# Patient Record
Sex: MALE | Race: WHITE | Employment: OTHER | URBAN - NONMETROPOLITAN AREA
[De-identification: names, ages, dates, MRNs, and addresses within clinical notes are randomized per-mention and may not be internally consistent; named-entity substitution may affect disease eponyms.]

---

## 2017-05-30 ENCOUNTER — OFFICE VISIT (OUTPATIENT)
Dept: FAMILY MEDICINE | Facility: CLINIC | Age: 72
End: 2017-05-30

## 2017-05-30 VITALS
BODY MASS INDEX: 27.07 KG/M2 | DIASTOLIC BLOOD PRESSURE: 88 MMHG | TEMPERATURE: 97.3 F | WEIGHT: 186 LBS | HEART RATE: 50 BPM | SYSTOLIC BLOOD PRESSURE: 138 MMHG | OXYGEN SATURATION: 99 %

## 2017-05-30 DIAGNOSIS — B02.9 HERPES ZOSTER WITHOUT COMPLICATION: Primary | ICD-10-CM

## 2017-05-30 PROCEDURE — 99214 OFFICE O/P EST MOD 30 MIN: CPT | Performed by: NURSE PRACTITIONER

## 2017-05-30 RX ORDER — ACYCLOVIR 800 MG/1
800 TABLET ORAL
Qty: 35 TABLET | Refills: 0 | Status: SHIPPED | OUTPATIENT
Start: 2017-05-30 | End: 2017-11-28

## 2017-05-30 RX ORDER — PREDNISONE 20 MG/1
40 TABLET ORAL DAILY
Qty: 10 TABLET | Refills: 0 | Status: SHIPPED | OUTPATIENT
Start: 2017-05-30 | End: 2017-06-04

## 2017-05-30 RX ORDER — AMITRIPTYLINE HYDROCHLORIDE 10 MG/1
TABLET ORAL
Qty: 60 TABLET | Refills: 0 | Status: SHIPPED | OUTPATIENT
Start: 2017-05-30 | End: 2017-06-20

## 2017-05-30 NOTE — PATIENT INSTRUCTIONS
Shingles  Shingles is a viral infection caused by the same virus as chicken pox. Anyone who has had chicken pox may get shingles later in life. The virus stays in the body, but remains dormant (asleep). Shingles often occurs in older persons or persons with lowered immunity. But it can affect anyone at any age.  Shingles starts as a tingling patch of skin on one side of the body. Small, painful blisters may then appear. The rash does not spread to the rest of the body.  Exposure to shingles cannot cause shingles. However, it can cause chicken pox in anyone who has not had chicken pox or has not been vaccinated. The contagious period ends when all blisters have crusted over (generally about 2 weeks after the illness begins).  After the blisters heal, the affected skin may be sensitive or painful for months (neuralgia). This often gradually goes away.  A shingles vaccine is available. This can help prevent shingles or make it less painful. It is generally recommended for adults over the age of 60 who have had chicken pox in the past, but who have never had shingles. Adults over 60 who have had neither chicken pox nor shingles can prevent both diseases with the chicken pox vaccine. Ask your healthcare provider about these vaccines.  Home care    Medicines may be prescribed to help relieve pain. Take these medicines as directed. Ask your healthcare provider or pharmacist before using over-the-counter medicines for helping treat pain and itching.     In certain cases, antiviral medicines may be prescribed to reduce pain, shorten the illness, and prevent neuralgia. Take these medicines as directed.    Compresses made from a solution of cool water mixed with cornstarch or baking soda may help relieve pain and itching.     Gently wash skin daily with soap and water to help prevent infection.  Be certain to rinse off all of the soap, which can be irritating.    Trim fingernails and try not to scratch. Scratching the sores  may leave scars.    Stay home from work or school until all blisters have formed a crust and you are no longer contagious.  Follow-up care  Follow up with your healthcare provider or as directed by our staff.  When to seek medical advice    Fever of 100.4 F (38 C) or higher, or as directed by your healthcare provider    Affected skin is on the face or neck and any of the following occur:                          Headache                          Eye pain                          Changes in vision                          Sores near the eye                          Weakness of facial muscles    Pain, redness, or swelling of a joint    Signs of skin infection: colored drainage from the sores, warmth, increasing redness, or increasing pain    0404-9276 [employer]. [address] [place] [address]. All rights reserved. This information is not intended as a substitute for professional medical care. Always follow your healthcare professional's instructions.

## 2017-05-30 NOTE — PROGRESS NOTES
SUBJECTIVE:                                                    Simone Daniels is a 69 year old male who presents to clinic today for the following health issues:      Rash      Duration: 5 days     Description  Location: left leg   Itching: mild    Intensity:  moderate    Accompanying signs and symptoms: blisters     History (similar episodes/previous evaluation): None    Precipitating or alleviating factors:  New exposures:  soaps laundry   Recent travel: no      Therapies tried and outcome: hydrocortisone cream -  not effective and lotions     Chicken pox in childhood.   Burning and painful  5-6 days.   No relief of the pain. Used heating pad and it got worse.  Thought it might be from a new laundry soap. New use of ERA        Chronic left knee pain.       -------------------------------------    Problem list and histories reviewed & adjusted, as indicated.  Additional history: as documented    BP Readings from Last 3 Encounters:   05/30/17 (!) 152/96   12/14/15 139/82   05/22/15 148/70    Wt Readings from Last 3 Encounters:   05/30/17 186 lb (84.4 kg)   12/14/15 186 lb (84.4 kg)   05/22/15 187 lb (84.8 kg)                  Labs reviewed in EPIC    Reviewed and updated as needed this visit by clinical staff  Allergies       Reviewed and updated as needed this visit by Provider         ROS:   ROS: 10 point ROS neg other than the symptoms noted above in the HPI.      OBJECTIVE:                                                    BP (!) 152/96  Pulse 50  Temp 97.3  F (36.3  C) (Tympanic)  Wt 186 lb (84.4 kg)  SpO2 99%  BMI 27.07 kg/m2  Body mass index is 27.07 kg/(m^2).   GENERAL: healthy, alert, well nourished, well hydrated, no distress  HENT: ear canals- normal; TMs- normal; Nose- normal; Mouth- no ulcers, no lesions  NECK: no tenderness, no adenopathy, no asymmetry, no masses, no stiffness; thyroid- normal to palpation  RESP: lungs clear to auscultation - no rales, no rhonchi, no wheezes  CV: regular  rates and rhythm, normal S1 S2, no S3 or S4 and no murmur, no click or rub -  ABDOMEN: soft, no tenderness, no  hepatosplenomegaly, no masses, normal bowel sounds  SKIN blistering lesions back of the left thigh down to the left knee.     Diagnostic test results:  Results for orders placed or performed in visit on 12/16/15   Basic metabolic panel  (Ca, Cl, CO2, Creat, Gluc, K, Na, BUN)   Result Value Ref Range    Sodium 140 133 - 144 mmol/L    Potassium 4.0 3.4 - 5.3 mmol/L    Chloride 106 94 - 109 mmol/L    Carbon Dioxide 27 20 - 32 mmol/L    Anion Gap 7 3 - 14 mmol/L    Glucose 91 70 - 99 mg/dL    Urea Nitrogen 18 7 - 30 mg/dL    Creatinine 0.88 0.66 - 1.25 mg/dL    GFR Estimate 86 >60 mL/min/1.7m2    GFR Estimate If Black >90   GFR Calc   >60 mL/min/1.7m2    Calcium 8.9 8.5 - 10.1 mg/dL   Lipid Profile   Result Value Ref Range    Cholesterol 159 <200 mg/dL    Triglycerides 52 <150 mg/dL    HDL Cholesterol 76 >39 mg/dL    LDL Cholesterol Calculated 73 <100 mg/dL    Non HDL Cholesterol 83 <130 mg/dL   Cortisol   Result Value Ref Range    Cortisol Serum 13.7 4 - 22 ug/dL   TSH with free T4 reflex   Result Value Ref Range    TSH 1.73 0.40 - 4.00 mU/L   CBC with platelets   Result Value Ref Range    WBC 5.4 4.0 - 11.0 10e9/L    RBC Count 4.75 4.4 - 5.9 10e12/L    Hemoglobin 14.9 13.3 - 17.7 g/dL    Hematocrit 43.2 40.0 - 53.0 %    MCV 91 78 - 100 fl    MCH 31.4 26.5 - 33.0 pg    MCHC 34.5 31.5 - 36.5 g/dL    RDW 12.7 10.0 - 15.0 %    Platelet Count 215 150 - 450 10e9/L        ASSESSMENT/PLAN:                                                    1. Herpes zoster without complication  BEGIN NEW MEDS  - acyclovir (ZOVIRAX) 800 MG tablet; Take 1 tablet (800 mg) by mouth 5 times daily for 7 days  Dispense: 35 tablet; Refill: 0  - amitriptyline (ELAVIL) 10 MG tablet; Start at 1 tab at bedtime for 3 days, then 2 tabs at bedtime for 3 days, then 3 tabs at bedtime.  Plan to increase dose to 50-75 mg at bedtime  after 1 month  Dispense: 60 tablet; Refill: 0  - acetaminophen-codeine (TYLENOL #3) 300-30 MG per tablet; Take 1 tablet by mouth every 4 hours as needed for pain maximum 4 tablet(s) per day  Dispense: 20 tablet; Refill: 0  Start tomorrow.   - predniSONE (DELTASONE) 20 MG tablet; Take 2 tablets (40 mg) by mouth daily for 5 days  Dispense: 10 tablet; Refill: 0      Follow up with Provider - Call or return to the clinic with any worsening of symptoms or no resolution. Patient/Parent verbalized understanding and is in agreement. Medication side effects reviewed.   Current Outpatient Prescriptions   Medication Sig Dispense Refill     acyclovir (ZOVIRAX) 800 MG tablet Take 1 tablet (800 mg) by mouth 5 times daily for 7 days 35 tablet 0     amitriptyline (ELAVIL) 10 MG tablet Start at 1 tab at bedtime for 3 days, then 2 tabs at bedtime for 3 days, then 3 tabs at bedtime.  Plan to increase dose to 50-75 mg at bedtime after 1 month 60 tablet 0     acetaminophen-codeine (TYLENOL #3) 300-30 MG per tablet Take 1 tablet by mouth every 4 hours as needed for pain maximum 4 tablet(s) per day 20 tablet 0     predniSONE (DELTASONE) 20 MG tablet Take 2 tablets (40 mg) by mouth daily for 5 days 10 tablet 0     L-CITRULLINE 600 MG OR CAPS 2 capsule daily       Q-10 CO-ENZYME OR 300mg daily          See Patient Instructions    CAROLANN Leary [facility]

## 2017-05-30 NOTE — MR AVS SNAPSHOT
After Visit Summary   5/30/2017    Simone Daniels    MRN: [id number]           Patient Information     Date Of Birth          5/26/1948        Visit Information        Provider Department      5/30/2017 10:00 AM Sophia Dacosta APRN [facility]        Today's Diagnoses     Herpes zoster without complication    -  1      Care Instructions      Shingles  Shingles is a viral infection caused by the same virus as chicken pox. Anyone who has had chicken pox may get shingles later in life. The virus stays in the body, but remains dormant (asleep). Shingles often occurs in older persons or persons with lowered immunity. But it can affect anyone at any age.  Shingles starts as a tingling patch of skin on one side of the body. Small, painful blisters may then appear. The rash does not spread to the rest of the body.  Exposure to shingles cannot cause shingles. However, it can cause chicken pox in anyone who has not had chicken pox or has not been vaccinated. The contagious period ends when all blisters have crusted over (generally about 2 weeks after the illness begins).  After the blisters heal, the affected skin may be sensitive or painful for months (neuralgia). This often gradually goes away.  A shingles vaccine is available. This can help prevent shingles or make it less painful. It is generally recommended for adults over the age of 60 who have had chicken pox in the past, but who have never had shingles. Adults over 60 who have had neither chicken pox nor shingles can prevent both diseases with the chicken pox vaccine. Ask your healthcare provider about these vaccines.  Home care    Medicines may be prescribed to help relieve pain. Take these medicines as directed. Ask your healthcare provider or pharmacist before using over-the-counter medicines for helping treat pain and itching.     In certain cases, antiviral medicines may be prescribed to reduce pain, shorten the  illness, and prevent neuralgia. Take these medicines as directed.    Compresses made from a solution of cool water mixed with cornstarch or baking soda may help relieve pain and itching.     Gently wash skin daily with soap and water to help prevent infection.  Be certain to rinse off all of the soap, which can be irritating.    Trim fingernails and try not to scratch. Scratching the sores may leave scars.    Stay home from work or school until all blisters have formed a crust and you are no longer contagious.  Follow-up care  Follow up with your healthcare provider or as directed by our staff.  When to seek medical advice    Fever of 100.4 F (38 C) or higher, or as directed by your healthcare provider    Affected skin is on the face or neck and any of the following occur:                          Headache                          Eye pain                          Changes in vision                          Sores near the eye                          Weakness of facial muscles    Pain, redness, or swelling of a joint    Signs of skin infection: colored drainage from the sores, warmth, increasing redness, or increasing pain    [id number] [employer]. [address] [place] [address]. All rights reserved. This information is not intended as a substitute for professional medical care. Always follow your healthcare professional's instructions.                Follow-ups after your visit        Who to contact     If you have questions or need follow up information about today's clinic visit or your schedule please contact [facility] directly at [phone].  Normal or non-critical lab and imaging results will be communicated to you by MyChart, letter or phone within 4 business days after the clinic has received the results. If you do not hear from us within 7 days, please contact the clinic through MyChart or phone. If you have a critical or abnormal lab result, we will notify you by  "phone as soon as possible.  Submit refill requests through [employer] or call your pharmacy and they will forward the refill request to us. Please allow 3 business days for your refill to be completed.          Additional Information About Your Visit        [employer] Information     [employer] lets you send messages to your doctor, view your test results, renew your prescriptions, schedule appointments and more. To sign up, go to [email or web address] . Click on \"Log in\" on the left side of the screen, which will take you to the Welcome page. Then click on \"Sign up Now\" on the right side of the page.     You will be asked to enter the access code listed below, as well as some personal information. Please follow the directions to create your username and password.     Your access code is: [email or web address]  Expires: 2017 10:20 AM     Your access code will  in 90 days. If you need help or a new code, please call your [facility] clinic or [phone].        Care EveryWhere ID     This is your Care EveryWhere ID. This could be used by other organizations to access your [facility] medical records  [id number]        Your Vitals Were     Pulse Temperature Pulse Oximetry BMI (Body Mass Index)          50 97.3  F (36.3  C) (Tympanic) 99% 27.07 kg/m2         Blood Pressure from Last 3 Encounters:   17 (!) 152/96   12/14/15 139/82   05/22/15 148/70    Weight from Last 3 Encounters:   17 186 lb (84.4 kg)   12/14/15 186 lb (84.4 kg)   05/22/15 187 lb (84.8 kg)              Today, you had the following     No orders found for display         Today's Medication Changes          These changes are accurate as of: 17 10:20 AM.  If you have any questions, ask your nurse or doctor.               Start taking these medicines.        Dose/Directions    acetaminophen-codeine 300-30 MG per tablet   Commonly known as:  TYLENOL #3   Used for:  Herpes zoster without complication   Started by:  Sophia Dacosta APRN CNP "        Dose:  1 tablet   Take 1 tablet by mouth every 4 hours as needed for pain maximum 4 tablet(s) per day   Quantity:  20 tablet   Refills:  0       acyclovir 800 MG tablet   Commonly known as:  ZOVIRAX   Used for:  Herpes zoster without complication   Started by:  Sophia Dacosta APRN CNP        Dose:  800 mg   Take 1 tablet (800 mg) by mouth 5 times daily for 7 days   Quantity:  35 tablet   Refills:  0       amitriptyline 10 MG tablet   Commonly known as:  ELAVIL   Used for:  Herpes zoster without complication   Started by:  Sophia Dacosta APRN CNP        Start at 1 tab at bedtime for 3 days, then 2 tabs at bedtime for 3 days, then 3 tabs at bedtime.  Plan to increase dose to 50-75 mg at bedtime after 1 month   Quantity:  60 tablet   Refills:  0       predniSONE 20 MG tablet   Commonly known as:  DELTASONE   Used for:  Herpes zoster without complication   Started by:  Sophia Dacosta APRN CNP        Dose:  40 mg   Take 2 tablets (40 mg) by mouth daily for 5 days   Quantity:  10 tablet   Refills:  0         Stop taking these medicines if you haven't already. Please contact your care team if you have questions.     amoxicillin 500 MG capsule   Commonly known as:  AMOXIL   Stopped by:  Sophia Dacosta APRN CNP                Where to get your medicines      These medications were sent to [facility] [address] [address]     Phone:  [phone]     acyclovir 800 MG tablet    predniSONE 20 MG tablet         Some of these will need a paper prescription and others can be bought over the counter.  Ask your nurse if you have questions.     Bring a paper prescription for each of these medications     acetaminophen-codeine 300-30 MG per tablet    amitriptyline 10 MG tablet                Primary Care Provider Office Phone # Fax #    CAROLANN Leary [phone]       [facility]  [address] [place] [address]        Thank you!     Thank you for choosing [facility]  for your care. Our goal is always to provide you with excellent care. Hearing back from our patients is one way we can continue to improve our services. Please take a few minutes to complete the written survey that you may receive in the mail after your visit with us. Thank you!             Your Updated Medication List - Protect others around you: Learn how to safely use, store and throw away your medicines at www.disposemymeds.org.          This list is accurate as of: 5/30/17 10:20 AM.  Always use your most recent med list.                   Brand Name Dispense Instructions for use    acetaminophen-codeine 300-30 MG per tablet    TYLENOL #3    20 tablet    Take 1 tablet by mouth every 4 hours as needed for pain maximum 4 tablet(s) per day       acyclovir 800 MG tablet    ZOVIRAX    35 tablet    Take 1 tablet (800 mg) by mouth 5 times daily for 7 days       amitriptyline 10 MG tablet    ELAVIL    60 tablet    Start at 1 tab at bedtime for 3 days, then 2 tabs at bedtime for 3 days, then 3 tabs at bedtime.  Plan to increase dose to 50-75 mg at bedtime after 1 month       citrulline 600 MG Caps capsule    L-CITRULLINE     2 capsule daily       predniSONE 20 MG tablet    DELTASONE    10 tablet    Take 2 tablets (40 mg) by mouth daily for 5 days       Q-10 CO-ENZYME PO      300mg daily

## 2017-05-30 NOTE — NURSING NOTE
"Chief Complaint   Patient presents with     Derm Problem     left leg rash for 5 days        Initial There were no vitals taken for this visit. Estimated body mass index is 27.07 kg/(m^2) as calculated from the following:    Height as of 4/29/15: 5' 9.5\" (1.765 m).    Weight as of 12/14/15: 186 lb (84.4 kg).  Medication Reconciliation: complete    Health Maintenance that is potentially due pending provider review:  NONE    n/a    "

## 2017-06-12 ENCOUNTER — TELEPHONE (OUTPATIENT)
Dept: FAMILY MEDICINE | Facility: CLINIC | Age: 72
End: 2017-06-12

## 2017-06-12 DIAGNOSIS — B02.9 HERPES ZOSTER WITHOUT COMPLICATION: Primary | ICD-10-CM

## 2017-06-12 RX ORDER — LIDOCAINE 50 MG/G
PATCH TOPICAL
Qty: 90 PATCH | Refills: 1 | Status: SHIPPED | OUTPATIENT
Start: 2017-06-12 | End: 2017-06-20

## 2017-06-12 NOTE — TELEPHONE ENCOUNTER
Reason for call:  Patient reporting a symptom    Symptom or request: Shingles Healing- Shingles very itchy. Pt requesting script for something for the itching. Pt unable to get ahold of him as he is working  Until tonight. Pt was seen 5/30/17 for the Shingles. Please Advise.    Have you been treated for this before? No    Phone Number patient can be reached at:  Home number on file [phone] (home)    Best Time:  Any Time      Can we leave a detailed message on this number:  YES    Call taken on 6/12/2017 at 8:56 AM by Denisse Mcdonald

## 2017-06-18 ENCOUNTER — TELEPHONE (OUTPATIENT)
Dept: NURSING | Facility: CLINIC | Age: 72
End: 2017-06-18

## 2017-06-18 ENCOUNTER — NURSE TRIAGE (OUTPATIENT)
Dept: NURSING | Facility: CLINIC | Age: 72
End: 2017-06-18

## 2017-06-18 DIAGNOSIS — B02.29 POST HERPETIC NEURALGIA: Primary | ICD-10-CM

## 2017-06-19 RX ORDER — GABAPENTIN 300 MG/1
300 CAPSULE ORAL 3 TIMES DAILY
Qty: 90 CAPSULE | Refills: 1 | Status: CANCELLED | OUTPATIENT
Start: 2017-06-19

## 2017-06-19 NOTE — TELEPHONE ENCOUNTER
"Simone calling for a prescription for Gabapentin for his postherpetic neuralgia symptoms which he describes as a \"burning pain\" from his \"buttock to his lower leg\". He reports his shingles rash is \"almost gone\" but that his currently prescribed medications aren't helping his nerve pain. He has tried the lidocaine patches, soaking, anti itch cream, \"synovial fluid\" topically and amitriptyline, with no resolve. He reports the nerve pain has been present \"for 1 week\". Advised patient to contact clinic in am for further pain control options. This nurse will leave a message for the clinic team as well. This nurse provided patient education regarding shingles, postherpetic neuralgia treatments, gabapentin side effects, Capsaicin info, etc via [employer], [employer] and nursing knowledge. (http://[email or web address]/skin-problems-[email or web address]-treatments/shingles/understanding-postherpetic-neuralgia-basics)  "

## 2017-06-19 NOTE — TELEPHONE ENCOUNTER
"  Reason for Disposition    SEVERE pain (e.g., excruciating)    Additional Information    Negative: Difficult to awaken or acting confused  (e.g., disoriented, slurred speech)    Negative: Sounds like a life-threatening emergency to the triager    Negative: [1] Localized rash AND [2] doesn't match the SYMPTOMS of shingles    Negative: [1] Back pain AND [2] doesn't match the SYMPTOMS of shingles    Negative: Patient sounds very sick or weak to the triager    Negative: [1] Shingles rash (matches SYMPTOMS) AND [2] weak immune system (e.g., HIV positive,  cancer chemotherapy, chronic steroid treatment, splenectomy) AND [3] NOT taking antiviral medication    Negative: Shingles rash on the eyelid or tip of the nose    Negative: [1] Shingles rash of face AND [2] eye pain or blurred vision    Negative: [1] Shingles rash of face AND [2] facial weakness    Negative: [1] Shingles rash of face or ear AND [2] earache or ringing in the ear    Negative: [1] Shingles rash AND [2] spots start appearing other places on body    Negative: Fever > 100.5 F (38.1 C)    Protocols used: SHINGLES-ADULT-AH    Simone calling for a prescription for Gabapentin for his postherpetic neuralgia symptoms which he describes as a \"burning pain\" from his \"buttock to his lower leg\". He reports his shingles rash is \"almost gone\" but that his currently prescribed medications aren't helping his nerve pain. He has tried the lidocaine patches, soaking, anti itch cream, \"synovial fluid\" topically and amitriptyline, with no resolve. He reports the nerve pain has been present \"for 1 week\". Advised patient to contact clinic in am for further pain control options. This nurse will leave a message for the clinic team as well. This nurse provided patient education regarding shingles, postherpetic neuralgia treatments, gabapentin side effects, Capsaicin info, etc via [employer], [employer] and nursing knowledge. " (http://www.[email or web address]/skin-problems-and-treatments/shingles/understanding-postherpetic-neuralgia-basics)

## 2017-06-19 NOTE — TELEPHONE ENCOUNTER
Gabapentin ready to send to pharmacy of his choice.  Maybe once a day, then twice a day, then 3x/day.  Let's see how that does.

## 2017-06-19 NOTE — TELEPHONE ENCOUNTER
Clinic Action Needed: please call Simone regarding his postherpetic neuralgia pain options (he might try gabapentin or capsaicin)  [facility] Triage Call  Presenting Problem: current pain control methods not resolving pain  Guideline Used: shingles; see triage encounter from today for details  Patient Recommendations/Teaching: call clinic tomorrow morning for advice  Routed to: Sophia Dacosta's care team

## 2017-06-20 ENCOUNTER — OFFICE VISIT (OUTPATIENT)
Dept: FAMILY MEDICINE | Facility: CLINIC | Age: 72
End: 2017-06-20

## 2017-06-20 VITALS
BODY MASS INDEX: 26.05 KG/M2 | OXYGEN SATURATION: 99 % | TEMPERATURE: 97.3 F | DIASTOLIC BLOOD PRESSURE: 82 MMHG | SYSTOLIC BLOOD PRESSURE: 139 MMHG | WEIGHT: 179 LBS | HEART RATE: 80 BPM

## 2017-06-20 DIAGNOSIS — B02.29 POST HERPETIC NEURALGIA: Primary | ICD-10-CM

## 2017-06-20 PROCEDURE — 99214 OFFICE O/P EST MOD 30 MIN: CPT | Performed by: NURSE PRACTITIONER

## 2017-06-20 RX ORDER — GABAPENTIN 300 MG/1
CAPSULE ORAL
Qty: 90 CAPSULE | Refills: 0 | Status: SHIPPED | OUTPATIENT
Start: 2017-06-20 | End: 2017-06-20

## 2017-06-20 RX ORDER — GABAPENTIN 100 MG/1
CAPSULE ORAL
Qty: 90 CAPSULE | Refills: 0 | Status: SHIPPED | OUTPATIENT
Start: 2017-06-20 | End: 2017-07-25

## 2017-06-20 RX ORDER — ASPIRIN 81 MG
TABLET,CHEWABLE ORAL 4 TIMES DAILY
Qty: 56.6 G | Refills: 2 | Status: ON HOLD | OUTPATIENT
Start: 2017-06-20 | End: 2018-12-11

## 2017-06-20 NOTE — PROGRESS NOTES
SUBJECTIVE:                                                    Simone Daniels is a 69 year old male who presents to clinic today for the following health issues:      RASH SHINGLES PAIN       Duration: 2 weeks     Description (location/character/radiation): left leg and abd     Intensity:  severe    Accompanying signs and symptoms: shooting pains     History (similar episodes/previous evaluation): shingles        Shingles lesions resolving no further open sores.   Severe burning pain left buttocks radiating down the left leg  lidoderm patches seemed to help some but when heated up after being outside. Causing severe burning sensation  Needs something further for pain. Not sleeping well. Hard to perform ADL's due to pain.   -------------------------------------    Problem list and histories reviewed & adjusted, as indicated.  Additional history: as documented    BP Readings from Last 3 Encounters:   06/20/17 139/82   05/30/17 138/88   12/14/15 139/82    Wt Readings from Last 3 Encounters:   06/20/17 179 lb (81.2 kg)   05/30/17 186 lb (84.4 kg)   12/14/15 186 lb (84.4 kg)                  Labs reviewed in EPIC    Reviewed and updated as needed this visit by clinical staff  Allergies       Reviewed and updated as needed this visit by Provider         ROS:  C: NEGATIVE for fever, chills, change in weight  INTEGUMENTARY/SKIN: SHINGELS  E/M: NEGATIVE for ear, mouth and throat problems  R: NEGATIVE for significant cough or SOB  CV: NEGATIVE for chest pain, palpitations or peripheral edema  MUSCULOSKELETAL: CHRONIC LEFT KNEE PAIN  ROS otherwise negative    OBJECTIVE:                                                    /82  Pulse 80  Temp 97.3  F (36.3  C) (Tympanic)  Wt 179 lb (81.2 kg)  SpO2 99%  BMI 26.05 kg/m2  Body mass index is 26.05 kg/(m^2).   GENERAL: healthy, alert, well nourished, well hydrated, no distress  HENT: ear canals- normal; TMs- normal; Nose- normal; Mouth- no ulcers, no lesions  NECK: no  tenderness, no adenopathy, no asymmetry, no masses, no stiffness; thyroid- normal to palpation  RESP: lungs clear to auscultation - no rales, no rhonchi, no wheezes  CV: regular rates and rhythm, normal S1 S2, no S3 or S4 and no murmur, no click or rub -  ABDOMEN: soft, no tenderness, no  hepatosplenomegaly, no masses, normal bowel sounds  SKIN calamine lotion if placed. Hard to see. Lesions all look to have dried up.   CMS to LLE intact.   Hypersensitive to touch.     Diagnostic test results:  none      ASSESSMENT/PLAN:                                                    1. Post herpetic neuralgia  Discussed treatment strategies.   May use topical   - Capsaicin 0.1 % cream; Apply topically 4 times daily  Dispense: 56.6 g; Refill: 2  Try oral   - gabapentin (NEURONTIN) 100 MG capsule; 100 mg by mouth at night for 3 nights then then 1 tablet twice daily for 3 days, then 1 tablet three times daily.  Dispense: 90 capsule; Refill: 0  Stop amitriptyline while taking neurontin.       Follow up with Provider - 1 month.  Call or return to the clinic with any worsening of symptoms or no resolution. Patient/Parent verbalized understanding and is in agreement. Medication side effects reviewed.   Current Outpatient Prescriptions   Medication Sig Dispense Refill     acetaminophen-codeine (TYLENOL #3) 300-30 MG per tablet Take 1 tablet by mouth every 4 hours as needed for pain maximum 4 tablet(s) per day 30 tablet 0     Capsaicin 0.1 % cream Apply topically 4 times daily 56.6 g 2     gabapentin (NEURONTIN) 100 MG capsule 100 mg by mouth at night for 3 nights then then 1 tablet twice daily for 3 days, then 1 tablet three times daily. 90 capsule 0     L-CITRULLINE 600 MG OR CAPS 2 capsule daily       Q-10 CO-ENZYME OR 300mg daily       [DISCONTINUED] gabapentin (NEURONTIN) 300 MG capsule Take 1 tablet (300 mg) every night for 1-3 days, then 1 tablet twice daily for 1-3 days, then 1 tablet three times daily 90 capsule 0     acyclovir  (ZOVIRAX) 800 MG tablet Take 1 tablet (800 mg) by mouth 5 times daily for 7 days 35 tablet 0          CAROLANN Leary CNP  [facility]

## 2017-06-20 NOTE — PATIENT INSTRUCTIONS
Shingles (Herpes Zoster)     Talk to your healthcare provider about the shingles vaccine.     Shingles is also called herpes zoster. It is a painful skin rash caused by the herpes zoster virus. This is the same virus that causes chickenpox. After a person has chickenpox, the virus remains inactive in the nerve cells. Years later, the virus can become active again and travel to the skin. Most people have shingles only once, but it is possible to have it more than once.  What are the risk factors for shingles?  Anyone who has ever had chickenpox can develop shingles. But your risk is greater if you:    Are 50 years of age or older    Have an illness that weakens your immune system, such as HIV/AIDS    Have cancer, especially Hodgkin disease or lymphoma    Take medicines that weaken your immune system  What are the symptoms of shingles?    The first sign of shingles is usually pain, burning, tingling, or itching on one part of your face or body. You may also feel as if you have the flu, with fever and chills.    A red rash with small blisters appears within a few days. The rash may appear as follows:     The blisters can occur anywhere, but they re most common on the back, chest, or abdomen.    They usually appear on only one side of the body, spreading along the nerve pathway where the virus was inactive.     The rash can also form around an eye, along one side of the face or neck, or in the mouth.    In a few people, usually those with weakened immune systems, shingles appear on more than one part of the body at once.    After a few days, the blisters become dry and form a crust. The crust falls off in days to weeks. The blisters generally do not leave scars.  How is shingles treated?  For most people, shingles heals on its own in a few weeks. But treatment is recommended to help relieve pain, speed healing, and reduce the risk of complications. Antiviral medicines are prescribed within the first 72 hours of the  appearance of the rash. To lessen symptoms:    Apply ice packs (wrapped in a thin towel) or cool compresses, or soak in a cool bath.    Use calamine lotion to calm itchy skin.    Ask your healthcare provider about over-the-counter pain relievers. If your pain is severe, your healthcare provider may prescribe stronger pain medicines.  What are the complications of shingles?  Shingles often goes away with no lasting effects. But some people have serious problems long after the blisters have healed:    Postherpetic neuralgia. This is the most common complication. It is severe nerve pain at the place where the rash used to be. It can last for months, or even years after you have had shingles. Medicines can be prescribed to help relieve the pain and improve quality of life.    Bacterial infection. Shingles blisters may become infected with bacteria. Antibiotic medicine is used to treat the infection.    Eye problems. A person with shingles on the face should see his or her healthcare provider right away. Shingles can cause serious problems with vision, and even blindness.  Very rarely shingles can also lead to pneumonia, hearing problems, brain inflammation, or even death.   When to seek medical care  Contact your healthcare provider if you experience any of the following:    Symptoms that don t go away with treatment    A rash or blisters near your eye    Increased drainage, fever, or rash after treatment, or severe pain that doesn t go away   How can shingles be prevented?  You can only get shingles if you have had chicken pox in the past. Those who have never had chickenpox can get the virus from you. Although instead of developing shingles, the person may get chickenpox. Until your blisters form scabs, avoid contact with others, especially the following:    Pregnant women who have never had chickenpox or the vaccine    Infants who were born early (prematurely) or who had low weight at birth    People with weak immune  system (for example, people receiving chemotherapy for cancer, people who have had organ transplants, or people with HIV infections)     The shingles vaccine  If you re 60 years of age or older, ask your healthcare provider if you should receive the shingles vaccine. The vaccine makes it less likely that you will develop shingles. If you do develop shingles, your symptoms will likely be milder than if you hadn t been vaccinated. Note: Although the vaccine is licensed for people 50 years of age or older, the CDC does not recommend the vaccine for those who are 50 to 59 years old.   Date Last Reviewed: 10/1/2016    6460-5854 [employer]. [address] [place] [address], [place] [address]. All rights reserved. This information is not intended as a substitute for professional medical care. Always follow your healthcare professional's instructions.

## 2017-06-20 NOTE — MR AVS SNAPSHOT
After Visit Summary   6/20/2017    Simone Daniels    MRN: [id number]           Patient Information     Date Of Birth          5/26/1948        Visit Information        Provider Department      6/20/2017 12:00 PM Sophia Dacosta APRN [facility]        Today's Diagnoses     Post herpetic neuralgia    -  1    Herpes zoster without complication          Care Instructions      Shingles (Herpes Zoster)     Talk to your healthcare provider about the shingles vaccine.     Shingles is also called herpes zoster. It is a painful skin rash caused by the herpes zoster virus. This is the same virus that causes chickenpox. After a person has chickenpox, the virus remains inactive in the nerve cells. Years later, the virus can become active again and travel to the skin. Most people have shingles only once, but it is possible to have it more than once.  What are the risk factors for shingles?  Anyone who has ever had chickenpox can develop shingles. But your risk is greater if you:    Are 50 years of age or older    Have an illness that weakens your immune system, such as HIV/AIDS    Have cancer, especially Hodgkin disease or lymphoma    Take medicines that weaken your immune system  What are the symptoms of shingles?    The first sign of shingles is usually pain, burning, tingling, or itching on one part of your face or body. You may also feel as if you have the flu, with fever and chills.    A red rash with small blisters appears within a few days. The rash may appear as follows:     The blisters can occur anywhere, but they re most common on the back, chest, or abdomen.    They usually appear on only one side of the body, spreading along the nerve pathway where the virus was inactive.     The rash can also form around an eye, along one side of the face or neck, or in the mouth.    In a few people, usually those with weakened immune systems, shingles appear on more than one part of the  body at once.    After a few days, the blisters become dry and form a crust. The crust falls off in days to weeks. The blisters generally do not leave scars.  How is shingles treated?  For most people, shingles heals on its own in a few weeks. But treatment is recommended to help relieve pain, speed healing, and reduce the risk of complications. Antiviral medicines are prescribed within the first 72 hours of the appearance of the rash. To lessen symptoms:    Apply ice packs (wrapped in a thin towel) or cool compresses, or soak in a cool bath.    Use calamine lotion to calm itchy skin.    Ask your healthcare provider about over-the-counter pain relievers. If your pain is severe, your healthcare provider may prescribe stronger pain medicines.  What are the complications of shingles?  Shingles often goes away with no lasting effects. But some people have serious problems long after the blisters have healed:    Postherpetic neuralgia. This is the most common complication. It is severe nerve pain at the place where the rash used to be. It can last for months, or even years after you have had shingles. Medicines can be prescribed to help relieve the pain and improve quality of life.    Bacterial infection. Shingles blisters may become infected with bacteria. Antibiotic medicine is used to treat the infection.    Eye problems. A person with shingles on the face should see his or her healthcare provider right away. Shingles can cause serious problems with vision, and even blindness.  Very rarely shingles can also lead to pneumonia, hearing problems, brain inflammation, or even death.   When to seek medical care  Contact your healthcare provider if you experience any of the following:    Symptoms that don t go away with treatment    A rash or blisters near your eye    Increased drainage, fever, or rash after treatment, or severe pain that doesn t go away   How can shingles be prevented?  You can only get shingles if you have  had chicken pox in the past. Those who have never had chickenpox can get the virus from you. Although instead of developing shingles, the person may get chickenpox. Until your blisters form scabs, avoid contact with others, especially the following:    Pregnant women who have never had chickenpox or the vaccine    Infants who were born early (prematurely) or who had low weight at birth    People with weak immune system (for example, people receiving chemotherapy for cancer, people who have had organ transplants, or people with HIV infections)     The shingles vaccine  If you re 60 years of age or older, ask your healthcare provider if you should receive the shingles vaccine. The vaccine makes it less likely that you will develop shingles. If you do develop shingles, your symptoms will likely be milder than if you hadn t been vaccinated. Note: Although the vaccine is licensed for people 50 years of age or older, the CDC does not recommend the vaccine for those who are 50 to 59 years old.   Date Last Reviewed: 10/1/2016    0449-5036 [employer]. [address] [place] [address]. All rights reserved. This information is not intended as a substitute for professional medical care. Always follow your healthcare professional's instructions.                Follow-ups after your visit        Who to contact     If you have questions or need follow up information about today's clinic visit or your schedule please contact [facility] directly at [phone].  Normal or non-critical lab and imaging results will be communicated to you by MyChart, letter or phone within 4 business days after the clinic has received the results. If you do not hear from us within 7 days, please contact the clinic through MyChart or phone. If you have a critical or abnormal lab result, we will notify you by phone as soon as possible.  Submit refill requests through [employer] or call your pharmacy and they will  "forward the refill request to us. Please allow 3 business days for your refill to be completed.          Additional Information About Your Visit        [email or web address] Information     [employer] lets you send messages to your doctor, view your test results, renew your prescriptions, schedule appointments and more. To sign up, go to [email or web address] . Click on \"Log in\" on the left side of the screen, which will take you to the Welcome page. Then click on \"Sign up Now\" on the right side of the page.     You will be asked to enter the access code listed below, as well as some personal information. Please follow the directions to create your username and password.     Your access code is: [email or web address]  Expires: 2017 10:20 AM     Your access code will  in 90 days. If you need help or a new code, please call your [facility] clinic or [phone].        Care EveryWhere ID     This is your Care EveryWhere ID. This could be used by other organizations to access your [facility] medical records  [id number]        Your Vitals Were     Pulse Temperature Pulse Oximetry BMI (Body Mass Index)          80 97.3  F (36.3  C) (Tympanic) 99% 26.05 kg/m2         Blood Pressure from Last 3 Encounters:   17 139/82   17 138/88   12/14/15 139/82    Weight from Last 3 Encounters:   17 179 lb (81.2 kg)   17 186 lb (84.4 kg)   12/14/15 186 lb (84.4 kg)              Today, you had the following     No orders found for display         Today's Medication Changes          These changes are accurate as of: 17 12:26 PM.  If you have any questions, ask your nurse or doctor.               Start taking these medicines.        Dose/Directions    Capsaicin 0.1 % cream   Used for:  Post herpetic neuralgia   Started by:  Sophia Dacosta APRN CNP        Apply topically 4 times daily   Quantity:  56.6 g   Refills:  2       gabapentin 300 MG capsule   Commonly known as:  NEURONTIN   Used for:  Post herpetic neuralgia "   Started by:  Sophia Dacosta APRN CNP        Take 1 tablet (300 mg) every night for 1-3 days, then 1 tablet twice daily for 1-3 days, then 1 tablet three times daily   Quantity:  90 capsule   Refills:  0         Stop taking these medicines if you haven't already. Please contact your care team if you have questions.     amitriptyline 10 MG tablet   Commonly known as:  ELAVIL   Stopped by:  Sophia Dacosta APRN CNP           lidocaine 5 % Patch   Commonly known as:  LIDODERM   Stopped by:  Sophia Dacosta APRN CNP                Where to get your medicines      These medications [place] [facility] [place] - [address]  [address] [facility] [address]     Phone:  [phone]     Capsaicin 0.1 % cream    gabapentin 300 MG capsule         Some of these will need a paper prescription and others can be bought over the counter.  Ask your nurse if you have questions.     Bring a paper prescription for each of these medications     acetaminophen-codeine 300-30 MG per tablet                Primary Care Provider Office Phone # Fax #    CAROLANN Leary [phone]       [facility] [address] [facility] [address]        Thank you!     Thank you for choosing [facility]  for your care. Our goal is always to provide you with excellent care. Hearing back from our patients is one way we can continue to improve our services. Please take a few minutes to complete the written survey that you may receive in the mail after your visit with us. Thank you!             Your Updated Medication List - Protect others around you: Learn how to safely use, store and throw away your medicines at www.disposemymeds.org.          This list is accurate as of: 6/20/17 12:26 PM.  Always use your most recent med list.                   Brand Name Dispense Instructions for use    acetaminophen-codeine 300-30 MG per tablet    TYLENOL  #3    30 tablet    Take 1 tablet by mouth every 4 hours as needed for pain maximum 4 tablet(s) per day       acyclovir 800 MG tablet    ZOVIRAX    35 tablet    Take 1 tablet (800 mg) by mouth 5 times daily for 7 days       Capsaicin 0.1 % cream     56.6 g    Apply topically 4 times daily       citrulline 600 MG Caps capsule    L-CITRULLINE     2 capsule daily       gabapentin 300 MG capsule    NEURONTIN    90 capsule    Take 1 tablet (300 mg) every night for 1-3 days, then 1 tablet twice daily for 1-3 days, then 1 tablet three times daily       Q-10 CO-ENZYME PO      300mg daily

## 2017-06-20 NOTE — NURSING NOTE
"Chief Complaint   Patient presents with     Pain     shingles pain        Initial There were no vitals taken for this visit. Estimated body mass index is 27.07 kg/(m^2) as calculated from the following:    Height as of 4/29/15: 5' 9.5\" (1.765 m).    Weight as of 5/30/17: 186 lb (84.4 kg).  Medication Reconciliation: complete    Health Maintenance that is potentially due pending provider review:  NONE    n/a    "

## 2017-07-12 DIAGNOSIS — B02.29 OTHER POSTHERPETIC NERVOUS SYSTEM INVOLVEMENT: Primary | ICD-10-CM

## 2017-07-12 NOTE — TELEPHONE ENCOUNTER
Please call patient and do a formal Pain Eval to document ongoing pain.   Location of Pain:  Duration of Pain:   Rating of Pain: /10  Pain is better with:   Pain is worse with:  Treatment so far consists of:    RX in out basket for pain meds  Thanks Sophia Dacosta FNP-BC

## 2017-07-12 NOTE — TELEPHONE ENCOUNTER
Pain Eval  Location of Pain: left leg  Duration of Pain: one month  Rating of Pain: 6/10  Pain is better with: taking pain medication  Pain is worse with:   Treatment so far consists of: Tylenol 3  :

## 2017-07-25 DIAGNOSIS — B02.29 POST HERPETIC NEURALGIA: ICD-10-CM

## 2017-07-25 RX ORDER — GABAPENTIN 100 MG/1
100 CAPSULE ORAL 3 TIMES DAILY
Qty: 30 CAPSULE | Refills: 0 | Status: SHIPPED | OUTPATIENT
Start: 2017-07-25 | End: 2017-11-28

## 2017-07-25 NOTE — TELEPHONE ENCOUNTER
Gabapentin      Last Written Prescription Date: 6/20/2017  Last Quantity: 90, # refills: 0  Last Office Visit with [facility], [facility] or [facility] prescribing provider: 6/20/2017       Creatinine   Date Value Ref Range Status   12/16/2015 0.88 0.66 - 1.25 mg/dL Final     Lab Results   Component Value Date    AST 32 02/27/2010     Lab Results   Component Value Date    ALT 12 02/27/2010     BP Readings from Last 3 Encounters:   06/20/17 139/82   05/30/17 138/88   12/14/15 139/82       He is wondering if he can get 10 more days of the Gabapentin for his shingles.

## 2017-11-28 ENCOUNTER — OFFICE VISIT (OUTPATIENT)
Dept: FAMILY MEDICINE | Facility: CLINIC | Age: 72
End: 2017-11-28

## 2017-11-28 VITALS
DIASTOLIC BLOOD PRESSURE: 62 MMHG | BODY MASS INDEX: 26.05 KG/M2 | WEIGHT: 179 LBS | RESPIRATION RATE: 14 BRPM | SYSTOLIC BLOOD PRESSURE: 126 MMHG | HEART RATE: 80 BPM | OXYGEN SATURATION: 99 % | TEMPERATURE: 98.2 F

## 2017-11-28 DIAGNOSIS — B02.29 POST HERPETIC NEURALGIA: ICD-10-CM

## 2017-11-28 DIAGNOSIS — N30.00 ACUTE CYSTITIS WITHOUT HEMATURIA: ICD-10-CM

## 2017-11-28 DIAGNOSIS — N39.41 URGE INCONTINENCE OF URINE: Primary | ICD-10-CM

## 2017-11-28 LAB
ALBUMIN UR-MCNC: 30 MG/DL
APPEARANCE UR: ABNORMAL
BACTERIA #/AREA URNS HPF: ABNORMAL /HPF
BILIRUB UR QL STRIP: NEGATIVE
COLOR UR AUTO: YELLOW
GLUCOSE UR STRIP-MCNC: NEGATIVE MG/DL
HGB UR QL STRIP: ABNORMAL
KETONES UR STRIP-MCNC: NEGATIVE MG/DL
LEUKOCYTE ESTERASE UR QL STRIP: ABNORMAL
NITRATE UR QL: POSITIVE
PH UR STRIP: 5.5 PH (ref 5–7)
RBC #/AREA URNS AUTO: ABNORMAL /HPF
SOURCE: ABNORMAL
SP GR UR STRIP: 1.01 (ref 1–1.03)
UROBILINOGEN UR STRIP-ACNC: 0.2 EU/DL (ref 0.2–1)
WBC #/AREA URNS AUTO: >100 /HPF

## 2017-11-28 PROCEDURE — 81001 URINALYSIS AUTO W/SCOPE: CPT | Performed by: NURSE PRACTITIONER

## 2017-11-28 PROCEDURE — 87086 URINE CULTURE/COLONY COUNT: CPT | Performed by: NURSE PRACTITIONER

## 2017-11-28 PROCEDURE — 99214 OFFICE O/P EST MOD 30 MIN: CPT | Performed by: NURSE PRACTITIONER

## 2017-11-28 RX ORDER — GABAPENTIN 100 MG/1
100 CAPSULE ORAL 3 TIMES DAILY
Qty: 90 CAPSULE | Refills: 2 | Status: ON HOLD | OUTPATIENT
Start: 2017-11-28 | End: 2018-12-11

## 2017-11-28 RX ORDER — SULFAMETHOXAZOLE/TRIMETHOPRIM 800-160 MG
1 TABLET ORAL 2 TIMES DAILY
Qty: 20 TABLET | Refills: 0 | Status: SHIPPED | OUTPATIENT
Start: 2017-11-28 | End: 2017-12-08

## 2017-11-28 NOTE — PATIENT INSTRUCTIONS
Urinary Tract Infections in Men  Urinary tract infections (UTIs) are most often caused by bacteria (germs) that invade the urinary tract. The bacteria may come from outside the body. Or they may travel from the skin outside of rectum into the urethra. Pain in or around the urinary tract is a common symptom for most UTIs. But the only way to know for sure if you have a UTI is to have a urinalysis and urine culture.     Four Types of UTIs    Cystitis: A bladder infection, or cystitis, is often linked to a blockage from an enlarged prostate. You may have an urgent or frequent need to urinate, and bloody urine. Treatment includes antibiotics and medications to relax or shrink the prostate. In some cases, surgery is needed.    Urethritis: This is an infection of the urethra. You may have a discharge from the urethra or burning when you urinate.You may also have pain in the urethra or penis. Urethritis is treated with antibiotics.    Prostatitis: This is an inflammation or infection of the prostate. You may have an urgent or frequent need to urinate, fever, or burning when you urinate. Or you may have a tender prostate, or a vague feeling of pressure. Prostatitis is treated with a range of medications, depending on the cause.    Pyelonephritis: This is a kidney infection. If not treated, it can be serious and damage your kidneys. In severe cases you may be hospitalized. You may have a fever and upper back pain.  Treating a UTI    Medications: Most UTIs are treated with antibiotics. These kill the bacteria. The length of time you need to take them depends on the type of infection. Take antibiotics exactly as directed until all of the medication is gone. If you do not, the infection may not go away and may become harder to treat. For certain types of UTIs, you may be given other medications to help treat your symptoms.    Lifestyle changes: The lifestyle changes below will help get rid of your current infection. They may  also help prevent future UTIs.    Drink plenty of fluids such as water, juice, or other caffeine-free drinks. This helps flush bacteria out of your system.    Empty your bladder when you feel the urge to urinate and before going to sleep. Urine that stays in your bladder promotes infection.    Use condoms during sex. These help prevent UTIs caused by sexually transmitted bacteria.    Keep follow-up appointments with your health care provider. He or she can may do tests to make sure the infection has cleared. If necessary, additional treatment can be started.    Additional treatment: Most UTIs respond to medication. But sometimes a procedure or surgery is needed. This can treat an enlarged prostate, or remove a kidney stone or other blockage. Surgery may also treat problems caused by scarring or long-term infections.    2584-7171 [employer]. [address] [place] [address], [place] [address]. All rights reserved. This information is not intended as a substitute for professional medical care. Always follow your healthcare professional's instructions.  This information has been modified by your health care provider with permission from the publisher.

## 2017-11-28 NOTE — MR AVS SNAPSHOT
After Visit Summary   11/28/2017    Simone Daniels    MRN: [id number]           Patient Information     Date Of Birth          5/26/1948        Visit Information        Provider Department      11/28/2017 10:00 AM Sophia Dacosta APRN [facility]        Today's Diagnoses     Urge incontinence of urine    -  1    Post herpetic neuralgia        Acute cystitis without hematuria          Care Instructions      Urinary Tract Infections in Men  Urinary tract infections (UTIs) are most often caused by bacteria (germs) that invade the urinary tract. The bacteria may come from outside the body. Or they may travel from the skin outside of rectum into the urethra. Pain in or around the urinary tract is a common symptom for most UTIs. But the only way to know for sure if you have a UTI is to have a urinalysis and urine culture.     Four Types of UTIs    Cystitis: A bladder infection, or cystitis, is often linked to a blockage from an enlarged prostate. You may have an urgent or frequent need to urinate, and bloody urine. Treatment includes antibiotics and medications to relax or shrink the prostate. In some cases, surgery is needed.    Urethritis: This is an infection of the urethra. You may have a discharge from the urethra or burning when you urinate.You may also have pain in the urethra or penis. Urethritis is treated with antibiotics.    Prostatitis: This is an inflammation or infection of the prostate. You may have an urgent or frequent need to urinate, fever, or burning when you urinate. Or you may have a tender prostate, or a vague feeling of pressure. Prostatitis is treated with a range of medications, depending on the cause.    Pyelonephritis: This is a kidney infection. If not treated, it can be serious and damage your kidneys. In severe cases you may be hospitalized. You may have a fever and upper back pain.  Treating a UTI    Medications: Most UTIs are treated with  antibiotics. These kill the bacteria. The length of time you need to take them depends on the type of infection. Take antibiotics exactly as directed until all of the medication is gone. If you do not, the infection may not go away and may become harder to treat. For certain types of UTIs, you may be given other medications to help treat your symptoms.    Lifestyle changes: The lifestyle changes below will help get rid of your current infection. They may also help prevent future UTIs.    Drink plenty of fluids such as water, juice, or other caffeine-free drinks. This helps flush bacteria out of your system.    Empty your bladder when you feel the urge to urinate and before going to sleep. Urine that stays in your bladder promotes infection.    Use condoms during sex. These help prevent UTIs caused by sexually transmitted bacteria.    Keep follow-up appointments with your health care provider. He or she can may do tests to make sure the infection has cleared. If necessary, additional treatment can be started.    Additional treatment: Most UTIs respond to medication. But sometimes a procedure or surgery is needed. This can treat an enlarged prostate, or remove a kidney stone or other blockage. Surgery may also treat problems caused by scarring or long-term infections.    0312-8557 [employer]. [address] [place] [address]. All rights reserved. This information is not intended as a substitute for professional medical care. Always follow your healthcare professional's instructions.  This information has been modified by your health care provider with permission from the publisher.                Follow-ups after your visit        Your next 10 appointments already scheduled     Nov 29, 2017  3:00 PM CST   Office Visit with CAROLANN Leary CNP   [facility] ([facility]    [address] [place] [id number]   [phone]           Bring a current  "list of meds and any records pertaining to this visit. For Physicals, please bring immunization records and any forms needing to be filled out. Please arrive 10 minutes early to complete paperwork.              Who to contact     If you have questions or need follow up information about today's clinic visit or your schedule please contact [facility] directly at [phone].  Normal or non-critical lab and imaging results will be communicated to you by MyChart, letter or phone within 4 business days after the clinic has received the results. If you do not hear from us within 7 days, please contact the clinic through Given[employer]tohart or phone. If you have a critical or abnormal lab result, we will notify you by phone as soon as possible.  Submit refill requests through [employer] or call your pharmacy and they will forward the refill request to us. Please allow 3 business days for your refill to be completed.          Additional Information About Your Visit        Given.tohar"[employer]" Information     [employer] lets you send messages to your doctor, view your test results, renew your prescriptions, schedule appointments and more. To sign up, go to [email or web address] . Click on \"Log in\" on the left side of the screen, which will take you to the Welcome page. Then click on \"Sign up Now\" on the right side of the page.     You will be asked to enter the access code listed below, as well as some personal information. Please follow the directions to create your username and password.     Your access code is: [email or web address]  Expires: 2018 11:03 AM     Your access code will  in 90 days. If you need help or a new code, please call your [place] clinic or [phone].        Care EveryWhere ID     This is your Care EveryWhere ID. This could be used by other organizations to access your [place] medical records  [id number]        Your Vitals Were     Pulse Temperature Respirations Pulse Oximetry BMI (Body Mass Index)       " 80 98.2  F (36.8  C) (Tympanic) 14 99% 26.05 kg/m2        Blood Pressure from Last 3 Encounters:   11/28/17 126/62   06/20/17 139/82   05/30/17 138/88    Weight from Last 3 Encounters:   11/28/17 179 lb (81.2 kg)   06/20/17 179 lb (81.2 kg)   05/30/17 186 lb (84.4 kg)              We Performed the Following     *UA reflex to Microscopic and Culture ([place] [facility] (except [place] and [place])     Urine Culture Aerobic Bacterial     Urine Microscopic          Today's Medication Changes          These changes are accurate as of: 11/28/17 11:03 AM.  If you have any questions, ask your nurse or doctor.               Start taking these medicines.        Dose/Directions    sulfamethoxazole-trimethoprim 800-160 MG per tablet   Commonly known as:  BACTRIM DS/SEPTRA DS   Used for:  Acute cystitis without hematuria   Started by:  Sophia Dacosta APRN CNP        Dose:  1 tablet   Take 1 tablet by mouth 2 times daily for 10 days   Quantity:  20 tablet   Refills:  0         Stop taking these medicines if you haven't already. Please contact your care team if you have questions.     acetaminophen-codeine 300-30 MG per tablet   Commonly known as:  TYLENOL #3   Stopped by:  Sophia Dacosta APRN CNP           acyclovir 800 MG tablet   Commonly known as:  ZOVIRAX   Stopped by:  Sophia Dacosta APRN CNP           gabapentin 100 MG capsule   Commonly known as:  NEURONTIN   Stopped by:  Sophia Dacosta APRN CNP                Where to get your medicines      These medications were sent to [facility] [address] [address]     Phone:  [phone]     sulfamethoxazole-trimethoprim 800-160 MG per tablet                Primary Care Provider Office Phone # Fax #    CAROLANN Leary [phone]       [address] [place] [address]        Equal Access to Services     TERESSA BAHENA : Marcia nolan  Saritha, waalexda luallenadaha, qasilviata kafredy trevino, esteban sawyerin hayaan levangy rickygonsalo laJaniyanikita rosie. So [facility] [phone].    ATENCIÓN: Si bogdan earl, tiene a zavala disposición servicios gratuitos de asistencia lingüística. Angel al [phone].    We comply with applicable federal civil rights laws and [place] laws. We do not discriminate on the basis of race, color, national origin, age, disability, sex, sexual orientation, or gender identity.            Thank you!     Thank you for choosing [facility]  for your care. Our goal is always to provide you with excellent care. Hearing back from our patients is one way we can continue to improve our services. Please take a few minutes to complete the written survey that you may receive in the mail after your visit with us. Thank you!             Your Updated Medication List - Protect others around you: Learn how to safely use, store and throw away your medicines at www.disposemymeds.org.          This list is accurate as of: 11/28/17 11:03 AM.  Always use your most recent med list.                   Brand Name Dispense Instructions for use Diagnosis    Capsaicin 0.1 % cream     56.6 g    Apply topically 4 times daily    Post herpetic neuralgia       citrulline 600 MG Caps capsule    L-CITRULLINE     2 capsule daily        Q-10 CO-ENZYME PO      300mg daily        sulfamethoxazole-trimethoprim 800-160 MG per tablet    BACTRIM DS/SEPTRA DS    20 tablet    Take 1 tablet by mouth 2 times daily for 10 days    Acute cystitis without hematuria

## 2017-11-28 NOTE — PROGRESS NOTES
SUBJECTIVE:   Simone Daniels is a 69 year old male who presents to clinic today for the following health issues:      Genitourinary symptoms      Duration: 6 weeks     First started as a prostate issue then now increased urination.     Description:  dysuria, frequency, back pain and nerve pain     Intensity:  moderate    Accompanying signs and symptoms (fever/discharge/nausea/vomiting/back or abdominal pain):  None    History (frequent UTI's/kidney stones/prostate problems): shingles in may   Sexually active: no     Precipitating or alleviating factors: None    Therapies tried and outcome: pyridium , cranberry juice  and saw palmetto   Outcome: saw palmettop helped       Shingles pain  Lidocaine not effective. Made the pain worse.  Gabapentin has been very helpful  Finished the antiviral.  Prostate pain was bad after that started 7 weeks.       -------------------------------------    Problem list and histories reviewed & adjusted, as indicated.  Additional history: as documented    BP Readings from Last 3 Encounters:   12/22/17 138/84   11/28/17 126/62   06/20/17 139/82    Wt Readings from Last 3 Encounters:   12/22/17 179 lb (81.2 kg)   11/28/17 179 lb (81.2 kg)   06/20/17 179 lb (81.2 kg)                  Labs reviewed in EPIC      Reviewed and updated as needed this visit by clinical staffTobacco  Allergies       Reviewed and updated as needed this visit by Provider         ROS:   ROS: 10 point ROS neg other than the symptoms noted above in the HPI.      OBJECTIVE:                                                    /62  Pulse 80  Temp 98.2  F (36.8  C) (Tympanic)  Resp 14  Wt 179 lb (81.2 kg)  SpO2 99%  BMI 26.05 kg/m2  Body mass index is 26.05 kg/(m^2).   GENERAL: healthy, alert, well nourished, well hydrated, no distress  HENT: ear canals- normal; TMs- normal; Nose- normal; Mouth- no ulcers, no lesions  NECK: no tenderness, no adenopathy, no asymmetry, no masses, no stiffness; thyroid-  normal to palpation  RESP: lungs clear to auscultation - no rales, no rhonchi, no wheezes  CV: regular rates and rhythm, normal S1 S2, no S3 or S4 and no murmur, no click or rub -  ABDOMEN: soft, no tenderness, no  hepatosplenomegaly, no masses, normal bowel sounds  MS: extremities- limited range of motion to the left knee with swelling   SKIN free of rash    Diagnostic test results:  Results for orders placed or performed in visit on 11/28/17   *UA reflex to Microscopic and Culture ([facility] (except [place] and [place])   Result Value Ref Range    Color Urine Yellow     Appearance Urine Cloudy     Glucose Urine Negative NEG^Negative mg/dL    Bilirubin Urine Negative NEG^Negative    Ketones Urine Negative NEG^Negative mg/dL    Specific Gravity Urine 1.015 1.003 - 1.035    Blood Urine Moderate (A) NEG^Negative    pH Urine 5.5 5.0 - 7.0 pH    Protein Albumin Urine 30 (A) NEG^Negative mg/dL    Urobilinogen Urine 0.2 0.2 - 1.0 EU/dL    Nitrite Urine Positive (A) NEG^Negative    Leukocyte Esterase Urine Large (A) NEG^Negative    Source Midstream Urine    Urine Microscopic   Result Value Ref Range    WBC Urine >100 (A) OTO2^O - 2 /HPF    RBC Urine 5-10 (A) OTO2^O - 2 /HPF    Bacteria Urine Moderate (A) NEG^Negative /HPF   Urine Culture Aerobic Bacterial   Result Value Ref Range    Specimen Description Midstream Urine     Special Requests Specimen received in preservative     Culture Micro       50,000 to 100,000 colonies/mL  mixed urogenital smiley      Culture Micro Susceptibility testing not routinely done           ASSESSMENT/PLAN:                                                    1. Urge incontinence of urine  PSA with prostate exam declined.   Urology consultation with ongoing symptoms recommended.   - *UA reflex to Microscopic and Culture ([facility] (except [place] and [place])  - Urine Microscopic  - Urine Culture Aerobic Bacterial    2. Post herpetic neuralgia  Refilled    - gabapentin (NEURONTIN) 100 MG capsule; Take 1 capsule (100 mg) by mouth 3 times daily  Dispense: 90 capsule; Refill: 2    3. Acute cystitis without hematuria  - sulfamethoxazole-trimethoprim (BACTRIM DS/SEPTRA DS) 800-160 MG per tablet; Take 1 tablet by mouth 2 times daily for 10 days  Dispense: 20 tablet; Refill: 0      Follow up with Provider - Call or return to the clinic with any worsening of symptoms or no resolution. Patient/Parent verbalized understanding and is in agreement. Medication side effects reviewed.          See Patient Instructions    CAROLANN Leary [facility]

## 2017-11-29 LAB
BACTERIA SPEC CULT: NORMAL
BACTERIA SPEC CULT: NORMAL
Lab: NORMAL
SPECIMEN SOURCE: NORMAL

## 2017-12-21 ENCOUNTER — ALLIED HEALTH/NURSE VISIT (OUTPATIENT)
Dept: FAMILY MEDICINE | Facility: CLINIC | Age: 72
End: 2017-12-21

## 2017-12-21 DIAGNOSIS — S05.90XA EYE INJURY: Primary | ICD-10-CM

## 2017-12-21 PROCEDURE — 99207 ZZC NO CHARGE NURSE ONLY: CPT

## 2017-12-21 NOTE — MR AVS SNAPSHOT
"              After Visit Summary   12/21/2017    Simone Daniels    MRN: [id number]           Patient Information     Date Of Birth          5/26/1948        Visit Information        Provider Department      12/21/2017 8:45 AM BEATA RIVERA RN [facility]        Today's Diagnoses     Eye injury    -  1       Follow-ups after your visit        Your next 10 appointments already scheduled     Dec 22, 2017  8:00 AM KANDIS   SHORT with CAROLANN Hogue CNP   [facility] ([facility])    [address] [place] [id number] [phone]              Who to contact     If you have questions or need follow up information about today's clinic visit or your schedule please contact [facility]note 3[facility] directly at [phone].  Normal or non-critical lab and imaging results will be communicated to you by MyChart, letter or phone within 4 business days after the clinic has received the results. If you do not hear from us within 7 days, please contact the clinic through MyChart or phone. If you have a critical or abnormal lab result, we will notify you by phone as soon as possible.  Submit refill requests through [employer] or call your pharmacy and they will forward the refill request to us. Please allow 3 business days for your refill to be completed.          Additional Information About Your Visit        [employer] Information     [employer] lets you send messages to your doctor, view your test results, renew your prescriptions, schedule appointments and more. To sign up, go to [email or web address] . Click on \"Log in\" on the left side of the screen, which will take you to the Welcome page. Then click on \"Sign up Now\" on the right side of the page.     You will be asked to enter the access code listed below, as well as some personal information. Please follow the directions to create your username and password.     Your access code is: [email or web address]  Expires: 2/26/2018 11:03 " AM     Your access code will  in 90 days. If you need help or a new code, please call your [place] clinic or [phone].        Care EveryWhere ID     This is your Care EveryWhere ID. This could be used by other organizations to access your [place] medical records  [id number]         Blood Pressure from Last 3 Encounters:   17 126/62   17 139/82   17 138/88    Weight from Last 3 Encounters:   17 179 lb (81.2 kg)   17 179 lb (81.2 kg)   17 186 lb (84.4 kg)              Today, you had the following     No orders found for display       Primary Care Provider Office Phone # Fax #    Sophia London CAROLANN Dacosta [facility] [phone] [address] [facility] [address]        Equal Access to Services     [facility] CenterALEXEI : Hadii patricia prakash hadasho Sowalter, waaxda luqadaha, qaybta kaalmada adeegyada, esteban ruth haynikita newton . So [facility] [phone].    ATENCIÓN: Si habla español, tiene a zavala disposición servicios gratuitos de asistencia lingüística. Llame al [phone].    We comply with applicable federal civil rights laws and [place] laws. We do not discriminate on the basis of race, color, national origin, age, disability, sex, sexual orientation, or gender identity.            Thank you!     Thank you for choosing [facility]  for your care. Our goal is always to provide you with excellent care. Hearing back from our patients is one way we can continue to improve our services. Please take a few minutes to complete the written survey that you may receive in the mail after your visit with us. Thank you!             Your Updated Medication List - Protect others around you: Learn how to safely use, store and throw away your medicines at www.disposemymeds.org.          This list is accurate as of: 17  3:08 PM.  Always use your most recent med list.                   Brand Name Dispense Instructions for use Diagnosis    Capsaicin 0.1 %  cream     56.6 g    Apply topically 4 times daily    Post herpetic neuralgia       citrulline 600 MG Caps capsule    L-CITRULLINE     2 capsule daily        gabapentin 100 MG capsule    NEURONTIN    90 capsule    Take 1 capsule (100 mg) by mouth 3 times daily    Post herpetic neuralgia       Q-10 CO-ENZYME PO      300mg daily

## 2017-12-21 NOTE — NURSING NOTE
Pt walked into clinic stating a piece of wood stick hit is eye 48 hours ago. States he would like to be seen. R eye noticeably red on outer scleral. Pt states it is getting better. Denies any vision problems. Denies fever, pain in eye. Offered appointment at our clinic later today with a provider, pt declined. Offered appointment at other clinics today and declined. Declined assistance with getting into eye doctor today. States he will take an appointment tomorrow. Advised for moisture he can use saline drops. Also advised if symptoms worsen he need melita seen in urgent care or ED.Christel Babb RN

## 2017-12-22 ENCOUNTER — OFFICE VISIT (OUTPATIENT)
Dept: FAMILY MEDICINE | Facility: CLINIC | Age: 72
End: 2017-12-22

## 2017-12-22 VITALS
WEIGHT: 179 LBS | BODY MASS INDEX: 26.05 KG/M2 | HEART RATE: 74 BPM | SYSTOLIC BLOOD PRESSURE: 138 MMHG | OXYGEN SATURATION: 97 % | TEMPERATURE: 97.3 F | DIASTOLIC BLOOD PRESSURE: 84 MMHG

## 2017-12-22 DIAGNOSIS — S05.91XA EYE INJURY, NON-PENETRATING, RIGHT, INITIAL ENCOUNTER: Primary | ICD-10-CM

## 2017-12-22 PROCEDURE — 99213 OFFICE O/P EST LOW 20 MIN: CPT | Performed by: NURSE PRACTITIONER

## 2017-12-22 RX ORDER — SULFACETAMIDE SODIUM 100 MG/ML
1 SOLUTION/ DROPS OPHTHALMIC
Qty: 1 BOTTLE | Refills: 0 | Status: SHIPPED | OUTPATIENT
Start: 2017-12-22 | End: 2017-12-22 | Stop reason: ALTCHOICE

## 2017-12-22 RX ORDER — OFLOXACIN 3 MG/ML
1 SOLUTION/ DROPS OPHTHALMIC EVERY 4 HOURS
Qty: 1 BOTTLE | Refills: 0 | Status: ON HOLD | OUTPATIENT
Start: 2017-12-22 | End: 2018-12-11

## 2017-12-22 NOTE — MR AVS SNAPSHOT
After Visit Summary   12/22/2017    Simone Daniels    MRN: [id number]           Patient Information     Date Of Birth          5/26/1948        Visit Information        Provider Department      12/22/2017 8:00 AM Lita Sawyer APRN [facility]        Today's Diagnoses     Eye injury, non-penetrating, right, initial encounter    -  1      Care Instructions    Apply eye drops 4 times a day for 7 days.    If vision worsens, you start to have discharge, increased redness or increased pain then go to the ER.      Corneal Abrasion    You have received a scratch or scrape (abrasion) to your cornea. The cornea is the clear part in the front of the eye. This sensitive area is very painful when injured. You may make tears frequently, and your vision may be blurry until the injury heals. You may be sensitive to light.  This part of the body heals quickly. You can expect the pain to go away within 24 to 48 hours. If the abrasion is large or deep, your doctor may apply an eye patch, although this is not always done. An antibiotic ointment or eye drops may also be used to prevent infection.  Numbing drops may be used to relieve the pain temporarily so that your eyes can be examined. But these drops can t be prescribed for home use because that would prevent healing and lead to more serious problems. Also, if you can t feel your eye, there is a chance of accidentally injuring it further without knowing it.  Home care    A cold pack may be applied over the eye (or eye patch) for 20 minutes at a time, to reduce pain. To make a cold pack, put ice cubes in a plastic bag that seals at the top. Wrap the bag in a clean, thin towel or cloth.    You may use acetaminophen or ibuprofen to control pain, unless another pain medicine was prescribed. Note: If you have chronic liver or kidney disease or have ever had a stomach ulcer or GI bleeding, talk with your doctor before using these  medicines.    Rest your eyes and don t read until symptoms are gone.    If you use contact lenses, don t wear them until all symptoms are gone.    If your vision is affected by the corneal abrasion or if an eye patch was applied, don t drive a motor vehicle or operate machinery until all symptoms are gone. You may have trouble judging distances using only one eye.    If your eyes are sensitive to light, try wearing sunglasses, or stay indoors until symptoms go away.  Follow-up care  Follow up with your healthcare provider, or as advised.    If no patch was put on your eye and the pain continues for more than 48 hours, you should have another exam. Contact your healthcare provider to arrange this.    If your eye was patched and you were asked to remove the patch yourself, see your healthcare provider. Contact your healthcare provider if you still have pain after the patch is removed.    If you were given a return appointment for patch removal and re-examination, be sure to keep the appointment. Leaving the patch in place longer than advised could be harmful.  When to seek medical advice  Call your healthcare provider right away if any of these occur.    Increasing eye pain or pain that does not improve after 24 hours    Discharge from the eye    Increasing redness of the eye or swelling of the eyelids    Worsening vision    Symptoms get worse after the abrasion has healed  Date Last Reviewed: 7/1/2017 2000-2017 [employer]. [address] [place] [address]. All rights reserved. This information is not intended as a substitute for professional medical care. Always follow your healthcare professional's instructions.                Follow-ups after your visit        Who to contact     If you have questions or need follow up information about today's clinic visit or your schedule please contact [facility] directly at [phone].  Normal or non-critical lab and imaging results  "will be communicated to you by MyChart, letter or phone within 4 business days after the clinic has received the results. If you do not hear from us within 7 days, please contact the clinic through [employer] or phone. If you have a critical or abnormal lab result, we will notify you by phone as soon as possible.  Submit refill requests through [employer] or call your pharmacy and they will forward the refill request to us. Please allow 3 business days for your refill to be completed.          Additional Information About Your Visit        [employer] Information     [employer] lets you send messages to your doctor, view your test results, renew your prescriptions, schedule appointments and more. To sign up, go to [email or web address] . Click on \"Log in\" on the left side of the screen, which will take you to the Welcome page. Then click on \"Sign up Now\" on the right side of the page.     You will be asked to enter the access code listed below, as well as some personal information. Please follow the directions to create your username and password.     Your access code is: [email or web address]  Expires: 2018 11:03 AM     Your access code will  in 90 days. If you need help or a new code, please call your [facility] clinic or [phone].        Care EveryWhere ID     This is your Care EveryWhere ID. This could be used by other organizations to access your [facility] medical records  [id number]        Your Vitals Were     Pulse Temperature Pulse Oximetry BMI (Body Mass Index)          74 97.3  F (36.3  C) (Tympanic) 97% 26.05 kg/m2         Blood Pressure from Last 3 Encounters:   17 138/84   17 126/62   17 139/82    Weight from Last 3 Encounters:   17 179 lb (81.2 kg)   17 179 lb (81.2 kg)   17 179 lb (81.2 kg)              Today, you had the following     No orders found for display         Today's Medication Changes          These changes are accurate as of: 17  8:46 AM.  If you have " any questions, ask your nurse or doctor.               Start taking these medicines.        Dose/Directions    sulfacetamide 10 % ophthalmic solution   Commonly known as:  BLEPH-10   Used for:  Eye injury, non-penetrating, right, initial encounter        Dose:  1 drop   Apply 1 drop to eye every 4 hours (while awake) for 7 days   Quantity:  1 Bottle   Refills:  0            Where to get your medicines      These medications were sent to [facility] - [address] [address]     Phone:  [phone]     sulfacetamide 10 % ophthalmic solution                Primary Care Provider Office Phone # Fax #    Sophia Dacosta, CAROLANN -[phone]       [address] [facility] [address]        Equal Access to Services     TERESSA BAHENA : Marcia najerao Sowalter, waaxda luqadaha, qaybta kaalmada adeegyada, esteban velez. So [facility] [phone].    ATENCIÓN: Si habla español, tiene a zavala disposición servicios gratuitos de asistencia lingüística. Llame al [phone].    We comply with applicable federal civil rights laws and [place] laws. We do not discriminate on the basis of race, color, national origin, age, disability, sex, sexual orientation, or gender identity.            Thank you!     Thank you for choosing [facility]  for your care. Our goal is always to provide you with excellent care. Hearing back from our patients is one way we can continue to improve our services. Please take a few minutes to complete the written survey that you may receive in the mail after your visit with us. Thank you!             Your Updated Medication List - Protect others around you: Learn how to safely use, store and throw away your medicines at www.disposemymeds.org.          This list is accurate as of: 12/22/17  8:46 AM.  Always use your most recent med list.                   Brand Name Dispense Instructions  for use Diagnosis    Capsaicin 0.1 % cream     56.6 g    Apply topically 4 times daily    Post herpetic neuralgia       citrulline 600 MG Caps capsule    L-CITRULLINE     2 capsule daily        gabapentin 100 MG capsule    NEURONTIN    90 capsule    Take 1 capsule (100 mg) by mouth 3 times daily    Post herpetic neuralgia       Q-10 CO-ENZYME PO      300mg daily        sulfacetamide 10 % ophthalmic solution    BLEPH-10    1 Bottle    Apply 1 drop to eye every 4 hours (while awake) for 7 days    Eye injury, non-penetrating, right, initial encounter

## 2017-12-22 NOTE — PATIENT INSTRUCTIONS
Apply eye drops 4 times a day for 7 days.    If vision worsens, you start to have discharge, increased redness or increased pain then go to the ER.      Corneal Abrasion    You have received a scratch or scrape (abrasion) to your cornea. The cornea is the clear part in the front of the eye. This sensitive area is very painful when injured. You may make tears frequently, and your vision may be blurry until the injury heals. You may be sensitive to light.  This part of the body heals quickly. You can expect the pain to go away within 24 to 48 hours. If the abrasion is large or deep, your doctor may apply an eye patch, although this is not always done. An antibiotic ointment or eye drops may also be used to prevent infection.  Numbing drops may be used to relieve the pain temporarily so that your eyes can be examined. But these drops can t be prescribed for home use because that would prevent healing and lead to more serious problems. Also, if you can t feel your eye, there is a chance of accidentally injuring it further without knowing it.  Home care    A cold pack may be applied over the eye (or eye patch) for 20 minutes at a time, to reduce pain. To make a cold pack, put ice cubes in a plastic bag that seals at the top. Wrap the bag in a clean, thin towel or cloth.    You may use acetaminophen or ibuprofen to control pain, unless another pain medicine was prescribed. Note: If you have chronic liver or kidney disease or have ever had a stomach ulcer or GI bleeding, talk with your doctor before using these medicines.    Rest your eyes and don t read until symptoms are gone.    If you use contact lenses, don t wear them until all symptoms are gone.    If your vision is affected by the corneal abrasion or if an eye patch was applied, don t drive a motor vehicle or operate machinery until all symptoms are gone. You may have trouble judging distances using only one eye.    If your eyes are sensitive to light, try wearing  sunglasses, or stay indoors until symptoms go away.  Follow-up care  Follow up with your healthcare provider, or as advised.    If no patch was put on your eye and the pain continues for more than 48 hours, you should have another exam. Contact your healthcare provider to arrange this.    If your eye was patched and you were asked to remove the patch yourself, see your healthcare provider. Contact your healthcare provider if you still have pain after the patch is removed.    If you were given a return appointment for patch removal and re-examination, be sure to keep the appointment. Leaving the patch in place longer than advised could be harmful.  When to seek medical advice  Call your healthcare provider right away if any of these occur.    Increasing eye pain or pain that does not improve after 24 hours    Discharge from the eye    Increasing redness of the eye or swelling of the eyelids    Worsening vision    Symptoms get worse after the abrasion has healed  Date Last Reviewed: 7/1/2017 2000-2017 [employer]. [address] [place] [address], [place] [address]. All rights reserved. This information is not intended as a substitute for professional medical care. Always follow your healthcare professional's instructions.

## 2017-12-22 NOTE — PROGRESS NOTES
SUBJECTIVE:   Simone Daniels is a 69 year old male who presents to clinic today for the following health issues:      Eye(s) Problem  Onset: one day with irritation (a branch hit eye 4 days ago)    Description:   Location: right  Pain: YES  Redness: YES    Accompanying Signs & Symptoms:  Discharge/mattering: no   Swelling: no   Visual changes: no   Fever: no   Nasal Congestion: no   Bothered by bright lights: no     History:   Trauma: YES- was hit by a small tree branch  Foreign body exposure: does not think anything is stuck    Precipitating factors:   Wearing contacts: no     Alleviating factors:  Improved by: none    Therapies Tried and outcome: eye drops, did not help  Gentle tears last night, now discomfort that lasts 20 seconds and intermittent. Slight discomfort, feels like muscle pain.  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  He never had significant injury.  Up until 2 years ago had very good vision, 2 years ago was going to have a laser procedure. Still plans to have procedure but set back by shingles and sequela.        Problem list and histories reviewed & adjusted, as indicated.  Additional history: as documented    Patient Active Problem List   Diagnosis     Anxiety state     Left knee osteoarthritis     Contact dermatitis and other eczema, due to unspecified cause     ESOPHAGEAL REFLUX     CARDIOVASCULAR SCREENING; LDL GOAL LESS THAN 160     Advanced directives, counseling/discussion     Post herpetic neuralgia     Past Surgical History:   Procedure Laterality Date     SURGICAL HISTORY OF -   1979    Cervical disc (5,6,&7)     SURGICAL HISTORY OF -       (L) Knee fx tibial plateau     SURGICAL HISTORY OF -   1981    (L) Knee surgery - re-fx tibial plateau       Social History   Substance Use Topics     Smoking status: Former Smoker     Packs/day: 2.00     Years: 18.00     Types: Cigarettes     Quit date:  1/1/1980     Smokeless tobacco: Not on file     Alcohol use Yes      Comment: occaisonal     Family History   Problem Relation Age of Onset     C.A.D. Mother      heart disease in her 60s     C.A.D. Brother      1/2 brother with heart disease     DIABETES No family hx of              Reviewed and updated as needed this visit by clinical staffAllergies  Meds  Problems       Reviewed and updated as needed this visit by Provider  Allergies  Meds  Problems         ROS:  Constitutional, HEENT, cardiovascular, pulmonary, gi and gu systems are negative, except as otherwise noted.      OBJECTIVE:   /84 (BP Location: Left arm, Patient Position: Chair, Cuff Size: Adult Large)  Pulse 74  Temp 97.3  F (36.3  C) (Tympanic)  Wt 179 lb (81.2 kg)  SpO2 97%  BMI 26.05 kg/m2  Body mass index is 26.05 kg/(m^2).  GENERAL: healthy, alert and no distress  EYES: Sclera of the right eye is injected.  There is no hemorrhage.  Fluorescein used prior to exam.  I do not identify any foreign body on exam.  No discharge.  NECK: no adenopathy, no asymmetry, masses, or scars and thyroid normal to palpation  RESP: lungs clear to auscultation - no rales, rhonchi or wheezes  CV: regular rate and rhythm, normal S1 S2, no S3 or S4, no murmur, click or rub, no peripheral edema and peripheral pulses strong    Diagnostic Test Results:  none     ASSESSMENT/PLAN:     ASSESSMENT:  1. Eye injury, non-penetrating, right, initial encounter    No foreign body or abrasion identified.  Will treat with antibiotic as a precaution.    PLAN:  Orders Placed This Encounter     DISCONTD: sulfacetamide (BLEPH-10) 10 % ophthalmic solution     ofloxacin (OCUFLOX) 0.3 % ophthalmic solution       Patient Instructions   Apply eye drops 4 times a day for 7 days.    If vision worsens, you start to have discharge, increased redness or increased pain then go to the ER.      Corneal Abrasion    You have received a scratch or scrape (abrasion) to your cornea. The  cornea is the clear part in the front of the eye. This sensitive area is very painful when injured. You may make tears frequently, and your vision may be blurry until the injury heals. You may be sensitive to light.  This part of the body heals quickly. You can expect the pain to go away within 24 to 48 hours. If the abrasion is large or deep, your doctor may apply an eye patch, although this is not always done. An antibiotic ointment or eye drops may also be used to prevent infection.  Numbing drops may be used to relieve the pain temporarily so that your eyes can be examined. But these drops can t be prescribed for home use because that would prevent healing and lead to more serious problems. Also, if you can t feel your eye, there is a chance of accidentally injuring it further without knowing it.  Home care    A cold pack may be applied over the eye (or eye patch) for 20 minutes at a time, to reduce pain. To make a cold pack, put ice cubes in a plastic bag that seals at the top. Wrap the bag in a clean, thin towel or cloth.    You may use acetaminophen or ibuprofen to control pain, unless another pain medicine was prescribed. Note: If you have chronic liver or kidney disease or have ever had a stomach ulcer or GI bleeding, talk with your doctor before using these medicines.    Rest your eyes and don t read until symptoms are gone.    If you use contact lenses, don t wear them until all symptoms are gone.    If your vision is affected by the corneal abrasion or if an eye patch was applied, don t drive a motor vehicle or operate machinery until all symptoms are gone. You may have trouble judging distances using only one eye.    If your eyes are sensitive to light, try wearing sunglasses, or stay indoors until symptoms go away.  Follow-up care  Follow up with your healthcare provider, or as advised.    If no patch was put on your eye and the pain continues for more than 48 hours, you should have another exam.  Contact your healthcare provider to arrange this.    If your eye was patched and you were asked to remove the patch yourself, see your healthcare provider. Contact your healthcare provider if you still have pain after the patch is removed.    If you were given a return appointment for patch removal and re-examination, be sure to keep the appointment. Leaving the patch in place longer than advised could be harmful.  When to seek medical advice  Call your healthcare provider right away if any of these occur.    Increasing eye pain or pain that does not improve after 24 hours    Discharge from the eye    Increasing redness of the eye or swelling of the eyelids    Worsening vision    Symptoms get worse after the abrasion has healed  Date Last Reviewed: 7/1/2017 2000-2017 [employer]. [address] [place] [address], [place] [address]. All rights reserved. This information is not intended as a substitute for professional medical care. Always follow your healthcare professional's instructions.            Lita Sawyer NP, APRN [facility]

## 2017-12-22 NOTE — NURSING NOTE
"Initial /84 (BP Location: Left arm, Patient Position: Chair, Cuff Size: Adult Large)  Pulse 74  Temp 97.3  F (36.3  C) (Tympanic)  Wt 179 lb (81.2 kg)  SpO2 97%  BMI 26.05 kg/m2 Estimated body mass index is 26.05 kg/(m^2) as calculated from the following:    Height as of 4/29/15: 5' 9.5\" (1.765 m).    Weight as of this encounter: 179 lb (81.2 kg). .    Gabi Corbin    "

## 2018-11-26 ENCOUNTER — HOSPITAL ENCOUNTER (EMERGENCY)
Facility: CLINIC | Age: 73
Discharge: ANOTHER HOSPITAL | End: 2018-11-27
Attending: EMERGENCY MEDICINE | Admitting: EMERGENCY MEDICINE

## 2018-11-26 DIAGNOSIS — R90.89 ABNORMAL BRAIN CT: ICD-10-CM

## 2018-11-26 DIAGNOSIS — I10 HYPERTENSION, UNSPECIFIED TYPE: ICD-10-CM

## 2018-11-26 PROCEDURE — 99285 EMERGENCY DEPT VISIT HI MDM: CPT | Mod: 25 | Performed by: EMERGENCY MEDICINE

## 2018-11-26 PROCEDURE — 96374 THER/PROPH/DIAG INJ IV PUSH: CPT | Performed by: EMERGENCY MEDICINE

## 2018-11-26 PROCEDURE — 99285 EMERGENCY DEPT VISIT HI MDM: CPT | Mod: Z6 | Performed by: EMERGENCY MEDICINE

## 2018-11-27 ENCOUNTER — APPOINTMENT (OUTPATIENT)
Dept: SPEECH THERAPY | Facility: CLINIC | Age: 73
DRG: 064 | End: 2018-11-27
Attending: STUDENT IN AN ORGANIZED HEALTH CARE EDUCATION/TRAINING PROGRAM

## 2018-11-27 ENCOUNTER — APPOINTMENT (OUTPATIENT)
Dept: CT IMAGING | Facility: CLINIC | Age: 73
DRG: 064 | End: 2018-11-27
Attending: STUDENT IN AN ORGANIZED HEALTH CARE EDUCATION/TRAINING PROGRAM

## 2018-11-27 ENCOUNTER — HOSPITAL ENCOUNTER (INPATIENT)
Facility: CLINIC | Age: 73
LOS: 14 days | Discharge: NURSING, REHAB OR LONG-TERM CARE | DRG: 064 | End: 2018-12-11
Attending: PSYCHIATRY & NEUROLOGY | Admitting: PSYCHIATRY & NEUROLOGY

## 2018-11-27 ENCOUNTER — APPOINTMENT (OUTPATIENT)
Dept: PHYSICAL THERAPY | Facility: CLINIC | Age: 73
DRG: 064 | End: 2018-11-27
Attending: STUDENT IN AN ORGANIZED HEALTH CARE EDUCATION/TRAINING PROGRAM

## 2018-11-27 ENCOUNTER — APPOINTMENT (OUTPATIENT)
Dept: CARDIOLOGY | Facility: CLINIC | Age: 73
DRG: 064 | End: 2018-11-27
Attending: STUDENT IN AN ORGANIZED HEALTH CARE EDUCATION/TRAINING PROGRAM

## 2018-11-27 ENCOUNTER — APPOINTMENT (OUTPATIENT)
Dept: CT IMAGING | Facility: CLINIC | Age: 73
End: 2018-11-27
Attending: EMERGENCY MEDICINE

## 2018-11-27 ENCOUNTER — APPOINTMENT (OUTPATIENT)
Dept: MRI IMAGING | Facility: CLINIC | Age: 73
DRG: 064 | End: 2018-11-27
Attending: STUDENT IN AN ORGANIZED HEALTH CARE EDUCATION/TRAINING PROGRAM

## 2018-11-27 VITALS
RESPIRATION RATE: 16 BRPM | HEART RATE: 91 BPM | TEMPERATURE: 97.1 F | DIASTOLIC BLOOD PRESSURE: 114 MMHG | SYSTOLIC BLOOD PRESSURE: 184 MMHG | WEIGHT: 180 LBS | OXYGEN SATURATION: 99 % | BODY MASS INDEX: 26.2 KG/M2

## 2018-11-27 DIAGNOSIS — K21.9 GASTROESOPHAGEAL REFLUX DISEASE, ESOPHAGITIS PRESENCE NOT SPECIFIED: ICD-10-CM

## 2018-11-27 DIAGNOSIS — B02.29 POST HERPETIC NEURALGIA: ICD-10-CM

## 2018-11-27 DIAGNOSIS — K59.09 OTHER CONSTIPATION: ICD-10-CM

## 2018-11-27 DIAGNOSIS — R33.9 URINARY RETENTION WITH INCOMPLETE BLADDER EMPTYING: Chronic | ICD-10-CM

## 2018-11-27 DIAGNOSIS — S05.91XA EYE INJURY, NON-PENETRATING, RIGHT, INITIAL ENCOUNTER: ICD-10-CM

## 2018-11-27 DIAGNOSIS — R09.81 NASAL CONGESTION: ICD-10-CM

## 2018-11-27 DIAGNOSIS — I63.532: Primary | ICD-10-CM

## 2018-11-27 DIAGNOSIS — M25.562 ARTHRALGIA OF LEFT LOWER LEG: ICD-10-CM

## 2018-11-27 PROBLEM — I62.9 INTRACRANIAL HEMORRHAGE (H): Status: ACTIVE | Noted: 2018-11-27

## 2018-11-27 LAB
ABO + RH BLD: NORMAL
ABO + RH BLD: NORMAL
ACETAMINOPHEN QUAL: NEGATIVE
ALBUMIN UR-MCNC: NEGATIVE MG/DL
AMANTADINE: NEGATIVE
AMITRIPTYLINE QUAL: NEGATIVE
AMOXAPINE: NEGATIVE
AMPHETAMINES QUAL: NEGATIVE
ANION GAP SERPL CALCULATED.3IONS-SCNC: 7 MMOL/L (ref 3–14)
APPEARANCE UR: CLEAR
ATROPINE: NEGATIVE
BACTERIA SPEC CULT: NORMAL
BASOPHILS # BLD AUTO: 0 10E9/L (ref 0–0.2)
BASOPHILS NFR BLD AUTO: 0.5 %
BENZODIAZ UR QL: NEGATIVE
BILIRUB UR QL STRIP: NEGATIVE
BLD GP AB SCN SERPL QL: NORMAL
BLOOD BANK CMNT PATIENT-IMP: NORMAL
BUN SERPL-MCNC: 17 MG/DL (ref 7–30)
CAFFEINE QUAL: POSITIVE
CALCIUM SERPL-MCNC: 8.3 MG/DL (ref 8.5–10.1)
CANNABINOIDS UR QL SCN: NEGATIVE
CARBAMAZEPINE QUAL: NEGATIVE
CHLORIDE SERPL-SCNC: 109 MMOL/L (ref 94–109)
CHLORPHENIRAMINE: NEGATIVE
CHLORPROMAZINE: NEGATIVE
CHOLEST SERPL-MCNC: 147 MG/DL
CITALOPRAM QUAL: NEGATIVE
CLOMIPRAMINE QUAL: NEGATIVE
CO2 SERPL-SCNC: 27 MMOL/L (ref 20–32)
COCAINE QUAL: NEGATIVE
COCAINE UR QL: NEGATIVE
CODEINE QUAL: NEGATIVE
COLOR UR AUTO: ABNORMAL
CREAT SERPL-MCNC: 0.95 MG/DL (ref 0.66–1.25)
DESIPRAMINE QUAL: NEGATIVE
DEXTROMETHORPHAN: NEGATIVE
DIFFERENTIAL METHOD BLD: NORMAL
DIPHENHYDRAMINE: NEGATIVE
DOXEPIN/METABOLITE: NEGATIVE
DOXYLAMINE: NEGATIVE
EOSINOPHIL # BLD AUTO: 0.1 10E9/L (ref 0–0.7)
EOSINOPHIL NFR BLD AUTO: 1.3 %
EPHEDRINE OR PSEUDO: NEGATIVE
ERYTHROCYTE [DISTWIDTH] IN BLOOD BY AUTOMATED COUNT: 13 % (ref 10–15)
FENTANYL QUAL: NEGATIVE
FLUAV+FLUBV AG SPEC QL: NEGATIVE
FLUAV+FLUBV AG SPEC QL: NEGATIVE
FLUOXETINE AND METAB: NEGATIVE
GFR SERPL CREATININE-BSD FRML MDRD: 78 ML/MIN/1.7M2
GLUCOSE BLDC GLUCOMTR-MCNC: 97 MG/DL (ref 70–99)
GLUCOSE SERPL-MCNC: 99 MG/DL (ref 70–99)
GLUCOSE UR STRIP-MCNC: NEGATIVE MG/DL
HBA1C MFR BLD: 5.5 % (ref 0–5.6)
HCT VFR BLD AUTO: 41.3 % (ref 40–53)
HDLC SERPL-MCNC: 57 MG/DL
HGB BLD-MCNC: 14 G/DL (ref 13.3–17.7)
HGB UR QL STRIP: ABNORMAL
HYDROCODONE QUAL: NEGATIVE
HYDROMORPHONE QUAL: NEGATIVE
IBUPROFEN QUAL: NEGATIVE
IMIPRAMINE QUAL: NEGATIVE
IMM GRANULOCYTES # BLD: 0 10E9/L (ref 0–0.4)
IMM GRANULOCYTES NFR BLD: 0.3 %
INR PPP: 1.08 (ref 0.86–1.14)
INTERPRETATION ECG - MUSE: NORMAL
KETONES UR STRIP-MCNC: NEGATIVE MG/DL
LAMOTRIGINE QUAL: NEGATIVE
LDLC SERPL CALC-MCNC: 78 MG/DL
LEUKOCYTE ESTERASE UR QL STRIP: NEGATIVE
LOXAPINE: NEGATIVE
LYMPHOCYTES # BLD AUTO: 1.4 10E9/L (ref 0.8–5.3)
LYMPHOCYTES NFR BLD AUTO: 16.1 %
MAPROTYLINE: NEGATIVE
MCH RBC QN AUTO: 31.8 PG (ref 26.5–33)
MCHC RBC AUTO-ENTMCNC: 33.9 G/DL (ref 31.5–36.5)
MCV RBC AUTO: 94 FL (ref 78–100)
MDMA QUAL: NEGATIVE
MEPERIDINE QUAL: NEGATIVE
METHAMPHETAMINE: NEGATIVE
METHODONE QUAL: NEGATIVE
MONOCYTES # BLD AUTO: 1 10E9/L (ref 0–1.3)
MONOCYTES NFR BLD AUTO: 11.1 %
MORPHINE QUAL: NEGATIVE
MRSA DNA SPEC QL NAA+PROBE: NEGATIVE
NEUTROPHILS # BLD AUTO: 6.1 10E9/L (ref 1.6–8.3)
NEUTROPHILS NFR BLD AUTO: 70.7 %
NICOTINE: NEGATIVE
NITRATE UR QL: NEGATIVE
NONHDLC SERPL-MCNC: 90 MG/DL
NORTRIPTYLINE QUAL: NEGATIVE
NRBC # BLD AUTO: 0 10*3/UL
NRBC BLD AUTO-RTO: 0 /100
OLANZAPINE QUAL: NEGATIVE
OPIATES UR QL SCN: NEGATIVE
OXYCODONE QUAL: NEGATIVE
PENTAZOCINE: NEGATIVE
PH UR STRIP: 7 PH (ref 5–7)
PHENCYCLIDINE QUAL: NEGATIVE
PHENMETRAZINE: NEGATIVE
PHENTERMINE: NEGATIVE
PHENYLBUTAZONE: NEGATIVE
PHENYLPROPANOLAMINE: NEGATIVE
PLATELET # BLD AUTO: 208 10E9/L (ref 150–450)
POTASSIUM SERPL-SCNC: 3.5 MMOL/L (ref 3.4–5.3)
PROPOXPHENE QUAL: NEGATIVE
PROPRANOLOL QUAL: NEGATIVE
PYRILAMINE: NEGATIVE
RADIOLOGIST FLAGS: ABNORMAL
RBC # BLD AUTO: 4.4 10E12/L (ref 4.4–5.9)
RBC #/AREA URNS AUTO: 6 /HPF (ref 0–2)
SALICYLATE QUAL: NEGATIVE
SODIUM SERPL-SCNC: 143 MMOL/L (ref 133–144)
SOURCE: ABNORMAL
SP GR UR STRIP: 1.01 (ref 1–1.03)
SPECIMEN EXP DATE BLD: NORMAL
SPECIMEN SOURCE: NORMAL
SQUAMOUS #/AREA URNS AUTO: <1 /HPF (ref 0–1)
THEOBROMINE: POSITIVE
TOPIRAMATE QUAL: NEGATIVE
TRANS CELLS #/AREA URNS HPF: <1 /HPF (ref 0–1)
TRIGL SERPL-MCNC: 58 MG/DL
TRIMIPRAMINE QUAL: NEGATIVE
TROPONIN I SERPL-MCNC: 0.06 UG/L (ref 0–0.04)
TROPONIN I SERPL-MCNC: 0.06 UG/L (ref 0–0.04)
TROPONIN I SERPL-MCNC: 0.07 UG/L (ref 0–0.04)
UROBILINOGEN UR STRIP-MCNC: NORMAL MG/DL (ref 0–2)
VENLAFAXINE QUAL: NEGATIVE
WBC # BLD AUTO: 8.7 10E9/L (ref 4–11)
WBC #/AREA URNS AUTO: 1 /HPF (ref 0–5)

## 2018-11-27 PROCEDURE — 87804 INFLUENZA ASSAY W/OPTIC: CPT | Performed by: EMERGENCY MEDICINE

## 2018-11-27 PROCEDURE — 80061 LIPID PANEL: CPT | Performed by: STUDENT IN AN ORGANIZED HEALTH CARE EDUCATION/TRAINING PROGRAM

## 2018-11-27 PROCEDURE — 87641 MR-STAPH DNA AMP PROBE: CPT | Performed by: STUDENT IN AN ORGANIZED HEALTH CARE EDUCATION/TRAINING PROGRAM

## 2018-11-27 PROCEDURE — 00000146 ZZHCL STATISTIC GLUCOSE BY METER IP

## 2018-11-27 PROCEDURE — 40000225 ZZH STATISTIC SLP WARD VISIT: Performed by: SPEECH-LANGUAGE PATHOLOGIST

## 2018-11-27 PROCEDURE — 40000193 ZZH STATISTIC PT WARD VISIT

## 2018-11-27 PROCEDURE — 86901 BLOOD TYPING SEROLOGIC RH(D): CPT | Performed by: EMERGENCY MEDICINE

## 2018-11-27 PROCEDURE — 93005 ELECTROCARDIOGRAM TRACING: CPT

## 2018-11-27 PROCEDURE — 97530 THERAPEUTIC ACTIVITIES: CPT | Mod: GP

## 2018-11-27 PROCEDURE — 97162 PT EVAL MOD COMPLEX 30 MIN: CPT | Mod: GP

## 2018-11-27 PROCEDURE — 36415 COLL VENOUS BLD VENIPUNCTURE: CPT | Performed by: PSYCHIATRY & NEUROLOGY

## 2018-11-27 PROCEDURE — 93306 TTE W/DOPPLER COMPLETE: CPT | Mod: 26 | Performed by: INTERNAL MEDICINE

## 2018-11-27 PROCEDURE — 84484 ASSAY OF TROPONIN QUANT: CPT | Performed by: STUDENT IN AN ORGANIZED HEALTH CARE EDUCATION/TRAINING PROGRAM

## 2018-11-27 PROCEDURE — 20000004 ZZH R&B ICU UMMC

## 2018-11-27 PROCEDURE — 25500064 ZZH RX 255 OP 636: Performed by: PSYCHIATRY & NEUROLOGY

## 2018-11-27 PROCEDURE — 40000358 ZZHCL STATISTIC DRUG SCREEN MULTIPLE (METRO): Performed by: STUDENT IN AN ORGANIZED HEALTH CARE EDUCATION/TRAINING PROGRAM

## 2018-11-27 PROCEDURE — 93010 ELECTROCARDIOGRAM REPORT: CPT | Performed by: INTERNAL MEDICINE

## 2018-11-27 PROCEDURE — 81001 URINALYSIS AUTO W/SCOPE: CPT | Performed by: STUDENT IN AN ORGANIZED HEALTH CARE EDUCATION/TRAINING PROGRAM

## 2018-11-27 PROCEDURE — 25000132 ZZH RX MED GY IP 250 OP 250 PS 637: Performed by: NURSE PRACTITIONER

## 2018-11-27 PROCEDURE — 97112 NEUROMUSCULAR REEDUCATION: CPT | Mod: GP

## 2018-11-27 PROCEDURE — A9585 GADOBUTROL INJECTION: HCPCS | Performed by: PSYCHIATRY & NEUROLOGY

## 2018-11-27 PROCEDURE — 40000264 ECHO COMPLETE BUBBLE

## 2018-11-27 PROCEDURE — 86900 BLOOD TYPING SEROLOGIC ABO: CPT | Performed by: EMERGENCY MEDICINE

## 2018-11-27 PROCEDURE — 80048 BASIC METABOLIC PNL TOTAL CA: CPT | Performed by: FAMILY MEDICINE

## 2018-11-27 PROCEDURE — 85610 PROTHROMBIN TIME: CPT | Performed by: EMERGENCY MEDICINE

## 2018-11-27 PROCEDURE — 36415 COLL VENOUS BLD VENIPUNCTURE: CPT | Performed by: STUDENT IN AN ORGANIZED HEALTH CARE EDUCATION/TRAINING PROGRAM

## 2018-11-27 PROCEDURE — 87640 STAPH A DNA AMP PROBE: CPT | Performed by: STUDENT IN AN ORGANIZED HEALTH CARE EDUCATION/TRAINING PROGRAM

## 2018-11-27 PROCEDURE — 25000125 ZZHC RX 250: Performed by: EMERGENCY MEDICINE

## 2018-11-27 PROCEDURE — 84484 ASSAY OF TROPONIN QUANT: CPT | Performed by: PSYCHIATRY & NEUROLOGY

## 2018-11-27 PROCEDURE — 92610 EVALUATE SWALLOWING FUNCTION: CPT | Mod: GN | Performed by: SPEECH-LANGUAGE PATHOLOGIST

## 2018-11-27 PROCEDURE — 84484 ASSAY OF TROPONIN QUANT: CPT | Performed by: NURSE PRACTITIONER

## 2018-11-27 PROCEDURE — 86850 RBC ANTIBODY SCREEN: CPT | Performed by: EMERGENCY MEDICINE

## 2018-11-27 PROCEDURE — 25000128 H RX IP 250 OP 636: Performed by: PSYCHIATRY & NEUROLOGY

## 2018-11-27 PROCEDURE — 70450 CT HEAD/BRAIN W/O DYE: CPT | Mod: 77

## 2018-11-27 PROCEDURE — 25000128 H RX IP 250 OP 636: Performed by: EMERGENCY MEDICINE

## 2018-11-27 PROCEDURE — 40000275 ZZH STATISTIC RCP TIME EA 10 MIN

## 2018-11-27 PROCEDURE — 25000125 ZZHC RX 250: Performed by: STUDENT IN AN ORGANIZED HEALTH CARE EDUCATION/TRAINING PROGRAM

## 2018-11-27 PROCEDURE — 36415 COLL VENOUS BLD VENIPUNCTURE: CPT | Performed by: NURSE PRACTITIONER

## 2018-11-27 PROCEDURE — 85025 COMPLETE CBC W/AUTO DIFF WBC: CPT | Performed by: FAMILY MEDICINE

## 2018-11-27 PROCEDURE — 83036 HEMOGLOBIN GLYCOSYLATED A1C: CPT | Performed by: STUDENT IN AN ORGANIZED HEALTH CARE EDUCATION/TRAINING PROGRAM

## 2018-11-27 PROCEDURE — 70549 MR ANGIOGRAPH NECK W/O&W/DYE: CPT

## 2018-11-27 PROCEDURE — 25800025 ZZH RX 258: Performed by: INTERNAL MEDICINE

## 2018-11-27 PROCEDURE — 97116 GAIT TRAINING THERAPY: CPT | Mod: GP

## 2018-11-27 PROCEDURE — 70450 CT HEAD/BRAIN W/O DYE: CPT

## 2018-11-27 PROCEDURE — 25000128 H RX IP 250 OP 636: Performed by: STUDENT IN AN ORGANIZED HEALTH CARE EDUCATION/TRAINING PROGRAM

## 2018-11-27 PROCEDURE — 80307 DRUG TEST PRSMV CHEM ANLYZR: CPT | Performed by: STUDENT IN AN ORGANIZED HEALTH CARE EDUCATION/TRAINING PROGRAM

## 2018-11-27 RX ORDER — ACETAMINOPHEN 325 MG/1
650 TABLET ORAL EVERY 4 HOURS PRN
Status: DISCONTINUED | OUTPATIENT
Start: 2018-11-27 | End: 2018-12-11 | Stop reason: HOSPADM

## 2018-11-27 RX ORDER — HYDRALAZINE HYDROCHLORIDE 20 MG/ML
10 INJECTION INTRAMUSCULAR; INTRAVENOUS EVERY 6 HOURS PRN
Status: DISCONTINUED | OUTPATIENT
Start: 2018-11-27 | End: 2018-11-29

## 2018-11-27 RX ORDER — LABETALOL HYDROCHLORIDE 5 MG/ML
10-20 INJECTION, SOLUTION INTRAVENOUS EVERY 4 HOURS PRN
Status: DISCONTINUED | OUTPATIENT
Start: 2018-11-27 | End: 2018-11-29

## 2018-11-27 RX ORDER — GABAPENTIN 100 MG/1
100 CAPSULE ORAL 3 TIMES DAILY
Status: DISCONTINUED | OUTPATIENT
Start: 2018-11-27 | End: 2018-11-27

## 2018-11-27 RX ORDER — GADOBUTROL 604.72 MG/ML
10 INJECTION INTRAVENOUS ONCE
Status: COMPLETED | OUTPATIENT
Start: 2018-11-27 | End: 2018-11-27

## 2018-11-27 RX ORDER — SODIUM CHLORIDE 9 MG/ML
INJECTION, SOLUTION INTRAVENOUS CONTINUOUS
Status: DISCONTINUED | OUTPATIENT
Start: 2018-11-27 | End: 2018-11-28

## 2018-11-27 RX ORDER — TAMSULOSIN HYDROCHLORIDE 0.4 MG/1
0.4 CAPSULE ORAL DAILY
Status: DISCONTINUED | OUTPATIENT
Start: 2018-11-27 | End: 2018-12-11 | Stop reason: HOSPADM

## 2018-11-27 RX ORDER — ACYCLOVIR 200 MG/1
10 CAPSULE ORAL ONCE
Status: COMPLETED | OUTPATIENT
Start: 2018-11-27 | End: 2018-11-27

## 2018-11-27 RX ORDER — OFLOXACIN 3 MG/ML
1 SOLUTION/ DROPS OPHTHALMIC EVERY 4 HOURS
Status: DISCONTINUED | OUTPATIENT
Start: 2018-11-27 | End: 2018-12-11 | Stop reason: HOSPADM

## 2018-11-27 RX ORDER — LISINOPRIL 5 MG/1
5 TABLET ORAL DAILY
Status: DISCONTINUED | OUTPATIENT
Start: 2018-11-27 | End: 2018-12-11 | Stop reason: HOSPADM

## 2018-11-27 RX ORDER — NALOXONE HYDROCHLORIDE 0.4 MG/ML
.1-.4 INJECTION, SOLUTION INTRAMUSCULAR; INTRAVENOUS; SUBCUTANEOUS
Status: DISCONTINUED | OUTPATIENT
Start: 2018-11-27 | End: 2018-12-11 | Stop reason: HOSPADM

## 2018-11-27 RX ADMIN — SODIUM CHLORIDE 10 ML: 9 INJECTION, SOLUTION INTRAMUSCULAR; INTRAVENOUS; SUBCUTANEOUS at 11:09

## 2018-11-27 RX ADMIN — ACETAMINOPHEN 650 MG: 325 TABLET, FILM COATED ORAL at 11:09

## 2018-11-27 RX ADMIN — SODIUM CHLORIDE 0.5 MCG/KG/MIN: 9 INJECTION, SOLUTION INTRAVENOUS at 02:44

## 2018-11-27 RX ADMIN — GADOBUTROL 10 ML: 604.72 INJECTION INTRAVENOUS at 16:57

## 2018-11-27 RX ADMIN — TAMSULOSIN HYDROCHLORIDE 0.4 MG: 0.4 CAPSULE ORAL at 11:09

## 2018-11-27 RX ADMIN — NICARDIPINE HYDROCHLORIDE 7.5 MG/HR: 0.2 INJECTION, SOLUTION INTRAVENOUS at 04:51

## 2018-11-27 RX ADMIN — SODIUM CHLORIDE: 9 INJECTION, SOLUTION INTRAVENOUS at 04:46

## 2018-11-27 RX ADMIN — OFLOXACIN 1 DROP: 3 SOLUTION/ DROPS OPHTHALMIC at 19:59

## 2018-11-27 RX ADMIN — SODIUM CHLORIDE 100 ML: 9 INJECTION, SOLUTION INTRAVENOUS at 16:57

## 2018-11-27 RX ADMIN — LISINOPRIL 5 MG: 5 TABLET ORAL at 11:09

## 2018-11-27 ASSESSMENT — VISUAL ACUITY
OU: NORMAL ACUITY

## 2018-11-27 ASSESSMENT — ENCOUNTER SYMPTOMS
CARDIOVASCULAR NEGATIVE: 1
GASTROINTESTINAL NEGATIVE: 1
ENDOCRINE NEGATIVE: 1
PSYCHIATRIC NEGATIVE: 1
HEMATOLOGIC/LYMPHATIC NEGATIVE: 1
EYES NEGATIVE: 1
RESPIRATORY NEGATIVE: 1
ALLERGIC/IMMUNOLOGIC NEGATIVE: 1
MUSCULOSKELETAL NEGATIVE: 1

## 2018-11-27 ASSESSMENT — ACTIVITIES OF DAILY LIVING (ADL)
ADLS_ACUITY_SCORE: 12
ADLS_ACUITY_SCORE: 13
BATHING: 0-->INDEPENDENT
ADLS_ACUITY_SCORE: 13
TOILETING: 0-->INDEPENDENT
RETIRED_COMMUNICATION: 0-->UNDERSTANDS/COMMUNICATES WITHOUT DIFFICULTY
ADLS_ACUITY_SCORE: 13
AMBULATION: 0-->INDEPENDENT
DRESS: 0-->INDEPENDENT
COGNITION: 0 - NO COGNITION ISSUES REPORTED
RETIRED_EATING: 0-->INDEPENDENT
SWALLOWING: 0-->SWALLOWS FOODS/LIQUIDS WITHOUT DIFFICULTY
ADLS_ACUITY_SCORE: 12
TRANSFERRING: 0-->INDEPENDENT
FALL_HISTORY_WITHIN_LAST_SIX_MONTHS: NO

## 2018-11-27 ASSESSMENT — PAIN DESCRIPTION - DESCRIPTORS: DESCRIPTORS: HEADACHE

## 2018-11-27 NOTE — PLAN OF CARE
Problem: Patient Care Overview  Goal: Plan of Care/Patient Progress Review  Discharge Planner SLP   Patient plan for discharge: not discussed  Current status: Clinical swallow evaluation completed per MD order. RN reported no swallowing concerns. Patient presents with minimal oral dysphagia characterized by mild oral motor impairments. No overt aspiration signs occurred across consistencies. Patient able to manage solids and liquids orally without difficulty and no oral residue remained. Recommend continue regular diet and thin liquids given swallow precautions (fully upright position, small bites/sips, slow rate). No further swallow treatment indicated at this time. Will complete cognitive-communication evaluation as appropriate given mild confusion noted during swallow evaluation. Discussed with RN.  Barriers to return to prior living situation: medical needs  Recommendations for discharge: defer to OT/PT  Rationale for recommendations: may consider ST for cognitive-communication at discharge pending patient progress       Entered by: Tegan De León 11/27/2018 9:58 AM

## 2018-11-27 NOTE — PROGRESS NOTES
" 11/27/18 1300   Quick Adds   Type of Visit Initial PT Evaluation   Living Environment   Lives With alone   Living Arrangements house   Home Accessibility bed and bath on same level   Number of Stairs to Enter Home 2   Number of Stairs Within Home (pt denies stairs in home)   Stair Railings at Home outside, present at both sides   Transportation Available car   Living Environment Comment works as a  and deals with animals. Has a brace for his knee, broke his knee plate 'a long time ago' but unable to be more specific. No friends or family living nearby. 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    Self-Care   Dominant Hand right   Usual Activity Tolerance moderate   Current Activity Tolerance moderate   Regular Exercise yes   Activity/Exercise Type walking;strength training  (3.5 miles (distance to [address]))   Exercise Amount/Frequency daily   Equipment Currently Used at Home none   Activity/Exercise/Self-Care Comment pt reports he is very active 'doing what he needs to do' walking the equivalent distance from his home [address] (3.5 miles )daily on a treadmill   Functional Level Prior   Ambulation 0-->independent   Transferring 0-->independent   Toileting 0-->independent   Bathing 0-->independent   Dressing 0-->independent   Eating 0-->independent   Communication 0-->understands/communicates without difficulty   Swallowing 0-->swallows foods/liquids without difficulty   Cognition 1 - attention or memory deficits   Fall history within last six months (pt denies falls)   Which of the above functional risks had a recent onset or change? ambulation;transferring;cognition;toileting;bathing;dressing  (vision changes)   Prior Functional Level Comment pt IND at baseline   General Information   Onset of Illness/Injury or Date of Surgery - Date 11/27/18 " "  Referring Physician Ranjeet Lama MD   Patient/Family Goals Statement \"I just want to be a better person than I am\"   Pertinent History of Current Problem (include personal factors and/or comorbidities that impact the POC) 70 year old male with pmh of anxiety, GERD who presents w/ 3 days of generalized chills + subjective fevers as well as incoordination/imbalance, incidentally found to have L parietal hypodensity + associated hemorrhagic conversion. Patient vaguely describes fairly sudden onset of flu-like symptoms    Precautions/Limitations fall precautions   Weight-Bearing Status - LUE full weight-bearing   Weight-Bearing Status - RUE full weight-bearing   Weight-Bearing Status - LLE full weight-bearing   Weight-Bearing Status - RLE full weight-bearing   General Observations R eye/visual field deficit   General Info Comments activity: up with assist   Cognitive Status Examination   Orientation person;place   Level of Consciousness alert;confused   Follows Commands and Answers Questions 75% of the time   Personal Safety and Judgment impaired;impulsive   Memory impaired   Cognitive Comment pt is oriented to self (name only) but unable to report birthdate or current age. impulsive throughout session, displays impaired cognition from baseline.    Pain Assessment   Patient Currently in Pain No   Integumentary/Edema   Integumentary/Edema no deficits were identifed   Posture    Posture Forward head position;Protracted shoulders  (flexed posture in standing at hip and L knee)   Range of Motion (ROM)   ROM Comment BLE wfl; L knee with brace donned; limiting full extension AROM; pt denies concern without brace on.    Strength   Strength Comments BLE grossly 4+/5; mild strength deficit of L knee extensor vs R side.   Bed Mobility   Bed Mobility Comments not assessed (pt encountered up in chair); reports staff present during transfer earlier today however were not needed   Transfer Skills   Transfer Comments sit <> " stand transfer with close SBA without AD; BUE push off   Gait   Gait Comments amb x 40' without AD: initially CGA progressing to close SBA; flexed/slouched posture noted with head down initially; short step lengths with L knee maintained in flexion initially, improves over distance; minimal foot clearance BLE.    Balance   Balance Comments close SBA static stance; CGA with dynamic stepping tasks   Sensory Examination   Sensory Perception Comments grossly intact to light touch BUE/BLE dermatomes    Coordination   Coordination Comments impaired; difficulty controlling tempo R sided toe taps, BUE prontation/supination drill, and R UE nose-finger touches impaired both accuracy and speed of performance.    General Therapy Interventions   Planned Therapy Interventions balance training;gait training;bed mobility training;neuromuscular re-education;strengthening;transfer training;home program guidelines;visual perception   Clinical Impression   Criteria for Skilled Therapeutic Intervention yes, treatment indicated   PT Diagnosis impaired functional mobility   Influenced by the following impairments weakness, fatigue, coordination deficit, cognition deficit, visual deficit   Functional limitations due to impairments decreased safety with performance of functional mobility including transfers and gait.    Clinical Presentation Evolving/Changing   Clinical Presentation Rationale pt presentation, clinical reasoning   Clinical Decision Making (Complexity) Moderate complexity   Therapy Frequency` 5 times/week   Predicted Duration of Therapy Intervention (days/wks) 2-3 weeks   Anticipated Equipment Needs at Discharge (TBD)   Anticipated Discharge Disposition Acute Rehabilitation Facility;Transitional Care Facility   Risk & Benefits of therapy have been explained Yes   Patient, Family & other staff in agreement with plan of care Yes   Clinical Impression Comments evaluation completed, treatment initiated; pt with balance and  "coordination deficits, as well as cognitive and vision changes all leading to decreased safety with mobility.    [facility] AM-PAC TM \"6 Clicks\"   2016, Trustees of [facility], under license to [employer].  All rights reserved.   6 Clicks Short Forms Basic Mobility Inpatient Short Form   [facility] AM-PAC  \"6 Clicks\" V.2 Basic Mobility Inpatient Short Form   1. Turning from your back to your side while in a flat bed without using bedrails? 4 - None  (estimated)   2. Moving from lying on your back to sitting on the side of a flat bed without using bedrails? 4 - None  (estimated)   3. Moving to and from a bed to a chair (including a wheelchair)? 4 - None   4. Standing up from a chair using your arms (e.g., wheelchair, or bedside chair)? 4 - None   5. To walk in hospital room? 4 - None   6. Climbing 3-5 steps with a railing? 3 - A Little   Basic Mobility Raw Score (Score out of 24.Lower scores equate to lower levels of function) 23   Total Evaluation Time   Total Evaluation Time (Minutes) 10     "

## 2018-11-27 NOTE — ED NOTES
"Pt states that he has had the chills since I touched my neighboer who has \" the virus. He states that he starting getting the chills about a day later. He states that he was having trouible with hand movements as well as light headed as well. He states he bumped his head twice within the last 2 days. He states the chills are minimal now. He is alert and oriented. No  Nausea or vomiting. No change in bowel or bladder.  "

## 2018-11-27 NOTE — PROGRESS NOTES
Neurocrit plan updates:  - MRI brain today  - Started lisinopril for BP and flomax for bladder issues  - SBP goal <140 for now, can liberalize to <160 this evening around 10 pm  - prn bladder scan  - consider DVT prophylaxis tomorrow  - Pan culture if fever spikes

## 2018-11-27 NOTE — PROGRESS NOTES
Nurse Care Coordination was consulted to assess needs of patient following stroke. Chart was reviewed and discussed with interdisciplinary team. Nurse care coordination needs are not identified at this time.  RNCC will cont to follow plan of care.      Diana Osorio RN, PHN, BSN  4A and 4E/ ICU  Care Coordinator  Phone: [phone]  Pager: [phone]

## 2018-11-27 NOTE — PLAN OF CARE
Problem: Patient Care Overview  Goal: Plan of Care/Patient Progress Review  Discharge Planner PT 4AB  Patient plan for discharge: agreeable to rehab  Current status: evaluation completed; pt demonstrating impaired cognition, vision, coordination and balance from baseline. Completes sit <> stand transfers with close SBA; needs assist and guidance for managing urinal; ambulates x ~500' with light CGA -close SBA displaying short step lengths, limited foot clearance and limited L knee extension. Has much difficulty with navigating hallway without cues to turn head left/right to scan environment, needing frequent cues to turn right. Unable to attend to objects on R side of body without turning head to the right. VSS pre/post session.   Barriers to return to prior living situation: vision, cognition, balance, coordination deficits; medical status  Recommendations for discharge: TCU  Rationale for recommendations: pt not safe for return home; will benefit from continued therapy to improve safety with functional mobility status. Would be good candidate for ARU given multidisciplinary needs however is limited by potential unsafe discharge plan as pt lives alone does not have assistance.        Entered by: Morro Dacosta 11/27/2018 2:58 PM

## 2018-11-27 NOTE — ED PROVIDER NOTES
"  History     Chief Complaint   Patient presents with     Chills     believes he caught a virus from a friend that caused generalzied body aches and chills. Started 2 days ago. No n/v/d. Denies cough cold sx.      HPI  Simone Daniels is a 70 year old male with a history of postherpetic neuralgia, anxiety, GERD who presents for evaluation for concern for chills and generalized body aches with influenza-like symptoms .  Patient is reporting over the last 2-3 days he has felt ill.  He is reporting chills with body aches and also reporting his balance and coordination is off.  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  He is reporting no rash . He reports he was treated for shingles about a year and a half ago.  He is reporting no recent fall or trauma.  He was brought in by friend.  He reports he lives in [facility].  He is also reporting that \"he does not receive flu vaccines\".  He is denying any diarrhea and no nausea or vomiting.  No abdominal pain, no chest pressure or tightness or palpitations.  He reports no neck pain, no headache, no blurry vision.  He reports he does  does mostly holistic care and is not take any active prescription medication    Problem List:    Patient Active Problem List    Diagnosis Date Noted     Post herpetic neuralgia 06/19/2017     Priority: Medium     Advanced directives, counseling/discussion 05/10/2012     Priority: Medium     Discussed advance care planning with patient; however, patient declined at this time. 5/10/2012          CARDIOVASCULAR SCREENING; LDL GOAL LESS THAN 160 10/31/2010     Priority: Medium     ESOPHAGEAL REFLUX 10/19/2006     Priority: Medium     Left knee osteoarthritis 04/08/2006     Priority: Medium     Contact dermatitis and other eczema, due to unspecified cause 04/08/2006     Priority: Medium     " Anxiety state 05/26/2005     Priority: Medium     Problem list name updated by automated process. Provider to review          Past Medical History:    Past Medical History:   Diagnosis Date     LOW BACK PAIN        Past Surgical History:    Past Surgical History:   Procedure Laterality Date     SURGICAL HISTORY OF -   1979    Cervical disc (5,6,&7)     SURGICAL HISTORY OF -       (L) Knee fx tibial plateau     SURGICAL HISTORY OF -   1981    (L) Knee surgery - re-fx tibial plateau       Family History:    Family History   Problem Relation Age of Onset     C.A.D. Mother      heart disease in her 60s     C.A.D. Brother      1/2 brother with heart disease     Diabetes No family hx of        Social History:  Marital Status:  Single [1]  Social History   Substance Use Topics     Smoking status: Former Smoker     Packs/day: 2.00     Years: 18.00     Types: Cigarettes     Quit date: 1/1/1980     Smokeless tobacco: Not on file     Alcohol use Yes      Comment: occaisonal        Medications:      Capsaicin 0.1 % cream   gabapentin (NEURONTIN) 100 MG capsule   L-CITRULLINE 600 MG OR CAPS   ofloxacin (OCUFLOX) 0.3 % ophthalmic solution   Q-10 CO-ENZYME OR         Review of Systems   Constitutional:        Feeling unwell   HENT: Negative.    Eyes: Negative.    Respiratory: Negative.    Cardiovascular: Negative.    Gastrointestinal: Negative.    Endocrine: Negative.    Genitourinary: Negative.    Musculoskeletal: Negative.    Skin: Negative.    Allergic/Immunologic: Negative.    Neurological:        Coordination and balance off   Hematological: Negative.    Psychiatric/Behavioral: Negative.    All other systems reviewed and are negative.      Physical Exam   BP: (!) 184/103  Pulse: 91  Temp: 97.1  F (36.2  C)  Resp: 16  Weight: 81.6 kg (180 lb)  SpO2: 99 %      Physical Exam   Constitutional: He is oriented to person, place, and time. He appears well-developed and well-nourished. No distress.   HENT:   Head: Normocephalic and  atraumatic.   Eyes: Conjunctivae and EOM are normal. Pupils are equal, round, and reactive to light. Right eye exhibits no discharge. Left eye exhibits no discharge. No scleral icterus.   Neck: Normal range of motion. Neck supple. No JVD present. No tracheal deviation present. No thyromegaly present.   Cardiovascular: Normal rate and regular rhythm.  Exam reveals no gallop and no friction rub.    No murmur heard.  Pulmonary/Chest: Effort normal and breath sounds normal. No stridor. No respiratory distress. He has no wheezes. He has no rales. He exhibits no tenderness.   Abdominal: Soft. Bowel sounds are normal. He exhibits no distension and no mass. There is no tenderness. There is no rebound and no guarding.   Lymphadenopathy:     He has no cervical adenopathy.   Neurological: He is alert and oriented to person, place, and time. He displays normal reflexes. No cranial nerve deficit. He exhibits normal muscle tone. Coordination normal.   Skin: No rash noted. He is not diaphoretic. No erythema. No pallor.   Psychiatric: He has a normal mood and affect. His behavior is normal. Thought content normal.       ED Course     ED Course     Procedures               Critical Care time:  none                     ED medications:  Medications   niCARdipine (CARDENE) 40 mg in sodium chloride 0.9 % 200 mL infusion (0 mcg/kg/min × 81.6 kg Intravenous ED Infusing on Admission/transfer 11/27/18 0249)         ED labs and imaging:  Results for orders placed or performed during the hospital encounter of 11/26/18   Head CT w/o contrast   Result Value Ref Range    Radiologist flags Acute intracranial hemorrhage (AA)     Narrative    CT HEAD WITHOUT CONTRAST  11/27/2018 1:38 AM     HISTORY: Walking into doors. Generalized weakness and loss of  coordination. Flu-like symptoms.    COMPARISON: None.    TECHNIQUE: Without intravenous contrast, helical sections were  acquired through the brain. Coronal reconstructions were generated.  Radiation  dose for this scan was reduced using automated exposure  control, adjustment of the mA and/or kV according to the patient's  size, or iterative reconstruction technique.    FINDINGS: A 2.7 cm lobulated high attenuation structure in the  posterior left parietal lobe, consistent with acute intraparenchymal  hemorrhage. There is a 6 cm region of surrounding circumscribed low  attenuation. No significant mass effect. Mild periventricular white  matter low attenuation likely relating to chronic small vessel  ischemic disease. The cerebral ventricles are normal in caliber. The  basal cisterns are patent. No extra-axial fluid collection. The  visualized portions of the paranasal sinuses and mastoid air cells are  unremarkable.      Impression    IMPRESSION: 3 cm acute intraparenchymal hemorrhage in the posterior  left parietal lobe with surrounding low attenuation. The cause of this  is not definite, but it most likely represents the site of a  hemorrhagic infarction.    [Critical Result: Acute intracranial hemorrhage]    Finding was identified on 11/27/2018 2:08 AM.     Dr. Rivera was contacted by me on 11/27/2018 2:11 AM and verbalized  understanding of the critical result.   CBC with platelets differential   Result Value Ref Range    WBC 8.7 4.0 - 11.0 10e9/L    RBC Count 4.40 4.4 - 5.9 10e12/L    Hemoglobin 14.0 13.3 - 17.7 g/dL    Hematocrit 41.3 40.0 - 53.0 %    MCV 94 78 - 100 fl    MCH 31.8 26.5 - 33.0 pg    MCHC 33.9 31.5 - 36.5 g/dL    RDW 13.0 10.0 - 15.0 %    Platelet Count 208 150 - 450 10e9/L    Diff Method Automated Method     % Neutrophils 70.7 %    % Lymphocytes 16.1 %    % Monocytes 11.1 %    % Eosinophils 1.3 %    % Basophils 0.5 %    % Immature Granulocytes 0.3 %    Nucleated RBCs 0 0 /100    Absolute Neutrophil 6.1 1.6 - 8.3 10e9/L    Absolute Lymphocytes 1.4 0.8 - 5.3 10e9/L    Absolute Monocytes 1.0 0.0 - 1.3 10e9/L    Absolute Eosinophils 0.1 0.0 - 0.7 10e9/L    Absolute Basophils 0.0 0.0 - 0.2  "10e9/L    Abs Immature Granulocytes 0.0 0 - 0.4 10e9/L    Absolute Nucleated RBC 0.0    Basic metabolic panel   Result Value Ref Range    Sodium 143 133 - 144 mmol/L    Potassium 3.5 3.4 - 5.3 mmol/L    Chloride 109 94 - 109 mmol/L    Carbon Dioxide 27 20 - 32 mmol/L    Anion Gap 7 3 - 14 mmol/L    Glucose 99 70 - 99 mg/dL    Urea Nitrogen 17 7 - 30 mg/dL    Creatinine 0.95 0.66 - 1.25 mg/dL    GFR Estimate 78 >60 mL/min/1.7m2    GFR Estimate If Black >90 >60 mL/min/1.7m2    Calcium 8.3 (L) 8.5 - 10.1 mg/dL   INR   Result Value Ref Range    INR 1.08 0.86 - 1.14   ABO/Rh type and screen   Result Value Ref Range    ABO A     RH(D) Pos     Antibody Screen Neg     Test Valid Only At [facility]        Specimen Expires 11/30/2018    Influenza A/B antigen   Result Value Ref Range    Influenza A/B Agn Specimen Nasopharyngeal     Influenza A Negative NEG^Negative    Influenza B Negative NEG^Negative         ED Vitals:  Vitals:    11/27/18 0045 11/27/18 0100 11/27/18 0115 11/27/18 0216   BP:    (!) 184/114   Pulse:       Resp:       Temp:       SpO2: 98% 97% 99%    Weight:         Assessments & Plan (with Medical Decision Making)   Clinical impression: 70-year-old male who arrived by private car with a friend for further evaluation for concern that he has \"a virus\".  Patient has what appears to be concern for hemorrhagic stroke cannot exclude a mass lesion.    Patient reports he recently got sick after close contact was sick with viral infection.  Reports a history of shingles.  He practices holistic medicines and does not take prescription medications.  He reports he was treated for shingles about a year and a half ago.  He was concerned because over the last 3 days he has been walking into car doors and feels like his equilibrium and motor skills are off.  He is not reporting any extremity weakness or numbness.  He is also reporting no headache no fever no chills.  He also reports no rash.  He has had no " "complaints about neck stiffness no abdominal pain.  On my exam he is alert and oriented in no obvious distress and pleasant.  He has poor dentition.  He was noted to be hypertensive both on ED arrival and during his course of care in ED.  His lungs were clear to auscultation he was in no obvious distress.  Patient had normal gross neurologic exam without any obvious motor deficit including extremity weakness or numbness and he denied having a headache.      ED course and Plan:  We discussed and reviewed his concerns about a possible \"virus infection\".  His clinical symptoms are not consistent with influenza-like illness or symptoms.  His exam is not concerning for meningitis or encephalitis.  His friend who drove him to the emergency department was concerned about his episode of  Poor coordination and reports of walking into door and feeling like his \" motor function is off\". We agreed to obtain rapid influenza given his concern that he has a virus and feels poorly with chills and generalized body aches although he was documented to be afebrile in no acute distress during his course of care in ED.  Friend also requested neuroimaging given concern about coordination and balance.  She did not report any recent fall or trauma.  He did report walking to a car door that he opened.  I received a phone call from the interpreting and on-duty radiologist Dr Eddy Faith-who reported concern that patient had a recent ischemic event with hemorrhagic transformation.  Please see the detailed interpretation by the radiologist above.  I reviewed CT head findings with the patient and his friend who is at the bedside.  I reviewed options for care and additional management. Patient and friend requested transfer to the [facility] .    I spoke with Dr. Davina Anne- on-duty stroke neurologist at the [place] at 2.20AM.  Dr Anne recommended blood pressure control with goals of (~140/90), and requested transfer " "to the neuro ICU for further care and evaluation for consideration for repeat serial imaging including MRI imaging. Patient was started on IV nicardipine for better blood pressure control and transferred for concern for acute hemorrhagic stroke, cannot exclude a mass lesion.      Disclaimer: This note consists of symbols derived from keyboarding, dictation and/or voice recognition software. As a result, there may be errors in the script that have gone undetected. Please consider this when interpreting information found in this chart.  I have reviewed the nursing notes.    I have reviewed the findings, diagnosis, plan and need for follow up with the patient.       Discharge Medication List as of 11/27/2018  2:53 AM          Final diagnoses:   Hypertension, unspecified type   Abnormal brain CT - Showing concern for acute hemorrhagic infarction. 3-day history of feeling clumsy and \"my equilibrium is off and that I have a virus\"       11/26/2018   [facility] EMERGENCY DEPARTMENT     Mukund Rivera MD  11/27/18 0335    "

## 2018-11-27 NOTE — CONSULTS
Social Work: Assessment with Discharge Plan    Patient Name:  Simone Daniels  :  1948  Age:  70 year old  MRN:  [id number]  Risk/Complexity Score:  Filed Complexity Screen Score: 2  Completed assessment with:  Pt, Pt's friend    Presenting Information   Reason for Referral:  Discharge plan  Date of Intake:  2018  Referral Source:  Physician  Decision Maker:  Pt  Alternate Decision Maker:  Pt does not have NOK for decision-making. He stated his preference to have his friend, Jayne Fulton, designated as a health care agent and is willing to complete an advanced directive.  Health Care Directive:  Patient considering completing and Provided education  Living Situation:  House; Pt lives alone in a house with 2 steps to enter. He is currently in the process of renovations which he is completing on his own.  Previous Functional Status:  Independent; Pt has been independent and active. 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  Patient and family understanding of hospitalization:  Pt is aware of his current health status, though he did not relay specifics.  Cultural/Language/Spiritual Considerations:  Pt is part of the [facility] in [place].  Adjustment to Illness:  Pt is in good spirits and is optimistic about recovery.    Physical Health  Reason for Admission:  No diagnosis found.  Services Needed/Recommended:  TCU    Mental Health/Chemical Dependency  Diagnosis:  Per chart, Pt has a history of anxiety  Support/Services in Place:  None noted  Services Needed/Recommended:  Medication or support as needed.    Support System  Significant relationship at present time:  Friend, Jayne Fulton  Family of origin is available for support:  No; Pt grew up in foster care and does not have any close relatives. Pt refers to distant relatives in [place], but has no desire to contact  them or to have them contacted.  Other support available:  None noted  Gaps in support system:  Pt has limited social supports.  Patient is caregiver to:  None     Provider Information   Primary Care Physician:  Sophia Dacosta   [phone]   Clinic:  [address] [place] [id number]      :  None    Financial   Income Source:  Pt receives income, approximately $20,500 per year, through a settlement with [employer], a company for which he formerly worked.  Financial Concerns:  Pt has limited income and no insurance coverage. He does not qualify for Medicare because his employer did not pay into Social Security for him.  Insurance:    Payor/Plan Subscriber Name Rel Member # Group #       Discharge Plan   Patient and family discharge goal:  Pt wishes to return home at his baseline function.  Provided education on discharge plan:  YES; Writer discussed potential need for inpatient rehab (prior to PT recommendations for TCU) and the need to establish insurance coverage.  Patient agreeable to discharge plan:  Pt prefers to return home and maintain his independence.  A list of Medicare Certified Facilities was provided to the patient and/or family to encourage patient choice. Patient's choices for facility are:  No options discussed, pending review of rehab need and potential for insurance coverage.  Will NH provide Skilled rehabilitation or complex medical:  Pending placement.  General information regarding anticipated insurance coverage and possible out of pocket cost was discussed. Patient and patient's family are aware patient may incur the cost of transportation to the facility, pending insurance payment: YES  Barriers to discharge:  Medical clearance, lack of insurance coverage    Discharge Recommendations   Anticipated Disposition:  TCU recommended, though Pt prefers to return home.  Transportation Needs:  Other:  Friend may be able to transport.  Name of Transportation Company and  Phone:  N/A    Additional comments   Writer met with Pt to assess home situation and discuss discharge planning. Prior to speaking with Pt, Writer encountered his friend, Jayne, outside of his room. Jayne stated that Pt lives alone and has been very independent. She expressed concern about helping him find a way to cover the cost of hospitalization. She provided her contact information and stated a willingness to be his emergency contact.    Writer entered Pt's room and introduced self and role. Writer informed Pt of the discussion with his friend and he agreed that she should be his emergency contact. Writer discussed HCD and NOK policy; Pt is interested in reviewing the HCD and would like to name Jayne as his agent. Writer provided a blank form. Writer discussed applying for MA. Pt states that he does not qualify for Medicare due to his former employer not paying into the system. He did martha the employer and now has a alf income, but does not have insurance. Writer contacted Financial Services who will work with Pt to determine if he qualifies for MA, IHS, or michelle care.    Pt is fiercely independent and plans to protect his independence. He is not opposed to rehab, but also does not currently have coverage for inpatient rehab. He prefers to return home. SW will continue to work with Pt, team, and financial counselor, to determine Pt's care needs and options.      Shilpi Bellamy, [facility]  ICU   Pager: [phone]

## 2018-11-27 NOTE — PROGRESS NOTES
11/27/18 0938   General Information   Onset Date 11/27/18   Start of Care Date 11/27/18   Referring Physician Ranjeet Lama MD    Patient Profile Review/OT: Additional Occupational Profile Info See Profile for full history and prior level of function   Patient/Family Goals Statement Patient is hungry.   Swallowing Evaluation Bedside swallow evaluation   Behaviorial Observations Alert;Confused  (mild confusion; oriented to place, situation; not year or da)   Mode of current nutrition Oral diet   Type of oral diet Regular;Thin liquid   Respiratory Status Room air   Comments Simone Daniels is a 70 year old male with pmh of anxiety, GERD who presents w/ 3 days of generalized chills + subjective fevers as well as incoordination/imbalance, incidentally found to have L parietal hypodensity + associated hemorrhagic conversion. HCT revealed intraparenchymal hemorrhage posterior left parietal lobe. RN reports no swallowing concerns.   Clinical Swallow Evaluation   Oral Musculature anomalies present   Structural Abnormalities none present   Dentition (many missing or teeth in poor repair)   Mucosal Quality adequate   Mandibular Strength and Mobility intact   Oral Labial Strength and Mobility impaired retraction  (mild on right side)   Lingual Strength and Mobility impaired coordination   Velar Elevation intact   Laryngeal Function Cough;Throat clear;Swallow;Voicing initiated;Dry swallow palpated   Clinical Swallow Eval: Thin Liquid Texture Trial   Mode of Presentation, Thin Liquids cup;straw;self-fed   Volume of Liquid or Food Presented 2-3 oz water   Oral Phase of Swallow WFL   Pharyngeal Phase of Swallow intact   Diagnostic Statement No overt aspiration signs occurred.   Clinical Swallow Eval: Puree Solid Texture Trial   Mode of Presentation, Puree spoon   Volume of Puree Presented 1-2 oz applesauce   Oral Phase, Puree WFL   Pharyngeal Phase, Puree intact   Diagnostic Statement No overt aspiration signs occurred.    Clinical Swallow Eval: Solid Food Texture Trial   Mode of Presentation, Solid self-fed   Volume of Solid Food Presented 1/2 chacorta cracker square   Oral Phase, Solid WFL   Pharyngeal Phase, Solid intact   Diagnostic Statement No overt aspiration signs occurred.   Esophageal Phase of Swallow   Patient reports or presents with symptoms of esophageal dysphagia No   Swallow Eval: Clinical Impressions   Skilled Criteria for Therapy Intervention No problems identified which require skilled intervention   Functional Assessment Scale (FAS) 6   Treatment Diagnosis minimal dysphagia   Diet texture recommendations Regular diet;Thin liquids   Recommended Feeding/Eating Techniques maintain upright posture during/after eating for 30 mins;small sips/bites  (slow rate)   Demonstrates Need for Referral to Another Service (involved)   Therapy Frequency (n/a)   Predicted Duration of Therapy Intervention (days/wks) n/a   Anticipated Discharge Disposition (defer to OT/PT/medical team)   Risks and Benefits of Treatment have been explained. Yes   Patient, family and/or staff in agreement with Plan of Care Yes   Clinical Impression Comments Patient presents with minimal oral dysphagia characterized by mild oral motor impairments. No overt aspiration signs occurred across consistencies. Patient able to manage solids and liquids orally without difficulty and no oral residue remained. Recommend continue regular diet and thin liquids given swallow precautions (fully upright position, small bites/sips, slow rate). No further swallow treatment indicated at this time. Will complete cognitive-communication evaluation as appropriate.   Total Evaluation Time   Total Evaluation Time (Minutes) 15

## 2018-11-27 NOTE — H&P
[facility], [place]      Neurology Stroke Admission    Patient Name: Simone Daniels  : 1948 MRN#: [id number]    STROKE DATA    Stroke Code:  Stroke code not activated.  Time patient seen:  2018 0200  Last known normal (pt's baseline):  2018     TPA treatment:  Not given due to intracranial hemorrhage.      Stroke Scales      National Institutes of Health Stroke Scale (at presentation)   NIHSS done at:  time patient seen      Score    Level of consciousness:  (0)   Alert, keenly responsive    LOC questions:  (0)   Answers both questions correctly    LOC commands:  (0)   Performs both tasks correctly    Best gaze:  (0)   Normal    Visual:  (0)   No visual loss    Facial palsy:  (0)   Normal symmetrical movements    Motor arm (left):  (0)   No drift    Motor arm (right):  (0)   No drift    Motor leg (left):  (0)   No drift    Motor leg (right):  (0)   No drift    Limb ataxia:  (0)   Absent    Sensory:  (0)   Normal- no sensory loss    Best language:  (0)   Normal- no aphasia    Dysarthria:  (0)   Normal    Extinction and inattention:  (2)   Profound nickie-inattention / extinction > one modality        NIHSS Total Score:  2        ICH Score (within 6 hrs of admission/before surgery)   Done at: 1:38 18    ICH Score Tool Patients Score   Age ? 80 years 1 point 0   GCS score  3-4   5-12   13-15    2 point  1 point  0 point 0   Hematoma volume, cm 3 ? 30 1 point 0   Intraventricular extension 1 point 0   Infratentorial location 1 point 0   Patient s ICH Score (0-6) = 0          Dysphagia Screen  Time of screenin2018 0200  Screening results: Per Nursing, passed bedside swallow     ASSESSMENT & PLAN BY PROBLEM     Work-up for Intracerebral Hemorrhage     Acute Hemorrhagic Stroke Plan: ICH w/ some concern for ischemic stroke w/ hemorrhagic transformation vs underlying lesion, will further evaluate w/ MRI  - ICH Score at admission: 0  - Admit to Neuro ICU  -  "Neurochecks  - Systolic BP Goal: < 140    - on nicardipine  - Euthermia, Euglycemia  - Head of bed elevated  - Telemetry, EKG  - Imaging: 3cm acute intraparenchymal hemorrhage in posterior left parietal lobe w/ surrounding low attention.   - Bedside Glucose Monitoring  - A1c, Troponin   - PT/OT/SLP  - PM&R  - Stroke Education    During initial physical assessment, the plan of care was discussed and developed with patient.  Plan of care.    Patient was admitted via transfer from [facility] ED.    The patient will be admitted to the Neuro Critical Care/Stroke team..     Other Medical Problems:  #Concern for afib: no hx of palpitations, racing heart beat or known arrhythmia. E/o afib on tele + then  EKG w/intermittent absence of  / inconsistent/diminutive p-waves. To be further evaluated while hospitalized    #Urinary retention: straight cath'd for >1L, frequent urination ON.   - will continue to evaluate, discuss further w/u + treatment for possible BPH    Fluids/Electrolytes/Nutrition  0.9% sodium chloride @ 100 mL/hr  Avoid hypotonic fluids.    Nutrition:     Prophylaxis            For VTE Prevention:  - ambulation (patient is low-risk and no additional VTE prophylaxis is needed)    For Acid Suppression:  - GI prophylaxis is not indicated    Code Status  Full Code    HPI  Simone Daniels is a 70 year old male with pmh of anxiety, GERD who presents w/ 3 days of generalized chills + subjective fevers as well as incoordination/imbalance, incidentally found to have L parietal hypodensity + associated hemorrhagic conversion. Patient vaguely describes fairly sudden onset of flu-like symptoms   (nausea w/o vomiting, chills, subjective fevers) and imbalance (w/o falls) 3 days ago, generalized weakness + \"not feeling right\";  Also reports sensation of being \"excited,\" tingling in his fingers and as if someone was controlling his motor system. denies focal weakness/numbness/tingling/changes in vision or other abnormality. " Denies hx of HTN, HLD, stroke or other neurologic issue, night sweats/chills/weight loss, palpitations, SOB, Headache or vision changes.     Presented to [facility] ED, found to have CT w/ e/o ischemic event/hypodensity + hemorrhagic transformation; transferred to [facility] for further evaluation + treatment.     Pertinent Past Medical/Surgical History  Past Medical History:   Diagnosis Date     LOW BACK PAIN        Past Surgical History:   Procedure Laterality Date     SURGICAL HISTORY OF -   1979    Cervical disc (5,6,&7)     SURGICAL HISTORY OF -       (L) Knee fx tibial plateau     SURGICAL HISTORY OF -   1981    (L) Knee surgery - re-fx tibial plateau       Medications:   Prescriptions Prior to Admission   Medication Sig Dispense Refill Last Dose     Capsaicin 0.1 % cream Apply topically 4 times daily 56.6 g 2 11/26/2018     gabapentin (NEURONTIN) 100 MG capsule Take 1 capsule (100 mg) by mouth 3 times daily 90 capsule 2 11/26/2018     L-CITRULLINE 600 MG OR CAPS 2 capsule daily   11/26/2018     ofloxacin (OCUFLOX) 0.3 % ophthalmic solution Apply 1 drop to eye every 4 hours 1 Bottle 0 11/26/2018     Q-10 CO-ENZYME OR 300mg daily   11/26/2018   .    Allergies: No Known Allergies.    Family History:   Family History   Problem Relation Age of Onset     C.A.D. Mother      heart disease in her 60s     C.A.D. Brother      1/2 brother with heart disease     Diabetes No family hx of    .    Social History:   Social History   Substance Use Topics     Smoking status: Former Smoker     Packs/day: 2.00     Years: 18.00     Types: Cigarettes     Quit date: 1/1/1980     Smokeless tobacco: Not on file     Alcohol use Yes      Comment: occaisonal   .    Tobacco use: Former smoker, last smoked teens    ROS:  The 10 point Review of Systems is negative other than noted in the HPI.    PHYSICAL EXAMINATION  Vital Signs:  B/P: Data Unavailable,  T: 98.2,  P: Data Unavailable,  R: 16    General:  patient lying in bed without any acute distress     HEENT:  normocephalic/atraumatic  Cardio:  RRR  Pulmonary:  no respiratory distress  Abdomen:  soft, non-tender, bowel sounds present  Extremities:  no edema    Neurologic  Mental Status:  fully alert, attentive and oriented, follows commands, speech clear and fluent, odd comments.  Cranial Nerves:  visual fields intact (neglects R visual field w/ b/l stim), EOMI with normal smooth pursuit, facial sensation intact and symmetric, facial movements symmetric, hearing not formally tested but intact to conversation, no dysarthria, shoulder shrug strong bilaterally, tongue protrusion midline  Motor:  no abnormal movements, normal muscle bulk, no pronator drift, able to move all limbs spontaneously, strength 5/5 throughout upper and lower extremities decreased mobility in LLE w/ brace.  Reflexes:  2+ and symmetric throughout  Sensory:  intact/symmetric to light touch throughout upper and lower extremities  Coordination:  FNF and HS intact without dysmetria  Station/Gait:  deferred.    Labs  Labs and Imaging reviewed and used in developing the plan; pertinent results included.     Lab Results   Component Value Date    GLC 99 11/27/2018       The patient was discussed with the fellow, Dr. Shaffer.  The staff is Dr. Bass.    Ranjeet Lama MD  Pager: [phone]

## 2018-11-27 NOTE — LETTER
Transition Communication Hand-off for Care Transitions to Next Level of Care Provider    Name: Simone Daniels  : 1948  MRN #: [id number]  Primary Care Provider: Sophia Dacosta     Primary Clinic: [address] [facility] [address]     Reason for Hospitalization:  Intracerebral hemorrhage  Intracranial hemorrhage (H)  Admit Date/Time: 2018  3:37 AM  Discharge Date:  18  Payor Source: No coverage found.                   Reason for Communication Hand-off Referral:     Discharge Plan:    Social Work Services Discharge Note     Patient Name:  Simone Daniels     Anticipated Discharge Date:  18     Discharge Disposition:   [facility] ([phone])     Following MD:  Facility Assignment     Pre-Admission Screening (PAS) online form has been completed.  The Level of Care (LOC) is:  Determined  Confirmation Code is:  [id number].  Patient/caregiver informed of referral to [facility] for Pre-Admission Screening for skilled nursing facility (SNF) placement and to expect a phone call post discharge from SNF.     Additional Services/Equipment Arranged:  GILBERT confirmed readiness for discharge with Dr. Santos.  GILBERT confirmed acceptance to [facility] with Admissions (Rosanne).  GILBERT arranged for [email or web address] (Erik [phone]) to provide w/c transport at 11:30am.     Patient / Family response to discharge plan:  Pt voices understanding of the discharge plan and agreement with the discharge plan.     Persons notified of above discharge plan:  Pt, 6A nursing and Dr. Santos.  SW (per pt request) left a message for his friend (Marley) informing of the discharge plan.     Staff Discharge Instructions:  Please fax discharge orders and signed hard scripts for any controlled substances (GILBERT will complete this task).  Please print a packet and send with patient.      CTS Handoff completed:  YES     Medicare Notice of Rights provided to the patient/family:  NO   as per Admissions  Facesheet, pt is not on medicare.     SILVIA Sanchez  Social Work, 6A  Phone:  [phone]  Pager:  [phone]  12/11/2018

## 2018-11-28 ENCOUNTER — APPOINTMENT (OUTPATIENT)
Dept: SPEECH THERAPY | Facility: CLINIC | Age: 73
DRG: 064 | End: 2018-11-28
Attending: PSYCHIATRY & NEUROLOGY

## 2018-11-28 ENCOUNTER — APPOINTMENT (OUTPATIENT)
Dept: OCCUPATIONAL THERAPY | Facility: CLINIC | Age: 73
DRG: 064 | End: 2018-11-28
Attending: STUDENT IN AN ORGANIZED HEALTH CARE EDUCATION/TRAINING PROGRAM

## 2018-11-28 LAB
ALBUMIN UR-MCNC: 300 MG/DL
APPEARANCE UR: ABNORMAL
BILIRUB UR QL STRIP: NEGATIVE
COLOR UR AUTO: ABNORMAL
GLUCOSE UR STRIP-MCNC: NEGATIVE MG/DL
HGB UR QL STRIP: ABNORMAL
KETONES UR STRIP-MCNC: NEGATIVE MG/DL
LEUKOCYTE ESTERASE UR QL STRIP: ABNORMAL
MUCOUS THREADS #/AREA URNS LPF: PRESENT /LPF
NITRATE UR QL: NEGATIVE
PH UR STRIP: 7.5 PH (ref 5–7)
RBC #/AREA URNS AUTO: >182 /HPF (ref 0–2)
SOURCE: ABNORMAL
SP GR UR STRIP: >1.035 (ref 1–1.03)
SQUAMOUS #/AREA URNS AUTO: 5 /HPF (ref 0–1)
TROPONIN I SERPL-MCNC: 0.03 UG/L (ref 0–0.04)
TROPONIN I SERPL-MCNC: 0.04 UG/L (ref 0–0.04)
UROBILINOGEN UR STRIP-MCNC: NORMAL MG/DL (ref 0–2)
WBC #/AREA URNS AUTO: 8 /HPF (ref 0–5)

## 2018-11-28 PROCEDURE — 36415 COLL VENOUS BLD VENIPUNCTURE: CPT | Performed by: STUDENT IN AN ORGANIZED HEALTH CARE EDUCATION/TRAINING PROGRAM

## 2018-11-28 PROCEDURE — 25000125 ZZHC RX 250: Performed by: STUDENT IN AN ORGANIZED HEALTH CARE EDUCATION/TRAINING PROGRAM

## 2018-11-28 PROCEDURE — 40000225 ZZH STATISTIC SLP WARD VISIT

## 2018-11-28 PROCEDURE — 25000132 ZZH RX MED GY IP 250 OP 250 PS 637: Performed by: NURSE PRACTITIONER

## 2018-11-28 PROCEDURE — 40000133 ZZH STATISTIC OT WARD VISIT: Performed by: OCCUPATIONAL THERAPIST

## 2018-11-28 PROCEDURE — 84484 ASSAY OF TROPONIN QUANT: CPT | Performed by: STUDENT IN AN ORGANIZED HEALTH CARE EDUCATION/TRAINING PROGRAM

## 2018-11-28 PROCEDURE — 81001 URINALYSIS AUTO W/SCOPE: CPT | Performed by: PSYCHIATRY & NEUROLOGY

## 2018-11-28 PROCEDURE — 92523 SPEECH SOUND LANG COMPREHEN: CPT | Mod: GN

## 2018-11-28 PROCEDURE — 92507 TX SP LANG VOICE COMM INDIV: CPT | Mod: GN

## 2018-11-28 PROCEDURE — 97535 SELF CARE MNGMENT TRAINING: CPT | Mod: GO | Performed by: OCCUPATIONAL THERAPIST

## 2018-11-28 PROCEDURE — 97166 OT EVAL MOD COMPLEX 45 MIN: CPT | Mod: GO | Performed by: OCCUPATIONAL THERAPIST

## 2018-11-28 PROCEDURE — 20000004 ZZH R&B ICU UMMC

## 2018-11-28 RX ADMIN — OFLOXACIN 1 DROP: 3 SOLUTION/ DROPS OPHTHALMIC at 00:04

## 2018-11-28 RX ADMIN — LISINOPRIL 5 MG: 5 TABLET ORAL at 08:03

## 2018-11-28 RX ADMIN — TAMSULOSIN HYDROCHLORIDE 0.4 MG: 0.4 CAPSULE ORAL at 08:04

## 2018-11-28 RX ADMIN — OFLOXACIN 1 DROP: 3 SOLUTION/ DROPS OPHTHALMIC at 20:08

## 2018-11-28 RX ADMIN — OFLOXACIN 1 DROP: 3 SOLUTION/ DROPS OPHTHALMIC at 04:05

## 2018-11-28 RX ADMIN — LIDOCAINE HYDROCHLORIDE 10 ML: 20 JELLY TOPICAL at 22:54

## 2018-11-28 RX ADMIN — ACETAMINOPHEN 650 MG: 325 TABLET, FILM COATED ORAL at 20:06

## 2018-11-28 ASSESSMENT — VISUAL ACUITY
OU: NORMAL ACUITY

## 2018-11-28 ASSESSMENT — ACTIVITIES OF DAILY LIVING (ADL)
ADLS_ACUITY_SCORE: 16
ADLS_ACUITY_SCORE: 17
ADLS_ACUITY_SCORE: 17
ADLS_ACUITY_SCORE: 16
ADLS_ACUITY_SCORE: 17
ADLS_ACUITY_SCORE: 13

## 2018-11-28 ASSESSMENT — PAIN DESCRIPTION - DESCRIPTORS: DESCRIPTORS: BURNING

## 2018-11-28 NOTE — PROGRESS NOTES
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   Self-Care   Dominant Hand left   Usual Activity Tolerance excellent   Current Activity Tolerance moderate   Regular Exercise yes   Activity/Exercise Type walking;strength training   Exercise Amount/Frequency daily   Equipment Currently Used at Home none   Activity/Exercise/Self-Care Comment Pt reports being very active in daily activity - walking several miles daily on his property and engaging in strength training exercises (\"planks\", resistance bands, etc)   Functional Level Prior   Ambulation 0-->independent   Transferring 0-->independent   Toileting 0-->independent   Bathing 0-->independent   Dressing 0-->independent   Eating 0-->independent   Communication 0-->understands/communicates without difficulty   Swallowing 0-->swallows foods/liquids without difficulty   Cognition 0 - no cognition issues reported   Fall history within last six months no   Which of the above functional risks had a recent onset or change? ambulation;transferring;toileting;bathing;dressing;cognition;communication/speech   Prior Functional Level Comment Pt was (I) with all ADL/IADLs including driving and working odd jobs in mechanics, physical labor, etc   General Information   Onset of Illness/Injury or Date of Surgery - Date 11/27/18   Referring Physician Ranjeet Lama MD   Patient/Family Goals Statement to go home   Additional Occupational Profile Info/Pertinent History of Current Problem Pt is a 70 year old male with pmh of " "anxiety, GERD who presents w/ 3 days of generalized chills + subjective fevers as well as incoordination/imbalance, incidentally found to have L parietal hypodensity + associated hemorrhagic conversion.   Precautions/Limitations fall precautions   Weight-Bearing Status - LUE full weight-bearing   Weight-Bearing Status - RUE full weight-bearing   Weight-Bearing Status - LLE full weight-bearing   Weight-Bearing Status - RLE full weight-bearing   Cognitive Status Examination   Orientation person;place   Level of Consciousness alert;confused   Able to Follow Commands success, 1-step commands   Personal Safety (Cognitive) impulsive;moderate impairment;unaware of cognitive deficits;unaware of functional deficits   Memory impaired   Attention Distractible during evaluation;Sustained attention impaired   Executive Function Impulsive;Working memory impaired, decreased storage of information for performing tasks;Self awareness/monitoring impaired   Cognitive Comment Pt denies any new concerns with cognition; pt unable to state correct month or year, initially stated president as \"Daniel\" - able to correct to \"Trump\" with additional questioning (appears to have some mild word finding difficulty though he denies); pt attempts to compensate for cognitive deficits by \"talking around\" questions   Visual Perception   Visual Field WNL on peripheral testing though pt somewhat inconsistent at times with report   Visual Attention pt with severe inattention to R side   Occulomotor WNL   Visual Perception Comments Pt denies any new visual concerns   Sensory Examination   Sensory Quick Adds No deficits were identified   Pain Assessment   Patient Currently in Pain No   Range of Motion (ROM)   ROM Comment BUEs WFL   Strength   Strength Comments [place] WFL   Transfer Skill: Sit to Stand   Level of [place]: Sit/Stand stand-by assist   Grooming   Level of [place]: Grooming stand-by assist   Activities of Daily Living Analysis " "  Impairments Contributing to Impaired Activities of Daily Living cognition impaired;sensory feedback impaired;motor control impaired;balance impaired   General Therapy Interventions   Planned Therapy Interventions ADL retraining;IADL retraining;cognition;bed mobility training;motor coordination training;neuromuscular re-education;ROM;strengthening;stretching;transfer training;home program guidelines;progressive activity/exercise;visual perception   Clinical Impression   Criteria for Skilled Therapeutic Interventions Met yes, treatment indicated   OT Diagnosis decreased ADL/IADL (I)   Influenced by the following impairments cognitive deficits, visual inattention, impaired balance   Assessment of Occupational Performance 5 or more Performance Deficits   Identified Performance Deficits bathing, dressing, household chores, medication management, work, driving   Clinical Decision Making (Complexity) Moderate complexity   Therapy Frequency daily   Predicted Duration of Therapy Intervention (days/wks) 7-10 days   Anticipated Discharge Disposition Home with Home Therapy;Home with Outpatient Therapy;Transitional Care Facility;Acute Rehabilitation Facility   Risks and Benefits of Treatment have been explained. Yes   Patient, Family & other staff in agreement with plan of care Yes   [facility] \"6 Clicks\"   2016, Trustees of [facility], under license to [employer].  All rights reserved.   6 Clicks Short Forms Daily Activity Inpatient Short Form   [facility]  \"6 Clicks\" Daily Activity Inpatient Short Form   1. Putting on and taking off regular lower body clothing? 3 - A Little   2. Bathing (including washing, rinsing, drying)? 3 - A Little   3. Toileting, which includes using toilet, bedpan or urinal? 3 - A Little   4. Putting on and taking off regular upper body clothing? 3 - A Little   5. Taking care of personal grooming such as brushing teeth? 3 - A Little   6. Eating meals? 4 - None "   Daily Activity Raw Score (Score out of 24.Lower scores equate to lower levels of function) 19   Total Evaluation Time   Total Evaluation Time (Minutes) 18

## 2018-11-28 NOTE — PLAN OF CARE
Problem: Patient Care Overview  Goal: Plan of Care/Patient Progress Review  Cancel, in midst of SLP session.

## 2018-11-28 NOTE — PROGRESS NOTES
11/28/18 9888   General Information, SLP   Type of Evaluation Speech and Language   Type of Visit Initial   Start of Care Date 11/28/18   Onset of Illness/Injury or Date of Surgery - Date 11/27/18   Referring Physician Ranjeet Lama MD   Patient/Family Goals Statement Pt reports only mild difficulties with word finding   Pertinent History of Current Problem Pt  with pmh of anxiety, GERD who presents w/ 3 days of nausea, generalized chills and subjective fevers as well as generalized weakness and imbalance, was found to have L parietal hypodensity associated hemorrhagic conversion. MRI revealed hemorrhagic infarction.    General Observations Pt pleasant and cooperative   General Info Comments somewhat tangential in conversation   Oral Motor Sensory Function   Completed on Swallow Evaluation Completed Clinical Bedside Swallow Evaluation   Language: Auditory Comprehension (understanding of spoken language)   Tests were administered at the following levels Complex (vocation/community/social activities)   Commands; [place] Diagnostic Aphasia Exam 3 (out of 15 total) 10   Functional Assessment Scale (Auditory Comprehension) Mild Impairment   Comments (Auditory Comprehension) mild auditory comprehension deficits. Pt able to self correct on some errors after the fact   Language: Verbal Expression (use of spoken language to express information)   Tests were administered at the following levels Moderate (routine daily activities);Complex (vocation/community/social activities)   Generate Sentences; [place] Test for Differential Diagnosis Of Aphasia (out of 6 total) 5   [place] Naming Test, short form (out of 15 total) 12   Define Words; [place] Test for Differential Diagnosis Of Aphasia (out of 10 total) 2  (Pt with difficulty providing definition without use of word)   Generative Naming Score; Cognitive Linguistic Quick Test 4   Generative Naming; Cognitive Linguistic Quick Test Result Below mean  (mean= 5.14)    Conversation; [place] Diagnostic Aphasia Exam rating (out of 5 total) 4   Functional Assessment Scale (Verbal Expression) Mild Impairment   Comments (Verbal Expression) mild expressive language deficits   General Therapy Interventions   Planned Therapy Interventions Language   Language Auditory comprehension;Verbal expression   Clinical Impression, SLP Eval   Criteria for Skilled Therapeutic Interventions Met Yes   SLP Diagnosis mild expressive and receptive language deficits   Rehab Potential Good, to achieve stated therapy goals   Therapy Frequency 5 times/wk   Predicted Duration of Therapy Intervention (days/wks) 2 weeks   Anticipated Discharge Disposition (inpatient rehab vs. home with assist)   Risks and Benefits of Treatment have been explained. Yes   Patient, Family & other staff in agreement with plan of care Yes   Clinical Impression Comments Language evaluation completed per MD order. Pt presents with mild expressive and receptive language deficits on exam. Noted to do well in structured tasks, but breakdown often noted in conversation, or with open ended questions/multiple possible answers. ST to follow for high level language tx   Total Evaluation Time      Total Evaluation Time (Minutes) 15

## 2018-11-28 NOTE — PLAN OF CARE
"Problem: Patient Care Overview  Goal: Plan of Care/Patient Progress Review  Discharge Planner OT   Patient plan for discharge: Home  Current status: OT eval completed and tx initiated. Pt with significant cognitive impairment that he attempted to cover by \"talking around\" questions. Pt oriented to self, location, and general situation - unable to state correct date or president. Pt able to complete transfers and ADLs at sink with close SBA for safety due to mild impulsivity and R visual inattention. Pt inconsistent with visual testing however reported peripheral vision WNL to R side during assessment - demonstrated significant R neglect during testing and functional tasks; education initiated on compensatory strategies to promote safety.  Barriers to return to prior living situation: impaired cognition, visual deficits  Recommendations for discharge: TCU vs ARU pending progress with therapies; support available from friends/family  Rationale for recommendations: to promote pt's safety with ADL/IADLs       Entered by: Blanquita Lopez 11/28/2018 12:40 PM           "

## 2018-11-28 NOTE — PLAN OF CARE
Problem: Patient Care Overview  Goal: Plan of Care/Patient Progress Review  Outcome: No Change  D/A/I. Pt alert, disoriented to time intermittently. Observing right sided neglect, and potential right field cut. Denies CMS changes. Reg diet. Voiding per urinal with some incontinence. A-fib with frequent PVC's.  Up with SBA. Fluids TKO'd, BP managed with lisinopril.   P. Plan for OT eval tomorrow, possible tx out of ICU. Q 2 hour neuros, notify MD with any changes or concerns.

## 2018-11-28 NOTE — PLAN OF CARE
Problem: Patient Care Overview  Goal: Plan of Care/Patient Progress Review  Outcome: Improving  D/I= Pleasant 69 yo gentleman here since yesterday w/ left parietal hemorrhage. Skilled nursing assessments and neuro check every 2 hrs completed. A/P= AAO x3 but does not know the year intermittently. Has right sided gaze w/ right eye and does not see correct # of fingers @ times.  Cooperative w/ assessments. Denies HA or any other discomforts except did experience some leg cramping early in shift that has resolved. Voiding small amts in urinal and does have occasional incontinence. Wears a brief. Bladder scanned  @ midnoc for 617cc and straight cathed for  800cc. Was started on flomax yesterday. Can converse on natural medicines and their benefits and uses them @ home to treat his eyes and bladder symptoms. Continue poc.

## 2018-11-28 NOTE — PLAN OF CARE
Problem: Patient Care Overview  Goal: Plan of Care/Patient Progress Review  Outcome: No Change  D/A/I. Pt alert and oriented x3. Disoriented to time. Right neglect, small field cut noted per therapy. GOTTI. Up with SBA. Bladder scanned for volume of 580, pt unable to void and empty bladder. MD notified, elena inserted per MD, 800 drained. Reg diet, passing flatus. VSS, afib with CVR.   P. Transfer to , notify MD with any changes or concerns.

## 2018-11-28 NOTE — PLAN OF CARE
Problem: Patient Care Overview  Goal: Plan of Care/Patient Progress Review  Discharge Planner SLP   Patient plan for discharge: Pt wants to go home  Current status: Language evaluation completed per MD order. Pt presents with mild expressive and receptive language deficits on exam. Noted to do well in structured tasks, but breakdown often noted in conversation, or with open ended questions/multiple possible answers. ST to follow for high level language tx  Barriers to return to prior living situation: visual deficits; cognitive status  Recommendations for discharge: pending course  Rationale for recommendations: Pt with mild language deficits on exam. Further cognitive testing indicated to assess safety/judgement       Entered by: Rosanne Carranza 11/28/2018 4:57 PM

## 2018-11-28 NOTE — PROGRESS NOTES
[facility], [place]   Neurology Daily Note  Simone Daniels  [id number]  11/28/2018    Subjective:  He had bladder scan at midnight and was found retained at 600s then straight cath with 800 ml urine.He does not feel he is retained. He also has intermittent incontinence which was there at home too. He uses pads and herbal remidies for the retention at home. He has no history of cardiac diseases but was found to have intermittent Afib on telemetry.   His top was trending up from 0.057 to 0.067 yesterday    Objective:   BP (!) 150/100  Temp 97.2  F (36.2  C) (Axillary)  Resp 12  Wt 76.3 kg (168 lb 3.4 oz)  SpO2 99%  BMI 24.48 kg/m2  General:  patient lying in bed without any acute distress    HEENT:  normocephalic/atraumatic  Cardio:  RRR  Pulmonary:  no respiratory distress  Abdomen:  soft, non-tender, bowel sounds present  Extremities:  no edema     Neurologic  Mental Status:  fully alert, attentive and oriented, follows commands, speech clear and fluent, odd comments. He has intermittent right to left disorientation  Cranial Nerves:  visual fields intact (neglects R visual field w/ b/l stim), EOMI with normal smooth pursuit, facial sensation intact and symmetric, facial movements symmetric, hearing not formally tested but intact to conversation, no dysarthria, shoulder shrug strong bilaterally, tongue protrusion midline  Motor:  no abnormal movements, normal muscle bulk, no pronator drift, able to move all limbs spontaneously, strength 5/5 throughout upper and lower extremities decreased mobility in LLE w/ brace.  Reflexes:  2+ and symmetric throughout  Sensory:  intact/symmetric to light touch throughout upper and lower extremities but intermittently will have neglect to the right side.   Coordination:  FNF and HS intact without dysmetria  Station/Gait:  deferred.    National [employer] Stroke Scale  Exam Interval: 48 hours post onset of symptom +/- 20 minutes   Score     Level of consciousness: (0)   Alert, keenly responsive    LOC questions: (0)   Answers both questions correctly    LOC commands: (0)   Performs both tasks correctly    Best gaze: (0)   Normal    Visual: (1)   Partial hemianopia    Facial palsy: (0)   Normal symmetrical movements    Motor arm (left): (0)   No drift    Motor arm (right): (0)   No drift    Motor leg (left): (0)   No drift    Motor leg (right): (0)   No drift    Limb ataxia: (0)   Absent    Sensory: (0)   Normal- no sensory loss    Best language: (0)   Normal- no aphasia    Dysarthria: (0)   Normal    Extinction and inattention: (1)   Visual, tacile, auditory, spatial, person inattention        Total Score:  2         Lab Investigations:   Hemoglobin A1C   Date Value Ref Range Status   11/27/2018 5.5 0 - 5.6 % Final     Comment:     Normal <5.7% Prediabetes 5.7-6.4%  Diabetes 6.5% or higher - adopted from ADA   consensus guidelines.       Lab Results   Component Value Date    LDL 78 11/27/2018       Imaging:  Recent Results (from the past 24 hour(s))   CT Head w/o Contrast    Narrative    CT HEAD W/O CONTRAST 11/27/2018 9:54 AM    Provided History: repeat CTH in setting of recently discovered ICH;     Comparison: Head CT earlier the same day at 0136 hours.    Technique: Using multidetector thin collimation helical acquisition  technique, axial, coronal and sagittal CT images from the skull base  to the vertex were obtained without intravenous contrast.     Findings:    No significant change in left parietal lobe intraparenchymal hematoma  with surrounding confluent edema and loss of gray-white  differentiation. Unchanged associated mass effect with effacement of  adjacent cerebral sulci and partial effacement of the left lateral  ventricle. No new intracranial hemorrhage. Mild to moderate  leukoaraiosis, unchanged. No hydrocephalus.       Impression    Impression: No change in left parietal intraparenchymal hemorrhage  with surrounding  infarct.    MERCEDES ELLIOTT MD   MR Brain for Stroke [facility] w/o & w Contras    Narrative    MRI brain without and with contrast  MRA of the head without contrast  Neck MRA without and with contrast    Provided History:  ICH, concern for possible mets.    Comparison:  Head CT earlier today    Technique:   Brain MRI:  Axial diffusion, FLAIR, T2-weighted, susceptibility, and  coronal T1-weighted images were obtained without intravenous contrast.  Following intravenous gadolinium-based contrast administration, axial  and coronal T1-weighted images were obtained.    Head MRA: 3D time-of-flight MRA of the [place] of [place] was performed  without intravenous contrast.  Neck MRA:  Limited non contrast 2DTOF images were obtained of the  mid-cervical region. Following intravenous gadolinium-based contrast  administration, a contrast enhanced MRA of the neck/cervical vessels  was performed.  Three-dimensional reconstructions of the neck and head MRA were  created, which were reviewed by the radiologist.    Dose: 10mL Gadavist    Findings:   Brain MRI: No significant change in posterior left parietal  intraparenchymal hemorrhage. Associated susceptibility effect and  surrounding cytotoxic edema predominantly in the cerebral cortex with  adjacent vasogenic edema. Mild subcortical and deep white matter  leukoaraiosis likely related to chronic small vessel ischemic disease.  Ventricles are proportionate to the cerebral sulci. Mild  leptomeningeal enhancement in the region of infarct.    Mild paranasal sinus mucosal thickening. Susceptibility effect in the  left maxillary region attributable to dental hardware.    Head MRA demonstrates no aneurysm or stenosis of the major  intracranial arteries.    Neck MRA demonstrates is partially degraded due to suboptimal bolus  timing relative to image acquisition. However, the major cervical  arteries are patent. Short segment mild atherosclerotic plaque of the  proximal left internal carotid  artery without associated stenosis. The  normal distal left internal carotid artery measures 4 mm in diameter.  The normal distal right internal carotid artery measures 5 mm.  Antegrade flow in the major cervical vasculature.      Impression    Impression:  1. No significant change in hemorrhagic posterior left parietal  infarct.  2. Head MRA demonstrates no aneurysm or stenosis of the major  intracranial arteries.  3. Neck MRA is partially degraded due to suboptimal bolus timing.  However, the major cervical arteries are patent.    I have personally reviewed the examination and initial interpretation  and I agree with the findings.    MERCEDES ELLIOTT MD     Recent Results (from the past 4320 hour(s))   Echo Complete Bubble    Narrative    [id number]  [id number]  [id number]  [id number]^[place]^RAHEEM           [facility],[place]  Echocardiography Laboratory  [address] [place] [address]     Name: HALIE PEAÑ  MRN: [id number]  : 1948  Study Date: 2018 08:36 AM  Age: 70 yrs  Gender: Male  Patient Location: [facility]  Reason For Study: Afib  Ordering Physician: RAHEEM FERRIS  Referring Physician: Sophia Dacosta  Performed By: Chelsey Arshad     BSA: 2.0 m2  Height: 69 in  Weight: 180 lb  BP: 116/77 mmHg  _____________________________________________________________________________  __        Procedure  Bubble Echocardiogram with two-dimensional, color and spectral Doppler  performed.  _____________________________________________________________________________  __        Interpretation Summary  Global and regional left ventricular function is normal with an EF of 60-65%.  Global right ventricular function is normal.  Diastolic function not assessed due to atrial fibrillation.  No significant valve dysfunction.  No prior study for comparison.  _____________________________________________________________________________  __        Left Ventricle  Left ventricular  size is normal. Global and regional left ventricular function  is normal with an EF of 60-65%. Mild concentric wall thickening consistent  with left ventricular hypertrophy is present. Diastolic function not assessed  due to atrial fibrillation. No regional wall motion abnormalities are seen.     Right Ventricle  The right ventricle is normal size. Global right ventricular function is  normal.     Atria  Moderate to severe left atrial enlargement is present. Moderate right atrial  enlargement is present. The atrial septum is intact as assessed by color  Doppler and agitated saline bubble study .     Mitral Valve  The mitral valve is normal.        Aortic Valve  Aortic valve is normal in structure and function. The aortic valve is  tricuspid. No aortic regurgitation is present.     Tricuspid Valve  Trace to mild tricuspid insufficiency is present. The right ventricular  systolic pressure is approximated at 22.8 mmHg plus the right atrial pressure.     Pulmonic Valve  The pulmonic valve is normal.     Vessels  The inferior vena cava was normal in size with preserved respiratory  variability. Ascending aorta is mildly enlarged measuring 3.9 cm.     Pericardium  No pericardial effusion is present.     _____________________________________________________________________________  __  MMode/2D Measurements & Calculations  RVDd: 3.3 cm  IVSd: 1.3 cm  LVIDd: 4.8 cm  LVIDs: 3.0 cm  LVPWd: 1.1 cm  FS: 38.6 %     LV mass(C)d: 220.6 grams  LV mass(C)dI: 111.7 grams/m2  Ao root diam: 1.5 cm  LA dimension: 4.6 cm  asc Aorta Diam: 3.9 cm  LA/Ao: 3.0  LVOT diam: 2.2 cm  LVOT area: 3.7 cm2  LA Volume Index (BP): 62.2 ml/m2  RWT: 0.47  TAPSE: 1.9 cm        Doppler Measurements & Calculations  MV E max mattie: 63.4 cm/sec     TV max P.8 mmHg  PA V2 max: 88.2 cm/sec  PA max PG: 3.1 mmHg  PA acc time: 0.08 sec  TR max mattie: 238.4 cm/sec  TR max P.8 mmHg  E/E' av.4  Lateral E/e': 7.4  Medial E/e': 9.3      _____________________________________________________________________________  __           Report approved by: Lebron Grove 11/27/2018 11:01 AM            Assessment and Plan   Simone Daniels is a 70 year old male with pmh of anxiety, GERD who presents w/ 3 days of nausea, generalized chills and subjective fevers as well as generalized weakness and imbalance, was found to have L parietal hypodensity associated hemorrhagic conversion. MRI revealed hemorrhagic infarction. Probably HI2, vessels are patent. EKG revealed a fib, echo revealed moderate to severe left atrial enlargement and moderate right atrial enlargement. No PFO. The working diagnosis is cardio embolic hemorrhagic infarction in the context of newly diagnosed A fib. The blood pressure also is high since admission. This could be due to the acute stroke +/- baseline untreated hypertension.  LDL was 78 and HbA1c is 5.5%    #Acute hemorrhagic infarct:  Probably cardio embolic due to a fib not on anticoagulant. His SHAD vas score is 2. He is candidate for anticoagulation, however, the hemorrhage is fairly big. The risk of increasing the size of hemorrhage is high if we started the anticoagulation now. We will hold off starting anticoagulation for now and plan to start in 7-10 days with NOAC. 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 We will not start ASA now as the preventive value of ASA in the context of stroke due to a fib is minimal.   - Hold off Anticoagulation for now   - Consider adding NOAC in 7-10 days  - PT recommended TCU  - OT recommended TCU vs ARU based on his performance with therapies  - Neuro checks  - Euglycemic   - SPL cleared him and started on regular diet    # Afib  Newly diagnosed on admission with EKG and on telemetry. There is no RVR. Will start anticoagulation in 7-10 days as the ICH     # Elevated troponin  On admission his  trop was 0.057. We trended it and was found to be minimally trending up. There is no symptoms suggestive of cardiac ischemia and his EKG was not indicative of ischemia.  - continue trending trop q4h until starting trending down    # High blood pressure  Since his admission his blood pressure is running in the higher . This could be due to the acute stroke. There is a possibility that he has untreated hypertension.  We started him Nicardipine drip and switched him to Lisinopril 5 mg yesterday. It ranges between 80s to 150 systolic and 50s to 100 diastolic.   - Continue on Lisinopril   - Monitor his blood pressure. The goal is less than 160 systolic    # Urinary retention with incontinence  He has history of retention and intermittent incontinence at home. He did not seek medical advice and was treating himself with natural remedies and was using pads.   We do PRN bladder scanning. He does not feel when his bladder is full. At midnight the scan revealed 600 ml of urine and straight cath with 800 ml of urine. This afternoon he also was found to be retained about 600. This could be BPH. We started Flomax yesterday.  - Continue on Flomax 0.4 mg daily  - Insert Flores   - Follow with urologist as an outpatient    # GERD  Not on medications. He does not have symptoms   - no need for starting PPI now  - will defer managing the GERD to his PCP      FEN: regular diet   PPx: Not on GI or DVT prophylaxis. Will consider starting YOBANY tomorrow  Code status: Full    Disposition Plan   Expected discharge in days to probably transitional care unit once his blood pressure stabilize and find place in the TCU.          Patient was seen and discussed with Dr. Shelia Pearson MD  Neurology Resident, PGY-1  Pager: [phone]

## 2018-11-29 ENCOUNTER — APPOINTMENT (OUTPATIENT)
Dept: PHYSICAL THERAPY | Facility: CLINIC | Age: 73
DRG: 064 | End: 2018-11-29
Attending: PSYCHIATRY & NEUROLOGY

## 2018-11-29 ENCOUNTER — APPOINTMENT (OUTPATIENT)
Dept: OCCUPATIONAL THERAPY | Facility: CLINIC | Age: 73
DRG: 064 | End: 2018-11-29
Attending: PSYCHIATRY & NEUROLOGY

## 2018-11-29 LAB
PLATELET # BLD AUTO: 188 10E9/L (ref 150–450)
TROPONIN I SERPL-MCNC: 0.03 UG/L (ref 0–0.04)
TROPONIN I SERPL-MCNC: 0.07 UG/L (ref 0–0.04)
TROPONIN I SERPL-MCNC: 0.12 UG/L (ref 0–0.04)
TROPONIN I SERPL-MCNC: 0.15 UG/L (ref 0–0.04)

## 2018-11-29 PROCEDURE — 97112 NEUROMUSCULAR REEDUCATION: CPT | Mod: GO | Performed by: OCCUPATIONAL THERAPIST

## 2018-11-29 PROCEDURE — 25000128 H RX IP 250 OP 636: Performed by: PSYCHIATRY & NEUROLOGY

## 2018-11-29 PROCEDURE — 25000132 ZZH RX MED GY IP 250 OP 250 PS 637: Performed by: STUDENT IN AN ORGANIZED HEALTH CARE EDUCATION/TRAINING PROGRAM

## 2018-11-29 PROCEDURE — 36415 COLL VENOUS BLD VENIPUNCTURE: CPT | Performed by: STUDENT IN AN ORGANIZED HEALTH CARE EDUCATION/TRAINING PROGRAM

## 2018-11-29 PROCEDURE — 36415 COLL VENOUS BLD VENIPUNCTURE: CPT | Performed by: PSYCHIATRY & NEUROLOGY

## 2018-11-29 PROCEDURE — 97116 GAIT TRAINING THERAPY: CPT | Mod: GP

## 2018-11-29 PROCEDURE — 84484 ASSAY OF TROPONIN QUANT: CPT | Performed by: STUDENT IN AN ORGANIZED HEALTH CARE EDUCATION/TRAINING PROGRAM

## 2018-11-29 PROCEDURE — 97530 THERAPEUTIC ACTIVITIES: CPT | Mod: GP

## 2018-11-29 PROCEDURE — 97112 NEUROMUSCULAR REEDUCATION: CPT | Mod: GP

## 2018-11-29 PROCEDURE — 40000193 ZZH STATISTIC PT WARD VISIT

## 2018-11-29 PROCEDURE — 25000132 ZZH RX MED GY IP 250 OP 250 PS 637: Performed by: PSYCHIATRY & NEUROLOGY

## 2018-11-29 PROCEDURE — 25000128 H RX IP 250 OP 636: Performed by: STUDENT IN AN ORGANIZED HEALTH CARE EDUCATION/TRAINING PROGRAM

## 2018-11-29 PROCEDURE — 40000133 ZZH STATISTIC OT WARD VISIT: Performed by: OCCUPATIONAL THERAPIST

## 2018-11-29 PROCEDURE — 97535 SELF CARE MNGMENT TRAINING: CPT | Mod: GO | Performed by: OCCUPATIONAL THERAPIST

## 2018-11-29 PROCEDURE — 85049 AUTOMATED PLATELET COUNT: CPT | Performed by: PSYCHIATRY & NEUROLOGY

## 2018-11-29 PROCEDURE — 25000132 ZZH RX MED GY IP 250 OP 250 PS 637: Performed by: NURSE PRACTITIONER

## 2018-11-29 PROCEDURE — 12000008 ZZH R&B INTERMEDIATE UMMC

## 2018-11-29 PROCEDURE — 84484 ASSAY OF TROPONIN QUANT: CPT | Performed by: PSYCHIATRY & NEUROLOGY

## 2018-11-29 RX ORDER — MAGNESIUM HYDROXIDE 1200 MG/15ML
LIQUID ORAL
Status: DISCONTINUED
Start: 2018-11-29 | End: 2018-11-29 | Stop reason: HOSPADM

## 2018-11-29 RX ORDER — HYDRALAZINE HYDROCHLORIDE 20 MG/ML
10 INJECTION INTRAMUSCULAR; INTRAVENOUS EVERY 6 HOURS PRN
Status: DISCONTINUED | OUTPATIENT
Start: 2018-11-29 | End: 2018-12-11 | Stop reason: HOSPADM

## 2018-11-29 RX ORDER — ASPIRIN 81 MG/1
81 TABLET, CHEWABLE ORAL DAILY
Status: DISCONTINUED | OUTPATIENT
Start: 2018-11-29 | End: 2018-12-04

## 2018-11-29 RX ORDER — LABETALOL HYDROCHLORIDE 5 MG/ML
10-20 INJECTION, SOLUTION INTRAVENOUS EVERY 4 HOURS PRN
Status: DISCONTINUED | OUTPATIENT
Start: 2018-11-29 | End: 2018-12-11 | Stop reason: HOSPADM

## 2018-11-29 RX ORDER — CALCIUM CARBONATE 500 MG/1
500 TABLET, CHEWABLE ORAL EVERY 4 HOURS PRN
Status: DISCONTINUED | OUTPATIENT
Start: 2018-11-29 | End: 2018-12-11 | Stop reason: HOSPADM

## 2018-11-29 RX ORDER — HEPARIN SODIUM 5000 [USP'U]/.5ML
5000 INJECTION, SOLUTION INTRAVENOUS; SUBCUTANEOUS EVERY 12 HOURS
Status: COMPLETED | OUTPATIENT
Start: 2018-11-29 | End: 2018-12-03

## 2018-11-29 RX ADMIN — OFLOXACIN 1 DROP: 3 SOLUTION/ DROPS OPHTHALMIC at 15:43

## 2018-11-29 RX ADMIN — Medication 5000 UNITS: at 20:36

## 2018-11-29 RX ADMIN — ASPIRIN 81 MG CHEWABLE TABLET 81 MG: 81 TABLET CHEWABLE at 15:42

## 2018-11-29 RX ADMIN — SALINE NASAL SPRAY 1 SPRAY: 1.5 SOLUTION NASAL at 22:50

## 2018-11-29 RX ADMIN — LISINOPRIL 5 MG: 5 TABLET ORAL at 08:13

## 2018-11-29 RX ADMIN — TAMSULOSIN HYDROCHLORIDE 0.4 MG: 0.4 CAPSULE ORAL at 08:13

## 2018-11-29 RX ADMIN — ACETAMINOPHEN 650 MG: 325 TABLET, FILM COATED ORAL at 12:02

## 2018-11-29 RX ADMIN — SODIUM CHLORIDE 1000 ML: 9 INJECTION, SOLUTION INTRAVENOUS at 04:59

## 2018-11-29 RX ADMIN — OFLOXACIN 1 DROP: 3 SOLUTION/ DROPS OPHTHALMIC at 07:57

## 2018-11-29 RX ADMIN — CALCIUM CARBONATE (ANTACID) CHEW TAB 500 MG 500 MG: 500 CHEW TAB at 17:33

## 2018-11-29 RX ADMIN — OFLOXACIN 1 DROP: 3 SOLUTION/ DROPS OPHTHALMIC at 00:06

## 2018-11-29 RX ADMIN — ACETAMINOPHEN 650 MG: 325 TABLET, FILM COATED ORAL at 15:46

## 2018-11-29 RX ADMIN — OFLOXACIN 1 DROP: 3 SOLUTION/ DROPS OPHTHALMIC at 19:49

## 2018-11-29 RX ADMIN — ACETAMINOPHEN 650 MG: 325 TABLET, FILM COATED ORAL at 08:13

## 2018-11-29 RX ADMIN — OFLOXACIN 1 DROP: 3 SOLUTION/ DROPS OPHTHALMIC at 11:50

## 2018-11-29 RX ADMIN — OFLOXACIN 1 DROP: 3 SOLUTION/ DROPS OPHTHALMIC at 05:00

## 2018-11-29 RX ADMIN — ACETAMINOPHEN 650 MG: 325 TABLET, FILM COATED ORAL at 03:04

## 2018-11-29 ASSESSMENT — ACTIVITIES OF DAILY LIVING (ADL)
ADLS_ACUITY_SCORE: 12
ADLS_ACUITY_SCORE: 11

## 2018-11-29 ASSESSMENT — VISUAL ACUITY
OU: NORMAL ACUITY

## 2018-11-29 ASSESSMENT — PAIN DESCRIPTION - DESCRIPTORS
DESCRIPTORS: SORE
DESCRIPTORS: TINGLING
DESCRIPTORS: SORE

## 2018-11-29 NOTE — PLAN OF CARE
Problem: Patient Care Overview  Goal: Plan of Care/Patient Progress Review  Outcome: No Change  D/I= 69 yo male noted to have cherry red urine in elena @ 1900 last evening which was placed yesterday for retention . Urine had gotten darker throughout noc and was experiencing pain @ elena insertion site. NCC MD notified and came to bedside to assess. Was given tylenol for discomfort and 2 % lidocaine gel placed to urinary meatus. A/P= Relates lidocaine gel eased the pain and was able to sleep but the pain returned again and more gel given. One liter NSS bolus finishing up. Urine remains dark red. Averaging ~ 100cc UO / hr. Monitor for shelby bleeding or more pain. Report all untoward findings.

## 2018-11-29 NOTE — PLAN OF CARE
"Problem: Patient Care Overview  Goal: Plan of Care/Patient Progress Review  OT 4AB: Discharge Planner OT   Patient plan for discharge: Pt states he is agreeable to whatever is recommended at time of discharge; however believes if he went home he could \"figure it out\"  Current status: Pt is pleasant, agreeable and shows good participation throughout session.  Able to engage in R visual scanning exercises though requires cues for use and will benefit from continued reinforcement for safe use during functional activity.  Pt scored 9/30 on the [place] Cognitive Assessment (MoCA) indicating moderate to severe impairment.  Primary deficit areas include executive functioning, thought/language fluency, delayed recall and attention.  While pt acknowledge feeling below baseline, he lacks insight into safety and functional implications of current deficits.   Barriers to return to prior living situation: pt lives alone with limited support system, impaired cognition, R sided inattention, safety/functional problem solving  Recommendations for discharge: ARU  Rationale for recommendations: Pt is currently below baseline with regards to mobility and independence with self cares and will benefit from continued skilled therapy intervention to address deficits.  Based on pt cognitive presentation, would recommend 24hr supervision at this time.  If pt does not qualify for an inpatient rehab stay would recommend home safety evaluation          Entered by: Marilia Almodovar 11/29/2018 10:51 AM           "

## 2018-11-29 NOTE — PLAN OF CARE
Problem: Patient Care Overview  Goal: Plan of Care/Patient Progress Review  Outcome: Improving  Transfer  Transferred to: 6A   Via: wheelchair  Reason for transfer: Pt appropriate for 6A- improving patient condition  Belongings: Sent with pt  Chart: Sent with pt  Medications: Meds from bin sent with pt  Report called to: TAYLOR Ngo at 1600. Pt up to 6A on monitor at 1630      D/I/A:  Neuro: Occasionally disoriented to time. Reorientopn. Small right right visual cut noted. Mild aphasia-expressive. Continue to monitor. Other neuros WNL. Pleasant, cooperative. Afebrile. Pain in penile area. Tylenol with relief.  Resp: LS clear on RA. No SOB/GRANDE noted.   CV:Afib in 60-80s. BP stable. Aspirin started today. BP WNL.    GI/: BS+, BM+ x2.Good appetite.Flores draining with clots/red-pink urine. Urology consult-see note on 11/29/2018. Keep in till 12/3-if D/C'ed will need education Continue to monitor. Trops elevated, but no correlation with EKG. Trops q 4hr canceled.  Skin: WNL. Shifting weight in chair.  Other: Walking around with PT. STB.       P: Continue to monitor. Contact Neuro Crit with changes. Awaiting on bed on 6A.

## 2018-11-29 NOTE — PROGRESS NOTES
[facility], [place]   Neurology Daily Note  Simone Daniels  [id number]  11/29/2018    Subjective:   Pt had elena placed yesterday evening for continued urinary retention with pain and bleeding at the meatus. Elena continues to have blood-tinged output, improved from yesterday but elena colored. No other events.     Objective:   /66  Temp 97.4  F (36.3  C) (Oral)  Resp 12  Wt 76.3 kg (168 lb 3.4 oz)  SpO2 99%  BMI 24.48 kg/m2  General:  sitting in chair, non distressed  HEENT:  normocephalic/atraumatic  Cardio:  irregular  Pulmonary:  no respiratory distress  Abdomen:  soft, non-tender, bowel sounds present  Extremities:  no edema     Neurologic  Mental Status:  awake/alert. Speech is fluent, no paraphasic errors. Question of word-finding difficulties/confusion, exclusively with medical terminology.   Cranial Nerves:  Visual fields full with extinction on the right. EOM intact, no facial droop, hearing not formally tested but intact to conversation, no dysarthria, shoulder shrug strong bilaterally, tongue protrusion midline  Motor:  no abnormal movements, normal muscle bulk, no pronator drift, able to move all limbs spontaneously, strength 5/5 throughout upper and lower extremities decreased mobility in LLE w/ brace.  Sensory:  intact to LT, neglect of the right side  Coordination:  FNF and HS intact without dysmetria  Station/Gait:  deferred.    National Institutes of Health Stroke Scale  Exam Interval: 11/29/2018 1200   Score    Level of consciousness: (0)   Alert, keenly responsive    LOC questions: (0)   Answers both questions correctly    LOC commands: (0)   Performs both tasks correctly    Best gaze: (0)   Normal    Visual: (0)   normal    Facial palsy: (0)   Normal symmetrical movements    Motor arm (left): (0)   No drift    Motor arm (right): (0)   No drift    Motor leg (left): (0)   No drift    Motor leg (right): (0)   No drift    Limb ataxia: (0)   Absent     Sensory: (0)   Normal- no sensory loss    Best language: (0)   Normal- no aphasia    Dysarthria: (0)   Normal    Extinction and inattention: (2)   Visual, tacile, auditory, spatial, person inattention        Total Score:  2         Lab Investigations:   Hemoglobin A1C   Date Value Ref Range Status   11/27/2018 5.5 0 - 5.6 % Final     Comment:     Normal <5.7% Prediabetes 5.7-6.4%  Diabetes 6.5% or higher - adopted from ADA   consensus guidelines.       Lab Results   Component Value Date    LDL 78 11/27/2018       Imaging:  Recent Results (from the past 24 hour(s))   CT Head w/o Contrast    Narrative    CT HEAD W/O CONTRAST 11/27/2018 9:54 AM    Provided History: repeat CTH in setting of recently discovered ICH;     Comparison: Head CT earlier the same day at 0136 hours.    Technique: Using multidetector thin collimation helical acquisition  technique, axial, coronal and sagittal CT images from the skull base  to the vertex were obtained without intravenous contrast.     Findings:    No significant change in left parietal lobe intraparenchymal hematoma  with surrounding confluent edema and loss of gray-white  differentiation. Unchanged associated mass effect with effacement of  adjacent cerebral sulci and partial effacement of the left lateral  ventricle. No new intracranial hemorrhage. Mild to moderate  leukoaraiosis, unchanged. No hydrocephalus.       Impression    Impression: No change in left parietal intraparenchymal hemorrhage  with surrounding infarct.    MERCEDES ELLIOTT MD   MR Brain for Stroke [facility] w/o & w Contras    Narrative    MRI brain without and with contrast  MRA of the head without contrast  Neck MRA without and with contrast    Provided History:  ICH, concern for possible mets.    Comparison:  Head CT earlier today    Technique:   Brain MRI:  Axial diffusion, FLAIR, T2-weighted, susceptibility, and  coronal T1-weighted images were obtained without intravenous contrast.  Following intravenous  gadolinium-based contrast administration, axial  and coronal T1-weighted images were obtained.    Head MRA: 3D time-of-flight MRA of the [place] of [place] was performed  without intravenous contrast.  Neck MRA:  Limited non contrast 2DTOF images were obtained of the  mid-cervical region. Following intravenous gadolinium-based contrast  administration, a contrast enhanced MRA of the neck/cervical vessels  was performed.  Three-dimensional reconstructions of the neck and head MRA were  created, which were reviewed by the radiologist.    Dose: 10mL Gadavist    Findings:   Brain MRI: No significant change in posterior left parietal  intraparenchymal hemorrhage. Associated susceptibility effect and  surrounding cytotoxic edema predominantly in the cerebral cortex with  adjacent vasogenic edema. Mild subcortical and deep white matter  leukoaraiosis likely related to chronic small vessel ischemic disease.  Ventricles are proportionate to the cerebral sulci. Mild  leptomeningeal enhancement in the region of infarct.    Mild paranasal sinus mucosal thickening. Susceptibility effect in the  left maxillary region attributable to dental hardware.    Head MRA demonstrates no aneurysm or stenosis of the major  intracranial arteries.    Neck MRA demonstrates is partially degraded due to suboptimal bolus  timing relative to image acquisition. However, the major cervical  arteries are patent. Short segment mild atherosclerotic plaque of the  proximal left internal carotid artery without associated stenosis. The  normal distal left internal carotid artery measures 4 mm in diameter.  The normal distal right internal carotid artery measures 5 mm.  Antegrade flow in the major cervical vasculature.      Impression    Impression:  1. No significant change in hemorrhagic posterior left parietal  infarct.  2. Head MRA demonstrates no aneurysm or stenosis of the major  intracranial arteries.  3. Neck MRA is partially degraded due to  suboptimal bolus timing.  However, the major cervical arteries are patent.    I have personally reviewed the examination and initial interpretation  and I agree with the findings.    MERCEDES ELLIOTT MD     Recent Results (from the past 4320 hour(s))   Echo Complete Bubble    Narrative    [id number]  [id number]  [id number]  [id number]^[place]^RAHEEM           [facility],[place]  Echocardiography Laboratory  [address] [place] [address]     Name: HALIE PEÑA  MRN: [id number]  : 1948  Study Date: 2018 08:36 AM  Age: 70 yrs  Gender: Male  Patient Location: [facility]  Reason For Study: Afib  Ordering Physician: RAHEEM FERRIS  Referring Physician: Sophia Dacosta  Performed By: Chelsey Arshad     BSA: 2.0 m2  Height: 69 in  Weight: 180 lb  BP: 116/77 mmHg  _____________________________________________________________________________  __        Procedure  Bubble Echocardiogram with two-dimensional, color and spectral Doppler  performed.  _____________________________________________________________________________  __        Interpretation Summary  Global and regional left ventricular function is normal with an EF of 60-65%.  Global right ventricular function is normal.  Diastolic function not assessed due to atrial fibrillation.  No significant valve dysfunction.  No prior study for comparison.  _____________________________________________________________________________  __        Left Ventricle  Left ventricular size is normal. Global and regional left ventricular function  is normal with an EF of 60-65%. Mild concentric wall thickening consistent  with left ventricular hypertrophy is present. Diastolic function not assessed  due to atrial fibrillation. No regional wall motion abnormalities are seen.     Right Ventricle  The right ventricle is normal size. Global right ventricular function is  normal.     Atria  Moderate to severe left atrial enlargement is  present. Moderate right atrial  enlargement is present. The atrial septum is intact as assessed by color  Doppler and agitated saline bubble study .     Mitral Valve  The mitral valve is normal.        Aortic Valve  Aortic valve is normal in structure and function. The aortic valve is  tricuspid. No aortic regurgitation is present.     Tricuspid Valve  Trace to mild tricuspid insufficiency is present. The right ventricular  systolic pressure is approximated at 22.8 mmHg plus the right atrial pressure.     Pulmonic Valve  The pulmonic valve is normal.     Vessels  The inferior vena cava was normal in size with preserved respiratory  variability. Ascending aorta is mildly enlarged measuring 3.9 cm.     Pericardium  No pericardial effusion is present.     _____________________________________________________________________________  __  MMode/2D Measurements & Calculations  RVDd: 3.3 cm  IVSd: 1.3 cm  LVIDd: 4.8 cm  LVIDs: 3.0 cm  LVPWd: 1.1 cm  FS: 38.6 %     LV mass(C)d: 220.6 grams  LV mass(C)dI: 111.7 grams/m2  Ao root diam: 1.5 cm  LA dimension: 4.6 cm  asc Aorta Diam: 3.9 cm  LA/Ao: 3.0  LVOT diam: 2.2 cm  LVOT area: 3.7 cm2  LA Volume Index (BP): 62.2 ml/m2  RWT: 0.47  TAPSE: 1.9 cm        Doppler Measurements & Calculations  MV E max mattie: 63.4 cm/sec     TV max P.8 mmHg  PA V2 max: 88.2 cm/sec  PA max PG: 3.1 mmHg  PA acc time: 0.08 sec  TR max mattie: 238.4 cm/sec  TR max P.8 mmHg  E/E' av.4  Lateral E/e': 7.4  Medial E/e': 9.3     _____________________________________________________________________________  __           Report approved by: [place] 2018 11:01 AM            Assessment and Plan   Simone Daniels is a 70 year old male with pmh of anxiety, GERD who presents w/ 3 days of nausea, generalized chills and subjective fevers as well as generalized weakness and imbalance, was found to have L parietal hypodensity associated hemorrhagic conversion. MRI revealed  hemorrhagic infarction. Probably HI2, vessels are patent. EKG revealed a fib, echo revealed moderate to severe left atrial enlargement and moderate right atrial enlargement. No PFO. The working diagnosis is cardio embolic ischemic stroke with hemorrhagic transformation in the setting of new A fib. The blood pressure also is high since admission. This could be due to the acute stroke +/- baseline untreated hypertension.  LDL was 78 and HbA1c is 5.5%    #Acute hemorrhagic infarct:  Probably cardio embolic due to a fib not on anticoagulant. His [id number] score is 4. He should receive anticoagulation, but will wait due to hemorrhagic conversion HI2. We will hold off starting anticoagulation for now and plan to start in 7-10 days with NOAC if not cost-prohibitive; otherwise, will do coumadin.  - start anticoagulation around 12/7 (10 days post-admission)  - start ASA 81 today   - PT recommended TCU  - OT recommended ARU vs home w 24hr supervision   - Neuro checks q4h  - Euglycemic   - SPL cleared him and started on regular diet    # Afib  Newly diagnosed on admission with EKG and on telemetry. There is no RVR. Will start anticoagulation in 7-10 days as the ICH   - daily aspirin     # Elevated troponin  On admission his trop was 0.057, trended down then back to 0.149. Likely type 2 demand ischemia in the setting of afib and stroke, trop max 0.149. Echo completed, no evidence of infarct.   - If pt has chest pressure, eval w EKG.    # High blood pressure  Since his admission his blood pressure is running in the higher . This could be due to the acute stroke. There is a possibility that he has untreated hypertension.  Started lisinopril and flomax this admission  - Continue on Lisinopril   - SBP cap 180 (hasn't needed prns)    # Urinary retention with incontinence  He has history of retention and intermittent incontinence at home. He did not seek medical advice and was treating himself with natural remedies and was  using pads.   We do PRN bladder scanning. He does not feel when his bladder is full. After several straight caths, elena placed, now w blood-tinged urine  - Continue on Flomax 0.4 mg daily  - Continue elena   - urologist consult    # GERD  Not on medications. He does not have symptoms   - no need for starting PPI now  - will defer managing the GERD to his PCP      FEN: regular diet   PPx: subcutaneous heparin  Code status: Full    Disposition Plan   TCU vs ARU vs home. Transfer to floors.      Patient was seen and discussed with Dr. Shelia Shaffer MD  Stroke fellow

## 2018-11-29 NOTE — PLAN OF CARE
Problem: Patient Care Overview  Goal: Plan of Care/Patient Progress Review  Patient plan for discharge: agreeable to rehab  Current status: Completes sit <> stand transfers with SBA; ambulates x ~500' with SBA displaying short step lengths and downward gaze, as well as weaving pattern in hallway. Unable to attend to objects on R side of body without turning head to the right.  Barriers to return to prior living situation: vision, cognition, balance, coordination deficits, falls risk  Recommendations for discharge: TCU; possibly ARC if he demonstrates heightened cognitive deficits limiting his ability to discharge home  Rationale for recommendations: continued therapy to improve safety with functional mobility status and decrease falls risk (see 30sec STS results)    30-Second Sit to Stand Test:  The test is conducted with a straight back chair, without armrests, with a 17-inch seat height.    Patient Score (reps): 8     The 30 Second Sit to Stand Test is considered a test of fall risk.  Data from [employer], cosponsored by [place] Dept of Health:  8 or less times = High Fall Risk   9 to 12 times = Moderate Risk  13 or more times = Low Risk    The 30 Second Sit to Stand Test is also considered a test of leg strength and endurance.   Normative Data from Juliano et al,. 2001  Age                 Reps: Men        Reps: Women  60-64                14-19                       12-17                               65-69                12-18                       11-16                    70-74                12-17                       10-15              75-79                11-17                       10-15                    80-84                10-15                         9-14  85-89                 8-14                          8-13  90-94                 7-12                          4-11    Assessment (rationale for performing, application to patient s function & care plan): assess fall risk

## 2018-11-29 NOTE — CONSULTS
"Urology Consult History and Physical    Name: Simone Daniels    MRN: [id number]   YOB: 1948       We were asked to see Simone Daniels at the request of CAROLANN Araujo CNP for evaluation and treatment of the following chief complaint:          Chief Complaint:   Urinary retention  Hematuria following Flores placement    History is obtained from patient          History of Present Illness:   Simone Daniels is a 70 year old male with PMH significant for anxiety and Gerd who was admitted on 11/27/18 to the Neuro-ICU for left parietal hemorrhagic infarct with etiology likely being cardioembolic from Afib (per Neuro notes).     Upon admission he required straight catheterization (11/27 - 1100cc; 11/28 - 800cc) despite also having spontaneous voids.  Flomax was started by the team on POD#0.  Last night he had a Flores placed for 800cc.  The patient sts the Flores was immediately painful and has remained uncomfortable, mostly at tip of penis, worse with movement.  Some relief with tylenol and warm washcloths.  He also developed hematuria with the Flores.  Color lightened to Raquel' this morning with a bolus, says his RN, but now it is darker again. There are no labs today, but HGB on 11/27 was 14.0. INR 1.08. Plts normal.  The patient is not anticoagulated.     He tells me he has never seen a Urologist.  He did have some stones pass spontaneously \"many, many\" years ago.  He also had 1 UTI in the distant past.  No recent hematuria.  Never has been catheterized.     Voiding symptoms:  Prior to admission felt he was emptying normally, although on further questioning he endorsed hesitancy sometimes, urgency other times.  Intermittency was common.  Nocturia x 2-3. And about twice weekly he wet the bed.  He uses Marigold for prostate symptoms.     No history of \"slipped discs\" or other known low back pathology although he did have h/o shingles causing radiating pain down leg.   No DM          Past " Medical History:     Past Medical History:   Diagnosis Date     LOW BACK PAIN             Past Surgical History:     Past Surgical History:   Procedure Laterality Date     SURGICAL HISTORY OF -   1979    Cervical disc (5,6,&7)     SURGICAL HISTORY OF -       (L) Knee fx tibial plateau     SURGICAL HISTORY OF -   1981    (L) Knee surgery - re-fx tibial plateau            Social History:     Social History   Substance Use Topics     Smoking status: Former Smoker     Packs/day: 2.00     Years: 18.00     Types: Cigarettes     Quit date: 1/1/1980     Smokeless tobacco: Not on file     Alcohol use Yes      Comment: occaisonal   Lives alone  Works as a .   Is very cautious about avoiding dangerous fumes and solvents.          Family History:     Family History   Problem Relation Age of Onset     C.A.D. Mother      heart disease in her 60s     C.A.D. Brother      1/2 brother with heart disease     Diabetes No family hx of    No Fhx  malignancy         Allergies:   No Known Allergies         Medications:     Current Facility-Administered Medications   Medication     acetaminophen (TYLENOL) tablet 650 mg     hydrALAZINE (APRESOLINE) injection 10 mg     labetalol (NORMODYNE/TRANDATE) injection 10-20 mg     lisinopril (PRINIVIL/ZESTRIL) tablet 5 mg     naloxone (NARCAN) injection 0.1-0.4 mg     ofloxacin (OCUFLOX) 0.3 % ophthalmic solution 1 drop     tamsulosin (FLOMAX) capsule 0.4 mg             Review of Systems:    ROS: See HPI for pertinent details.  Remainder of 10-point ROS negative.         Physical Exam:   VS:  T: 97.4    HR: Data Unavailable    BP: 110/66    RR: 12   GEN:  AOx3.  NAD.  Pleasant.  HEENT:  Sclerae anicteric.  Conjunctivae pink.  Moist mucous membranes  NECK:  Supple.  No lymphadenopathy.  LUNGS: Non-labored breathing.  BACK:  No costoverterbral tenderness.  ABD:  Soft.  NT.  ND.  No rebound or guarding.  No surgical scars.   :  Phallus circumcised.  Meatus patent. Normal penile shaft  without plaques.  Testicles descended bilaterally, no nodules or tenderness.  Epididymes non-tender. No inguinal hernias.  PALAK:  Deferred  EXT:  Warm, well perfused.  no lower extremity edema bilaterally  SKIN:  Warm.  Dry.  No rashes.  NEURO:  CN grossly intact.  Normal gait    URINE: deep cherry without clots (in Flores bag)    PROCEDURE:  - Aspirated 8cc from the Flores balloon (which resulted in considerable discomfort for the patient)  - Then lubricated the urinary meatus and advanced/ hubbed the Flores  - Filled the balloon slowly with 20cc fluid to observe for grimacing or patient discomfort, and there was none.   - Seated the balloon at the bladder neck then manually irrigated with 400cc sterile NS.  Retried a couple tiny clots.  The irrigant cleared to peach  - Reconnected Flores drainage bag and secured Flores tension-free to thigh.             Data:   All laboratory data reviewed:    No results found for: PSA    Recent Labs  Lab 11/27/18  0024   WBC 8.7   HGB 14.0          Recent Labs  Lab 11/27/18  0024      POTASSIUM 3.5   CHLORIDE 109   CO2 27   BUN 17   CR 0.95   GLC 99   SANDRA 8.3*       Recent Labs  Lab 11/28/18  1827   COLOR Dark Red   APPEARANCE Cloudy   URINEGLC Negative   URINEBILI Negative   URINEKETONE Negative   SG >1.035*   URINEPH 7.5*   PROTEIN 300*   NITRITE Negative   LEUKEST Small*   RBCU >182*   WBCU 8*     Results for orders placed or performed in visit on 11/28/17   Urine Culture Aerobic Bacterial   Result Value Ref Range    Specimen Description Midstream Urine     Special Requests Specimen received in preservative     Culture Micro       50,000 to 100,000 colonies/mL  mixed urogenital smiley      Culture Micro Susceptibility testing not routinely done        All pertinent imaging reviewed:  None recent relevant         Impression and Plan:   Impression / Plan:   Simone Daniles is a 70 year old male with:  - Urinary retention, likely secondary to prostate hypertrophy.   Suspect his voiding dysfunction has been longstanding (as evidenced by recent history of nocturnal enuresis 2 of 7 nights).   - Hematuria, likely prostatic bleeding secondary to trauma from Flores balloon.         - Please continue Flomax (tamsulosin 0.4mg) and discharge patient with a generous supply.   - Likely will need to discharge patient with Flores.  He will need outpatient urology clinic f/u for a voiding trial and to further address voiding dysfunction, if persistent.   - If Mr. Daniels is still inpatient on 12/3, a voiding trial could be attempted.  For now, would rest bladder given recent history of overdistension.     - For hematuria --> have asked the patient to push PO fluids today to dilute any bleeding.  Expect the hematuria will clear spontaneously over next 24-48 hours.   - Nurses could manually irrigate (using a 60cc syringe and sterile NS/water) PRN if there are concerns for clots occluding Flores outflow.  Wouldn't recommend routine irrigation since it could increase risk of UTIs.   - Avoid narcotics, antihistamines and alpha agonists  - Avoid constipation, which will most certainly worsen retention    Urology will sign off but please call with questions.     Thank you for the opportunity to participate in the care of Simone Daniels.     Chelsi Blackman PA-C  Urology Physician Assistant  Personal Pager: [phone]    Please call Job Code:   [phone] to reach the Urology resident or PA on call - Weekdays  [phone] to reach the Urology resident or PA on call - Weeknights and weekends

## 2018-11-29 NOTE — PROVIDER NOTIFICATION
Writer assessed bloody urine in catheter tubing. Pt c/o burning from meatus. Provider notified, UA sent. Will cont to monitor.

## 2018-11-30 ENCOUNTER — APPOINTMENT (OUTPATIENT)
Dept: OCCUPATIONAL THERAPY | Facility: CLINIC | Age: 73
DRG: 064 | End: 2018-11-30
Attending: PSYCHIATRY & NEUROLOGY

## 2018-11-30 LAB
ANION GAP SERPL CALCULATED.3IONS-SCNC: 7 MMOL/L (ref 3–14)
BUN SERPL-MCNC: 14 MG/DL (ref 7–30)
CALCIUM SERPL-MCNC: 8.3 MG/DL (ref 8.5–10.1)
CHLORIDE SERPL-SCNC: 108 MMOL/L (ref 94–109)
CO2 SERPL-SCNC: 24 MMOL/L (ref 20–32)
CREAT SERPL-MCNC: 0.77 MG/DL (ref 0.66–1.25)
ERYTHROCYTE [DISTWIDTH] IN BLOOD BY AUTOMATED COUNT: 13.6 % (ref 10–15)
GFR SERPL CREATININE-BSD FRML MDRD: >90 ML/MIN/1.7M2
GLUCOSE SERPL-MCNC: 91 MG/DL (ref 70–99)
HCT VFR BLD AUTO: 41.7 % (ref 40–53)
HGB BLD-MCNC: 13.9 G/DL (ref 13.3–17.7)
MCH RBC QN AUTO: 31.3 PG (ref 26.5–33)
MCHC RBC AUTO-ENTMCNC: 33.3 G/DL (ref 31.5–36.5)
MCV RBC AUTO: 94 FL (ref 78–100)
PLATELET # BLD AUTO: 187 10E9/L (ref 150–450)
POTASSIUM SERPL-SCNC: 3.8 MMOL/L (ref 3.4–5.3)
RBC # BLD AUTO: 4.44 10E12/L (ref 4.4–5.9)
SODIUM SERPL-SCNC: 139 MMOL/L (ref 133–144)
WBC # BLD AUTO: 7.6 10E9/L (ref 4–11)

## 2018-11-30 PROCEDURE — 25000125 ZZHC RX 250: Performed by: STUDENT IN AN ORGANIZED HEALTH CARE EDUCATION/TRAINING PROGRAM

## 2018-11-30 PROCEDURE — 40000133 ZZH STATISTIC OT WARD VISIT

## 2018-11-30 PROCEDURE — 80048 BASIC METABOLIC PNL TOTAL CA: CPT | Performed by: PSYCHIATRY & NEUROLOGY

## 2018-11-30 PROCEDURE — 25000132 ZZH RX MED GY IP 250 OP 250 PS 637: Performed by: NURSE PRACTITIONER

## 2018-11-30 PROCEDURE — 25000128 H RX IP 250 OP 636: Performed by: PSYCHIATRY & NEUROLOGY

## 2018-11-30 PROCEDURE — 12000008 ZZH R&B INTERMEDIATE UMMC

## 2018-11-30 PROCEDURE — 25000132 ZZH RX MED GY IP 250 OP 250 PS 637: Performed by: PSYCHIATRY & NEUROLOGY

## 2018-11-30 PROCEDURE — 85027 COMPLETE CBC AUTOMATED: CPT | Performed by: PSYCHIATRY & NEUROLOGY

## 2018-11-30 PROCEDURE — 97530 THERAPEUTIC ACTIVITIES: CPT | Mod: GO

## 2018-11-30 PROCEDURE — 36415 COLL VENOUS BLD VENIPUNCTURE: CPT | Performed by: PSYCHIATRY & NEUROLOGY

## 2018-11-30 RX ADMIN — OFLOXACIN 1 DROP: 3 SOLUTION/ DROPS OPHTHALMIC at 00:04

## 2018-11-30 RX ADMIN — Medication 5000 UNITS: at 10:05

## 2018-11-30 RX ADMIN — OFLOXACIN 1 DROP: 3 SOLUTION/ DROPS OPHTHALMIC at 07:50

## 2018-11-30 RX ADMIN — Medication 5000 UNITS: at 22:07

## 2018-11-30 RX ADMIN — ACETAMINOPHEN 650 MG: 325 TABLET, FILM COATED ORAL at 14:23

## 2018-11-30 RX ADMIN — OFLOXACIN 1 DROP: 3 SOLUTION/ DROPS OPHTHALMIC at 12:35

## 2018-11-30 RX ADMIN — OFLOXACIN 1 DROP: 3 SOLUTION/ DROPS OPHTHALMIC at 04:54

## 2018-11-30 RX ADMIN — LISINOPRIL 5 MG: 5 TABLET ORAL at 07:50

## 2018-11-30 RX ADMIN — ASPIRIN 81 MG CHEWABLE TABLET 81 MG: 81 TABLET CHEWABLE at 07:50

## 2018-11-30 RX ADMIN — TAMSULOSIN HYDROCHLORIDE 0.4 MG: 0.4 CAPSULE ORAL at 07:50

## 2018-11-30 RX ADMIN — OFLOXACIN 1 DROP: 3 SOLUTION/ DROPS OPHTHALMIC at 16:47

## 2018-11-30 RX ADMIN — OFLOXACIN 1 DROP: 3 SOLUTION/ DROPS OPHTHALMIC at 19:48

## 2018-11-30 ASSESSMENT — ACTIVITIES OF DAILY LIVING (ADL)
ADLS_ACUITY_SCORE: 11

## 2018-11-30 ASSESSMENT — VISUAL ACUITY: OU: NORMAL ACUITY

## 2018-11-30 NOTE — PLAN OF CARE
Problem: Patient Care Overview  Goal: Plan of Care/Patient Progress Review  Outcome: Improving  Status: pt w/ hx of anxiety, GERD who presented to the ED w/3 days of nausea, generalized chills & subjective fever as well as generalized weakness & imbalance, found to have a L parietal hypodensity associated hemorrhagic conversion     VS: VSS on RA   Neuros: Alert & orientated x3, disorientated to time, pt has mild expressive aphasia, slight right field cut   GI: Regular diet, pt stated some stomach discomfort, PRN Tums given x1   : pt has a elena in place, pt has had a light blood tinged urine since insertion, Urologist was consulted for this, pt states the discomfort is getting better, per nursing report keep until 12/3, pt given handout on elena self care, PLC tomorrow morning  IV:PIC SL   Activity: SBA  Cardiac: pt on cardiac monitoring, afib part of this shift   Pain: pt denied pain this shift   Respiratory/Trach:  WNL, pt asked for nasal spray, stated the room felt dry, nasal spray ordered   Skin: Intact, pt repostioning himself independently   Social: pt anxious about money, transportation, rehab & living situation, social work consulted per pt request    Plan of care: Continue to monitor for pain, continue w/ POC

## 2018-11-30 NOTE — PLAN OF CARE
Problem: Patient Care Overview  Goal: Plan of Care/Patient Progress Review  SLP: SLP treatment session cancelled as pt working with OT on time of attempt. Will continue to follow per POC.

## 2018-11-30 NOTE — PLAN OF CARE
Problem: Patient Care Overview  Goal: Plan of Care/Patient Progress Review  Outcome: No Change  Pt alert and oriented x3 - slight confusion related to the year. VSS on RA. Flores catheter patent and having good amounts of reddish output, plan to keep in until 12/3 per urology. Pt very anxious about being at the hospital and how he is going to pay for his stay. Pt very hopeful that social work will be able to see him today. Continue plan of care.

## 2018-11-30 NOTE — PROGRESS NOTES
Social Work Services Progress Note    Hospital Day: 4  Date of Initial Social Work Evaluation:  11/27/18  Collaborated with:  Patient, Windy Metcalf NP Neurology, Admissions for [facility] ([facility]), Admissions at [facility] in [facility] ([facility] [phone])    Data:  SW is following for discharge planning.  A rehab stay is recommended.  Pt has a elena in place.    Pt does not have an active payer source for rehab placement.  [facility] [employer] Counselor, Nichole Arriaza met with pt on 11/27/18 and completed application for medical assistance.      Intervention:  SW met with pt.  SW provided a discharge planning update (rehab placement) and MA pending status was discussed. Pt would prefer placement as close to home as possible.  Pt states that he will work hard in therapy as he wants to be able to return to home.  SW spoke with Windy Metcalf NP.  Windy anticipates readiness for discharge on the weekend.  GILBERT spoke with Admissions for [facility] ([facility]).  [facility]ita indicates that they will not consider pt for admit to ARU as pt does not have a care provider in the home if assistance was needed. Margarita indicates that they will not consider pt for admit to TCU due to pt's cognitive deficits, lack of a care provider and because they are short term stay only.  GILBERT phoned Admissions at [facility] ([facility] [phone]) and they will assess.  GILBERT faxed referral via EPIC.  Jazmin requested a copy of pt's MA melina.  GILBERT phoned [facility] [employer] Counselor Nichole Tolentino ([phone]) and asked that she email a copy of the MA melina.      Assessment:  Pt is agreeable to short term rehab placement.    Plan:    Anticipated Disposition:  Short term rehab placement.  [facility] is assessing    Barriers to d/c plan:  Medical Assistance pending status    Follow Up:  SW will follow for discharge planning.    SILVIA Sanchez  Social Work, 6A  Phone:  [phone]  Pager:  [phone]  11/30/2018

## 2018-11-30 NOTE — PROGRESS NOTES
[facility], [place]   Neurology Daily Note  Simone Daniels  [id number]  11/30/2018    Subjective:   Continues w bloody urine, otherwise no complaints.    Objective:   /80 (BP Location: Right arm)  Pulse 95  Temp 98.3  F (36.8  C) (Oral)  Resp 15  Wt 76.3 kg (168 lb 3.4 oz)  SpO2 100%  BMI 24.48 kg/m2  General:  sitting in chair, non distressed  HEENT:  normocephalic/atraumatic  Cardio:  irregular  Pulmonary:  no respiratory distress  Abdomen:  soft, non-tender, bowel sounds present  Extremities:  no edema     Neurologic  Mental Status:  awake/alert. Not oriented to age, states he doesn't keep track of age. Speech is fluent, no paraphasic errors. Question of word-finding difficulties/confusion, exclusively with medical terminology.   Cranial Nerves:  Visual fields full with intermittent extinction on the right. EOM intact, no facial droop, hearing not formally tested but intact to conversation, no dysarthria, shoulder shrug strong bilaterally, tongue protrusion midline  Motor:  no abnormal movements, normal muscle bulk, no pronator drift, able to move all limbs spontaneously, strength 5/5 throughout upper and lower extremities decreased mobility in LLE w/ brace.  Sensory:  intact to LT, neglect of the right side  Coordination:  FNF and HS intact without dysmetria  Station/Gait:  deferred.    [employer] Stroke Scale  Exam Interval: 11/30/2018 800   Score    Level of consciousness: (0)   Alert, keenly responsive    LOC questions: (1)   Answers 1 question correctly    LOC commands: (0)   Performs both tasks correctly    Best gaze: (0)   Normal    Visual: (0)   normal    Facial palsy: (0)   Normal symmetrical movements    Motor arm (left): (0)   No drift    Motor arm (right): (0)   No drift    Motor leg (left): (0)   No drift    Motor leg (right): (0)   No drift    Limb ataxia: (0)   Absent    Sensory: (0)   Normal- no sensory loss    Best language: (0)    Normal- no aphasia    Dysarthria: (0)   Normal    Extinction and inattention: (2)   Visual, tacile, auditory, spatial, person inattention        Total Score:  3         Lab Investigations:   Hemoglobin A1C   Date Value Ref Range Status   11/27/2018 5.5 0 - 5.6 % Final     Comment:     Normal <5.7% Prediabetes 5.7-6.4%  Diabetes 6.5% or higher - adopted from ADA   consensus guidelines.       Lab Results   Component Value Date    LDL 78 11/27/2018       Imaging:  Recent Results (from the past 24 hour(s))   CT Head w/o Contrast    Narrative    CT HEAD W/O CONTRAST 11/27/2018 9:54 AM    Provided History: repeat CTH in setting of recently discovered ICH;     Comparison: Head CT earlier the same day at 0136 hours.    Technique: Using multidetector thin collimation helical acquisition  technique, axial, coronal and sagittal CT images from the skull base  to the vertex were obtained without intravenous contrast.     Findings:    No significant change in left parietal lobe intraparenchymal hematoma  with surrounding confluent edema and loss of gray-white  differentiation. Unchanged associated mass effect with effacement of  adjacent cerebral sulci and partial effacement of the left lateral  ventricle. No new intracranial hemorrhage. Mild to moderate  leukoaraiosis, unchanged. No hydrocephalus.       Impression    Impression: No change in left parietal intraparenchymal hemorrhage  with surrounding infarct.    MERCEDES ELLIOTT MD   MR Brain for Stroke [facility] w/o & w Contras    Narrative    MRI brain without and with contrast  MRA of the head without contrast  Neck MRA without and with contrast    Provided History:  ICH, concern for possible mets.    Comparison:  Head CT earlier today    Technique:   Brain MRI:  Axial diffusion, FLAIR, T2-weighted, susceptibility, and  coronal T1-weighted images were obtained without intravenous contrast.  Following intravenous gadolinium-based contrast administration, axial  and coronal  T1-weighted images were obtained.    Head MRA: 3D time-of-flight MRA of the [place] was performed  without intravenous contrast.  Neck MRA:  Limited non contrast 2DTOF images were obtained of the  mid-cervical region. Following intravenous gadolinium-based contrast  administration, a contrast enhanced MRA of the neck/cervical vessels  was performed.  Three-dimensional reconstructions of the neck and head MRA were  created, which were reviewed by the radiologist.    Dose: 10mL Gadavist    Findings:   Brain MRI: No significant change in posterior left parietal  intraparenchymal hemorrhage. Associated susceptibility effect and  surrounding cytotoxic edema predominantly in the cerebral cortex with  adjacent vasogenic edema. Mild subcortical and deep white matter  leukoaraiosis likely related to chronic small vessel ischemic disease.  Ventricles are proportionate to the cerebral sulci. Mild  leptomeningeal enhancement in the region of infarct.    Mild paranasal sinus mucosal thickening. Susceptibility effect in the  left maxillary region attributable to dental hardware.    Head MRA demonstrates no aneurysm or stenosis of the major  intracranial arteries.    Neck MRA demonstrates is partially degraded due to suboptimal bolus  timing relative to image acquisition. However, the major cervical  arteries are patent. Short segment mild atherosclerotic plaque of the  proximal left internal carotid artery without associated stenosis. The  normal distal left internal carotid artery measures 4 mm in diameter.  The normal distal right internal carotid artery measures 5 mm.  Antegrade flow in the major cervical vasculature.      Impression    Impression:  1. No significant change in hemorrhagic posterior left parietal  infarct.  2. Head MRA demonstrates no aneurysm or stenosis of the major  intracranial arteries.  3. Neck MRA is partially degraded due to suboptimal bolus timing.  However, the major cervical arteries are  patent.    I have personally reviewed the examination and initial interpretation  and I agree with the findings.    MERCEDES ELLIOTT MD     Recent Results (from the past 4320 hour(s))   Echo Complete Bubble    Narrative    [id number]  [id number]  [id number]  [id number]^[place]^RAHEEM           [facility]  [facility]  [address] [place] [address]     Name: HALIE PEÑA  MRN: [id number]  : 1948  Study Date: 2018 08:36 AM  Age: 70 yrs  Gender: Male  Patient Location: [facility]  Reason For Study: Afib  Ordering Physician: RAHEEM FERRIS  Referring Physician: Sophia Dacosta  Performed By: Chelsey Arshad     BSA: 2.0 m2  Height: 69 in  Weight: 180 lb  BP: 116/77 mmHg  _____________________________________________________________________________  __        Procedure  Bubble Echocardiogram with two-dimensional, color and spectral Doppler  performed.  _____________________________________________________________________________  __        Interpretation Summary  Global and regional left ventricular function is normal with an EF of 60-65%.  Global right ventricular function is normal.  Diastolic function not assessed due to atrial fibrillation.  No significant valve dysfunction.  No prior study for comparison.  _____________________________________________________________________________  __        Left Ventricle  Left ventricular size is normal. Global and regional left ventricular function  is normal with an EF of 60-65%. Mild concentric wall thickening consistent  with left ventricular hypertrophy is present. Diastolic function not assessed  due to atrial fibrillation. No regional wall motion abnormalities are seen.     Right Ventricle  The right ventricle is normal size. Global right ventricular function is  normal.     Atria  Moderate to severe left atrial enlargement is present. Moderate right atrial  enlargement is present. The atrial septum  is intact as assessed by color  Doppler and agitated saline bubble study .     Mitral Valve  The mitral valve is normal.        Aortic Valve  Aortic valve is normal in structure and function. The aortic valve is  tricuspid. No aortic regurgitation is present.     Tricuspid Valve  Trace to mild tricuspid insufficiency is present. The right ventricular  systolic pressure is approximated at 22.8 mmHg plus the right atrial pressure.     Pulmonic Valve  The pulmonic valve is normal.     Vessels  The inferior vena cava was normal in size with preserved respiratory  variability. Ascending aorta is mildly enlarged measuring 3.9 cm.     Pericardium  No pericardial effusion is present.     _____________________________________________________________________________  __  MMode/2D Measurements & Calculations  RVDd: 3.3 cm  IVSd: 1.3 cm  LVIDd: 4.8 cm  LVIDs: 3.0 cm  LVPWd: 1.1 cm  FS: 38.6 %     LV mass(C)d: 220.6 grams  LV mass(C)dI: 111.7 grams/m2  Ao root diam: 1.5 cm  LA dimension: 4.6 cm  asc Aorta Diam: 3.9 cm  LA/Ao: 3.0  LVOT diam: 2.2 cm  LVOT area: 3.7 cm2  LA Volume Index (BP): 62.2 ml/m2  RWT: 0.47  TAPSE: 1.9 cm        Doppler Measurements & Calculations  MV E max mattie: 63.4 cm/sec     TV max P.8 mmHg  PA V2 max: 88.2 cm/sec  PA max PG: 3.1 mmHg  PA acc time: 0.08 sec  TR max mattie: 238.4 cm/sec  TR max P.8 mmHg  E/E' av.4  Lateral E/e': 7.4  Medial E/e': 9.3     _____________________________________________________________________________  __           Report approved by: [place] 2018 11:01 AM            Assessment and Plan   Simone Daniels is a 70 year old male with pmh of anxiety, GERD who presents w/ 3 days of nausea, generalized chills and subjective fevers as well as generalized weakness and imbalance, was found to have L parietal hypodensity associated hemorrhagic conversion. MRI revealed hemorrhagic infarction. Probably HI2, vessels are patent. EKG revealed a fib, echo  revealed moderate to severe left atrial enlargement and moderate right atrial enlargement. No PFO. The working diagnosis is cardio embolic ischemic stroke with hemorrhagic transformation in the setting of new A fib. The blood pressure also is high since admission. This could be due to the acute stroke +/- baseline untreated hypertension.  LDL was 78 and HbA1c is 5.5%    #Acute hemorrhagic infarct:  Probably cardio embolic due to a fib not on anticoagulant. His [id number] score is 4. He should receive anticoagulation, but will wait due to hemorrhagic conversion HI2. We will hold off starting anticoagulation for now and plan to start in 7-10 days with NOAC   - start anticoagulation around 12/7 (10 days post-admission)  - start ASA 81 today   - PT recommended TCU  - OT recommended ARU vs home w 24hr supervision   - Neuro checks q4h  - Euglycemic   - SPL cleared him and started on regular diet    # Afib  Newly diagnosed on admission with EKG and on telemetry. There is no RVR. Will start anticoagulation in 7-10 days as the ICH   - daily aspirin     # Elevated troponin  On admission his trop was 0.057, trended down then back to 0.149. Likely type 2 demand ischemia in the setting of afib and stroke, trop max 0.149. Echo completed, no evidence of infarct.   - If pt has chest pressure, eval w EKG.    # High blood pressure  Since his admission his blood pressure is running in the higher . This could be due to the acute stroke. There is a possibility that he has untreated hypertension.  Started lisinopril and flomax this admission  - Continue on Lisinopril   - SBP cap 180 (hasn't needed prns)    # Urinary retention with incontinence  He has history of retention and intermittent incontinence at home. He did not seek medical advice and was treating himself with natural remedies and was using pads.   We do PRN bladder scanning. He does not feel when his bladder is full. After several straight caths, elena placed, now w  blood-tinged urine. Improving  - Continue on Flomax 0.4 mg daily  - Continue elena   - urologist consult completed and OP follow up  - voiding trial 12/3 if still inpatient    # GERD  Not on medications. He does not have symptoms   - no need for starting PPI now  - will defer managing the GERD to his PCP      FEN: regular diet   PPx: subcutaneous heparin  Code status: Full    Disposition Plan   TCU vs ARU vs home. Medically ready for discharge      Patient was seen and discussed with Dr. Shelia Shaffer MD  Stroke fellow

## 2018-11-30 NOTE — PLAN OF CARE
Problem: Patient Care Overview  Goal: Plan of Care/Patient Progress Review  OT 6A   Discharge Planner OT   Patient plan for discharge: Pt open to rehab at discharge  Current status: Pt completed medication management activity with mod assist required to successfully complete activity. Pt declining all other therapy today due to abdominal pain.   Barriers to return to prior living situation: cognitive deficits, vision, balance, fall risk, acute medical needs  Recommendations for discharge: TCU  Rationale for recommendations: Pt is below baseline and would benefit from continued skilled therapy to increase activity tolerance and independence with ADLs. Based on pt cognitive presentation, would recommend 24hr supervision at this time.  If pt does not qualify for an inpatient rehab stay would recommend home safety evaluation        Entered by: Sudha Goldstein 11/30/2018 3:58 PM

## 2018-12-01 ENCOUNTER — APPOINTMENT (OUTPATIENT)
Dept: OCCUPATIONAL THERAPY | Facility: CLINIC | Age: 73
DRG: 064 | End: 2018-12-01
Attending: PSYCHIATRY & NEUROLOGY

## 2018-12-01 ENCOUNTER — APPOINTMENT (OUTPATIENT)
Dept: SPEECH THERAPY | Facility: CLINIC | Age: 73
DRG: 064 | End: 2018-12-01
Attending: PSYCHIATRY & NEUROLOGY

## 2018-12-01 PROBLEM — I63.531: Status: ACTIVE | Noted: 2018-11-27

## 2018-12-01 PROBLEM — I63.532: Status: ACTIVE | Noted: 2018-11-27

## 2018-12-01 PROCEDURE — 92507 TX SP LANG VOICE COMM INDIV: CPT | Mod: GN | Performed by: SPEECH-LANGUAGE PATHOLOGIST

## 2018-12-01 PROCEDURE — 25000125 ZZHC RX 250: Performed by: PSYCHIATRY & NEUROLOGY

## 2018-12-01 PROCEDURE — 25000132 ZZH RX MED GY IP 250 OP 250 PS 637: Performed by: PSYCHIATRY & NEUROLOGY

## 2018-12-01 PROCEDURE — 40000225 ZZH STATISTIC SLP WARD VISIT: Performed by: SPEECH-LANGUAGE PATHOLOGIST

## 2018-12-01 PROCEDURE — 25000125 ZZHC RX 250: Performed by: STUDENT IN AN ORGANIZED HEALTH CARE EDUCATION/TRAINING PROGRAM

## 2018-12-01 PROCEDURE — 12000008 ZZH R&B INTERMEDIATE UMMC

## 2018-12-01 PROCEDURE — 25000128 H RX IP 250 OP 636: Performed by: PSYCHIATRY & NEUROLOGY

## 2018-12-01 PROCEDURE — 25000132 ZZH RX MED GY IP 250 OP 250 PS 637: Performed by: NURSE PRACTITIONER

## 2018-12-01 PROCEDURE — 40000133 ZZH STATISTIC OT WARD VISIT: Performed by: OCCUPATIONAL THERAPIST

## 2018-12-01 PROCEDURE — 97530 THERAPEUTIC ACTIVITIES: CPT | Mod: GO | Performed by: OCCUPATIONAL THERAPIST

## 2018-12-01 RX ADMIN — LISINOPRIL 5 MG: 5 TABLET ORAL at 08:36

## 2018-12-01 RX ADMIN — Medication 5000 UNITS: at 22:27

## 2018-12-01 RX ADMIN — OFLOXACIN 1 DROP: 3 SOLUTION/ DROPS OPHTHALMIC at 08:36

## 2018-12-01 RX ADMIN — OFLOXACIN 1 DROP: 3 SOLUTION/ DROPS OPHTHALMIC at 20:50

## 2018-12-01 RX ADMIN — ACETAMINOPHEN 650 MG: 325 TABLET, FILM COATED ORAL at 12:43

## 2018-12-01 RX ADMIN — ASPIRIN 81 MG CHEWABLE TABLET 81 MG: 81 TABLET CHEWABLE at 08:36

## 2018-12-01 RX ADMIN — OFLOXACIN 1 DROP: 3 SOLUTION/ DROPS OPHTHALMIC at 20:44

## 2018-12-01 RX ADMIN — Medication 5000 UNITS: at 10:56

## 2018-12-01 RX ADMIN — OFLOXACIN 1 DROP: 3 SOLUTION/ DROPS OPHTHALMIC at 00:53

## 2018-12-01 RX ADMIN — OFLOXACIN 1 DROP: 3 SOLUTION/ DROPS OPHTHALMIC at 12:43

## 2018-12-01 RX ADMIN — LIDOCAINE HYDROCHLORIDE 10 ML: 20 JELLY TOPICAL at 20:50

## 2018-12-01 RX ADMIN — TAMSULOSIN HYDROCHLORIDE 0.4 MG: 0.4 CAPSULE ORAL at 08:35

## 2018-12-01 RX ADMIN — LIDOCAINE HYDROCHLORIDE 10 ML: 20 JELLY TOPICAL at 00:53

## 2018-12-01 RX ADMIN — ACETAMINOPHEN 650 MG: 325 TABLET, FILM COATED ORAL at 08:38

## 2018-12-01 RX ADMIN — ACETAMINOPHEN 650 MG: 325 TABLET, FILM COATED ORAL at 04:25

## 2018-12-01 RX ADMIN — OFLOXACIN 1 DROP: 3 SOLUTION/ DROPS OPHTHALMIC at 04:25

## 2018-12-01 ASSESSMENT — ACTIVITIES OF DAILY LIVING (ADL)
ADLS_ACUITY_SCORE: 11

## 2018-12-01 ASSESSMENT — VISUAL ACUITY
OU: NORMAL ACUITY

## 2018-12-01 NOTE — PLAN OF CARE
Problem: Patient Care Overview  Goal: Plan of Care/Patient Progress Review  Outcome: No Change  /90  Pulse 71  Temp 98.2  F (36.8  C) (Oral)  Resp 16  Wt 76.3 kg (168 lb 3.4 oz)  SpO2 99%  BMI 24.48 kg/m2  Status: pt w/ hx of anxiety, GERD who was found to have a L parietal hypodensity associated hemorrhagic conversion  VS: VSS on RA ex HTN (MD notified) and CCM (intermittent A-fib)  Neuro: Alert & orientated x3, disorientated to time. Anxious, pt has mild expressive aphasia, slight right field cut and forgetful at times.   Respiratory: WDL  GI: Regular diet, fair po intake, needs enc and assist to order meals   : Pt has a elena in place, with cherry to dark red urine o/p. Urology aware on previous shifts and per pt, the color is due to traumatic insertion. Pt states he has penile discomfort, uro-jet given per order with relief.   IV: PIV SL x2  Skin: Intact, pt repostioning himself independently  Pain: Penile only, relief from uro-jet until AM. Is asking for more.  Activity: SBA  See flow sheets for further details and assessments.  Plan of Care: continue to monitor, notify MD of significant changes.

## 2018-12-01 NOTE — PLAN OF CARE
Problem: Patient Care Overview  Goal: Plan of Care/Patient Progress Review  Outcome: Therapy, progress towards functional goals is fair  Discharge Planner SLP   Patient plan for discharge: rehab facility  Current status: Patient seen for cognitive-communication treatment. Patient denied difficulty with vision, however note inattention to right side of reading materials at times and difficulty locating printed material on left side of page x1. Patient completed practical reading task for medication label information with 57% accuracy independently (performance improved given mod-max cues). Patient not able to calculate 2-digit addition unless vertically oriented. Two-digit addition and subtraction 100% accurate when oriented vertically. Patient not able to complete multiplication or division tasks (e.g. 3x15; 4x20; 48/4). Patient reports he takes care of bills and balances checkbook at home and that performance today much below baseline. Patient appeared aware of difficulty with tasks but note reduced understanding of impact on functional/daily needs. Will continue to follow.  Barriers to return to prior living situation: cognition; insight/awareness of impairments  Recommendations for discharge: defer to OT/PT  Rationale for recommendations: recommend continued ST at discharge for cognitive-communication       Entered by: Tegan De León 12/01/2018 11:43 AM

## 2018-12-01 NOTE — PLAN OF CARE
Problem: Patient Care Overview  Goal: Plan of Care/Patient Progress Review  Outcome: No Change  Status: Pt s/o L parietal hypodensity associated hemorrhagic conversion   VS: VSS on RA, HTN within parameters. CCM with intermittent Afib   Neuros: AO3, disoriented to time, intermittent confusion/forgetful. Mild expressive aphasia   GI: Tolerating regular diet. No BM this shift   : Flores catheter patent with adequate amount of red urine output, urology following per report ?  IV: No blood return on both PIVs, awaiting order for no IVs  Activity: Up SBA, can be impulsive.   Pain: Penile pain controlled with tylenol [id number] x 2  Social: Family friend, Isa Fulton, will be visiting Monday to bring clothes for TCU whenever pt transitions   Plan of care: OT/PT recommending TCU when medically ready. Continue to monitor and follow POC
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
## 2018-12-01 NOTE — PLAN OF CARE
Problem: Patient Care Overview  Goal: Plan of Care/Patient Progress Review  Outcome: No Change  Status: pt w/ hx of anxiety, GERD who was found to have a L parietal hypodensity associated hemorrhagic conversion     VS: VSS on RA   Neuros: Alert & orientated x3, disorientated to time, pt has mild expressive aphasia, slight right field cut and forgetful at times.  GI: Regular diet, fair po intake, needs enc and assist to order meals   : Pt has a elena in place, with cherry red urine o/p. Urology aware on previous shifts and per pt, the color is due to traumatic insertion. Pt states the penile discomfort is getting better nd finds relief with warm washcloths.  IV: PIV SL x2  Activity: SBA  Cardiac: pt on cardiac monitoring, afib part of this shift   Respiratory/Trach:  WNL  Skin: Intact, pt repostioning himself independently   Plan of care: Continue to monitor for pain, continue w/ POC. Charge nurse currently updating MD that urine continues to be cherry red and are we to give Heparin subcutaneous as scheduled or possibly check HGB. Awaiting call back.

## 2018-12-01 NOTE — PLAN OF CARE
Problem: Patient Care Overview  Goal: Plan of Care/Patient Progress Review  Outcome: No Change  Status: pt w/ hx of anxiety, GERD who was found to have a L parietal hypodensity associated hemorrhagic conversion     VS: VSS on RA   Neuros: Alert & orientated x3, disorientated to time, pt has mild expressive aphasia, slight right field cut and forgetful at times.  GI: Regular diet, fair po intake, needs enc and assist to order meals   : Pt has a elena in place, with cherry red urine o/p. Urology aware on previous shifts and per pt, the color is due to traumatic insertion. Pt states the penile discomfort is getting better nd finds relief with warm washcloths. PLC called [facility] and stated they were asked to teach elena care, but were unable due to their schedule. RN staff will teach elena care if dc'd with it.  IV: PIV SL x2  Activity: SBA  Cardiac: pt on cardiac monitoring, afib part of this shift   Respiratory/Trach:  WNL  Skin: Intact, pt repostioning himself independently   Plan of care: Continue to monitor for pain, continue w/ POC. Pt was on MD question list as to whether we should be holding subcutaneous Heparin d/t bleeding. Did receive one dose today.

## 2018-12-02 LAB
ANION GAP SERPL CALCULATED.3IONS-SCNC: 7 MMOL/L (ref 3–14)
BUN SERPL-MCNC: 16 MG/DL (ref 7–30)
CALCIUM SERPL-MCNC: 8.4 MG/DL (ref 8.5–10.1)
CHLORIDE SERPL-SCNC: 105 MMOL/L (ref 94–109)
CO2 SERPL-SCNC: 27 MMOL/L (ref 20–32)
CREAT SERPL-MCNC: 0.86 MG/DL (ref 0.66–1.25)
ERYTHROCYTE [DISTWIDTH] IN BLOOD BY AUTOMATED COUNT: 13.5 % (ref 10–15)
GFR SERPL CREATININE-BSD FRML MDRD: 88 ML/MIN/1.7M2
GLUCOSE SERPL-MCNC: 101 MG/DL (ref 70–99)
HCT VFR BLD AUTO: 45.5 % (ref 40–53)
HGB BLD-MCNC: 15 G/DL (ref 13.3–17.7)
MCH RBC QN AUTO: 30.9 PG (ref 26.5–33)
MCHC RBC AUTO-ENTMCNC: 33 G/DL (ref 31.5–36.5)
MCV RBC AUTO: 94 FL (ref 78–100)
PLATELET # BLD AUTO: 227 10E9/L (ref 150–450)
POTASSIUM SERPL-SCNC: 3.9 MMOL/L (ref 3.4–5.3)
RBC # BLD AUTO: 4.85 10E12/L (ref 4.4–5.9)
SODIUM SERPL-SCNC: 139 MMOL/L (ref 133–144)
WBC # BLD AUTO: 8.2 10E9/L (ref 4–11)

## 2018-12-02 PROCEDURE — 12000001 ZZH R&B MED SURG/OB UMMC

## 2018-12-02 PROCEDURE — 25000128 H RX IP 250 OP 636: Performed by: PSYCHIATRY & NEUROLOGY

## 2018-12-02 PROCEDURE — 80048 BASIC METABOLIC PNL TOTAL CA: CPT | Performed by: PSYCHIATRY & NEUROLOGY

## 2018-12-02 PROCEDURE — 85027 COMPLETE CBC AUTOMATED: CPT | Performed by: PSYCHIATRY & NEUROLOGY

## 2018-12-02 PROCEDURE — 40000556 ZZH STATISTIC PERIPHERAL IV START W US GUIDANCE

## 2018-12-02 PROCEDURE — 36415 COLL VENOUS BLD VENIPUNCTURE: CPT | Performed by: PSYCHIATRY & NEUROLOGY

## 2018-12-02 PROCEDURE — 25000132 ZZH RX MED GY IP 250 OP 250 PS 637: Performed by: NURSE PRACTITIONER

## 2018-12-02 PROCEDURE — 25000132 ZZH RX MED GY IP 250 OP 250 PS 637: Performed by: PSYCHIATRY & NEUROLOGY

## 2018-12-02 RX ADMIN — OFLOXACIN 1 DROP: 3 SOLUTION/ DROPS OPHTHALMIC at 00:07

## 2018-12-02 RX ADMIN — OFLOXACIN 1 DROP: 3 SOLUTION/ DROPS OPHTHALMIC at 16:58

## 2018-12-02 RX ADMIN — OFLOXACIN 1 DROP: 3 SOLUTION/ DROPS OPHTHALMIC at 03:04

## 2018-12-02 RX ADMIN — TAMSULOSIN HYDROCHLORIDE 0.4 MG: 0.4 CAPSULE ORAL at 08:16

## 2018-12-02 RX ADMIN — OFLOXACIN 1 DROP: 3 SOLUTION/ DROPS OPHTHALMIC at 08:17

## 2018-12-02 RX ADMIN — ASPIRIN 81 MG CHEWABLE TABLET 81 MG: 81 TABLET CHEWABLE at 08:16

## 2018-12-02 RX ADMIN — ACETAMINOPHEN 650 MG: 325 TABLET, FILM COATED ORAL at 19:02

## 2018-12-02 RX ADMIN — LISINOPRIL 5 MG: 5 TABLET ORAL at 08:18

## 2018-12-02 RX ADMIN — OFLOXACIN 1 DROP: 3 SOLUTION/ DROPS OPHTHALMIC at 12:55

## 2018-12-02 RX ADMIN — Medication 5000 UNITS: at 10:47

## 2018-12-02 RX ADMIN — OFLOXACIN 1 DROP: 3 SOLUTION/ DROPS OPHTHALMIC at 20:14

## 2018-12-02 RX ADMIN — Medication 5000 UNITS: at 22:17

## 2018-12-02 ASSESSMENT — ACTIVITIES OF DAILY LIVING (ADL)
ADLS_ACUITY_SCORE: 11

## 2018-12-02 ASSESSMENT — VISUAL ACUITY
OU: NORMAL ACUITY

## 2018-12-02 NOTE — PROGRESS NOTES
"Neuro-ICU Progress Note    24 hour events:  Urine more cherry/bloody red w clots. More pain. urojet ordered.     Vital Signs:  B/P: 136/82, T: 98.4, P: 71, R: 16    Physical Examination:  BP 95/59 (BP Location: Right arm)  Temp 98.6  F (37  C) (Oral)  Resp 18  Ht 1.6 m (5' 3\")  Wt 73.1 kg (161 lb 2.5 oz)  SpO2 93%  BMI 28.55 kg/m2    Neurologic  Mental Status:  awake/alert. Speech is fluent, no paraphasic errors.  Cranial Nerves:  Visual fields full with intermittent extinction on the right. EOM intact, no facial droop, hearing not formally tested but intact to conversation, no dysarthria, shoulder shrug strong bilaterally, tongue protrusion midline  Motor:  no abnormal movements, normal muscle bulk, no pronator drift, able to move all limbs spontaneously, strength 5/5 throughout upper and lower extremities decreased mobility in LLE w/ brace.  Sensory:  intact to LT, neglect of the right side  Coordination:  FNF and HS intact without dysmetria  Station/Gait:  deferred.    Labs/Studies:  No labs    Imaging:  No new images     Assessment and Plan:  Assessment and Plan   Simone Daniels is a 70 year old male with pmh of anxiety, GERD who presents w/ 3 days of nausea, generalized chills and subjective fevers as well as generalized weakness and imbalance, was found to have L parietal hypodensity associated hemorrhagic conversion. MRI revealed hemorrhagic infarction. Probably HI2, vessels are patent. EKG revealed a fib, echo revealed moderate to severe left atrial enlargement and moderate right atrial enlargement. No PFO. The working diagnosis is cardio embolic ischemic stroke with hemorrhagic transformation in the setting of new A fib. The blood pressure also is high since admission. This could be due to the acute stroke +/- baseline untreated hypertension.  LDL was 78 and HbA1c is 5.5%     #Acute hemorrhagic infarct:  Probably cardio embolic due to a fib not on anticoagulant. His [id number] score is 4. He should " receive anticoagulation, but will wait due to hemorrhagic conversion HI2. We will hold off starting anticoagulation for now and plan to start in 7-10 days with NOAC   - start anticoagulation around 12/7 (10 days post-admission)  - cont ASA 81 (started in this admission), discontinue once started AC.   - PT/OT/SLP  - Neuro checks q4h  - Euglycemic   - SPL cleared him and started on regular diet     # Afib  Newly diagnosed on admission with EKG and on telemetry. There is no RVR. Will start anticoagulation in 7-10 days as the ICH   - daily aspirin      # Elevated troponin  On admission his trop was 0.057, trended down then back to 0.149. Likely type 2 demand ischemia in the setting of afib and stroke, trop max 0.149. Echo completed, no evidence of infarct.   - If pt has chest pressure, eval w EKG.     # High blood pressure  Since his admission his blood pressure is running in the higher . This could be due to the acute stroke. There is a possibility that he has untreated hypertension.  Started lisinopril and flomax this admission  - Continue on Lisinopril        # Urinary retention with incontinence  He has history of retention and intermittent incontinence at home. He did not seek medical advice and was treating himself with natural remedies and was using pads.   We do PRN bladder scanning. He does not feel when his bladder is full. After several straight caths, elena placed, now w blood-tinged urine. Improving  - Continue on Flomax 0.4 mg daily  - Continue elena   - urologist consult completed and OP follow up  - voiding trial 12/3 if still inpatient     # GERD  Not on medications. He does not have symptoms   - no need for starting PPI now  - will defer managing the GERD to his PCP        FEN: regular diet   PPx: subcutaneous heparin  Code status: Full     Disposition Plan   TCU. Medically ready for discharge        Patient was seen and discussed with Dr[facility] Shelia Katz, [facility]  PGY2-Neurology   P: [phone]

## 2018-12-02 NOTE — PLAN OF CARE
Problem: Patient Care Overview  Goal: Plan of Care/Patient Progress Review  Outcome: No Change  /74 (BP Location: Left arm)  Pulse 80  Temp 96.4  F (35.8  C) (Oral)  Resp 16  Wt 76.3 kg (168 lb 3.4 oz)  SpO2 98%  BMI 24.48 kg/m2  Status: pt w/ hx of anxiety, GERD who was found to have a L parietal hypodensity associated hemorrhagic conversion  VS: VSS on RA  Neuro: Alert & orientated x3, disorientated to time, pt has mild expressive aphasia, very slight right field cut and forgetful at times. Somewhat impulsive to stand.  GI: Regular diet, fair po intake, needs enc and assist to order meals. No BM since 11/30 wants Metamucil  : Pt has a elena in place, with cherry red urine o/p for most of shift. At [address] [place] and Yellow Urine noted in line, some clots present. Urology aware on previous shifts and per pt, the color is due to traumatic insertion. Intermittently reporting pain at insertion site. Wants lidocaine (urojet) for pain.   IV: PIV placed  Skin: Intact, pt repostioning himself independently  Pain:Intermittently reporting pain at catheter insertion site.  Activity: SBA  See flow sheets for further details and assessments.  Plan of Care: Awaiting TCU placement, continue to monitor, notify MD of significant changes.

## 2018-12-02 NOTE — PLAN OF CARE
Problem: Patient Care Overview  Goal: Plan of Care/Patient Progress Review  Outcome: No Change  Status: pt w/ hx of anxiety, GERD who was found to have a L parietal hypodensity associated hemorrhagic conversion     VS: VSS on RA   Neuros: Alert & orientated x3, disorientated to time, pt has mild expressive aphasia,  Very slight right field cut and forgetful at times.  GI: Regular diet, fair po intake, needs enc and assist to order meals   : Pt has a elena in place, with cherry red urine o/p. Urology aware on previous shifts and per pt, the color is due to traumatic insertion. Pt states the penile discomfort is getting better nd finds relief with warm washcloths.  IV: PIV SL x2  Activity: SBA  Cardiac: pt on cardiac monitoring, afib part of this shift   Respiratory/Trach:  WNL  Skin: Intact, pt repostioning himself independently   Plan of care: Continue to monitor for pain, continue w/ POC.

## 2018-12-02 NOTE — PROGRESS NOTES
"Neuro-ICU Progress Note    24 hour events:  Urine more cherry/bloody without clots. Has pain and irritation. urojet ordered.     Vital Signs:  B/P: 136/82, T: 98.4, P: 71, R: 16    Physical Examination:  BP 95/59 (BP Location: Right arm)  Temp 98.6  F (37  C) (Oral)  Resp 18  Ht 1.6 m (5' 3\")  Wt 73.1 kg (161 lb 2.5 oz)  SpO2 93%  BMI 28.55 kg/m2    Neurologic  Mental Status:  awake/alert. Speech is fluent, no paraphasic errors.  Cranial Nerves:  Visual fields full with intermittent extinction on the right. EOM intact, no facial droop, hearing not formally tested but intact to conversation, no dysarthria, shoulder shrug strong bilaterally, tongue protrusion midline  Motor:  no abnormal movements, normal muscle bulk, no pronator drift, able to move all limbs spontaneously, strength 5/5 throughout upper and lower extremities decreased mobility in LLE w/ brace.  Sensory:  intact to LT, neglect of the right side improved   Coordination:  FNF and HS intact without dysmetria  Station/Gait:  deferred.    Labs/Studies:  CBc today : WbCs 8.2, Hb 15, Plt 227  BMP: unremarkable    Imaging:  No new images     Assessment and Plan:  Assessment and Plan   Simone Daniels is a 70 year old male with pmh of anxiety, GERD who presents w/ 3 days of nausea, generalized chills and subjective fevers as well as generalized weakness and imbalance, was found to have L parietal hypodensity associated hemorrhagic conversion. MRI revealed hemorrhagic infarction. Probably HI2, vessels are patent. EKG revealed a fib, echo revealed moderate to severe left atrial enlargement and moderate right atrial enlargement. No PFO. The working diagnosis is cardio embolic ischemic stroke with hemorrhagic transformation in the setting of new A fib. The blood pressure also is high since admission. This could be due to the acute stroke +/- baseline untreated hypertension.  LDL was 78 and HbA1c is 5.5%     #Acute hemorrhagic infarct:  Probably cardio " embolic due to a fib not on anticoagulant. His [id number] score is 4. He should receive anticoagulation, but will wait due to hemorrhagic conversion HI2. We will hold off starting anticoagulation for now and plan to start 7 days post admission with NOAC   - start anticoagulation around 12/4 (7 days post-admission)  - cont ASA 81 (started in this admission), discontinue once started AC.   - PT/OT/SLP  - Neuro checks q4h  - Euglycemic   - SPL cleared him and started on regular diet     # Afib  Newly diagnosed on admission with EKG and on telemetry. There is no RVR. Will start anticoagulation 7 days post admission as the ICH   - daily aspirin for now     # Elevated troponin  On admission his trop was 0.057, trended down then back to 0.149. Likely type 2 demand ischemia in the setting of afib and stroke, trop max 0.149. Echo completed, no evidence of infarct.   - If pt has chest pressure, eval w EKG.     # High blood pressure  Since his admission his blood pressure is running in the higher . This could be due to the acute stroke. There is a possibility that he has untreated hypertension.  Started lisinopril and flomax this admission  - Continue on Lisinopril 5 mg       # Urinary retention with incontinence  He has history of retention and intermittent incontinence at home. He did not seek medical advice and was treating himself with natural remedies and was using pads.   We did PRN bladder scanning. He did not feel when his bladder is full. After several straight caths, elena placed, now w blood-tinged urine. Improving  - Continue on Flomax 0.4 mg daily  - Continue elena   - urologist consult completed and OP follow up  - voiding trial 12/3      # GERD  Not on medications. He does not have symptoms   - no need for starting PPI now  - will defer managing the GERD to his PCP        FEN: regular diet   PPx: subcutaneous heparin  Code status: Full     Disposition Plan   TCU. Medically ready for discharge        Patient  was seen and discussed with Dr. Shelia Pearson MD  PGY1-Neurology   P: [phone]

## 2018-12-02 NOTE — PLAN OF CARE
Problem: Patient Care Overview  Goal: Plan of Care/Patient Progress Review  Outcome: No Change  BP (!) 153/98 (BP Location: Left arm)  Pulse 91  Temp 96.4  F (35.8  C) (Oral)  Resp 16  Wt 76.3 kg (168 lb 3.4 oz)  SpO2 98%  BMI 24.48 kg/m2  Status: pt w/ hx of anxiety, GERD who was found to have a L parietal hypodensity associated hemorrhagic conversion  VS: VSS on RA - CCM discontinued per verbal order  Neuro: Alert & orientated x3, disorientated to time, pt has mild expressive aphasia, very slight right field cut and forgetful at times. Somewhat impulsive to stand.  GI: Regular diet, fair po intake, needs enc and assist to order meals   : Pt has a elena in place, with cherry red urine o/p. Urology aware on previous shifts and per pt, the color is due to traumatic insertion. No longer complaining of pain at insertion site.  IV: PIV placed  Skin: Intact, pt repostioning himself independently  Pain: denies  Activity: SBA  See flow sheets for further details and assessments.  Plan of Care: continue to monitor, notify MD of significant changes.

## 2018-12-03 ENCOUNTER — APPOINTMENT (OUTPATIENT)
Dept: PHYSICAL THERAPY | Facility: CLINIC | Age: 73
DRG: 064 | End: 2018-12-03
Attending: PSYCHIATRY & NEUROLOGY

## 2018-12-03 ENCOUNTER — APPOINTMENT (OUTPATIENT)
Dept: OCCUPATIONAL THERAPY | Facility: CLINIC | Age: 73
DRG: 064 | End: 2018-12-03
Attending: PSYCHIATRY & NEUROLOGY

## 2018-12-03 PROCEDURE — 40000133 ZZH STATISTIC OT WARD VISIT

## 2018-12-03 PROCEDURE — 40000193 ZZH STATISTIC PT WARD VISIT: Performed by: PHYSICAL THERAPIST

## 2018-12-03 PROCEDURE — 25000128 H RX IP 250 OP 636: Performed by: PSYCHIATRY & NEUROLOGY

## 2018-12-03 PROCEDURE — 97112 NEUROMUSCULAR REEDUCATION: CPT | Mod: GP | Performed by: PHYSICAL THERAPIST

## 2018-12-03 PROCEDURE — 97116 GAIT TRAINING THERAPY: CPT | Mod: GP | Performed by: PHYSICAL THERAPIST

## 2018-12-03 PROCEDURE — 25000132 ZZH RX MED GY IP 250 OP 250 PS 637: Performed by: PSYCHIATRY & NEUROLOGY

## 2018-12-03 PROCEDURE — 12000001 ZZH R&B MED SURG/OB UMMC

## 2018-12-03 PROCEDURE — 97530 THERAPEUTIC ACTIVITIES: CPT | Mod: GO

## 2018-12-03 PROCEDURE — 25000132 ZZH RX MED GY IP 250 OP 250 PS 637: Performed by: NURSE PRACTITIONER

## 2018-12-03 RX ADMIN — ASPIRIN 81 MG CHEWABLE TABLET 81 MG: 81 TABLET CHEWABLE at 07:59

## 2018-12-03 RX ADMIN — TAMSULOSIN HYDROCHLORIDE 0.4 MG: 0.4 CAPSULE ORAL at 07:59

## 2018-12-03 RX ADMIN — ACETAMINOPHEN 650 MG: 325 TABLET, FILM COATED ORAL at 03:12

## 2018-12-03 RX ADMIN — OFLOXACIN 1 DROP: 3 SOLUTION/ DROPS OPHTHALMIC at 20:09

## 2018-12-03 RX ADMIN — OFLOXACIN 1 DROP: 3 SOLUTION/ DROPS OPHTHALMIC at 23:41

## 2018-12-03 RX ADMIN — Medication 5000 UNITS: at 10:14

## 2018-12-03 RX ADMIN — LISINOPRIL 5 MG: 5 TABLET ORAL at 07:59

## 2018-12-03 RX ADMIN — OFLOXACIN 1 DROP: 3 SOLUTION/ DROPS OPHTHALMIC at 03:56

## 2018-12-03 RX ADMIN — OFLOXACIN 1 DROP: 3 SOLUTION/ DROPS OPHTHALMIC at 07:59

## 2018-12-03 RX ADMIN — Medication 5000 UNITS: at 22:24

## 2018-12-03 RX ADMIN — OFLOXACIN 1 DROP: 3 SOLUTION/ DROPS OPHTHALMIC at 15:30

## 2018-12-03 RX ADMIN — ACETAMINOPHEN 650 MG: 325 TABLET, FILM COATED ORAL at 10:38

## 2018-12-03 RX ADMIN — OFLOXACIN 1 DROP: 3 SOLUTION/ DROPS OPHTHALMIC at 00:00

## 2018-12-03 ASSESSMENT — ACTIVITIES OF DAILY LIVING (ADL)
ADLS_ACUITY_SCORE: 11

## 2018-12-03 ASSESSMENT — VISUAL ACUITY
OU: NORMAL ACUITY

## 2018-12-03 NOTE — PROGRESS NOTES
"Social Work Services Progress Note    Hospital Day: 7  Date of Initial Social Work Evaluation:  11/27/18  Collaborated with:  Admissions at [facility], [employer] (Evelina Cronin)    Data:  Pt is awaiting rehab placement.  Both [facility] and [facility] are interested in accepting pt for admit but neither facility will consider pt until \"Application for Payment of Long Term Care Services\" is complete and submitted to pt's home county.    Intervention:  GILBERT spoke with Admissions at [facility] (Archana) who indicates that she has assessed and approved pt for admit pending completion of \"Application for Payment of Long Term Care Services.\"  SW phoned [employer]or, Nichole Tolentino, and learned that Nichole is not in today.  GILBERT spoke with [employer], Evelina Cronin.  Evelina states that she will come to 6A now to complete the above application, with pt.    Assessment:  Pt is agreeable to rehab placement    Plan:    Anticipated Disposition: Rehab placement    Barriers to d/c plan:  Pending completion of \"Application for Payment of Long Term Care Services\"    Follow Up:  GILBERT will continue to follow.    SILVIA Sanchez  Social Work, 6A  Phone:  [phone]  Pager:  [phone]  12/3/2018        "

## 2018-12-03 NOTE — PROGRESS NOTES
"Social Work Services Progress Note    Hospital Day: 7  Date of Initial Social Work Evaluation:  11/27/18  Collaborated with:  SNF Admissions  Coordinator's    Data:  Per rounds report, pt is medically ready for discharge.      Intervention:  SW received a voice mail message from Admissions at [facility] requesting that SW fax a copy of pt's Medical Assistance application.  SW received a copy of the application from the [employer] Counselor, Nichole Tolentino.  SW faxed the melina an left a message informing that this task was completed and asked Admissions to call in regards to whether or not they can accept pt for admit today.  SW received a return call from Admissions (Rosanne) at [facility] indicating that the [employer] Counselor completed the wrong melina ([place] [place] melina completed instead of DHS form 3531, \"Application for Payment of Long Term Care Services.\"  SW phoned Nichole Rajan and left a message informing her that pt is medically ready for discharge and need DHS form 3531 completed.  SW also phoned Admissions Coordinator's at the following facility's (which are closest to pt's home):  [facility] (Isabella) is full, [facility] (Carolee) does not accept [place] pending pt's, [place] (Abbie[place] does not accept MA pending pt's, [facility] (Elsa) does not accept MA pending pt's, [facility] (Corie) is full,  [facility] (Thao) is full, [facility] (Lauren) will not take another MA pending as they already \"have a couple,\"  [facility] (Archana) assessed and approved pt for admit but will not accept pt for admit until \"Application for Payment of Long Term Care Services is complete and submitted to pt's home county.          Assessment:  Pt is medically ready for discharge.  Pt is agreeable to rehab placement.      Plan:    Anticipated Disposition:  Short term rehab placement in a community SNF.    Barriers " to d/c plan:  Secure accepting facility, MA pending    Follow Up:  SW will continue to follow.    SILVIA Sanchez  Social Work, 6A  Phone:  [phone]  Pager:  [phone]  12/3/2018

## 2018-12-03 NOTE — PLAN OF CARE
Problem: Stroke (Hemorrhagic) (Adult)  Goal: Signs and Symptoms of Listed Potential Problems Will be Absent, Minimized or Managed (Stroke)  Signs and symptoms of listed potential problems will be absent, minimized or managed by discharge/transition of care (reference Stroke (Hemorrhagic) (Adult) CPG).   Outcome: Improving  Status: Pt on 6A with L parietal hypodensity associated hemorrhagic conversion  VS: VSS  Neuro: A&Ox3; DO to time. Pt is confused at forgetful at times. BA/CA on at all times dt impulsivity.   GI: Tolerating a reg diet w/fair intake. No BM since 11/30   : Flores in place; blood noted on previous shifts continues but is improving; MD aware. Per pt it was from a traumatic insertion.  Lidocaine urojet available for pain. Cath cares completed.   IV: PIV-SL  Skin: Intact  Pain:No c/o of pain except with cath cares  Activity: Up w/SBA  Plan of Care: awaiting TCU placement. Continue to monitor and follow POC.

## 2018-12-03 NOTE — PROGRESS NOTES
Neuro-ICU Progress Note    24 hour events:  No acute overnight events. Urine now faintly pink, improved from cherry-red previously. Continued irritation from Flores catheter.    Physical Examination:  Temp: 95.6  F (35.3  C) Temp src: Oral BP: 135/84 Pulse: 88 Heart Rate: 75 Resp: 16 SpO2: 100 % O2 Device: None (Room air)      Neurologic  Mental Status:  Awake/alert. Speech is fluent, no paraphasic errors.  Cranial Nerves:  Visual fields full, right-sided extinction improved. EOM intact, no facial droop, hearing intact to conversation, no dysarthria, shoulder shrug strong bilaterally, tongue protrusion midline.  Motor:  No abnormal movements, normal muscle bulk, no pronator drift, able to move all limbs spontaneously, strength 5/5 throughout upper and lower extremities  Sensory:  Intact to LT, neglect of the right side improved  Coordination:  FNF and HS intact without dysmetria  Station/Gait: Requires assistance to transfer from bed to chair    Labs/Studies:  CBC and BMP from 12/2/18 unremarkable    Imaging:  No new images     Assessment and Plan:  Simone Daniels is a 70 year old male with PMHx of anxiety, GERD who presents w/ 3 days of nausea, generalized chills and subjective fevers as well as generalized weakness and imbalance, was found to have L parietal hypodensity associated hemorrhagic conversion. MRI revealed hemorrhagic infarction. Probably HI2, vessels are patent. EKG revealed a fib, echo revealed moderate to severe left atrial enlargement and moderate right atrial enlargement. No PFO. The working diagnosis is cardio embolic ischemic stroke with hemorrhagic transformation in the setting of new A fib. The blood pressure also is high since admission. This could be due to the acute stroke +/- baseline untreated hypertension.  LDL was 78 and HbA1c is 5.5%     #Acute hemorrhagic infarct:  Probably cardioembolic due to a fib not on anticoagulant. Euglycemic. His [id number] score is 4. Allowed 7 days before  starting anticoagulation due to hemorrhagic conversion HI2, will initiate NOAC on 12/4.   - Tomorrow: start apixaban 5 mg BID (7 days post-admission)  - continue ASA 81 (started this admission), discontinue once started AC   - Neuro checks q4h  - regular diet (cleared by SLP)    #Afib  Newly diagnosed on admission by EKG and telemetry. No RVR. Will start anticoagulation 7 days post admission (12/4) given ICH.  - continue ASA 81 (started this admission), discontinue once started AC      #Elevated troponin  Troponin 0.057 on admission, downtrended to 0.149. Likely type 2 demand ischemia in the setting of afib and stroke, trop max 0.149. Echo completed, no evidence of infarct.   - If pt has chest pressure, get EKG     #High blood pressure  BP running high since admission, could be 2/2 acute stroke. There is a possibility that he has untreated hypertension. Started lisinopril and Flomax this admission.  - lisinopril 5 mg    #Urinary retention with incontinence  H/o retention and intermittent incontinence at home. Pt did not seek medical advice, was treating himself with natural remedies and was using pads. Bladder scans performed prn; patient unable to feel when bladder is full. Flores placed after repeated straight catheterization. Initially returning blood-tinged urine, now improving.  - Void trial today  - Continue Flomax 0.4 mg daily  - Inpt Urology consult completed, follow up as outpatient     #GERD  Not on medications, asymptomatic.   - no need to start PPI for now  - defer management to PCP        FEN: regular diet   PPx: subcutaneous heparin, start apixaban 12/4  Code status: Full  Disposition: TCU, medically ready for discharge        Patient was seen and discussed with Dr. Nicholson.    Annabella Santos MD  PGY1-Neurology   P: [phone]

## 2018-12-03 NOTE — PLAN OF CARE
Cared for from [id number]: No change from previous RN note. Forgetful and impulsive. Up in chair majority of the evening. Denied pain. Urine yellow and red, urology aware. Awaiting placement. BA/CA on. Continue with POC.

## 2018-12-03 NOTE — PLAN OF CARE
Problem: Patient Care Overview  Goal: Plan of Care/Patient Progress Review  Outcome: No Change  Reason for admission: Acute hemorrhagic stroke.    Neuro: A&Ox3- not to time. Bed alarm on- confused at times.  Activity: SBA w/ BG.   Vitals: VSS on RA.     LDAS: R PIV SL. Elena in place- continuing to have blood noted.   Cardiac: No acute changes this shift.   Respiratory: Stable on RA.     GI/: No BM this shift. Voiding spont.    Pain: C/o pain with the elena catheter, managed w/ Tylenol.   Diet: Tolerating regular diet.   Plan: Awaiting TCU placement. Will continue to monitor and follow POC.

## 2018-12-03 NOTE — PLAN OF CARE
Problem: Patient Care Overview  Goal: Plan of Care/Patient Progress Review  Discharge Planner OT   Patient plan for discharge: Rehab  Current status: Facilitated seated cognitive tasks with playing cards. Pt with difficulty attending to right visual field ~45 degrees, pt with continued difficulty following VC to attend to right side.   Barriers to return to prior living situation: Cognition, medical stability  Recommendations for discharge: TCU  Rationale for recommendations: Pt below functional baseline in cognition with new neuro impairments. Pt would require 24 hour assist to live safely with cognitive deficits. Pt would benefit from continued therapy to increase safety and IND in I/ADLs.        Entered by: Deanna Zimmerman 12/03/2018 4:11 PM

## 2018-12-04 ENCOUNTER — APPOINTMENT (OUTPATIENT)
Dept: SPEECH THERAPY | Facility: CLINIC | Age: 73
DRG: 064 | End: 2018-12-04
Attending: PSYCHIATRY & NEUROLOGY

## 2018-12-04 ENCOUNTER — APPOINTMENT (OUTPATIENT)
Dept: PHYSICAL THERAPY | Facility: CLINIC | Age: 73
DRG: 064 | End: 2018-12-04
Attending: PSYCHIATRY & NEUROLOGY

## 2018-12-04 LAB
ANION GAP SERPL CALCULATED.3IONS-SCNC: 8 MMOL/L (ref 3–14)
BUN SERPL-MCNC: 16 MG/DL (ref 7–30)
CALCIUM SERPL-MCNC: 8.5 MG/DL (ref 8.5–10.1)
CHLORIDE SERPL-SCNC: 105 MMOL/L (ref 94–109)
CO2 SERPL-SCNC: 26 MMOL/L (ref 20–32)
CREAT SERPL-MCNC: 0.81 MG/DL (ref 0.66–1.25)
ERYTHROCYTE [DISTWIDTH] IN BLOOD BY AUTOMATED COUNT: 13.4 % (ref 10–15)
GFR SERPL CREATININE-BSD FRML MDRD: >90 ML/MIN/1.7M2
GLUCOSE SERPL-MCNC: 101 MG/DL (ref 70–99)
HCT VFR BLD AUTO: 42.6 % (ref 40–53)
HGB BLD-MCNC: 14.1 G/DL (ref 13.3–17.7)
MCH RBC QN AUTO: 30.8 PG (ref 26.5–33)
MCHC RBC AUTO-ENTMCNC: 33.1 G/DL (ref 31.5–36.5)
MCV RBC AUTO: 93 FL (ref 78–100)
PLATELET # BLD AUTO: 278 10E9/L (ref 150–450)
POTASSIUM SERPL-SCNC: 3.7 MMOL/L (ref 3.4–5.3)
RBC # BLD AUTO: 4.58 10E12/L (ref 4.4–5.9)
SODIUM SERPL-SCNC: 139 MMOL/L (ref 133–144)
WBC # BLD AUTO: 8.3 10E9/L (ref 4–11)

## 2018-12-04 PROCEDURE — 85027 COMPLETE CBC AUTOMATED: CPT | Performed by: PSYCHIATRY & NEUROLOGY

## 2018-12-04 PROCEDURE — 80048 BASIC METABOLIC PNL TOTAL CA: CPT | Performed by: PSYCHIATRY & NEUROLOGY

## 2018-12-04 PROCEDURE — 97110 THERAPEUTIC EXERCISES: CPT | Mod: GP | Performed by: PHYSICAL THERAPIST

## 2018-12-04 PROCEDURE — 40000225 ZZH STATISTIC SLP WARD VISIT: Performed by: SPEECH-LANGUAGE PATHOLOGIST

## 2018-12-04 PROCEDURE — 25000132 ZZH RX MED GY IP 250 OP 250 PS 637: Performed by: PSYCHIATRY & NEUROLOGY

## 2018-12-04 PROCEDURE — 12000008 ZZH R&B INTERMEDIATE UMMC

## 2018-12-04 PROCEDURE — 36415 COLL VENOUS BLD VENIPUNCTURE: CPT | Performed by: PSYCHIATRY & NEUROLOGY

## 2018-12-04 PROCEDURE — 40000193 ZZH STATISTIC PT WARD VISIT: Performed by: PHYSICAL THERAPIST

## 2018-12-04 PROCEDURE — 97127 ZZHC SP THERAPEUTIC INTERVENTION W/FOCUS ON COGNITIVE FUNCTION,EA 15 MIN: CPT | Mod: GN | Performed by: SPEECH-LANGUAGE PATHOLOGIST

## 2018-12-04 PROCEDURE — 25000132 ZZH RX MED GY IP 250 OP 250 PS 637: Performed by: NURSE PRACTITIONER

## 2018-12-04 RX ADMIN — ACETAMINOPHEN 650 MG: 325 TABLET, FILM COATED ORAL at 15:35

## 2018-12-04 RX ADMIN — APIXABAN 5 MG: 5 TABLET, FILM COATED ORAL at 07:10

## 2018-12-04 RX ADMIN — OFLOXACIN 1 DROP: 3 SOLUTION/ DROPS OPHTHALMIC at 07:10

## 2018-12-04 RX ADMIN — TAMSULOSIN HYDROCHLORIDE 0.4 MG: 0.4 CAPSULE ORAL at 07:10

## 2018-12-04 RX ADMIN — ASPIRIN 81 MG CHEWABLE TABLET 81 MG: 81 TABLET CHEWABLE at 07:10

## 2018-12-04 RX ADMIN — OFLOXACIN 1 DROP: 3 SOLUTION/ DROPS OPHTHALMIC at 03:03

## 2018-12-04 RX ADMIN — OFLOXACIN 1 DROP: 3 SOLUTION/ DROPS OPHTHALMIC at 20:40

## 2018-12-04 RX ADMIN — OFLOXACIN 1 DROP: 3 SOLUTION/ DROPS OPHTHALMIC at 15:35

## 2018-12-04 RX ADMIN — LISINOPRIL 5 MG: 5 TABLET ORAL at 07:10

## 2018-12-04 RX ADMIN — APIXABAN 5 MG: 5 TABLET, FILM COATED ORAL at 20:40

## 2018-12-04 ASSESSMENT — ACTIVITIES OF DAILY LIVING (ADL)
ADLS_ACUITY_SCORE: 11
ADLS_ACUITY_SCORE: 11
ADLS_ACUITY_SCORE: 10
ADLS_ACUITY_SCORE: 11

## 2018-12-04 ASSESSMENT — VISUAL ACUITY
OU: NORMAL ACUITY

## 2018-12-04 NOTE — PROGRESS NOTES
CLINICAL NUTRITION SERVICES - BRIEF NOTE    Reviewed nutrition risk factors due to LOS. Pt is tolerating diet, eating well per nursing documentation; % PO intakes. No nutrition issues identified at this time. RD will follow via rounds at this time, unless consulted.      Celia Trivedi RD, LD, [facility]  Neuro ICU  Pager: [phone]

## 2018-12-04 NOTE — PROGRESS NOTES
"Social Work Services Progress Note    Hospital Day: 8  Date of Initial Social Work Evaluation:  11/27/18  Collaborated with:  SNF Admissions Coordinator's, patient    Data:  Pt is awaiting rehab placement.    Intervention:  SW faxed pt's completed Application for Payment of Long Term Care Services (form 3531) to Admissions at [facility] and [facility].   SW phoned Admissions Coordinator's at both facility's to inform that this task is complete. SW met with pt and updated.  Pt states that based on location, he would prefer [facility] but will accept placement at either facility.  Pt states that someone told him that he was going to a facility called \"[facility].\"  SW informed pt that there is not a SNF called [facility].      Assessment:  Pt is medically ready for discharge.  Pt is agreeable to rehab placement.    Plan:    Anticipated Disposition:  Short term rehab placement.  Anticipate discharge today.    Barriers to d/c plan:  Admissions at [facility] (Teresa) and [facility] (Jazmin) are reviewing the MA melina and will then call back to confirm whether or not pt is approved for admit.    Follow Up:  SW will continue to follow for discharge planning.    SILVIA Sanchez  Social Work, 6A  Phone:  [phone]  Pager:  [phone]  12/4/2018        "

## 2018-12-04 NOTE — PROGRESS NOTES
Social Work Services Progress Note    Hospital Day: 8  Date of Initial Social Work Evaluation:  11/27/18  Collaborated with:   Admissions at [facility] (Teresa), Admissions at [facility] (Jazmin), pt, pt's friend (Marley Donaldo)    Data:  Pt is awaiting rehab placement.      Intervention:  SW received a message from Admissions at [facility] (Teresa) and pt has been declined for admit.  SW spoke with Admissions at [facility] (Jazmin) and they do not have a male bed open today.  Jazmin also voiced concerns about the applications completed by the [place] [employer] as their are discrepancies between the 2 melina's.  Lita also voiced concerns as pt does not have known support persons to provide verifications of income and assets.    SW updated pt.  Pt confused (even after multiple explanations) about the discharge plan, what a rehab stay is, how the rehab stay is paid for.  With pt's permission, SW phoned his emergency , Marley Fulton.  Marley indicates that she has known pt for the past 30 years. 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  Marley states that she is picking up pt's mail.  Marley states that she visited pt at the hospital yesterday to bring him his bills. Marley states that pt has his check book with him.  Marley states that she offered pt assistance to write out checks for his bills but pt declined.  Marley states that pt does all of his banking on line and most of his bills are paid on line.  Marley states that she does not know any personal information about pt (income/asset information).  Marley states that to her knowledge, pt has a truck and 2 other vehicles that he purchased in need of repair that he was working on.  Marley states that she has pt's wallet and bills.  Marley's does not see herself becoming involved in pt's personal financial affairs as she has no knowledge of  them.    Assessment:  Pt is agreeable to placement.  Pt's friend Marley is supportive of pt emotionally.    Plan:    Anticipated Disposition:  Rehab placement in a SNF    Barriers to d/c plan:  Secure accepting facility, MA pending    Follow Up:  SW will continue to follow.    SILVIA Sanchez  Social Work, 6A  Phone:  [phone]  Pager:  [phone]  12/4/2018

## 2018-12-04 NOTE — PLAN OF CARE
Problem: Patient Care Overview  Goal: Plan of Care/Patient Progress Review  Discharge Planner PT   Patient plan for discharge: rehab  Current status: Pt transfers with SBA. Ambulated >500 ft with SBA. Completed standing strengthening exercises. Some difficulties with word finding, decreased safety awareness.   Barriers to return to prior living situation: impaired cognition, pt lives alone, currently needs 24/7 supervision 2/2 cognitive impairments.   Recommendations for discharge: TCU   Rationale for recommendations: progress safety and independence with mobility.        Entered by: Aura Kidd 12/04/2018 1:24 PM

## 2018-12-04 NOTE — PLAN OF CARE
Patient refuses bed alarm. Educated patient on rational for bed alarm overnight. Patient understood rational but still refuses bed alarm.

## 2018-12-04 NOTE — PLAN OF CARE
Problem: Patient Care Overview  Goal: Discharge Needs Assessment  VSS: A&O X 4 ex forgetful at times. Disoriented x time historically. PIV saline locked, up with SBA, tolerated regular diet. BS pt at 1730 at for 475 and Straight cath'd for 450 ml. Pt had dribbled about 50 ml out, continues to be blood-tinged. Continue with POC, ready and waiting for placement.

## 2018-12-04 NOTE — PROGRESS NOTES
Neuro-ICU Progress Note    24 hour events:  No acute overnight events. Yesterday during void trial patient had straight cath x2, voided x3. No issues with straight cath.    Physical Examination:  Temp: 98.3  F (36.8  C) Temp src: Oral BP: 123/80 Pulse: 85 Heart Rate: 89 Resp: 16 SpO2: 96 % O2 Device: None (Room air)      Neurologic  Mental Status:  Awake/alert. Speech is fluent, no paraphasic errors.  Cranial Nerves:  Visual fields full, right-sided extinction improved. EOM intact, no facial droop, hearing intact to conversation, no dysarthria, shoulder shrug strong bilaterally, tongue protrusion midline.  Motor:  No abnormal movements, normal muscle bulk, no pronator drift, able to move all limbs spontaneously, strength 5/5 throughout upper and lower extremities  Sensory:  Intact to LT, right sided neglect noted intermittently this morning  Coordination:  FNF and HS intact without dysmetria  Station/Gait: Not assessed    Labs/Studies:  CBC and BMP wnl (12/4/18)    Imaging:  No new images     Assessment and Plan:  Simone Daniels is a 70 year old male with PMHx of anxiety, GERD who presents w/ 3 days of nausea, generalized chills and subjective fevers as well as generalized weakness and imbalance, was found to have L parietal hypodensity associated hemorrhagic conversion. MRI revealed hemorrhagic infarction. Probably HI2, vessels are patent. EKG revealed a fib, echo revealed moderate to severe left atrial enlargement and moderate right atrial enlargement. No PFO. The working diagnosis is cardio embolic ischemic stroke with hemorrhagic transformation in the setting of new A fib. The blood pressure also is high since admission. This could be due to the acute stroke +/- baseline untreated hypertension.  LDL was 78 and HbA1c is 5.5%     #Acute hemorrhagic infarct:  Probably cardioembolic due to a fib not on anticoagulant. Euglycemic. His [id number] score is 4. Allowed 7 days before starting anticoagulation due to  hemorrhagic conversion HI2. NOAC initiated today, heparin and ASA discontinued.   - apixaban 5 mg BID  - Neuro checks q4h    #Afib  Newly diagnosed on admission by EKG and telemetry. No RVR. Will start anticoagulation 7 days post admission (12/4) given ICH.  - discontinue ASA     #Elevated troponin  Troponin 0.057 on admission, downtrended to 0.149. Likely type 2 demand ischemia in the setting of afib and stroke, trop max 0.149. Echo completed, no evidence of infarct.   - If pt has chest pressure, get EKG     #High blood pressure (resolved)  BP running high since admission, could be 2/2 acute stroke vs untreated chronic hypertension. Started lisinopril and Flomax this admission, BPs now wnl.  - lisinopril 5 mg    #Urinary retention with incontinence  H/o retention and intermittent incontinence at home. Pt did not seek medical advice, was treating himself with natural remedies and was using pads. Bladder scans performed prn; patient unable to feel when bladder is full. Flores placed after repeated straight catheterization. Initially returning blood-tinged urine, now improving. Lfores removed after void trial yesterday, required straight cath x2. May need to discharge with intermittent straight cath.  - Continue Flomax 0.4 mg daily  - Inpt Urology consult completed, follow up as outpatient     #GERD  Not on medications, asymptomatic.   - no need to start PPI for now  - defer management to PCP        FEN: regular diet (cleared by SLP)  PPx: apixaban  Code status: Full  Disposition: Discharge to TCU, likely today       Patient was seen and discussed with Dr. Nicholson.    Annabella Santos MD  PGY1-Neurology   P: [phone]

## 2018-12-04 NOTE — PLAN OF CARE
Problem: Patient Care Overview  Goal: Plan of Care/Patient Progress Review  Outcome: Therapy, progress towards functional goals is fair  Discharge Planner SLP   Patient plan for discharge: rehab  Current status: Patient seen for cognitive-communication treatment. Note verbosity at times in conversation with reduced turn-taking awareness. Patient oriented to place, time, situation information. Patient completed generative naming task with 3-8 items per abstract category. Patient required max assist for simple elapsed time calculations. Recommend continued ST for cognitive-communication at discharge.  Barriers to return to prior living situation: confusion; safety concerns due to cognition  Recommendations for discharge: TCU  Rationale for recommendations: continued ST to optimize cognitive-communication and safety for daily needs       Entered by: Tegan De León 12/04/2018 1:39 PM

## 2018-12-04 NOTE — PLAN OF CARE
Problem: Patient Care Overview  Goal: Plan of Care/Patient Progress Review  Outcome: No Change  Admit for stroke work up. VSS ex HTN within parameters. Neuros intact ex forgetful. Disoriented to time. Refuses BA/CA. Patient educated on rational for bed alarm. PIV SL. Regular diet. Up SBA. Voids, high PVR. Straight cath at 0300, see flow sheet. Tylenol for generalized pain. Pending TCU discharge. Continue to monitor.

## 2018-12-04 NOTE — PLAN OF CARE
Problem: Patient Care Overview  Goal: Plan of Care/Patient Progress Review  Discharge Planner PT   Patient plan for discharge: TCU  Current status: gait without device on level & stairs with supervision; dynamic standing instability  Barriers to return to prior living situation: medical status; cognition; lives alone  Recommendations for discharge: TCU  Rationale for recommendations: will benefit from continued skilled PT intervention to progress gait, balance & activity tolerance       Entered by: Ivette Manrique 12/03/2018 10:39 PM

## 2018-12-05 ENCOUNTER — APPOINTMENT (OUTPATIENT)
Dept: OCCUPATIONAL THERAPY | Facility: CLINIC | Age: 73
DRG: 064 | End: 2018-12-05
Attending: PSYCHIATRY & NEUROLOGY

## 2018-12-05 LAB
ANION GAP SERPL CALCULATED.3IONS-SCNC: 7 MMOL/L (ref 3–14)
BUN SERPL-MCNC: 15 MG/DL (ref 7–30)
CALCIUM SERPL-MCNC: 8.7 MG/DL (ref 8.5–10.1)
CHLORIDE SERPL-SCNC: 104 MMOL/L (ref 94–109)
CO2 SERPL-SCNC: 27 MMOL/L (ref 20–32)
CREAT SERPL-MCNC: 0.85 MG/DL (ref 0.66–1.25)
ERYTHROCYTE [DISTWIDTH] IN BLOOD BY AUTOMATED COUNT: 13.5 % (ref 10–15)
GFR SERPL CREATININE-BSD FRML MDRD: 88 ML/MIN/1.7M2
GLUCOSE SERPL-MCNC: 84 MG/DL (ref 70–99)
HCT VFR BLD AUTO: 42.9 % (ref 40–53)
HGB BLD-MCNC: 14.3 G/DL (ref 13.3–17.7)
MCH RBC QN AUTO: 31.3 PG (ref 26.5–33)
MCHC RBC AUTO-ENTMCNC: 33.3 G/DL (ref 31.5–36.5)
MCV RBC AUTO: 94 FL (ref 78–100)
PLATELET # BLD AUTO: 279 10E9/L (ref 150–450)
POTASSIUM SERPL-SCNC: 3.9 MMOL/L (ref 3.4–5.3)
RBC # BLD AUTO: 4.57 10E12/L (ref 4.4–5.9)
SODIUM SERPL-SCNC: 139 MMOL/L (ref 133–144)
WBC # BLD AUTO: 7.3 10E9/L (ref 4–11)

## 2018-12-05 PROCEDURE — 36415 COLL VENOUS BLD VENIPUNCTURE: CPT | Performed by: PSYCHIATRY & NEUROLOGY

## 2018-12-05 PROCEDURE — 25000132 ZZH RX MED GY IP 250 OP 250 PS 637: Performed by: PSYCHIATRY & NEUROLOGY

## 2018-12-05 PROCEDURE — 40000133 ZZH STATISTIC OT WARD VISIT: Performed by: OCCUPATIONAL THERAPIST

## 2018-12-05 PROCEDURE — 85027 COMPLETE CBC AUTOMATED: CPT | Performed by: PSYCHIATRY & NEUROLOGY

## 2018-12-05 PROCEDURE — 80048 BASIC METABOLIC PNL TOTAL CA: CPT | Performed by: PSYCHIATRY & NEUROLOGY

## 2018-12-05 PROCEDURE — 97535 SELF CARE MNGMENT TRAINING: CPT | Mod: GO | Performed by: OCCUPATIONAL THERAPIST

## 2018-12-05 PROCEDURE — 12000008 ZZH R&B INTERMEDIATE UMMC

## 2018-12-05 PROCEDURE — 25000132 ZZH RX MED GY IP 250 OP 250 PS 637: Performed by: NURSE PRACTITIONER

## 2018-12-05 RX ADMIN — OFLOXACIN 1 DROP: 3 SOLUTION/ DROPS OPHTHALMIC at 08:08

## 2018-12-05 RX ADMIN — APIXABAN 5 MG: 5 TABLET, FILM COATED ORAL at 20:02

## 2018-12-05 RX ADMIN — OFLOXACIN 1 DROP: 3 SOLUTION/ DROPS OPHTHALMIC at 16:11

## 2018-12-05 RX ADMIN — TAMSULOSIN HYDROCHLORIDE 0.4 MG: 0.4 CAPSULE ORAL at 08:09

## 2018-12-05 RX ADMIN — OFLOXACIN 1 DROP: 3 SOLUTION/ DROPS OPHTHALMIC at 00:18

## 2018-12-05 RX ADMIN — APIXABAN 5 MG: 5 TABLET, FILM COATED ORAL at 08:09

## 2018-12-05 RX ADMIN — LISINOPRIL 5 MG: 5 TABLET ORAL at 08:09

## 2018-12-05 RX ADMIN — OFLOXACIN 1 DROP: 3 SOLUTION/ DROPS OPHTHALMIC at 20:02

## 2018-12-05 RX ADMIN — OFLOXACIN 1 DROP: 3 SOLUTION/ DROPS OPHTHALMIC at 13:26

## 2018-12-05 ASSESSMENT — VISUAL ACUITY
OU: NORMAL ACUITY

## 2018-12-05 ASSESSMENT — ACTIVITIES OF DAILY LIVING (ADL)
ADLS_ACUITY_SCORE: 11
ADLS_ACUITY_SCORE: 10
ADLS_ACUITY_SCORE: 11
ADLS_ACUITY_SCORE: 11

## 2018-12-05 NOTE — PLAN OF CARE
Problem: Patient Care Overview  Goal: Plan of Care/Patient Progress Review  Outcome: Improving  Admit stroke work up. Neuros intact ex forgetful. Disoriented to time. VSS on RA. Voids with high PVR, straight cathed at 0300 for 850cc. PIV SL. Regular diet. Up SBA. Bed alarm off d/t patient refusing. Uses call light appropriately. Pending discharge to rehab facility. Continue to monitor.

## 2018-12-05 NOTE — PLAN OF CARE
Problem: Patient Care Overview  Goal: Plan of Care/Patient Progress Review  Discharge Planner OT   Patient plan for discharge: Pt reports he would have no concerns for discharge home   Current status: Continue to note severe cognitive impairment with functional tasks. Focused on training in finance management and bill pay with practice check writing. Pt requiring max A to successfully complete task, pt with limited carryover following explanantion and training on multiple trials. When asked at end of session if pt feels he could be indep in task, pt tangential and reports no concerns for going home and paying bills through check writing, indicating very poor insight into current deficits despite education.   Barriers to return to prior living situation: cognition, poor insight, vision, falls risk  Recommendations for discharge: TCU  Rationale for recommendations: Strongly advise against home discharge due to safety concerns with pts severe cognitive impairment as pt lives alone, would currently require 24/7 assist at home to live safely. Pt continues to benefit from continued therapy to maximize safety and indep with ADLs/IADLs given new neuro deficits.        Entered by: Cristiane Newman 12/05/2018 11:13 AM

## 2018-12-05 NOTE — PROGRESS NOTES
Neuro-ICU Progress Note    24 hour events:  Denied from TCU yesterday, as no close contact available to corroborate financial information for MA application.    Physical Examination:  Temp: 97.1  F (36.2  C) Temp src: Oral BP: 116/74 Pulse: 75   Resp: 16 SpO2: 100 % O2 Device: None (Room air)      Neurologic  Mental Status:  Awake/alert. Speech is fluent, no paraphasic errors.  Cranial Nerves:  Visual fields full, right-sided extinction noted intermittently. EOM intact, no facial droop, hearing intact to conversation, no dysarthria, shoulder shrug strong bilaterally, tongue protrusion midline.  Motor:  No abnormal movements, normal muscle bulk, no pronator drift, able to move all limbs spontaneously, strength 5/5 throughout RUE, LUE, and LLE. Strength 4+/5 RLE.   Reflexes: DTRs 2+/symmetric throughout.  Sensory:  Intact to LT, right sided neglect noted intermittently.  Coordination:  FNF and HS intact without dysmetria  Station/Gait: Not assessed    Labs/Studies:  CBC and BMP wnl (12/5/18)    Imaging:  No new images     Assessment and Plan:  Simone Daniels is a 70 year old male with PMHx of anxiety, GERD who presents w/ 3 days of nausea, generalized chills and subjective fevers as well as generalized weakness and imbalance, was found to have L parietal hypodensity associated hemorrhagic conversion. MRI revealed hemorrhagic infarction. Probably HI2, vessels are patent. EKG revealed a fib, echo revealed moderate to severe left atrial enlargement and moderate right atrial enlargement. No PFO. The working diagnosis is cardio embolic ischemic stroke with hemorrhagic transformation in the setting of new A fib. The blood pressure also is high since admission. This could be due to the acute stroke +/- baseline untreated hypertension.  LDL was 78 and HbA1c is 5.5%     #Acute hemorrhagic infarct:  Probably cardioembolic due to a fib not on anticoagulant. Now on apixaban.  - apixaban 5 mg BID  - Neuro checks q4h  - Will  need acute rehab, placement pending    #Afib  Newly diagnosed on admission by EKG and telemetry. No RVR. [id number] score=4. Allowed 7 days before starting anticoagulation due to hemorrhagic conversion. Now on apixaban as of 12/4, heparin and ASA discontinued.   - apixaban 5 mg BID     #Elevated troponin  Troponin 0.057 on admission, downtrended to 0.149. Likely type 2 demand ischemia in the setting of afib and stroke, trop max 0.149. Echo completed, no evidence of infarct.   - If pt has chest pressure, get EKG     #High blood pressure (resolved)  BP running high since admission, could be 2/2 acute stroke vs untreated chronic hypertension. Started lisinopril and Flomax this admission, BPs now wnl.  - lisinopril 5 mg    #Urinary retention with incontinence  H/o retention and intermittent incontinence at home. Pt did not seek medical advice, was treating himself with natural remedies and was using pads. Bladder scans performed prn; patient unable to feel when bladder is full. Flores placed after repeated straight catheterization. Initially returning blood-tinged urine, now improving. Flores removed after void trial yesterday, required straight cath x2. Noted to have high PVRs after removal of Flores, will need to discharge with intermittent straight cath.  - Continue Flomax 0.4 mg daily  - Inpt Urology consult completed, follow up as outpatient     #GERD  Not on medications, asymptomatic.   - no need to start PPI for now  - defer management to PCP        FEN: regular diet  PPx: apixaban  Code status: Full  Disposition: TCU per OT recs, medically ready for discharge       Patient was seen and discussed with Dr. Nicholson.    Annabella Santos MD  PGY1-Neurology   P: [phone]

## 2018-12-05 NOTE — PLAN OF CARE
Problem: Patient Care Overview  Goal: Plan of Care/Patient Progress Review  ST 6A: Cx- Pt sleeping at time of attempt this afternoon. Woke to voicing, but declined to participate in ST intervention despite encouragement/rationale. Will reschedule

## 2018-12-05 NOTE — PLAN OF CARE
Problem: Patient Care Overview  Goal: Interdisciplinary Rounds/Family Conf  VSS: A&O X 4 ex forgetful at times. Disoriented x time historically. PIV saline locked, up with SBA, tolerated regular diet. Continuing to straight cath pt after large PVRs. Urine is less blood tinged today compared to yesterday. Continue with POC, ready and waiting for placement.

## 2018-12-05 NOTE — PLAN OF CARE
Problem: Patient Care Overview  Goal: Plan of Care/Patient Progress Review  Status: Patient admitted d/t hemorrhagic infarction.   VS: VSS.    Neuros: A&Ox3, disoriented to time. Neuros include forgetful otherwise intact.   GI: Regular diet, good intake. No BM this shift.   : Voiding in very small amounts. Straight cathed last at 1830 for 775cc. Urine is patricio reddish color from old blood, improved throughout day. Paged MDs about elena placement d/t straight cathing for over 48 hours, said no elena - continue to straight cath. ?  IV: PIV SL.   Activity: Up independently in room. Calls appropriately.   Pain: Denies.   Respiratory/Trach: No issues.    Skin: Intact.    Plan of care: Plan to discharge to TCU when bed available. Will continue to monitor.

## 2018-12-06 ENCOUNTER — APPOINTMENT (OUTPATIENT)
Dept: PHYSICAL THERAPY | Facility: CLINIC | Age: 73
DRG: 064 | End: 2018-12-06
Attending: PSYCHIATRY & NEUROLOGY

## 2018-12-06 ENCOUNTER — APPOINTMENT (OUTPATIENT)
Dept: OCCUPATIONAL THERAPY | Facility: CLINIC | Age: 73
DRG: 064 | End: 2018-12-06
Attending: PSYCHIATRY & NEUROLOGY

## 2018-12-06 ENCOUNTER — APPOINTMENT (OUTPATIENT)
Dept: SPEECH THERAPY | Facility: CLINIC | Age: 73
DRG: 064 | End: 2018-12-06
Attending: PSYCHIATRY & NEUROLOGY

## 2018-12-06 PROCEDURE — 12000008 ZZH R&B INTERMEDIATE UMMC

## 2018-12-06 PROCEDURE — 92507 TX SP LANG VOICE COMM INDIV: CPT | Mod: GN

## 2018-12-06 PROCEDURE — 40000225 ZZH STATISTIC SLP WARD VISIT

## 2018-12-06 PROCEDURE — 97535 SELF CARE MNGMENT TRAINING: CPT | Mod: GO | Performed by: OCCUPATIONAL THERAPIST

## 2018-12-06 PROCEDURE — 97110 THERAPEUTIC EXERCISES: CPT | Mod: GP

## 2018-12-06 PROCEDURE — 40000133 ZZH STATISTIC OT WARD VISIT: Performed by: OCCUPATIONAL THERAPIST

## 2018-12-06 PROCEDURE — 40000193 ZZH STATISTIC PT WARD VISIT

## 2018-12-06 PROCEDURE — 25000132 ZZH RX MED GY IP 250 OP 250 PS 637: Performed by: NURSE PRACTITIONER

## 2018-12-06 PROCEDURE — 97116 GAIT TRAINING THERAPY: CPT | Mod: GP

## 2018-12-06 PROCEDURE — 25000132 ZZH RX MED GY IP 250 OP 250 PS 637: Performed by: PSYCHIATRY & NEUROLOGY

## 2018-12-06 RX ORDER — SENNOSIDES 8.6 MG
8.6 TABLET ORAL 2 TIMES DAILY PRN
Status: DISCONTINUED | OUTPATIENT
Start: 2018-12-06 | End: 2018-12-08

## 2018-12-06 RX ADMIN — LISINOPRIL 5 MG: 5 TABLET ORAL at 07:42

## 2018-12-06 RX ADMIN — TAMSULOSIN HYDROCHLORIDE 0.4 MG: 0.4 CAPSULE ORAL at 07:42

## 2018-12-06 RX ADMIN — OFLOXACIN 1 DROP: 3 SOLUTION/ DROPS OPHTHALMIC at 05:00

## 2018-12-06 RX ADMIN — OFLOXACIN 1 DROP: 3 SOLUTION/ DROPS OPHTHALMIC at 12:34

## 2018-12-06 RX ADMIN — SODIUM PHOSPHATE 1 ENEMA: 7; 19 ENEMA RECTAL at 12:34

## 2018-12-06 RX ADMIN — APIXABAN 5 MG: 5 TABLET, FILM COATED ORAL at 07:42

## 2018-12-06 RX ADMIN — SENNOSIDES 8.6 MG: 8.6 TABLET, FILM COATED ORAL at 11:20

## 2018-12-06 RX ADMIN — OFLOXACIN 1 DROP: 3 SOLUTION/ DROPS OPHTHALMIC at 00:00

## 2018-12-06 RX ADMIN — OFLOXACIN 1 DROP: 3 SOLUTION/ DROPS OPHTHALMIC at 07:42

## 2018-12-06 RX ADMIN — OFLOXACIN 1 DROP: 3 SOLUTION/ DROPS OPHTHALMIC at 20:09

## 2018-12-06 RX ADMIN — OFLOXACIN 1 DROP: 3 SOLUTION/ DROPS OPHTHALMIC at 16:28

## 2018-12-06 RX ADMIN — APIXABAN 5 MG: 5 TABLET, FILM COATED ORAL at 20:09

## 2018-12-06 ASSESSMENT — ACTIVITIES OF DAILY LIVING (ADL)
ADLS_ACUITY_SCORE: 10

## 2018-12-06 ASSESSMENT — VISUAL ACUITY
OU: NORMAL ACUITY

## 2018-12-06 NOTE — PLAN OF CARE
Problem: Patient Care Overview  Goal: Plan of Care/Patient Progress Review  Discharge Planner SLP   Patient plan for discharge: none stated  Current status: SLP: Pt with improved cog/ling skills with minimal word-finding difficulties noted in conversation. Recommend continue ST for moderate-high level cog/ling tasks. Pt benefits from increased response time and repetition of directions.   Barriers to return to prior living situation: cognition  Recommendations for discharge: TCU  Rationale for recommendations: pt would benefit from continued ST to optimize cog/ling skills for safety and daily needs       Entered by: Cristiane Gill 12/06/2018 10:33 AM

## 2018-12-06 NOTE — PLAN OF CARE
Problem: Patient Care Overview  Goal: Plan of Care/Patient Progress Review  Discharge Planner OT   6A  Patient plan for discharge: rehab  Current status: Pt continues to demonstrate deficits in cognition and insight.  Poor problem solving and judgement.  Scored slightly better on [place] today 12/30 (26+ being normal and scored 9 on 11-29).  Pt Ox person and place but again stated the date incorrectly believing it was Saturday and 2017. Pt with confidence in his ability to be IND and manage IADLs such as driving.    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 Would currently require 24/7 assist at home to live safely. Pt continues to benefit from continued therapy to maximize safety and indep with ADLs/IADLs given new neuro deficits.  Strongly rec behind the wheel driving assessment if pt plan to return to driving (as he reports)       Entered by: Cristiane Franco 12/06/2018 4:26 PM

## 2018-12-06 NOTE — PROGRESS NOTES
Social Work Services Progress Note    Hospital Day: 10  Date of Initial Social Work Evaluation:  11/27/18  Collaborated with:  Admissions at [facility] ([facility])    Data:  Pt is awaiting rehab placement.     Intervention:  On 12/5/18, GILBERT spoke with Admissions at [facility] ([facility]) who expressed concerns about accepting pt for admit due to MA pending status and lack of support person to assist pt in obtaining income/asset verification information needed by the [facility] to process MA application.  Jazmin inquired about a contract with [place].  GILBERT discussed with Manager (on 12/5/18) and based on the outcome of the discussion, asked Jazmin to contact Care Coordination Dept Manager to further discuss.  On 12/6/18. GILBERT left a message for Jazmin to call in regards to the outcome.  On 12/6/18, OT completed a MoCha and pt scored 12/30 indicating severe cognitive impairment.  GILBERT phoned (12/6/18) [facility] and left a message for Eliza Amezquita ([phone]) in the Adult Protection Unit to call.  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    Assessment:  Pt's cognitive impairment and lack of community support person impacts ability to secure payer source    Plan:    Anticipated Disposition:  Rehab placement    Barriers to d/c plan:  No active payer source    Follow Up:  GILBERT will continue to follow.    SILVIA Sanchez  Social Work, 6A  Phone:  [phone]  Pager:  [phone]  12/6/2018

## 2018-12-06 NOTE — PROGRESS NOTES
Neuro-ICU Progress Note    24 hour events:  Per nursing, patient had a straight cath requiring attempts by three different nurses overnight, had previous traumatic catheterization. Patient still wants to go home instead of to TCU.     Physical Examination:  Temp: 95.8  F (35.4  C) Temp src: Oral BP: 93/58 Pulse: 75   Resp: 16 SpO2: 99 % O2 Device: None (Room air)      Neurologic  Mental Status:  Awake/alert. Speech is fluent, no paraphasic errors.  Cranial Nerves:  Visual fields full, right-sided extinction noted intermittently. EOM intact, no facial droop, hearing intact to conversation, no dysarthria, shoulder shrug strong bilaterally, tongue protrusion midline.  Motor:  No abnormal movements, normal muscle bulk, no pronator drift, able to move all limbs spontaneously, strength 5/5 throughout RUE, LUE, and LLE. Strength 4+/5 RLE.   Reflexes: DTRs 2+/symmetric throughout.  Sensory:  Intact to LT, right sided neglect noted intermittently.  Coordination:  FNF and HS intact without dysmetria  Station/Gait: Not assessed    Labs/Studies:  CBC and BMP wnl (12/5/18)    Imaging:  No new images     Assessment and Plan:  Simone Daniels is a 70 year old male with PMHx of anxiety, GERD who presents w/ 3 days of nausea, generalized chills and subjective fevers as well as generalized weakness and imbalance, was found to have L parietal hypodensity associated hemorrhagic conversion. MRI revealed hemorrhagic infarction. Probably HI2, vessels are patent. EKG revealed a fib, echo revealed moderate to severe left atrial enlargement and moderate right atrial enlargement. No PFO. The working diagnosis is cardio embolic ischemic stroke with hemorrhagic transformation in the setting of new A fib. The blood pressure also is high since admission. This could be due to the acute stroke +/- baseline untreated hypertension.  LDL was 78 and HbA1c is 5.5%     #Acute hemorrhagic infarct:  Probably cardioembolic due to a fib not on  anticoagulant. Now on apixaban.  - apixaban 5 mg BID  - Neuro checks q4h  - Will need acute rehab, placement pending    #Afib  Newly diagnosed on admission by EKG and telemetry. No RVR. [id number] score=4. Allowed 7 days before starting anticoagulation due to hemorrhagic conversion. Now on apixaban as of 12/4, heparin and ASA discontinued.   - apixaban 5 mg BID     #Elevated troponin  Troponin 0.057 on admission, downtrended to 0.149. Likely type 2 demand ischemia in the setting of afib and stroke, trop max 0.149. Echo completed, no evidence of infarct.   - If pt has chest pressure, get EKG     #High blood pressure (resolved)  BP running high since admission, could be 2/2 acute stroke vs untreated chronic hypertension. Started lisinopril and Flomax this admission, BPs now wnl.  - lisinopril 5 mg    #Urinary retention with incontinence  H/o retention and intermittent incontinence at home. Pt did not seek medical advice, was treating himself with natural remedies and was using pads. Bladder scans performed prn; patient unable to feel when bladder is full. Flores placed after repeated straight catheterization. Initially returning blood-tinged urine, now improving. Flores removed after void trial, though noted to have high PVRs requiring intermittent straight cath. Increasing difficulty with repeated catheterization reported.  - Consider replacing Flores due to repetitive intermittent straight cath  - Continue Flomax 0.4 mg daily  - Inpt Urology consult completed, follow up as outpatient    #Constipation  No BM for several days.  - Senokot 8.6 mg PO BID prn  - Fleet enema     #GERD  Not on medications, asymptomatic.   - no need to start PPI for now  - defer management to PCP        FEN: regular diet  PPx: apixaban  Code status: Full  Disposition: TCU per OT recs, medically ready for discharge       Patient was seen and discussed with Dr. Nicholson.    Annabella Santos MD  PGY1-Neurology   P: [phone]

## 2018-12-07 ENCOUNTER — APPOINTMENT (OUTPATIENT)
Dept: PHYSICAL THERAPY | Facility: CLINIC | Age: 73
DRG: 064 | End: 2018-12-07
Attending: PSYCHIATRY & NEUROLOGY

## 2018-12-07 LAB
ANION GAP SERPL CALCULATED.3IONS-SCNC: 9 MMOL/L (ref 3–14)
BUN SERPL-MCNC: 17 MG/DL (ref 7–30)
CALCIUM SERPL-MCNC: 8.6 MG/DL (ref 8.5–10.1)
CHLORIDE SERPL-SCNC: 104 MMOL/L (ref 94–109)
CO2 SERPL-SCNC: 25 MMOL/L (ref 20–32)
CREAT SERPL-MCNC: 0.92 MG/DL (ref 0.66–1.25)
ERYTHROCYTE [DISTWIDTH] IN BLOOD BY AUTOMATED COUNT: 13.4 % (ref 10–15)
GFR SERPL CREATININE-BSD FRML MDRD: 80 ML/MIN/1.7M2
GLUCOSE SERPL-MCNC: 92 MG/DL (ref 70–99)
HCT VFR BLD AUTO: 42.9 % (ref 40–53)
HGB BLD-MCNC: 14.3 G/DL (ref 13.3–17.7)
MCH RBC QN AUTO: 31.2 PG (ref 26.5–33)
MCHC RBC AUTO-ENTMCNC: 33.3 G/DL (ref 31.5–36.5)
MCV RBC AUTO: 94 FL (ref 78–100)
PLATELET # BLD AUTO: 302 10E9/L (ref 150–450)
POTASSIUM SERPL-SCNC: 4 MMOL/L (ref 3.4–5.3)
RBC # BLD AUTO: 4.59 10E12/L (ref 4.4–5.9)
SODIUM SERPL-SCNC: 138 MMOL/L (ref 133–144)
WBC # BLD AUTO: 7 10E9/L (ref 4–11)

## 2018-12-07 PROCEDURE — 25000132 ZZH RX MED GY IP 250 OP 250 PS 637: Performed by: NURSE PRACTITIONER

## 2018-12-07 PROCEDURE — 40000193 ZZH STATISTIC PT WARD VISIT

## 2018-12-07 PROCEDURE — 36415 COLL VENOUS BLD VENIPUNCTURE: CPT | Performed by: PSYCHIATRY & NEUROLOGY

## 2018-12-07 PROCEDURE — 97530 THERAPEUTIC ACTIVITIES: CPT | Mod: GP

## 2018-12-07 PROCEDURE — 85027 COMPLETE CBC AUTOMATED: CPT | Performed by: PSYCHIATRY & NEUROLOGY

## 2018-12-07 PROCEDURE — 12000008 ZZH R&B INTERMEDIATE UMMC

## 2018-12-07 PROCEDURE — 80048 BASIC METABOLIC PNL TOTAL CA: CPT | Performed by: PSYCHIATRY & NEUROLOGY

## 2018-12-07 PROCEDURE — 25000132 ZZH RX MED GY IP 250 OP 250 PS 637: Performed by: PSYCHIATRY & NEUROLOGY

## 2018-12-07 PROCEDURE — 97110 THERAPEUTIC EXERCISES: CPT | Mod: GP

## 2018-12-07 RX ADMIN — APIXABAN 5 MG: 5 TABLET, FILM COATED ORAL at 08:14

## 2018-12-07 RX ADMIN — LISINOPRIL 5 MG: 5 TABLET ORAL at 08:14

## 2018-12-07 RX ADMIN — OFLOXACIN 1 DROP: 3 SOLUTION/ DROPS OPHTHALMIC at 08:14

## 2018-12-07 RX ADMIN — OFLOXACIN 1 DROP: 3 SOLUTION/ DROPS OPHTHALMIC at 21:20

## 2018-12-07 RX ADMIN — OFLOXACIN 1 DROP: 3 SOLUTION/ DROPS OPHTHALMIC at 05:00

## 2018-12-07 RX ADMIN — OFLOXACIN 1 DROP: 3 SOLUTION/ DROPS OPHTHALMIC at 00:00

## 2018-12-07 RX ADMIN — OFLOXACIN 1 DROP: 3 SOLUTION/ DROPS OPHTHALMIC at 16:16

## 2018-12-07 RX ADMIN — TAMSULOSIN HYDROCHLORIDE 0.4 MG: 0.4 CAPSULE ORAL at 08:14

## 2018-12-07 RX ADMIN — OFLOXACIN 1 DROP: 3 SOLUTION/ DROPS OPHTHALMIC at 13:34

## 2018-12-07 RX ADMIN — APIXABAN 5 MG: 5 TABLET, FILM COATED ORAL at 21:20

## 2018-12-07 ASSESSMENT — ACTIVITIES OF DAILY LIVING (ADL)
ADLS_ACUITY_SCORE: 10
ADLS_ACUITY_SCORE: 8
ADLS_ACUITY_SCORE: 10
ADLS_ACUITY_SCORE: 8
ADLS_ACUITY_SCORE: 8
ADLS_ACUITY_SCORE: 10

## 2018-12-07 ASSESSMENT — VISUAL ACUITY
OU: NORMAL ACUITY

## 2018-12-07 NOTE — PLAN OF CARE
Problem: Patient Care Overview  Goal: Plan of Care/Patient Progress Review  Discharge Planner PT   Patient plan for discharge: not discussed   Current status: Pt performs most functional mobility with supervision for cognition. To promote improvement/maintenance in activity tolerance, pt ambulated 500'+ without AD and supervision. Shuffling steps with antalgic gait dt L knee pain. No overt LOB. Completed 5 x 3 stairs with unilateral UE support during ascent and marni support during descent, step over gait pattern.     Barriers to return to prior living situation: medical needs, cognition,   Recommendations for discharge: TCU dt waxing and waning cognition   Rationale for recommendations: PT to decrease frequency to 3x/week for maintenance of strength and activity tolerance. Encourage up ambulating with staff per request.        Entered by: Dana Tidwell 12/07/2018 3:30 PM

## 2018-12-07 NOTE — PLAN OF CARE
Problem: Patient Care Overview  Goal: Plan of Care/Patient Progress Review  ST session cancelled. Attempted to see pt x2, however pt sleeping soundly upon first attempt and then declined participation despite encouragement. Will follow up as appropriate.

## 2018-12-07 NOTE — PLAN OF CARE
Problem: Patient Care Overview  Goal: Plan of Care/Patient Progress Review  Outcome: No Change  Status: Admitted with hemorrhagic infarction, now awaiting TCU placement  VS: VSS  Neuros: mild expressive aphasia; forgetful  GI: small BM this NOC; Reg diet  : continues to require straight catheterization; no incontinence.   IV: SL  Activity: up independent in room; SBA in halls  Pain: denies ex with catheterizations  Respiratory: LS clear   Plan of care: awaiting TCU placment

## 2018-12-07 NOTE — PLAN OF CARE
Problem: Patient Care Overview  Goal: Plan of Care/Patient Progress Review  Status: Patient admitted d/t hemorrhagic infarction.   VS: VSS.    Neuros: A&Ox4. Can be forgetful at times, otherwise neuros intact.   GI: Regular diet, good intake. No BM this shift.   : Voiding in small amounts, increasing throughout day. Straight cathed last at 1845 for 900cc. Urine is yellow with minimal clots.   IV: PIV SL.   Activity: Up independently in room. Calls appropriately.   Pain: Denies.   Respiratory/Trach: No issues.    Skin: Intact.    Plan of care: Plan to discharge to TCU when bed available. Will continue to monitor.

## 2018-12-07 NOTE — PROGRESS NOTES
Social Work Services Progress Note    Hospital Day: 11  Date of Initial Social Work Evaluation:  11/27/18  Collaborated with:  Received a voice mail from Admissions at [facility] and left a message for [facility] to call    Data:  Pt is awaiting rehab placement.      Intervention:  SW received a 12/6/18 voice mail from Admissions at [facility] (Jazmin) who indicated that they are in process of working on a contract with [facility] for pt's stay.  On 12/7/18, SW left a 2nd message for [facility] Eliza Amezquita ([phone]) in the Adult Protection Unit to call.  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     Assessment:  Pt is agreeable to rehab placement    Plan:    Anticipated Disposition:  Rehab placement    Barriers to d/c plan:  MA pending with no ability to provide proofs of income and assets at this time.    Follow Up:  GILBERT will continue to follow.    SILVIA Sanchez  Social Work, 6A  Phone:  [phone]  Pager:  [phone]  12/7/2018

## 2018-12-07 NOTE — PROGRESS NOTES
Neuro-ICU Progress Note    24 hour events:  Repeat MoCA increased from 9/30 to 12/30. Ongoing placement efforts by SW.     Physical Examination:  Temp: 96.4  F (35.8  C) Temp src: Oral BP: 109/66 Pulse: 78   Resp: 16 SpO2: 100 % O2 Device: None (Room air)      Neurologic  Mental Status:  Awake/alert. Speech is fluent, no paraphasic errors.  Cranial Nerves:  Visual fields full, right-sided extinction noted intermittently. EOM intact, no facial droop, hearing intact to conversation, no dysarthria, shoulder shrug strong bilaterally, tongue protrusion midline.  Motor:  No abnormal movements, normal muscle bulk, no pronator drift, able to move all limbs spontaneously, strength 5/5 throughout RUE, LUE, and LLE. Strength 4+/5 RLE.   Reflexes: DTRs 2+/symmetric throughout.  Sensory:  Intact to LT, right sided neglect noted intermittently.  Coordination:  FNF and HS intact without dysmetria  Station/Gait: Not assessed    Labs/Studies:  CBC and BMP wnl (12/7/18)    Imaging:  No new images     Assessment and Plan:  Simone Daniels is a 70 year old male with PMHx of anxiety, GERD who presents w/ 3 days of nausea, generalized chills and subjective fevers as well as generalized weakness and imbalance, was found to have L parietal hypodensity associated hemorrhagic conversion. MRI revealed hemorrhagic infarction. Probably HI2, vessels are patent. EKG revealed a fib, echo revealed moderate to severe left atrial enlargement and moderate right atrial enlargement. No PFO. The working diagnosis is cardio embolic ischemic stroke with hemorrhagic transformation in the setting of new A fib. The blood pressure also is high since admission. This could be due to the acute stroke +/- baseline untreated hypertension.  LDL was 78 and HbA1c is 5.5%     #Acute hemorrhagic infarct:  Probably cardioembolic due to a fib not on anticoagulant. Now on apixaban.  - apixaban 5 mg BID  - Neuro checks q4h  - Will need acute rehab, placement  pending    #Afib  Newly diagnosed on admission by EKG and telemetry. No RVR. [id number] score=4. Allowed 7 days before starting anticoagulation due to hemorrhagic conversion. Now on apixaban as of 12/4, heparin and ASA discontinued.   - apixaban 5 mg BID     #Elevated troponin  Troponin 0.057 on admission, downtrended to 0.149. Likely type 2 demand ischemia in the setting of afib and stroke, trop max 0.149. Echo completed, no evidence of infarct.   - If pt has chest pressure, get EKG     #High blood pressure (resolved)  BP running high since admission, could be 2/2 acute stroke vs untreated chronic hypertension. Started lisinopril and Flomax this admission, BPs now wnl.  - lisinopril 5 mg    #Urinary retention with incontinence  H/o retention and intermittent incontinence at home. Pt did not seek medical advice, was treating himself with natural remedies and was using pads. Bladder scans performed prn; patient unable to feel when bladder is full. Flores placed after repeated straight catheterization. Initially returning blood-tinged urine, now improving. Flores removed after void trial, though noted to have high PVRs requiring intermittent straight cath. Increasing difficulty with repeated catheterization initially reported; patient appears to now be recovering spontaneous voiding.   - Continue to monitor, consider replacing Flores if persistent issues with voiding and/or intermittent cath  - Continue Flomax 0.4 mg daily  - Inpt Urology consult completed, follow up as outpatient    #Constipation   No BM for several days. Senokot started, Fleet enema ordered 12/6.   - Senokot 8.6 mg PO BID prn  - repeat enema prn     #GERD  Not on medications, asymptomatic.   - no need to start PPI for now  - defer management to PCP        FEN: regular diet  PPx: apixaban  Code status: Full  Disposition: TCU for cognitive impairment per OT recs, medically ready for discharge       Patient was seen and discussed with   Ezzeddine.    Annabella Santos MD  PGY1-Neurology   P: [phone]

## 2018-12-08 ENCOUNTER — APPOINTMENT (OUTPATIENT)
Dept: SPEECH THERAPY | Facility: CLINIC | Age: 73
DRG: 064 | End: 2018-12-08
Attending: PSYCHIATRY & NEUROLOGY

## 2018-12-08 LAB — PLATELET # BLD AUTO: 344 10E9/L (ref 150–450)

## 2018-12-08 PROCEDURE — 25000132 ZZH RX MED GY IP 250 OP 250 PS 637: Performed by: NURSE PRACTITIONER

## 2018-12-08 PROCEDURE — 25000132 ZZH RX MED GY IP 250 OP 250 PS 637: Performed by: PSYCHIATRY & NEUROLOGY

## 2018-12-08 PROCEDURE — 40000225 ZZH STATISTIC SLP WARD VISIT: Performed by: SPEECH-LANGUAGE PATHOLOGIST

## 2018-12-08 PROCEDURE — 36415 COLL VENOUS BLD VENIPUNCTURE: CPT | Performed by: PSYCHIATRY & NEUROLOGY

## 2018-12-08 PROCEDURE — 92526 ORAL FUNCTION THERAPY: CPT | Mod: GN | Performed by: SPEECH-LANGUAGE PATHOLOGIST

## 2018-12-08 PROCEDURE — 12000008 ZZH R&B INTERMEDIATE UMMC

## 2018-12-08 PROCEDURE — 92507 TX SP LANG VOICE COMM INDIV: CPT | Mod: GN | Performed by: SPEECH-LANGUAGE PATHOLOGIST

## 2018-12-08 PROCEDURE — 85049 AUTOMATED PLATELET COUNT: CPT | Performed by: PSYCHIATRY & NEUROLOGY

## 2018-12-08 RX ORDER — AMOXICILLIN 250 MG
1 CAPSULE ORAL 2 TIMES DAILY
Status: DISCONTINUED | OUTPATIENT
Start: 2018-12-08 | End: 2018-12-11 | Stop reason: HOSPADM

## 2018-12-08 RX ADMIN — APIXABAN 5 MG: 5 TABLET, FILM COATED ORAL at 20:22

## 2018-12-08 RX ADMIN — TAMSULOSIN HYDROCHLORIDE 0.4 MG: 0.4 CAPSULE ORAL at 08:39

## 2018-12-08 RX ADMIN — OFLOXACIN 1 DROP: 3 SOLUTION/ DROPS OPHTHALMIC at 13:00

## 2018-12-08 RX ADMIN — SODIUM PHOSPHATE 1 ENEMA: 7; 19 ENEMA RECTAL at 17:57

## 2018-12-08 RX ADMIN — SENNOSIDES AND DOCUSATE SODIUM 1 TABLET: 8.6; 5 TABLET ORAL at 20:22

## 2018-12-08 RX ADMIN — OFLOXACIN 1 DROP: 3 SOLUTION/ DROPS OPHTHALMIC at 20:22

## 2018-12-08 RX ADMIN — APIXABAN 5 MG: 5 TABLET, FILM COATED ORAL at 08:39

## 2018-12-08 RX ADMIN — OFLOXACIN 1 DROP: 3 SOLUTION/ DROPS OPHTHALMIC at 08:39

## 2018-12-08 RX ADMIN — OFLOXACIN 1 DROP: 3 SOLUTION/ DROPS OPHTHALMIC at 00:04

## 2018-12-08 RX ADMIN — OFLOXACIN 1 DROP: 3 SOLUTION/ DROPS OPHTHALMIC at 23:39

## 2018-12-08 RX ADMIN — SENNOSIDES 8.6 MG: 8.6 TABLET, FILM COATED ORAL at 08:51

## 2018-12-08 ASSESSMENT — VISUAL ACUITY
OU: NORMAL ACUITY

## 2018-12-08 ASSESSMENT — ACTIVITIES OF DAILY LIVING (ADL)
ADLS_ACUITY_SCORE: 8

## 2018-12-08 NOTE — PLAN OF CARE
Problem: Patient Care Overview  Goal: Plan of Care/Patient Progress Review  Outcome: No Change  Status: Patient on 6A for ischemic stroke with hemorrhagic transformation.   VS: VSS.    Neuros: Pt is A&Ox4. Mild expressive aphasia and forgetful at times. Other neuros intact.   GI: Regular diet. No BM this shift.   : No void this shift; per report pt had been voiding in small amounts. Continues to need to be straight cathed. Will scan around 0600 and cath if needed  IV: PIV SL.   Activity: Up independently in room. Up with SBA in halls. Ambulated halls with staff multiple times last evening.   Pain: Denies pain.   Skin: Intact.    Plan of care: Plan to discharge to TCU when bed available. Continue to monitor and follow POC.    *Pt straight cathed at 0615 for 600ml. Pt would like a Urology consult, on charge list for MD

## 2018-12-08 NOTE — PLAN OF CARE
Problem: Patient Care Overview  Goal: Plan of Care/Patient Progress Review  Discharge Planner SLP   Patient plan for discharge: TCU  Current status: Pt with ongoing cognitive and language deficits. Deficits most present at conversational level and in tasks requiring reasoning. Reading comprehension not targeted this session 2/2 time. Worksheets left in room at pt request. SLP to follow per POC.  Barriers to return to prior living situation: Language, cognitive deficits  Recommendations for discharge: TCU  Rationale for recommendations: Pt will benefit from ongoing SLP services at next level of care to address the above outlined deficits.        Entered by: Kristen Mcmillan 12/08/2018 3:10 PM

## 2018-12-08 NOTE — PLAN OF CARE
Problem: Patient Care Overview  Goal: Plan of Care/Patient Progress Review  Status: Patient admitted d/t hemorrhagic infarction.   VS: VSS.    Neuros: A&Ox4. Can be forgetful at times, otherwise neuros intact.   GI: Regular diet, good intake. No BM this shift.   : Voiding in small amounts, increasing throughout day. Urine is yellow with minimal clots. Last cathed around 1400 for 800cc. Bladder scanned at 1845 for 200cc.  IV: PIV SL.   Activity: Up independently in room. Calls appropriately.   Pain: Denies.   Respiratory/Trach: No issues.    Skin: Intact.    Plan of care: Plan to discharge to TCU when bed available. Will continue to monitor.

## 2018-12-09 ENCOUNTER — APPOINTMENT (OUTPATIENT)
Dept: OCCUPATIONAL THERAPY | Facility: CLINIC | Age: 73
DRG: 064 | End: 2018-12-09
Attending: PSYCHIATRY & NEUROLOGY

## 2018-12-09 PROCEDURE — 12000008 ZZH R&B INTERMEDIATE UMMC

## 2018-12-09 PROCEDURE — 25000132 ZZH RX MED GY IP 250 OP 250 PS 637: Performed by: NURSE PRACTITIONER

## 2018-12-09 PROCEDURE — 97535 SELF CARE MNGMENT TRAINING: CPT | Mod: GO | Performed by: OCCUPATIONAL THERAPIST

## 2018-12-09 PROCEDURE — 40000133 ZZH STATISTIC OT WARD VISIT: Performed by: OCCUPATIONAL THERAPIST

## 2018-12-09 PROCEDURE — 25000132 ZZH RX MED GY IP 250 OP 250 PS 637: Performed by: PSYCHIATRY & NEUROLOGY

## 2018-12-09 RX ADMIN — OFLOXACIN 1 DROP: 3 SOLUTION/ DROPS OPHTHALMIC at 23:43

## 2018-12-09 RX ADMIN — OFLOXACIN 1 DROP: 3 SOLUTION/ DROPS OPHTHALMIC at 04:50

## 2018-12-09 RX ADMIN — OFLOXACIN 1 DROP: 3 SOLUTION/ DROPS OPHTHALMIC at 19:31

## 2018-12-09 RX ADMIN — SENNOSIDES AND DOCUSATE SODIUM 1 TABLET: 8.6; 5 TABLET ORAL at 08:04

## 2018-12-09 RX ADMIN — APIXABAN 5 MG: 5 TABLET, FILM COATED ORAL at 08:04

## 2018-12-09 RX ADMIN — LISINOPRIL 5 MG: 5 TABLET ORAL at 08:04

## 2018-12-09 RX ADMIN — APIXABAN 5 MG: 5 TABLET, FILM COATED ORAL at 19:31

## 2018-12-09 RX ADMIN — OFLOXACIN 1 DROP: 3 SOLUTION/ DROPS OPHTHALMIC at 12:43

## 2018-12-09 RX ADMIN — TAMSULOSIN HYDROCHLORIDE 0.4 MG: 0.4 CAPSULE ORAL at 08:04

## 2018-12-09 RX ADMIN — OFLOXACIN 1 DROP: 3 SOLUTION/ DROPS OPHTHALMIC at 08:04

## 2018-12-09 RX ADMIN — OFLOXACIN 1 DROP: 3 SOLUTION/ DROPS OPHTHALMIC at 15:53

## 2018-12-09 ASSESSMENT — ACTIVITIES OF DAILY LIVING (ADL)
ADLS_ACUITY_SCORE: 12
ADLS_ACUITY_SCORE: 12
ADLS_ACUITY_SCORE: 15
ADLS_ACUITY_SCORE: 15

## 2018-12-09 NOTE — PLAN OF CARE
Problem: Patient Care Overview  Goal: Plan of Care/Patient Progress Review  Outcome: No Change  Status: Pt on 6A s/p ischemic stroke with hemorrhagic transformation.   VS: Stable on RA. Morning lisinopril held d/t soft BP (96/51).   Neuros: alert and oriented, forgetful. Mild expressive aphasia.   GI: Tolerating regular diet. One small BM today. Pt requested enema, given at 1800.   : Retaining large amounts of urine. Able to void 225 ccs on own this evening. Order to straight cath >600ccs. Will follow up before shift change if pt needs to be cathed.   IV: PIV SL  Activity: Up independently in room. SBA in the halls. Walked the halls several times today.   Pain: denies.   Plan of care: Awaiting TCU placement. Continue to monitor.

## 2018-12-09 NOTE — PLAN OF CARE
Pt here for ischemic stroke work-up, vs ex bry, neuros include: a/o x4, forgetful. PIV SL, regular diet, up ad kb in room, SBA in hallway, regular diet, voiding w/PVRs, on list to have Urology consulted to place elena, pt reported having BM this morning, denies pain. Pt upset that RNs unable to successfully straight cath, RN provided education about double-voiding if possible, pt slightly resistance and refused to attempt to void, charge RN made aware. Continue to monitor per MD orders.

## 2018-12-09 NOTE — PROGRESS NOTES
[facility]   Neuro-ICU Progress Note    24 hour events:  No acute overnight events. Retaining moderate to large amounts of urine, requiring continued intermittent straight cath. Had small BM x2 yesterday. Has been ambulating around unit.     Physical Examination:  Temp: 95.9  F (35.5  C) Temp src: Oral BP: 112/71   Heart Rate: 59 Resp: 16 SpO2: 100 % O2 Device: None (Room air)      Gen: no acute distress, sitting on edge of bed  Cardiac: RRR  Lungs: Clear  Abdomen: non distended    Neurologic  Mental Status: Awake/alert. Speech is fluent, no paraphasic errors.  Cranial Nerves: Visual fields full,  EOM intact, no facial droop, hearing intact to conversation, no dysarthria, shoulder shrug strong bilaterally, tongue protrusion midline.  Motor: No abnormal movements, normal muscle bulk, no pronator drift, able to move all limbs spontaneously, strength 5/5 throughout RUE, LUE, and LLE.   Sensory: Intact to LT, right sided neglect noted intermittently.  Coordination: FNF and HS intact without dysmetria.  Station/Gait: Not assessed    Labs/Studies:  CBC and BMP wnl (12/7/18)    Imaging:  No new images     Assessment and Plan:  Simone Daniels is a 70 year old male with PMHx of anxiety, GERD who presents w/ 3 days of nausea, generalized chills and subjective fevers as well as generalized weakness and imbalance, was found to have L parietal hypodensity associated hemorrhagic conversion. MRI revealed hemorrhagic infarction. Probably HI2, vessels are patent. EKG revealed a fib, echo revealed moderate to severe left atrial enlargement and moderate right atrial enlargement. No PFO. The working diagnosis is cardio embolic ischemic stroke with hemorrhagic transformation in the setting of new A fib. The blood pressure also is high since admission. This could be due to the acute stroke +/- baseline untreated hypertension.  LDL was 78 and HbA1c is 5.5%. MoCA 12/30, TCU placement pending for ongoing  cognitive deficits.      #Acute hemorrhagic infarct:  Probably cardioembolic due to a fib not on anticoagulant. Now on apixaban.  - apixaban 5 mg BID  - Neuro checks q4h    #Afib  Newly diagnosed on admission by EKG and telemetry. No RVR. [id number] score=4. Allowed 7 days before starting anticoagulation due to hemorrhagic conversion. Now on apixaban as of 12/4, heparin and ASA discontinued.   - apixaban 5 mg BID     #Elevated troponin  Troponin 0.057 on admission, downtrended to 0.149. Likely type 2 demand ischemia in the setting of afib and stroke, trop max 0.149. Echo completed, no evidence of infarct.   - If pt has chest pressure, get EKG     #High blood pressure (resolved)  BP running high since admission, could be 2/2 acute stroke vs untreated chronic hypertension. Started lisinopril and Flomax this admission, BPs now wnl.  - lisinopril 5 mg    #Urinary retention with incontinence  H/o retention and intermittent incontinence at home. Pt did not seek medical advice, was treating himself with natural remedies and was using pads. Bladder scans performed prn; patient unable to feel when bladder is full. Flores placed after repeated straight catheterization. Initially returning blood-tinged urine, now improving. Flores removed after void trial, though noted to have high PVRs requiring intermittent straight cath. Discussed with Urology 12/8/2018 - will continue straight cath and pt needs to learn to straight cath (or have this continued at TCU), no Flores.  - Continue Flomax 0.4 mg daily  - Follow up with Urology as outpatient    #Constipation (resolved)  - Senokot 8.6 mg PO BID prn  - repeat enema prn  - aggressive bowel regimen to help alleviate urinary retention.     #GERD  Not on medications, asymptomatic.   - no need to start PPI for now  - defer management to PCP        FEN: regular diet  PPx: apixaban  Code status: Full  Disposition: TCU, medically ready for discharge       Patient was seen and discussed with   Ezzeddine.    Annabella Santos MD  Neurology PGY-1  Pager: [phone]

## 2018-12-09 NOTE — PROGRESS NOTES
Status: Pt on 6A s/p ischemic stroke with hemorrhagic transformation.   VS: AVSS on RA.   Neuros: A&Ox4 but forgetful. Very mild expressive aphasia.   GI: Regular diet - tolerating well. Had enema yesterday which produced 2 small stools.  : Retaining large amounts of urine. Refused attempting to void. Order to straight cath >600ml. SC at 0245 for 850 ml with coude cath.   IV: PIV SL  Activity: Up ad kb in room. SBA in halls. Went on several walks yesterday.   Pain: denies.   Plan of care: Awaiting TCU placement. Will continue to monitor and follow with POC.

## 2018-12-09 NOTE — PLAN OF CARE
Discharge Planner OT   Patient plan for discharge: prefers home with homecare, but agreeable to TCU  Current status: Mild cognitive deficits (memory, attention). Pt endorses PTSD from childhood that affects his performance on academic based cognitive assessments/tasks. Pt scores/performs better on functional tasks and verbal processing with problem solving/safety scenarios. Past standardized testing scores could be affected by this.  Barriers to return to prior living situation: cognitive/safety concerns, limited support system/lives alone  Recommendations for discharge: TCU preferred. If pt does discharge to home, recommend home OT safety assessment (especially prior to any driving, use of oven/stove)  Rationale for recommendations: Pt would benefit from OT to continue to address cognitive deficits, although improving. Pt has executive level deficits that could affect safety with driving, new medications, oven/stovetop use, new learning/retention       Entered by: Alessandra Garcia 12/09/2018 3:42 PM

## 2018-12-10 ENCOUNTER — APPOINTMENT (OUTPATIENT)
Dept: SPEECH THERAPY | Facility: CLINIC | Age: 73
DRG: 064 | End: 2018-12-10
Attending: PSYCHIATRY & NEUROLOGY

## 2018-12-10 ENCOUNTER — APPOINTMENT (OUTPATIENT)
Dept: OCCUPATIONAL THERAPY | Facility: CLINIC | Age: 73
DRG: 064 | End: 2018-12-10
Attending: PSYCHIATRY & NEUROLOGY

## 2018-12-10 ENCOUNTER — APPOINTMENT (OUTPATIENT)
Dept: PHYSICAL THERAPY | Facility: CLINIC | Age: 73
DRG: 064 | End: 2018-12-10
Attending: PSYCHIATRY & NEUROLOGY

## 2018-12-10 PROCEDURE — 92507 TX SP LANG VOICE COMM INDIV: CPT | Mod: GN

## 2018-12-10 PROCEDURE — 97750 PHYSICAL PERFORMANCE TEST: CPT | Mod: GP | Performed by: PHYSICAL THERAPIST

## 2018-12-10 PROCEDURE — 25000132 ZZH RX MED GY IP 250 OP 250 PS 637: Performed by: NURSE PRACTITIONER

## 2018-12-10 PROCEDURE — 97535 SELF CARE MNGMENT TRAINING: CPT | Mod: GO

## 2018-12-10 PROCEDURE — 40000225 ZZH STATISTIC SLP WARD VISIT

## 2018-12-10 PROCEDURE — 97530 THERAPEUTIC ACTIVITIES: CPT | Mod: GO

## 2018-12-10 PROCEDURE — 40000133 ZZH STATISTIC OT WARD VISIT

## 2018-12-10 PROCEDURE — 40000193 ZZH STATISTIC PT WARD VISIT: Performed by: PHYSICAL THERAPIST

## 2018-12-10 PROCEDURE — 12000008 ZZH R&B INTERMEDIATE UMMC

## 2018-12-10 PROCEDURE — 25000132 ZZH RX MED GY IP 250 OP 250 PS 637: Performed by: PSYCHIATRY & NEUROLOGY

## 2018-12-10 PROCEDURE — 97530 THERAPEUTIC ACTIVITIES: CPT | Mod: GP | Performed by: PHYSICAL THERAPIST

## 2018-12-10 RX ADMIN — OFLOXACIN 1 DROP: 3 SOLUTION/ DROPS OPHTHALMIC at 11:11

## 2018-12-10 RX ADMIN — LISINOPRIL 5 MG: 5 TABLET ORAL at 08:02

## 2018-12-10 RX ADMIN — OFLOXACIN 1 DROP: 3 SOLUTION/ DROPS OPHTHALMIC at 08:02

## 2018-12-10 RX ADMIN — OFLOXACIN 1 DROP: 3 SOLUTION/ DROPS OPHTHALMIC at 20:49

## 2018-12-10 RX ADMIN — OFLOXACIN 1 DROP: 3 SOLUTION/ DROPS OPHTHALMIC at 04:00

## 2018-12-10 RX ADMIN — OFLOXACIN 1 DROP: 3 SOLUTION/ DROPS OPHTHALMIC at 17:03

## 2018-12-10 RX ADMIN — TAMSULOSIN HYDROCHLORIDE 0.4 MG: 0.4 CAPSULE ORAL at 08:02

## 2018-12-10 RX ADMIN — OFLOXACIN 1 DROP: 3 SOLUTION/ DROPS OPHTHALMIC at 23:45

## 2018-12-10 RX ADMIN — APIXABAN 5 MG: 5 TABLET, FILM COATED ORAL at 20:49

## 2018-12-10 RX ADMIN — APIXABAN 5 MG: 5 TABLET, FILM COATED ORAL at 08:02

## 2018-12-10 ASSESSMENT — VISUAL ACUITY
OU: NORMAL ACUITY

## 2018-12-10 ASSESSMENT — ACTIVITIES OF DAILY LIVING (ADL)
ADLS_ACUITY_SCORE: 12

## 2018-12-10 NOTE — PROGRESS NOTES
[facility]   Neuro-ICU Progress Note    24 hour events:  No acute overnight events. Now self-catheterizing. TCU placement pending.     Physical Examination:  Temp: 95.3  F (35.2  C) Temp src: Oral BP: 97/79 Pulse: 69 Heart Rate: 72 Resp: 18 SpO2: 100 % O2 Device: None (Room air)      Gen: no acute distress, sitting on edge of bed  Cardiac: RRR  Lungs: Clear  Abdomen: non distended    Neurologic  Mental Status: Awake/alert. Speech is fluent, no paraphasic errors.  Cranial Nerves: EOM intact, no facial droop, hearing intact to conversation, no dysarthria, shoulder shrug strong bilaterally, tongue protrusion midline.  Motor: No abnormal movements, normal muscle bulk, no pronator drift, moves all four extremities spontaneously, strength 5/5 throughout BUE and BLE.  Sensory: Intact to LT, right sided neglect nearly resolved.  Coordination: FNF and HS intact without dysmetria.  Station/Gait: Not assessed    Labs/Studies:  CBC and BMP wnl (12/7/18)    Imaging:  No new images     Assessment and Plan:  Simone Daniels is a 70 year old male with PMHx of anxiety, GERD who presents w/ 3 days of nausea, generalized chills and subjective fevers as well as generalized weakness and imbalance, was found to have L parietal hypodensity with hemorrhagic conversion. MRI revealed hemorrhagic infarction. Probably HI2, vessels are patent. EKG revealed a fib; echo showed moderate to severe left atrial enlargement and moderate right atrial enlargement without PFO or other ASD. Etiology likely cardioembolic in the setting of new A fib.     #Acute hemorrhagic infarct:  Probably cardioembolic due to a fib not on anticoagulant. Now on apixaban.  - apixaban 5 mg BID  - Neuro checks q4h    #Afib  Newly diagnosed on admission by EKG and telemetry. No RVR. [id number] score=4. Patient on heparin and ASA for 7 days before starting anticoagulation due to concern for bleed in the setting of hemorrhagic conversion.  Transitioned to apixaban starting 12/4.  - apixaban 5 mg BID     #Elevated troponin  Troponin 0.057 on admission, downtrended to 0.149. Likely type 2 demand ischemia in the setting of afib and stroke, trop max 0.149. Echo completed, no evidence of infarct.   - Repeat EKG if symptomatic     #High blood pressure (resolved)  BP elevated early in hopsitalization, could be 2/2 acute stroke vs untreated chronic hypertension. Started lisinopril and Flomax this admission, BPs now wnl.  - lisinopril 5 mg    #Urinary retention with incontinence  H/o retention and intermittent incontinence at home. Pt did not seek medical advice, was treating himself with natural remedies and was using pads. Bladder scans performed prn; patient unable to feel when bladder is full. Flores placed after repeated straight catheterization. Initially returning blood-tinged urine, now improving. Flores removed after void trial, though noted to have high PVRs requiring intermittent straight cath. Discussed with Urology 12/8/2018 - will continue straight cath and pt needs to learn to straight cath (or have this continued at TCU), no Flores. Ongoing/intermittent difficulties with straight catheterization, Urology recommends trying smaller catheter (14 Fr) and emptying bladder before PVRs reach >300.   - Continue Flomax 0.4 mg daily  - Follow up with Urology as outpatient    #Constipation (resolved)  - Senokot 8.6 mg PO BID prn  - repeat enema prn  - aggressive bowel regimen to help alleviate urinary retention.       FEN: regular diet  PPx: apixaban  Code status: Full  Disposition: TCU placement pending, medically ready for discharge       Patient was seen and discussed with Dr. Nicholson.    Annabella Santos MD  Neurology PGY-1  Pager: [phone]

## 2018-12-10 NOTE — PLAN OF CARE
OT6A  Discharge Planner OT   Patient plan for discharge: rehab v home  Current status: Pt completed g/h activities in bathroom SBA. Pt completed LBD SBA. Completed functional cognition activities such as money management, medication set up, and scheduling. Pt completed in rivera navigation ambulating ~ 1000+ ' SBA with x1 seated rest break. Pt with tangential speech at times when discussing cognitive deficits. VSS on RA throughout.   Barriers to return to prior living situation: medical status, cognition, insight to safety concerns in home 2/2 higher level cognitive deficits   Recommendations for discharge: TCU   Rationale for recommendations: Pt lives alone and would continue to benefit from therapy to progress safety and IND in home through higher level cognition improvement and safety strategies.        Entered by: Aura Dale 12/10/2018 4:08 PM

## 2018-12-10 NOTE — PROGRESS NOTES
Social Work Services Progress Note    Hospital Day: 14  Date of Initial Social Work Evaluation:  11/2718  Collaborated with:  Left a message for [facility] Eliza Amezquita ([phone]) in the Adult Protection Unit to call.    Data:  Pt is awaiting rehab placement in a community SNF.  [facility] has received [facility] contract.  GILBERT is awaiting decision as to whether or not contract will be signed.  GILBERT received a 12/7/18 voice mail to call [facility] Eliza Amezquita ([phone]) in the Adult Protection Unit (Eliza was returning this SW's call.    Intervention:  GILBERT phoned [facility] Eliza Amezquita ([phone]) in the Adult Protection Unit, and left a message for Eliza to call (desk and pager number provided.  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            Assessment:  Pt is agreeable to rehab placement.    Plan:    Anticipated Disposition:  Rehab placement    Barriers to d/c plan:  MA pending    Follow Up:  GILBERT will continue to follow.    SILVIA Sanchez  Social Work, 6A  Phone:  [phone]  Pager:  [phone]  12/10/2018

## 2018-12-10 NOTE — PLAN OF CARE
Discharge Planner PT   Patient plan for discharge: home, indicates he does not need TCU  Current status: PT 6A: Pt demonstrates gait without any loss of balance, has baseline L LE limp and abnormal gait presentation from prior knee injury but was not unsteady with multiple dynamic balance challenges. Pt scored 22/24 on the DGI (Dynamic Gait Index) indicating low falling risk. Educated pt regarding safe activity progression and fall prevention. Pt has been up walking unit and hospital hallways already multiple times/day and agrees to continue. Pt has met all PT goals at this time.   Barriers to return to prior living situation: none from a PT standpoint but defer to OT related to cognition/safety  Recommendations for discharge: home  Rationale for recommendations: goals met, pt lacks skilled PT needs in TCU setting       Entered by: Kayleen Llamas 12/10/2018 5:28 PM         Physical Therapy Discharge Summary    Reason for therapy discharge:    All goals and outcomes met, no further needs identified.    Progress towards therapy goal(s). See goals on Care Plan in [facility] electronic health record for goal details.  Goals met    Therapy recommendation(s):    Continue home exercise program.

## 2018-12-10 NOTE — PLAN OF CARE
Discharge Planner SLP   Patient plan for discharge: Prefers home, but agreeable to rehab  Current status: Pt continues to demonstrate difficulty communicating in conversation and unstructured tasks. Provides vague responses and demonstrates frustration with follow-up questions from clinician. Pt able to complete moderate level naming tasks with ~90% accuracy. Increased difficulty with simple numerical reasoning task.  Barriers to return to prior living situation: cognitive-communication deficits; safety/judgement  Recommendations for discharge: inpatient rehab  Rationale for recommendations: anticipate ongoing rehab needs post discharge       Entered by: Rosanne Carranza 12/10/2018 3:22 PM

## 2018-12-10 NOTE — PLAN OF CARE
Temp: 97.4  F (36.3  C) Temp src: Oral BP: 122/71   Heart Rate: 74 Resp: 18 SpO2: 98 % O2 Device: None (Room air)       Status: Pt here for ischemic stroke work up.  VS: Stable, RA.    Neuros: A&O x4. Forgetful.   GI: Regular diet, good intake. Pt reported having BM during the afternoon.   : Difficulty voiding completely and retaining. Last PVR bladder scan around 2130 was 738cc and straight cath for 700 with pink, clear output. Pt self-cath for better success. Continue to educate pt on self-cath.  ?  IV: PIV SL  Activity: Up ad kb in room. UP SBA in hallways. Up in chair multiple times today.   Pain: Denies any pain.   Respiratory/Trach: No issue   Social: No family or friends visited this shift.     Plan of care: Continue to monitor and follow POC.

## 2018-12-10 NOTE — PLAN OF CARE
Status: Pt on 6A s/p ischemic stroke with hemorrhagic transformation.   VS: AVSS on RA.   Neuros: A&Ox4 . Neuros intact except slight expressive aphasia noted.   GI: Regular diet - tolerating well. Last BM 12/9 per pt  : Retaining large amounts of urine. Refused attempting to void. Order to straight cath >600ml. Pt SC at 0410 for 525ml with RN guidance.  IV: PIV SL  Activity: Up ad kb in room. SBA in halls. Went on several walks yesterday.   Pain: denies.   Plan of care: Awaiting TCU placement. Will continue to monitor and follow with POC.

## 2018-12-11 VITALS
SYSTOLIC BLOOD PRESSURE: 104 MMHG | OXYGEN SATURATION: 100 % | DIASTOLIC BLOOD PRESSURE: 71 MMHG | RESPIRATION RATE: 16 BRPM | HEART RATE: 70 BPM | WEIGHT: 164 LBS | TEMPERATURE: 96.4 F | BODY MASS INDEX: 23.87 KG/M2

## 2018-12-11 PROBLEM — R33.9 URINARY RETENTION WITH INCOMPLETE BLADDER EMPTYING: Chronic | Status: ACTIVE | Noted: 2018-12-11

## 2018-12-11 LAB — PLATELET # BLD AUTO: 304 10E9/L (ref 150–450)

## 2018-12-11 PROCEDURE — 25000132 ZZH RX MED GY IP 250 OP 250 PS 637: Performed by: PSYCHIATRY & NEUROLOGY

## 2018-12-11 PROCEDURE — 85049 AUTOMATED PLATELET COUNT: CPT | Performed by: PSYCHIATRY & NEUROLOGY

## 2018-12-11 PROCEDURE — 25000132 ZZH RX MED GY IP 250 OP 250 PS 637: Performed by: NURSE PRACTITIONER

## 2018-12-11 PROCEDURE — 36415 COLL VENOUS BLD VENIPUNCTURE: CPT | Performed by: PSYCHIATRY & NEUROLOGY

## 2018-12-11 RX ORDER — AMOXICILLIN 250 MG
1 CAPSULE ORAL 2 TIMES DAILY
Qty: 180 TABLET | Refills: 3 | DISCHARGE
Start: 2018-12-11 | End: 2018-12-11

## 2018-12-11 RX ORDER — ACETAMINOPHEN 325 MG/1
650 TABLET ORAL EVERY 4 HOURS PRN
DISCHARGE
Start: 2018-12-11 | End: 2019-01-18

## 2018-12-11 RX ORDER — CALCIUM CARBONATE 500 MG/1
2 TABLET, CHEWABLE ORAL EVERY 4 HOURS PRN
Qty: 150 TABLET | DISCHARGE
Start: 2018-12-11 | End: 2019-04-18

## 2018-12-11 RX ORDER — ASPIRIN 81 MG
TABLET,CHEWABLE ORAL 4 TIMES DAILY
Qty: 56.6 G | Refills: 2 | DISCHARGE
Start: 2018-12-11 | End: 2018-12-11

## 2018-12-11 RX ORDER — GABAPENTIN 100 MG/1
100 CAPSULE ORAL 3 TIMES DAILY
Qty: 90 CAPSULE | Refills: 2 | DISCHARGE
Start: 2018-12-11 | End: 2018-12-11

## 2018-12-11 RX ORDER — AMOXICILLIN 250 MG
1 CAPSULE ORAL 2 TIMES DAILY
Qty: 180 TABLET | Refills: 3 | Status: ON HOLD | DISCHARGE
Start: 2018-12-11 | End: 2020-12-12

## 2018-12-11 RX ORDER — LISINOPRIL 5 MG/1
5 TABLET ORAL DAILY
Qty: 90 TABLET | Refills: 3 | DISCHARGE
Start: 2018-12-12 | End: 2019-01-18

## 2018-12-11 RX ORDER — LISINOPRIL 5 MG/1
5 TABLET ORAL DAILY
Qty: 90 TABLET | Refills: 3 | DISCHARGE
Start: 2018-12-12 | End: 2018-12-11

## 2018-12-11 RX ORDER — TAMSULOSIN HYDROCHLORIDE 0.4 MG/1
0.4 CAPSULE ORAL DAILY
Qty: 90 CAPSULE | Refills: 3 | DISCHARGE
Start: 2018-12-12 | End: 2019-01-18

## 2018-12-11 RX ORDER — TAMSULOSIN HYDROCHLORIDE 0.4 MG/1
0.4 CAPSULE ORAL DAILY
Qty: 90 CAPSULE | Refills: 3 | DISCHARGE
Start: 2018-12-12 | End: 2018-12-11

## 2018-12-11 RX ADMIN — TAMSULOSIN HYDROCHLORIDE 0.4 MG: 0.4 CAPSULE ORAL at 08:25

## 2018-12-11 RX ADMIN — APIXABAN 5 MG: 5 TABLET, FILM COATED ORAL at 08:25

## 2018-12-11 RX ADMIN — LISINOPRIL 5 MG: 5 TABLET ORAL at 08:25

## 2018-12-11 RX ADMIN — OFLOXACIN 1 DROP: 3 SOLUTION/ DROPS OPHTHALMIC at 08:26

## 2018-12-11 RX ADMIN — OFLOXACIN 1 DROP: 3 SOLUTION/ DROPS OPHTHALMIC at 04:16

## 2018-12-11 ASSESSMENT — VISUAL ACUITY
OU: NORMAL ACUITY
OU: NORMAL ACUITY

## 2018-12-11 ASSESSMENT — PATIENT HEALTH QUESTIONNAIRE - PHQ9: SUM OF ALL RESPONSES TO PHQ QUESTIONS 1-9: 0

## 2018-12-11 ASSESSMENT — ACTIVITIES OF DAILY LIVING (ADL)
ADLS_ACUITY_SCORE: 12

## 2018-12-11 NOTE — DISCHARGE SUMMARY
[facility] [place]    Neurology Stroke Discharge Summary    Date of Admission: 11/27/2018  Date of Discharge: 12/11/2018    Disposition: Discharged to TCU ([facility])  Primary Care Physician: Sophia Dacosta      Admission Diagnosis:   Left parietal hypodensity on CT Head    Discharge Diagnosis:   Ischemic Stroke due to cardioembolism    Problem Leading to Hospitalization (from HPI):   Acute left posterior parietal ischemic stroke with hemorrhagic transformation    Please see H&P dated 11/27/2018 for further details about presentation.    Brief Hospital Course:   Simone Daniels is a 70 year old male with PMHx of anxiety, GERD who presented w/ 3 days of flu-like symptoms, weakness, and loss of coordination. CT Head revealed left parietal hypodensity with associated intraparenchymal hemorrhage. MRI revealed hemorrhagic infarction. EKG revealed new atrial fibrillation. Echo showed moderate to severe left atrial enlargement and moderate right atrial enlargement without PFO or other ASD. MoCA of 9/30 revealed cognitive deficit. Improved to 12/30 upon reassessment during this hospitalization. TCU recommended by OT for ongoing cognitive impairment precluding management of patient's own finances, ensuring own safety, etc. Otherwise no ongoing physical and/or neurologic deficits.     IV tPA was not administered due to being outside of tPA window, presence of intraparenchymal hemorrhage.      Work-up as stated below under Pertinent Investigations.    Etiology is thought to be cardioembolic in the setting of new atrial fibrillation.       Rehab evaluation: OT.     Smoking Cessation: patient is not a smoker    BP Long-term Goal: 140/90 or less    Antithrombotic/Anticoagulant Agent: apixaban (Eliquis)    Statins: Not indicated for LDL 78       Hgb A1C Goal: < 7.0    Complications: None.     Other problems addressed during this hospitalization:     #Acute ischemic infarct with hemorrhagic  transformation  Likely cardioembolic due to undiagnosed a fib not on anticoagulant. Now on apixaban 5 mg PO BID.     #Atrial fibrillation  Newly diagnosed during this hospitalization by EKG and telemetry. No RVR. [id number] score was 4 on admission. Now anticoagulated on apixaban.      #Elevated troponin  Troponin 0.057 on admission, downtrended to 0.149. Likely type 2 demand ischemia in the setting of afib and stroke, trop max 0.149. Echo completed, no evidence of infarct.     #High blood pressure (resolved)  BP elevated early in hopsitalization, could be 2/2 acute stroke vs untreated chronic hypertension. Started lisinopril and Flomax this admission, BPs now wnl.     #Urinary retention with incontinence  H/o retention and intermittent incontinence at home. Pt did not seek medical advice, was treating himself with natural remedies and was using pads. High PVRs noted during this admission. Briefly with Flores catheter, transitioned to intermittent straight catheterization which patient now performs independently.   - intermittent straight cath  - Continue Flomax 0.4 mg daily  - Follow up with Urology as outpatient     #Constipation  - Senokot 8.6 mg PO BID prn  - repeat enema prn  - aggressive bowel regimen to help alleviate urinary retention      PERTINENT INVESTIGATIONS    Labs  Lipid Panel:   Recent Labs   Lab Test 11/27/18  0701   CHOL 147   HDL 57   LDL 78   TRIG 58     A1C:   Lab Results   Component Value Date    A1C 5.5 11/27/2018     INR: No lab results found in last 7 days.   Coag Panel / Hypercoag Workup: Not indicated  Pending test results: None    Echo (11/27/2018):   Global and regional left ventricular function is normal with an EF of 60-65%.  Global right ventricular function is normal.  Diastolic function not assessed due to atrial fibrillation.  No significant valve dysfunction.  No prior study for comparison.    Imaging:  CT Head w/o contrast (11/26/2018)  3 cm acute intraparenchymal hemorrhage in  the posterior  left parietal lobe with surrounding low attenuation. This likely  represents a subacute cerebral infarction with associated hemorrhage.    CT Head w/o contrast (11/27/2018):  No significant change in left parietal lobe intraparenchymal hematoma  with surrounding confluent edema and loss of gray-white  differentiation. Unchanged associated mass effect with effacement of  adjacent cerebral sulci and partial effacement of the left lateral  ventricle. No new intracranial hemorrhage. Mild to moderate  leukoaraiosis, unchanged. No hydrocephalus.    MR Brain stroke protocol (11/27/2018):  1. No significant change in hemorrhagic posterior left parietal infarct.  2. Head MRA demonstrates no aneurysm or stenosis of the major  intracranial arteries.  3. Neck MRA is partially degraded due to suboptimal bolus timing.  However, the major cervical arteries are patent.    Endovascular procedure: None     Cardiac Monitoring: Patient had > 24 hrs of cardiac monitor while in hospital.    Findings: Patient was found to have atrial fibrillation and was started on apixaban.    Sleep Apnea Screen:   Questions/Answers      1. Prior to your stroke, have you been told that you snore? No.    2. Prior to your stroke, have you been told that you struggle to breath while you are sleeping? No.    3. Prior to your stroke, do you feel tired and sleepy even after getting a normal night of sleep? No.    Sleep Apnea Screen Findings: Patient has 0-1 symptoms of sleep apnea.  Further sleep study is not recommended at this time.    PHQ-9 Depression Screen Score: 0    Education discussed with: patient on follow-up recommendations/plan.    During daily rounds, the plan of care was discussed and developed with patient.  Plan of care includes: rehabilitation at TCU.    PHYSICAL EXAMINATION  Vital Signs:  B/P: 104/71, T: 96.4, P: 70, R: 16    General:  patient lying in bed without any acute distress     HEENT:  normocephalic/atraumatic    Cardio:  Irregularly irregular  Pulmonary:  no respiratory distress  Abdomen:  soft, non-tender, non-distended  Extremities:  no edema, chronic bony enlargement of left knee  Skin:  intact, warm/dry     Neurologic  Mental Status:  fully alert, attentive and oriented, follows commands, speech clear and fluent  Cranial Nerves:  PERRL, EOMI with normal smooth pursuit, facial sensation intact and symmetric, facial movements symmetric  Motor:  no abnormal movements, normal tone throughout, no pronator drift, normal and symmetric rapid finger tapping, able to move all limbs spontaneously, strength 5/5 throughout upper and lower extremities  Reflexes:  2+ and symmetric throughout  Sensory:  intact/symmetric to light touch and pin prick throughout upper and lower extremities  Coordination:  FNF and HS intact without dysmetria  Station/Gait:  Normal casual gait    National Institutes of Health Stroke Scale (on day of discharge)  NIHSS Total Score: 0    Modified Weston Scale (on day of discharge): 0-No symptoms    Medications    Current Discharge Medication List      START taking these medications    Details   acetaminophen (TYLENOL) 325 MG tablet Take 2 tablets (650 mg) by mouth every 4 hours as needed for mild pain    Associated Diagnoses: Arthralgia of left lower leg      apixaban ANTICOAGULANT (ELIQUIS) 5 MG tablet Take 1 tablet (5 mg) by mouth 2 times daily  Qty: 180 tablet, Refills: 3    Associated Diagnoses: Hemorrhagic infarction involving posterior cerebral circulation of left side (H)      calcium carbonate (TUMS) 500 MG chewable tablet Take 2 tablets (1,000 mg) by mouth every 4 hours as needed for heartburn  Qty: 150 tablet    Associated Diagnoses: Gastroesophageal reflux disease, esophagitis presence not specified      lisinopril (PRINIVIL/ZESTRIL) 5 MG tablet Take 1 tablet (5 mg) by mouth daily  Qty: 90 tablet, Refills: 3    Associated Diagnoses: Hemorrhagic infarction involving posterior cerebral circulation  of left side (H)      senna-docusate (SENOKOT-S/PERICOLACE) 8.6-50 MG tablet Take 1 tablet by mouth 2 times daily  Qty: 180 tablet, Refills: 3    Associated Diagnoses: Other constipation      sodium chloride (OCEAN) 0.65 % nasal spray Spray 1 spray into both nostrils every hour as needed for congestion    Associated Diagnoses: Nasal congestion      tamsulosin (FLOMAX) 0.4 MG capsule Take 1 capsule (0.4 mg) by mouth daily  Qty: 90 capsule, Refills: 3    Associated Diagnoses: Urinary retention with incomplete bladder emptying         CONTINUE these medications which have CHANGED    Details   Capsaicin 0.1 % cream Apply topically 4 times daily  Qty: 56.6 g, Refills: 2    Associated Diagnoses: Post herpetic neuralgia      gabapentin (NEURONTIN) 100 MG capsule Take 1 capsule (100 mg) by mouth 3 times daily  Qty: 90 capsule, Refills: 2    Associated Diagnoses: Post herpetic neuralgia         STOP taking these medications       L-CITRULLINE 600 MG OR CAPS Comments:   Reason for Stopping:         ofloxacin (OCUFLOX) 0.3 % ophthalmic solution Comments:   Reason for Stopping:         Q-10 CO-ENZYME OR Comments:   Reason for Stopping:               Additional recommendations and follow up:       General info for SNF    Length of Stay Estimate: Short Term Care: Estimated # of Days <30  Condition at Discharge: Stable  Level of care:board and care  Rehabilitation Potential: Good  Admission H&P remains valid and up-to-date: Yes  Recent Chemotherapy: N/A  Use Nursing Home Standing Orders: N/A     Mantoux instructions    Give two-step Mantoux (PPD) Per Facility Policy Yes     Reason for your hospital stay    Acute ischemic stroke with hemorrhagic conversion     Bladder scan    X 2 for post void residual    Patient self-catheterizes with intermittent straight cath     Activity - Up ad kb     Full Code    Prior     Occupational Therapy Adult Consult    Evaluate and treat as clinically indicated.    Reason:  Cognitive impairment        Patient was seen and discussed with the Attending, Dr. Tony Issa.    Annabella Santos MD  Pager: [phone]

## 2018-12-11 NOTE — PROGRESS NOTES
"CLINICAL NUTRITION SERVICES - ASSESSMENT NOTE     Nutrition Prescription    RECOMMENDATIONS FOR MDs/PROVIDERS TO ORDER:  - none currently     Malnutrition Status:    - unable to complete all parameters of NFPA     Recommendations already ordered by Registered Dietitian (RD):  - Ordered new weight (most recent 11/28)    Future/Additional Recommendations:  Monitor   - Ongoing PO adequacy      REASON FOR ASSESSMENT  Simone Daniels is a/an 70 year old male assessed by the dietitian for LOS    Medical history: Pt with h/o anxiety, GERD admitted w/  generalized chills + subjective fevers as well as incoordination/imbalance, incidentally found to have L parietal hypodensity + associated hemorrhagic conversion.    NUTRITION HISTORY  Assess as able; pt unavailable upon visit     CURRENT NUTRITION ORDERS  Diet: Regular  Intake/Tolerance: tolerating 100% PO     LABS  Labs reviewed    MEDICATIONS  Medications reviewed  Senokot     ANTHROPOMETRICS  Height: 0 cm (Data Unavailable)   Ht Readings from Last 2 Encounters:   04/29/15 1.765 m (5' 9.5\")   05/10/12 1.778 m (5' 10\")   70\"  Most Recent Weight: 76.3 kg (168 lb 3.4 oz)    IBW: 75 kg  BMI: Normal BMI (21.0)  Weight History:   Wt Readings from Last 8 Encounters:   11/28/18 76.3 kg (168 lb 3.4 oz)   11/26/18 81.6 kg (180 lb)   12/22/17 81.2 kg (179 lb)   11/28/17 81.2 kg (179 lb)   06/20/17 81.2 kg (179 lb)   05/30/17 84.4 kg (186 lb)   12/14/15 84.4 kg (186 lb)   05/22/15 84.8 kg (187 lb)   6% weight loss over ~ 1 year. Large discrepancy over 2 days, likely incorrect vs change in fluid status.    Dosing Weight: 76 kg (most recent/driest weight)    ASSESSED NUTRITION NEEDS  Estimated Energy Needs: 2658-7733 kcals/day (25 - 30 kcals/kg)  Justification: Maintenance  Estimated Protein Needs: 76-91 grams protein/day (1 - 1.2 grams of pro/kg)  Justification: Maintenance  Estimated Fluid Needs: 3304-1660 mL/day (1 mL/kcal)   Justification: Maintenance and Per provider pending " fluid status    PHYSICAL FINDINGS  See malnutrition section below.     MALNUTRITION  % Intake: Unable to assess; currently tolerating 100% per flowsheets   % Weight Loss: Weight loss does not meet criteria  Subcutaneous Fat Loss: Unable to assess  Muscle Loss: Unable to assess  Fluid Accumulation/Edema: Unable to assess  Malnutrition Diagnosis: Unable to determine due to unable to complete all parameters of NFPA/pt unavailable upon visit.     NUTRITION DIAGNOSIS  Predicted inadequate nutrient intake  related to hospital LOS as evidenced by potential for menu fatigue and disruptions to regular PO over long hospital stay.       INTERVENTIONS  Implementation  Nutrition Education: Unable to complete due to pt unavailable.  No nutrition interventions identified at this time. Will continue to monitor for continued adequate PO intake/nutrition needs.      Goals  Patient to consume % of nutritionally adequate meal trays TID, or the equivalent with supplements/snacks.     Monitoring/Evaluation  Progress toward goals will be monitored and evaluated per protocol.     Celia Trivedi RD, AARTI, [facility]  Neuro ICU  Pager: [phone]

## 2018-12-11 NOTE — PLAN OF CARE
Pt. admitted w/ 3 days of nausea, generalized chills and subjective fevers as well as generalized weakness and imbalance, was found to have L parietal hypodensity with hemorrhagic conversion. MRI revealed hemorrhagic infarction. VSS on RA with intermittent bradycardia within parameters (50-59). A&Ox4. Neuros include forgetful at times, otherwise intact. Tolerating regular diet. Voids spontaneously in small amounts with high PVR's; straight cathed self @0645 for 500mL. No BM this shift. Up independently. PIV, SL. Had no c/o pain. Plans for rehab pending placement. Continue to monitor and follow POC.

## 2018-12-11 NOTE — PLAN OF CARE
Afebrile, VSS on RA.  Alert and oriented x4.  Denies pain and nausea.  Tolerating regular diet, appetite good.  Up independently in hallway.  Piv removed.  Pt. Transported by [facility] to [facility].  Reort given to receiving nurse.

## 2018-12-11 NOTE — PROGRESS NOTES
Social Work Services Discharge Note      Patient Name:  Simone Daniels     Anticipated Discharge Date:  12/11/18    Discharge Disposition:   [facility] ([phone])    Following MD:  Facility Assignment     Pre-Admission Screening (PAS) online form has been completed.  The Level of Care (LOC) is:  Determined  Confirmation Code is:  [id number].  Patient/caregiver informed of referral to [facility] for Pre-Admission Screening for skilled nursing facility (SNF) placement and to expect a phone call post discharge from SNF.     Additional Services/Equipment Arranged:  SW confirmed readiness for discharge with Dr. Santos.  SW confirmed acceptance to [facility] with Admissions (Rosanne).  SW arranged for [employer] (Erik [phone]) to provide w/c transport at 11:30am.     Patient / Family response to discharge plan:  Pt voices understanding of the discharge plan and agreement with the discharge plan.     Persons notified of above discharge plan:  Pt, 6A nursing and Dr. Santos.  SW (per pt request) left a message for his friend (Marley) informing of the discharge plan.    Staff Discharge Instructions:  Please fax discharge orders and signed hard scripts for any controlled substances (SW will complete this task).  Please print a packet and send with patient.     CTS Handoff completed:  YES    Medicare Notice of Rights provided to the patient/family:  NO   as per Admissions Facesheet, pt is not on medicare.    SILVIA Sanchez  Social Work, 6A  Phone:  [phone]  Pager:  [phone]  12/11/2018

## 2018-12-11 NOTE — PLAN OF CARE
Occupational Therapy Discharge Summary    Reason for therapy discharge:    Discharged to transitional care facility.    Progress towards therapy goal(s). See goals on Care Plan in [facility] electronic health record for goal details.  Goals partially met.  Barriers to achieving goals:   discharge from facility.    Therapy recommendation(s):    Continued therapy is recommended.  Rationale/Recommendations:  To increase safety and independence with ADLs/IADLs. .

## 2018-12-11 NOTE — PLAN OF CARE
Status: Stroke  VS: Stable, RA.    Neuros: A&O x4. Forgetful.   GI: Regular diet, good intake. No BM this shift; +flatus  : Voiding small amounts with high PVR's. Straight cath'd self around 1730 for 650 mL, will continue to assess. Continue to educate patient on self-cath technique.  ?  IV: PIV SL  Activity: Up ad kb in room. Walked halls x1   Pain: Denies pain.   Respiratory/Trach: LS clear   Social: No visitors     Plan of care: Awaiting placement. Continue to monitor and follow POC.     Straight cath at 2300 for 525 mL

## 2018-12-11 NOTE — PLAN OF CARE
SLP: Patient not in room at time of attempt this morning and reportedly out for a walk. RN notified. Will reattempt later today or reschedule for 12-.

## 2018-12-12 ENCOUNTER — NURSING HOME VISIT (OUTPATIENT)
Dept: GERIATRICS | Facility: CLINIC | Age: 73
End: 2018-12-12

## 2018-12-12 ENCOUNTER — PATIENT OUTREACH (OUTPATIENT)
Dept: CARE COORDINATION | Facility: CLINIC | Age: 73
End: 2018-12-12

## 2018-12-12 VITALS
BODY MASS INDEX: 25.03 KG/M2 | HEIGHT: 69 IN | SYSTOLIC BLOOD PRESSURE: 121 MMHG | DIASTOLIC BLOOD PRESSURE: 72 MMHG | TEMPERATURE: 98.2 F | HEART RATE: 77 BPM | WEIGHT: 169 LBS | OXYGEN SATURATION: 98 % | RESPIRATION RATE: 16 BRPM

## 2018-12-12 DIAGNOSIS — I10 ESSENTIAL HYPERTENSION: ICD-10-CM

## 2018-12-12 DIAGNOSIS — I69.30 LATE EFFECTS OF CEREBRAL ISCHEMIC STROKE: Primary | ICD-10-CM

## 2018-12-12 DIAGNOSIS — K59.01 SLOW TRANSIT CONSTIPATION: ICD-10-CM

## 2018-12-12 DIAGNOSIS — I48.91 NEW ONSET ATRIAL FIBRILLATION (H): ICD-10-CM

## 2018-12-12 DIAGNOSIS — R33.9 URINARY RETENTION WITH INCOMPLETE BLADDER EMPTYING: Chronic | ICD-10-CM

## 2018-12-12 PROCEDURE — 99309 SBSQ NF CARE MODERATE MDM 30: CPT | Performed by: NURSE PRACTITIONER

## 2018-12-12 ASSESSMENT — MIFFLIN-ST. JEOR: SCORE: 1506.96

## 2018-12-12 NOTE — PLAN OF CARE
Speech Language Therapy Discharge Summary    Reason for therapy discharge:    Discharged to transitional care facility.    Progress towards therapy goal(s). See goals on Care Plan in [facility] electronic health record for goal details.  Goals partially met.  Barriers to achieving goals:   discharge from facility.    Therapy recommendation(s):    Continued therapy is recommended.  Rationale/Recommendations:  Below baseline communication function. Recommend continued SLP services targeting functional communication.

## 2018-12-12 NOTE — LETTER
[facility] CARE COORDINATION  [place] [id number] [address] [address]  [phone]  December 12, 2018    Simone Daniels  [address]   [place] [place] [facility] [address]      Dear Simone,    I am a clinic care coordinator who works with CAROLANN Leary CNP at [facility]. I wanted to introduce myself and provide you with my contact information so that you can call me with questions or concerns about your health care. Below is a description of clinic care coordination and how I can further assist you.     The clinic care coordinator is a registered nurse and/or  who understand the health care system. The goal of clinic care coordination is to help you manage your health and improve access to the [facility] system in the most efficient manner. The registered nurse can assist you in meeting your health care goals by providing education, coordinating services, and strengthening the communication among your providers. The  can assist you with financial, behavioral, psychosocial, chemical dependency, counseling, and/or psychiatric resources.    Please feel free to contact me at [phone], [phone], with any questions or concerns. We at [facility] are focused on providing you with the highest-quality healthcare experience possible and that all starts with you.     Sincerely,     Carlton Briones RN  Clinic Care Coordinator    Enclosed: I have enclosed a copy of a 24 Hour Access Plan. This has helpful phone numbers for you to call when needed. Please keep this in an easy to access place to use as needed.

## 2018-12-12 NOTE — PROGRESS NOTES
[facility]  PRIMARY CARE PROVIDER AND CLINIC:  Sophia Dacosta [address]  / [facility] [address]  Chief Complaint   Patient presents with     Hospital F/U     [facility] Medical Record Number:  [id number]  Place of Service where encounter took place:  [facility] ([facility]) [[id number]]    HPI:    Simone Daniels is a 70 year old  (5/26/1948),admitted to the above facility from  [facility].  Hospital stay 11/27/18 through 12/11/18.  Admitted to this facility for  rehab, medical management and nursing care.      Hospital Course Per [facility] Discharge Summary 12/11/18:    Simone Daniels is a 70 year old male with PMHx of anxiety, GERD who presented w/ 3 days of flu-like symptoms, weakness, and loss of coordination. CT Head revealed left parietal hypodensity with associated intraparenchymal hemorrhage. MRI revealed hemorrhagic infarction. EKG revealed new atrial fibrillation. Echo showed moderate to severe left atrial enlargement and moderate right atrial enlargement without PFO or other ASD. MoCA of 9/30 revealed cognitive deficit. Improved to 12/30 upon reassessment during this hospitalization. TCU recommended by OT for ongoing cognitive impairment precluding management of patient's own finances, ensuring own safety, etc. Otherwise no ongoing physical and/or neurologic deficits.      IV tPA was not administered due to being outside of tPA window, presence of intraparenchymal hemorrhage.       Work-up as stated below under Pertinent Investigations.     Etiology is thought to be cardioembolic in the setting of new atrial fibrillation.       Rehab evaluation: OT.      Smoking Cessation: patient is not a smoker     BP Long-term Goal: 140/90 or less     Antithrombotic/Anticoagulant Agent: apixaban (Eliquis)     Statins: Not indicated for LDL 78        Hgb A1C Goal: < 7.0     Complications: None.      Other problems addressed during this hospitalization:     #Acute ischemic  infarct with hemorrhagic transformation  Likely cardioembolic due to undiagnosed a fib not on anticoagulant. Now on apixaban 5 mg PO BID.     #Atrial fibrillation  Newly diagnosed during this hospitalization by EKG and telemetry. No RVR. [id number] score was 4 on admission. Now anticoagulated on apixaban.      #Elevated troponin  Troponin 0.057 on admission, downtrended to 0.149. Likely type 2 demand ischemia in the setting of afib and stroke, trop max 0.149. Echo completed, no evidence of infarct.     #High blood pressure (resolved)  BP elevated early in hopsitalization, could be 2/2 acute stroke vs untreated chronic hypertension. Started lisinopril and Flomax this admission, BPs now wnl.     #Urinary retention with incontinence  H/o retention and intermittent incontinence at home. Pt did not seek medical advice, was treating himself with natural remedies and was using pads. High PVRs noted during this admission. Briefly with Flores catheter, transitioned to intermittent straight catheterization which patient now performs independently.   - intermittent straight cath  - Continue Flomax 0.4 mg daily  - Follow up with Urology as outpatient     #Constipation  - Senokot 8.6 mg PO BID prn  - repeat enema prn  - aggressive bowel regimen to help alleviate urinary retention        HPI information obtained from: facility chart records, facility staff, patient report and [facility] chart review.        Current issues are:      Late effects of cerebral ischemic stroke  See above. Patient is being evaluated by therapy today. When his new medications are discussed, he speaks at length about using natural treatments and how he knew for a long time that something was wrong with his heart. See afib. He seems to indicate that he does not want to take many prescriptions.    New onset atrial fibrillation (H)  See above. Current regimen Abixaban 5mg po BID. HR 66-92. Patient reports that for years he would feel palpatations, a few  times a year, and it would last 20-30 seconds. He has had multiple EKGs and been told he is fine. Advised patient that if his afib was paroxysmal it can be challenging to diagnose. Anticoagulation would not be recommended unless there was documented afib.    Essential hypertension  Taking Lisinopril 5mg daily.   BP readings range:  121/72  116/76  116/79  138/68  140/79  131/79  123/78    Urinary retention with incomplete bladder emptying  Taking Flomax 0.4mg po qday. No acute concerns    Slow transit constipation  Bowels moving currently      CODE STATUS/ADVANCE DIRECTIVES DISCUSSION:   CPR/Full code   Patient's living condition: lives alone    ALLERGIES:Patient has no known allergies.  PAST MEDICAL HISTORY:  has a past medical history of LOW BACK PAIN.  PAST SURGICAL HISTORY:  has a past surgical history that includes surgical history of -  (1979); surgical history of - ; and surgical history of -  (1981).  FAMILY HISTORY: family history includes C.A.D. in his brother and mother.  SOCIAL HISTORY:  reports that he quit smoking about 38 years ago. His smoking use included cigarettes. He has a 36.00 pack-year smoking history. He does not have any smokeless tobacco history on file. He reports that he drinks alcohol. He reports that he does not use drugs.    Post Discharge Medication Reconciliation Status: discharge medications reconciled, continue medications without change.  Current Outpatient Medications   Medication Sig Dispense Refill     acetaminophen (TYLENOL) 325 MG tablet Take 2 tablets (650 mg) by mouth every 4 hours as needed for mild pain       apixaban ANTICOAGULANT (ELIQUIS) 5 MG tablet Take 1 tablet (5 mg) by mouth 2 times daily For anticoagulation after stroke 180 tablet 3     calcium carbonate (TUMS) 500 MG chewable tablet Take 2 tablets (1,000 mg) by mouth every 4 hours as needed for heartburn 150 tablet      lisinopril (PRINIVIL/ZESTRIL) 5 MG tablet Take 1 tablet (5 mg) by mouth daily For blood  "pressure control 90 tablet 3     senna-docusate (SENOKOT-S/PERICOLACE) 8.6-50 MG tablet Take 1 tablet by mouth 2 times daily For constipation 180 tablet 3     sodium chloride (OCEAN) 0.65 % nasal spray Spray 1 spray into both nostrils every hour as needed for congestion       tamsulosin (FLOMAX) 0.4 MG capsule Take 1 capsule (0.4 mg) by mouth daily For urinary retention 90 capsule 3       ROS:  10 point ROS of systems including Constitutional, Eyes, Respiratory, Cardiovascular, Gastroenterology, Genitourinary, Integumentary, Musculoskeletal, Psychiatric were all negative except for pertinent positives noted in my HPI.    Exam:  /72   Pulse 77   Temp 98.2  F (36.8  C)   Resp 16   Ht 1.753 m (5' 9\")   Wt 76.7 kg (169 lb)   SpO2 98%   BMI 24.96 kg/m    GENERAL APPEARANCE:  Alert, in no distress, oriented  ENT:  Mouth and posterior oropharynx normal, moist mucous membranes, normal hearing acuity  EYES:  EOM, conjunctivae, lids, pupils and irises normal  NECK:  No adenopathy,masses or thyromegaly  RESP:  respiratory effort and palpation of chest normal, lungs clear to auscultation , no respiratory distress  CV:  Palpation and auscultation of heart done , no edema, rate-normal, few irregular beats noted, but was not irregularly irregular  ABDOMEN:  normal bowel sounds, soft, nontender, no hepatosplenomegaly or other masses  M/S:   Gait and station normal  Digits and nails normal  SKIN:  Inspection of skin and subcutaneous tissue baseline, Palpation of skin and subcutaneous tissue baseline  PSYCH:  oriented X 3, normal insight, judgement and memory, affect and mood normal    Lab/Diagnostic data:     CBC RESULTS:   Recent Labs   Lab Test 12/11/18  0618 12/08/18  0917 12/07/18  0818 12/05/18  0652   WBC  --   --  7.0 7.3   RBC  --   --  4.59 4.57   HGB  --   --  14.3 14.3   HCT  --   --  42.9 42.9   MCV  --   --  94 94   MCH  --   --  31.2 31.3   MCHC  --   --  33.3 33.3   RDW  --   --  13.4 13.5    344 " 302 279       Last Basic Metabolic Panel:  Recent Labs   Lab Test 12/07/18  0818 12/05/18  0652    139   POTASSIUM 4.0 3.9   CHLORIDE 104 104   SANDRA 8.6 8.7   CO2 25 27   BUN 17 15   CR 0.92 0.85   GLC 92 84     TSH   Date Value Ref Range Status   12/16/2015 1.73 0.40 - 4.00 mU/L Final       Lab Results   Component Value Date    A1C 5.5 11/27/2018       ASSESSMENT/PLAN:  (I69.30) Late effects of cerebral ischemic stroke  (primary encounter diagnosis)  Comment: No physical residual effect noted on exam. If there is cognitive impairment, it is subtle.  Plan: PT/OT eval and treat, discharge planning per their recommendations. Continue secondary prevention    (I48.91) New onset atrial fibrillation (H)  Comment: Rate controlled.   Plan: Continue current POC with no changes at this time and adjustments as needed.    (I10) Essential hypertension  Comment: Controlled. Goal < 140/80  Plan: Continue current POC with no changes at this time and adjustments as needed.    (R33.9) Urinary retention with incomplete bladder emptying  Comment: Chronic  Plan: f/u urology    (K59.01) Slow transit constipation  Comment: Controlled with current regimen  Plan: Continue current POC with no changes at this time and adjustments as needed.        Electronically signed by:  CAROLANN Holguin [facility]  Cell: [phone]

## 2018-12-12 NOTE — LETTER
Health Care Home - Access Care Plan    About Me  Patient Name:  Simone Daniels    YOB: 1948  Age:                             70 year old   [place] MRN:            [id number] Telephone Information:   Home Phone [phone]   Mobile Not on file.       Address:    [address]  [place] [address] Email address:  No e-mail address on record      Emergency Contact(s)  Name Relationship Lgl Grd Work Phone Home Phone Mobile Phone   1. SCOOTER ZULUAGA Friend No  [phone]             Health Maintenance:    Health Maintenance Due   Topic Date Due     DTAP/TDAP/TD IMMUNIZATION (1 - Tdap) 05/26/1953     HEPATITIS C SCREENING  05/26/1964     ZOSTER IMMUNIZATION (1 of 2) 05/26/1996     PNEUMOVAX IMMUNIZATION 65+ LOW/MEDIUM RISK (1 of 2 - PCV13) 05/26/2011     AORTIC ANEURYSM SCREENING (SYSTEM ASSIGNED)  05/26/2011     FIT Q1 YR  09/30/2012     FALL RISK ASSESSMENT  05/30/2018     PHQ-2 Q1 YR  05/30/2018     INFLUENZA VACCINE (1) 09/01/2018       My Access Plan  Medical Emergency 911   Questions or concerns during clinic hours Primary Clinic Line, I will call the clinic directly: [facility] - [phone]   24 Hour Appointment Line [phone] or  [phone] [place] [phone]) (toll free)   24 Hour Nurse Line [phone] (toll free)   Questions or concerns outside clinic hours 24 Hour Appointment Line, I will call the after-hours on-call line:   [facility] [phone] or [phone]) (toll-free)   Preferred Urgent Care [facility] - [place], [phone]   [facility] [place], [place]  [phone]   Preferred Pharmacy [facility] - [place], [place] - [place] - [address] [facility]     Behavioral Health Crisis Line The National Suicide Prevention Lifeline at 1-359.181.4380 or 911     My Care Team Members  Patient Care Team       Relationship Specialty Notifications Start End    Sophia Dacosta  CAROLANN London CNP PCP - General Family Practice  5/22/15     Phone: [phone] Fax: [phone]         [address] [place] [address]    Xiomara Brown, RN Clinic Care Coordinator Primary Care -   12/12/18     Phone: [phone] Fax: [phone]               My Medical and Care Information  Problem List   Patient Active Problem List   Diagnosis     Anxiety state     Left knee osteoarthritis     Contact dermatitis and other eczema, due to unspecified cause     ESOPHAGEAL REFLUX     CARDIOVASCULAR SCREENING; LDL GOAL LESS THAN 160     Post herpetic neuralgia     Left parietal hemorrhagic infarction (H)     Urinary retention with incomplete bladder emptying     Other constipation     Nasal congestion      Current Medications and Allergies:  See printed Medication Report

## 2018-12-12 NOTE — PROGRESS NOTES
SW received a 12/11/18 2:57pm return call on  voice mail from [facility] Eliza Amezquita ([phone]).  SW was not available to return this call.  Per 12/11/18 telephone conversation with Admissions at [facility] ([facility]), their SW will attempt to follow up with [facility] Eliza Amezquita ([phone]).    SILVIA Sanchez  Social Work, 6A  Phone:  [phone]  Pager:  [phone]  12/12/2018

## 2018-12-12 NOTE — PROGRESS NOTES
Clinic Care Coordination Contact  Care Coordination Transition Communication    Referral Source: IP Handoff    Clinical Data: Patient was hospitalized at [facility] from 11/27 to 12/11 with diagnosis of Intercerebral hemorrhage.     Transition to Facility:              [facility] ([phone])    Plan: RN/SW Care Coordinator will await notification from facility staff informing RN/SW Care Coordinator of patient's discharge plans/needs. RN/SW Care Coordinator will review chart and outreach to facility staff every 4 weeks and as needed.     Carlton Briones RN  Clinic Care Coordinator  [phone] or [phone]

## 2018-12-12 NOTE — LETTER
12/12/2018        RE: Simone Daniels  [address]  [address]  [place] [place] [facility] [address]        [facility]  PRIMARY CARE PROVIDER AND CLINIC:  Sophia Dacosta [address]  / [facility] [address]  Chief Complaint   Patient presents with     Hospital F/U     [facility] Medical Record Number:  [id number]  Place of Service where encounter took place:  [facility] ([facility]) [[id number]]    HPI:    Simone Daniels is a 70 year old  (5/26/1948),admitted to the above facility from  [facility].  Hospital stay 11/27/18 through 12/11/18.  Admitted to this facility for  rehab, medical management and nursing care.      Hospital Course Per [facility] Discharge Summary 12/11/18:    Simone Daniels is a 70 year old male with PMHx of anxiety, GERD who presented w/ 3 days of flu-like symptoms, weakness, and loss of coordination. CT Head revealed left parietal hypodensity with associated intraparenchymal hemorrhage. MRI revealed hemorrhagic infarction. EKG revealed new atrial fibrillation. Echo showed moderate to severe left atrial enlargement and moderate right atrial enlargement without PFO or other ASD. MoCA of 9/30 revealed cognitive deficit. Improved to 12/30 upon reassessment during this hospitalization. TCU recommended by OT for ongoing cognitive impairment precluding management of patient's own finances, ensuring own safety, etc. Otherwise no ongoing physical and/or neurologic deficits.      IV tPA was not administered due to being outside of tPA window, presence of intraparenchymal hemorrhage.       Work-up as stated below under Pertinent Investigations.     Etiology is thought to be cardioembolic in the setting of new atrial fibrillation.       Rehab evaluation: OT.      Smoking Cessation: patient is not a smoker     BP Long-term Goal: 140/90 or less     Antithrombotic/Anticoagulant Agent: apixaban (Eliquis)     Statins: Not indicated for LDL 78        Hgb A1C Goal: <  7.0     Complications: None.      Other problems addressed during this hospitalization:     #Acute ischemic infarct with hemorrhagic transformation  Likely cardioembolic due to undiagnosed a fib not on anticoagulant. Now on apixaban 5 mg PO BID.     #Atrial fibrillation  Newly diagnosed during this hospitalization by EKG and telemetry. No RVR. [id number] score was 4 on admission. Now anticoagulated on apixaban.      #Elevated troponin  Troponin 0.057 on admission, downtrended to 0.149. Likely type 2 demand ischemia in the setting of afib and stroke, trop max 0.149. Echo completed, no evidence of infarct.     #High blood pressure (resolved)  BP elevated early in hopsitalization, could be 2/2 acute stroke vs untreated chronic hypertension. Started lisinopril and Flomax this admission, BPs now wnl.     #Urinary retention with incontinence  H/o retention and intermittent incontinence at home. Pt did not seek medical advice, was treating himself with natural remedies and was using pads. High PVRs noted during this admission. Briefly with Flores catheter, transitioned to intermittent straight catheterization which patient now performs independently.   - intermittent straight cath  - Continue Flomax 0.4 mg daily  - Follow up with Urology as outpatient     #Constipation  - Senokot 8.6 mg PO BID prn  - repeat enema prn  - aggressive bowel regimen to help alleviate urinary retention        HPI information obtained from: facility chart records, facility staff, patient report and [facility] chart review.        Current issues are:      Late effects of cerebral ischemic stroke  See above. Patient is being evaluated by therapy today. When his new medications are discussed, he speaks at length about using natural treatments and how he knew for a long time that something was wrong with his heart. See afib. He seems to indicate that he does not want to take many prescriptions.    New onset atrial fibrillation (H)  See above.  Current regimen Abixaban 5mg po BID. HR 66-92. Patient reports that for years he would feel palpatations, a few times a year, and it would last 20-30 seconds. He has had multiple EKGs and been told he is fine. Advised patient that if his afib was paroxysmal it can be challenging to diagnose. Anticoagulation would not be recommended unless there was documented afib.    Essential hypertension  Taking Lisinopril 5mg daily.   BP readings range:  121/72  116/76  116/79  138/68  140/79  131/79  123/78    Urinary retention with incomplete bladder emptying  Taking Flomax 0.4mg po qday. No acute concerns    Slow transit constipation  Bowels moving currently      CODE STATUS/ADVANCE DIRECTIVES DISCUSSION:   CPR/Full code   Patient's living condition: lives alone    ALLERGIES:Patient has no known allergies.  PAST MEDICAL HISTORY:  has a past medical history of LOW BACK PAIN.  PAST SURGICAL HISTORY:  has a past surgical history that includes surgical history of -  (1979); surgical history of - ; and surgical history of -  (1981).  FAMILY HISTORY: family history includes C.A.D. in his brother and mother.  SOCIAL HISTORY:  reports that he quit smoking about 38 years ago. His smoking use included cigarettes. He has a 36.00 pack-year smoking history. He does not have any smokeless tobacco history on file. He reports that he drinks alcohol. He reports that he does not use drugs.    Post Discharge Medication Reconciliation Status: discharge medications reconciled, continue medications without change.  Current Outpatient Medications   Medication Sig Dispense Refill     acetaminophen (TYLENOL) 325 MG tablet Take 2 tablets (650 mg) by mouth every 4 hours as needed for mild pain       apixaban ANTICOAGULANT (ELIQUIS) 5 MG tablet Take 1 tablet (5 mg) by mouth 2 times daily For anticoagulation after stroke 180 tablet 3     calcium carbonate (TUMS) 500 MG chewable tablet Take 2 tablets (1,000 mg) by mouth every 4 hours as needed for  "heartburn 150 tablet      lisinopril (PRINIVIL/ZESTRIL) 5 MG tablet Take 1 tablet (5 mg) by mouth daily For blood pressure control 90 tablet 3     senna-docusate (SENOKOT-S/PERICOLACE) 8.6-50 MG tablet Take 1 tablet by mouth 2 times daily For constipation 180 tablet 3     sodium chloride (OCEAN) 0.65 % nasal spray Spray 1 spray into both nostrils every hour as needed for congestion       tamsulosin (FLOMAX) 0.4 MG capsule Take 1 capsule (0.4 mg) by mouth daily For urinary retention 90 capsule 3       ROS:  10 point ROS of systems including Constitutional, Eyes, Respiratory, Cardiovascular, Gastroenterology, Genitourinary, Integumentary, Musculoskeletal, Psychiatric were all negative except for pertinent positives noted in my HPI.    Exam:  /72   Pulse 77   Temp 98.2  F (36.8  C)   Resp 16   Ht 1.753 m (5' 9\")   Wt 76.7 kg (169 lb)   SpO2 98%   BMI 24.96 kg/m     GENERAL APPEARANCE:  Alert, in no distress, oriented  ENT:  Mouth and posterior oropharynx normal, moist mucous membranes, normal hearing acuity  EYES:  EOM, conjunctivae, lids, pupils and irises normal  NECK:  No adenopathy,masses or thyromegaly  RESP:  respiratory effort and palpation of chest normal, lungs clear to auscultation , no respiratory distress  CV:  Palpation and auscultation of heart done , no edema, rate-normal, few irregular beats noted, but was not irregularly irregular  ABDOMEN:  normal bowel sounds, soft, nontender, no hepatosplenomegaly or other masses  M/S:   Gait and station normal  Digits and nails normal  SKIN:  Inspection of skin and subcutaneous tissue baseline, Palpation of skin and subcutaneous tissue baseline  PSYCH:  oriented X 3, normal insight, judgement and memory, affect and mood normal    Lab/Diagnostic data:     CBC RESULTS:   Recent Labs   Lab Test 12/11/18 0618 12/08/18  0917 12/07/18  0818 12/05/18  0652   WBC  --   --  7.0 7.3   RBC  --   --  4.59 4.57   HGB  --   --  14.3 14.3   HCT  --   --  42.9 42.9 "   MCV  --   --  94 94   MCH  --   --  31.2 31.3   MCHC  --   --  33.3 33.3   RDW  --   --  13.4 13.5    344 302 279       Last Basic Metabolic Panel:  Recent Labs   Lab Test 12/07/18  0818 12/05/18  0652    139   POTASSIUM 4.0 3.9   CHLORIDE 104 104   SANDRA 8.6 8.7   CO2 25 27   BUN 17 15   CR 0.92 0.85   GLC 92 84     TSH   Date Value Ref Range Status   12/16/2015 1.73 0.40 - 4.00 mU/L Final       Lab Results   Component Value Date    A1C 5.5 11/27/2018       ASSESSMENT/PLAN:  (I69.30) Late effects of cerebral ischemic stroke  (primary encounter diagnosis)  Comment: No physical residual effect noted on exam. If there is cognitive impairment, it is subtle.  Plan: PT/OT eval and treat, discharge planning per their recommendations. Continue secondary prevention    (I48.91) New onset atrial fibrillation (H)  Comment: Rate controlled.   Plan: Continue current POC with no changes at this time and adjustments as needed.    (I10) Essential hypertension  Comment: Controlled. Goal < 140/80  Plan: Continue current POC with no changes at this time and adjustments as needed.    (R33.9) Urinary retention with incomplete bladder emptying  Comment: Chronic  Plan: f/u urology    (K59.01) Slow transit constipation  Comment: Controlled with current regimen  Plan: Continue current POC with no changes at this time and adjustments as needed.        Electronically signed by:  CAROLANN Holguin [facility]  Cell: [phone]

## 2018-12-19 ENCOUNTER — NURSING HOME VISIT (OUTPATIENT)
Dept: GERIATRICS | Facility: CLINIC | Age: 73
End: 2018-12-19

## 2018-12-19 VITALS
HEART RATE: 67 BPM | BODY MASS INDEX: 25.01 KG/M2 | OXYGEN SATURATION: 99 % | SYSTOLIC BLOOD PRESSURE: 109 MMHG | DIASTOLIC BLOOD PRESSURE: 68 MMHG | RESPIRATION RATE: 18 BRPM | TEMPERATURE: 97.3 F | HEIGHT: 68 IN | WEIGHT: 165 LBS

## 2018-12-19 DIAGNOSIS — I48.91 NEW ONSET ATRIAL FIBRILLATION (H): ICD-10-CM

## 2018-12-19 DIAGNOSIS — I10 ESSENTIAL HYPERTENSION: ICD-10-CM

## 2018-12-19 DIAGNOSIS — I69.30 LATE EFFECTS OF CEREBRAL ISCHEMIC STROKE: Primary | ICD-10-CM

## 2018-12-19 DIAGNOSIS — R33.9 URINARY RETENTION WITH INCOMPLETE BLADDER EMPTYING: Chronic | ICD-10-CM

## 2018-12-19 DIAGNOSIS — K59.01 SLOW TRANSIT CONSTIPATION: ICD-10-CM

## 2018-12-19 PROCEDURE — 99309 SBSQ NF CARE MODERATE MDM 30: CPT | Performed by: NURSE PRACTITIONER

## 2018-12-19 ASSESSMENT — MIFFLIN-ST. JEOR: SCORE: 1465

## 2018-12-19 NOTE — PROGRESS NOTES
[facility]    Chief Complaint   Patient presents with     RECHECK       [place] Medical Record Number:  [id number]  Place of Service where encounter took place:  [facility] ([facility]) [[id number]]    HPI:    Simone Daniels is a 70 year old  (5/26/1948), who is being seen today for an episodic care visit.  Hospital stay was at [facility] from 11/27/18 through 12/11/18. PMH of anxiety and GERD. He presented to the ER with flu like symptoms, loss of coordination. CT/MRI showed left parietal intraparenchymal hemorrhage. He was also newly diagnosed with afib.    Admitted to this facility for  rehab, medical management and nursing care.      HPI information obtained from: facility chart records, facility staff, patient report and [facility] chart review.      Today's concern is:  Late effects of cerebral ischemic stroke  The only residual effect from his stroke is some cognitive impairment. In the hospital MoCA was initially 9/30, then improved to 12/30. Here at TCU, he scored a 5.1 on the CPT, which would indicate ability to manage living alone. However, based on his evaluation and disconnect with some information, therapy recommends he have a driving assessment and may need assistance with finances and medications.     New onset atrial fibrillation (H)  Current regimen Abixaban 5mg po BID. HR 66-92. Patient reports that for years he would feel palpatations, a few times a year, and it would last 20-30 seconds. He has had multiple EKGs and been told he is fine. Advised patient that if his afib was paroxysmal it can be challenging to diagnose.     Essential hypertension  Taking Lisinopril 5mg daily.   BP readings range:  109/68  116/72  121/73  121/72  116/76  116/79  138/68  140/79  131/79  123/78    Urinary retention with incomplete bladder emptying  Taking Flomax 0.4mg po qday. He is doing SIC on his own, says this is going fine.     Slow transit constipation  Bowels  "moving currently per staff, but patient thinks he is constipated.      ALLERGIES: Patient has no known allergies.  Past Medical, Surgical, Family and Social History reviewed and updated in [facility].    Current Outpatient Medications   Medication Sig Dispense Refill     acetaminophen (TYLENOL) 325 MG tablet Take 2 tablets (650 mg) by mouth every 4 hours as needed for mild pain       apixaban ANTICOAGULANT (ELIQUIS) 5 MG tablet Take 1 tablet (5 mg) by mouth 2 times daily For anticoagulation after stroke 180 tablet 3     calcium carbonate (TUMS) 500 MG chewable tablet Take 2 tablets (1,000 mg) by mouth every 4 hours as needed for heartburn 150 tablet      lisinopril (PRINIVIL/ZESTRIL) 5 MG tablet Take 1 tablet (5 mg) by mouth daily For blood pressure control 90 tablet 3     senna-docusate (SENOKOT-S/PERICOLACE) 8.6-50 MG tablet Take 1 tablet by mouth 2 times daily For constipation 180 tablet 3     sodium chloride (OCEAN) 0.65 % nasal spray Spray 1 spray into both nostrils every hour as needed for congestion       tamsulosin (FLOMAX) 0.4 MG capsule Take 1 capsule (0.4 mg) by mouth daily For urinary retention 90 capsule 3     Medications reviewed:  Medications reconciled to facility chart and changes were made to reflect current medications as identified as above med list.     REVIEW OF SYSTEMS:  10 point ROS of systems including Constitutional, Eyes, Respiratory, Cardiovascular, Gastroenterology, Genitourinary, Integumentary, Musculoskeletal, Psychiatric were all negative except for pertinent positives noted in my HPI.    Physical Exam:  /68   Pulse 67   Temp 97.3  F (36.3  C)   Resp 18   Ht 1.715 m (5' 7.5\")   Wt 74.8 kg (165 lb)   SpO2 99%   BMI 25.46 kg/m     GENERAL APPEARANCE:  Alert, in no distress, oriented  ENT:  Mouth and posterior oropharynx normal, moist mucous membranes, normal hearing acuity  EYES:  EOM, conjunctivae, lids, pupils and irises normal  NECK:  No adenopathy,masses or thyromegaly  RESP: "  respiratory effort and palpation of chest normal, lungs clear to auscultation , no respiratory distress  CV:  Palpation and auscultation of heart done , no edema, rate-normal, few irregular beats noted, but was not irregularly irregular  ABDOMEN:  normal bowel sounds, soft, nontender, no hepatosplenomegaly or other masses  M/S:   Gait and station normal  Digits and nails normal  SKIN:  Inspection of skin and subcutaneous tissue baseline, Palpation of skin and subcutaneous tissue baseline  PSYCH:  oriented X 3, normal insight, judgement and memory, affect and mood normal      Recent Labs:     CBC RESULTS:   Recent Labs   Lab Test 12/11/18 0618 12/08/18  0917 12/07/18  0818 12/05/18  0652   WBC  --   --  7.0 7.3   RBC  --   --  4.59 4.57   HGB  --   --  14.3 14.3   HCT  --   --  42.9 42.9   MCV  --   --  94 94   MCH  --   --  31.2 31.3   MCHC  --   --  33.3 33.3   RDW  --   --  13.4 13.5    344 302 279       Last Basic Metabolic Panel:  Recent Labs   Lab Test 12/07/18 0818 12/05/18  0652    139   POTASSIUM 4.0 3.9   CHLORIDE 104 104   SANDRA 8.6 8.7   CO2 25 27   BUN 17 15   CR 0.92 0.85   GLC 92 84         Assessment/Plan:  (I69.30) Late effects of cerebral ischemic stroke  (primary encounter diagnosis)  Comment: No physical residual effect noted on exam. Cognitive impairment is subtle. Further evaluation is recommended on this as he does not have any family to assist him.  Plan: Neuropsych testing to be scheduled. PT/OT/SLP eval and treat, discharge planning per their recommendations. Continue secondary prevention    (I48.91) New onset atrial fibrillation (H)  Comment: Rate controlled. No s/sx side effects with anticoagulation.  Plan: Continue current POC with no changes at this time and adjustments as needed.    (I10) Essential hypertension  Comment: Controlled. Goal < 140/80  Plan: Continue current POC with no changes at this time and adjustments as needed.    (R33.9) Urinary retention with incomplete  bladder emptying  Comment: Chronic  Plan: f/u urology    (K59.01) Slow transit constipation  Comment: Controlled with current regimen  Plan: Continue current POC with no changes at this time and adjustments as needed.      Orders:  Schedule neuropsych testing in house      Electronically signed by  CAROLANN Holguin [facility]  Cell: [phone]

## 2018-12-19 NOTE — LETTER
12/19/2018        RE: Simone Daniels  [address]  [address]  [place] [address]        [facility]    Chief Complaint   Patient presents with     RECHECK       [place] Medical Record Number:  [id number]  Place of Service where encounter took place:  [facility] ([facility]) [[id number]]    HPI:    Simone Daniels is a 70 year old  (5/26/1948), who is being seen today for an episodic care visit.  Hospital stay was at [facility] from 11/27/18 through 12/11/18. PMH of anxiety and GERD. He presented to the ER with flu like symptoms, loss of coordination. CT/MRI showed left parietal intraparenchymal hemorrhage. He was also newly diagnosed with afib.    Admitted to this facility for  rehab, medical management and nursing care.      HPI information obtained from: facility chart records, facility staff, patient report and [facility] chart review.      Today's concern is:  Late effects of cerebral ischemic stroke  The only residual effect from his stroke is some cognitive impairment. In the hospital MoCA was initially 9/30, then improved to 12/30. Here at TCU, he scored a 5.1 on the CPT, which would indicate ability to manage living alone. However, based on his evaluation and disconnect with some information, therapy recommends he have a driving assessment and may need assistance with finances and medications.     New onset atrial fibrillation (H)  Current regimen Abixaban 5mg po BID. HR 66-92. Patient reports that for years he would feel palpatations, a few times a year, and it would last 20-30 seconds. He has had multiple EKGs and been told he is fine. Advised patient that if his afib was paroxysmal it can be challenging to diagnose.     Essential hypertension  Taking Lisinopril 5mg daily.   BP readings range:  109/68  116/72  121/73  121/72  116/76  116/79  138/68  140/79  131/79  123/78    Urinary retention with incomplete bladder emptying  Taking Flomax  "0.4mg po qday. He is doing SIC on his own, says this is going fine.     Slow transit constipation  Bowels moving currently per staff, but patient thinks he is constipated.      ALLERGIES: Patient has no known allergies.  Past Medical, Surgical, Family and Social History reviewed and updated in [facility].    Current Outpatient Medications   Medication Sig Dispense Refill     acetaminophen (TYLENOL) 325 MG tablet Take 2 tablets (650 mg) by mouth every 4 hours as needed for mild pain       apixaban ANTICOAGULANT (ELIQUIS) 5 MG tablet Take 1 tablet (5 mg) by mouth 2 times daily For anticoagulation after stroke 180 tablet 3     calcium carbonate (TUMS) 500 MG chewable tablet Take 2 tablets (1,000 mg) by mouth every 4 hours as needed for heartburn 150 tablet      lisinopril (PRINIVIL/ZESTRIL) 5 MG tablet Take 1 tablet (5 mg) by mouth daily For blood pressure control 90 tablet 3     senna-docusate (SENOKOT-S/PERICOLACE) 8.6-50 MG tablet Take 1 tablet by mouth 2 times daily For constipation 180 tablet 3     sodium chloride (OCEAN) 0.65 % nasal spray Spray 1 spray into both nostrils every hour as needed for congestion       tamsulosin (FLOMAX) 0.4 MG capsule Take 1 capsule (0.4 mg) by mouth daily For urinary retention 90 capsule 3     Medications reviewed:  Medications reconciled to facility chart and changes were made to reflect current medications as identified as above med list.     REVIEW OF SYSTEMS:  10 point ROS of systems including Constitutional, Eyes, Respiratory, Cardiovascular, Gastroenterology, Genitourinary, Integumentary, Musculoskeletal, Psychiatric were all negative except for pertinent positives noted in my HPI.    Physical Exam:  /68   Pulse 67   Temp 97.3  F (36.3  C)   Resp 18   Ht 1.715 m (5' 7.5\")   Wt 74.8 kg (165 lb)   SpO2 99%   BMI 25.46 kg/m      GENERAL APPEARANCE:  Alert, in no distress, oriented  ENT:  Mouth and posterior oropharynx normal, moist mucous membranes, normal hearing " acuity  EYES:  EOM, conjunctivae, lids, pupils and irises normal  NECK:  No adenopathy,masses or thyromegaly  RESP:  respiratory effort and palpation of chest normal, lungs clear to auscultation , no respiratory distress  CV:  Palpation and auscultation of heart done , no edema, rate-normal, few irregular beats noted, but was not irregularly irregular  ABDOMEN:  normal bowel sounds, soft, nontender, no hepatosplenomegaly or other masses  M/S:   Gait and station normal  Digits and nails normal  SKIN:  Inspection of skin and subcutaneous tissue baseline, Palpation of skin and subcutaneous tissue baseline  PSYCH:  oriented X 3, normal insight, judgement and memory, affect and mood normal      Recent Labs:     CBC RESULTS:   Recent Labs   Lab Test 12/11/18 0618 12/08/18  0917 12/07/18  0818 12/05/18  0652   WBC  --   --  7.0 7.3   RBC  --   --  4.59 4.57   HGB  --   --  14.3 14.3   HCT  --   --  42.9 42.9   MCV  --   --  94 94   MCH  --   --  31.2 31.3   MCHC  --   --  33.3 33.3   RDW  --   --  13.4 13.5    344 302 279       Last Basic Metabolic Panel:  Recent Labs   Lab Test 12/07/18  0818 12/05/18  0652    139   POTASSIUM 4.0 3.9   CHLORIDE 104 104   SANDRA 8.6 8.7   CO2 25 27   BUN 17 15   CR 0.92 0.85   GLC 92 84         Assessment/Plan:  (I69.30) Late effects of cerebral ischemic stroke  (primary encounter diagnosis)  Comment: No physical residual effect noted on exam. Cognitive impairment is subtle. Further evaluation is recommended on this as he does not have any family to assist him.  Plan: Neuropsych testing to be scheduled. PT/OT/SLP eval and treat, discharge planning per their recommendations. Continue secondary prevention    (I48.91) New onset atrial fibrillation (H)  Comment: Rate controlled. No s/sx side effects with anticoagulation.  Plan: Continue current POC with no changes at this time and adjustments as needed.    (I10) Essential hypertension  Comment: Controlled. Goal < 140/80  Plan:  Continue current POC with no changes at this time and adjustments as needed.    (R33.9) Urinary retention with incomplete bladder emptying  Comment: Chronic  Plan: f/u urology    (K59.01) Slow transit constipation  Comment: Controlled with current regimen  Plan: Continue current POC with no changes at this time and adjustments as needed.      Orders:  Schedule neuropsych testing in house      Electronically signed by  CAROLANN Holguin CNP   [facility]  Cell: [phone]

## 2018-12-28 ENCOUNTER — PATIENT OUTREACH (OUTPATIENT)
Dept: CARE COORDINATION | Facility: CLINIC | Age: 73
End: 2018-12-28

## 2018-12-28 ENCOUNTER — HOSPITAL LABORATORY (OUTPATIENT)
Dept: OTHER | Facility: CLINIC | Age: 73
End: 2018-12-28

## 2018-12-28 LAB
ANION GAP SERPL CALCULATED.3IONS-SCNC: 7 MMOL/L (ref 3–14)
BASOPHILS # BLD AUTO: 0 10E9/L (ref 0–0.2)
BASOPHILS NFR BLD AUTO: 0.2 %
BUN SERPL-MCNC: 11 MG/DL (ref 7–30)
CALCIUM SERPL-MCNC: 9 MG/DL (ref 8.5–10.1)
CHLORIDE SERPL-SCNC: 104 MMOL/L (ref 94–109)
CO2 SERPL-SCNC: 27 MMOL/L (ref 20–32)
CREAT SERPL-MCNC: 0.98 MG/DL (ref 0.66–1.25)
DIFFERENTIAL METHOD BLD: ABNORMAL
EOSINOPHIL # BLD AUTO: 0.1 10E9/L (ref 0–0.7)
EOSINOPHIL NFR BLD AUTO: 0.5 %
ERYTHROCYTE [DISTWIDTH] IN BLOOD BY AUTOMATED COUNT: 14 % (ref 10–15)
GFR SERPL CREATININE-BSD FRML MDRD: 76 ML/MIN/{1.73_M2}
GLUCOSE SERPL-MCNC: 97 MG/DL (ref 70–99)
HCT VFR BLD AUTO: 40.9 % (ref 40–53)
HGB BLD-MCNC: 13.7 G/DL (ref 13.3–17.7)
IMM GRANULOCYTES # BLD: 0 10E9/L (ref 0–0.4)
IMM GRANULOCYTES NFR BLD: 0.2 %
LYMPHOCYTES # BLD AUTO: 1.1 10E9/L (ref 0.8–5.3)
LYMPHOCYTES NFR BLD AUTO: 8.7 %
MCH RBC QN AUTO: 30.6 PG (ref 26.5–33)
MCHC RBC AUTO-ENTMCNC: 33.5 G/DL (ref 31.5–36.5)
MCV RBC AUTO: 92 FL (ref 78–100)
MONOCYTES # BLD AUTO: 1.9 10E9/L (ref 0–1.3)
MONOCYTES NFR BLD AUTO: 14.7 %
NEUTROPHILS # BLD AUTO: 9.6 10E9/L (ref 1.6–8.3)
NEUTROPHILS NFR BLD AUTO: 75.7 %
NRBC # BLD AUTO: 0 10*3/UL
NRBC BLD AUTO-RTO: 0 /100
PLATELET # BLD AUTO: 190 10E9/L (ref 150–450)
POTASSIUM SERPL-SCNC: 4.1 MMOL/L (ref 3.4–5.3)
RBC # BLD AUTO: 4.47 10E12/L (ref 4.4–5.9)
SODIUM SERPL-SCNC: 138 MMOL/L (ref 133–144)
WBC # BLD AUTO: 12.7 10E9/L (ref 4–11)

## 2018-12-28 ASSESSMENT — ACTIVITIES OF DAILY LIVING (ADL): DEPENDENT_IADLS:: INDEPENDENT;MEDICATION MANAGEMENT

## 2018-12-28 NOTE — PROGRESS NOTES
Clinic Care Coordination Contact  Care Team Conversations    Care Coordinator RN received a call from Sophia HUNT with [facility]. She states patient is discharging on 1/2/19 to home with [facility]. She states patient does not qualify for Medicare as he had failed to pay into it. They did help patient apply for Medical Assistance but is not looking good to receive. He did get [facility] from [facility] and [facility]. She states she talked with [facility] Stefany Fernandez as this will be a Gloria Care as well case. She states patient is needing RN for medication set up and oversight. Patient has had a stroke that caused cognitive issues. Initially the hospital had stated that he scored so poorly for cognitive testing that he was going to have 24/7 supervision she states patient has significantly improved at [facility] where all he needs is oversight by a RN and medication set up. She states he is independent with all his ADL's and IADLs but struggles problem solving and decision making issues. She states he does not have any family at all. He only has one friend Marley Long, she only checks in on him every once in a while and talks to him. She states she is not sure if patient will allow Marley to help him. She states they did encourage him to fill out some sort of power of  paperwork but so far he has been not willing. She states he is scheduled to see Dr. Fowler on 1/3/19.    Plan;  Care Coordinator RN to f/u after discharge from [facility].    Xiomara Brown RN, [facility]  RN Care Coordinator  [facility]  Phone # [phone]  12/28/2018 11:02 AM

## 2019-01-02 ENCOUNTER — DISCHARGE SUMMARY NURSING HOME (OUTPATIENT)
Dept: GERIATRICS | Facility: CLINIC | Age: 74
End: 2019-01-02

## 2019-01-02 VITALS
SYSTOLIC BLOOD PRESSURE: 153 MMHG | HEART RATE: 90 BPM | OXYGEN SATURATION: 100 % | TEMPERATURE: 98.4 F | BODY MASS INDEX: 25.69 KG/M2 | HEIGHT: 68 IN | DIASTOLIC BLOOD PRESSURE: 74 MMHG | RESPIRATION RATE: 18 BRPM | WEIGHT: 169.5 LBS

## 2019-01-02 DIAGNOSIS — I48.91 NEW ONSET A-FIB (H): ICD-10-CM

## 2019-01-02 DIAGNOSIS — K59.01 SLOW TRANSIT CONSTIPATION: ICD-10-CM

## 2019-01-02 DIAGNOSIS — R33.9 URINARY RETENTION WITH INCOMPLETE BLADDER EMPTYING: Chronic | ICD-10-CM

## 2019-01-02 DIAGNOSIS — I10 ESSENTIAL HYPERTENSION: ICD-10-CM

## 2019-01-02 DIAGNOSIS — I69.30 LATE EFFECTS OF CEREBRAL ISCHEMIC STROKE: Primary | ICD-10-CM

## 2019-01-02 PROCEDURE — 99316 NF DSCHRG MGMT 30 MIN+: CPT | Performed by: NURSE PRACTITIONER

## 2019-01-02 ASSESSMENT — MIFFLIN-ST. JEOR: SCORE: 1485.41

## 2019-01-02 NOTE — LETTER
1/2/2019        RE: Simone Daniels  [address]  [address]  [place] [address]          [facility] DISCHARGE SUMMARY    PATIENT'S NAME: Simone Daniels  YOB: 1948  MEDICAL RECORD NUMBER:  [id number]  Place of Service where encounter took place:  [facility] ([facility]) [[id number]]    PRIMARY CARE PROVIDER AND CLINIC RESPONSIBLE AFTER TRANSFER: Sophia Dacosta [address] [facility] [address]     TRANSFERRING PROVIDERS: CAROLANN Holguin CNP, Christel Freitas MD  DATE OF SNF ADMISSION:  December / 11 / 2018  DATE OF [facility] (anticipated) DISCHARGE: January / 02 / 2019  DISCHARGE DISPOSITION: [facility] Provider   RECENT HOSPITALIZATION/ED:  [facility] stay 11/27/18 to 12/11/18.     CODE STATUS/ADVANCE DIRECTIVES DISCUSSION:   DNR / DNI   No Known Allergies  Condition on Discharge:  Improving.  Function:  Ambulating community distances and independent with ADLs  Cognitive Scores: CPT 5.1/5.6    Equipment: no aids    DISCHARGE DIAGNOSIS:   1. Late effects of cerebral ischemic stroke    2. New onset a-fib (H)    3. Essential hypertension    4. Urinary retention with incomplete bladder emptying    5. Slow transit constipation        HPI Nursing Facility Course:  HPI information obtained from: facility chart records, facility staff, patient report and [facility] chart review. Hospital stay was at [facility] from 11/27/18 through 12/11/18. PMH of anxiety and GERD. He presented to the ER with flu like symptoms, loss of coordination. CT/MRI showed left parietal intraparenchymal hemorrhage. He was also newly diagnosed with afib.      Late effects of cerebral ischemic stroke  The only residual effect from his stroke is some cognitive impairment. In the hospital MoCA was initially 9/30, then improved to 12/30. Here at TCU, he scored a 5.1 on the CPT, which would indicate ability to manage living alone. However,  based on therapy evaluation and disconnect with some information, they recommend he have a driving assessment and may need assistance with finances and medications. He has no family, only a neighbor that can help a little if needed.     New onset atrial fibrillation (H)  Current regimen Abixaban 5mg po BID. HR 65-92. Patient reports that for years he would feel palpatations, a few times a year, and it would last 20-30 seconds. He has had multiple EKGs and been told he is fine. He was treating himself with supplements, natural remedies, and seems fairly reluctant to take prescription medictions, but he has been compliant here.    Essential hypertension  Taking Lisinopril 5mg daily.   BP readings range:  153/74  111/66  107/61  109/68  116/72  121/73  121/72  116/76  116/79  138/68  140/79  131/79  123/78    Urinary retention with incomplete bladder emptying  Taking Flomax 0.4mg po qday. He is doing SIC on his own, says this is going fine. Nursing says he is doing this appropriately    Slow transit constipation  On several occasions patient would c/o being constipated, but nursing would report that his bowels seemed to be moving fine. There is some suspicion that what he was feeling was likely his full bladder/urinary retention.      PAST MEDICAL HISTORY:  has a past medical history of LOW BACK PAIN.    DISCHARGE MEDICATIONS:  Current Outpatient Medications   Medication Sig Dispense Refill     acetaminophen (TYLENOL) 325 MG tablet Take 2 tablets (650 mg) by mouth every 4 hours as needed for mild pain       apixaban ANTICOAGULANT (ELIQUIS) 5 MG tablet Take 1 tablet (5 mg) by mouth 2 times daily For anticoagulation after stroke 180 tablet 3     CALCIUM CARBONATE PO Take 1,000 mg by mouth every 4 hours as needed       lisinopril (PRINIVIL/ZESTRIL) 5 MG tablet Take 1 tablet (5 mg) by mouth daily For blood pressure control 90 tablet 3     senna-docusate (SENOKOT-S/PERICOLACE) 8.6-50 MG tablet Take 1 tablet by mouth 2 times  "daily For constipation 180 tablet 3     sodium chloride (OCEAN) 0.65 % nasal spray Spray 1 spray into both nostrils every hour as needed for congestion       tamsulosin (FLOMAX) 0.4 MG capsule Take 1 capsule (0.4 mg) by mouth daily For urinary retention 90 capsule 3       MEDICATION CHANGES/RATIONALE:   None at TCU    Controlled medications sent with patient:   not applicable/none     ROS:    10 point ROS of systems including Constitutional, Eyes, Respiratory, Cardiovascular, Gastroenterology, Genitourinary, Integumentary, Musculoskeletal, Psychiatric were all negative except for pertinent positives noted in my HPI.    Physical Exam:   Vitals: /74   Pulse 90   Temp 98.4  F (36.9  C)   Resp 18   Ht 1.715 m (5' 7.5\")   Wt 76.9 kg (169 lb 8 oz)   SpO2 100%   BMI 26.16 kg/m     BMI= Body mass index is 26.16 kg/m .  GENERAL APPEARANCE:  Alert, in no distress, oriented  ENT:  Mouth and posterior oropharynx normal, moist mucous membranes, normal hearing acuity  EYES:  EOM, conjunctivae, lids, pupils and irises normal  NECK:  No adenopathy,masses or thyromegaly  RESP:  respiratory effort and palpation of chest normal, lungs clear to auscultation , no respiratory distress  CV:  Palpation and auscultation of heart done , no edema, rate-normal, few irregular beats noted, but was not irregularly irregular  ABDOMEN:  normal bowel sounds, soft, nontender, no hepatosplenomegaly or other masses  M/S:   Gait and station normal  Digits and nails normal  SKIN:  Inspection of skin and subcutaneous tissue baseline, Palpation of skin and subcutaneous tissue baseline  PSYCH:  oriented X 3, normal insight, judgement and memory, affect and mood normal      DISCHARGE PLAN:  Registered Nurse and   Patient instructed to follow-up with:  PCP in 7 days      [facility] scheduled appointments:  Future Appointments   Date Time Provider Department Center   1/3/2019  9:20 AM Matti Fowler MD [facility] "       MTM referral needed and placed by this provider: Yes    Pending labs: none    SNF labs     CBC RESULTS:   Recent Labs   Lab Test 12/28/18  0742 12/11/18 0618 12/07/18 0818   WBC 12.7*  --   --  7.0   RBC 4.47  --   --  4.59   HGB 13.7  --   --  14.3   HCT 40.9  --   --  42.9   MCV 92  --   --  94   MCH 30.6  --   --  31.2   MCHC 33.5  --   --  33.3   RDW 14.0  --   --  13.4    304   < > 302    < > = values in this interval not displayed.       Last Basic Metabolic Panel:  Recent Labs   Lab Test 12/28/18 0742 12/07/18 0818    138   POTASSIUM 4.1 4.0   CHLORIDE 104 104   SANDRA 9.0 8.6   CO2 27 25   BUN 11 17   CR 0.98 0.92   GLC 97 92           Discharge Treatments: none    TOTAL DISCHARGE TIME:   Greater than 30 minutes     Electronically signed by:  CAROLANN Holguin [facility]  Cell: [phone]

## 2019-01-02 NOTE — PROGRESS NOTES
[facility] DISCHARGE SUMMARY    PATIENT'S NAME: Simone Daniels  YOB: 1948  MEDICAL RECORD NUMBER:  [id number]  Place of Service where encounter took place:  [facility] ([facility]) [[id number]]    PRIMARY CARE PROVIDER AND CLINIC RESPONSIBLE AFTER TRANSFER: Sophia Dacosta [address]  / [facility] [address]     TRANSFERRING PROVIDERS: CAROLANN Holguin CNP, Christel Freitas MD  DATE OF SNF ADMISSION:  December / 11 / 2018  DATE OF SNF (anticipated) DISCHARGE: January / 02 / 2019  DISCHARGE DISPOSITION: [facility] Provider   RECENT HOSPITALIZATION/ED:  [facility] stay 11/27/18 to 12/11/18.     CODE STATUS/ADVANCE DIRECTIVES DISCUSSION:   DNR / DNI   No Known Allergies  Condition on Discharge:  Improving.  Function:  Ambulating community distances and independent with ADLs  Cognitive Scores: CPT 5.1/5.6    Equipment: no aids    DISCHARGE DIAGNOSIS:   1. Late effects of cerebral ischemic stroke    2. New onset a-fib (H)    3. Essential hypertension    4. Urinary retention with incomplete bladder emptying    5. Slow transit constipation        HPI Nursing Facility Course:  HPI information obtained from: facility chart records, facility staff, patient report and [facility] chart review. Hospital stay was at [facility] from 11/27/18 through 12/11/18. PMH of anxiety and GERD. He presented to the ER with flu like symptoms, loss of coordination. CT/MRI showed left parietal intraparenchymal hemorrhage. He was also newly diagnosed with afib.      Late effects of cerebral ischemic stroke  The only residual effect from his stroke is some cognitive impairment. In the hospital MoCA was initially 9/30, then improved to 12/30. Here at TCU, he scored a 5.1 on the CPT, which would indicate ability to manage living alone. However, based on therapy evaluation and disconnect with some information, they recommend he have a driving  assessment and may need assistance with finances and medications. He has no family, only a neighbor that can help a little if needed.     New onset atrial fibrillation (H)  Current regimen Abixaban 5mg po BID. HR 65-92. Patient reports that for years he would feel palpatations, a few times a year, and it would last 20-30 seconds. He has had multiple EKGs and been told he is fine. He was treating himself with supplements, natural remedies, and seems fairly reluctant to take prescription medictions, but he has been compliant here.    Essential hypertension  Taking Lisinopril 5mg daily.   BP readings range:  153/74  111/66  107/61  109/68  116/72  121/73  121/72  116/76  116/79  138/68  140/79  131/79  123/78    Urinary retention with incomplete bladder emptying  Taking Flomax 0.4mg po qday. He is doing SIC on his own, says this is going fine. Nursing says he is doing this appropriately    Slow transit constipation  On several occasions patient would c/o being constipated, but nursing would report that his bowels seemed to be moving fine. There is some suspicion that what he was feeling was likely his full bladder/urinary retention.      PAST MEDICAL HISTORY:  has a past medical history of LOW BACK PAIN.    DISCHARGE MEDICATIONS:  Current Outpatient Medications   Medication Sig Dispense Refill     acetaminophen (TYLENOL) 325 MG tablet Take 2 tablets (650 mg) by mouth every 4 hours as needed for mild pain       apixaban ANTICOAGULANT (ELIQUIS) 5 MG tablet Take 1 tablet (5 mg) by mouth 2 times daily For anticoagulation after stroke 180 tablet 3     CALCIUM CARBONATE PO Take 1,000 mg by mouth every 4 hours as needed       lisinopril (PRINIVIL/ZESTRIL) 5 MG tablet Take 1 tablet (5 mg) by mouth daily For blood pressure control 90 tablet 3     senna-docusate (SENOKOT-S/PERICOLACE) 8.6-50 MG tablet Take 1 tablet by mouth 2 times daily For constipation 180 tablet 3     sodium chloride (OCEAN) 0.65 % nasal spray Spray 1 spray  "into both nostrils every hour as needed for congestion       tamsulosin (FLOMAX) 0.4 MG capsule Take 1 capsule (0.4 mg) by mouth daily For urinary retention 90 capsule 3       MEDICATION CHANGES/RATIONALE:   None at TCU    Controlled medications sent with patient:   not applicable/none     ROS:    10 point ROS of systems including Constitutional, Eyes, Respiratory, Cardiovascular, Gastroenterology, Genitourinary, Integumentary, Musculoskeletal, Psychiatric were all negative except for pertinent positives noted in my HPI.    Physical Exam:   Vitals: /74   Pulse 90   Temp 98.4  F (36.9  C)   Resp 18   Ht 1.715 m (5' 7.5\")   Wt 76.9 kg (169 lb 8 oz)   SpO2 100%   BMI 26.16 kg/m    BMI= Body mass index is 26.16 kg/m .  GENERAL APPEARANCE:  Alert, in no distress, oriented  ENT:  Mouth and posterior oropharynx normal, moist mucous membranes, normal hearing acuity  EYES:  EOM, conjunctivae, lids, pupils and irises normal  NECK:  No adenopathy,masses or thyromegaly  RESP:  respiratory effort and palpation of chest normal, lungs clear to auscultation , no respiratory distress  CV:  Palpation and auscultation of heart done , no edema, rate-normal, few irregular beats noted, but was not irregularly irregular  ABDOMEN:  normal bowel sounds, soft, nontender, no hepatosplenomegaly or other masses  M/S:   Gait and station normal  Digits and nails normal  SKIN:  Inspection of skin and subcutaneous tissue baseline, Palpation of skin and subcutaneous tissue baseline  PSYCH:  oriented X 3, normal insight, judgement and memory, affect and mood normal      DISCHARGE PLAN:  Registered Nurse and   Patient instructed to follow-up with:  PCP in 7 days      [facility] scheduled appointments:  Future Appointments   Date Time Provider Department Center   1/3/2019  9:20 AM Matti Fowler MD [facility] referral needed and placed by this provider: Yes    Pending labs: none    SNF labs     CBC " RESULTS:   Recent Labs   Lab Test 12/28/18  0742 12/11/18 0618 12/07/18 0818   WBC 12.7*  --   --  7.0   RBC 4.47  --   --  4.59   HGB 13.7  --   --  14.3   HCT 40.9  --   --  42.9   MCV 92  --   --  94   MCH 30.6  --   --  31.2   MCHC 33.5  --   --  33.3   RDW 14.0  --   --  13.4    304   < > 302    < > = values in this interval not displayed.       Last Basic Metabolic Panel:  Recent Labs   Lab Test 12/28/18 0742 12/07/18 0818    138   POTASSIUM 4.1 4.0   CHLORIDE 104 104   SANDRA 9.0 8.6   CO2 27 25   BUN 11 17   CR 0.98 0.92   GLC 97 92           Discharge Treatments: none    TOTAL DISCHARGE TIME:   Greater than 30 minutes     Electronically signed by:  CAROLANN Holguin [facility]  Cell: [phone]

## 2019-01-03 ENCOUNTER — PATIENT OUTREACH (OUTPATIENT)
Dept: CARE COORDINATION | Facility: CLINIC | Age: 74
End: 2019-01-03

## 2019-01-03 ASSESSMENT — ACTIVITIES OF DAILY LIVING (ADL): DEPENDENT_IADLS:: INDEPENDENT;MEDICATION MANAGEMENT

## 2019-01-03 NOTE — PROGRESS NOTES
Clinic Care Coordination Contact  [facility]/Voicemail    Referral Source: SNF/TCU    Clinical Data: Care Coordinator Outreach, discharged from [facility] TCU 1/2/19 to home with [facility]. Was supposed to establish care with Dr. Fowler today but he no showed. Home care is trying to reach him as well.    Outreach attempted x 1.  No answer, unable to leave a message for patient on home number. Cell phone number is not inservice.    Plan: Care Coordinator will mail out care coordination introduction letter with care coordinator contact information and explanation of care coordination services. Care Coordinator will try to reach patient again in 1-2 business days.    Xiomara Brown RN, [facility]  RN Care Coordinator  [facility]  Phone # [phone]  1/3/2019 1:18 PM

## 2019-01-03 NOTE — LETTER
[place] CARE COORDINATION  [facility]  [address] [address]  [phone]    January 3, 2019    Simone Daniels  [address]  [address]   [facility] [address]      Dear Simone,    I am a clinic care coordinator who works at [facility] [employer]. I have been trying to reach you recently to introduce Clinic Care Coordination and to see if there was anything I could assist you with. I know you were just discharged from [facility] transitional care unit on 1/2/19 and you were supposed to establish care with Dr. Fowler at [facility] but no showed.  I wanted to provide you with my contact information so that you can call me with questions or concerns about your health care. Below is a description of clinic care coordination and how I can further assist you.     The clinic care coordinator is a registered nurse and/or  who understand the health care system. The goal of clinic care coordination is to help you manage your health and improve access to the [facility] system in the most efficient manner. The registered nurse can assist you in meeting your health care goals by providing education, coordinating services, and strengthening the communication among your providers. The  can assist you with financial, behavioral, psychosocial, chemical dependency, counseling, and/or psychiatric resources.    Please feel free to contact me at [phone], with any questions or concerns. We at [facility] are focused on providing you with the highest-quality healthcare experience possible and that all starts with you.     Sincerely,     Xiomara Brown          Enclosed: I have enclosed a copy of a 24 Hour Access Plan. This has helpful phone numbers for you to call when needed. Please keep this in an easy to access place to use as needed. I have sent a Consent to Communicate form for you to complete (as you wish) to allow  "us to discuss/share your personal health information with whomever you choose on your behalf.   I have highlighted the areas for you to review/address.  Please complete all that apply.  Please check the box for medical information if you allow the clinic to share personal health information with whomever you choose.  This form must be signed and dated to be valid.  If you check all boxes, please be aware that checking \"nothing\" despite checking other boxes will allow no information to be shared.      If you wish not to have information regarding your mental health, chemical health, AIDS/HIV or sickle cell anemia, then please initial the second bullet point at the end of the form.  If you allow this information to be shared with those noted above on the form, then leave this area blank and do not initial.      This form does not , you can make a change to this document at any time.    "

## 2019-01-03 NOTE — LETTER
Health Care Home - Access Care Plan    About Me  Patient Name:  Simone Daniels    YOB: 1948  Age:                             70 year old   [place] MRN:            [id number] Telephone Information:   Home Phone [phone]   Mobile Not on file.       Address:    [address]  [facility] [place] [address] Email address:  No e-mail address on record      Emergency Contact(s)  Name Relationship Lgl Grd Work Phone Home Phone Mobile Phone   1. SCOOTER ZULUAGA Friend No  [phone]             Health Maintenance: Routine Health maintenance Reviewed: Due/Overdue   Health Maintenance Due   Topic Date Due     HEPATITIS C SCREENING  05/26/1964     ZOSTER IMMUNIZATION (1 of 2) 05/26/1996     AORTIC ANEURYSM SCREENING (SYSTEM ASSIGNED)  05/26/2011     PNEUMOVAX IMMUNIZATION 65+ LOW/MEDIUM RISK (2 of 2 - PCV13) 09/26/2012     FIT Q1 YR  09/30/2012     FALL RISK ASSESSMENT  05/30/2018     INFLUENZA VACCINE (1) 09/01/2018     Pt to talk with provider about what is needed or can continue wait.       My Access Plan  Medical Emergency 916   Questions or concerns during clinic hours Primary Clinic Line, I will call the clinic directly: [facility] - [phone]   24 Hour Appointment Line [phone] or  [phone] [place] ([phone]) (toll free)   24 Hour Nurse Line [phone] (toll free)   Questions or concerns outside clinic hours 24 Hour Appointment Line, I will call the after-hours on-call line:   [facility] [phone] or [phone] ([phone]) (toll-free)   Preferred Urgent Care [facility] [phone]   Preferred Hospital [place] [place]  [phone]   [place] [facility] [place] [place]  [address] [facility]     Behavioral Health Crisis Line The National Suicide Prevention Lifeline at 1-156.503.3628 or 889     My Care Team Members  Patient Care Team       Relationship Specialty  Notifications Start End    Sophia Dacosta APRN CNP PCP - General Family Practice  5/22/15     Phone: [phone] Fax: [phone]         [address] [place] [address]    Teri Reyes APRN CNP PCP - Assigned PCP   12/16/18     Phone: [phone] Fax: [phone] [address] [address]    Xiomara Brown, RN Clinic Care Coordinator Primary Care - [facility] Admissions 12/12/18     Phone: [phone] Fax: [phone]               My Medical and Care Information  Problem List   Patient Active Problem List   Diagnosis     Anxiety state     Left knee osteoarthritis     Contact dermatitis and other eczema, due to unspecified cause     ESOPHAGEAL REFLUX     CARDIOVASCULAR SCREENING; LDL GOAL LESS THAN 160     Post herpetic neuralgia     Left parietal hemorrhagic infarction (H)     Urinary retention with incomplete bladder emptying     Other constipation     Nasal congestion      Current Medications and Allergies:  See printed Medication Report

## 2019-01-04 ENCOUNTER — DOCUMENTATION ONLY (OUTPATIENT)
Dept: CARE COORDINATION | Facility: CLINIC | Age: 74
End: 2019-01-04

## 2019-01-04 ENCOUNTER — NURSE TRIAGE (OUTPATIENT)
Dept: NURSING | Facility: CLINIC | Age: 74
End: 2019-01-04

## 2019-01-04 ENCOUNTER — OFFICE VISIT (OUTPATIENT)
Dept: FAMILY MEDICINE | Facility: CLINIC | Age: 74
End: 2019-01-04

## 2019-01-04 ENCOUNTER — TELEPHONE (OUTPATIENT)
Dept: FAMILY MEDICINE | Facility: CLINIC | Age: 74
End: 2019-01-04

## 2019-01-04 VITALS
BODY MASS INDEX: 25.67 KG/M2 | SYSTOLIC BLOOD PRESSURE: 120 MMHG | HEART RATE: 96 BPM | RESPIRATION RATE: 18 BRPM | DIASTOLIC BLOOD PRESSURE: 72 MMHG | OXYGEN SATURATION: 99 % | WEIGHT: 169.4 LBS | TEMPERATURE: 98 F | HEIGHT: 68 IN

## 2019-01-04 DIAGNOSIS — I48.0 PAF (PAROXYSMAL ATRIAL FIBRILLATION) (H): ICD-10-CM

## 2019-01-04 DIAGNOSIS — R31.9 HEMATURIA, UNSPECIFIED TYPE: ICD-10-CM

## 2019-01-04 DIAGNOSIS — Z86.73 H/O: STROKE: ICD-10-CM

## 2019-01-04 DIAGNOSIS — Z12.11 SPECIAL SCREENING FOR MALIGNANT NEOPLASMS, COLON: Primary | ICD-10-CM

## 2019-01-04 DIAGNOSIS — R41.89 COGNITIVE IMPAIRMENT: ICD-10-CM

## 2019-01-04 PROCEDURE — 99495 TRANSJ CARE MGMT MOD F2F 14D: CPT | Performed by: FAMILY MEDICINE

## 2019-01-04 SDOH — HEALTH STABILITY: MENTAL HEALTH: HOW MANY STANDARD DRINKS CONTAINING ALCOHOL DO YOU HAVE ON A TYPICAL DAY?: 1 OR 2

## 2019-01-04 SDOH — HEALTH STABILITY: MENTAL HEALTH: HOW OFTEN DO YOU HAVE A DRINK CONTAINING ALCOHOL?: 2-4 TIMES A MONTH

## 2019-01-04 SDOH — HEALTH STABILITY: MENTAL HEALTH: HOW OFTEN DO YOU HAVE 6 OR MORE DRINKS ON ONE OCCASION?: NEVER

## 2019-01-04 ASSESSMENT — ACTIVITIES OF DAILY LIVING (ADL): DEPENDENT_IADLS:: INDEPENDENT;MEDICATION MANAGEMENT

## 2019-01-04 ASSESSMENT — MIFFLIN-ST. JEOR: SCORE: 1484.95

## 2019-01-04 NOTE — PROGRESS NOTES
[facility] had referral to see patient after he establishes care with provider.  I talked to patient today and he is refusing to let a nurse come out at this time stating that his house is a mess and he does not want a nurse until he gets his home cleaned up.  He was given our phone number and stated that he will call us when he is ready. If he does call in the future we will need to get new orders then. Please call if you have any questions.    [place] MariaRN  [phone]

## 2019-01-04 NOTE — PROGRESS NOTES
SUBJECTIVE:   Simone Daniels is a 70 year old male who presents to clinic today for the following health issues:          Hospital Follow-up Visit:    DATE OF SNF ADMISSION:  December / 11 / 2018  DATE OF SNF (anticipated) DISCHARGE: January / 02 / 2019  DISCHARGE DISPOSITION: [facility] Provider   RECENT HOSPITALIZATION/ED:  [facility] stay 11/27/18 to 12/11/18.       Reason(s) for Admission:   1. Late effects of cerebral ischemic stroke    2. New onset a-fib (H)    3. Essential hypertension    4. Urinary retention with incomplete bladder emptying    5. Slow transit constipation           yesterday while self cath had blood in cath, because he did not have any other cath supplies at the time, patient cleaned out the cath and used alcohol on tube and re-cathed with same cath. Now he has new supplies but did not have those yesterday.  Patient self cath's twice per day and gets 750 ml each time    Continues to have blood clots        Problems taking medications regularly:  None       Medication changes since discharge: None       Problems adhering to non-medication therapy:  None but patient states would like stop these medications    Summary of hospitalization:  [facility] discharge summary reviewed  Diagnostic Tests/Treatments reviewed.  Follow up needed: none  Other Healthcare Providers Involved in Patient s Care:         None  Update since discharge: hematuria     Post Discharge Medication Reconciliation: discharge medications reconciled, continue medications without change.  Plan of care communicated with patient     Coding guidelines for this visit:  Type of Medical   Decision Making Face-to-Face Visit       within 7 Days of discharge Face-to-Face Visit        within 14 days of discharge   Moderate Complexity 97777 [id number]   High Complexity 78009 [address]278              Problem list and histories reviewed & adjusted, as indicated.  Additional history: as documented    Patient  Active Problem List   Diagnosis     Anxiety state     Left knee osteoarthritis     Contact dermatitis and other eczema, due to unspecified cause     ESOPHAGEAL REFLUX     CARDIOVASCULAR SCREENING; LDL GOAL LESS THAN 160     Post herpetic neuralgia     Left parietal hemorrhagic infarction (H)     Urinary retention with incomplete bladder emptying     Other constipation     Nasal congestion     Past Surgical History:   Procedure Laterality Date     SURGICAL HISTORY OF -       Cervical disc (5,6,&7)     SURGICAL HISTORY OF -       (L) Knee fx tibial plateau     SURGICAL HISTORY OF -       (L) Knee surgery - re-fx tibial plateau       Social History     Tobacco Use     Smoking status: Former Smoker     Packs/day: 2.00     Years: 18.00     Pack years: 36.00     Types: Cigarettes     Last attempt to quit: 1980     Years since quittin.0     Smokeless tobacco: Never Used   Substance Use Topics     Alcohol use: Yes     Frequency: 2-4 times a month     Drinks per session: 1 or 2     Binge frequency: Never     Comment: occaisonal     Family History   Problem Relation Age of Onset     C.A.D. Mother         heart disease in her 60s     C.A.D. Brother         1/2 brother with heart disease     Diabetes No family hx of          Current Outpatient Medications   Medication Sig Dispense Refill     acetaminophen (TYLENOL) 325 MG tablet Take 2 tablets (650 mg) by mouth every 4 hours as needed for mild pain       apixaban ANTICOAGULANT (ELIQUIS) 5 MG tablet Take 1 tablet (5 mg) by mouth 2 times daily For anticoagulation after stroke 180 tablet 3     cholecalciferol (VITAMIN D3) 5000 units (125 mcg) capsule Take 5,000 Units by mouth daily       lisinopril (PRINIVIL/ZESTRIL) 5 MG tablet Take 1 tablet (5 mg) by mouth daily For blood pressure control 90 tablet 3     senna-docusate (SENOKOT-S/PERICOLACE) 8.6-50 MG tablet Take 1 tablet by mouth 2 times daily For constipation 180 tablet 3     tamsulosin (FLOMAX) 0.4 MG capsule  "Take 1 capsule (0.4 mg) by mouth daily For urinary retention 90 capsule 3     No Known Allergies  Recent Labs   Lab Test 12/28/18  0742 12/07/18  0818  11/27/18  0701  12/16/15  0857   A1C  --   --   --  5.5  --   --    LDL  --   --   --  78  --  73   HDL  --   --   --  57  --  76   TRIG  --   --   --  58  --  52   CR 0.98 0.92   < >  --    < > 0.88   GFRESTIMATED 76 80   < >  --    < > 86   GFRESTBLACK 88 >90   < >  --    < > >90   GFR Calc     POTASSIUM 4.1 4.0   < >  --    < > 4.0   TSH  --   --   --   --   --  1.73    < > = values in this interval not displayed.      BP Readings from Last 3 Encounters:   01/04/19 120/72   01/02/19 153/74   12/19/18 109/68    Wt Readings from Last 3 Encounters:   01/04/19 76.8 kg (169 lb 6.4 oz)   01/02/19 76.9 kg (169 lb 8 oz)   12/19/18 74.8 kg (165 lb)                  Labs reviewed in EPIC    Reviewed and updated as needed this visit by clinical staff       Reviewed and updated as needed this visit by Provider         ROS:  Constitutional, HEENT, cardiovascular, pulmonary, GI, , musculoskeletal, neuro, skin, endocrine and psych systems are negative, except as otherwise noted.    OBJECTIVE:     /72   Pulse 96   Temp 98  F (36.7  C)   Resp 18   Ht 1.715 m (5' 7.5\")   Wt 76.8 kg (169 lb 6.4 oz)   SpO2 99%   BMI 26.14 kg/m    Body mass index is 26.14 kg/m .  GENERAL: alert and no distress  EYES: Eyes grossly normal to inspection, PERRL and conjunctivae and sclerae normal  HENT: ear canals and TM's normal, nose and mouth without ulcers or lesions  NECK: no adenopathy, no asymmetry, masses, or scars and thyroid normal to palpation  RESP: lungs clear to auscultation - no rales, rhonchi or wheezes  CV: regular rates and rhythm, normal S1 S2, no S3 or S4 and no murmur, click or rub  ABDOMEN: soft, nontender, no hepatosplenomegaly, no masses and bowel sounds normal   (male): testicles normal without atrophy or masses, no hernias and penis normal without " urethral discharge  NEURO: Normal strength and tone, mentation intact and speech normal    ASSESSMENT/PLAN:       1. Hematuria, unspecified type  -History of urinary retention, was discharged from TCU with self-catheterization.  Hematuria likely traumatic.  Patient would like to continue with coude catheter at home, suggested to leave the catheter in until he sees urology, has to go ER if hematuria worsens over the weekend.  UA and urine culture ordered  - UROLOGY ADULT REFERRAL      2. Special screening for malignant neoplasms, colon  - Fecal colorectal cancer screen (FIT); Future      3. PAF (paroxysmal atrial fibrillation) (H)  -Continue apixaban      4. H/O: stroke  -History of left MCA infarct with hemorrhagic transformation.  Continue apixaban  - NEUROLOGY ADULT REFERRAL      5. Cognitive impairment  - MoCA score was 9/30, consistent with cognitive impairment/dementia.  Care coordinator referral placed  - CARE COORDINATION REFERRAL      Patient Instructions     Patient Education     Blood in the Urine    Blood in the urine (hematuria) has many possible causes. If it occurs after an injury (such as a car accident or fall), it is most often a sign of bruising to the kidney or bladder. Common causes of blood in the urine include urinary tract infections, kidney stones, inflammation, tumors, or certain other diseases of the kidney or bladder. Menstruation can cause blood to appear in the urine sample, although it is not coming from the urinary tract.  If only a trace amount of blood is present, it will show up on the urine test, even though the urine may be yellow and not pink or red. This may occur with any of the above conditions, as well as heavy exercise or high fever. In this case, your doctor may want to repeat the urine test on another day. This will show if the blood is still present. If it is, then other tests can be done to find out the cause.  Home care  Follow these home care guidelines:    If your  urine does not appear bloody (pink, brown or red) then you do not need to restrict your activity in any way.    If you can see blood in your urine, rest and avoid heavy exertion until your next exam. Do not use aspirin, blood thinners, or anti-platelet or anti-inflammatory medicines. These include ibuprofen and naproxen. These thin the blood and may increase bleeding.  Follow-up care  Follow up with your healthcare provider, or as advised. If you were injured and had blood in your urine, you should have a repeat urine test in 1 to 2 days. Contact your doctor for this test.  A radiologist will review any X-rays that were taken. You will be told of any new findings that may affect your care.  When to seek medical advice  Call your healthcare provider right away if any of these occur:    Bright red blood or blood clots in the urine (if you did not have this before)    Weakness, dizziness or fainting    New groin, abdominal, or back pain    Fever of 100.4 F (38 C) or higher, or as directed by your healthcare provider    Repeated vomiting    Bleeding from the nose or gums or easy bruising  Date Last Reviewed: 9/1/2016 2000-2018 [employer]. [address] [place] [address], [place] [address]. All rights reserved. This information is not intended as a substitute for professional medical care. Always follow your healthcare professional's instructions.               Matti Fowler MD  [facility]

## 2019-01-04 NOTE — PROGRESS NOTES
Clinic Care Coordination Contact  [facility]/Voicemail    Referral Source: SNF/TCU    Clinical Data: Care Coordinator Outreach, discharged from [facility]CU 1/2/19 to home with [facility]. Was supposed to establish care with Dr. Fowler 1/3/19 but he no showed but rescheduled 1/4/19. Patient dealing with hematuria after catheter change at home, will continue coude catheter follow up with Urology. MoCA score today 9/30, he made referral for neurology and care coordination. Declined home care stating he wants to wait until his house is cleaned up.    Outreach attempted x 2.   No answer, unable to leave a message for patient on home number.    Plan: Care Coordinator mailed out care coordination introduction letter on 1/3/19. Care Coordinator will try to reach patient again in 3-5 business days.    Xiomara Brown RN, [facility]  RN Care Coordinator  [facility]  Phone # [phone]  1/4/2019 2:39 PM

## 2019-01-04 NOTE — TELEPHONE ENCOUNTER
MTM referral from: Transitions of Care (recent hospital discharge or ED visit)    MTM referral outreach attempt #2 on January 4, 2019 at 3:24 PM      Outcome: Patient not reachable after several attempts, will route to MTM Pharmacist/Provider as an FYI. Thank you for the referral.    Mya Hernández, MTM Coordinator      MTM Pharmacist at clinic where patient receives primary care-no  Referred by a specialist-no, MTM at speciality clinic-NA  Patient covered for MTM-Yes MAIKOL    Blocked MTM provider schedule-no

## 2019-01-04 NOTE — NURSING NOTE
"Chief Complaint   Patient presents with     Hospital F/U       Initial /72   Pulse 96   Temp 98  F (36.7  C)   Resp 18   Ht 1.715 m (5' 7.5\")   Wt 76.8 kg (169 lb 6.4 oz)   SpO2 99%   BMI 26.14 kg/m   Estimated body mass index is 26.14 kg/m  as calculated from the following:    Height as of this encounter: 1.715 m (5' 7.5\").    Weight as of this encounter: 76.8 kg (169 lb 6.4 oz).    Patient presents to the clinic using No DME    Health Maintenance that is potentially due pending provider review:  Colonoscopy/FIT    Given FIT to take home.    Is there anyone who you would like to be able to receive your results? not asked  If yes have patient fill out SERGIO    "

## 2019-01-04 NOTE — PATIENT INSTRUCTIONS
Patient Education     Blood in the Urine    Blood in the urine (hematuria) has many possible causes. If it occurs after an injury (such as a car accident or fall), it is most often a sign of bruising to the kidney or bladder. Common causes of blood in the urine include urinary tract infections, kidney stones, inflammation, tumors, or certain other diseases of the kidney or bladder. Menstruation can cause blood to appear in the urine sample, although it is not coming from the urinary tract.  If only a trace amount of blood is present, it will show up on the urine test, even though the urine may be yellow and not pink or red. This may occur with any of the above conditions, as well as heavy exercise or high fever. In this case, your doctor may want to repeat the urine test on another day. This will show if the blood is still present. If it is, then other tests can be done to find out the cause.  Home care  Follow these home care guidelines:    If your urine does not appear bloody (pink, brown or red) then you do not need to restrict your activity in any way.    If you can see blood in your urine, rest and avoid heavy exertion until your next exam. Do not use aspirin, blood thinners, or anti-platelet or anti-inflammatory medicines. These include ibuprofen and naproxen. These thin the blood and may increase bleeding.  Follow-up care  Follow up with your healthcare provider, or as advised. If you were injured and had blood in your urine, you should have a repeat urine test in 1 to 2 days. Contact your doctor for this test.  A radiologist will review any X-rays that were taken. You will be told of any new findings that may affect your care.  When to seek medical advice  Call your healthcare provider right away if any of these occur:    Bright red blood or blood clots in the urine (if you did not have this before)    Weakness, dizziness or fainting    New groin, abdominal, or back pain    Fever of 100.4 F (38 C) or  higher, or as directed by your healthcare provider    Repeated vomiting    Bleeding from the nose or gums or easy bruising  Date Last Reviewed: 9/1/2016 2000-2018 [employer]. [address] [place] [address], [place] [address]. All rights reserved. This information is not intended as a substitute for professional medical care. Always follow your healthcare professional's instructions.

## 2019-01-07 ENCOUNTER — TELEPHONE (OUTPATIENT)
Dept: FAMILY MEDICINE | Facility: CLINIC | Age: 74
End: 2019-01-07

## 2019-01-07 NOTE — TELEPHONE ENCOUNTER
"PC to pt in F/U to 01-04-19 OV.  States has not noted any further blood/clots in urine.  Has not rec'd cath supplies from [employer] yet therefore has been self-cathing with same \"sterilized\" cath over wknd.  Has not called to schedule urology or neurology appts yet as states needs to get cath supplies first before he will consider making appts. Offered to schedule appts for pt, declines this as states will need to arrange transportation first.   RN contacted [facility] [employer] [facility], cath supply shipment has gone out, pt should receive today or tomorrow.   Attempt to call pt back, unable to LM.  Dr. Fowler updated.  CONOR Webster RN      "

## 2019-01-09 ENCOUNTER — NURSE TRIAGE (OUTPATIENT)
Dept: NURSING | Facility: CLINIC | Age: 74
End: 2019-01-09

## 2019-01-09 NOTE — TELEPHONE ENCOUNTER
Tamsulosin, senna/dss. What are they for? He was prescribed those from [facility]. I used UpToDate to look up the medications. I told him the senna is for stooling and the Tamsulosin is for his prostate.  He said he is using the senna for stooling. He is wondering about the Tamsulosin and whether or not he should be on it, he is cathing. He wants an appointment with [facility], Sophia Dacosta to discuss current care.  I connected the patient with scheduling, for an appointment.    Abbi Riggins RN-[facility]

## 2019-01-10 ENCOUNTER — OFFICE VISIT (OUTPATIENT)
Dept: FAMILY MEDICINE | Facility: CLINIC | Age: 74
End: 2019-01-10

## 2019-01-10 ENCOUNTER — PATIENT OUTREACH (OUTPATIENT)
Dept: CARE COORDINATION | Facility: CLINIC | Age: 74
End: 2019-01-10

## 2019-01-10 VITALS
TEMPERATURE: 98 F | OXYGEN SATURATION: 99 % | RESPIRATION RATE: 14 BRPM | SYSTOLIC BLOOD PRESSURE: 126 MMHG | BODY MASS INDEX: 27.16 KG/M2 | HEART RATE: 70 BPM | WEIGHT: 176 LBS | DIASTOLIC BLOOD PRESSURE: 62 MMHG

## 2019-01-10 DIAGNOSIS — R30.0 DYSURIA: Primary | ICD-10-CM

## 2019-01-10 DIAGNOSIS — I48.20 CHRONIC ATRIAL FIBRILLATION (H): ICD-10-CM

## 2019-01-10 DIAGNOSIS — R82.90 NONSPECIFIC FINDING ON EXAMINATION OF URINE: ICD-10-CM

## 2019-01-10 DIAGNOSIS — I63.9 CEREBROVASCULAR ACCIDENT (CVA), UNSPECIFIED MECHANISM (H): ICD-10-CM

## 2019-01-10 LAB
ALBUMIN UR-MCNC: NEGATIVE MG/DL
APPEARANCE UR: CLEAR
BACTERIA #/AREA URNS HPF: ABNORMAL /HPF
BILIRUB UR QL STRIP: NEGATIVE
COLOR UR AUTO: YELLOW
GLUCOSE UR STRIP-MCNC: NEGATIVE MG/DL
HGB UR QL STRIP: ABNORMAL
KETONES UR STRIP-MCNC: NEGATIVE MG/DL
LEUKOCYTE ESTERASE UR QL STRIP: ABNORMAL
MUCOUS THREADS #/AREA URNS LPF: PRESENT /LPF
NITRATE UR QL: NEGATIVE
PH UR STRIP: 6.5 PH (ref 5–7)
RBC #/AREA URNS AUTO: ABNORMAL /HPF
SOURCE: ABNORMAL
SP GR UR STRIP: 1.01 (ref 1–1.03)
UROBILINOGEN UR STRIP-ACNC: 0.2 EU/DL (ref 0.2–1)
WBC #/AREA URNS AUTO: ABNORMAL /HPF
WBC CLUMPS #/AREA URNS HPF: PRESENT /HPF

## 2019-01-10 PROCEDURE — 99215 OFFICE O/P EST HI 40 MIN: CPT | Performed by: NURSE PRACTITIONER

## 2019-01-10 PROCEDURE — 87086 URINE CULTURE/COLONY COUNT: CPT | Performed by: NURSE PRACTITIONER

## 2019-01-10 PROCEDURE — 81001 URINALYSIS AUTO W/SCOPE: CPT | Performed by: NURSE PRACTITIONER

## 2019-01-10 RX ORDER — SULFAMETHOXAZOLE/TRIMETHOPRIM 800-160 MG
1 TABLET ORAL 2 TIMES DAILY
Qty: 20 TABLET | Refills: 0 | Status: SHIPPED | OUTPATIENT
Start: 2019-01-10 | End: 2019-01-18

## 2019-01-10 ASSESSMENT — ACTIVITIES OF DAILY LIVING (ADL): DEPENDENT_IADLS:: INDEPENDENT;MEDICATION MANAGEMENT

## 2019-01-10 NOTE — PROGRESS NOTES
SUBJECTIVE:   Simone Daniels is a 70 year old male who presents to clinic today for the following health issues:  Thinks this all started after falling and mild hit with a hose when he fell and went in because the right side of the head felt different. FOLLOW UP STROKE 11/2018- Atrial fibrillation     QUIT TAKING THE ELIQUIS about 2 weeks ago. Got a nose bleed so stopped.   Was having nose bleeds lasting about 30 mins    Never started the lisinopril does not want a blood pressure medicine.   Wants to just eat healthy and exercise.   Taking stool softeners now. Senokot once a day.   14 F Targeted Growth. No prescription for them. Going to pay cash for it.   700-750 ml's now 500-650 ml twice a day. Starting to urinate some now.    Cognitive support Sammy. Using them .. Daily for about an hour.   Declining eliquis and statin and lisinopril  Blood in urine yet.  Declines consult in Neurology.   Will go see Urology if blood persists.  No insurance and does not want to run up bigger bill.   Has been doing a straight cath for urinary retention. Bought catheters at [facility].   Doing this twice a day. Lives alone and feels comfortable with that. Decreased appetite.     Results for orders placed or performed in visit on 01/10/19   *UA reflex to Microscopic and Culture ([facility] (except [place] and [place])   Result Value Ref Range    Color Urine Yellow     Appearance Urine Clear     Glucose Urine Negative NEG^Negative mg/dL    Bilirubin Urine Negative NEG^Negative    Ketones Urine Negative NEG^Negative mg/dL    Specific Gravity Urine 1.015 1.003 - 1.035    Blood Urine Trace (A) NEG^Negative    pH Urine 6.5 5.0 - 7.0 pH    Protein Albumin Urine Negative NEG^Negative mg/dL    Urobilinogen Urine 0.2 0.2 - 1.0 EU/dL    Nitrite Urine Negative NEG^Negative    Leukocyte Esterase Urine Large (A) NEG^Negative    Source Midstream Urine    Urine Microscopic   Result Value Ref Range    WBC Urine  10-25 (A) OTO5^0 - 5 /HPF    RBC Urine 2-5 (A) OTO2^O - 2 /HPF    WBC Clumps Present (A) NEG^Negative /HPF    Bacteria Urine Few (A) NEG^Negative /HPF    Mucous Urine Present (A) NEG^Negative /LPF     BP Readings from Last 3 Encounters:   01/10/19 126/62   01/04/19 120/72   01/02/19 153/74     Wt Readings from Last 5 Encounters:   01/10/19 79.8 kg (176 lb)   01/04/19 76.8 kg (169 lb 6.4 oz)   01/02/19 76.9 kg (169 lb 8 oz)   12/19/18 74.8 kg (165 lb)   12/12/18 76.7 kg (169 lb)     Memory feels fine. Eating fine. Sleeping has been fine.   Occasionally fearful/panic attacks.   Does not every wish to go back to NH. States that was a living nightmare.     Genitourinary symptoms      Duration: 1 months     Would like to keep RX at home for UTI in case symptoms start over a weekend.     Description:  hematuria and retention    Intensity:  moderate    Accompanying signs and symptoms (fever/discharge/nausea/vomiting/back or abdominal pain):  None    History (frequent UTI's/kidney stones/prostate problems):  Has been self cathing for about 1 month     Knee pain chronic and ongoing. Improved after home therapy.     -------------------------------------    Problem list and histories reviewed & adjusted, as indicated.  Additional history: as documented    Patient Active Problem List   Diagnosis     Anxiety state     Left knee osteoarthritis     Contact dermatitis and other eczema, due to unspecified cause     ESOPHAGEAL REFLUX     CARDIOVASCULAR SCREENING; LDL GOAL LESS THAN 160     Post herpetic neuralgia     Left parietal hemorrhagic infarction (H)     Urinary retention with incomplete bladder emptying     Other constipation     Nasal congestion     Past Surgical History:   Procedure Laterality Date     SURGICAL HISTORY OF -   1979    Cervical disc (5,6,&7)     SURGICAL HISTORY OF -       (L) Knee fx tibial plateau     SURGICAL HISTORY OF -   1981    (L) Knee surgery - re-fx tibial plateau       Social History     Tobacco  Use     Smoking status: Former Smoker     Packs/day: 2.00     Years: 18.00     Pack years: 36.00     Types: Cigarettes     Last attempt to quit: 1980     Years since quittin.0     Smokeless tobacco: Never Used   Substance Use Topics     Alcohol use: Yes     Frequency: 2-4 times a month     Drinks per session: 1 or 2     Binge frequency: Never     Comment: occaisonal     Family History   Problem Relation Age of Onset     C.A.D. Mother         heart disease in her 60s     C.A.D. Brother         1/2 brother with heart disease     Diabetes No family hx of          Labs reviewed in EPIC    Reviewed and updated as needed this visit by clinical staff       Reviewed and updated as needed this visit by Provider         ROS:   ROS: 10 point ROS neg other than the symptoms noted above in the HPI.      OBJECTIVE:                                                    /62   Pulse 70   Temp 98  F (36.7  C) (Tympanic)   Resp 14   Wt 79.8 kg (176 lb)   SpO2 99%   BMI 27.16 kg/m    Body mass index is 27.16 kg/m .   GENERAL: healthy, alert, well nourished, well hydrated, no distress  HENT: ear canals- normal; TMs- normal; Nose- normal; no evidence of excoriation or bleeding Mouth- no ulcers, no lesions  NECK: no tenderness, no adenopathy, no asymmetry, no masses, no stiffness; thyroid- normal to palpation  RESP: lungs clear to auscultation - no rales, no rhonchi, no wheezes  CV irregularly irregular rate 70   ABDOMEN: soft, no tenderness, no  hepatosplenomegaly, no masses, normal bowel sounds  NEURO: strength and tone- normal, sensory exam- grossly normal, mentation- intact, speech- normal, reflexes- symmetric    Diagnostic test results:  Results for orders placed or performed in visit on 01/10/19   *UA reflex to Microscopic and Culture ([facility] (except [place] and [place])   Result Value Ref Range    Color Urine Yellow     Appearance Urine Clear     Glucose Urine Negative NEG^Negative mg/dL     Bilirubin Urine Negative NEG^Negative    Ketones Urine Negative NEG^Negative mg/dL    Specific Gravity Urine 1.015 1.003 - 1.035    Blood Urine Trace (A) NEG^Negative    pH Urine 6.5 5.0 - 7.0 pH    Protein Albumin Urine Negative NEG^Negative mg/dL    Urobilinogen Urine 0.2 0.2 - 1.0 EU/dL    Nitrite Urine Negative NEG^Negative    Leukocyte Esterase Urine Large (A) NEG^Negative    Source Midstream Urine    Urine Microscopic   Result Value Ref Range    WBC Urine 10-25 (A) OTO5^0 - 5 /HPF    RBC Urine 2-5 (A) OTO2^O - 2 /HPF    WBC Clumps Present (A) NEG^Negative /HPF    Bacteria Urine Few (A) NEG^Negative /HPF    Mucous Urine Present (A) NEG^Negative /LPF   Urine Culture Aerobic Bacterial   Result Value Ref Range    Specimen Description Midstream Urine     Special Requests Specimen received in preservative     Culture Micro No growth         ASSESSMENT/PLAN:                                                    1. Dysuria  Repeat catheter UA/ UC  - *UA reflex to Microscopic and Culture ([facility] (except [place] and [place])  - Urine Microscopic  Prescription  given to have on hand at home  - sulfamethoxazole-trimethoprim (BACTRIM DS/SEPTRA DS) 800-160 MG tablet; Take 1 tablet by mouth 2 times daily for 10 days  Dispense: 20 tablet; Refill: 0  Follow-up with urology as planned recommended.  He will consider.    2. Nonspecific finding on examination of urine  - Urine Culture Aerobic Bacterial    3. Cerebrovascular accident (CVA), unspecified mechanism (H)  Long discussion today with Jay about recommendations regarding his healthcare  Statin recommended and declined.  Flomax recommended and declined.  Lisinopril recommended and declined.  Apixaban recommended and declined.  Follow-up with neurology strongly recommended.  He will consider.    4. Chronic atrial fibrillation (H)  Follow-up with cardiology strongly recommended  Risk factors to include repeat stroke and death reviewed.  He verbalizes  understanding of this and is in agreementm      Follow up with Provider - Call or return to the clinic with any worsening of symptoms or no resolution. Patient/Parent verbalized understanding and is in agreement. Medication side effects reviewed.   Current Outpatient Medications   Medication Sig Dispense Refill     acetaminophen (TYLENOL) 325 MG tablet Take 2 tablets (650 mg) by mouth every 4 hours as needed for mild pain       cholecalciferol (VITAMIN D3) 5000 units (125 mcg) capsule Take 5,000 Units by mouth daily       senna-docusate (SENOKOT-S/PERICOLACE) 8.6-50 MG tablet Take 1 tablet by mouth 2 times daily For constipation 180 tablet 3     STATIN NOT PRESCRIBED (INTENTIONAL) 1 each daily Please choose reason not prescribed, below       sulfamethoxazole-trimethoprim (BACTRIM DS/SEPTRA DS) 800-160 MG tablet Take 1 tablet by mouth 2 times daily for 10 days 20 tablet 0     tamsulosin (FLOMAX) 0.4 MG capsule Take 1 capsule (0.4 mg) by mouth daily For urinary retention 90 capsule 3     apixaban ANTICOAGULANT (ELIQUIS) 5 MG tablet Take 1 tablet (5 mg) by mouth 2 times daily For anticoagulation after stroke 60 tablet 0     lisinopril (PRINIVIL/ZESTRIL) 5 MG tablet Take 1 tablet (5 mg) by mouth daily For blood pressure control (Patient not taking: Reported on 1/10/2019) 90 tablet 3        See Patient Instructions  40 minutes spent today with Jay.  Over half this time was spent educating him on atrial fibrillation, cerebrovascular accidents, urinary retention, osteoarthritis  CAROLANN Leary [facility]

## 2019-01-10 NOTE — PATIENT INSTRUCTIONS
"  Patient Education     Dysuria     Painful urination (dysuria) is often caused by a problem in the urinary tract.   Dysuria is pain felt during urination. It is often described as a burning. Learn more about this problem and how it can be treated.  What causes dysuria?  Possible causes include:    Infection with a bacteria or virus such as a urinary tract infection (UTI or a sexually transmitted infection (STI)    Sensitivity or allergy to chemicals such as those found in lotions and other products    Prostate or bladder problems    Radiation therapy to the pelvic area  How is dysuria diagnosed?  Your healthcare provider will examine you. He or she will ask about your symptoms and health. After talking with you and doing a physical exam, your healthcare provider may know what is causing your dysuria. He or she will usually request  a sample of your urine. Tests of your urine, or a \"urinalysis,\" are done. A urinalysis may include:    Looking at the urine sample (visual exam)    Checking for substances (chemical exam)    Looking at a small amount under a microscope (microscopic exam)  Some parts of the urinalysis may be done in the provider's office and some in a lab. And, the urine sample may be checked for bacteria and yeast (urine culture). Your healthcare provider will tell you more about these tests if they are needed.  How is dysuria treated?  Treatment depends on the cause. If you have a bacterial infection, you may need antibiotics. You may be given medicines to make it easier for you to urinate and help relieve pain. Your healthcare provider can tell you more about your treatment options. Untreated, symptoms may get worse.  When to call your healthcare provider  Call the healthcare provider right away if you have any of the following:    Fever of 100.4 F (38 C) or higher     No improvement after three days of treatment    Trouble urinating because of pain    New or increased discharge from the vagina or " penis    Rash or joint pain    Increased back or abdominal pain    Enlarged painful lymph nodes (lumps) in the groin   Date Last Reviewed: 1/1/2017 2000-2018 [employer]. [address] [place] [address]. All rights reserved. This information is not intended as a substitute for professional medical care. Always follow your healthcare professional's instructions.           Patient Education     Preparing Your Home After Stroke  When your loved one returns home to recover after stroke, you ll both need time to adjust. You and your loved one will have to transfer the skills learned in rehab to a new location. Be patient with your loved one and with yourself. Planning ahead can help long-term recovery go more smoothly.  Planning for home care  After leaving the rehab center, most people need to practice their exercises a few times daily. Others still need ongoing therapy or nursing care. Talk with your  or  about ways of meeting your loved one s needs. You should help get all needed equipment and training in moving and handling to position the patient safely the home.  Setting up the bathroom  For your loved one s safety, set up the bathroom with the features shown here.    Setting up the home  A few minor changes can make home life easier and safer for your loved one. Try these tips:    Turn down the water heater temperature a bit to help avoid burns.    Get a cordless or speaker phone. Program emergency numbers and those of family and friends.    Light halls and stairways. Keep all walkways free of clutter.    Remove any throw rugs. Tape down electrical cords.    Move furniture, if needed, to give more room to move around.     Try to use a ground-floor bedroom if you live in a multilevel home.  Date Last Reviewed: 8/1/2017 2000-2018 [employer]. [address] [place] [address]. All rights reserved. This information is not intended as a  substitute for professional medical care. Always follow your healthcare professional's instructions.           Patient Education     Healthy Lifestyle to Prevent Another Stroke  Breaking old habits can be hard. But when your health is at stake, it s never too late to make changes for the better. Some lifestyle changes might be easy for you. Others might be tough. So if you need help, talk with your doctor, family, and friends.  Make healthy changes    Diet. Your healthcare provider will give you information on dietary changes that you may need to make, based on your situation. Your provider may recommend that you see a registered dietitian for help with diet changes.      Weight management. If you are overweight, your healthcare provider will work with you to lose weight and lower your BMI (body mass index) to a normal or near-normal level. Making diet changes and increasing physical activity can help.      Stop smoking. If you smoke, the time to quit is now. There s no more time for excuses. Smoking raises blood pressure and damages arteries--both of which can lead to a stroke. To stop smoking, ask your doctor for help. Join a stop-smoking program. Make a list of reasons to quit, including that you'll lower your risk of lung cancer, and read it daily.    Limit alcohol. Drinking too much alcohol can raise blood pressure and increase the risk for stroke. Alcohol can also react with certain medicines. Ask your doctor if it s safe for you to drink alcohol.    Get support.  A stroke can leave you feeling frustrated or depressed. Don t ignore your feelings, but don t dwell on them either. Focus on what you can do. Talking to family, friends, your doctor, or clergy can also help.    Reduce stress. Stress can make your heart work harder and raise blood pressure. To reduce stress, try to let go of daily annoyances. Ask yourself if problems will still matter a week from now. Getting proper rest can also help. Finally, don t  be embarrassed to ask for help when you need it.    Exercise. Strength and aerobic training improves your ability to function and do activities of daily living. It also reduces your risk for another stroke. Develop a custom plan with your physical therapist to meet activity goals.  If you have high blood pressure    One of the most important things you can do to prevent another stroke is to keep your blood pressure under control. If you have high blood pressure:    Take all your medicines as directed.    Get regular exercise. Try to work up to getting at least 40 minutes of moderate to high intensity physical activity at least 3 to 4 days each week.     Talk with your doctor about limiting fat and salt in your diet. You most likely will be told to limit your salt intake to 2,400 milligrams (mg) or less each day.     Check your blood pressure regularly. Write down your numbers and bring them to checkups with your doctor.  Manage other health problems  Strokes are often closely related to certain health problems. These include high blood pressure, heart disease, and diabetes. If you have any of these conditions, it s more important than ever to keep them under control. Do this by taking any prescribed medicines and having regular checkups. Keep in mind, too, that the same healthy lifestyle choices that prevent stroke will also help control these health problems.  For family and friends  It s much harder for your loved one to make lifestyle changes if he or she is feeling low. So be on the lookout for sadness, depression, or hopelessness. These feelings are common after a stroke. Talk with the doctor if you have concerns.   Date Last Reviewed: 5/1/2017 2000-2018 [employer]. [address] [place] [address], [place] [address]. All rights reserved. This information is not intended as a substitute for professional medical care. Always follow your healthcare professional's instructions.           Patient Education      Stroke: Resources and Support    After being released from rehab, your loved one may need ongoing therapy or nursing care. Talk with a  or  about planning for care and local sources of support.  Planning for home care    A nurse may come and check his or her blood pressure.    A physical therapist may help with exercises. The therapist will often show him or her and family members certain exercises that can be done without supervision.    Speech and occupational therapists can help the whole family communicate and handle tasks of daily living better.  Adult day care  You may be afraid to leave your loved one alone. Adult day care facilities can provide supervision if you need time away during the day. They also give your loved one a chance to be with other people.  Other resources  You can also check your phone book and the internet for other resources. Try the following listings:    Churches and synagogues    Recreation centers    Adult         Support groups    Online stroke support communities    National Stroke Association  112-267-4748     American Stroke Association  927-486-0315    [employer] Caregiver [employer]  [phone]   Date Last Reviewed: 8/1/2017 2000-2018 [employer]. [address] [place] [address]. All rights reserved. This information is not intended as a substitute for professional medical care. Always follow your healthcare professional's instructions.           Patient Education     Stroke--Self-Care  Routine tasks may be hard after you ve had a stroke (brain attack). But many people can learn ways to manage their daily activities. In fact, daily activities may help you to regain muscle strength and bring back function to affected limbs.  Bathing and dressing  By learning a few new ways of doing things, most people who have had a stroke can bathe and dress themselves. You may want to try the following:    Test water  "temperature with a hand or foot that was not affected by the stroke.    Use grab bars, a shower seat, a hand-held shower, and a long-handled brush.    Dress while sitting, starting with the affected side or limb.    Put on shirts that pull over the head, and pants or skirts with elastic waistbands.    Use zippers with loops attached to them.    Visit the hair salon weekly, or change to a \"wash and wear\" hairstyle to avoid using blow dryers and curling irons.    Use electric saba instead of razors to avoid injuries.    Review grooming with your occupational therapist.  Managing bladder and bowel problems  After your stroke, you may not be able to control your bladder and bowels. Nurses will work closely with you to set up a new routine.    You may be taken to the toilet on a schedule -- perhaps every 2 hours to 3 hours. Making a bathroom stop before going out may also work well.    A time may be set to empty the bowel. This will help train your bladder and bowels to go at specific intervals.    Absorbent briefs or a condom catheter (a small bag that fits over the penis) may be used.    You can use adult diapers if you need to.    Drink fluids (especially caffeine and alcohol) in the daytime and limit them in the evening to avoid having to use the bathroom at night.  Date Last Reviewed: 8/1/2017 2000-2018 [employer]. [address] [place] [address]. All rights reserved. This information is not intended as a substitute for professional medical care. Always follow your healthcare professional's instructions.           Patient Education     Urinary Retention (Male)  Urinary retention is the medical term for difficulty or inability to pass urine, even though your bladder is full.  Causes  The most common cause of urinary retention in men is the bladder outlet being blocked. This can be due to an enlarged prostate gland or a bladder infection. Certain medicines can also cause this problem. " This condition is more likely to occur as men get older.    Symptoms  Common symptoms of urinary retention include:    Pain (not experienced by everyone)    Frequent urination    Feeling that the bladder is still full after urinating    Incontinence (not being able to control the release of urine)    Swollen abdomen  Treatment  This condition is treated by inserting a tube (catheter) into the bladder to drain the urine. This provides immediate relief. The catheter may need to stay in place for a few days. The catheter has a balloon on the tip, which is inflated after insertion. This prevents the catheter from falling out.  Home care    If you were given antibiotics, take them until they are used up, or your healthcare provider tells you to stop. It is important to finish the antibiotics even though you feel better. This is to make sure your infection has cleared.    If a catheter was left in place, it is important to keep bacteria from getting into the collection bag. Don't disconnect the catheter from the collection bag.    Use a leg band to secure the drainage tube, so it does not pull on the catheter. Drain the collection bag when it becomes full using the drain spout at the bottom of the bag.    Don't pull on or try to remove your catheter. This will injure your urethra. The catheter must be removed by a healthcare provider.  Follow-up care  Follow up with your healthcare provider, or as advised.  If a catheter was left in place, it can usually be removed within 3 to 7 days. Some conditions require the catheter to stay in longer. Your healthcare provider will tell you when to return to have the catheter removed.  When to seek medical advice  Call your healthcare provider right away if any of these occur:    Fever of 100.4 F (38 C) or higher, or as directed by your healthcare provider    Bladder or lower-abdominal pain or fullness    Abdominal swelling, nausea, vomiting, or back pain    Blood or urine leakage  around the catheter    Bloody urine coming from the catheter (if a new symptom)    Weakness, dizziness, or fainting    Confusion or change in usual level of alertness    If a catheter was left in place, return if:  ? Catheter falls out  ? Catheter stops draining for 6 hours  Date Last Reviewed: 10/1/2017    8910-3281 [employer]. [address] [place] [address]. All rights reserved. This information is not intended as a substitute for professional medical care. Always follow your healthcare professional's instructions.

## 2019-01-10 NOTE — PROGRESS NOTES
Clinic Care Coordination Contact  RUST/Voicemail    Referral Source: SNF/TCU    Clinical Data: Care Coordinator Outreach, discharged from [facility] 1/2/19 to home with [facility]. Was supposed to establish care with Dr. Fowler 1/3/19 but he no showed but rescheduled 1/4/19. Patient dealing with hematuria after catheter change at home, will continue coude catheter follow up with Urology. MoCA score today 9/30, he made referral for neurology and care coordination. Declined home care stating he wants to wait until his house is cleaned up. OV today with Sophia Dacosta CNP for possible UTI.     Outreach attempted x 3.  No answer, unable to leave a message for patient on home number.    Plan: Care Coordinator mailed out care coordination introduction letter on 1/3/19. Care Coordinator will do no further outreaches at this time.    Xiomara rBown RN, [facility]  RN Care Coordinator  [facility], [facility]  Phone # [phone]  1/10/2019 11:06 AM

## 2019-01-11 ENCOUNTER — APPOINTMENT (OUTPATIENT)
Dept: CT IMAGING | Facility: CLINIC | Age: 74
End: 2019-01-11

## 2019-01-11 ENCOUNTER — HOSPITAL ENCOUNTER (EMERGENCY)
Facility: CLINIC | Age: 74
Discharge: HOME | End: 2019-01-11
Attending: EMERGENCY MEDICINE | Admitting: EMERGENCY MEDICINE

## 2019-01-11 VITALS
WEIGHT: 186 LBS | OXYGEN SATURATION: 99 % | SYSTOLIC BLOOD PRESSURE: 130 MMHG | DIASTOLIC BLOOD PRESSURE: 77 MMHG | RESPIRATION RATE: 16 BRPM | TEMPERATURE: 98.4 F | HEART RATE: 77 BPM | BODY MASS INDEX: 28.7 KG/M2

## 2019-01-11 DIAGNOSIS — R42 DIZZINESS: ICD-10-CM

## 2019-01-11 DIAGNOSIS — I48.0 PAF (PAROXYSMAL ATRIAL FIBRILLATION) (H): ICD-10-CM

## 2019-01-11 DIAGNOSIS — Z86.73 HISTORY OF STROKE: ICD-10-CM

## 2019-01-11 DIAGNOSIS — Z86.79 HISTORY OF ATRIAL FIBRILLATION: ICD-10-CM

## 2019-01-11 LAB
BACTERIA SPEC CULT: NO GROWTH
Lab: NORMAL
SPECIMEN SOURCE: NORMAL

## 2019-01-11 PROCEDURE — 93005 ELECTROCARDIOGRAM TRACING: CPT | Performed by: EMERGENCY MEDICINE

## 2019-01-11 PROCEDURE — 70450 CT HEAD/BRAIN W/O DYE: CPT

## 2019-01-11 PROCEDURE — 99284 EMERGENCY DEPT VISIT MOD MDM: CPT | Mod: 25 | Performed by: EMERGENCY MEDICINE

## 2019-01-11 PROCEDURE — 93010 ELECTROCARDIOGRAM REPORT: CPT | Mod: Z6 | Performed by: EMERGENCY MEDICINE

## 2019-01-11 ASSESSMENT — ENCOUNTER SYMPTOMS
PSYCHIATRIC NEGATIVE: 1
CARDIOVASCULAR NEGATIVE: 1
ENDOCRINE NEGATIVE: 1
EYES NEGATIVE: 1
DIZZINESS: 1
GASTROINTESTINAL NEGATIVE: 1
HEMATOLOGIC/LYMPHATIC NEGATIVE: 1
RESPIRATORY NEGATIVE: 1
MUSCULOSKELETAL NEGATIVE: 1
CONSTITUTIONAL NEGATIVE: 1

## 2019-01-11 NOTE — ED AVS SNAPSHOT
[facility] Emergency Department  [address] [address]  Phone:  [phone]  Fax:  [phone]                                    Simone Daniels   MRN: [id number]    Department:  [facility] Emergency Department   Date of Visit:  1/11/2019           After Visit Summary Signature Page    I have received my discharge instructions, and my questions have been answered. I have discussed any challenges I see with this plan with the nurse or doctor.    ..........................................................................................................................................  Patient/Patient Representative Signature      ..........................................................................................................................................  Patient Representative Print Name and Relationship to Patient    ..................................................               ................................................  Date                                   Time    ..........................................................................................................................................  Reviewed by Signature/Title    ...................................................              ..............................................  Date                                               Time          22EPIC Rev 08/18

## 2019-01-12 NOTE — ED NOTES
States getting up off the couch and had some dizziness that lasted about 30 seconds. Not dizzy not and had no other symptoms. Worried because he had a stroke at [facility]

## 2019-01-12 NOTE — ED PROVIDER NOTES
"  History     Chief Complaint   Patient presents with     Dizziness     HPI  Simone Daniels is a 70 year old male who has a history of low back pain, anxiety, left knee osteoarthritis, GERD, post herpetic neuralgia, and left parietal hemorrhagic infarction who presents to the ED for evaluation of dizziness. Patient reports slight dizziness when he stood up from the couch at 18:30. Patient stood up from the laying position. He characterizes this as a 20-30 seconds of dizziness. Dianen does not describe this as a room spinning.  He reports that this made him fearful due to his recent stroke in November 2018. He has not had any other symptoms since his episode of dizziness. He denies headache, blurred vision, recent falls, trouble speaking, forgetfulness.     Patient was recently started on Eliquis on 1/4/2019. He notes that he is low on this medication, and the last day that he took this medication was 2-3 days ago. Patient reports that this medication scares him. He reports feeling very nervous since his recent stroke in 2018 that \"caught him off guard\", and he is here today to \"cover his bases and remain stable\".  He is also taking Senna and Flomax. Patient reports that he remains active with exercise machines and at home exercises.      Social History: Patient lives in [place], [place]. He is here in the ED via private car with his friend.     Problem List:    Patient Active Problem List    Diagnosis Date Noted     Urinary retention with incomplete bladder emptying 12/11/2018     Priority: Medium     Other constipation 12/11/2018     Priority: Medium     Nasal congestion 12/11/2018     Priority: Medium     Left parietal hemorrhagic infarction (H) 11/27/2018     Priority: Medium     Post herpetic neuralgia 06/19/2017     Priority: Medium     CARDIOVASCULAR SCREENING; LDL GOAL LESS THAN 160 10/31/2010     Priority: Medium     ESOPHAGEAL REFLUX 10/19/2006     Priority: Medium     Left knee osteoarthritis 04/08/2006     " Priority: Medium     Contact dermatitis and other eczema, due to unspecified cause 2006     Priority: Medium     Anxiety state 2005     Priority: Medium     Problem list name updated by automated process. Provider to review          Past Medical History:    Past Medical History:   Diagnosis Date     LOW BACK PAIN        Past Surgical History:    Past Surgical History:   Procedure Laterality Date     SURGICAL HISTORY OF -       Cervical disc (5,6,&7)     SURGICAL HISTORY OF -       (L) Knee fx tibial plateau     SURGICAL HISTORY OF -       (L) Knee surgery - re-fx tibial plateau       Family History:    Family History   Problem Relation Age of Onset     C.A.D. Mother         heart disease in her 60s     C.A.D. Brother         1/2 brother with heart disease     Diabetes No family hx of        Social History:  Marital Status:  Single [1]  Social History     Tobacco Use     Smoking status: Former Smoker     Packs/day: 2.00     Years: 18.00     Pack years: 36.00     Types: Cigarettes     Last attempt to quit: 1980     Years since quittin.0     Smokeless tobacco: Never Used   Substance Use Topics     Alcohol use: Yes     Frequency: 2-4 times a month     Drinks per session: 1 or 2     Binge frequency: Never     Comment: occaisonal     Drug use: No        Medications:      apixaban ANTICOAGULANT (ELIQUIS) 5 MG tablet   acetaminophen (TYLENOL) 325 MG tablet   cholecalciferol (VITAMIN D3) 5000 units (125 mcg) capsule   lisinopril (PRINIVIL/ZESTRIL) 5 MG tablet   senna-docusate (SENOKOT-S/PERICOLACE) 8.6-50 MG tablet   STATIN NOT PRESCRIBED (INTENTIONAL)   sulfamethoxazole-trimethoprim (BACTRIM DS/SEPTRA DS) 800-160 MG tablet   tamsulosin (FLOMAX) 0.4 MG capsule         Review of Systems   Constitutional: Negative.    HENT: Negative.    Eyes: Negative.    Respiratory: Negative.    Cardiovascular: Negative.    Gastrointestinal: Negative.    Endocrine: Negative.    Genitourinary: Negative.     Musculoskeletal: Negative.    Skin: Negative.    Neurological: Positive for dizziness.   Hematological: Negative.    Psychiatric/Behavioral: Negative.    All other systems reviewed and are negative.      Physical Exam   BP: 146/83  Pulse: 72  Temp: 98.4  F (36.9  C)  Resp: 16  Weight: 84.4 kg (186 lb)  SpO2: 99 %      Physical Exam   Constitutional: He is oriented to person, place, and time. He appears well-developed and well-nourished.   HENT:   Head: Normocephalic and atraumatic.   Right Ear: External ear normal.   Eyes: EOM are normal. Pupils are equal, round, and reactive to light. Right eye exhibits no discharge. Left eye exhibits no discharge. No scleral icterus.   Neck: Normal range of motion. Neck supple. No JVD present. No tracheal deviation present. No thyromegaly present.   Cardiovascular: Normal rate. Exam reveals no gallop and no friction rub.   No murmur heard.  Pulmonary/Chest: Effort normal and breath sounds normal. No stridor. No respiratory distress. He has no wheezes. He has no rales. He exhibits no tenderness.   Abdominal: Soft.   Musculoskeletal: Normal range of motion. He exhibits no edema, tenderness or deformity.   Lymphadenopathy:     He has no cervical adenopathy.   Neurological: He is alert and oriented to person, place, and time. He displays normal reflexes. No cranial nerve deficit or sensory deficit. He exhibits normal muscle tone. Coordination normal.   Skin: Skin is warm. Capillary refill takes less than 2 seconds.   Psychiatric: He has a normal mood and affect. His behavior is normal. Judgment and thought content normal.       ED Course        Procedures                  EKG Interpretation:      Interpreted by Mukund Rivera  Time reviewed:19:50  Symptoms at time of EKG: None   Rhythm: Normal sinus  and Atrial fibrillation - controlled  Rate: Normal  Axis: Normal  Ectopy: None  Conduction: Normal  ST Segments/ T Waves: Non-specific ST-T wave changes  Q Waves: None and  Nonspecific  Comparison to prior: When compared with EKG dated 11/27/18    Clinical Impression: no acute changes      Critical Care time:  none                   Medications - No data to display  ED medications: none      ED labs and imaging:  Results for orders placed or performed during the hospital encounter of 01/11/19   Head CT w/o contrast    Narrative    CT SCAN OF THE HEAD WITHOUT CONTRAST   1/11/2019 8:22 PM     HISTORY: Recent history of ischemic stroke with hemorrhagic  transformation (11/27/18), sudden onset of dizziness on apixaban.      TECHNIQUE: Axial images of the head and coronal reformations without  IV contrast material. Radiation dose for this scan was reduced using  automated exposure control, adjustment of the mA and/or kV according  to patient size, or iterative reconstruction technique.    COMPARISON: Brain MR 11/27/2018. Head CT 11/27/2018.    FINDINGS: Previous parenchymal hemorrhage centered in the left  parietal lobe has contracted and almost completely resolved. Area of  encephalomalacia in the area of infarct is again seen and is better  defined since prior. There now appears to be volume loss in this area  of infarct with resolution of previous regional edema. No new  intracranial hemorrhage is appreciated. No significant mass effect or  midline shift. Ventricular size is unchanged without evidence of  hydrocephalus. Mild patchy periventricular white matter hypodensities  which are nonspecific, but most likely related to chronic  microvascular ischemic disease.    The visualized portions of the sinuses and mastoids appear normal. The  bony calvarium and bones of the skull base appear intact.       Impression    IMPRESSION: The previous parenchymal hemorrhage centered in the left  parietal lobe has essentially resolved. The surrounding area of  infarct is better defined since prior with decrease in regional edema  in that area. This is compatible with expected evolution of  the  infarct. No new intracranial hemorrhage.    SAURAV MELGAR MD       ED Vitals:  Vitals:    01/11/19 1915 01/11/19 1921 01/11/19 2032 01/11/19 2033   BP: 146/83  130/77    Pulse: 72  77    Resp: 16      Temp: 98.4  F (36.9  C)      TempSrc: Temporal      SpO2: 99%   99%   Weight:  84.4 kg (186 lb)       Prior or recent diagnostic imaging and work-up:  MRI brain without and with contrast  MRA of the head without contrast  Neck MRA without and with contrast on 11/27/18     Provided History:  ICH, concern for possible mets.     Comparison:  Head CT earlier today  Impression:  1. No significant change in hemorrhagic posterior left parietal  infarct.  2. Head MRA demonstrates no aneurysm or stenosis of the major  intracranial arteries.  3. Neck MRA is partially degraded due to suboptimal bolus timing.  However, the major cervical arteries are patent.     I have personally reviewed the examination and initial interpretation  and I agree with the findings.     MERCEDES ELLIOTT MD    CT HEAD WITHOUT CONTRAST  11/27/2018 1:38 AM      HISTORY: Walking into doors. Generalized weakness and loss of  coordination. Flu-like symptoms.     COMPARISON: None.     TECHNIQUE: Without intravenous contrast, helical sections were  acquired through the brain. Coronal reconstructions were generated.  Radiation dose for this scan was reduced using automated exposure  control, adjustment of the mA and/or kV according to the patient's  size, or iterative reconstruction technique.     FINDINGS: A 2.7 cm lobulated high attenuation structure in the  posterior left parietal lobe, consistent with acute intraparenchymal  hemorrhage. There is a 6 cm region of circumscribed low-attenuation in  the surrounding cerebral white and gray matter. No significant mass  effect. Mild periventricular white matter low attenuation likely  relating to chronic small vessel ischemic disease. The cerebral  ventricles are normal in caliber. The basal cisterns are  patent. No  extra-axial fluid collection. The visualized portions of the paranasal  sinuses and mastoid air cells are unremarkable.                                                                    IMPRESSION: 3 cm acute intraparenchymal hemorrhage in the posterior  left parietal lobe with surrounding low attenuation. This likely  represents a subacute cerebral infarction with associated hemorrhage.     [Critical Result: Acute intracranial hemorrhage]     Finding was identified on 11/27/2018 2:08 AM.     Assessments & Plan (with Medical Decision Making)   Clinical impression: 70-year-old male who presented for evaluation for an episode of dizziness that lasted about 30 seconds prior to ED arrival.  He was concerned because he recently had a ischemic stroke which was felt to be due to a cardioembolic process after he was diagnosed with atrial fibrillation during his evaluation and care.  He was seen on November 27, 2018 after presenting with flulike illness and had  head CT imaging which showed a left posterior parietal ischemic stroke with hemorrhagic transformation.  He is currently on apixaban.  Patient admits that he has not been taking his apixaban daily as prescribed but reports he takes it sparingly.  He also reports is currently almost out of apixaban.  He also reports has had nosebleeds in the last 2 weeks and has stretched out his dosing of his apixaban, About an hour before I evaluated the patient he reports around 6:30 PM he was laying down on the couch and got up.  He reports upon standing he felt dizzy.  The episode lasted about 20-30 seconds.  He had no speech difficulty, no numbness, no extremity weakness and does not report a headache.  He was concerned about his recent history of stroke with sudden onset of dizziness and elected to come in for further evaluation for care.  On my exam he is in no acute distress he is pleasant.  His blood pressure was 146/83.  He is alert oriented x3.  She has no  complaints.  Neurologic exam was normal.  NIH stroke score was 0        ED course and Plan:  I reviewed his recent emergency department evaluation from November 2018 as well as his hospital course from November 27, 2018 to December 11, 2018. See imaging reports above from recent evaluation and care.  I also reviewed his recent clinic visit yesterday (1/10/19) with Sophia Dacosta.  Please see medical records for the details of the visit.  UA obtained with complaints of symptoms of urinary tract infection.  His urine in clinic showed large leukocyte esterase, negative nitrites, white blood cell clumps were present 10-25 white cells per high-power field.  There is no growth to date on preliminary urine culture todate.  During his clinic visit he declined therapy as recommended including statin therapy, lisinopril and apixaban.   We discussed options for care.  Patient reports he presented for reassurance.  I elected to image his head with recent atraumatic ischemic stroke with hemorrhagic transformation currently on apixaban although he has not being fully compliant.  I encouraged the patient to take his apixaban as prescribed with his  [id number] score of 4.   I reviewed his hospital course and discharge summary patient had an echocardiogram which showed moderate to severe left atrial enlargement with moderate right atrial enlargement without PE or other ASD.  With his episode of atrial fibrillation diagnosed during his hospital course EKG was obtained in the emergency department as he reported sudden onset of dizziness without any focal neurologic deficit.  EKG on arrival in the emergency department showed rate controlled atrial fibrillation, (no discrete P waves) .Poor R wave progression and nonspecific T wave flattening.  When EKG is compared with EKG dated November 27, 2018, atrial fibrillation remains unchanged.  No RVR is appreciated.  T waves changes appear unchanged.   CT imaging today showed no new bleed and  interval resolution of patient's prior bleed from November 2018.  Please see the interpreting radiologist report above.  Patient is discharged home with a prescription for his apixaban as he reports he is running low and almost out.  The exact cause of his episode of dizziness is not clear.  No change in his serial neurologic exam.  His exam was not concerning for or suspicious for vertigo.    Pharmacy was able to provide a discounted refill of his apixaban.  I urged the patient to be compliant with his medications given his atrial fibrillation [id number] score (4).         Disclaimer: This note consists of symbols derived from keyboarding, dictation and/or voice recognition software. As a result, there may be errors in the script that have gone undetected. Please consider this when interpreting information found in this chart.  I have reviewed the nursing notes.    I have reviewed the findings, diagnosis, plan and need for follow up with the patient.          Medication List      There are no discharge medications for this visit.         Final diagnoses:   Dizziness - Episode of dizziness upon getting up from the couch from a laying position at around 6:30 PM prior to ED arrival   History of stroke - History of a left posterior parietal ischemic stroke with hemorrhagic transformation felt to be secondary to a cardio embolic process   History of atrial fibrillation - Diagnosed during hospital course from November 27 - December 11, 2018     This document serves as a record of the services and decisions personally performed and made by Mukund Rivera, *. The HPI was created on his behalf by   Chani Mckeon, a trained medical scribe. The creation of this document is based the provider's statements to the medical scribe.  Chani Mckeon 7:48 PM 1/11/2019    Provider:   The information in this document, created by the medical scribe for me, accurately reflects the services I personally performed and the  decisions made by me. I have reviewed and approved this document for accuracy prior to leaving the patient care area.  Mukund Rivera, * 7:48 PM 1/11/2019 1/11/2019   [facility] EMERGENCY DEPARTMENT     Mukund Rivera MD  01/12/19 0011

## 2019-01-12 NOTE — ED NOTES
Near syncopal episode today at around 1830 tonight.  Patient got up from couch and had a few seconds of dizziness.   Patient was able to sit back down on couch and dizziness went away.  Patient had a stroke in  November.  Patient denies weakness.  Winston Danielle RN on 1/11/2019 at 7:21 PM

## 2019-01-12 NOTE — DISCHARGE INSTRUCTIONS
1) CAT scan today shows improvement from your CAT scan from November 2018. No new bleeds  2) I recommend you take your Eliquis (apixaban) as prescribed with 1 tablet by mouth twice a day to help reduce the risk of stroke recurrence as you have atrial fibrillation.  3) return if you have recurrence of dizziness or if you have new concerns or symptoms

## 2019-01-16 RX ORDER — TAMSULOSIN HYDROCHLORIDE 0.4 MG/1
0.4 CAPSULE ORAL DAILY
Qty: 90 CAPSULE | Refills: 3 | Status: CANCELLED | OUTPATIENT
Start: 2019-01-16

## 2019-01-16 RX ORDER — LISINOPRIL 5 MG/1
5 TABLET ORAL DAILY
Qty: 90 TABLET | Refills: 3 | Status: CANCELLED | OUTPATIENT
Start: 2019-01-16

## 2019-01-16 RX ORDER — AMOXICILLIN 250 MG
1 CAPSULE ORAL 2 TIMES DAILY
Qty: 180 TABLET | Refills: 3 | Status: CANCELLED | OUTPATIENT
Start: 2019-01-16

## 2019-01-16 NOTE — PROGRESS NOTES
SUBJECTIVE:   Simone Daniels is a 70 year old male who presents to clinic today for the following health issues:  ED/UC Followup:    Facility:  [facility]  Date of visit: 1/11/2019  Reason for visit: Dizziness, hx of stroke, hx of afib, PAF  Current Status:   Alert oriented moving all extremities and interacting appropriately.  Slightly fearful and some paranoid thinking       Paroxysmal atrial fibrillation - pt taking Eliquis 5mg. Pt would like to decrease the dose. Pt states he is only taking this once a day (12 hours apart). He says he forgets to take at times.   Patient is declining consultation with cardiology and neurology at this time.  He is agreeable however to taking apixaban.  He would like to decrease the dose though to 2.5 mg twice daily     Hypertension Follow-up      Outpatient blood pressures are being checked at home.  Results are over 140/90.    Low Salt Diet: no added salt   Pt states he is not currently taking lisinopril due to running out. After review pt should have refills at pharmacy.     Genitourinary symptoms    Description:  Unable to empty bladder     Intensity:  moderate    Accompanying signs and symptoms (fever/discharge/nausea/vomiting/back or abdominal pain):  None     History (frequent UTI's/kidney stones/prostate problems): UTI   Sexually active: no     Precipitating or alleviating factors: self catheterizing once daily in am      Therapies tried and outcome: Flomax   - not currently taking. State he ran out of medication. Pt states would like to increase dose.     Is still doing self cathing once a day.  He is not followed up with urology does not plan to follow-up with urology unless he has to       -------------------------------------    Problem list and histories reviewed & adjusted, as indicated.  Additional history: as documented    BP Readings from Last 3 Encounters:   01/18/19 140/86   01/11/19 130/77   01/10/19 126/62    Wt Readings from Last 3 Encounters:   01/18/19 78 kg  "(172 lb)   01/11/19 84.4 kg (186 lb)   01/10/19 79.8 kg (176 lb)                  Labs reviewed in EPIC    Reviewed and updated as needed this visit by clinical staff       Reviewed and updated as needed this visit by Provider         ROS:   ROS: 10 point ROS neg other than the symptoms noted above in the HPI.      OBJECTIVE:                                                    /86 (Cuff Size: Adult Large)   Pulse 76   Temp 99.3  F (37.4  C) (Tympanic)   Resp 18   Ht 1.715 m (5' 7.5\")   Wt 78 kg (172 lb)   BMI 26.54 kg/m    Body mass index is 26.54 kg/m .   GENERAL: healthy, alert, well nourished, well hydrated, no distress  HENT: ear canals- normal; TMs- normal; Nose- normal; Mouth- no ulcers, no lesions  NECK: no tenderness, no adenopathy, no asymmetry, no masses, no stiffness; thyroid- normal to palpation  RESP: lungs clear to auscultation - no rales, no rhonchi, no wheezes  CV: regular rates and rhythm, normal S1 S2, no S3 or S4 and no murmur, no click or rub -  ABDOMEN: soft, no tenderness, no  hepatosplenomegaly, no masses, normal bowel sounds  NEURO: strength and tone- normal, sensory exam- grossly normal, mentation- intact, speech- normal, reflexes- symmetric  BACK: no CVA tenderness, no paralumbar tenderness  PSYCH: Alert and oriented times 3; speech- coherent , normal rate and volume; able to articulate logical thoughts, able to abstract reason, no tangential thoughts, no hallucinations or delusions, affect- normal    Diagnostic test results:  Results for orders placed or performed during the hospital encounter of 01/11/19   Head CT w/o contrast    Narrative    CT SCAN OF THE HEAD WITHOUT CONTRAST   1/11/2019 8:22 PM     HISTORY: Recent history of ischemic stroke with hemorrhagic  transformation (11/27/18), sudden onset of dizziness on apixaban.      TECHNIQUE: Axial images of the head and coronal reformations without  IV contrast material. Radiation dose for this scan was reduced " using  automated exposure control, adjustment of the mA and/or kV according  to patient size, or iterative reconstruction technique.    COMPARISON: Brain MR 11/27/2018. Head CT 11/27/2018.    FINDINGS: Previous parenchymal hemorrhage centered in the left  parietal lobe has contracted and almost completely resolved. Area of  encephalomalacia in the area of infarct is again seen and is better  defined since prior. There now appears to be volume loss in this area  of infarct with resolution of previous regional edema. No new  intracranial hemorrhage is appreciated. No significant mass effect or  midline shift. Ventricular size is unchanged without evidence of  hydrocephalus. Mild patchy periventricular white matter hypodensities  which are nonspecific, but most likely related to chronic  microvascular ischemic disease.    The visualized portions of the sinuses and mastoids appear normal. The  bony calvarium and bones of the skull base appear intact.       Impression    IMPRESSION: The previous parenchymal hemorrhage centered in the left  parietal lobe has essentially resolved. The surrounding area of  infarct is better defined since prior with decrease in regional edema  in that area. This is compatible with expected evolution of the  infarct. No new intracranial hemorrhage.    SAURAV MELGAR MD          ASSESSMENT/PLAN:                                                    1. PAF (paroxysmal atrial fibrillation) (H)  Dose decrease.   - apixaban ANTICOAGULANT (ELIQUIS) 2.5 MG tablet; Take 1 tablet (2.5 mg) by mouth 2 times daily  Dispense: 60 tablet; Refill: 2  Call [facility] patient assistance [facility] number given.     2. Left parietal hemorrhagic infarction (H)   with HTN   Restart   - lisinopril (PRINIVIL/ZESTRIL) 5 MG tablet; Take 1 tablet (5 mg) by mouth daily For blood pressure control  Dispense: 90 tablet; Refill: 3  Statin declined    3. Urinary retention with incomplete bladder emptying  Continue to straight cath bid  as needed.   Continue   - tamsulosin (FLOMAX) 0.4 MG capsule; Take 1 capsule (0.4 mg) by mouth daily For urinary retention  Dispense: 31 capsule; Refill: 5      Follow up with Provider - Neurology, Cardiology and Urology strongly recommended.   Call or return to the clinic with any worsening of symptoms or no resolution. Patient/Parent verbalized understanding and is in agreement. Medication side effects reviewed.   Current Outpatient Medications   Medication Sig Dispense Refill     apixaban ANTICOAGULANT (ELIQUIS) 2.5 MG tablet Take 1 tablet (2.5 mg) by mouth 2 times daily 60 tablet 2     cholecalciferol (VITAMIN D3) 5000 units (125 mcg) capsule Take 5,000 Units by mouth daily       lisinopril (PRINIVIL/ZESTRIL) 5 MG tablet Take 1 tablet (5 mg) by mouth daily For blood pressure control 90 tablet 3     senna-docusate (SENOKOT-S/PERICOLACE) 8.6-50 MG tablet Take 1 tablet by mouth 2 times daily For constipation 180 tablet 3     tamsulosin (FLOMAX) 0.4 MG capsule Take 1 capsule (0.4 mg) by mouth daily For urinary retention 31 capsule 5     STATIN NOT PRESCRIBED (INTENTIONAL) 1 each daily Please choose reason not prescribed, below (Patient not taking: Reported on 1/18/2019)          See Patient Instructions    CAROLANN Leary [facility]

## 2019-01-18 ENCOUNTER — OFFICE VISIT (OUTPATIENT)
Dept: FAMILY MEDICINE | Facility: CLINIC | Age: 74
End: 2019-01-18

## 2019-01-18 VITALS
WEIGHT: 172 LBS | BODY MASS INDEX: 26.07 KG/M2 | TEMPERATURE: 99.3 F | HEART RATE: 76 BPM | SYSTOLIC BLOOD PRESSURE: 140 MMHG | RESPIRATION RATE: 18 BRPM | HEIGHT: 68 IN | DIASTOLIC BLOOD PRESSURE: 86 MMHG

## 2019-01-18 DIAGNOSIS — R33.9 URINARY RETENTION WITH INCOMPLETE BLADDER EMPTYING: Chronic | ICD-10-CM

## 2019-01-18 DIAGNOSIS — I63.532: ICD-10-CM

## 2019-01-18 DIAGNOSIS — I48.0 PAF (PAROXYSMAL ATRIAL FIBRILLATION) (H): ICD-10-CM

## 2019-01-18 PROCEDURE — 99214 OFFICE O/P EST MOD 30 MIN: CPT | Performed by: NURSE PRACTITIONER

## 2019-01-18 RX ORDER — LISINOPRIL 5 MG/1
5 TABLET ORAL DAILY
Qty: 90 TABLET | Refills: 3 | Status: ON HOLD | OUTPATIENT
Start: 2019-01-18 | End: 2020-12-12

## 2019-01-18 RX ORDER — TAMSULOSIN HYDROCHLORIDE 0.4 MG/1
0.4 CAPSULE ORAL DAILY
Qty: 31 CAPSULE | Refills: 5 | Status: SHIPPED | OUTPATIENT
Start: 2019-01-18 | End: 2019-04-18

## 2019-01-18 ASSESSMENT — MIFFLIN-ST. JEOR: SCORE: 1496.75

## 2019-01-18 NOTE — PATIENT INSTRUCTIONS
This independent, non-profit organization provides assistance to qualifying patients with financial hardship who generally have no prescription insurance. Contact [phone] or visit www.bmspaf.org for more information.    Call to get the assistance form to complete to see if you can get help with your meds

## 2019-01-22 ENCOUNTER — TELEPHONE (OUTPATIENT)
Dept: FAMILY MEDICINE | Facility: CLINIC | Age: 74
End: 2019-01-22

## 2019-01-22 NOTE — TELEPHONE ENCOUNTER
MTM referral from: [facility] visit (referral by provider)    MTM referral outreach attempt #2 on January 22, 2019 at 5:06 PM      Outcome: Patient not reachable after several attempts, will route to MTM Pharmacist/Provider as an FYI. Thank you for the referral.    Mya Hernández, MTM Coordinator    MTM Pharmacist at clinic where patient receives primary care-yes  Referred by a specialist-no, [facility]  Patient covered for MTM-unknown    Blocked MTM provider schedule-no

## 2019-01-22 NOTE — LETTER
[facility]  [address] [place] [id number] [phone]      January 28, 2019    Simone Daniels                                                                                                                     [address]  [address]   [facility] [address]      Dear Sophia Nichols has recommended you schedule a Medication Therapy Management (MTM) appointment. MTM is designed to help you get the most of out of your medicines.     During an MTM appointment a specially trained pharmacist will review all of your medicines, both prescription and over-the-counter. They will make sure your medicines are the best choice for you and are safe and convenient for you.  MTM pharmacists work together with you and your doctor to help you understand your medicines, solve any problems related to your medicines and help you get the best results from taking your medicines.     At [facility] [place], we strongly believe in a team approach to health care. We want to help you understand your medicines and health conditions. To learn more about how you might benefit from MTM services, watch the patient video at [email or web address].     To make an appointment, please call the MTM scheduling line at [phone] (toll-free at [phone]).    We look forward to hearing from you!        Zbigniew Kong, Davian, [facility]  Medication Therapy Management Pharmacist  Pager: [phone]

## 2019-01-28 NOTE — TELEPHONE ENCOUNTER
Referral letter sent.    Zbigniew Kong, PharmD, [facility]  Medication Therapy Management Pharmacist  Pager: [phone]

## 2019-04-18 ENCOUNTER — OFFICE VISIT (OUTPATIENT)
Dept: FAMILY MEDICINE | Facility: CLINIC | Age: 74
End: 2019-04-18

## 2019-04-18 VITALS
TEMPERATURE: 97.3 F | HEART RATE: 80 BPM | OXYGEN SATURATION: 99 % | DIASTOLIC BLOOD PRESSURE: 80 MMHG | WEIGHT: 166 LBS | SYSTOLIC BLOOD PRESSURE: 136 MMHG | BODY MASS INDEX: 25.62 KG/M2 | RESPIRATION RATE: 14 BRPM

## 2019-04-18 DIAGNOSIS — N39.41 URGE INCONTINENCE OF URINE: Primary | ICD-10-CM

## 2019-04-18 DIAGNOSIS — I48.20 CHRONIC ATRIAL FIBRILLATION (H): ICD-10-CM

## 2019-04-18 DIAGNOSIS — R97.20 ELEVATED PROSTATE SPECIFIC ANTIGEN (PSA): ICD-10-CM

## 2019-04-18 DIAGNOSIS — I48.0 PAF (PAROXYSMAL ATRIAL FIBRILLATION) (H): ICD-10-CM

## 2019-04-18 DIAGNOSIS — R33.9 URINARY RETENTION WITH INCOMPLETE BLADDER EMPTYING: Chronic | ICD-10-CM

## 2019-04-18 LAB
BASOPHILS # BLD AUTO: 0 10E9/L (ref 0–0.2)
BASOPHILS NFR BLD AUTO: 0.1 %
DIFFERENTIAL METHOD BLD: ABNORMAL
EOSINOPHIL # BLD AUTO: 0.2 10E9/L (ref 0–0.7)
EOSINOPHIL NFR BLD AUTO: 1.1 %
ERYTHROCYTE [DISTWIDTH] IN BLOOD BY AUTOMATED COUNT: 12.9 % (ref 10–15)
HCT VFR BLD AUTO: 39.7 % (ref 40–53)
HGB BLD-MCNC: 13.4 G/DL (ref 13.3–17.7)
LYMPHOCYTES # BLD AUTO: 1.2 10E9/L (ref 0.8–5.3)
LYMPHOCYTES NFR BLD AUTO: 8.3 %
MCH RBC QN AUTO: 30.9 PG (ref 26.5–33)
MCHC RBC AUTO-ENTMCNC: 33.8 G/DL (ref 31.5–36.5)
MCV RBC AUTO: 92 FL (ref 78–100)
MONOCYTES # BLD AUTO: 1.6 10E9/L (ref 0–1.3)
MONOCYTES NFR BLD AUTO: 11.9 %
NEUTROPHILS # BLD AUTO: 10.8 10E9/L (ref 1.6–8.3)
NEUTROPHILS NFR BLD AUTO: 78.6 %
PLATELET # BLD AUTO: 425 10E9/L (ref 150–450)
PSA SERPL-ACNC: 25 UG/L (ref 0–4)
RBC # BLD AUTO: 4.33 10E12/L (ref 4.4–5.9)
WBC # BLD AUTO: 13.8 10E9/L (ref 4–11)

## 2019-04-18 PROCEDURE — 99214 OFFICE O/P EST MOD 30 MIN: CPT | Performed by: NURSE PRACTITIONER

## 2019-04-18 PROCEDURE — 85025 COMPLETE CBC W/AUTO DIFF WBC: CPT | Performed by: NURSE PRACTITIONER

## 2019-04-18 PROCEDURE — G0103 PSA SCREENING: HCPCS | Performed by: NURSE PRACTITIONER

## 2019-04-18 PROCEDURE — 36415 COLL VENOUS BLD VENIPUNCTURE: CPT | Performed by: NURSE PRACTITIONER

## 2019-04-18 RX ORDER — TAMSULOSIN HYDROCHLORIDE 0.4 MG/1
0.4 CAPSULE ORAL DAILY
Qty: 31 CAPSULE | Refills: 5 | Status: SHIPPED | OUTPATIENT
Start: 2019-04-18 | End: 2019-06-05

## 2019-04-18 RX ORDER — CIPROFLOXACIN 250 MG/1
250 TABLET, FILM COATED ORAL 2 TIMES DAILY
Qty: 14 TABLET | Refills: 0 | Status: SHIPPED | OUTPATIENT
Start: 2019-04-18 | End: 2019-06-05

## 2019-04-18 ASSESSMENT — PAIN SCALES - GENERAL: PAINLEVEL: NO PAIN (0)

## 2019-04-18 NOTE — Clinical Note
Please email this letter to [email or web address] per his request. Copy in green folder  Thanks Sophia Dacosta [facility]-BC

## 2019-04-18 NOTE — PATIENT INSTRUCTIONS
Patient Education     Urinary Retention (Male)  Urinary retention is the medical term for difficulty or inability to pass urine, even though your bladder is full.  Causes  The most common cause of urinary retention in men is the bladder outlet being blocked. This can be due to an enlarged prostate gland or a bladder infection. Certain medicines can also cause this problem. This condition is more likely to occur as men get older.    Symptoms  Common symptoms of urinary retention include:    Pain (not experienced by everyone)    Frequent urination    Feeling that the bladder is still full after urinating    Incontinence (not being able to control the release of urine)    Swollen abdomen  Treatment  This condition is treated by inserting a tube (catheter) into the bladder to drain the urine. This provides immediate relief. The catheter may need to stay in place for a few days. The catheter has a balloon on the tip, which is inflated after insertion. This prevents the catheter from falling out.  Home care    If you were given antibiotics, take them until they are used up, or your healthcare provider tells you to stop. It is important to finish the antibiotics even though you feel better. This is to make sure your infection has cleared.    If a catheter was left in place, it is important to keep bacteria from getting into the collection bag. Don't disconnect the catheter from the collection bag.    Use a leg band to secure the drainage tube, so it does not pull on the catheter. Drain the collection bag when it becomes full using the drain spout at the bottom of the bag.    Don't pull on or try to remove your catheter. This will injure your urethra. The catheter must be removed by a healthcare provider.  Follow-up care  Follow up with your healthcare provider, or as advised.  If a catheter was left in place, it can usually be removed within 3 to 7 days. Some conditions require the catheter to stay in longer. Your  healthcare provider will tell you when to return to have the catheter removed.  When to seek medical advice  Call your healthcare provider right away if any of these occur:    Fever of 100.4 F (38 C) or higher, or as directed by your healthcare provider    Bladder or lower-abdominal pain or fullness    Abdominal swelling, nausea, vomiting, or back pain    Blood or urine leakage around the catheter    Bloody urine coming from the catheter (if a new symptom)    Weakness, dizziness, or fainting    Confusion or change in usual level of alertness    If a catheter was left in place, return if:  ? Catheter falls out  ? Catheter stops draining for 6 hours  Date Last Reviewed: 10/1/2017    3014-1172 [employer]. [address] [place] [address], [place] [address]. All rights reserved. This information is not intended as a substitute for professional medical care. Always follow your healthcare professional's instructions.

## 2019-04-18 NOTE — PROGRESS NOTES
"f   SUBJECTIVE:   Simone Daniels is a 70 year old male who presents to clinic today for the following   health issues:      Genitourinary symptoms      Duration: ongoing for months off and on     Description:  frequency and colon pain at times     Intensity:  moderate    Accompanying signs and symptoms (fever/discharge/nausea/vomiting/back or abdominal pain):  None    History (frequent UTI's/kidney stones/prostate problems): urinary retention   Sexually active: no     Precipitating or alleviating factors: None    Therapies tried and outcome: none   Outcome: self cath started again 4 days ago had supplies at home from previous.     AFIB did not resolve. Has not seen cardiology as directed in January.   \"I know it comes and goes, Im sure its fine. Its been that way since I had anxiety in the boarding school as a child\"  Denies previous quit apixaban then restarted last week at one tablet daily instead of 2.  Vit c 6000 mg taken to feel better. Not sure it made a difference per his report.   Did change his urine color.   Urinating twice a day about 900 ml when he measures this  No further pain with urethral catheter, now straight cath x 4 days.   Had not had to do this for several weeks before this.  Has not seen urology as directed in January.   No blood in the urine per patient report this past week. .   Last straight cath was this morning. No able to provide urine sample in clinic today. Will take container and return straight cath sample.   No abdominal pain. Denies previous stroke. \"it was because of my infected tooth that went to my head\"    -------------------------------------    Additional history: as documented    Reviewed  and updated as needed this visit by clinical staff          BP Readings from Last 3 Encounters:   04/18/19 136/80   01/18/19 140/86   01/11/19 130/77         Reviewed and updated as needed this visit by Provider         Patient Active Problem List   Diagnosis     Anxiety state     Left " knee osteoarthritis     Contact dermatitis and other eczema, due to unspecified cause     ESOPHAGEAL REFLUX     CARDIOVASCULAR SCREENING; LDL GOAL LESS THAN 160     Post herpetic neuralgia     Left parietal hemorrhagic infarction (H)     Urinary retention with incomplete bladder emptying     Other constipation     Nasal congestion     Elevated prostate specific antigen (PSA)     Urge incontinence of urine     Chronic atrial fibrillation (H)     Past Surgical History:   Procedure Laterality Date     SURGICAL HISTORY OF -       Cervical disc (5,6,&7)     SURGICAL HISTORY OF -       (L) Knee fx tibial plateau     SURGICAL HISTORY OF -       (L) Knee surgery - re-fx tibial plateau       Social History     Tobacco Use     Smoking status: Former Smoker     Packs/day: 2.00     Years: 18.00     Pack years: 36.00     Types: Cigarettes     Last attempt to quit: 1980     Years since quittin.3     Smokeless tobacco: Never Used   Substance Use Topics     Alcohol use: Yes     Frequency: 2-4 times a month     Drinks per session: 1 or 2     Binge frequency: Never     Comment: occaisonal     Family History   Problem Relation Age of Onset     C.A.D. Mother         heart disease in her 60s     C.A.D. Brother         1/2 brother with heart disease     Diabetes No family hx of            ROS:   ROS: 10 point ROS neg other than the symptoms noted above in the HPI.      OBJECTIVE:                                                    /80   Pulse 80   Temp 97.3  F (36.3  C) (Tympanic)   Resp 14   Wt 75.3 kg (166 lb)   SpO2 99%   BMI 25.62 kg/m    Body mass index is 25.62 kg/m .   GENERAL:  alert, well nourished, well hydrated, no acute distress  HENT: ear canals- normal; TMs- normal; Nose- normal; Mouth- no ulcers, no lesions  NECK: no tenderness, no adenopathy, no asymmetry, no masses, no stiffness; thyroid- normal to palpation  RESP: lungs clear to auscultation - no rales, no rhonchi, no wheezes  CV: irregularly  irregular rate 80-90   ABDOMEN: soft, no tenderness, no  hepatosplenomegaly, no masses, normal bowel sounds  MS: extremities- ambulating with a limp, no edema    Diagnostic test results:  Results for orders placed or performed in visit on 04/18/19   PSA, screen   Result Value Ref Range    PSA 25.00 (H) 0 - 4 ug/L   CBC with platelets and differential   Result Value Ref Range    WBC 13.8 (H) 4.0 - 11.0 10e9/L    RBC Count 4.33 (L) 4.4 - 5.9 10e12/L    Hemoglobin 13.4 13.3 - 17.7 g/dL    Hematocrit 39.7 (L) 40.0 - 53.0 %    MCV 92 78 - 100 fl    MCH 30.9 26.5 - 33.0 pg    MCHC 33.8 31.5 - 36.5 g/dL    RDW 12.9 10.0 - 15.0 %    Platelet Count 425 150 - 450 10e9/L    % Neutrophils 78.6 %    % Lymphocytes 8.3 %    % Monocytes 11.9 %    % Eosinophils 1.1 %    % Basophils 0.1 %    Absolute Neutrophil 10.8 (H) 1.6 - 8.3 10e9/L    Absolute Lymphocytes 1.2 0.8 - 5.3 10e9/L    Absolute Monocytes 1.6 (H) 0.0 - 1.3 10e9/L    Absolute Eosinophils 0.2 0.0 - 0.7 10e9/L    Absolute Basophils 0.0 0.0 - 0.2 10e9/L    Diff Method Automated Method         ASSESSMENT/PLAN:                                                    1. Urge incontinence of urine  - PSA, screen  - CBC with platelets and differential  - *UA reflex to Microscopic and Culture ([place] [facility] [facility] (except [place] and [place]); Future    2. Chronic atrial fibrillation (H)  Importance of apixaban discussed with him in detail. Agrees to restart BID and follow up with cardiology.   - CARDIOLOGY EVAL ADULT REFERRAL  - apixaban ANTICOAGULANT (ELIQUIS) 2.5 MG tablet; Take 1 tablet (2.5 mg) by mouth 2 times daily  Dispense: 60 tablet; Refill: 2  Declines other intervention and medication at this time.     3. Urinary retention with incomplete bladder emptying  Straight cath every 4-6 hours as needed.  Begin   - ciprofloxacin (CIPRO) 250 MG tablet; Take 1 tablet (250 mg) by mouth 2 times daily  Dispense: 14 tablet; Refill: 0  Restart   - tamsulosin (FLOMAX) 0.4 MG  capsule; Take 1 capsule (0.4 mg) by mouth daily For urinary retention  Dispense: 31 capsule; Refill: 5    4. Elevated prostate specific antigen (PSA)  See Urology as previously directed.       Follow up with Provider - Call or return to the clinic with any worsening of symptoms or no resolution. Patient/Parent verbalized understanding and is in agreement. Medication side effects reviewed.   Current Outpatient Medications   Medication Sig Dispense Refill     apixaban ANTICOAGULANT (ELIQUIS) 2.5 MG tablet Take 1 tablet (2.5 mg) by mouth 2 times daily 60 tablet 2     cholecalciferol (VITAMIN D3) 5000 units (125 mcg) capsule Take 5,000 Units by mouth daily       ciprofloxacin (CIPRO) 250 MG tablet Take 1 tablet (250 mg) by mouth 2 times daily 14 tablet 0     lisinopril (PRINIVIL/ZESTRIL) 5 MG tablet Take 1 tablet (5 mg) by mouth daily For blood pressure control 90 tablet 3     STATIN NOT PRESCRIBED (INTENTIONAL) 1 each daily Please choose reason not prescribed, below       tamsulosin (FLOMAX) 0.4 MG capsule Take 1 capsule (0.4 mg) by mouth daily For urinary retention 31 capsule 5     senna-docusate (SENOKOT-S/PERICOLACE) 8.6-50 MG tablet Take 1 tablet by mouth 2 times daily For constipation (Patient not taking: Reported on 4/18/2019) 180 tablet 3        See Patient Instructions    CAROLANN Leary [facility]

## 2019-04-18 NOTE — LETTER
April 19, 2019      Simone Daniels  [address]  [address]   [facility] [address]        Dear Simone,     Your PSA level is very high.   Recommend you make an appointment to see Urologist in [place] to discuss other work up needed.  Here is the number again to call and schedule follow up for this.  [facility] [phone]   [email or web address]    Results for orders placed or performed in visit on 04/18/19   PSA, screen   Result Value Ref Range    PSA 25.00 (H) 0 - 4 ug/L   CBC with platelets and differential   Result Value Ref Range    WBC 13.8 (H) 4.0 - 11.0 10e9/L    RBC Count 4.33 (L) 4.4 - 5.9 10e12/L    Hemoglobin 13.4 13.3 - 17.7 g/dL    Hematocrit 39.7 (L) 40.0 - 53.0 %    MCV 92 78 - 100 fl    MCH 30.9 26.5 - 33.0 pg    MCHC 33.8 31.5 - 36.5 g/dL    RDW 12.9 10.0 - 15.0 %    Platelet Count 425 150 - 450 10e9/L    % Neutrophils 78.6 %    % Lymphocytes 8.3 %    % Monocytes 11.9 %    % Eosinophils 1.1 %    % Basophils 0.1 %    Absolute Neutrophil 10.8 (H) 1.6 - 8.3 10e9/L    Absolute Lymphocytes 1.2 0.8 - 5.3 10e9/L    Absolute Monocytes 1.6 (H) 0.0 - 1.3 10e9/L    Absolute Eosinophils 0.2 0.0 - 0.7 10e9/L    Absolute Basophils 0.0 0.0 - 0.2 10e9/L    Diff Method Automated Method    Cardiology- Heart - Phone number to call and schedule to follow up on your irregular heart rhythm in [place] is   [facility] [phone]   [email or web address]    Please call and let me know that you received this letter by email at [email or web address] per your request. We are unable to reach you by phone at this time.     Sincerely,        CAROLANN Leary CNP

## 2019-04-19 DIAGNOSIS — R82.90 NONSPECIFIC FINDING ON EXAMINATION OF URINE: Primary | ICD-10-CM

## 2019-04-19 DIAGNOSIS — N39.41 URGE INCONTINENCE OF URINE: ICD-10-CM

## 2019-04-19 PROBLEM — I48.20 CHRONIC ATRIAL FIBRILLATION (H): Status: ACTIVE | Noted: 2019-04-19

## 2019-04-19 PROBLEM — R97.20 ELEVATED PROSTATE SPECIFIC ANTIGEN (PSA): Status: ACTIVE | Noted: 2019-04-19

## 2019-04-19 LAB
ALBUMIN UR-MCNC: 100 MG/DL
APPEARANCE UR: CLEAR
BACTERIA #/AREA URNS HPF: ABNORMAL /HPF
BILIRUB UR QL STRIP: NEGATIVE
COLOR UR AUTO: YELLOW
GLUCOSE UR STRIP-MCNC: NEGATIVE MG/DL
HGB UR QL STRIP: ABNORMAL
KETONES UR STRIP-MCNC: NEGATIVE MG/DL
LEUKOCYTE ESTERASE UR QL STRIP: ABNORMAL
NITRATE UR QL: NEGATIVE
PH UR STRIP: 5.5 PH (ref 5–7)
RBC #/AREA URNS AUTO: ABNORMAL /HPF
SOURCE: ABNORMAL
SP GR UR STRIP: <=1.005 (ref 1–1.03)
UROBILINOGEN UR STRIP-ACNC: 0.2 EU/DL (ref 0.2–1)
WBC #/AREA URNS AUTO: ABNORMAL /HPF

## 2019-04-19 PROCEDURE — 81001 URINALYSIS AUTO W/SCOPE: CPT | Performed by: NURSE PRACTITIONER

## 2019-04-19 PROCEDURE — 87086 URINE CULTURE/COLONY COUNT: CPT | Performed by: NURSE PRACTITIONER

## 2019-04-19 PROCEDURE — 87186 SC STD MICRODIL/AGAR DIL: CPT | Performed by: NURSE PRACTITIONER

## 2019-04-19 PROCEDURE — 87088 URINE BACTERIA CULTURE: CPT | Performed by: NURSE PRACTITIONER

## 2019-04-21 LAB
BACTERIA SPEC CULT: ABNORMAL
Lab: ABNORMAL
SPECIMEN SOURCE: ABNORMAL

## 2019-04-22 ENCOUNTER — TELEPHONE (OUTPATIENT)
Dept: FAMILY MEDICINE | Facility: CLINIC | Age: 74
End: 2019-04-22

## 2019-04-22 DIAGNOSIS — N30.01 ACUTE CYSTITIS WITH HEMATURIA: Primary | ICD-10-CM

## 2019-04-22 RX ORDER — NITROFURANTOIN MACROCRYSTAL 100 MG
100 CAPSULE ORAL 4 TIMES DAILY
Qty: 40 CAPSULE | Refills: 0 | Status: SHIPPED | OUTPATIENT
Start: 2019-04-22 | End: 2019-06-05

## 2019-04-22 NOTE — LETTER
[facility]  [address] [place] [id number]  [phone]          April 23, 2019    Simone Daniels                                                                                                                     [address]  [address]   [facility] [address]            Dear Sophia Nichols wanted us to share some information with you because she is aware that you currently do not have pharmacy insurance coverage and that your Eliquis is very expensive. We were unable to reach you by phone.  [place] has a Prescription Assistance Program that works with people that have difficulty affording their medication. You can contact them by phone and they will check into programs that you may qualify for to get your medication. The phone number is [phone].   Sophia also had a website for the company that manufactures Eliquis  ([employer]) and they have programs to help with free medications also. http://www.bmspaf.org/Documents/BMSPAF-Enrollment-Form.pdf  Please let Sophia know if you have any questions or concerns.           Sincerely,       Sharron FRANKLIN/ Clinic Care Team [place] for   Sophia Dacosta

## 2019-04-22 NOTE — TELEPHONE ENCOUNTER
"Simone does not have pharmacy drug insurance and has been cutting his Eliquis in half and taking it only once daily.  He also reports taking vitamin K \"to help\" I discussed that vitamin K will not help with blood thinning properties.  He purchased 10 tabs of Eliquis today and said that should last him until the snow flies.     I am wondering if warfarin would be an option for him due to cost.    Thanks,  Orion Jaeger, Pharm D  [facility]  [phone]   "

## 2019-04-23 NOTE — TELEPHONE ENCOUNTER
Orion. Thank you for the update !  Team could you get him connected to the patient assistance medication program through [employer].  Wondering if he may qualify for free meds through [employer]. Here is the web site information. You may have to send him a letter with the information. Was not able to reach him by phone with lab results. Concerned about his complaince with warfarin and cost associated with monthly labs etc but if that would be a cheaper way and he is willing to be compliant with this then sure. Perhaps he may qualify for [employer] meds.   http://www.[email or web address]/Documents/BMSPAF-Enrollment-Form.pdf  Thanks Sophia Dacosta [facility]

## 2019-05-02 ENCOUNTER — TELEPHONE (OUTPATIENT)
Dept: FAMILY MEDICINE | Facility: CLINIC | Age: 74
End: 2019-05-02

## 2019-06-03 ENCOUNTER — TELEPHONE (OUTPATIENT)
Dept: FAMILY MEDICINE | Facility: CLINIC | Age: 74
End: 2019-06-03

## 2019-06-03 NOTE — TELEPHONE ENCOUNTER
Reason for call:  Patient reporting a symptom    Symptom or request: Pt says his bladder infections are getting worse because the medications he has been given aren't strong enough. He says the last antibiotic helped for a few days and then symptoms came back. He says his urine is discolored-dark but not blood, he has some pain when he initially starts to urinate. He wants Sophia to just send in another antibiotic when she is in the office on Tuesday.     Have you been treated for this before? Yes    Additional comments: [facility]    Phone Number patient can be reached at:   [phone]    Best Time:  anytime    Can we leave a detailed message on this number:  YES    Call taken on 6/3/2019 at 3:17 PM by Sharron Velasco

## 2019-06-04 NOTE — TELEPHONE ENCOUNTER
Voice my Box not set up. Attempted to call pt.  Urology Appt scheduled 6/26/19 8:15 am Dr. Courtney Salcedo [facility]

## 2019-06-04 NOTE — TELEPHONE ENCOUNTER
Pt notified wanted to be seen early Morning.  Dr. Carvalho appt scheduled 8:40 6/5/19  [facility]

## 2019-06-04 NOTE — TELEPHONE ENCOUNTER
Spoke to Denisse [facility] and appt with Urology office on June 26th with Dr. Valdez.      Advised Denisse that pt will need to complete abx treatment prior to this visit.    Leisa Gabriel  [facility] RN

## 2019-06-04 NOTE — TELEPHONE ENCOUNTER
Please call [facility] @: [phone] and let them know if pt can be worked in this week to see urology.     Denise Behrendt  Specialty CSS

## 2019-06-04 NOTE — TELEPHONE ENCOUNTER
He needs to see Urology.   I can see him if they are not able to get him in this week.    PSA   Date Value Ref Range Status   04/18/2019 25.00 (H) 0 - 4 ug/L Final     Comment:     Assay Method:  Chemiluminescence using Siemens Vista analyzer     Thanks Sophia Dacosta [facility]-BC

## 2019-06-05 ENCOUNTER — OFFICE VISIT (OUTPATIENT)
Dept: FAMILY MEDICINE | Facility: CLINIC | Age: 74
End: 2019-06-05

## 2019-06-05 VITALS
WEIGHT: 167 LBS | HEART RATE: 88 BPM | DIASTOLIC BLOOD PRESSURE: 80 MMHG | SYSTOLIC BLOOD PRESSURE: 164 MMHG | RESPIRATION RATE: 18 BRPM | TEMPERATURE: 99 F | BODY MASS INDEX: 25.77 KG/M2

## 2019-06-05 DIAGNOSIS — N30.01 ACUTE CYSTITIS WITH HEMATURIA: Primary | ICD-10-CM

## 2019-06-05 DIAGNOSIS — I48.20 CHRONIC ATRIAL FIBRILLATION (H): ICD-10-CM

## 2019-06-05 DIAGNOSIS — R82.90 NONSPECIFIC FINDING ON EXAMINATION OF URINE: ICD-10-CM

## 2019-06-05 DIAGNOSIS — R30.0 DYSURIA: ICD-10-CM

## 2019-06-05 DIAGNOSIS — R33.9 URINARY RETENTION WITH INCOMPLETE BLADDER EMPTYING: Chronic | ICD-10-CM

## 2019-06-05 PROBLEM — K59.09 OTHER CONSTIPATION: Status: RESOLVED | Noted: 2018-12-11 | Resolved: 2019-06-05

## 2019-06-05 PROBLEM — R09.81 NASAL CONGESTION: Status: RESOLVED | Noted: 2018-12-11 | Resolved: 2019-06-05

## 2019-06-05 LAB
ALBUMIN UR-MCNC: 30 MG/DL
APPEARANCE UR: CLEAR
BILIRUB UR QL STRIP: NEGATIVE
COLOR UR AUTO: YELLOW
GLUCOSE UR STRIP-MCNC: NEGATIVE MG/DL
HGB UR QL STRIP: ABNORMAL
KETONES UR STRIP-MCNC: NEGATIVE MG/DL
LEUKOCYTE ESTERASE UR QL STRIP: ABNORMAL
NITRATE UR QL: NEGATIVE
PH UR STRIP: 5.5 PH (ref 5–7)
RBC #/AREA URNS AUTO: ABNORMAL /HPF
SOURCE: ABNORMAL
SP GR UR STRIP: 1.01 (ref 1–1.03)
UROBILINOGEN UR STRIP-ACNC: 0.2 EU/DL (ref 0.2–1)
WBC #/AREA URNS AUTO: ABNORMAL /HPF

## 2019-06-05 PROCEDURE — 87088 URINE BACTERIA CULTURE: CPT | Performed by: FAMILY MEDICINE

## 2019-06-05 PROCEDURE — 87186 SC STD MICRODIL/AGAR DIL: CPT | Performed by: FAMILY MEDICINE

## 2019-06-05 PROCEDURE — 81001 URINALYSIS AUTO W/SCOPE: CPT | Performed by: FAMILY MEDICINE

## 2019-06-05 PROCEDURE — 87086 URINE CULTURE/COLONY COUNT: CPT | Performed by: FAMILY MEDICINE

## 2019-06-05 PROCEDURE — 99214 OFFICE O/P EST MOD 30 MIN: CPT | Performed by: FAMILY MEDICINE

## 2019-06-05 RX ORDER — TAMSULOSIN HYDROCHLORIDE 0.4 MG/1
0.4 CAPSULE ORAL DAILY
Qty: 90 CAPSULE | Refills: 3 | Status: ON HOLD | OUTPATIENT
Start: 2019-06-05 | End: 2020-12-12

## 2019-06-05 RX ORDER — CIPROFLOXACIN 500 MG/1
500 TABLET, FILM COATED ORAL 2 TIMES DAILY
Qty: 28 TABLET | Refills: 0 | Status: SHIPPED | OUTPATIENT
Start: 2019-06-05 | End: 2019-07-30

## 2019-06-05 ASSESSMENT — PAIN SCALES - GENERAL: PAINLEVEL: MILD PAIN (2)

## 2019-06-05 NOTE — NURSING NOTE
"Chief Complaint   Patient presents with     UTI     Increase UTI sx.  States self cath every 1-2 days.     Medication Reconciliation     States not taking Eliquis regularly- states weaning off on his own. Also not taking Lisinopril or Flomax.       Initial /80 (BP Location: Right arm, Patient Position: Chair, Cuff Size: Adult Large)   Pulse 88   Temp 99  F (37.2  C) (Tympanic)   Resp 18   Wt 75.8 kg (167 lb)   BMI 25.77 kg/m   Estimated body mass index is 25.77 kg/m  as calculated from the following:    Height as of 1/18/19: 1.715 m (5' 7.5\").    Weight as of this encounter: 75.8 kg (167 lb).    Patient presents to the clinic using No DME    Health Maintenance that is potentially due pending provider review:  Colonoscopy/FIT    Gave pt phone number/pended order to schedule mammo and/or colonoscopy(or FIT)    Is there anyone who you would like to be able to receive your results? No  If yes have patient fill out SERGIO  Felicia [place] CMA    "

## 2019-06-05 NOTE — PROGRESS NOTES
Subjective     Simone Daniels is a 71 year old male who presents to clinic today for the following health issues:    HPI   Genitourinary symptoms      Duration: On going issue since last fall- but sx are increased now    Description:  dysuria, hesitancy, retention and incontinence    Intensity:  moderate    Accompanying signs and symptoms (fever/discharge/nausea/vomiting/back or abdominal pain):  None    History (frequent UTI's/kidney stones/prostate problems): some UTI in the past  Sexually active: no     Precipitating or alleviating factors: None    Therapies tried and outcome: cranberry juice grapefruit extract and    Outcome: helps some        Reviewed and updated as needed this visit by Provider         S: Simone Daniels is a 71 year old male with dysuria.  Recurrent.  Some retention in past, he says tip of penis angles urine to the s mc.  Wonders if something is wrong.    Was on flomax in past, not now.  Some mention of retention in past    Afib: doesn't believe in blood thinners.  No ASA either.  Is adamant about this.  Doesn't think this is an issue.     Problem list, med list, additional histories reviewed and updated, as indicatedl    No fever.  No cough.  No rash  No palpitations    O:/80 (BP Location: Right arm, Patient Position: Chair, Cuff Size: Adult Large)   Pulse 88   Temp 99  F (37.2  C) (Tympanic)   Resp 18   Wt 75.8 kg (167 lb)   BMI 25.77 kg/m    GEN: Alert and oriented, in no acute distress  Irregular rhythm.  Rate fine.  No murmur  EXT: no edema or lesions noted in lower extremities    Ua: lots of WBC on micro  Some blood    A: urinary tract infection      Hx of retention       Afib, not treating with anticoagulation    P: long talk on afib.  Not interested in addressing this.  In denial somewhat, doesn't want more meds or interventions on this.    cipro bid for 2 w eeks.  Back on flomax.  He wants to see urology, seems reasonable given infections last few months.  Prostate  based retention ? Urethral issues?     F/u as above.

## 2019-06-08 LAB
BACTERIA SPEC CULT: ABNORMAL
SPECIMEN SOURCE: ABNORMAL

## 2019-06-09 ENCOUNTER — TELEPHONE (OUTPATIENT)
Dept: FAMILY MEDICINE | Facility: CLINIC | Age: 74
End: 2019-06-09

## 2019-06-09 NOTE — LETTER
[facility]  [address] [place] [id number]  [phone]        Simone Daniels                                                               Date: 6/9/2019  [address]  [address]   [facility] [address]      Dear Simone,    In order to ensure we are providing the best quality care, we have reviewed your chart and see that you are due for:  1.   Fecal Colorectal Test (FIT test).  It appears you have received a screening test this winter but it seems it has not been returned.       Please call the clinic if you have misplaced this colon cancer screening test or if you would like to complete a colonoscopy and we can give you the number.       We greatly appreciate the opportunity to serve you.  Thank you for trusting us with your health care.      Sincerely,    Your care team at [facility]    Jose Floyd MA for   Sophia Dacosta N.P.

## 2019-06-09 NOTE — TELEPHONE ENCOUNTER
Panel Management Review      Patient has the following on his problem list: None      Composite cancer screening  Chart review shows that this patient is due/due soon for the following Fecal Colorectal (FIT)  Summary:    Patient is due/failing the following:   FIT    Action needed:   Patient needs to return FIT test.     Type of outreach:    Sent letter.    Questions for provider review:    None                                                                                                                                    Jose Floyd M.A.       Chart routed to None .

## 2019-06-20 ENCOUNTER — TELEPHONE (OUTPATIENT)
Dept: FAMILY MEDICINE | Facility: CLINIC | Age: 74
End: 2019-06-20

## 2019-06-20 NOTE — TELEPHONE ENCOUNTER
Attempted to contact pt multiple times over multiple days to inform of sensitivity culture results 6/5/19 per Dr. Carvalho. Unable to get in contact with pt or leave a VM as he does not have this set up.     Padmini Blue MA  1:00 PM 6/20/2019

## 2019-07-30 ENCOUNTER — HOSPITAL ENCOUNTER (EMERGENCY)
Facility: CLINIC | Age: 74
Discharge: HOME | End: 2019-07-30
Attending: FAMILY MEDICINE | Admitting: FAMILY MEDICINE

## 2019-07-30 ENCOUNTER — APPOINTMENT (OUTPATIENT)
Dept: GENERAL RADIOLOGY | Facility: CLINIC | Age: 74
End: 2019-07-30
Attending: FAMILY MEDICINE

## 2019-07-30 VITALS
DIASTOLIC BLOOD PRESSURE: 93 MMHG | TEMPERATURE: 98.2 F | OXYGEN SATURATION: 98 % | HEIGHT: 69 IN | RESPIRATION RATE: 20 BRPM | WEIGHT: 167.55 LBS | HEART RATE: 85 BPM | BODY MASS INDEX: 24.82 KG/M2 | SYSTOLIC BLOOD PRESSURE: 142 MMHG

## 2019-07-30 DIAGNOSIS — I48.20 CHRONIC ATRIAL FIBRILLATION (H): ICD-10-CM

## 2019-07-30 DIAGNOSIS — R07.89 ATYPICAL CHEST PAIN: ICD-10-CM

## 2019-07-30 LAB
ALBUMIN SERPL-MCNC: 3.5 G/DL (ref 3.4–5)
ALP SERPL-CCNC: 70 U/L (ref 40–150)
ALT SERPL W P-5'-P-CCNC: 20 U/L (ref 0–70)
ANION GAP SERPL CALCULATED.3IONS-SCNC: 7 MMOL/L (ref 3–14)
AST SERPL W P-5'-P-CCNC: 44 U/L (ref 0–45)
BASOPHILS # BLD AUTO: 0.1 10E9/L (ref 0–0.2)
BASOPHILS NFR BLD AUTO: 0.5 %
BILIRUB SERPL-MCNC: 0.6 MG/DL (ref 0.2–1.3)
BUN SERPL-MCNC: 17 MG/DL (ref 7–30)
CALCIUM SERPL-MCNC: 8.7 MG/DL (ref 8.5–10.1)
CHLORIDE SERPL-SCNC: 109 MMOL/L (ref 94–109)
CO2 SERPL-SCNC: 25 MMOL/L (ref 20–32)
CREAT SERPL-MCNC: 0.95 MG/DL (ref 0.66–1.25)
DIFFERENTIAL METHOD BLD: ABNORMAL
EOSINOPHIL # BLD AUTO: 0.2 10E9/L (ref 0–0.7)
EOSINOPHIL NFR BLD AUTO: 1.6 %
ERYTHROCYTE [DISTWIDTH] IN BLOOD BY AUTOMATED COUNT: 12.9 % (ref 10–15)
GFR SERPL CREATININE-BSD FRML MDRD: 79 ML/MIN/{1.73_M2}
GLUCOSE SERPL-MCNC: 92 MG/DL (ref 70–99)
HCT VFR BLD AUTO: 41.6 % (ref 40–53)
HGB BLD-MCNC: 13.2 G/DL (ref 13.3–17.7)
IMM GRANULOCYTES # BLD: 0 10E9/L (ref 0–0.4)
IMM GRANULOCYTES NFR BLD: 0.3 %
LYMPHOCYTES # BLD AUTO: 1 10E9/L (ref 0.8–5.3)
LYMPHOCYTES NFR BLD AUTO: 11 %
MCH RBC QN AUTO: 28.9 PG (ref 26.5–33)
MCHC RBC AUTO-ENTMCNC: 31.7 G/DL (ref 31.5–36.5)
MCV RBC AUTO: 91 FL (ref 78–100)
MONOCYTES # BLD AUTO: 0.7 10E9/L (ref 0–1.3)
MONOCYTES NFR BLD AUTO: 7.5 %
NEUTROPHILS # BLD AUTO: 7.5 10E9/L (ref 1.6–8.3)
NEUTROPHILS NFR BLD AUTO: 79.1 %
NRBC # BLD AUTO: 0 10*3/UL
NRBC BLD AUTO-RTO: 0 /100
PLATELET # BLD AUTO: 293 10E9/L (ref 150–450)
POTASSIUM SERPL-SCNC: 4.3 MMOL/L (ref 3.4–5.3)
PROT SERPL-MCNC: 7.2 G/DL (ref 6.8–8.8)
RBC # BLD AUTO: 4.56 10E12/L (ref 4.4–5.9)
SODIUM SERPL-SCNC: 141 MMOL/L (ref 133–144)
TROPONIN I SERPL-MCNC: 0.03 UG/L (ref 0–0.04)
WBC # BLD AUTO: 9.4 10E9/L (ref 4–11)

## 2019-07-30 PROCEDURE — 93005 ELECTROCARDIOGRAM TRACING: CPT | Performed by: FAMILY MEDICINE

## 2019-07-30 PROCEDURE — 93010 ELECTROCARDIOGRAM REPORT: CPT | Mod: Z6 | Performed by: FAMILY MEDICINE

## 2019-07-30 PROCEDURE — 80053 COMPREHEN METABOLIC PANEL: CPT | Performed by: FAMILY MEDICINE

## 2019-07-30 PROCEDURE — 76604 US EXAM CHEST: CPT | Performed by: FAMILY MEDICINE

## 2019-07-30 PROCEDURE — 36415 COLL VENOUS BLD VENIPUNCTURE: CPT | Performed by: FAMILY MEDICINE

## 2019-07-30 PROCEDURE — 99285 EMERGENCY DEPT VISIT HI MDM: CPT | Mod: 25 | Performed by: FAMILY MEDICINE

## 2019-07-30 PROCEDURE — 85025 COMPLETE CBC W/AUTO DIFF WBC: CPT | Performed by: FAMILY MEDICINE

## 2019-07-30 PROCEDURE — 71046 X-RAY EXAM CHEST 2 VIEWS: CPT

## 2019-07-30 PROCEDURE — 76604 US EXAM CHEST: CPT | Mod: 26 | Performed by: FAMILY MEDICINE

## 2019-07-30 PROCEDURE — 84484 ASSAY OF TROPONIN QUANT: CPT | Performed by: FAMILY MEDICINE

## 2019-07-30 ASSESSMENT — MIFFLIN-ST. JEOR: SCORE: 1495.38

## 2019-07-30 NOTE — ED NOTES
Intermittent chest pain since Sunday.  Patient had one episode on Sunday and then an episode today at approximately noon after lifting a can of diesel fuel.  Patient denies nausea, vomiting, sweating.  Patient currently denies pain. Winston Danielle RN on 7/30/2019 at 5:28 PM

## 2019-07-30 NOTE — ED AVS SNAPSHOT
[facility] Emergency Department  [address] [address]  Phone:  [phone]  Fax:  [phone]                                    Simone Daniels   MRN: [id number]    Department:  [facility] Emergency Department   Date of Visit:  7/30/2019           After Visit Summary Signature Page    I have received my discharge instructions, and my questions have been answered. I have discussed any challenges I see with this plan with the nurse or doctor.    ..........................................................................................................................................  Patient/Patient Representative Signature      ..........................................................................................................................................  Patient Representative Print Name and Relationship to Patient    ..................................................               ................................................  Date                                   Time    ..........................................................................................................................................  Reviewed by Signature/Title    ...................................................              ..............................................  Date                                               Time          22EPIC Rev 08/18
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
## 2019-07-31 NOTE — DISCHARGE INSTRUCTIONS
Return to the Emergency Room if the following occurs:     Severely worsened pain, trouble breathing, fever >101, fainting, or for any concern at anytime.    Or, follow-up with the following provider as we discussed:     Return to your primary doctor for reevalation.    Medications discussed:    None new.  No changes.    If you received pain-relieving or sedating medication during your time in the ER, avoid alcohol, driving automobiles, or working with machinery.  Also, a responsible adult must stay with you.        Call the Nurse Advice Line at [phone] or [phone] for any concern at anytime.

## 2019-12-16 ENCOUNTER — APPOINTMENT (OUTPATIENT)
Dept: GENERAL RADIOLOGY | Facility: CLINIC | Age: 74
End: 2019-12-16
Attending: EMERGENCY MEDICINE

## 2019-12-16 ENCOUNTER — HOSPITAL ENCOUNTER (EMERGENCY)
Facility: CLINIC | Age: 74
Discharge: HOME | End: 2019-12-16
Attending: EMERGENCY MEDICINE | Admitting: EMERGENCY MEDICINE

## 2019-12-16 VITALS
BODY MASS INDEX: 26.58 KG/M2 | OXYGEN SATURATION: 99 % | RESPIRATION RATE: 16 BRPM | DIASTOLIC BLOOD PRESSURE: 82 MMHG | TEMPERATURE: 97.6 F | WEIGHT: 180 LBS | HEART RATE: 95 BPM | SYSTOLIC BLOOD PRESSURE: 156 MMHG

## 2019-12-16 DIAGNOSIS — R07.9 CHEST PAIN, UNSPECIFIED TYPE: Primary | ICD-10-CM

## 2019-12-16 LAB
ANION GAP SERPL CALCULATED.3IONS-SCNC: 4 MMOL/L (ref 3–14)
BASOPHILS # BLD AUTO: 0.1 10E9/L (ref 0–0.2)
BASOPHILS NFR BLD AUTO: 0.5 %
BUN SERPL-MCNC: 20 MG/DL (ref 7–30)
CALCIUM SERPL-MCNC: 9 MG/DL (ref 8.5–10.1)
CHLORIDE SERPL-SCNC: 108 MMOL/L (ref 94–109)
CO2 SERPL-SCNC: 29 MMOL/L (ref 20–32)
CREAT SERPL-MCNC: 0.98 MG/DL (ref 0.66–1.25)
DIFFERENTIAL METHOD BLD: NORMAL
EOSINOPHIL # BLD AUTO: 0.1 10E9/L (ref 0–0.7)
EOSINOPHIL NFR BLD AUTO: 1.5 %
ERYTHROCYTE [DISTWIDTH] IN BLOOD BY AUTOMATED COUNT: 14.5 % (ref 10–15)
GFR SERPL CREATININE-BSD FRML MDRD: 76 ML/MIN/{1.73_M2}
GLUCOSE SERPL-MCNC: 71 MG/DL (ref 70–99)
HCT VFR BLD AUTO: 41.2 % (ref 40–53)
HGB BLD-MCNC: 13.7 G/DL (ref 13.3–17.7)
IMM GRANULOCYTES # BLD: 0 10E9/L (ref 0–0.4)
IMM GRANULOCYTES NFR BLD: 0.2 %
LYMPHOCYTES # BLD AUTO: 1.2 10E9/L (ref 0.8–5.3)
LYMPHOCYTES NFR BLD AUTO: 12.8 %
MCH RBC QN AUTO: 29.2 PG (ref 26.5–33)
MCHC RBC AUTO-ENTMCNC: 33.3 G/DL (ref 31.5–36.5)
MCV RBC AUTO: 88 FL (ref 78–100)
MONOCYTES # BLD AUTO: 0.6 10E9/L (ref 0–1.3)
MONOCYTES NFR BLD AUTO: 6.9 %
NEUTROPHILS # BLD AUTO: 7.2 10E9/L (ref 1.6–8.3)
NEUTROPHILS NFR BLD AUTO: 78.1 %
NRBC # BLD AUTO: 0 10*3/UL
NRBC BLD AUTO-RTO: 0 /100
PLATELET # BLD AUTO: 290 10E9/L (ref 150–450)
POTASSIUM SERPL-SCNC: 3.7 MMOL/L (ref 3.4–5.3)
RBC # BLD AUTO: 4.69 10E12/L (ref 4.4–5.9)
SODIUM SERPL-SCNC: 141 MMOL/L (ref 133–144)
TROPONIN I SERPL-MCNC: 0.04 UG/L (ref 0–0.04)
WBC # BLD AUTO: 9.2 10E9/L (ref 4–11)

## 2019-12-16 PROCEDURE — 99285 EMERGENCY DEPT VISIT HI MDM: CPT | Mod: 25

## 2019-12-16 PROCEDURE — 93010 ELECTROCARDIOGRAM REPORT: CPT | Mod: Z6 | Performed by: EMERGENCY MEDICINE

## 2019-12-16 PROCEDURE — 71046 X-RAY EXAM CHEST 2 VIEWS: CPT

## 2019-12-16 PROCEDURE — 80048 BASIC METABOLIC PNL TOTAL CA: CPT | Performed by: EMERGENCY MEDICINE

## 2019-12-16 PROCEDURE — 84484 ASSAY OF TROPONIN QUANT: CPT | Performed by: EMERGENCY MEDICINE

## 2019-12-16 PROCEDURE — 93005 ELECTROCARDIOGRAM TRACING: CPT

## 2019-12-16 PROCEDURE — 99285 EMERGENCY DEPT VISIT HI MDM: CPT | Mod: 25 | Performed by: EMERGENCY MEDICINE

## 2019-12-16 PROCEDURE — 85025 COMPLETE CBC W/AUTO DIFF WBC: CPT | Performed by: EMERGENCY MEDICINE

## 2019-12-16 ASSESSMENT — ENCOUNTER SYMPTOMS
EYE REDNESS: 0
DIZZINESS: 0
ABDOMINAL PAIN: 0
COUGH: 0
SHORTNESS OF BREATH: 0
FREQUENCY: 0
FEVER: 0
PALPITATIONS: 0
NECK PAIN: 0
CHILLS: 1
FACIAL ASYMMETRY: 0
EYE PAIN: 0
RHINORRHEA: 0
BACK PAIN: 0
DIFFICULTY URINATING: 1
DIAPHORESIS: 0
DYSURIA: 0

## 2019-12-16 NOTE — ED AVS SNAPSHOT
[facility] Emergency Department  [address] [address]  Phone:  [phone]  Fax:  [phone]                                    Simone Daniels   MRN: [id number]    Department:  [facility] Emergency Department   Date of Visit:  12/16/2019           After Visit Summary Signature Page    I have received my discharge instructions, and my questions have been answered. I have discussed any challenges I see with this plan with the nurse or doctor.    ..........................................................................................................................................  Patient/Patient Representative Signature      ..........................................................................................................................................  Patient Representative Print Name and Relationship to Patient    ..................................................               ................................................  Date                                   Time    ..........................................................................................................................................  Reviewed by Signature/Title    ...................................................              ..............................................  Date                                               Time          22EPIC Rev 08/18

## 2019-12-16 NOTE — DISCHARGE INSTRUCTIONS
You were evaluated emergency department for chest pain.  Your work-up was reassuring however as we discussed we cannot rule out acute heart issues in the emergency department.  I recommended additional testing which you declined.  If you experience return of your chest pain, shortness of breath, nausea, or cold sweat, please return to emergency department immediately for further evaluation and treatment.
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
## 2020-03-13 ENCOUNTER — TELEPHONE (OUTPATIENT)
Dept: FAMILY MEDICINE | Facility: CLINIC | Age: 75
End: 2020-03-13

## 2020-03-13 NOTE — TELEPHONE ENCOUNTER
"Patient called, he declined appointment today, can't do that, offered appointment on Monday, Tuesday and Wednesday but patient said \" you can call me in the morning and see if that works.\" Explained that need to schedule the appointment and come at time of appointment not just we call patient to see if can come in the morning, patient says he will call back and let us know when he can schedule with Sophia.    TAYLOR Sheth    "

## 2020-03-13 NOTE — TELEPHONE ENCOUNTER
Reason for call:  Patient reporting a symptom    Symptom or request: Pt says he has a tooth infection in an upper tooth and it's causing his cheek to swell and under the eye. He says he needs the tooth extracted and wants the infection to be cleared before he can go to the dentist. She has done this for him before 4-5 years ago.  No fever    Duration (how long have symptoms been present): 3 weeks    Have you been treated for this before? Yes    Additional comments: [facility] pharmacy    Phone Number patient can be reached at:  [phone]     Best Time:  anytime    Can we leave a detailed message on this number:  YES    Call taken on 3/13/2020 at 9:12 AM by Sharron Velasco

## 2020-03-13 NOTE — TELEPHONE ENCOUNTER
Patient unable to come in to the clinic today. Appointment scheduled with Sophia Dacosta on Monday afternoon. Melida Sinclair CMA

## 2020-03-13 NOTE — TELEPHONE ENCOUNTER
S-(situation): Simone is asking if can have antibiotic.  Has pain left cheek bone.  Has had this for three weeks.  No swelling.  When food gets in the tooth starts to hurt.  Does not have a dental appointment    B-(background): last time treated 2015 for abscessed tooth     A-(assessment): left side cheek pain, sounds like from tooth that has a cavity    R-(recommendations): I told Simone that I would ask Sophia and get back to him  Felicia Hughes RN

## 2020-11-25 ENCOUNTER — APPOINTMENT (OUTPATIENT)
Dept: CT IMAGING | Facility: CLINIC | Age: 75
DRG: 064 | End: 2020-11-25
Attending: PSYCHIATRY & NEUROLOGY
Payer: MEDICARE

## 2020-11-25 ENCOUNTER — HOSPITAL ENCOUNTER (EMERGENCY)
Facility: CLINIC | Age: 75
Discharge: ANOTHER HOSPITAL | End: 2020-11-25
Attending: EMERGENCY MEDICINE | Admitting: EMERGENCY MEDICINE
Payer: MEDICAID

## 2020-11-25 ENCOUNTER — APPOINTMENT (OUTPATIENT)
Dept: GENERAL RADIOLOGY | Facility: CLINIC | Age: 75
End: 2020-11-25
Attending: EMERGENCY MEDICINE
Payer: MEDICAID

## 2020-11-25 ENCOUNTER — APPOINTMENT (OUTPATIENT)
Dept: CT IMAGING | Facility: CLINIC | Age: 75
End: 2020-11-25
Attending: EMERGENCY MEDICINE
Payer: MEDICAID

## 2020-11-25 ENCOUNTER — HOSPITAL ENCOUNTER (INPATIENT)
Facility: CLINIC | Age: 75
LOS: 35 days | Discharge: NURSING, REHAB OR LONG-TERM CARE | DRG: 064 | End: 2020-12-30
Attending: PSYCHIATRY & NEUROLOGY | Admitting: PSYCHIATRY & NEUROLOGY
Payer: MEDICARE

## 2020-11-25 VITALS
DIASTOLIC BLOOD PRESSURE: 103 MMHG | RESPIRATION RATE: 15 BRPM | TEMPERATURE: 98.3 F | SYSTOLIC BLOOD PRESSURE: 155 MMHG | OXYGEN SATURATION: 100 % | HEART RATE: 82 BPM

## 2020-11-25 DIAGNOSIS — I51.89 DIASTOLIC DYSFUNCTION: ICD-10-CM

## 2020-11-25 DIAGNOSIS — E56.9 VITAMIN DEFICIENCY: ICD-10-CM

## 2020-11-25 DIAGNOSIS — I63.89 ACUTE HEMORRHAGIC INFARCTION OF BRAIN (H): Primary | ICD-10-CM

## 2020-11-25 DIAGNOSIS — G47.00 INSOMNIA, UNSPECIFIED TYPE: ICD-10-CM

## 2020-11-25 DIAGNOSIS — I63.89 ACUTE HEMORRHAGIC INFARCTION OF BRAIN (H): ICD-10-CM

## 2020-11-25 DIAGNOSIS — I48.20 CHRONIC ATRIAL FIBRILLATION (H): ICD-10-CM

## 2020-11-25 DIAGNOSIS — R33.9 URINARY RETENTION WITH INCOMPLETE BLADDER EMPTYING: Chronic | ICD-10-CM

## 2020-11-25 DIAGNOSIS — Z00.00 HEALTHCARE MAINTENANCE: ICD-10-CM

## 2020-11-25 PROBLEM — I61.9 HEMORRHAGIC STROKE (H): Status: ACTIVE | Noted: 2020-11-25

## 2020-11-25 LAB
ABO + RH BLD: NORMAL
ABO + RH BLD: NORMAL
ALBUMIN SERPL-MCNC: 3.5 G/DL (ref 3.4–5)
ALP SERPL-CCNC: 76 U/L (ref 40–150)
ALT SERPL W P-5'-P-CCNC: 24 U/L (ref 0–70)
AMMONIA PLAS-SCNC: <10 UMOL/L (ref 10–50)
ANION GAP SERPL CALCULATED.3IONS-SCNC: 9 MMOL/L (ref 3–14)
APTT PPP: 27 SEC (ref 22–37)
AST SERPL W P-5'-P-CCNC: 51 U/L (ref 0–45)
BASOPHILS # BLD AUTO: 0 10E9/L (ref 0–0.2)
BASOPHILS NFR BLD AUTO: 0.4 %
BILIRUB SERPL-MCNC: 1.2 MG/DL (ref 0.2–1.3)
BLD GP AB SCN SERPL QL: NORMAL
BLOOD BANK CMNT PATIENT-IMP: NORMAL
BUN SERPL-MCNC: 26 MG/DL (ref 7–30)
CALCIUM SERPL-MCNC: 9.2 MG/DL (ref 8.5–10.1)
CHLORIDE SERPL-SCNC: 111 MMOL/L (ref 94–109)
CHOLEST SERPL-MCNC: 133 MG/DL
CO2 SERPL-SCNC: 21 MMOL/L (ref 20–32)
CREAT SERPL-MCNC: 1.31 MG/DL (ref 0.66–1.25)
DIFFERENTIAL METHOD BLD: ABNORMAL
EOSINOPHIL # BLD AUTO: 0 10E9/L (ref 0–0.7)
EOSINOPHIL NFR BLD AUTO: 0.3 %
ERYTHROCYTE [DISTWIDTH] IN BLOOD BY AUTOMATED COUNT: 14.2 % (ref 10–15)
ETHANOL SERPL-MCNC: <0.01 G/DL
GFR SERPL CREATININE-BSD FRML MDRD: 53 ML/MIN/{1.73_M2}
GLUCOSE BLDC GLUCOMTR-MCNC: 106 MG/DL (ref 70–99)
GLUCOSE SERPL-MCNC: 134 MG/DL (ref 70–99)
HBA1C MFR BLD: 5.6 % (ref 0–5.6)
HCT VFR BLD AUTO: 42.8 % (ref 40–53)
HDLC SERPL-MCNC: 55 MG/DL
HGB BLD-MCNC: 13.5 G/DL (ref 13.3–17.7)
IMM GRANULOCYTES # BLD: 0.1 10E9/L (ref 0–0.4)
IMM GRANULOCYTES NFR BLD: 0.5 %
INR PPP: 1.23 (ref 0.86–1.14)
LDLC SERPL CALC-MCNC: 69 MG/DL
LIPASE SERPL-CCNC: 125 U/L (ref 73–393)
LYMPHOCYTES # BLD AUTO: 0.7 10E9/L (ref 0.8–5.3)
LYMPHOCYTES NFR BLD AUTO: 6.1 %
MAGNESIUM SERPL-MCNC: 1.9 MG/DL (ref 1.6–2.3)
MCH RBC QN AUTO: 30.8 PG (ref 26.5–33)
MCHC RBC AUTO-ENTMCNC: 31.5 G/DL (ref 31.5–36.5)
MCV RBC AUTO: 98 FL (ref 78–100)
MONOCYTES # BLD AUTO: 0.7 10E9/L (ref 0–1.3)
MONOCYTES NFR BLD AUTO: 6.1 %
NEUTROPHILS # BLD AUTO: 9.6 10E9/L (ref 1.6–8.3)
NEUTROPHILS NFR BLD AUTO: 86.6 %
NONHDLC SERPL-MCNC: 78 MG/DL
NRBC # BLD AUTO: 0 10*3/UL
NRBC BLD AUTO-RTO: 0 /100
PHOSPHATE SERPL-MCNC: 3.9 MG/DL (ref 2.5–4.5)
PLATELET # BLD AUTO: 239 10E9/L (ref 150–450)
POTASSIUM SERPL-SCNC: 3.8 MMOL/L (ref 3.4–5.3)
POTASSIUM SERPL-SCNC: 4 MMOL/L (ref 3.4–5.3)
PROT SERPL-MCNC: 7.5 G/DL (ref 6.8–8.8)
RBC # BLD AUTO: 4.39 10E12/L (ref 4.4–5.9)
SODIUM SERPL-SCNC: 141 MMOL/L (ref 133–144)
SPECIMEN EXP DATE BLD: NORMAL
TRIGL SERPL-MCNC: 46 MG/DL
WBC # BLD AUTO: 11.1 10E9/L (ref 4–11)

## 2020-11-25 PROCEDURE — 250N000011 HC RX IP 250 OP 636: Performed by: STUDENT IN AN ORGANIZED HEALTH CARE EDUCATION/TRAINING PROGRAM

## 2020-11-25 PROCEDURE — 93010 ELECTROCARDIOGRAM REPORT: CPT | Performed by: INTERNAL MEDICINE

## 2020-11-25 PROCEDURE — 71045 X-RAY EXAM CHEST 1 VIEW: CPT

## 2020-11-25 PROCEDURE — 250N000011 HC RX IP 250 OP 636: Performed by: EMERGENCY MEDICINE

## 2020-11-25 PROCEDURE — 96374 THER/PROPH/DIAG INJ IV PUSH: CPT | Mod: 59 | Performed by: EMERGENCY MEDICINE

## 2020-11-25 PROCEDURE — 80053 COMPREHEN METABOLIC PANEL: CPT | Performed by: EMERGENCY MEDICINE

## 2020-11-25 PROCEDURE — 83690 ASSAY OF LIPASE: CPT | Performed by: EMERGENCY MEDICINE

## 2020-11-25 PROCEDURE — 84132 ASSAY OF SERUM POTASSIUM: CPT | Performed by: PSYCHIATRY & NEUROLOGY

## 2020-11-25 PROCEDURE — 85025 COMPLETE CBC W/AUTO DIFF WBC: CPT | Performed by: EMERGENCY MEDICINE

## 2020-11-25 PROCEDURE — U0003 INFECTIOUS AGENT DETECTION BY NUCLEIC ACID (DNA OR RNA); SEVERE ACUTE RESPIRATORY SYNDROME CORONAVIRUS 2 (SARS-COV-2) (CORONAVIRUS DISEASE [COVID-19]), AMPLIFIED PROBE TECHNIQUE, MAKING USE OF HIGH THROUGHPUT TECHNOLOGIES AS DESCRIBED BY CMS-2020-01-R: HCPCS | Performed by: EMERGENCY MEDICINE

## 2020-11-25 PROCEDURE — 70450 CT HEAD/BRAIN W/O DYE: CPT | Mod: 77

## 2020-11-25 PROCEDURE — 99292 CRITICAL CARE ADDL 30 MIN: CPT | Mod: 25 | Performed by: EMERGENCY MEDICINE

## 2020-11-25 PROCEDURE — 250N000009 HC RX 250: Performed by: STUDENT IN AN ORGANIZED HEALTH CARE EDUCATION/TRAINING PROGRAM

## 2020-11-25 PROCEDURE — 250N000011 HC RX IP 250 OP 636: Performed by: PSYCHIATRY & NEUROLOGY

## 2020-11-25 PROCEDURE — 93005 ELECTROCARDIOGRAM TRACING: CPT

## 2020-11-25 PROCEDURE — 87641 MR-STAPH DNA AMP PROBE: CPT | Performed by: PSYCHIATRY & NEUROLOGY

## 2020-11-25 PROCEDURE — 83735 ASSAY OF MAGNESIUM: CPT | Performed by: PSYCHIATRY & NEUROLOGY

## 2020-11-25 PROCEDURE — HZ2ZZZZ DETOXIFICATION SERVICES FOR SUBSTANCE ABUSE TREATMENT: ICD-10-PCS | Performed by: PSYCHIATRY & NEUROLOGY

## 2020-11-25 PROCEDURE — 80320 DRUG SCREEN QUANTALCOHOLS: CPT | Performed by: EMERGENCY MEDICINE

## 2020-11-25 PROCEDURE — C9803 HOPD COVID-19 SPEC COLLECT: HCPCS | Performed by: EMERGENCY MEDICINE

## 2020-11-25 PROCEDURE — 87640 STAPH A DNA AMP PROBE: CPT | Performed by: PSYCHIATRY & NEUROLOGY

## 2020-11-25 PROCEDURE — 99291 CRITICAL CARE FIRST HOUR: CPT | Mod: 25 | Performed by: EMERGENCY MEDICINE

## 2020-11-25 PROCEDURE — 86850 RBC ANTIBODY SCREEN: CPT | Performed by: PSYCHIATRY & NEUROLOGY

## 2020-11-25 PROCEDURE — 85610 PROTHROMBIN TIME: CPT | Performed by: EMERGENCY MEDICINE

## 2020-11-25 PROCEDURE — 93010 ELECTROCARDIOGRAM REPORT: CPT | Performed by: EMERGENCY MEDICINE

## 2020-11-25 PROCEDURE — 70450 CT HEAD/BRAIN W/O DYE: CPT | Mod: XS

## 2020-11-25 PROCEDURE — 83036 HEMOGLOBIN GLYCOSYLATED A1C: CPT | Performed by: PSYCHIATRY & NEUROLOGY

## 2020-11-25 PROCEDURE — 82140 ASSAY OF AMMONIA: CPT | Performed by: EMERGENCY MEDICINE

## 2020-11-25 PROCEDURE — 70496 CT ANGIOGRAPHY HEAD: CPT

## 2020-11-25 PROCEDURE — 36415 COLL VENOUS BLD VENIPUNCTURE: CPT | Performed by: PSYCHIATRY & NEUROLOGY

## 2020-11-25 PROCEDURE — 70450 CT HEAD/BRAIN W/O DYE: CPT | Mod: 26 | Performed by: STUDENT IN AN ORGANIZED HEALTH CARE EDUCATION/TRAINING PROGRAM

## 2020-11-25 PROCEDURE — 200N000002 HC R&B ICU UMMC

## 2020-11-25 PROCEDURE — 86901 BLOOD TYPING SEROLOGIC RH(D): CPT | Performed by: PSYCHIATRY & NEUROLOGY

## 2020-11-25 PROCEDURE — 999N001017 HC STATISTIC GLUCOSE BY METER IP

## 2020-11-25 PROCEDURE — 93005 ELECTROCARDIOGRAM TRACING: CPT | Performed by: EMERGENCY MEDICINE

## 2020-11-25 PROCEDURE — 86900 BLOOD TYPING SEROLOGIC ABO: CPT | Performed by: PSYCHIATRY & NEUROLOGY

## 2020-11-25 PROCEDURE — 250N000009 HC RX 250: Performed by: EMERGENCY MEDICINE

## 2020-11-25 PROCEDURE — 84100 ASSAY OF PHOSPHORUS: CPT | Performed by: PSYCHIATRY & NEUROLOGY

## 2020-11-25 PROCEDURE — 74019 RADEX ABDOMEN 2 VIEWS: CPT

## 2020-11-25 PROCEDURE — 85730 THROMBOPLASTIN TIME PARTIAL: CPT | Performed by: EMERGENCY MEDICINE

## 2020-11-25 PROCEDURE — 80061 LIPID PANEL: CPT | Performed by: PSYCHIATRY & NEUROLOGY

## 2020-11-25 RX ORDER — BISACODYL 10 MG
10 SUPPOSITORY, RECTAL RECTAL DAILY PRN
Status: DISCONTINUED | OUTPATIENT
Start: 2020-11-25 | End: 2020-12-30 | Stop reason: HOSPADM

## 2020-11-25 RX ORDER — ACETAMINOPHEN 325 MG/1
650 TABLET ORAL EVERY 4 HOURS PRN
Status: DISCONTINUED | OUTPATIENT
Start: 2020-11-25 | End: 2020-12-30 | Stop reason: HOSPADM

## 2020-11-25 RX ORDER — IOPAMIDOL 755 MG/ML
70 INJECTION, SOLUTION INTRAVASCULAR ONCE
Status: COMPLETED | OUTPATIENT
Start: 2020-11-25 | End: 2020-11-25

## 2020-11-25 RX ORDER — ONDANSETRON 2 MG/ML
4 INJECTION INTRAMUSCULAR; INTRAVENOUS EVERY 6 HOURS PRN
Status: DISCONTINUED | OUTPATIENT
Start: 2020-11-25 | End: 2020-12-30 | Stop reason: HOSPADM

## 2020-11-25 RX ORDER — LABETALOL HYDROCHLORIDE 5 MG/ML
10 INJECTION, SOLUTION INTRAVENOUS EVERY 10 MIN PRN
Status: DISCONTINUED | OUTPATIENT
Start: 2020-11-25 | End: 2020-12-30 | Stop reason: HOSPADM

## 2020-11-25 RX ORDER — LABETALOL 20 MG/4 ML (5 MG/ML) INTRAVENOUS SYRINGE
20 ONCE
Status: COMPLETED | OUTPATIENT
Start: 2020-11-25 | End: 2020-11-25

## 2020-11-25 RX ORDER — ACETAMINOPHEN 650 MG/1
650 SUPPOSITORY RECTAL EVERY 4 HOURS PRN
Status: DISCONTINUED | OUTPATIENT
Start: 2020-11-25 | End: 2020-12-30 | Stop reason: HOSPADM

## 2020-11-25 RX ORDER — ONDANSETRON 4 MG/1
4 TABLET, ORALLY DISINTEGRATING ORAL EVERY 6 HOURS PRN
Status: DISCONTINUED | OUTPATIENT
Start: 2020-11-25 | End: 2020-12-30 | Stop reason: HOSPADM

## 2020-11-25 RX ORDER — LORAZEPAM 2 MG/ML
1 INJECTION INTRAMUSCULAR ONCE
Status: COMPLETED | OUTPATIENT
Start: 2020-11-25 | End: 2020-11-25

## 2020-11-25 RX ORDER — POLYETHYLENE GLYCOL 3350 17 G/17G
17 POWDER, FOR SOLUTION ORAL DAILY
Status: DISCONTINUED | OUTPATIENT
Start: 2020-11-25 | End: 2020-11-30

## 2020-11-25 RX ORDER — SODIUM CHLORIDE, SODIUM GLUCONATE, SODIUM ACETATE, POTASSIUM CHLORIDE AND MAGNESIUM CHLORIDE 526; 502; 368; 37; 30 MG/100ML; MG/100ML; MG/100ML; MG/100ML; MG/100ML
75 INJECTION, SOLUTION INTRAVENOUS CONTINUOUS
Status: DISCONTINUED | OUTPATIENT
Start: 2020-11-25 | End: 2020-12-01

## 2020-11-25 RX ORDER — AMOXICILLIN 250 MG
1-2 CAPSULE ORAL 2 TIMES DAILY
Status: DISCONTINUED | OUTPATIENT
Start: 2020-11-25 | End: 2020-11-30

## 2020-11-25 RX ORDER — MAGNESIUM SULFATE HEPTAHYDRATE 40 MG/ML
2 INJECTION, SOLUTION INTRAVENOUS ONCE
Status: COMPLETED | OUTPATIENT
Start: 2020-11-25 | End: 2020-11-26

## 2020-11-25 RX ADMIN — MAGNESIUM SULFATE IN WATER 2 G: 40 INJECTION, SOLUTION INTRAVENOUS at 23:28

## 2020-11-25 RX ADMIN — LABETALOL HYDROCHLORIDE 20 MG: 5 INJECTION, SOLUTION INTRAVENOUS at 14:40

## 2020-11-25 RX ADMIN — LABETALOL HYDROCHLORIDE 20 MG: 5 INJECTION, SOLUTION INTRAVENOUS at 18:38

## 2020-11-25 RX ADMIN — SODIUM CHLORIDE 100 ML: 9 INJECTION, SOLUTION INTRAVENOUS at 11:48

## 2020-11-25 RX ADMIN — LABETALOL HYDROCHLORIDE 10 MG: 5 INJECTION, SOLUTION INTRAVENOUS at 20:02

## 2020-11-25 RX ADMIN — SODIUM CHLORIDE, SODIUM GLUCONATE, SODIUM ACETATE, POTASSIUM CHLORIDE AND MAGNESIUM CHLORIDE 100 ML/HR: 526; 502; 368; 37; 30 INJECTION, SOLUTION INTRAVENOUS at 22:53

## 2020-11-25 RX ADMIN — IOPAMIDOL 70 ML: 755 INJECTION, SOLUTION INTRAVENOUS at 11:47

## 2020-11-25 RX ADMIN — LORAZEPAM 1 MG: 2 INJECTION INTRAMUSCULAR; INTRAVENOUS at 14:52

## 2020-11-25 ASSESSMENT — MIFFLIN-ST. JEOR: SCORE: 1372.5

## 2020-11-25 ASSESSMENT — ACTIVITIES OF DAILY LIVING (ADL): ADLS_ACUITY_SCORE: 13

## 2020-11-25 NOTE — ED NOTES
Due to risk of falls, pt placed into visible room, curtains open. Bed rails up. Bed in low position. Bed exit alarm activated. Call light within reach. Pt encouraged to call if any need arise. Bedside sitter requested.

## 2020-11-25 NOTE — H&P
[facility]     Stroke Admission Note    Chief Complaint  Altered Mental Status     HPI  Simone Daniels is a 72 year old man with history of atrial fibrillation not on anticoagulation (discontinued apixaban 6/2019), anxiety, GERD, and left parietal stroke with hemorrhagic conversion in 2018 who presented to [facility] for evaluation of aphasia. He was reportedly found behaving abnormally at a liquor store (disheveled and mute). Last known well is unclear. Head CT showed new area of subacute infarct in right parietal lobe with petechial hemorrhage. CTA was without LVO. Dr. Abrams (stroke fellow) was contacted for stroke alert. Ultimately, decision was made to transfer patient to [facility] for neuro-ICU level cares.     With regards to his prior neurological history, he was admitted to [facility] neurocritical care service in 2018 for his left parietal lobe stroke. He was found to have atrial fibrillation and started on Apixaban 5mg BID. MOCA was 9/30 then improved to 12/30 by discharge. Pt discharged to TCU. Per chart review, it seems patient was not adherent to prescribed dose and would often just take one pill per day instead of two prior to marked discontinuation 6/2019.    Patient mute on arrival, unable to elicit additional history from patient during interview.    TPA Treatment   Not given due to subacute/acute blood in brain .    Endovascular Treatment  Not initiated due to absence of proximal vessel occlusion    Impression  1. Right parietal lobe infarct with hemorrhagic conversion, subacute   2. Hx of left parietal IPH with resultant aphasia     Plan    Neuro:  #Right parietal lobe infarct with hemorrhagic conversion, subacute  Etiology likely embolic associated with atrial fibrillation not on anticoagulation.  INR 1.23 on day of admission.  - Admit to Neuro-ICU  - Q2 hr neuro-checks, with Q4hr overnight   - SBP goal < 160   - Repeat non-contrast CTH tonight  -  Avoid hypotonic IV fluids  - Hold PTA Apixaban   - Statin:LDL 69, no indication for statin   - MRI Stroke Protocol  - TTE with Bubble Study   - Telemetry, EKG  - Bedside Glucose Monitoring  - Nutrition: NPO, failed swallow evaluation  - A1c, Lipid Panel, Troponin   - PT/OT/SLP  - Stroke Education  - Stroke Class per [facility]  - Smoking Cessation not needed - reportedly not current smoker  - Depression Screen  - Apnea Screen  - Euthermia, Euglycemia  - Begin aspirin 11/27    #Alcohol use disorde  - CIWA without benzos       Resp:   No acute issues, saturation well on room air  - continuous pulse ox monitoring   - maintain O2 saturations > 92%     CV:  # Atrial fibrillation  Was previously prescribed apixaban, however per chart review does not seem to have taken this medication consistently after recurrent nosebleeds 1/2019.  - hold home apixaban given intracranial hemorrhage    - SBP goal: <160  - PRN labetalol    Renal/FEN:   - strict monitoring of I/O    #GREGORIA  Cr 1.3, baseline less than 1  - Daily BMP   - maintenace IVF is Plasmalyte 100ml/h    # Urinary retention  Was on tamsulosin in the past, not a current medication  - urinalysis on admission    Endo:   No acute issues  A1C 5.6% 11/25/2020    Heme:   # Mild leukocytosis  Possibly reactive to stroke.  - monitor Hgb  - Hg > 7.0   - Plt > 100k   - INR <1.5   - Thiamine, Folate IV     GI:   Diet: NPO  - No need for GI ppx    ID:   -Monitor for signs of infection; pan culture if >100.4    FEN: NPO - will evaluate enteral access 11/27  PPx:   - no chemical ppx due to hemorrhagic conversion of stroke  - SCDs . Begin heparin 11/27  - no GI ppx indicated  Code: Full    During initial physical assessment, the plan of care was discussed with patient.  Plan of care includes: admission to [facility], repeat CT head, close neurological monitoring.    Patient was admitted via transfer from [facility] ED    The patient will be admitted to the Neuro Critical  Care/Stroke team..     The patient was discussed with the fellow, Dr. Bejarano. The attending is Dr. Lewis.    Thao Moore MD  Neurology Resident  Ascom [id number]    Plan modified by Darci Bauer   ___________________________________________________    Nutrition:   Orders Placed This Encounter      NPO for Medical/Clinical Reasons Except for: No Exceptions    Past Medical History   Past Medical History:   Diagnosis Date     LOW BACK PAIN      Past Surgical History   Past Surgical History:   Procedure Laterality Date     SURGICAL HISTORY OF -       Cervical disc (5,6,&7)     SURGICAL HISTORY OF -       (L) Knee fx tibial plateau     SURGICAL HISTORY OF -       (L) Knee surgery - re-fx tibial plateau     Medications   Home Meds  Prior to Admission medications    Medication Sig Start Date End Date Taking? Authorizing Provider   CRANBERRY JUICE EXTRACT PO Take 40,000 Units by mouth daily    Reported, Patient   Garlic 1000 MG CAPS Take 1 capsule by mouth daily    Reported, Patient   lisinopril (PRINIVIL/ZESTRIL) 5 MG tablet Take 1 tablet (5 mg) by mouth daily For blood pressure control 19   Sophia Dacosta APRN CNP   NONFORMULARY Beet Root Juice    Reported, Patient   senna-docusate (SENOKOT-S/PERICOLACE) 8.6-50 MG tablet Take 1 tablet by mouth 2 times daily For constipation 18   Annabella Santos MD   tamsulosin (FLOMAX) 0.4 MG capsule Take 1 capsule (0.4 mg) by mouth daily 19   Missael Carvalho MD         Allergies   No Known Allergies  Family History   Family History   Problem Relation Age of Onset     C.A.D. Mother         heart disease in her 60s     C.A.D. Brother         1/2 brother with heart disease     Diabetes No family hx of      Social History   Social History     Tobacco Use     Smoking status: Former Smoker     Packs/day: 2.00     Years: 18.00     Pack years: 36.00     Types: Cigarettes     Quit date: 1980     Years since quittin.9     Smokeless tobacco: Never Used    Substance Use Topics     Alcohol use: Yes     Frequency: 2-4 times a month     Drinks per session: 1 or 2     Binge frequency: Never     Comment: occaisonal     Drug use: No       Review of Systems   Review of systems not obtained due to patient factors - unable to communicate verbally       PHYSICAL EXAMINATION  Temp:  [98.3  F (36.8  C)] 98.3  F (36.8  C)  Pulse:  [] 82  Resp:  [15-16] 15  BP: (136-187)/() 155/103  SpO2:  [96 %-100 %] 100 %    General:  patient lying in bed without any acute distress    HEENT:  poor dental hygiene  Cardio:  irregularly irregular  Pulmonary:  no respiratory distress  Abdomen:  soft  Extremities:  no edema  Skin:  intact, warm/dry     Neurologic  Mental Status:  Awake, alert, mute, follows command to open/close eyes, otherwise does not follow any other verbal command, able to mimic examiner when asked  Cranial Nerves:  visual fields intact to confrontation, PERRL, EOMI with normal smooth pursuit, facial movements symmetric, hearing not formally tested but appears intact to conversation, tongue protrusion midline  Motor:  normal muscle tone and bulk, no abnormal movements, able to move all limbs spontaneously, strength 5/5 throughout upper and lower extremities, no pronator drift  Reflexes: 3+ throughout upper extremities and patellar, 1+ achilles, toes mute  Sensory:  light touch sensation intact throughout upper and lower extremities  Coordination:  normal finger-to-nose and heel-to-shin bilaterally without dysmetria  Station/Gait:  deferred    Dysphagia Screen  Failed screening - Strict NPO pending SLP evaluation  11/25/20 2000    Modified [place] Scale (pre-stroke):   2-Slight disability; unable to carry out all previous activities, but able to look after own affairs     Stroke Scales    NIHSS  Interval transfer (11/25/20 1947)   Interval Comments     1a. Level of Consciousness 0-->Alert, keenly responsive   1b. LOC Questions 2-->Answers neither question correctly    1c. LOC Commands 1-->Performs one task correctly   2.   Best Gaze 0-->Normal   3.   Visual 0-->No visual loss   4.   Facial Palsy 0-->Normal symmetrical movements   5a. Motor Arm, Left 0-->No drift, limb holds 90 (or 45) degrees for full 10 secs   5b. Motor Arm, Right 0-->No drift, limb holds 90 (or 45) degrees for full 10 secs   6a. Motor Leg, Left 0-->No drift, leg holds 30 degree position for full 5 secs   6b. Motor Leg, right 0-->No drift, leg holds 30 degree position for full 5 secs   7.   Limb Ataxia 0-->Absent   8.   Sensory 0-->Normal, no sensory loss   9.   Best Language 3-->Mute, global aphasia, no usable speech or auditory comprehension   10. Dysarthria 2-->Severe dysarthria, patients speech is so slurred as to be unintelligible in the absence of or out of proportion to any dysphasia, or is mute/anarthric   11. Extinction and Inattention  0-->No abnormality   Total 8 (11/25/20 1947)       ICH Score (at arrival)  Scoring Tool Score   Age ? 80 years 1 point No   GCS score  3-4   5-12   13-15   2 point  1 point  0 point GCS 5-12(mute)   Hematoma volume, cm 3 ? 30 1 point No   Intraventricular extension 1 point No   Infratentorial location 1 point No   Total 1       Imaging  I personally reviewed all imaging; relevant findings per the HPI.    Lab Results Data   CBC  Recent Labs   Lab 11/25/20  1142   WBC 11.1*   RBC 4.39*   HGB 13.5   HCT 42.8        Basic Metabolic Panel   Recent Labs   Lab 11/25/20  1142      POTASSIUM 4.0   CHLORIDE 111*   CO2 21   BUN 26   CR 1.31*   *   SANDRA 9.2     Liver Panel  Recent Labs   Lab 11/25/20  1142   PROTTOTAL 7.5   ALBUMIN 3.5   BILITOTAL 1.2   ALKPHOS 76   AST 51*   ALT 24     INR    Recent Labs   Lab Test 11/25/20  1142 11/27/18  0232   INR 1.23* 1.08      Lipid Profile    Recent Labs   Lab Test 11/27/18  0701 12/16/15  0857   CHOL 147 159   HDL 57 76   LDL 78 73   TRIG 58 52     A1C    Recent Labs   Lab Test 11/27/18  0701   A1C 5.5     Troponin I   No results for input(s): TROPI in the last 168 hours.       Stroke Code / Stroke Consult Data Data    Not a stroke code

## 2020-11-25 NOTE — ED NOTES
Pt has no family listed in demographics, only a friend. When asked if we can call someone for him, he nods his head no.

## 2020-11-25 NOTE — LETTER
Health Information Management Services               Recipient:  Darius Deal liaison        Sender:  SHELLIE Juarez, LICSW- covering for Lakisha Mckeon     [facility]  Phone [phone]          Date: December 28, 2020  Patient Name:  Simone Daniels  Routing Message:    Please consider [place] [facility] [place]. Thanks!        The documents accompanying this notice contain confidential information belonging to the sender.  This information is intended only for the use of the individual or entity named above.  The authorized recipient of this information is prohibited from disclosing this information to any other party and is required to destroy the information after its stated need has been fulfilled, unless otherwise required by state law.      If you are not the intended recipient, you are hereby notified that any disclosure, copy, distribution or action taken in reliance on the contents of these documents is strictly prohibited.  If you have received this document in error, please return it by fax to [phone] with a note on the cover sheet explaining why you are returning it (e.g. not your patient, not your provider, etc.).  If you need further assistance, please call [facility] Centralized Transcription at [phone].  Documents may also be returned by mail to [employer], , [facility] [address] [place], [place] [address].

## 2020-11-25 NOTE — ED NOTES
Pt with 1 to 1 sitting with him. Pt continues to be impulsive and requiring redirection frequently

## 2020-11-25 NOTE — ED PROVIDER NOTES
History     Chief Complaint   Patient presents with     Altered Mental Status     BROUGHT TO ed BY ems AFTER PT WAS ALtered at liquor store.      HPI   History per EMS and review of EMR, patient is nonverbal and unable to provide history.  Simone Daniels is a 72 year old male with history of left parietal hemorrhagic infarct in November 2018 and atrial fibrillation, not anticoagulated, who was brought by EMS after a welfare check was called after he acted confused and ? mute at a local liquor store where he apparently presented to buy liquor this morning.  EMS arrived at his home where he lives independently by himself to find him aphasic, ambulatory and in no distress.  He is unable to provide history.  He is able to shake his head yes and no and appears to answer some simple questions appropriately.  He denies fall, trauma or head injury. He denies headache.  No motor or sensory deficit.    EMS reports that his house was very disheveled and unkept.  Blood sugar was normal.      No Known Allergies    Problem List:    Patient Active Problem List    Diagnosis Date Noted     Hemorrhagic stroke (H) 11/25/2020     Priority: Medium     Elevated prostate specific antigen (PSA) 04/19/2019     Priority: Medium     Urge incontinence of urine 04/19/2019     Priority: Medium     Chronic atrial fibrillation (H) 04/19/2019     Priority: Medium     Urinary retention with incomplete bladder emptying 12/11/2018     Priority: Medium     Left parietal hemorrhagic infarction (H) 11/27/2018     Priority: Medium     Post herpetic neuralgia 06/19/2017     Priority: Medium     CARDIOVASCULAR SCREENING; LDL GOAL LESS THAN 160 10/31/2010     Priority: Medium     ESOPHAGEAL REFLUX 10/19/2006     Priority: Medium     Left knee osteoarthritis 04/08/2006     Priority: Medium     Anxiety state 05/26/2005     Priority: Medium     Problem list name updated by automated process. Provider to review          Past Medical History:    Past Medical  History:   Diagnosis Date     LOW BACK PAIN        Past Surgical History:    Past Surgical History:   Procedure Laterality Date     SURGICAL HISTORY OF -       Cervical disc (5,6,&7)     SURGICAL HISTORY OF -       (L) Knee fx tibial plateau     SURGICAL HISTORY OF -       (L) Knee surgery - re-fx tibial plateau       Family History:    Family History   Problem Relation Age of Onset     C.A.D. Mother         heart disease in her 60s     C.A.D. Brother         1/2 brother with heart disease     Diabetes No family hx of        Social History:  Marital Status:  Single [1]  Social History     Tobacco Use     Smoking status: Former Smoker     Packs/day: 2.00     Years: 18.00     Pack years: 36.00     Types: Cigarettes     Quit date: 1980     Years since quittin.9     Smokeless tobacco: Never Used   Substance Use Topics     Alcohol use: Yes     Frequency: 2-4 times a month     Drinks per session: 1 or 2     Binge frequency: Never     Comment: occaisonal     Drug use: No        Medications:    No current outpatient medications on file.      Review of Systems   Unable to perform ROS: Patient nonverbal       Physical Exam   BP: (!) 178/99  Pulse: 81  Temp: 98.3  F (36.8  C)  Resp: 16  SpO2: 98 %      Physical Exam  Vitals signs and nursing note reviewed.   Constitutional:       General: He is not in acute distress.     Appearance: Normal appearance. He is well-developed. He is not ill-appearing or diaphoretic.   HENT:      Head: Normocephalic and atraumatic.      Right Ear: External ear normal.      Left Ear: External ear normal.      Nose: Nose normal.      Mouth/Throat:      Mouth: Mucous membranes are moist.   Eyes:      General: No scleral icterus.     Extraocular Movements: Extraocular movements intact.      Conjunctiva/sclera: Conjunctivae normal.      Pupils: Pupils are equal, round, and reactive to light.   Neck:      Musculoskeletal: Normal range of motion and neck supple.      Trachea: No tracheal  deviation.   Cardiovascular:      Rate and Rhythm: Normal rate. Rhythm irregularly irregular.      Heart sounds: Normal heart sounds. No murmur. No friction rub. No gallop.    Pulmonary:      Effort: Pulmonary effort is normal. No respiratory distress.      Breath sounds: Normal breath sounds. No wheezing, rhonchi or rales.   Abdominal:      General: There is no distension.      Palpations: Abdomen is soft.      Tenderness: There is no abdominal tenderness.   Musculoskeletal: Normal range of motion.      Right lower leg: No edema.      Left lower leg: No edema.   Skin:     General: Skin is warm and dry.      Coloration: Skin is not pale.      Findings: No erythema or rash.   Neurological:      Mental Status: He is alert.      Cranial Nerves: No cranial nerve deficit.      Motor: No weakness.      Comments: Aphasic, mute.  Able to follow some simple commands.   Psychiatric:         Mood and Affect: Mood normal.      Comments: Confused, restless.         ED Course        Procedures               EKG Interpretation:      Interpreted by Neel Villalobos MD  Time reviewed: upon completion  Symptoms at time of EKG: stroke symptoms  Rhythm: Atrial fibrillation  Rate: normal  Axis: normal  Ectopy: none  Conduction: normal  ST Segments/ T Waves: No acute ST-T wave changes  Q Waves: none  Comparison to prior: Unchanged from 12/16/2019  Clinical Impression: Atrial fibrillation with controlled ventricular response, no significant change versus 12/16/2019    The patient has stroke symptoms:         ED Stroke specific documentation           NIHSS PDF     Patient last known well time: Unknown  ED Provider first to bedside at: 11:39 AM  11:46 AM - I reviewed the case consulted with stroke neuro at [facility], Dr. Abrams. He recommended a stat MRI of the brain without and with contrast.  11:48 AM - I reviewed obtaining MRI study with the MRI technician.  Patient will be unable to consent or complete paperwork for  an MRI, but EMR shows he had an MRI of the brain with and with contrast in 2018.  11:50 AM - Spoke with the stroke neurologist again.  11:56 AM - I reviewed the preliminary CT head results with the interpreting Radiologist.  12:35 PM - Returned from CT, unchanged NIHSS. Code stroke deescalated.  1:18 PM - Reviewed case Dr. Quigley, Stroke Neuro at [facility].  She accepted care of the patient in transfer there.  Goal BP is 160 or less.  1:21 PM - Reviewed case with Stroke Neuro at [facility], Dr. Abrams.  1:45 PM - /99    tPA:   Not given due to active bleeding and unclear or unfavorable risk-benefit profile for extended window thrombolysis beyond the conventional 4.5 hour time window.    Endovascular Retrieval:  Not initiated due to absence of proximal vessel occlusion    National Institutes of Health Stroke Scale (Baseline)  Time Performed: 11:39 AM     Score    Level of consciousness: (0)   Alert, keenly responsive    LOC questions: (2)   Answers neither question correctly    LOC commands: (1)   Performs one task correctly    Best gaze: (0)   Normal    Visual: (0)   No visual loss    Facial palsy: (0)   Normal symmetrical movements    Motor arm (left): (0)   No drift    Motor arm (right): (0)   No drift    Motor leg (left): (0)   No drift    Motor leg (right): (0)   No drift    Limb ataxia: (0)   Absent    Sensory: (0)   Normal- no sensory loss    Best language: (3)   Mute- global aphasia    Dysarthria:  (2) UN Intubated or other physical barrier: Aphasic/mute    Extinction and inattention: (0) UN         Total Score:  8      Stroke Mimics were considered (including migraine headache, seizure disorder, hypoglycemia (or hyperglycemia), head or spinal trauma, CNS infection, Toxin ingestion and shock state (e.g. sepsis) .      National Institutes of Health Stroke Scale  Time Performed: 12:35 PM  Total Score: 8  (unchanged from last stroke score)      National Institutes of Health  Stroke Scale  Time Performed: 1:10 PM  Total Score: 8 (unchanged from last stroke score)      National Institutes of Health Stroke Scale  Time Performed: 1:25 PM  Total Score: 8  (unchanged from last stroke score)      National Institutes of Health Stroke Scale  Time Performed: 1:40 PM  Total Score: 8  (unchanged from last stroke score)           Results for orders placed or performed during the hospital encounter of 11/25/20 (from the past 24 hour(s))   INR   Result Value Ref Range    INR 1.23 (H) 0.86 - 1.14   CBC with platelets differential   Result Value Ref Range    WBC 11.1 (H) 4.0 - 11.0 10e9/L    RBC Count 4.39 (L) 4.4 - 5.9 10e12/L    Hemoglobin 13.5 13.3 - 17.7 g/dL    Hematocrit 42.8 40.0 - 53.0 %    MCV 98 78 - 100 fl    MCH 30.8 26.5 - 33.0 pg    MCHC 31.5 31.5 - 36.5 g/dL    RDW 14.2 10.0 - 15.0 %    Platelet Count 239 150 - 450 10e9/L    Diff Method Automated Method     % Neutrophils 86.6 %    % Lymphocytes 6.1 %    % Monocytes 6.1 %    % Eosinophils 0.3 %    % Basophils 0.4 %    % Immature Granulocytes 0.5 %    Nucleated RBCs 0 0 /100    Absolute Neutrophil 9.6 (H) 1.6 - 8.3 10e9/L    Absolute Lymphocytes 0.7 (L) 0.8 - 5.3 10e9/L    Absolute Monocytes 0.7 0.0 - 1.3 10e9/L    Absolute Eosinophils 0.0 0.0 - 0.7 10e9/L    Absolute Basophils 0.0 0.0 - 0.2 10e9/L    Abs Immature Granulocytes 0.1 0 - 0.4 10e9/L    Absolute Nucleated RBC 0.0    Partial thromboplastin time   Result Value Ref Range    PTT 27 22 - 37 sec   Lipase   Result Value Ref Range    Lipase 125 73 - 393 U/L   Comprehensive metabolic panel   Result Value Ref Range    Sodium 141 133 - 144 mmol/L    Potassium 4.0 3.4 - 5.3 mmol/L    Chloride 111 (H) 94 - 109 mmol/L    Carbon Dioxide 21 20 - 32 mmol/L    Anion Gap 9 3 - 14 mmol/L    Glucose 134 (H) 70 - 99 mg/dL    Urea Nitrogen 26 7 - 30 mg/dL    Creatinine 1.31 (H) 0.66 - 1.25 mg/dL    GFR Estimate 53 (L) >60 mL/min/[1.73_m2]    GFR Estimate If Black 62 >60 mL/min/[1.73_m2]    Calcium 9.2  8.5 - 10.1 mg/dL    Bilirubin Total 1.2 0.2 - 1.3 mg/dL    Albumin 3.5 3.4 - 5.0 g/dL    Protein Total 7.5 6.8 - 8.8 g/dL    Alkaline Phosphatase 76 40 - 150 U/L    ALT 24 0 - 70 U/L    AST 51 (H) 0 - 45 U/L   Alcohol ethyl   Result Value Ref Range    Ethanol g/dL <0.01 <0.01 g/dL   CT Head w/o Contrast    Narrative    CT SCAN OF THE HEAD WITHOUT CONTRAST   11/25/2020 11:59 AM     HISTORY: Aphasia.    TECHNIQUE:  Axial images of the head and coronal reformations without  IV contrast material. Radiation dose for this scan was reduced using  automated exposure control, adjustment of the mA and/or kV according  to patient size, or iterative reconstruction technique.    COMPARISON: CT head 1/11/2019.    FINDINGS:  There is a moderate-sized area of cortical/subcortical  hypoattenuation and loss of cortical gray-white distinction involving  the right parietal lobe and right occipitotemporal junction,  consistent with a developing acute to subacute ischemic infarct.  Within the areas of developing infarct, there are small foci of  parenchymal hemorrhage, primarily in the right parietal lobe. The  largest area of confluent parenchymal hematoma measures approximately  2 cm x 1.6 cm in greatest dimension.    Chronic infarct involving the left parietal lobe and left  occipitotemporal region is again noted.    There is a background of mild to moderate generalized brain  parenchymal volume loss. Mild scattered patchy hypoattenuation in the  periventricular and deep cerebral white matter is nonspecific, but  likely represents sequela of chronic small vessel ischemic disease.  The ventricles are within normal limits in size and configuration. No  significant mass effect or shift/herniation. Scattered intracranial  atherosclerotic calcifications.    The orbits appear within normal limits. Trace mucosal thickening in  the ethmoid air cells. The mastoid and middle ear cavities are clear.      Impression    IMPRESSION:  1. Developing  acute to subacute infarct centered in the right parietal  lobe and also involving the right occipitotemporal junction, with  evidence for associated hemorrhagic conversion/small volume  intraparenchymal hematoma. No significant mass effect/herniation.  2. Chronic left cerebral hemisphere infarct involving the parietal  lobe and occipitotemporal region.  3. Brain atrophy and presumed chronic small vessel ischemic disease,  as described.    The findings were discussed with Dr. Villalobos by myself at approximately  11:58 AM on 11/25/2020.    PHIL VELASQUEZ MD   CTA Head Neck with Contrast    Narrative    CT ANGIOGRAM OF THE HEAD AND NECK WITH CONTRAST  11/25/2020 12:04 PM     HISTORY: Aphasia.    TECHNIQUE:  CT angiography with an injection of 70 mL Isovue-370 IV  with scans through the head and neck. Images were transferred to a  separate 3-D workstation where multiplanar reformations and 3-D images  were created. Estimates of carotid stenoses are made relative to the  distal internal carotid artery diameters except as noted. Radiation  dose for this scan was reduced using automated exposure control,  adjustment of the mA and/or kV according to patient size, or iterative  reconstruction technique.      COMPARISON: MR angiogram of the head and neck dated 11/27/2018.    CT HEAD FINDINGS: There is hypoenhancement involving the right  parietal lobe and lateral occipitotemporal region, consistent with  developing infarct. Please see separate report from noncontrast head  CT of same date for additional details regarding structural brain  findings. Mild asymmetric prominence of the cortical vessels overlying  the right lateral cerebral convexity, potentially representing  pial-pial collateralization/autoregulation-related vasodilation. No  definite intracranial contrast enhancing lesions identified.    CT ANGIOGRAM HEAD FINDINGS:  The major intracranial arteries including  the proximal branches of the anterior cerebral, middle  cerebral, and  posterior cerebral arteries appear patent without vascular cutoff. No  definite high-grade stenosis or large vessel occlusion. No aneurysm or  high flow vascular malformation identified. Venous circulation is  unremarkable.     CT ANGIOGRAM NECK FINDINGS:  Mild scattered atherosclerosis involving  the aortic arch without significant stenosis at the origins of the  great vessels. Conventional three-vessel configuration of the aortic  arch.     Right carotid artery: The right common and internal carotid arteries  are patent. Mild atherosclerotic disease at the carotid bifurcation  and proximal internal carotid artery without significant stenosis by  NASCET criteria.     Left carotid artery: The left common and internal carotid arteries are  patent. Mild atherosclerotic disease at the carotid bifurcation and  proximal internal carotid artery without significant stenosis by  NASCET criteria.     Vertebral arteries: Vertebral arteries are patent without evidence of  dissection. No significant stenosis.     Other findings: Multilevel degenerative changes in the cervical and  upper thoracic spine. Interbody fusion at C6-C7. Multilevel  uncovertebral and facet arthropathy.      Impression    IMPRESSION:   1. No definite high-grade stenosis or large vessel occlusion involving  the major craniocervical arterial vasculature.  2. Developing infarct with areas of associated parenchymal hemorrhage  centered in the right parietal lobe and extending to involve the  occipitotemporal region.   3. No aneurysm or high flow vascular malformation identified.    PHIL VELASQUEZ MD   XR Chest 1 View    Narrative    XR CHEST 1 VW 11/25/2020 1:21 PM    HISTORY: Stroke. Cough.    COMPARISON: 12/16/2019.      Impression    IMPRESSION: A single view of the chest show shows new cardiomegaly  without CHF or pleural effusion.     MAHI BABB MD   XR KUB    Narrative    ABDOMEN ONE VIEW  11/25/2020 1:22 PM     HISTORY: Stroke.  Evaluate for MRI clearance.    COMPARISON: None.      Impression    IMPRESSION: Excreted contrast noted in hydronephrotic bilateral renal  collecting systems. Nonobstructive gas pattern. No metallic densities  noted.    KRISS HOPPER MD   Ammonia (on ice)   Result Value Ref Range    Ammonia <10 (L) 10 - 50 umol/L       Medications   iopamidol (ISOVUE-370) solution 70 mL (70 mLs Intravenous Given 11/25/20 1147)   sodium chloride 0.9 % bag 500mL for CT scan flush use (100 mLs Intravenous Given 11/25/20 1148)   labetalol (NORMODYNE/TRANDATE) syringe 20 mg (20 mg Intravenous Given 11/25/20 1440)   LORazepam (ATIVAN) injection 1 mg (1 mg Intravenous Given 11/25/20 1452)   labetalol (NORMODYNE/TRANDATE) syringe 20 mg (20 mg Intravenous Given 11/25/20 1838)         Critical Care time:  was 110 minutes for this patient excluding procedures.    Assessments & Plan (with Medical Decision Making)   72 year old male with history of left parietal hemorrhagic infarct in November 2018 and atrial fibrillation, not anticoagulated, who was brought by EMS after a welfare check was called after he acted confused and ? mute at a local liquor store where he apparently presented to buy liquor this morning.  Last known well time is unknown.  EMS arrived at his home where he lives independently by himself to find him aphasic, ambulatory and in no distress.  He is unable to provide history.  He is disheveled and unkept and EMS reports that his home was in similar condition, where he lives alone.  Code stroke was initiated and CT/CTA head neck stroke evaluation studies were remarkable for an acute-subacute right parietal infarct with hemorrhage.  tPA contraindicated and code stroke was deescalated.  He remained unchanged throughout his ED evaluation.  He was given bolus doses of labetalol for blood pressure control and he will be transferred to the [facility] stroke neurology service.    I have reviewed the nursing notes.    I  have reviewed the findings, diagnosis, plan and need for follow up with the patient.    Discharge Medication List as of 11/25/2020  6:46 PM          Final diagnoses:   Acute hemorrhagic infarction of brain (H)   Chronic atrial fibrillation (H) - Not on anticoagulation therapy       11/25/2020   [facility] EMERGENCY DEPT     Neel Villalobos MD  11/26/20 0127

## 2020-11-25 NOTE — ED NOTES
Pt presents to ED with concerns of stroke like symptoms.    Stroke eval called at 11:35 AM, Neel Villalobos MD notified.  Code stroke called at 11:39 AM by Neel Villalobos MD.     Pt is alert and aphasic.     Last known normal: unknown    Dysphagia screening deferred due to clinical situation.

## 2020-11-25 NOTE — CONSULTS
[facility]     Stroke Telephone Note    I was called by Dr. Neel Villalobos Md on 11/25/20 at 1142 regarding patient Simone Daniels. The patient is a 72 year old male with PMH of HTN, HLD, recent left parietal intraparenchymal hemorrhage now seen as a stroke alert for aphasia.    The patient last known well is unknown.  He went to the liquor store this morning where he was noted to be behaving abnormally throughout the home welfare visit was called in for and the patient was found to be disheveled and mute.  He was brought into the ED where he was noted to be aphasic without any focal motor or sensory deficits.  Head CT showed an area of encephalomalacia in the prior left parietal stroke, in addition it showed a new area of subacute infarction in the right parietal lobe with petechial hemorrhagic transformation.  CTA of the head and neck did not show any large vessel occlusions.    Stroke Code Data  (for stroke code without tele)  Stroke code activated 11/25/20   1142   First stroke provider response 11/25/20       Last known normal       Unknown    Time of discovery   (or onset of symptoms)       Unknown    Head CT read by me 11/25/20   1210   Was stroke code de-escalated? Yes 11/25/20    presence of contraindications for both intravenous and intra-arterial stroke treatments       TPA Treatment   Not given due to active bleeding.    Endovascular Treatment  Not initiated due to absence of proximal vessel occlusion    Impression  Ischemic Stroke due to undetermined etiology     Recommendations  Acute Ischemic Stroke (without tPA) Recommendations  - Petechial hemorrhagic transformation s/p Right MCA AIS  - Neurochecks Q 2 hours  - SBP goal: <140mmHg, maintain platelets >100K, INR<1.7  - Stability scan: repeat Head CT 6 hours from initial Head CT  - Consider routine EEG in am given prior left parietal ICH   - Statin: atorvastatin 40mg HS   - MRI brain pending   - TTE with Bubble Study  -  "Telemetry, EKG  - Bedside Glucose Monitoring  - A1c, Lipid Panel, Troponin x 3  - PT/OT/SLP  - Stroke Education  - Smoking Cessation  - Depression Screen  - Euthermia, Euglycemia    My recommendations are based on the information provided over the phone by Simone Daniels's in-person providers. They are not intended to replace the clinical judgment of his in-person providers. I was not requested to personally see or examine the patient at this time.    The Stroke Staff is Dr. Valle.    MALCOM FRASER MD  Vascular Neurology Fellow  To page me or covering stroke neurology team member, click here: AMCOM   Choose \"On Call\" tab at top, then search dropdown box for \"Neurology Adult\", select location, press Enter, then look for stroke/neuro ICU/telestroke.           "

## 2020-11-25 NOTE — ED NOTES
Pt brought to ED by EMS. Pt was at a nearby [employer]; who noted the patient wasn't acting right. [employer] called 911 for a welfare check on patient. EMS arrived to patient's house and noted that patient wasn't able to speak. Pt nodded yes when asked if this is abnormal. Pt appears to understand and is able to nod yes and no appropriately. Pt follows most commands, but lifts leg when asked to lift arm. Pt asked if he would like any family contacted, nods no. Pt denies pain.

## 2020-11-25 NOTE — ED NOTES
Pt assisted to bedside commode. Pt is mostly steady on his feet, was incontinent of urine. Some redirection required to go back to bed. No other needs expressed at this time.

## 2020-11-25 NOTE — ED NOTES
Pt intermittently compliant with SpO2 monitoring, BP monitoring, Pt wanting to ambulate in hallway several times. Walks well with standby assist. Pt continues to be incontinent of urine, despite offering opportunities to get up and use the commode at bedside or bathroom down the fall. Pt remains unable to speak, occasional grunts, appears to understand, inconsistent following of commands, continues to refuse contacting any friends or family.

## 2020-11-25 NOTE — ED NOTES
Pt unable to write. Pt given pen, unable to hold it. Continues attempting to write even though the pen is not in his hand. Fine motor control in fingers appears limited.

## 2020-11-25 NOTE — ED NOTES
Bed alarm sounding again . Pt being redirected and denies any concerns but is just anxious and wants to be up and moving

## 2020-11-26 ENCOUNTER — APPOINTMENT (OUTPATIENT)
Dept: OCCUPATIONAL THERAPY | Facility: CLINIC | Age: 75
DRG: 064 | End: 2020-11-26
Attending: PSYCHIATRY & NEUROLOGY
Payer: MEDICARE

## 2020-11-26 ENCOUNTER — APPOINTMENT (OUTPATIENT)
Dept: SPEECH THERAPY | Facility: CLINIC | Age: 75
DRG: 064 | End: 2020-11-26
Attending: PSYCHIATRY & NEUROLOGY
Payer: MEDICARE

## 2020-11-26 ENCOUNTER — APPOINTMENT (OUTPATIENT)
Dept: CARDIOLOGY | Facility: CLINIC | Age: 75
DRG: 064 | End: 2020-11-26
Attending: PSYCHIATRY & NEUROLOGY
Payer: MEDICARE

## 2020-11-26 LAB
ALBUMIN UR-MCNC: 100 MG/DL
APPEARANCE UR: ABNORMAL
BILIRUB UR QL STRIP: NEGATIVE
COLOR UR AUTO: YELLOW
GLUCOSE BLDC GLUCOMTR-MCNC: 106 MG/DL (ref 70–99)
GLUCOSE BLDC GLUCOMTR-MCNC: 113 MG/DL (ref 70–99)
GLUCOSE BLDC GLUCOMTR-MCNC: 115 MG/DL (ref 70–99)
GLUCOSE BLDC GLUCOMTR-MCNC: 116 MG/DL (ref 70–99)
GLUCOSE BLDC GLUCOMTR-MCNC: 121 MG/DL (ref 70–99)
GLUCOSE UR STRIP-MCNC: NEGATIVE MG/DL
HGB UR QL STRIP: ABNORMAL
INTERPRETATION ECG - MUSE: NORMAL
KETONES UR STRIP-MCNC: 5 MG/DL
LEUKOCYTE ESTERASE UR QL STRIP: ABNORMAL
MAGNESIUM SERPL-MCNC: 2.1 MG/DL (ref 1.6–2.3)
MAGNESIUM SERPL-MCNC: 2.3 MG/DL (ref 1.6–2.3)
MRSA DNA SPEC QL NAA+PROBE: NEGATIVE
MUCOUS THREADS #/AREA URNS LPF: PRESENT /LPF
NITRATE UR QL: NEGATIVE
PH UR STRIP: 6 PH (ref 5–7)
PHOSPHATE SERPL-MCNC: 4 MG/DL (ref 2.5–4.5)
POTASSIUM SERPL-SCNC: 3.6 MMOL/L (ref 3.4–5.3)
RBC #/AREA URNS AUTO: 59 /HPF (ref 0–2)
SARS-COV-2 RNA SPEC QL NAA+PROBE: NOT DETECTED
SOURCE: ABNORMAL
SP GR UR STRIP: 1.02 (ref 1–1.03)
SPECIMEN SOURCE: NORMAL
SPECIMEN SOURCE: NORMAL
UROBILINOGEN UR STRIP-MCNC: NORMAL MG/DL (ref 0–2)
WBC #/AREA URNS AUTO: >182 /HPF (ref 0–5)

## 2020-11-26 PROCEDURE — 255N000002 HC RX 255 OP 636: Performed by: INTERNAL MEDICINE

## 2020-11-26 PROCEDURE — 250N000009 HC RX 250: Performed by: STUDENT IN AN ORGANIZED HEALTH CARE EDUCATION/TRAINING PROGRAM

## 2020-11-26 PROCEDURE — 93306 TTE W/DOPPLER COMPLETE: CPT

## 2020-11-26 PROCEDURE — 250N000009 HC RX 250: Performed by: NURSE PRACTITIONER

## 2020-11-26 PROCEDURE — 81001 URINALYSIS AUTO W/SCOPE: CPT | Performed by: STUDENT IN AN ORGANIZED HEALTH CARE EDUCATION/TRAINING PROGRAM

## 2020-11-26 PROCEDURE — 200N000002 HC R&B ICU UMMC

## 2020-11-26 PROCEDURE — 87086 URINE CULTURE/COLONY COUNT: CPT | Performed by: STUDENT IN AN ORGANIZED HEALTH CARE EDUCATION/TRAINING PROGRAM

## 2020-11-26 PROCEDURE — 250N000011 HC RX IP 250 OP 636: Performed by: NURSE PRACTITIONER

## 2020-11-26 PROCEDURE — 97530 THERAPEUTIC ACTIVITIES: CPT | Mod: GO | Performed by: OCCUPATIONAL THERAPIST

## 2020-11-26 PROCEDURE — 36415 COLL VENOUS BLD VENIPUNCTURE: CPT | Performed by: PSYCHIATRY & NEUROLOGY

## 2020-11-26 PROCEDURE — 36415 COLL VENOUS BLD VENIPUNCTURE: CPT | Performed by: STUDENT IN AN ORGANIZED HEALTH CARE EDUCATION/TRAINING PROGRAM

## 2020-11-26 PROCEDURE — 250N000011 HC RX IP 250 OP 636: Performed by: PSYCHIATRY & NEUROLOGY

## 2020-11-26 PROCEDURE — 83735 ASSAY OF MAGNESIUM: CPT | Performed by: PSYCHIATRY & NEUROLOGY

## 2020-11-26 PROCEDURE — 93306 TTE W/DOPPLER COMPLETE: CPT | Mod: 26 | Performed by: INTERNAL MEDICINE

## 2020-11-26 PROCEDURE — 87088 URINE BACTERIA CULTURE: CPT | Performed by: STUDENT IN AN ORGANIZED HEALTH CARE EDUCATION/TRAINING PROGRAM

## 2020-11-26 PROCEDURE — 83735 ASSAY OF MAGNESIUM: CPT | Performed by: STUDENT IN AN ORGANIZED HEALTH CARE EDUCATION/TRAINING PROGRAM

## 2020-11-26 PROCEDURE — 97535 SELF CARE MNGMENT TRAINING: CPT | Mod: GO | Performed by: OCCUPATIONAL THERAPIST

## 2020-11-26 PROCEDURE — 999N001017 HC STATISTIC GLUCOSE BY METER IP

## 2020-11-26 PROCEDURE — 999N000208 ECHOCARDIOGRAM COMPLETE

## 2020-11-26 PROCEDURE — 92526 ORAL FUNCTION THERAPY: CPT | Mod: GN

## 2020-11-26 PROCEDURE — 92610 EVALUATE SWALLOWING FUNCTION: CPT | Mod: GN

## 2020-11-26 PROCEDURE — 97165 OT EVAL LOW COMPLEX 30 MIN: CPT | Mod: GO | Performed by: OCCUPATIONAL THERAPIST

## 2020-11-26 PROCEDURE — 84100 ASSAY OF PHOSPHORUS: CPT | Performed by: STUDENT IN AN ORGANIZED HEALTH CARE EDUCATION/TRAINING PROGRAM

## 2020-11-26 PROCEDURE — 84132 ASSAY OF SERUM POTASSIUM: CPT | Performed by: STUDENT IN AN ORGANIZED HEALTH CARE EDUCATION/TRAINING PROGRAM

## 2020-11-26 PROCEDURE — 87186 SC STD MICRODIL/AGAR DIL: CPT | Performed by: STUDENT IN AN ORGANIZED HEALTH CARE EDUCATION/TRAINING PROGRAM

## 2020-11-26 PROCEDURE — 99291 CRITICAL CARE FIRST HOUR: CPT | Mod: GC | Performed by: PSYCHIATRY & NEUROLOGY

## 2020-11-26 PROCEDURE — 250N000011 HC RX IP 250 OP 636: Performed by: STUDENT IN AN ORGANIZED HEALTH CARE EDUCATION/TRAINING PROGRAM

## 2020-11-26 PROCEDURE — 999N000128 HC STATISTIC PERIPHERAL IV START W/O US GUIDANCE

## 2020-11-26 RX ORDER — THIAMINE HYDROCHLORIDE 100 MG/ML
100 INJECTION, SOLUTION INTRAMUSCULAR; INTRAVENOUS DAILY
Status: DISCONTINUED | OUTPATIENT
Start: 2020-11-26 | End: 2020-12-04

## 2020-11-26 RX ORDER — FOLIC ACID 5 MG/ML
1 INJECTION, SOLUTION INTRAMUSCULAR; INTRAVENOUS; SUBCUTANEOUS DAILY
Status: DISCONTINUED | OUTPATIENT
Start: 2020-11-26 | End: 2020-12-04

## 2020-11-26 RX ORDER — HEPARIN SODIUM 5000 [USP'U]/.5ML
5000 INJECTION, SOLUTION INTRAVENOUS; SUBCUTANEOUS EVERY 8 HOURS
Status: DISCONTINUED | OUTPATIENT
Start: 2020-11-26 | End: 2020-12-04

## 2020-11-26 RX ORDER — ACYCLOVIR 200 MG/1
10 CAPSULE ORAL ONCE
Status: DISCONTINUED | OUTPATIENT
Start: 2020-11-26 | End: 2020-12-07 | Stop reason: CLARIF

## 2020-11-26 RX ORDER — POTASSIUM CHLORIDE 7.45 MG/ML
10 INJECTION INTRAVENOUS
Status: COMPLETED | OUTPATIENT
Start: 2020-11-26 | End: 2020-11-26

## 2020-11-26 RX ADMIN — HEPARIN SODIUM 5000 UNITS: 5000 INJECTION, SOLUTION INTRAVENOUS; SUBCUTANEOUS at 18:17

## 2020-11-26 RX ADMIN — POTASSIUM CHLORIDE 10 MEQ: 7.46 INJECTION, SOLUTION INTRAVENOUS at 19:44

## 2020-11-26 RX ADMIN — POTASSIUM CHLORIDE 10 MEQ: 7.46 INJECTION, SOLUTION INTRAVENOUS at 18:27

## 2020-11-26 RX ADMIN — SODIUM CHLORIDE, SODIUM GLUCONATE, SODIUM ACETATE, POTASSIUM CHLORIDE AND MAGNESIUM CHLORIDE 100 ML/HR: 526; 502; 368; 37; 30 INJECTION, SOLUTION INTRAVENOUS at 09:20

## 2020-11-26 RX ADMIN — HUMAN ALBUMIN MICROSPHERES AND PERFLUTREN 6 ML: 10; .22 INJECTION, SOLUTION INTRAVENOUS at 11:00

## 2020-11-26 RX ADMIN — LABETALOL HYDROCHLORIDE 10 MG: 5 INJECTION, SOLUTION INTRAVENOUS at 16:10

## 2020-11-26 RX ADMIN — SODIUM CHLORIDE, SODIUM GLUCONATE, SODIUM ACETATE, POTASSIUM CHLORIDE AND MAGNESIUM CHLORIDE 100 ML/HR: 526; 502; 368; 37; 30 INJECTION, SOLUTION INTRAVENOUS at 19:47

## 2020-11-26 RX ADMIN — LABETALOL HYDROCHLORIDE 10 MG: 5 INJECTION, SOLUTION INTRAVENOUS at 20:37

## 2020-11-26 RX ADMIN — FOLIC ACID 1 MG: 5 INJECTION, SOLUTION INTRAMUSCULAR; INTRAVENOUS; SUBCUTANEOUS at 11:12

## 2020-11-26 RX ADMIN — THIAMINE HYDROCHLORIDE 100 MG: 100 INJECTION, SOLUTION INTRAMUSCULAR; INTRAVENOUS at 09:20

## 2020-11-26 ASSESSMENT — ACTIVITIES OF DAILY LIVING (ADL)
ADLS_ACUITY_SCORE: 18
ADLS_ACUITY_SCORE: 18
ADLS_ACUITY_SCORE: 14
ADLS_ACUITY_SCORE: 18

## 2020-11-26 ASSESSMENT — VISUAL ACUITY
OU: NOT TESTABLE
OU: NOT TESTABLE

## 2020-11-26 NOTE — PROGRESS NOTES
11/26/20 0928   General Information   Onset of Illness/Injury or Date of Surgery 11/25/20   Referring Physician Christopher Bejarano MD   Patient/Family Therapy Goal Statement (SLP) None stated   Pertinent History of Current Problem SLP: Pt is a 72 year old man with history of atrial fibrillation not on anticoagulation (discontinued apixaban 6/2019), anxiety, GERD, and left parietal stroke with hemorrhagic conversion in 2018 who presented to [facility] for evaluation of aphasia. He was reportedly found behaving abnormally at a liquor store (disheveled and mute). Last known well is unclear. Head CT showed new area of subacute infarct in right parietal lobe with petechial hemorrhage. CTA was without LVO. Dr. Abrams (stroke fellow) was contacted for stroke alert. Ultimately, decision was made to transfer patient to [facility] for neuro-ICU level cares.    General Observations Pt alert but inconsistently following commands. What commands he does follow appear only slightly better than chance. Inconsistent y/n. Attempted training of y/n communication board; pt unable to use despite training.   Past History of Dysphagia Pt seen after previous stroke in 2018. Regular diet/thin liquids recommended.    Pain Assessment   Patient Currently in Pain No   Type of Evaluation   Type of Evaluation Swallow Evaluation   Oral Motor   Oral Musculature unable to assess due to poor participation/comprehension   Structural Abnormalities none present   Mucosal Quality dry   Dentition (Oral Motor)   Dentition (Oral Motor) some missing teeth;poor condition   Comment, Dentition (Oral Motor) pt unable to state if he wears dentures   Facial Symmetry (Oral Motor)   Facial Symmetry (Oral Motor) left side impairment  (informally)   Cough/Swallow/Gag Reflex (Oral Motor)   Comment, Cough/Swallow/Gag Reflex (Oral Motor) Strong reflexive cough, pt unable to cough upon command   Vocal Quality/Secretion Management (Oral Motor)   Comment, Vocal  Quality/Secretion Management (Oral Motor) Pt made no attempts to communicate. Small, intermittent vocalizations which were not meaningful.   General Swallowing Observations   Current Diet/Method of Nutritional Intake (General Swallowing Observations, NIS) NPO   Respiratory Support (General Swallowing Observations) none   Swallowing Evaluation Clinical swallow evaluation   Clinical Swallow Evaluation   Feeding Assistance dependent   Clinical Swallow Evaluation Textures Trialed Thin Liquids;Puree Textures   Clinical Swallow Eval: Thin Liquid Texture Trial   Mode of Presentation, Thin Liquids cup;self-fed   Volume of Liquid or Food Presented 3oz cup sip   Oral Phase of Swallow Poor AP movement  (significant anterior loss from left side of mouth)   Pharyngeal Phase of Swallow impaired;coughing/choking   Diagnostic Statement Pt with significant loss from left side of mouth. Coughing appreciated with all cup sips. Pt highly impulsive.    Clinical Swallow Evaluation: Puree Solid Texture Trial   Mode of Presentation, Puree spoon;fed by clinician   Volume of Puree Presented 1/2 tsp   Oral Phase, Puree   (poor oral acceptance, no attempt at manipulation)   Oral Residue, Puree   (diffuse, entire bolus)   Pharyngeal Phase, Puree   (pt did not initiate until liquid rinse)   Diagnostic Statement Pt with poor oral acceptance, did not appear to understand that pudding was not liquid. Once bolus was presented, pt left mouth open as if waiting for additional bolus, despite showing the pt the pudding and spoon, and attempting to have him feed himself. Pt did not attempt to orally manipulate the bolus and only swallowed when presented with liquid rinse. No further solid trials administered d/t aspiration risk.    Esophageal Phase of Swallow   Patient reports or presents with symptoms of esophageal dysphagia No   Swallowing Recommendations   Diet Consistency Recommendations NPO   Medication Administration Recommendations, Swallowing  (SLP) consider alternative means, do not recommend given any meds PO   General Therapy Interventions   Planned Therapy Interventions Dysphagia Treatment   Dysphagia treatment Oropharyngeal exercise training;Modified diet education;Instruction of safe swallow strategies;Compensatory strategies for swallowing   SLP Therapy Assessment/Plan   Criteria for Skilled Therapeutic Interventions Met (SLP Eval) yes;treatment indicated   SLP Diagnosis Severe oropharyngeal dysphagia 2/2 CVA   Rehab Potential (SLP Eval) fair, will monitor progress closely   Therapy Frequency (SLP Eval) daily   Predicted Duration of Therapy Intervention (SLP Eval) 3 weeks   Comment, Therapy Assessment/Plan (SLP) SLP: Clinical swallow eval completed per MD order. Pt presents with severe oropharyngeal dysphagia in setting of new CVA. Swallow function is impacted by severe cognitive-communication deficits. Pt unable to meaningfully participate in oral mech exam, occasionally seemed to follow commands but suspect this was by chance. Inconsistent yes/no, unable to use y/n communication board despite training. Pt assessed with thin liquids and puree. Significant anterior loss of majority of thin liquid bolus from left side of oral cavity. Coughig/choking with all thin liquid trials. Pt made no attempt to orally manipulate puree bolus and only swallowed when presented with another trial of thin liquids. Recommend NPO with consideration for alternative means of hydration/nutrition/med administration. SLP will follow.   Therapy Plan Review/Discharge Plan (SLP)   Therapy Plan Review (SLP) evaluation/treatment results reviewed;care plan/treatment goals reviewed;risks/benefits reviewed;current/potential barriers reviewed;participants included;patient   Demonstrates Need for Referral to Another Service (SLP) clinical nutrition services/dietitian;occupational therapist;physical therapist   SLP Discharge Planning    SLP Discharge Recommendation (DC Rec)  Transitional Care Facility   SLP Rationale for DC Rec Significantly below baseline for swallow and communication   SLP Brief overview of current status  Recommend NPO with frequent oral care. Consider alternative means of hydration/nutrition/med administration.     Total Evaluation Time   Total Evaluation Time (Minutes) 18

## 2020-11-26 NOTE — PROGRESS NOTES
Admit Note    Admitted to 4A via litter/EMS from [facility]. Arrived awake, alert though not speaking. Follows simple commands in an inconsistent manner and mostly after a few repetitions. Shook head when asked if he was having any pain/discomfort. Breathing regular, unlabored and with equal chest expansion; on room air. Left forearm PIV WNL. Transferred to ICU bed via sliding board x4 personnel. Connected to all pertinent monitoring devices and orientated to unit routine, procedures, and controls/buttons patient may use. Sitter in the room and on-call service currently examining patient herself. 2-RN complete skin assessment done with De Lazaro. Only belongings received are two long-sleeve shirts, a pocket notepad, his [place] drivers license, an  [employer] debit card, a [employer] debit card, and two [employer] credit cards. No family/friend with the patient.

## 2020-11-26 NOTE — PLAN OF CARE
4A PT: Hold, PT orders acknowledged and appreciated. Per OT, pt mainly limited by deficits in cognition and aphasia rather than strength. OT to continue to follow and will include PT as indicated.

## 2020-11-26 NOTE — PROGRESS NOTES
11/26/20 0925   Quick Adds   Type of Visit Initial Occupational Therapy Evaluation   Living Environment   People in home alone   Current Living Arrangements house   Home Accessibility   (unknown)   Living Environment Comments Pt unable to provide any history, per chart pt lives in a house alone   Self-Care   Activity/Exercise/Self-Care Comment unknown   Disability/Function   Hearing Difficulty or Deaf no   Wear Glasses or Blind no   Concentrating, Remembering or Making Decisions Difficulty yes   Difficulty Communicating yes   Communication unable to speak;difficulty understanding   General Information   Onset of Illness/Injury or Date of Surgery 11/25/20   Referring Physician Christopher Bejarano MD   Patient/Family Therapy Goal Statement (OT) pt unable to communicate   Additional Occupational Profile Info/Pertinent History of Current Problem I was called by Dr. Neel Villalobos Md on 11/25/20 at 1142 regarding patient Simone Daniels. The patient is a 72 year old male with PMH of HTN, HLD, recent left parietal intraparenchymal hemorrhage now seen as a stroke alert for aphasia.   Cognitive Status Examination   Orientation Status   (unknown)   Affect/Mental Status (Cognitive) confused   Follows Commands 25-49% accuracy;follows one-step commands;initiation impaired;increased processing time needed;delayed response/completion;physical/tactile prompts required  (difficulty with termination, frequently mimics)   Safety Deficit unable/difficult to assess   Memory Deficit unable/difficult to assess   Attention Deficit perseverates on recent task;requires cues/redirection to task;moderate deficit   Executive Function Deficit unable/difficult to assess   Visual Perception   Impact of Vision Impairment on Function (Vision) Pt participated minimally in confrontational exam   Sensory   Sensory Comments unable to assess   Pain Assessment   Patient Currently in Pain No   Integumentary/Edema   Integumentary/Edema no deficits were  identifed   Muscle Tone Assessment   Muscle Tone Comments WNL   Coordination   Upper Extremity Coordination Left UE impaired;Right UE impaired   Clinical Impression   Criteria for Skilled Therapeutic Interventions Met (OT) yes   OT Diagnosis decreased ADL I and safety   OT Problem List-Impairments impacting ADL cognition;coordination;communication   Assessment of Occupational Performance 3-5 Performance Deficits   Identified Performance Deficits home mgmt, leisure, bathing, communcation   Planned Therapy Interventions (OT) ADL retraining;cognition;home program guidelines;progressive activity/exercise   Clinical Decision Making Complexity (OT) low complexity   Therapy Frequency (OT) 6x/week   Predicted Duration of Therapy 12/03/2020   Anticipated Equipment Needs Upon Discharge (OT)   (TBD)   Risks and Benefits of Treatment have been explained. Yes   Patient, Family & other staff in agreement with plan of care Yes   Comment-Clinical Impression Pt may benefit from skilled OT to help increase ADL I and safety post stroke   OT Discharge Planning    OT Discharge Recommendation (DC Rec) Transitional Care Facility   OT Rationale for DC Rec Pt would benefit from skilled OT post stroke to increase ADL I, tolerance and safety.   OT Brief overview of current status  Pt unable to participate in discharge planning/wants   Total Evaluation Time (Minutes)   Total Evaluation Time (Minutes) 5

## 2020-11-26 NOTE — PLAN OF CARE
Major Shift Events: No acute events. Patient remains impulsive and excitable, is not consistently following any commands. GOTTI equally, PERRLA 3mm. Requiring a 1:1 attendant for agitation and impulsivity. On room air, LS clear. VS stable, remains in a fib, rates 80-100s. BPs elevated, 10mg labetalol given x1 PRN. NPO. Incontinent of urine. No BM today. x1 PIV, plasma-lyte IVM at 100 mL /hr.     Plan: Continue to monitor, notify MD of any acute changes.     For vital signs and complete assessments, please see documentation flowsheets.     Steven William RN

## 2020-11-26 NOTE — PLAN OF CARE
Shift Summary    Doesn't speak, not even any vocal sound. Certain parts of the neuro check assessment couldn't be done due to patient not following commands; however he follows most commands. He's strong, excitable and has labile activity/cooperativeness; 1:1 sitter maintained in patient's room for safety. Had on-and-off sleep this 12-hr shift. Been breathing regular, unlabored and with equal chest expansion; on room and no GRANDE. BP was initially high on admission and received Labetalol 10 mg IVP; vital signs been stable since then. Kept NPO due to his failing the initial Dysphagia Screen. Continues to be incontinent of urine; Primofit external urinary collection system was used but patient kept pulling it off. Nodded when asked if he didn't want it on. Adult diaper pad utilized instead and skin barrier protectant had been applied. MRI of brain pending. CT scan of head was done earlier in the shift.

## 2020-11-27 ENCOUNTER — APPOINTMENT (OUTPATIENT)
Dept: CT IMAGING | Facility: CLINIC | Age: 75
DRG: 064 | End: 2020-11-27
Attending: STUDENT IN AN ORGANIZED HEALTH CARE EDUCATION/TRAINING PROGRAM
Payer: MEDICARE

## 2020-11-27 ENCOUNTER — APPOINTMENT (OUTPATIENT)
Dept: OCCUPATIONAL THERAPY | Facility: CLINIC | Age: 75
DRG: 064 | End: 2020-11-27
Attending: PSYCHIATRY & NEUROLOGY
Payer: MEDICARE

## 2020-11-27 ENCOUNTER — APPOINTMENT (OUTPATIENT)
Dept: SPEECH THERAPY | Facility: CLINIC | Age: 75
DRG: 064 | End: 2020-11-27
Attending: PSYCHIATRY & NEUROLOGY
Payer: MEDICARE

## 2020-11-27 LAB
ANION GAP SERPL CALCULATED.3IONS-SCNC: 7 MMOL/L (ref 3–14)
BUN SERPL-MCNC: 25 MG/DL (ref 7–30)
CALCIUM SERPL-MCNC: 9 MG/DL (ref 8.5–10.1)
CHLORIDE SERPL-SCNC: 116 MMOL/L (ref 94–109)
CO2 SERPL-SCNC: 27 MMOL/L (ref 20–32)
CREAT SERPL-MCNC: 1.32 MG/DL (ref 0.66–1.25)
ERYTHROCYTE [DISTWIDTH] IN BLOOD BY AUTOMATED COUNT: 14.4 % (ref 10–15)
GFR SERPL CREATININE-BSD FRML MDRD: 53 ML/MIN/{1.73_M2}
GLUCOSE BLDC GLUCOMTR-MCNC: 106 MG/DL (ref 70–99)
GLUCOSE BLDC GLUCOMTR-MCNC: 108 MG/DL (ref 70–99)
GLUCOSE BLDC GLUCOMTR-MCNC: 108 MG/DL (ref 70–99)
GLUCOSE BLDC GLUCOMTR-MCNC: 111 MG/DL (ref 70–99)
GLUCOSE BLDC GLUCOMTR-MCNC: 115 MG/DL (ref 70–99)
GLUCOSE BLDC GLUCOMTR-MCNC: 119 MG/DL (ref 70–99)
GLUCOSE SERPL-MCNC: 111 MG/DL (ref 70–99)
HCT VFR BLD AUTO: 42.7 % (ref 40–53)
HGB BLD-MCNC: 13.4 G/DL (ref 13.3–17.7)
LACTATE BLD-SCNC: 1.8 MMOL/L (ref 0.7–2)
MCH RBC QN AUTO: 29.8 PG (ref 26.5–33)
MCHC RBC AUTO-ENTMCNC: 31.4 G/DL (ref 31.5–36.5)
MCV RBC AUTO: 95 FL (ref 78–100)
PLATELET # BLD AUTO: 249 10E9/L (ref 150–450)
POTASSIUM SERPL-SCNC: 3.6 MMOL/L (ref 3.4–5.3)
POTASSIUM SERPL-SCNC: 3.7 MMOL/L (ref 3.4–5.3)
RBC # BLD AUTO: 4.5 10E12/L (ref 4.4–5.9)
SODIUM SERPL-SCNC: 148 MMOL/L (ref 133–144)
SODIUM SERPL-SCNC: 150 MMOL/L (ref 133–144)
WBC # BLD AUTO: 12 10E9/L (ref 4–11)

## 2020-11-27 PROCEDURE — 84132 ASSAY OF SERUM POTASSIUM: CPT | Performed by: PSYCHIATRY & NEUROLOGY

## 2020-11-27 PROCEDURE — 250N000009 HC RX 250: Performed by: NURSE PRACTITIONER

## 2020-11-27 PROCEDURE — 83605 ASSAY OF LACTIC ACID: CPT | Performed by: PSYCHIATRY & NEUROLOGY

## 2020-11-27 PROCEDURE — 92526 ORAL FUNCTION THERAPY: CPT | Mod: GN | Performed by: SPEECH-LANGUAGE PATHOLOGIST

## 2020-11-27 PROCEDURE — 999N001017 HC STATISTIC GLUCOSE BY METER IP

## 2020-11-27 PROCEDURE — 250N000011 HC RX IP 250 OP 636: Performed by: STUDENT IN AN ORGANIZED HEALTH CARE EDUCATION/TRAINING PROGRAM

## 2020-11-27 PROCEDURE — 120N000002 HC R&B MED SURG/OB UMMC

## 2020-11-27 PROCEDURE — 99233 SBSQ HOSP IP/OBS HIGH 50: CPT | Mod: GC | Performed by: PSYCHIATRY & NEUROLOGY

## 2020-11-27 PROCEDURE — 84295 ASSAY OF SERUM SODIUM: CPT | Performed by: STUDENT IN AN ORGANIZED HEALTH CARE EDUCATION/TRAINING PROGRAM

## 2020-11-27 PROCEDURE — 97535 SELF CARE MNGMENT TRAINING: CPT | Mod: GO | Performed by: OCCUPATIONAL THERAPIST

## 2020-11-27 PROCEDURE — 80048 BASIC METABOLIC PNL TOTAL CA: CPT | Performed by: STUDENT IN AN ORGANIZED HEALTH CARE EDUCATION/TRAINING PROGRAM

## 2020-11-27 PROCEDURE — 250N000011 HC RX IP 250 OP 636: Performed by: NURSE PRACTITIONER

## 2020-11-27 PROCEDURE — 250N000009 HC RX 250: Performed by: STUDENT IN AN ORGANIZED HEALTH CARE EDUCATION/TRAINING PROGRAM

## 2020-11-27 PROCEDURE — 36415 COLL VENOUS BLD VENIPUNCTURE: CPT | Performed by: STUDENT IN AN ORGANIZED HEALTH CARE EDUCATION/TRAINING PROGRAM

## 2020-11-27 PROCEDURE — 36415 COLL VENOUS BLD VENIPUNCTURE: CPT | Performed by: PSYCHIATRY & NEUROLOGY

## 2020-11-27 PROCEDURE — 70450 CT HEAD/BRAIN W/O DYE: CPT

## 2020-11-27 PROCEDURE — 70450 CT HEAD/BRAIN W/O DYE: CPT | Mod: 26 | Performed by: RADIOLOGY

## 2020-11-27 PROCEDURE — 85027 COMPLETE CBC AUTOMATED: CPT | Performed by: STUDENT IN AN ORGANIZED HEALTH CARE EDUCATION/TRAINING PROGRAM

## 2020-11-27 PROCEDURE — 250N000011 HC RX IP 250 OP 636: Performed by: PSYCHIATRY & NEUROLOGY

## 2020-11-27 RX ORDER — HALOPERIDOL 5 MG/ML
1 INJECTION INTRAMUSCULAR EVERY 6 HOURS PRN
Status: DISCONTINUED | OUTPATIENT
Start: 2020-11-27 | End: 2020-11-28

## 2020-11-27 RX ORDER — LORAZEPAM 2 MG/ML
2 INJECTION INTRAMUSCULAR
Status: COMPLETED | OUTPATIENT
Start: 2020-11-27 | End: 2020-11-28

## 2020-11-27 RX ORDER — AMPICILLIN 1 G/1
1 INJECTION, POWDER, FOR SOLUTION INTRAMUSCULAR; INTRAVENOUS EVERY 8 HOURS SCHEDULED
Status: DISCONTINUED | OUTPATIENT
Start: 2020-11-27 | End: 2020-11-29

## 2020-11-27 RX ORDER — LORAZEPAM 2 MG/ML
2 INJECTION INTRAMUSCULAR
Status: DISCONTINUED | OUTPATIENT
Start: 2020-11-27 | End: 2020-12-05

## 2020-11-27 RX ORDER — POTASSIUM CHLORIDE 7.45 MG/ML
10 INJECTION INTRAVENOUS
Status: COMPLETED | OUTPATIENT
Start: 2020-11-27 | End: 2020-11-27

## 2020-11-27 RX ORDER — OLANZAPINE 10 MG/2ML
2.5 INJECTION, POWDER, FOR SOLUTION INTRAMUSCULAR EVERY 6 HOURS PRN
Status: DISCONTINUED | OUTPATIENT
Start: 2020-11-27 | End: 2020-11-28

## 2020-11-27 RX ADMIN — LABETALOL HYDROCHLORIDE 10 MG: 5 INJECTION, SOLUTION INTRAVENOUS at 21:22

## 2020-11-27 RX ADMIN — AMPICILLIN SODIUM 1 G: 1 INJECTION, POWDER, FOR SOLUTION INTRAMUSCULAR; INTRAVENOUS at 22:41

## 2020-11-27 RX ADMIN — FOLIC ACID 1 MG: 5 INJECTION, SOLUTION INTRAMUSCULAR; INTRAVENOUS; SUBCUTANEOUS at 08:33

## 2020-11-27 RX ADMIN — HEPARIN SODIUM 5000 UNITS: 5000 INJECTION, SOLUTION INTRAVENOUS; SUBCUTANEOUS at 08:55

## 2020-11-27 RX ADMIN — POTASSIUM CHLORIDE 10 MEQ: 7.46 INJECTION, SOLUTION INTRAVENOUS at 09:44

## 2020-11-27 RX ADMIN — POTASSIUM CHLORIDE 10 MEQ: 7.46 INJECTION, SOLUTION INTRAVENOUS at 08:36

## 2020-11-27 RX ADMIN — HEPARIN SODIUM 5000 UNITS: 5000 INJECTION, SOLUTION INTRAVENOUS; SUBCUTANEOUS at 17:19

## 2020-11-27 RX ADMIN — SODIUM CHLORIDE, SODIUM GLUCONATE, SODIUM ACETATE, POTASSIUM CHLORIDE AND MAGNESIUM CHLORIDE 100 ML/HR: 526; 502; 368; 37; 30 INJECTION, SOLUTION INTRAVENOUS at 05:45

## 2020-11-27 RX ADMIN — HEPARIN SODIUM 5000 UNITS: 5000 INJECTION, SOLUTION INTRAVENOUS; SUBCUTANEOUS at 02:32

## 2020-11-27 RX ADMIN — LABETALOL HYDROCHLORIDE 10 MG: 5 INJECTION, SOLUTION INTRAVENOUS at 08:55

## 2020-11-27 RX ADMIN — THIAMINE HYDROCHLORIDE 100 MG: 100 INJECTION, SOLUTION INTRAMUSCULAR; INTRAVENOUS at 08:31

## 2020-11-27 RX ADMIN — SODIUM CHLORIDE, SODIUM GLUCONATE, SODIUM ACETATE, POTASSIUM CHLORIDE AND MAGNESIUM CHLORIDE 75 ML/HR: 526; 502; 368; 37; 30 INJECTION, SOLUTION INTRAVENOUS at 21:21

## 2020-11-27 RX ADMIN — LABETALOL HYDROCHLORIDE 10 MG: 5 INJECTION, SOLUTION INTRAVENOUS at 20:50

## 2020-11-27 RX ADMIN — LABETALOL HYDROCHLORIDE 10 MG: 5 INJECTION, SOLUTION INTRAVENOUS at 13:02

## 2020-11-27 ASSESSMENT — ACTIVITIES OF DAILY LIVING (ADL)
ADLS_ACUITY_SCORE: 20

## 2020-11-27 ASSESSMENT — VISUAL ACUITY: OU: NOT TESTABLE

## 2020-11-27 ASSESSMENT — MIFFLIN-ST. JEOR: SCORE: 1367.5

## 2020-11-27 NOTE — PROGRESS NOTES
"Neuroscience Intensive Care Progress Note    11/27/2020    Simone Daniels is a 72 year old man with history of atrial fibrillation not on anticoagulation (discontinued apixaban 6/2019), anxiety, GERD, and left parietal stroke with hemorrhagic conversion in 2018 who presented to [facility] for evaluation of aphasia. He was reportedly found behaving abnormally at a liquor store. Last known well is unclear. Head CT showed new area of subacute infarct in right parietal lobe with overlying petechial hemorrhage. Patient was transferred to [facility] for neuro-ICU level cares.     24 hour events:  No acute events overnight. Received labetalol x 2 for HTN     Subjective:  Unable to assess due to mutism      Current Medications:    folic acid  1 mg Intravenous Daily     heparin ANTICOAGULANT  5,000 Units Subcutaneous Q8H     [Held by provider] polyethylene glycol  17 g Oral or Feeding Tube Daily     [Held by provider] senna-docusate  1-2 tablet Oral or Feeding Tube BID     sodium chloride (PF)  10 mL Intravenous Once     sodium chloride bacteriostatic  10 mL Intravenous Once     thiamine  100 mg Intravenous Daily       PRN Medications:  acetaminophen **OR** acetaminophen **OR** acetaminophen, bisacodyl, labetalol, ondansetron **OR** ondansetron    Infusions:    Plasma-Lyte A 100 mL/hr (11/27/20 0600)       No Known Allergies    Physical Examination:  /78 (BP Location: Right arm)   Pulse 77   Temp 98.4  F (36.9  C) (Axillary)   Resp 19   Ht 1.676 m (5' 5.98\")   Wt 68.5 kg (151 lb 0.2 oz)   SpO2 95%   BMI 24.39 kg/m     Overnight Exam:   General:  patient lying in bed without any acute distress    HEENT:  poor dental hygiene  Cardio:  appears euvolemic  Pulmonary:  no respiratory distress  Abdomen:  soft  Extremities:  no edema  Skin:  intact, warm/dry     Neurologic  Mental Status:  Awake, alert, mute, mimics but does not seem to understand verbal commands. No spontaneous speech production.   Cranial Nerves:   " PERRL, EOMI with normal smooth pursuit, facial movements symmetric.   Motor:  Normal muscle tone and bulk, no abnormal movements, able to move all limbs spontaneously, no pronator drift.   Reflexes: Toes mute   Sensory:  Unable to assess  Coordination:  Unable to assess  Station/Gait:  Unable to assess    I&O's  I: 2.5L  O: 0.3L   Net: + 2.3L     Labs/Studies:  Na 150 <-- 141  K 3.6  Cl 116  Cr 1.32  Glucose 100s-110s  WBC 12  Hg 13.4    Imaging:  Echo 11/26 - Negative for PFO. LVEF 55-60%, mild LVH. Grade III diastolic dysfunction. Severe LA enlargement.        Impression  1. Right parietal lobe infarct with hemorrhagic conversion, subacute   2. Hx of left parietal IPH with resultant aphasia      Plan     Neuro:  #Right parietal lobe infarct with hemorrhagic conversion, subacute  Etiology likely embolic associated with atrial fibrillation, not on anticoagulation.  - Q4 hour neuro checks   - SBP goal < 160   - Avoid hypotonic IV fluids  - Hold PTA Apixaban   - Statin:LDL 69, no indication for statin   - MRI Stroke Protocol with one time PRN Ativan for anxiety   - Telemetry, EKG  - Bedside Glucose Monitoring  - Nutrition: NPO, failed swallow evaluation  - A1c, Lipid Panel, Troponin   - PT/OT/SLP  - Stroke Education  - Stroke Class per [facility] ([facility])  - Smoking Cessation not needed - reportedly not current smoker  - Depression Screen  - Apnea Screen  - Euthermia, Euglycemia  - Begin aspirin 81mg if brain imaging today stable.      #Alcohol use disorder  - CIWA without benzos   - Thiamine, Folate IV       Resp:   No acute issues, saturation well on room air  - continuous pulse ox monitoring   - maintain O2 saturations > 92%      CV:  #Atrial fibrillation  Was previously prescribed apixaban, however per chart review does not seem to have taken this medication consistently after recurrent nosebleeds 1/2019.  - hold home apixaban given intracranial hemorrhage - resume 1 week from bleed.      - SBP goal:  <160     Renal/FEN:   - strict monitoring of I/O     #GREGORIA  Cr 1.3, baseline less than 1  - Daily BMP   - maintenace IVF is Plasmalyte 75ml/h     #Urinary retention  Was on tamsulosin in the past, not a current medication  - urinalysis on admission     Endo:   No acute issues  A1C 5.6% 11/25/2020     Heme:   No acute concerns     GI:   Diet: NPO  - No need for GI ppx     ID:   -Monitor for signs of infection; pan culture if >100.4     FEN: NPO, on PlasmaLyte  PPx:   - no chemical ppx due to hemorrhagic conversion of stroke  - SCDs . Begin heparin 11/27 if brain imaging stable   - no GI ppx indicated  Code: Full  Social: No clear NOK     Darci Bauer MD  PGY-2 Neurology Resident  P: [phone]

## 2020-11-27 NOTE — PLAN OF CARE
Major Shift Events: No acute events. Pt has no new neuro changes. Slight L side facial droop, remains severely aphasic. Will sometimes follow commands, however mostly does not understand instructions from writer.  Continues to be restless, especially this AM/early afternoon - 1:1 sitter continues. CIWA scores <6. BA on as pt is quick to get up, unsteady on feet & does not follow direction. CT performed this afternoon.  Failed swallow study per speech, remains NPO. A-fib. High BPs, PRN labetolol given x2. LS clear/equal bilaterally, stable on RA. No s/sx of pain observed. Plasmalyte infusing @ 75cc/hr in PIV. BGs stable, sodium elevated. K+ replaced, recheck tmrw.      Plan: Possible MRI later tonight. Order for EEG monitoring after MRI. Transfer order to floor placed pending bed availability.      For vital signs and complete assessments, please see documentation flowsheets.

## 2020-11-27 NOTE — PLAN OF CARE
Neuro exam remains unchanged. Patient severely aphasic, impulsive requiring sitter. Difficulty following commands consistently to obtain thorough neuro exam. Hemodynamically stable, afebrile. PIV x 1. Plan for potential transfer to floor today.

## 2020-11-27 NOTE — PLAN OF CARE
PT4A: HOLDING- per pt observation and discussion with OT and RN, pt up taking steps in room though extremely impulsive and not following commands. Skilled PT services would focus on gait and balance training. However, given current cognitive status pt not able to meaningfully participate in this level training. OT will continue to progress mobility and cognition, PT hopeful for command following to improve in next 1-2 days and initiate at this time. Will continue to monitor status closely.

## 2020-11-27 NOTE — CONSULTS
"Care Management Follow Up    Length of Stay (days): 2    Expected Discharge Date:       Concerns to be Addressed:       Patient plan of care discussed at interdisciplinary rounds: Yes    Anticipated Discharge Disposition:       Anticipated Discharge Services:    Anticipated Discharge DME:      Patient/family educated on Medicare website which has current facility and service quality ratings:    Education Provided on the Discharge Plan:    Patient/Family in Agreement with the Plan:      Referrals Placed by CM/SW:    Private pay costs discussed: Not applicable    Additional Information:  GILBERT spoke with resident Darci regarding consult. She mentioned that a contact will be important to discuss peg placement in the future and other potential medical needs. called Pt friend Marley ([phone]) for finding NOK. Per Marley, she mentioned Pt has one adopted daughter. He has not spoken to this daughter in several years and he considers himself \"estranged\" from the daughter. Pt has never been . Marley mentioned that \"she is it\" in terms of a source of close contact for Pt.     GILBERT informed Marley that Pt does not have a HCD so unfortunately the hospital uses a NOK policy for decision making. Marley was concerned about what will happen if no NOK is identified. GILBERT infromed her that Risk management will be consulted and the medical team will make decisions in the best interest of Pt. GILBERT searched [employer] in attempt to find NOK. The results were unsuccessful. GILBERT will continue to search for NOK for Pt. Page the weekend GILBERT [phone] (after hours pager [phone])  if concerns arise on this case over the weekend. GILBERT will continue to follow for psychosocial support, resources and advocate on behalf of the patient.    SHELLIE Max, HESHAM  ICU    [facility]  Phone: [phone]  Pager: [phone]      HESHAM Max      " Detail Level: Detailed Detail Level: Simple

## 2020-11-28 ENCOUNTER — APPOINTMENT (OUTPATIENT)
Dept: SPEECH THERAPY | Facility: CLINIC | Age: 75
DRG: 064 | End: 2020-11-28
Attending: PSYCHIATRY & NEUROLOGY
Payer: MEDICARE

## 2020-11-28 ENCOUNTER — APPOINTMENT (OUTPATIENT)
Dept: NEUROLOGY | Facility: CLINIC | Age: 75
DRG: 064 | End: 2020-11-28
Attending: STUDENT IN AN ORGANIZED HEALTH CARE EDUCATION/TRAINING PROGRAM
Payer: MEDICARE

## 2020-11-28 LAB
ANION GAP SERPL CALCULATED.3IONS-SCNC: 7 MMOL/L (ref 3–14)
BACTERIA SPEC CULT: ABNORMAL
BUN SERPL-MCNC: 33 MG/DL (ref 7–30)
CALCIUM SERPL-MCNC: 9.2 MG/DL (ref 8.5–10.1)
CHLORIDE SERPL-SCNC: 119 MMOL/L (ref 94–109)
CO2 SERPL-SCNC: 24 MMOL/L (ref 20–32)
CREAT SERPL-MCNC: 1.22 MG/DL (ref 0.66–1.25)
ERYTHROCYTE [DISTWIDTH] IN BLOOD BY AUTOMATED COUNT: 14.5 % (ref 10–15)
GFR SERPL CREATININE-BSD FRML MDRD: 58 ML/MIN/{1.73_M2}
GLUCOSE BLDC GLUCOMTR-MCNC: 104 MG/DL (ref 70–99)
GLUCOSE BLDC GLUCOMTR-MCNC: 91 MG/DL (ref 70–99)
GLUCOSE SERPL-MCNC: 108 MG/DL (ref 70–99)
HCT VFR BLD AUTO: 43.9 % (ref 40–53)
HGB BLD-MCNC: 13.5 G/DL (ref 13.3–17.7)
Lab: ABNORMAL
MAGNESIUM SERPL-MCNC: 2.1 MG/DL (ref 1.6–2.3)
MCH RBC QN AUTO: 29.9 PG (ref 26.5–33)
MCHC RBC AUTO-ENTMCNC: 30.8 G/DL (ref 31.5–36.5)
MCV RBC AUTO: 97 FL (ref 78–100)
PHOSPHATE SERPL-MCNC: 3.5 MG/DL (ref 2.5–4.5)
PLATELET # BLD AUTO: 238 10E9/L (ref 150–450)
POTASSIUM SERPL-SCNC: 3.8 MMOL/L (ref 3.4–5.3)
RBC # BLD AUTO: 4.51 10E12/L (ref 4.4–5.9)
SODIUM SERPL-SCNC: 151 MMOL/L (ref 133–144)
SPECIMEN SOURCE: ABNORMAL
WBC # BLD AUTO: 11.1 10E9/L (ref 4–11)

## 2020-11-28 PROCEDURE — 999N000128 HC STATISTIC PERIPHERAL IV START W/O US GUIDANCE

## 2020-11-28 PROCEDURE — 80048 BASIC METABOLIC PNL TOTAL CA: CPT | Performed by: STUDENT IN AN ORGANIZED HEALTH CARE EDUCATION/TRAINING PROGRAM

## 2020-11-28 PROCEDURE — 83735 ASSAY OF MAGNESIUM: CPT | Performed by: STUDENT IN AN ORGANIZED HEALTH CARE EDUCATION/TRAINING PROGRAM

## 2020-11-28 PROCEDURE — 250N000009 HC RX 250: Performed by: NURSE PRACTITIONER

## 2020-11-28 PROCEDURE — 250N000009 HC RX 250: Performed by: STUDENT IN AN ORGANIZED HEALTH CARE EDUCATION/TRAINING PROGRAM

## 2020-11-28 PROCEDURE — 95720 EEG PHY/QHP EA INCR W/VEEG: CPT | Performed by: PSYCHIATRY & NEUROLOGY

## 2020-11-28 PROCEDURE — 250N000011 HC RX IP 250 OP 636: Performed by: PSYCHIATRY & NEUROLOGY

## 2020-11-28 PROCEDURE — 92526 ORAL FUNCTION THERAPY: CPT | Mod: GN

## 2020-11-28 PROCEDURE — 83735 ASSAY OF MAGNESIUM: CPT | Performed by: PSYCHIATRY & NEUROLOGY

## 2020-11-28 PROCEDURE — 84100 ASSAY OF PHOSPHORUS: CPT | Performed by: STUDENT IN AN ORGANIZED HEALTH CARE EDUCATION/TRAINING PROGRAM

## 2020-11-28 PROCEDURE — 36415 COLL VENOUS BLD VENIPUNCTURE: CPT | Performed by: STUDENT IN AN ORGANIZED HEALTH CARE EDUCATION/TRAINING PROGRAM

## 2020-11-28 PROCEDURE — 250N000011 HC RX IP 250 OP 636: Performed by: NURSE PRACTITIONER

## 2020-11-28 PROCEDURE — 250N000011 HC RX IP 250 OP 636: Performed by: STUDENT IN AN ORGANIZED HEALTH CARE EDUCATION/TRAINING PROGRAM

## 2020-11-28 PROCEDURE — 999N001017 HC STATISTIC GLUCOSE BY METER IP

## 2020-11-28 PROCEDURE — 95714 VEEG EA 12-26 HR UNMNTR: CPT

## 2020-11-28 PROCEDURE — 85027 COMPLETE CBC AUTOMATED: CPT | Performed by: STUDENT IN AN ORGANIZED HEALTH CARE EDUCATION/TRAINING PROGRAM

## 2020-11-28 PROCEDURE — 99232 SBSQ HOSP IP/OBS MODERATE 35: CPT | Mod: 25 | Performed by: PSYCHIATRY & NEUROLOGY

## 2020-11-28 PROCEDURE — 120N000002 HC R&B MED SURG/OB UMMC

## 2020-11-28 RX ORDER — POTASSIUM CHLORIDE 7.45 MG/ML
10 INJECTION INTRAVENOUS
Status: COMPLETED | OUTPATIENT
Start: 2020-11-28 | End: 2020-11-28

## 2020-11-28 RX ORDER — ASPIRIN 81 MG/1
81 TABLET, CHEWABLE ORAL DAILY
Status: DISCONTINUED | OUTPATIENT
Start: 2020-11-29 | End: 2020-11-28

## 2020-11-28 RX ADMIN — HEPARIN SODIUM 5000 UNITS: 5000 INJECTION, SOLUTION INTRAVENOUS; SUBCUTANEOUS at 17:26

## 2020-11-28 RX ADMIN — AMPICILLIN SODIUM 1 G: 1 INJECTION, POWDER, FOR SOLUTION INTRAMUSCULAR; INTRAVENOUS at 06:47

## 2020-11-28 RX ADMIN — SODIUM CHLORIDE, SODIUM GLUCONATE, SODIUM ACETATE, POTASSIUM CHLORIDE AND MAGNESIUM CHLORIDE 75 ML/HR: 526; 502; 368; 37; 30 INJECTION, SOLUTION INTRAVENOUS at 21:34

## 2020-11-28 RX ADMIN — FOLIC ACID 1 MG: 5 INJECTION, SOLUTION INTRAMUSCULAR; INTRAVENOUS; SUBCUTANEOUS at 08:39

## 2020-11-28 RX ADMIN — POTASSIUM CHLORIDE 10 MEQ: 7.46 INJECTION, SOLUTION INTRAVENOUS at 14:43

## 2020-11-28 RX ADMIN — HEPARIN SODIUM 5000 UNITS: 5000 INJECTION, SOLUTION INTRAVENOUS; SUBCUTANEOUS at 08:39

## 2020-11-28 RX ADMIN — AMPICILLIN SODIUM 1 G: 1 INJECTION, POWDER, FOR SOLUTION INTRAMUSCULAR; INTRAVENOUS at 21:25

## 2020-11-28 RX ADMIN — HEPARIN SODIUM 5000 UNITS: 5000 INJECTION, SOLUTION INTRAVENOUS; SUBCUTANEOUS at 02:46

## 2020-11-28 RX ADMIN — LABETALOL HYDROCHLORIDE 10 MG: 5 INJECTION, SOLUTION INTRAVENOUS at 04:41

## 2020-11-28 RX ADMIN — LORAZEPAM 2 MG: 2 INJECTION INTRAMUSCULAR; INTRAVENOUS at 10:30

## 2020-11-28 RX ADMIN — THIAMINE HYDROCHLORIDE 100 MG: 100 INJECTION, SOLUTION INTRAMUSCULAR; INTRAVENOUS at 08:39

## 2020-11-28 RX ADMIN — POTASSIUM CHLORIDE 10 MEQ: 7.46 INJECTION, SOLUTION INTRAVENOUS at 11:59

## 2020-11-28 RX ADMIN — AMPICILLIN SODIUM 1 G: 1 INJECTION, POWDER, FOR SOLUTION INTRAMUSCULAR; INTRAVENOUS at 14:42

## 2020-11-28 ASSESSMENT — ACTIVITIES OF DAILY LIVING (ADL)
ADLS_ACUITY_SCORE: 14
ADLS_ACUITY_SCORE: 22
ADLS_ACUITY_SCORE: 13.5
ADLS_ACUITY_SCORE: 22

## 2020-11-28 NOTE — PROGRESS NOTES
INITIAL REPORT of Video-EEG Monitoring              DATE OF RECORDIN2020         I reviewed the first 2 hours of video-EEG monitoring of Simone Daniels.         The EEG during waking was abnormal due to generalized delta-theta slowing, with ongoing intermixture of faster alpha-beta activities bilaterally, with focal left frontocentral delta slowing, and with relative amplitude attenuation of biposterior (parietal, occipital and posterior temporal) activities.  No interictal epileptiform abnormalities and no electrographic seizures were observed.         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  This recording excludes non-convulsive status epilepticus as a possible cause of this encephalopathy.    Screening for intermittent seizures will require a longer period of recording.   Merritt Londono M.D.

## 2020-11-28 NOTE — PROGRESS NOTES
"Neuroscience Intensive Care Progress Note    11/28/2020    Simone Daniels is a 72 year old man with history of atrial fibrillation not on anticoagulation (discontinued apixaban 6/2019), anxiety, GERD, and left parietal stroke with hemorrhagic conversion in 2018 who presented to [facility] for evaluation of aphasia. He was reportedly found behaving abnormally at a liquor store. Last known well is unclear. Head CT showed new area of subacute infarct in right parietal lobe with overlying petechial hemorrhage. Patient was transferred to [facility] for further cares.     24 hour events:  No acute events overnight. Had large BM.   Unable to tolerate MRI due to agitation.     Subjective:  Unable to assess due to mutism      Current Medications:    ampicillin  1 g Intravenous Q8H YOBANY     folic acid  1 mg Intravenous Daily     heparin ANTICOAGULANT  5,000 Units Subcutaneous Q8H     [Held by provider] polyethylene glycol  17 g Oral or Feeding Tube Daily     [Held by provider] senna-docusate  1-2 tablet Oral or Feeding Tube BID     sodium chloride (PF)  10 mL Intravenous Once     sodium chloride bacteriostatic  10 mL Intravenous Once     thiamine  100 mg Intravenous Daily       PRN Medications:  acetaminophen **OR** acetaminophen **OR** acetaminophen, bisacodyl, labetalol, LORazepam, ondansetron **OR** ondansetron    Infusions:    Plasma-Lyte A 75 mL/hr (11/27/20 2121)       No Known Allergies    Physical Examination:  BP (!) 151/93 (BP Location: Right arm)   Pulse 75   Temp 97.3  F (36.3  C) (Axillary)   Resp 18   Ht 1.676 m (5' 5.98\")   Wt 68.5 kg (151 lb 0.2 oz)   SpO2 100%   BMI 24.39 kg/m     Overnight Exam:   General:  patient lying in bed without any acute distress    HEENT:  poor dental hygiene  Cardio:  appears euvolemic  Pulmonary:  no respiratory distress  Abdomen:  soft  Extremities:  no edema  Skin:  intact, warm/dry     Neurologic  Mental Status:  Awake, alert, mute, mimics but does not seem to " understand verbal commands. No spontaneous speech production.   Cranial Nerves:   PERRL, EOM intact to sides. Face appears symmetric   Motor:  Normal muscle tone and bulk, no abnormal movements, able to move all limbs spontaneously, no pronator drift.     Labs/Studies:  Na 151  K 3.8  Cl 119  Cr 1.22   WBC 11.1  Hg 13.5    Imaging:  CTH 11/27 - No change in petechial hemorrhage.        Impression  1. Right parietal lobe infarct with hemorrhagic conversion, subacute   2. Hx of left parietal IPH with resultant aphasia      Plan     #Right parietal lobe infarct with hemorrhagic conversion, subacute  Etiology likely embolic associated with atrial fibrillation, not on anticoagulation.  - 6A  - Q4 hour neuro checks   - SBP goal < 160   - Avoid hypotonic IV fluids  - Hold PTA Apixaban   - Statin:LDL 69, no indication for statin   - MRI Stroke Protocol   - Telemetry  - Nutrition: NPO. Speech evaluation when able. Has been unable to participate with speech to date. Q4hr glucoses while NPO   - PT/OT/SLP  - Stroke Education  - Stroke Class per [facility] ([facility])  - Depression Screen  - Apnea Screen  - Euthermia, Euglycemia  - Begin aspirin 81mg when enteral access available      #Alcohol use disorder  - CIWA without benzos   - Thiamine, Folate IV       #Atrial fibrillation  Was previously prescribed apixaban, however per chart review does not seem to have taken this medication consistently after recurrent nosebleeds 1/2019.  - hold home apixaban given intracranial hemorrhage - resume 1 week from bleed.   - SBP goal: <160     #GREGORIA  Cr 1.3, baseline less than 1  - Daily BMP   - maintenace IVF is Plasmalyte 75ml/h     #Urinary retention  Was on tamsulosin in the past, not a current medication  - urinalysis on admission        FEN: NPO, on PlasmaLyte  PPx:   - Heparin 5000 Q12hr  - no GI ppx indicated  Code: Full  Social: No clear NOK     Darci Bauer MD  PGY-2 Neurology Resident  P: [phone]

## 2020-11-28 NOTE — PLAN OF CARE
Status: Admitted for evaluation of aphasia. Head CT showed R parietal lobe infarct with hemorrhagic transformation  Vitals: HTN, PRN Labetolol given x2 this shift for SBP > 160. Hx afib, HR varies from upper 90s-140s. On CCM  Neuros: AUSTIN many neuros. Impulsive, slight L facial droop. Severe aphasia. Mute. Inconsistently follows commands, does not demonstrate understanding of most commands. 1:1 sitter in place  IV: PIV infusing abx inbetween Plasmalyte at 75mL/hr  Resp/trach: LS clear  Diet: NPO  Bowel status: Had large BM this shift  : Voiding with frequency and intermittent incontinence, is now being treated for UTI  Skin: Scrape to forehead. Some scrapes/bruising to legs  Pain: AUSTIN, is restless but no other significant nonverbal signs  Activity: Per report up with 2 and GB if OOB  Plan: Plan for MRI tomorrow. Plan for EEG monitoring tomorrow.  Continue to monitor and follow POC

## 2020-11-28 NOTE — PLAN OF CARE
"Status: Pt history of atrial fibrillation not on anticoagulation (discontinued apixaban 6/2019), anxiety, GERD, and left parietal stroke with hemorrhagic conversion in 2018 who presented to [facility] for evaluation of aphasia. He was reportedly found behaving abnormally at a liquor store. Last known well is unclear. Head CT showed new area of subacute infarct in right parietal lobe with overlying petechial hemorrhage.  Vitals: HTN at times. VSS on 1.5L NC.   Neuros: Inconsistently follows commands. Mutism. Slight L facial droop. Restless at times. Sitter at bedside. Ativan administrated but pt was still restless on MRI table despite somnolence. MD aware. MD paged regarding pts continued somnolence, arousable to nox stim. VSS throughout.    IV: PIV infusing Plasmalyte at 75ml/hr between abx and K replacement. Second PIV obtained this shift d/t multiple med admin and pt being strict NPO. Requested PICC, MD declined.   Resp: WDL  Diet: Strict NPO, no exceptions.   Bowel status: No BM, BS +.   : Intermittent incontinence and frequency.   Skin: Scab on forehead/hairline.   Pain: No non-verbal indicators.   Activity: Not OOB this shift, turn/repo Q2hrs while somnolent. SLP saw pt today, still NPO but did say he would be appropriate for oral cares with minimal water and swabs. \"Friend\" Marley Fulton updated via phone (pt's emergency contact), she stated that \"he is a fighter\" and that \"he will be driven to heal\". Very supportive of pt.   Plan: Cont to monitor. Keep MD updated on pt's somnolence. Sitter. EEG in place.   "

## 2020-11-28 NOTE — PLAN OF CARE
Status: Admitted for evaluation of aphasia. Head CT showed R parietal lobe infarct with hemorrhagic transformation  Vitals: HTN, PRN Labetolol x 1. Hx of A. Fib, on CCM. On 1 L NC overnight. All other VSS.   Neuros: Difficult to assess neuros, inconsistently follows few commands. Severe aphasia. Slight L facial droop. 1:1 sitter.  IV: PIV infusing Plasmalyte at 75mL/hr.  Resp/trach: LS clear  Diet: NPO  Bowel status: No BM this shift, BS present.  : Intermittent incontinence, frequency.   Skin: Scab on upper forehead.   Pain: No non-verbal statements of pain overnight.   Activity: No OOB this shift, per report, A2 w/ GB.   Plan: Plan for MRI & EEG today. Continue to monitor and follow POC.

## 2020-11-28 NOTE — PROVIDER NOTIFICATION
Pt unable to complete MRI d/t movement/restlessness while on MRI table. Float RN present, resp status ok. Pt given 2mg ativan for restlessness previous to MRI but still too much movement. Pt now back on floor and unarousable to sternal rub, VSS on 3L NC. RR 12. Other VS stable. Resident paged, fellow paged. Dr. Bejarano stated to just to monitor pt. Sitter at bedside. Move forward with EEG.

## 2020-11-28 NOTE — PROGRESS NOTES
Arrived from:  4A  Belongings/meds:    2 RN Skin Assessment Completed by: Teresita DE LA ROSA RN, Elizabeth BURKS RN  Non-intact findings documented (yes/no/NA): Poor dental hygiene, unkempt appearance, scrape/scab to forehead and legs

## 2020-11-28 NOTE — PHARMACY-CONSULT NOTE
Pharmacy Delirium Chart Review    Upon chart review, the following medications may contribute to possible patient delirium: nothing significant note. Patient received lorazepam on 11/25 which could contribute to altered mental status but given it was >48 hours ago, low risk.  Please consult unit pharmacist with further questions.    Connie Carlton RPH

## 2020-11-29 ENCOUNTER — APPOINTMENT (OUTPATIENT)
Dept: SPEECH THERAPY | Facility: CLINIC | Age: 75
DRG: 064 | End: 2020-11-29
Attending: PSYCHIATRY & NEUROLOGY
Payer: MEDICARE

## 2020-11-29 ENCOUNTER — APPOINTMENT (OUTPATIENT)
Dept: NEUROLOGY | Facility: CLINIC | Age: 75
DRG: 064 | End: 2020-11-29
Attending: STUDENT IN AN ORGANIZED HEALTH CARE EDUCATION/TRAINING PROGRAM
Payer: MEDICARE

## 2020-11-29 ENCOUNTER — APPOINTMENT (OUTPATIENT)
Dept: OCCUPATIONAL THERAPY | Facility: CLINIC | Age: 75
DRG: 064 | End: 2020-11-29
Attending: PSYCHIATRY & NEUROLOGY
Payer: MEDICARE

## 2020-11-29 LAB
ALBUMIN UR-MCNC: 300 MG/DL
ANION GAP SERPL CALCULATED.3IONS-SCNC: 4 MMOL/L (ref 3–14)
ANION GAP SERPL CALCULATED.3IONS-SCNC: 7 MMOL/L (ref 3–14)
APPEARANCE UR: CLEAR
BILIRUB UR QL STRIP: NEGATIVE
BUN SERPL-MCNC: 31 MG/DL (ref 7–30)
BUN SERPL-MCNC: 32 MG/DL (ref 7–30)
CALCIUM SERPL-MCNC: 9 MG/DL (ref 8.5–10.1)
CALCIUM SERPL-MCNC: 9.1 MG/DL (ref 8.5–10.1)
CHLORIDE SERPL-SCNC: 121 MMOL/L (ref 94–109)
CHLORIDE SERPL-SCNC: 122 MMOL/L (ref 94–109)
CO2 SERPL-SCNC: 27 MMOL/L (ref 20–32)
CO2 SERPL-SCNC: 30 MMOL/L (ref 20–32)
COLOR UR AUTO: YELLOW
CREAT SERPL-MCNC: 1.09 MG/DL (ref 0.66–1.25)
CREAT SERPL-MCNC: 1.11 MG/DL (ref 0.66–1.25)
ERYTHROCYTE [DISTWIDTH] IN BLOOD BY AUTOMATED COUNT: 14.2 % (ref 10–15)
ERYTHROCYTE [DISTWIDTH] IN BLOOD BY AUTOMATED COUNT: 14.2 % (ref 10–15)
GFR SERPL CREATININE-BSD FRML MDRD: 65 ML/MIN/{1.73_M2}
GFR SERPL CREATININE-BSD FRML MDRD: 66 ML/MIN/{1.73_M2}
GLUCOSE BLDC GLUCOMTR-MCNC: 100 MG/DL (ref 70–99)
GLUCOSE BLDC GLUCOMTR-MCNC: 110 MG/DL (ref 70–99)
GLUCOSE BLDC GLUCOMTR-MCNC: 113 MG/DL (ref 70–99)
GLUCOSE BLDC GLUCOMTR-MCNC: 96 MG/DL (ref 70–99)
GLUCOSE BLDC GLUCOMTR-MCNC: 99 MG/DL (ref 70–99)
GLUCOSE SERPL-MCNC: 102 MG/DL (ref 70–99)
GLUCOSE SERPL-MCNC: 112 MG/DL (ref 70–99)
GLUCOSE UR STRIP-MCNC: 30 MG/DL
HCT VFR BLD AUTO: 43.5 % (ref 40–53)
HCT VFR BLD AUTO: 46.8 % (ref 40–53)
HGB BLD-MCNC: 13.5 G/DL (ref 13.3–17.7)
HGB BLD-MCNC: 14.6 G/DL (ref 13.3–17.7)
HGB UR QL STRIP: ABNORMAL
KETONES UR STRIP-MCNC: 10 MG/DL
LEUKOCYTE ESTERASE UR QL STRIP: NEGATIVE
MAGNESIUM SERPL-MCNC: 1.7 MG/DL (ref 1.6–2.3)
MAGNESIUM SERPL-MCNC: 1.9 MG/DL (ref 1.6–2.3)
MCH RBC QN AUTO: 29.9 PG (ref 26.5–33)
MCH RBC QN AUTO: 29.9 PG (ref 26.5–33)
MCHC RBC AUTO-ENTMCNC: 31 G/DL (ref 31.5–36.5)
MCHC RBC AUTO-ENTMCNC: 31.2 G/DL (ref 31.5–36.5)
MCV RBC AUTO: 96 FL (ref 78–100)
MCV RBC AUTO: 96 FL (ref 78–100)
NITRATE UR QL: NEGATIVE
PH UR STRIP: 8 PH (ref 5–7)
PHOSPHATE SERPL-MCNC: 3.3 MG/DL (ref 2.5–4.5)
PLATELET # BLD AUTO: 232 10E9/L (ref 150–450)
PLATELET # BLD AUTO: 255 10E9/L (ref 150–450)
POTASSIUM SERPL-SCNC: 3.3 MMOL/L (ref 3.4–5.3)
POTASSIUM SERPL-SCNC: 3.4 MMOL/L (ref 3.4–5.3)
RBC # BLD AUTO: 4.52 10E12/L (ref 4.4–5.9)
RBC # BLD AUTO: 4.89 10E12/L (ref 4.4–5.9)
RBC #/AREA URNS AUTO: >182 /HPF (ref 0–2)
SODIUM SERPL-SCNC: 155 MMOL/L (ref 133–144)
SODIUM SERPL-SCNC: 155 MMOL/L (ref 133–144)
SOURCE: ABNORMAL
SP GR UR STRIP: 1.02 (ref 1–1.03)
UROBILINOGEN UR STRIP-MCNC: NORMAL MG/DL (ref 0–2)
WBC # BLD AUTO: 11.2 10E9/L (ref 4–11)
WBC # BLD AUTO: 13.5 10E9/L (ref 4–11)
WBC #/AREA URNS AUTO: 16 /HPF (ref 0–5)

## 2020-11-29 PROCEDURE — 250N000009 HC RX 250: Performed by: STUDENT IN AN ORGANIZED HEALTH CARE EDUCATION/TRAINING PROGRAM

## 2020-11-29 PROCEDURE — 999N001017 HC STATISTIC GLUCOSE BY METER IP

## 2020-11-29 PROCEDURE — 99232 SBSQ HOSP IP/OBS MODERATE 35: CPT | Mod: 25 | Performed by: PSYCHIATRY & NEUROLOGY

## 2020-11-29 PROCEDURE — 250N000011 HC RX IP 250 OP 636: Performed by: NURSE PRACTITIONER

## 2020-11-29 PROCEDURE — 250N000011 HC RX IP 250 OP 636: Performed by: STUDENT IN AN ORGANIZED HEALTH CARE EDUCATION/TRAINING PROGRAM

## 2020-11-29 PROCEDURE — 120N000002 HC R&B MED SURG/OB UMMC

## 2020-11-29 PROCEDURE — 95714 VEEG EA 12-26 HR UNMNTR: CPT

## 2020-11-29 PROCEDURE — 36415 COLL VENOUS BLD VENIPUNCTURE: CPT | Performed by: PSYCHIATRY & NEUROLOGY

## 2020-11-29 PROCEDURE — 95720 EEG PHY/QHP EA INCR W/VEEG: CPT | Mod: GC | Performed by: PSYCHIATRY & NEUROLOGY

## 2020-11-29 PROCEDURE — 81001 URINALYSIS AUTO W/SCOPE: CPT | Performed by: STUDENT IN AN ORGANIZED HEALTH CARE EDUCATION/TRAINING PROGRAM

## 2020-11-29 PROCEDURE — 80048 BASIC METABOLIC PNL TOTAL CA: CPT | Performed by: STUDENT IN AN ORGANIZED HEALTH CARE EDUCATION/TRAINING PROGRAM

## 2020-11-29 PROCEDURE — 999N000127 HC STATISTIC PERIPHERAL IV START W US GUIDANCE

## 2020-11-29 PROCEDURE — 97530 THERAPEUTIC ACTIVITIES: CPT | Mod: GO | Performed by: OCCUPATIONAL THERAPIST

## 2020-11-29 PROCEDURE — 250N000009 HC RX 250: Performed by: NURSE PRACTITIONER

## 2020-11-29 PROCEDURE — 85027 COMPLETE CBC AUTOMATED: CPT | Performed by: PSYCHIATRY & NEUROLOGY

## 2020-11-29 PROCEDURE — 36415 COLL VENOUS BLD VENIPUNCTURE: CPT | Performed by: STUDENT IN AN ORGANIZED HEALTH CARE EDUCATION/TRAINING PROGRAM

## 2020-11-29 PROCEDURE — 85027 COMPLETE CBC AUTOMATED: CPT | Performed by: STUDENT IN AN ORGANIZED HEALTH CARE EDUCATION/TRAINING PROGRAM

## 2020-11-29 PROCEDURE — 83735 ASSAY OF MAGNESIUM: CPT | Performed by: STUDENT IN AN ORGANIZED HEALTH CARE EDUCATION/TRAINING PROGRAM

## 2020-11-29 PROCEDURE — 80048 BASIC METABOLIC PNL TOTAL CA: CPT | Performed by: PSYCHIATRY & NEUROLOGY

## 2020-11-29 PROCEDURE — 250N000011 HC RX IP 250 OP 636: Performed by: PSYCHIATRY & NEUROLOGY

## 2020-11-29 PROCEDURE — 84145 PROCALCITONIN (PCT): CPT | Performed by: STUDENT IN AN ORGANIZED HEALTH CARE EDUCATION/TRAINING PROGRAM

## 2020-11-29 PROCEDURE — 92526 ORAL FUNCTION THERAPY: CPT | Mod: GN

## 2020-11-29 PROCEDURE — 83735 ASSAY OF MAGNESIUM: CPT | Performed by: PSYCHIATRY & NEUROLOGY

## 2020-11-29 PROCEDURE — 84100 ASSAY OF PHOSPHORUS: CPT | Performed by: PSYCHIATRY & NEUROLOGY

## 2020-11-29 RX ORDER — ASPIRIN 81 MG/1
81 TABLET, CHEWABLE ORAL DAILY
Status: DISCONTINUED | OUTPATIENT
Start: 2020-11-29 | End: 2020-11-30

## 2020-11-29 RX ORDER — AMPICILLIN 1 G/1
1 INJECTION, POWDER, FOR SOLUTION INTRAMUSCULAR; INTRAVENOUS EVERY 6 HOURS
Status: DISPENSED | OUTPATIENT
Start: 2020-11-29 | End: 2020-12-06

## 2020-11-29 RX ORDER — METOPROLOL TARTRATE 1 MG/ML
5 INJECTION, SOLUTION INTRAVENOUS
Status: DISCONTINUED | OUTPATIENT
Start: 2020-11-29 | End: 2020-12-05

## 2020-11-29 RX ORDER — LIDOCAINE HYDROCHLORIDE 20 MG/ML
JELLY TOPICAL ONCE
Status: COMPLETED | OUTPATIENT
Start: 2020-11-29 | End: 2020-11-29

## 2020-11-29 RX ORDER — METOPROLOL TARTRATE 1 MG/ML
INJECTION, SOLUTION INTRAVENOUS
Status: DISCONTINUED
Start: 2020-11-29 | End: 2020-11-30 | Stop reason: HOSPADM

## 2020-11-29 RX ORDER — METOPROLOL TARTRATE 1 MG/ML
5 INJECTION, SOLUTION INTRAVENOUS
Status: DISCONTINUED | OUTPATIENT
Start: 2020-11-29 | End: 2020-11-29

## 2020-11-29 RX ORDER — METOPROLOL TARTRATE 1 MG/ML
2.5 INJECTION, SOLUTION INTRAVENOUS
Status: DISCONTINUED | OUTPATIENT
Start: 2020-11-29 | End: 2020-11-29

## 2020-11-29 RX ADMIN — HEPARIN SODIUM 5000 UNITS: 5000 INJECTION, SOLUTION INTRAVENOUS; SUBCUTANEOUS at 00:32

## 2020-11-29 RX ADMIN — AMPICILLIN SODIUM 1 G: 1 INJECTION, POWDER, FOR SOLUTION INTRAMUSCULAR; INTRAVENOUS at 21:11

## 2020-11-29 RX ADMIN — METOPROLOL TARTRATE 5 MG: 5 INJECTION INTRAVENOUS at 20:05

## 2020-11-29 RX ADMIN — LABETALOL HYDROCHLORIDE 10 MG: 5 INJECTION, SOLUTION INTRAVENOUS at 00:32

## 2020-11-29 RX ADMIN — FOLIC ACID 1 MG: 5 INJECTION, SOLUTION INTRAMUSCULAR; INTRAVENOUS; SUBCUTANEOUS at 11:40

## 2020-11-29 RX ADMIN — AMPICILLIN SODIUM 1 G: 1 INJECTION, POWDER, FOR SOLUTION INTRAMUSCULAR; INTRAVENOUS at 14:20

## 2020-11-29 RX ADMIN — LIDOCAINE HYDROCHLORIDE: 20 JELLY TOPICAL at 14:19

## 2020-11-29 RX ADMIN — THIAMINE HYDROCHLORIDE 100 MG: 100 INJECTION, SOLUTION INTRAMUSCULAR; INTRAVENOUS at 11:40

## 2020-11-29 RX ADMIN — HEPARIN SODIUM 5000 UNITS: 5000 INJECTION, SOLUTION INTRAVENOUS; SUBCUTANEOUS at 11:39

## 2020-11-29 RX ADMIN — LABETALOL HYDROCHLORIDE 10 MG: 5 INJECTION, SOLUTION INTRAVENOUS at 02:10

## 2020-11-29 RX ADMIN — AMPICILLIN SODIUM 1 G: 1 INJECTION, POWDER, FOR SOLUTION INTRAMUSCULAR; INTRAVENOUS at 07:11

## 2020-11-29 RX ADMIN — LABETALOL HYDROCHLORIDE 10 MG: 5 INJECTION, SOLUTION INTRAVENOUS at 03:11

## 2020-11-29 ASSESSMENT — ACTIVITIES OF DAILY LIVING (ADL)
ADLS_ACUITY_SCORE: 14
ADLS_ACUITY_SCORE: 20
ADLS_ACUITY_SCORE: 14

## 2020-11-29 NOTE — PROGRESS NOTES
Patient bladder scanned at 1940 with 700mLs. Straight cathed per orders. Output was 800mLs with cloudy yellow urine. Updated MD via page

## 2020-11-29 NOTE — PROGRESS NOTES
CLINICAL NUTRITION SERVICES - ASSESSMENT NOTE     Nutrition Prescription    RECOMMENDATIONS FOR MDs/PROVIDERS TO ORDER:  Continue monitoring and replacing electrolytes per protocol     Malnutrition Status:    Severe malnutrition in the context of acute on chronic illness    Recommendations already ordered by Registered Dietitian (RD):  - Nutrition Education - not appropriate at this time d/t altered mental status   - Plan to start TF when NG placed and patent   - Interprofessional communication - RD spoke with RN about tube placement     Future/Additional Recommendations:  1) Continue diet order per SLP    2) Once NG tube placed and patent, consider the following enteral regimen:   Isosource 1.5 via NGT @ goal 50 ml/hr (1200 ml/day) to provide 1800 kcals (26 kcal/kg/day), 82 g PRO (1.2 g/kg/day), 912 ml free H2O, 211 g CHO and 18 g Fiber daily.  - Start at 10 mL/hr and advance by 10 mL/hr every 8 hours until goal rate of 50 mL/hr is reached   - Do not start or advance TF rate unless Mg++ >1.5, K+ is >3, and phos >2  - Consider 150 mL free water flushes q 4 hours to meet 100% of hydration needs with FW in TF formula   - Order Certavite since pt is 100% reliance of TF to meet needs d/t NPO diet order      REASON FOR ASSESSMENT  Simone Daniels is a/an 72 year old male assessed by the dietitian for Provider Order - Registered Dietitian to Assess and Order TF per Medical Nutrition Therapy Protocol    NUTRITION HISTORY  PMH of IPH, atrial fibrillation (previously on eliquis but was not taking in the past year), anxiety, and GERD who presented with a right parietal lobe infarction with hemorrhagic transformation.  He was found disheveled and mute at the liquor store which prompted EMS being called.    Unable to collected nutrition history from pt d/t altered mental status. No prior RD note within last year.    CURRENT NUTRITION ORDERS  Diet: NPO - stroke protocol  Intake/Tolerance: NPO since admission on 11/25 (NPO x 4  Patient denies any changes in diet, medications, or health that would effect warfarin therapy.     "days)    RN reports attempted NG placement today but was unsuccessful. Plan for pt to go down to IR tomorrow for placement.     Last BM: 11/27 - large     Per SLP note from 11/26:  Clinical swallow eval completed per MD order. Pt presents with severe oropharyngeal dysphagia in setting of new CVA. Swallow function is impacted by severe cognitive-communication deficits. Pt unable to meaningfully participate in oral mech exam, occasionally seemed to follow commands but suspect this was by chance. Inconsistent yes/no, unable to use y/n communication board despite training. Pt assessed with thin liquids and puree. Significant anterior loss of majority of thin liquid bolus from left side of oral cavity. Coughing/choking with all thin liquid trials. Pt made no attempt to orally manipulate puree bolus and only swallowed when presented with another trial of thin liquids. Recommend NPO with consideration for alternative means of hydration/nutrition/med administration.    LABS  Labs reviewed  Na+: 155 (H)  K+: 3.4 (WNL), Mg++: 1.9 (WNL), Phos: 3.3 (WNL)  BUN: 32 (H), Creatinine: (WNL, previously H), GFR 65 (WNL, previously L)      MEDICATIONS  Medications reviewed  Folic acid   Thiamine   Zofran PRN   Miralax, senna-docusate - order help per provider     ANTHROPOMETRICS  Height: 167.6 cm (5' 5.984\")  Most Recent Weight: 68.5 kg (151 lb 0.2 oz)    IBW: 64.5 kg (106%)  BMI: 24.4 kg/m2 Normal BMI  Weight History: Limited wt history between Chart Review and Care Everywhere. Over the last 11.5 months, Simone has lost 29# (16%).   Wt Readings from Last 10 Encounters:   11/27/20 68.5 kg (151 lb 0.2 oz)   12/16/19 81.6 kg (180 lb)     Dosing Weight: 69 kg (actual)     ASSESSED NUTRITION NEEDS  Estimated Energy Needs: 3395-4589+ kcals/day (25 - 30+ kcals/kg)  Justification: Maintenance/repletion   Estimated Protein Needs: 55-69+ grams protein/day (0.8 - 1+ grams of pro/kg)  Justification: Maintenance  Estimated Fluid Needs: 4329-4900 " mL/day (25 - 30 mL/kg)   Justification: Maintenance or per team     PHYSICAL FINDINGS  See malnutrition section below.    MALNUTRITION  % Intake: </= 50% for >/= 5 days (severe)  % Weight Loss: Up to 20% in 1 year (non-severe)  Subcutaneous Fat Loss: Facial region:  mild  Muscle Loss: Temporal:  Mild/moderate, Facial & jaw region:  Mild/moderate, Thoracic region (clavicle, acromium bone, deltoid, trapezius, pectoral):  Mild/moderate, Upper arm (bicep, tricep):  Mild/moderate, Lower arm  (forearm):  Mild/moderate and Patellar region:  Mild/moderate  Fluid Accumulation/Edema: None noted  Malnutrition Diagnosis: Severe malnutrition in the context of acute on chronic illness    NUTRITION DIAGNOSIS  Inadequate oral intake related to failed swallow test as evidenced by NPO Diet per SLP x4 days with no nutrition support.       INTERVENTIONS  Implementation  See Nutrition Prescription box above for details.     Goals  Diet adv v nutrition support within 2-3 days.     Monitoring/Evaluation  Progress toward goals will be monitored and evaluated per protocol.      Iesha Weiner RD, LD  Weekend pager [phone]

## 2020-11-29 NOTE — SIGNIFICANT EVENT
Significant Event Note    Time of event: 5:16 PM November 29, 2020    Description of event:  Paged by nurse that HR in 130-150s. Patient has hx a fib not on anticoagulation. Tele looks like a fib/flutter with runs of sinus tach throughout. Nurses noticed this tends to happen when he uses the bathroom.    Plan:  -5mg IV metoprolol x3 for HR >120    Discussed with: bedside nurse and supervising physician, Dr. Carlee Medina MD

## 2020-11-29 NOTE — PROGRESS NOTES
[facility]     Stroke Progress Note    Interval Events  - No acute events overnight  - Preliminary EEG results neg for seizure or epileptiform activity; generalized delta-theta slowing w/ ongoing mixture of faster alpha-beta activity bilaterally w/ focal L frontocentral delta slowing and / relative amplitude attenuation of biposterior activities  - Enterococcus growing in urine: started Ampicillin  - Failed swallow today, will attempt NG and place Nutrition consult    Impression  Ischemic Stroke due to cardioembolism    Plan  Simone Daniels is a 72yr old male with PMHx Afib not on anticoagulation, EtOH abuse, anxiety, GERD, and hx L parietal stroke w/ hemorrhagic conversion (2018) who presented to [facility] via EMS on 11/25/2020 after appearing disheveled, mute, and with abnormal 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 On arrival to the ED, pt found to have a new subacute R parietal infarct w/ overlying petechial hemorrhage and was subsequently transferred to [facility] for further cares    #Subacute R Parietal Lobe Infarct w/ hemorrhagic conversion likely 2/2 cardioembolic source in the setting of known Afib not on anticoagulation  #Afib  Pt had been placed on Eliquis in 2018 for prior L parietal stroke, but was inconsistent in taking it as prescribed and then ultimately discontinued it in 2019 after recurrent nosebleeds  - Q4h neurochecks  - SBP goal > 160  - Avoid hypotonic IVF  - ASA 81mg QD when enteral access achieved  - Plan to restart Eliquis 1wk from bleed (12/1)  - SQ Heparin Q8h  - No indication for statin (LDL 69)  - Lipid Panel (LDL 69), HgbA1c (5.6)  - TTE (11/25): LV/RV size/function normal w/ EF 55-60%; Grade III diastolic dysfunction w/ severe LA enlargement and mild RA enlargement; intact atrial septum; mild mitral annular calcification; aortic sclerosis w/o stenosis  - MRI Stroke Protocol: pt unable to tolerate  - Telemetry  - PT/OT/SLP  -  Stroke Education  - Stroke Class per [facility] ([facility])  - Depression Screen  - Apnea Screen  - Euthermia, Euglycemia    #EtOH Abuse  - Discontinue CIWA  - Thiamine, Folate    #GREGORIA  #Urinary Retention  #Enterococcus UTI  Baseline Cr < 1, presented w/ Cr 1.3 likely . Pt has a hx of urinary retention (hx tamsulosin but not a current med) but is exhibiting marked amounts here which could be worsening pt's GREGORIA. Lack of PO intake and Enterococcus UTI may also be contributing  - Plasmalyte 75ml/hr  - IV Ampicillin 1g Q8h     Diet: NPO; will attempt NG  VTE PPx: SQ Heparin Q8h  Dispo: pending PT/OT/SLP  Code Status: FULL      The patient was discussed with Stroke Fellow, Dr. Bejarano.  The Stroke Staff is Dr. Shalonda Lewis.    Diann Leon MD  Neurology Resident, PGY1  Pager: [phone]  ______________________________________________________    Medications   Home Meds  Prior to Admission medications    Medication Sig Start Date End Date Taking? Authorizing Provider   CRANBERRY JUICE EXTRACT PO Take 40,000 Units by mouth daily    Reported, Patient   Garlic 1000 MG CAPS Take 1 capsule by mouth daily    Reported, Patient   lisinopril (PRINIVIL/ZESTRIL) 5 MG tablet Take 1 tablet (5 mg) by mouth daily For blood pressure control 1/18/19   Sophia Dacosta APRN CNP   NONFORMULARY Beet Root Juice    Reported, Patient   senna-docusate (SENOKOT-S/PERICOLACE) 8.6-50 MG tablet Take 1 tablet by mouth 2 times daily For constipation 12/11/18   Annabella Santos MD   tamsulosin (FLOMAX) 0.4 MG capsule Take 1 capsule (0.4 mg) by mouth daily 6/5/19   Missael Carvalho MD       Scheduled Meds    ampicillin  1 g Intravenous Q8H YOBANY     [Held by provider] aspirin  81 mg Oral Daily     folic acid  1 mg Intravenous Daily     heparin ANTICOAGULANT  5,000 Units Subcutaneous Q8H     lidocaine   Urethral Once     [Held by provider] polyethylene glycol  17 g Oral or Feeding Tube Daily     [Held by provider] senna-docusate  1-2 tablet  Oral or Feeding Tube BID     sodium chloride (PF)  10 mL Intravenous Once     sodium chloride bacteriostatic  10 mL Intravenous Once     thiamine  100 mg Intravenous Daily       Infusion Meds    Plasma-Lyte A 75 mL/hr (11/28/20 2134)       PRN Meds  acetaminophen **OR** acetaminophen **OR** acetaminophen, bisacodyl, labetalol, LORazepam, ondansetron **OR** ondansetron       PHYSICAL EXAMINATION  Temp:  [97.3  F (36.3  C)-98.7  F (37.1  C)] 97.8  F (36.6  C)  Pulse:  [] 83  Resp:  [16-18] 18  BP: (108-171)/() 124/80  SpO2:  [96 %-100 %] 97 %     General:  patient lying in bed without any acute distress    HEENT:  normocephalic/atraumatic  Cardio:  RRR  Pulmonary:  no respiratory distress  Abdomen:  soft, non-tender, non-distended  Extremities:  no edema  Skin:  intact, warm/dry     Neurologic  Mental Status:  Awake, alert, minimally verbal (grunts, soft moans), will occasionally shake head yes/no to questions but unclear if actual answering or not, the manish of his actions appear to be mimicry, pt is severely globally aphasic  Cranial Nerves:  visual fields intact, PERRL, EOMI with normal smooth pursuit, facial movements symmetric  Motor:  normal muscle tone and bulk, no abnormal movements, able to move all limbs spontaneously, no pronator drift  Reflexes:  toes down-going  Sensory:  deferred  Coordination:  unable to assess 2/2 aphasia  Station/Gait:  deferred     Stroke Scales    NIHSS  Interval transfer (11/25/20 1947)   Interval Comments     1a. Level of Consciousness 0-->Alert, keenly responsive   1b. LOC Questions 2-->Answers neither question correctly   1c. LOC Commands 1-->Performs one task correctly   2.   Best Gaze 0-->Normal   3.   Visual 0-->No visual loss   4.   Facial Palsy 0-->Normal symmetrical movements   5a. Motor Arm, Left 0-->No drift, limb holds 90 (or 45) degrees for full 10 secs   5b. Motor Arm, Right 0-->No drift, limb holds 90 (or 45) degrees for full 10 secs   6a. Motor Leg, Left  0-->No drift, leg holds 30 degree position for full 5 secs   6b. Motor Leg, right 0-->No drift, leg holds 30 degree position for full 5 secs   7.   Limb Ataxia 0-->Absent   8.   Sensory 0-->Normal, no sensory loss   9.   Best Language 3-->Mute, global aphasia, no usable speech or auditory comprehension   10. Dysarthria 2-->Severe dysarthria, patients speech is so slurred as to be unintelligible in the absence of or out of proportion to any dysphasia, or is mute/anarthric   11. Extinction and Inattention  0-->No abnormality   Total 8 (11/25/20 1947)       Imaging  I personally reviewed all imaging; relevant findings per HPI.     Lab Results Data   CBC  Recent Labs   Lab 11/29/20  0759 11/28/20  0749 11/27/20  0509   WBC 11.2* 11.1* 12.0*   RBC 4.52 4.51 4.50   HGB 13.5 13.5 13.4   HCT 43.5 43.9 42.7    238 249     Basic Metabolic Panel    Recent Labs   Lab 11/29/20  0759 11/28/20  0749 11/27/20  0904 11/27/20  0509   * 151* 148* 150*   POTASSIUM 3.4 3.8  --  3.6  3.7   CHLORIDE 122* 119*  --  116*   CO2 27 24  --  27   BUN 32* 33*  --  25   CR 1.11 1.22  --  1.32*   * 108*  --  111*   SANDRA 9.1 9.2  --  9.0     Liver Panel  Recent Labs   Lab 11/25/20  1142   PROTTOTAL 7.5   ALBUMIN 3.5   BILITOTAL 1.2   ALKPHOS 76   AST 51*   ALT 24     INR    Recent Labs   Lab Test 11/25/20  1142 11/27/18  0232   INR 1.23* 1.08      Lipid Profile    Recent Labs   Lab Test 11/25/20 2020 11/27/18  0701 12/16/15  0857   CHOL 133 147 159   HDL 55 57 76   LDL 69 78 73   TRIG 46 58 52     A1C    Recent Labs   Lab Test 11/25/20 2020 11/27/18  0701   A1C 5.6 5.5     Troponin I  No results for input(s): TROPI in the last 168 hours.

## 2020-11-29 NOTE — PROGRESS NOTES
INITIAL REPORT of Video-EEG Monitoring              DATE OF RECORDIN2020         I reviewed the ongoing video-EEG monitoring of Simone Daniels.         The EEG during waking and sleep was abnormal due to generalized delta-theta slowing in waking, with ongoing intermixture of faster alpha-beta activities bilaterally, with focal left frontocentral delta slowing, and with relative amplitude attenuation of biposterior (parietal, occipital and posterior temporal) activities.  No interictal epileptiform abnormalities and no electrographic seizures were observed.         These abnormalities indicate mild-moderate electrographic encephalopathy.  This recording excludes non-convulsive status epilepticus as a possible cause of this encephalopathy.    Merritt Londono M.D.

## 2020-11-29 NOTE — PLAN OF CARE
Status: Pt history of atrial fibrillation not on anticoagulation (discontinued apixaban 6/2019), anxiety, GERD, and left parietal stroke with hemorrhagic conversion in 2018 who presented to [facility] for evaluation of aphasia. He was reportedly found behaving abnormally at a liquor store. Last known well is unclear. Head CT showed new area of subacute infarct in right parietal lobe with overlying petechial hemorrhage.  Vitals: HTN at times, no meds needed. VSS on RA. Tachy (at one time up to 160 but quickly came back down to low 100s, MD notified).   Neuros: Inconsistently follows commands. Mutism. Slight L facial droop. Restless at times. Sitter at bedside. Pt was able to groan when trying to answer questions today, slight improvement from yesterday. VEEG in place.   IV: PIV infusing Plasmalyte at 75ml/hr between abx.   Resp: WDL  Diet: Strict NPO, no exceptions. Failed swallow with SLP again today. NG attempted to be placed bedside by float float, unable to do on either side of nose. Will need NG placed in IR, MD aware.   Bowel status: No BM, BS +.   : Straight cath'ed x2, MD notified of bleeding from urethra during cath. Urology consult placed. Urology MD Dougherty instructed RN to place 16Fr coude (with balloon, indwelling) with 10cc water in balloon. Flores placed without bleeding, urine return present. Urine with mucous, slight pink color, MD aware. Cont to monitor.   Skin: Scab on forehead/hairline.   Pain: No non-verbal indicators.   Activity: OOB with OT today, walked in halls. Really enjoyed walking, see OT recs in flowsheets. Sitter at bedside for line pulling/impulsivity, mitts on d/t line pulling. Turn/repo Q2 when sleeping.   Plan: Cont to monitor.

## 2020-11-29 NOTE — PLAN OF CARE
Status: Admitted for evaluation of aphasia. Head CT showed R parietal lobe infarct with hemorrhagic transformation. Hx of jerica Busby, stopped taking apixaban 6/2019.   Vitals: HTN, PRN Labetolol x 3. Hx of JERICA Busby, on CCM. On 1 L NC overnight. All other VSS.   Neuros: Difficult to assess neuros, inconsistently follows few commands. Severe aphasia. Slight L facial droop. 1:1 sitter.  IV: PIV infusing Plasmalyte at 75mL/hr. One PIV SL. LUE infiltration (non-vesicant), 1+ edema, cold compress applied.   Resp/trach: LS clear  Diet: NPO  Bowel status: No BM this shift, BS present.  : Intermittent incontinence, frequency. Bladder scan for < 556 ml, straight cath at 0500 for 800 ml.   Skin: Scab on upper forehead. EEG leads in place.   Pain: No non-verbal statements of pain overnight.   Activity: No OOB this shift, per report, A2 w/ GB. Mitts in place for pulling at lines.   Plan: Continue to monitor and follow POC.

## 2020-11-29 NOTE — PLAN OF CARE
"Status: Admitted for evaluation of aphasia. Head CT showed R parietal lobe infarct with hemorrhagic transformation  Vitals: HTN. VSS on 1.5L NC  Neuros: Inconsistently follows commands. Mute. Slight L facial droop. Can be restless and impulsive. Sitter at bedside  IV: 2 PIVs in place. 1 PIV infusing Plasmalyte @75mL/hr  Resp/trach: WDL  Diet: Strict NPO  Bowel status: No BM this shift. BS+  : Intermittent incontinence and frequency. Also having retention. Bladder scanned this shift 700mLs, straight cathed x1 with 800mL output. Cloudy urine  Skin: Scab on forehead  Pain: When asked if having any pain, patient shook head \"no\"  Activity: Not OOB this shift. Able to reposition independently  Plan: EEG in place. Continue to monitor and follow POC    "

## 2020-11-30 ENCOUNTER — APPOINTMENT (OUTPATIENT)
Dept: GENERAL RADIOLOGY | Facility: CLINIC | Age: 75
DRG: 064 | End: 2020-11-30
Attending: STUDENT IN AN ORGANIZED HEALTH CARE EDUCATION/TRAINING PROGRAM
Payer: MEDICARE

## 2020-11-30 ENCOUNTER — APPOINTMENT (OUTPATIENT)
Dept: PHYSICAL THERAPY | Facility: CLINIC | Age: 75
DRG: 064 | End: 2020-11-30
Attending: PSYCHIATRY & NEUROLOGY
Payer: MEDICARE

## 2020-11-30 ENCOUNTER — APPOINTMENT (OUTPATIENT)
Dept: OCCUPATIONAL THERAPY | Facility: CLINIC | Age: 75
DRG: 064 | End: 2020-11-30
Attending: PSYCHIATRY & NEUROLOGY
Payer: MEDICARE

## 2020-11-30 ENCOUNTER — APPOINTMENT (OUTPATIENT)
Dept: SPEECH THERAPY | Facility: CLINIC | Age: 75
DRG: 064 | End: 2020-11-30
Attending: PSYCHIATRY & NEUROLOGY
Payer: MEDICARE

## 2020-11-30 ENCOUNTER — APPOINTMENT (OUTPATIENT)
Dept: NEUROLOGY | Facility: CLINIC | Age: 75
DRG: 064 | End: 2020-11-30
Attending: STUDENT IN AN ORGANIZED HEALTH CARE EDUCATION/TRAINING PROGRAM
Payer: MEDICARE

## 2020-11-30 LAB
ANION GAP SERPL CALCULATED.3IONS-SCNC: 6 MMOL/L (ref 3–14)
BUN SERPL-MCNC: 31 MG/DL (ref 7–30)
CALCIUM SERPL-MCNC: 8.9 MG/DL (ref 8.5–10.1)
CHLORIDE SERPL-SCNC: 122 MMOL/L (ref 94–109)
CO2 SERPL-SCNC: 28 MMOL/L (ref 20–32)
CREAT SERPL-MCNC: 1.03 MG/DL (ref 0.66–1.25)
ERYTHROCYTE [DISTWIDTH] IN BLOOD BY AUTOMATED COUNT: 14.2 % (ref 10–15)
GFR SERPL CREATININE-BSD FRML MDRD: 71 ML/MIN/{1.73_M2}
GLUCOSE BLDC GLUCOMTR-MCNC: 101 MG/DL (ref 70–99)
GLUCOSE BLDC GLUCOMTR-MCNC: 105 MG/DL (ref 70–99)
GLUCOSE BLDC GLUCOMTR-MCNC: 93 MG/DL (ref 70–99)
GLUCOSE SERPL-MCNC: 110 MG/DL (ref 70–99)
HCT VFR BLD AUTO: 49.9 % (ref 40–53)
HGB BLD-MCNC: 15.6 G/DL (ref 13.3–17.7)
LACTATE BLD-SCNC: 1.6 MMOL/L (ref 0.7–2)
MAGNESIUM SERPL-MCNC: 2.4 MG/DL (ref 1.6–2.3)
MCH RBC QN AUTO: 30.1 PG (ref 26.5–33)
MCHC RBC AUTO-ENTMCNC: 31.3 G/DL (ref 31.5–36.5)
MCV RBC AUTO: 96 FL (ref 78–100)
PHOSPHATE SERPL-MCNC: 3.6 MG/DL (ref 2.5–4.5)
PLATELET # BLD AUTO: 250 10E9/L (ref 150–450)
POTASSIUM SERPL-SCNC: 3.8 MMOL/L (ref 3.4–5.3)
POTASSIUM SERPL-SCNC: 4 MMOL/L (ref 3.4–5.3)
PROCALCITONIN SERPL-MCNC: <0.05 NG/ML
RBC # BLD AUTO: 5.19 10E12/L (ref 4.4–5.9)
SODIUM SERPL-SCNC: 156 MMOL/L (ref 133–144)
WBC # BLD AUTO: 13.4 10E9/L (ref 4–11)

## 2020-11-30 PROCEDURE — 250N000011 HC RX IP 250 OP 636: Performed by: PSYCHIATRY & NEUROLOGY

## 2020-11-30 PROCEDURE — 83735 ASSAY OF MAGNESIUM: CPT | Performed by: STUDENT IN AN ORGANIZED HEALTH CARE EDUCATION/TRAINING PROGRAM

## 2020-11-30 PROCEDURE — 83605 ASSAY OF LACTIC ACID: CPT | Performed by: PSYCHIATRY & NEUROLOGY

## 2020-11-30 PROCEDURE — 999N001017 HC STATISTIC GLUCOSE BY METER IP

## 2020-11-30 PROCEDURE — 999N000128 HC STATISTIC PERIPHERAL IV START W/O US GUIDANCE

## 2020-11-30 PROCEDURE — 0DH97UZ INSERTION OF FEEDING DEVICE INTO DUODENUM, VIA NATURAL OR ARTIFICIAL OPENING: ICD-10-PCS | Performed by: RADIOLOGY

## 2020-11-30 PROCEDURE — 120N000002 HC R&B MED SURG/OB UMMC

## 2020-11-30 PROCEDURE — 97161 PT EVAL LOW COMPLEX 20 MIN: CPT | Mod: GP

## 2020-11-30 PROCEDURE — 250N000013 HC RX MED GY IP 250 OP 250 PS 637: Performed by: STUDENT IN AN ORGANIZED HEALTH CARE EDUCATION/TRAINING PROGRAM

## 2020-11-30 PROCEDURE — 44500 INTRO GASTROINTESTINAL TUBE: CPT

## 2020-11-30 PROCEDURE — 85027 COMPLETE CBC AUTOMATED: CPT | Performed by: STUDENT IN AN ORGANIZED HEALTH CARE EDUCATION/TRAINING PROGRAM

## 2020-11-30 PROCEDURE — 999N000007 HC SITE CHECK

## 2020-11-30 PROCEDURE — 97535 SELF CARE MNGMENT TRAINING: CPT | Mod: GO | Performed by: OCCUPATIONAL THERAPIST

## 2020-11-30 PROCEDURE — 250N000009 HC RX 250: Performed by: STUDENT IN AN ORGANIZED HEALTH CARE EDUCATION/TRAINING PROGRAM

## 2020-11-30 PROCEDURE — 272N000078 HC NUTRITION PRODUCT INTERMEDIATE LITER

## 2020-11-30 PROCEDURE — 99232 SBSQ HOSP IP/OBS MODERATE 35: CPT | Mod: 25 | Performed by: PSYCHIATRY & NEUROLOGY

## 2020-11-30 PROCEDURE — 250N000009 HC RX 250: Performed by: NURSE PRACTITIONER

## 2020-11-30 PROCEDURE — 95711 VEEG 2-12 HR UNMONITORED: CPT

## 2020-11-30 PROCEDURE — 97116 GAIT TRAINING THERAPY: CPT | Mod: GP

## 2020-11-30 PROCEDURE — 36415 COLL VENOUS BLD VENIPUNCTURE: CPT | Performed by: PSYCHIATRY & NEUROLOGY

## 2020-11-30 PROCEDURE — 84100 ASSAY OF PHOSPHORUS: CPT | Performed by: STUDENT IN AN ORGANIZED HEALTH CARE EDUCATION/TRAINING PROGRAM

## 2020-11-30 PROCEDURE — 80048 BASIC METABOLIC PNL TOTAL CA: CPT | Performed by: STUDENT IN AN ORGANIZED HEALTH CARE EDUCATION/TRAINING PROGRAM

## 2020-11-30 PROCEDURE — 250N000011 HC RX IP 250 OP 636: Performed by: NURSE PRACTITIONER

## 2020-11-30 PROCEDURE — 84132 ASSAY OF SERUM POTASSIUM: CPT | Performed by: PSYCHIATRY & NEUROLOGY

## 2020-11-30 PROCEDURE — 92526 ORAL FUNCTION THERAPY: CPT | Mod: GN

## 2020-11-30 PROCEDURE — 36415 COLL VENOUS BLD VENIPUNCTURE: CPT | Performed by: STUDENT IN AN ORGANIZED HEALTH CARE EDUCATION/TRAINING PROGRAM

## 2020-11-30 PROCEDURE — 97530 THERAPEUTIC ACTIVITIES: CPT | Mod: GP

## 2020-11-30 PROCEDURE — 250N000011 HC RX IP 250 OP 636: Performed by: STUDENT IN AN ORGANIZED HEALTH CARE EDUCATION/TRAINING PROGRAM

## 2020-11-30 PROCEDURE — 74340 X-RAY GUIDE FOR GI TUBE: CPT | Mod: 26 | Performed by: RADIOLOGY

## 2020-11-30 PROCEDURE — 95718 EEG PHYS/QHP 2-12 HR W/VEEG: CPT | Mod: GC | Performed by: PSYCHIATRY & NEUROLOGY

## 2020-11-30 PROCEDURE — 250N000009 HC RX 250: Performed by: RADIOLOGY

## 2020-11-30 PROCEDURE — 97530 THERAPEUTIC ACTIVITIES: CPT | Mod: GO | Performed by: OCCUPATIONAL THERAPIST

## 2020-11-30 RX ORDER — AMOXICILLIN 250 MG
1 CAPSULE ORAL
Status: DISCONTINUED | OUTPATIENT
Start: 2020-11-30 | End: 2020-12-05

## 2020-11-30 RX ORDER — LIDOCAINE HYDROCHLORIDE 20 MG/ML
5 SOLUTION OROPHARYNGEAL ONCE
Status: COMPLETED | OUTPATIENT
Start: 2020-11-30 | End: 2020-11-30

## 2020-11-30 RX ORDER — ASPIRIN 81 MG/1
81 TABLET, CHEWABLE ORAL DAILY
Status: DISCONTINUED | OUTPATIENT
Start: 2020-11-30 | End: 2020-12-04

## 2020-11-30 RX ORDER — MAGNESIUM SULFATE HEPTAHYDRATE 40 MG/ML
2 INJECTION, SOLUTION INTRAVENOUS ONCE
Status: COMPLETED | OUTPATIENT
Start: 2020-11-30 | End: 2020-11-30

## 2020-11-30 RX ORDER — DEXTROSE MONOHYDRATE 100 MG/ML
INJECTION, SOLUTION INTRAVENOUS CONTINUOUS PRN
Status: DISCONTINUED | OUTPATIENT
Start: 2020-11-30 | End: 2020-12-30 | Stop reason: HOSPADM

## 2020-11-30 RX ORDER — POLYETHYLENE GLYCOL 3350 17 G/17G
17 POWDER, FOR SOLUTION ORAL DAILY PRN
Status: DISCONTINUED | OUTPATIENT
Start: 2020-11-30 | End: 2020-12-05

## 2020-11-30 RX ORDER — POTASSIUM CHLORIDE 7.45 MG/ML
10 INJECTION INTRAVENOUS
Status: COMPLETED | OUTPATIENT
Start: 2020-11-30 | End: 2020-11-30

## 2020-11-30 RX ADMIN — POTASSIUM CHLORIDE 10 MEQ: 7.46 INJECTION, SOLUTION INTRAVENOUS at 06:37

## 2020-11-30 RX ADMIN — AMPICILLIN SODIUM 1 G: 1 INJECTION, POWDER, FOR SOLUTION INTRAMUSCULAR; INTRAVENOUS at 17:13

## 2020-11-30 RX ADMIN — LABETALOL HYDROCHLORIDE 10 MG: 5 INJECTION, SOLUTION INTRAVENOUS at 03:56

## 2020-11-30 RX ADMIN — LIDOCAINE HYDROCHLORIDE 3 ML: 20 SOLUTION ORAL; TOPICAL at 15:06

## 2020-11-30 RX ADMIN — MAGNESIUM SULFATE IN WATER 2 G: 40 INJECTION, SOLUTION INTRAVENOUS at 01:49

## 2020-11-30 RX ADMIN — AMPICILLIN SODIUM 1 G: 1 INJECTION, POWDER, FOR SOLUTION INTRAMUSCULAR; INTRAVENOUS at 02:59

## 2020-11-30 RX ADMIN — FOLIC ACID 1 MG: 5 INJECTION, SOLUTION INTRAMUSCULAR; INTRAVENOUS; SUBCUTANEOUS at 08:17

## 2020-11-30 RX ADMIN — SODIUM CHLORIDE, SODIUM GLUCONATE, SODIUM ACETATE, POTASSIUM CHLORIDE AND MAGNESIUM CHLORIDE 75 ML/HR: 526; 502; 368; 37; 30 INJECTION, SOLUTION INTRAVENOUS at 21:46

## 2020-11-30 RX ADMIN — ASPIRIN 81 MG CHEWABLE TABLET 81 MG: 81 TABLET CHEWABLE at 17:13

## 2020-11-30 RX ADMIN — AMPICILLIN SODIUM 1 G: 1 INJECTION, POWDER, FOR SOLUTION INTRAMUSCULAR; INTRAVENOUS at 23:18

## 2020-11-30 RX ADMIN — AMPICILLIN SODIUM 1 G: 1 INJECTION, POWDER, FOR SOLUTION INTRAMUSCULAR; INTRAVENOUS at 08:25

## 2020-11-30 RX ADMIN — THIAMINE HYDROCHLORIDE 100 MG: 100 INJECTION, SOLUTION INTRAMUSCULAR; INTRAVENOUS at 08:19

## 2020-11-30 RX ADMIN — POTASSIUM CHLORIDE 10 MEQ: 7.46 INJECTION, SOLUTION INTRAVENOUS at 05:32

## 2020-11-30 RX ADMIN — POTASSIUM CHLORIDE 10 MEQ: 7.46 INJECTION, SOLUTION INTRAVENOUS at 04:29

## 2020-11-30 RX ADMIN — POTASSIUM CHLORIDE 10 MEQ: 7.46 INJECTION, SOLUTION INTRAVENOUS at 03:03

## 2020-11-30 RX ADMIN — HEPARIN SODIUM 5000 UNITS: 5000 INJECTION, SOLUTION INTRAVENOUS; SUBCUTANEOUS at 20:08

## 2020-11-30 ASSESSMENT — ACTIVITIES OF DAILY LIVING (ADL)
ADLS_ACUITY_SCORE: 12
ADLS_ACUITY_SCORE: 14
ADLS_ACUITY_SCORE: 12
ADLS_ACUITY_SCORE: 12
ADLS_ACUITY_SCORE: 10.5
ADLS_ACUITY_SCORE: 11.5

## 2020-11-30 ASSESSMENT — VISUAL ACUITY
OU: NOT TESTABLE

## 2020-11-30 NOTE — PROGRESS NOTES
Brief urology note:    Pt is a 72 y.o. man with a history of A-fib (not on AC), stroke in 2018 and now admitted for another stroke. He now has NGB and has required straight catheterization per protocol. Urology contacted regarding traumatic straight catheterization earlier today concerning for false pass. I recommended placing an indwelling coude catheter with copious lubrication and this was placed without difficulty. Urine was initially clear/faint pink but this afternoon became bloodier. Patient was on SQH and this has since been held. Catheter continues to drain well with no concerns for obstruction; PVR this evening is 17 mL.      - Recommend continuing indwelling catheter for at least 5 days to allow the urethra to heal from traumatic catheterization.   - Please involve urology if initial hematuria does not clear or if it worsens  - Patient may ultimately benefit from outpatient urology care if continuing to retain. Urology can help with care coordination after catheter removal if ongoing retention following catheter removal    Justin Romano MD  Urology PGY3

## 2020-11-30 NOTE — PROGRESS NOTES
[facility]     Stroke Progress Note    Interval Events  - Had short run of Afib/flutter w/ -150. Received IV Metoprolol 5mg + 40mEq K + 2g Mg with improvement  - Flores in place today for traumatic straight cath yesterday, output remains bloody but w/o clots  - Failed NG placement yesterday, plan for IR placement today    Impression  Ischemic Stroke due to cardioembolism    Plan  Simone Daniels is a 72yr old male with PMHx Afib not on anticoagulation, EtOH abuse, anxiety, GERD, and hx L parietal stroke w/ hemorrhagic conversion (2018) who presented to [facility] via EMS on 11/25/2020 after appearing disheveled, mute, and with abnormal 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 On arrival to the ED, pt found to have a new subacute R parietal infarct w/ overlying petechial hemorrhage and was subsequently transferred to [facility] for further cares    #Subacute R Parietal Lobe Infarct w/ hemorrhagic conversion likely 2/2 cardioembolic source in the setting of known Afib not on anticoagulation  #Afib  Pt had been placed on Eliquis in 2018 for prior L parietal stroke, but was inconsistent in taking it as prescribed and then ultimately discontinued it in 2019 after recurrent nosebleeds  - Q4h neurochecks  - SBP goal > 160  - Avoid hypotonic IVF  - ASA 81mg QD when enteral access achieved  - IV Metoprolol 5mg Q15min prn for Afib w/ HR > 120  - Plan to start IV Heparin on post-bleed day 10 (12/3)  - SQ Heparin Q8h  - No indication for statin (LDL 69)  - Lipid Panel (LDL 69), HgbA1c (5.6)  - TTE (11/25): LV/RV size/function normal w/ EF 55-60%; Grade III diastolic dysfunction w/ severe LA enlargement and mild RA enlargement; intact atrial septum; mild mitral annular calcification; aortic sclerosis w/o stenosis  - MRI Stroke Protocol: can consider in the future if pt calms; previously did not tolerate  - EEG: generalized slowing suggestive of mild-mod encephalopathy w/  maximum cortical dysfunction in the R hemisphere and w/o sign of seizure or epileptiform activity  - Telemetry  - PT/OT/SLP  - Stroke Education  - Stroke Class per [facility] ([facility])  - Depression Screen  - Apnea Screen  - Euthermia, Euglycemia  - Consider Cardiology referral upon discharge for diastolic dysfunction seen on TTE    #EtOH Abuse  - Discontinue CIWA  - Thiamine, Folate    #GREGORIA  #Urinary Retention  #Traumatic Straight Cath   #Enterococcus UTI  Baseline Cr < 1, presented w/ Cr 1.3 likely . Pt has a hx of urinary retention (hx tamsulosin but not a current med) but is exhibiting marked amounts here which could be worsening pt's GREGORIA. Lack of PO intake and Enterococcus UTI may also be contributing. On 11/29, pt also suffered a traumatic straight cath. Curbside to Urology recommended placement of Flores for 5d to allow healing  - Plasmalyte 75ml/hr  - IV Ampicillin 1g Q8h for 7d (ends 12/2)  - Flores for 5d (ends 12/3)  - Consider Urology referral upon discharge     # Hypernatremia  # Hyperchloremia  Pt has exhibited elevated Na and Cl, rising since time of admission. Likely 2/2 NPO status since arrival due to multiple failed swallow attempts. Will monitor for refeeding syndrome once NJ placed and TF initiated  - IR to place NJ today  - Nutrition consult; will start tube feeds once enteral access obtained  - 30ml Q4h free water    Diet: NPO; start TF per nutrition once NJ placed  VTE PPx: SQ Heparin Q8h  Dispo: pending PT/OT/SLP eval  Code Status: FULL      The patient was discussed with Stroke Fellow, Dr. Bejarano.  The Stroke Staff is Dr. Bass.    Diann Leon MD  Neurology Resident, PGY1  Pager: [phone]  ______________________________________________________    Medications   Home Meds  Prior to Admission medications    Medication Sig Start Date End Date Taking? Authorizing Provider   CRANBERRY JUICE EXTRACT PO Take 40,000 Units by mouth daily    Reported, Patient   Garlic 1000 MG CAPS Take 1  capsule by mouth daily    Reported, Patient   lisinopril (PRINIVIL/ZESTRIL) 5 MG tablet Take 1 tablet (5 mg) by mouth daily For blood pressure control 1/18/19   Sophia Dacosta APRN CNP   NONFORMULARY Beet Root Juice    Reported, Patient   senna-docusate (SENOKOT-S/PERICOLACE) 8.6-50 MG tablet Take 1 tablet by mouth 2 times daily For constipation 12/11/18   Annabella Santos MD   tamsulosin (FLOMAX) 0.4 MG capsule Take 1 capsule (0.4 mg) by mouth daily 6/5/19   Missael Carvalho MD       Scheduled Meds    ampicillin  1 g Intravenous Q6H     aspirin  81 mg Per Feeding Tube Daily     folic acid  1 mg Intravenous Daily     heparin ANTICOAGULANT  5,000 Units Subcutaneous Q8H     multivitamins w/minerals  15 mL Per Feeding Tube Daily     sodium chloride (PF)  10 mL Intravenous Once     sodium chloride bacteriostatic  10 mL Intravenous Once     thiamine  100 mg Intravenous Daily       Infusion Meds    dextrose       Plasma-Lyte A 75 mL/hr (11/28/20 2134)       PRN Meds  acetaminophen **OR** acetaminophen **OR** acetaminophen, bisacodyl, dextrose, labetalol, LORazepam, metoprolol, ondansetron **OR** ondansetron, polyethylene glycol, senna-docusate       PHYSICAL EXAMINATION  Temp:  [96.8  F (36  C)-98.3  F (36.8  C)] 97  F (36.1  C)  Pulse:  [] 98  Resp:  [16-18] 18  BP: (130-177)/() 169/87  SpO2:  [94 %-99 %] 99 %     General:  patient lying in bed without any acute distress    HEENT:  normocephalic/atraumatic  Cardio:  RRR  Pulmonary:  no respiratory distress  Abdomen:  soft, non-tender, non-distended  Extremities:  no edema  Skin:  intact, warm/dry     Neurologic  Mental Status:  Awake, alert, minimally verbal (grunts, soft moans), will occasionally shake head yes/no to questions but unclear if actual answering or not, the manish of his actions appear to be mimicry and often perseverates on a singular action, but having improved ability to follow commands today, pt remains severely aphasic  Cranial Nerves:   visual fields intact to confrontation, PERRL, EOMI with normal smooth pursuit, facial movements symmetric  Motor:  normal muscle tone and bulk, no abnormal movements, able to move all limbs spontaneously, no pronator drift  Reflexes:  toes down-going  Sensory:  deferred  Coordination:  unable to assess 2/2 aphasia  Station/Gait: walking well w/ walker and 1-assist    Stroke Scales    NIHSS  Interval transfer (11/25/20 1947)   Interval Comments     1a. Level of Consciousness 0-->Alert, keenly responsive   1b. LOC Questions 2-->Answers neither question correctly   1c. LOC Commands 1-->Performs one task correctly   2.   Best Gaze 0-->Normal   3.   Visual 0-->No visual loss   4.   Facial Palsy 0-->Normal symmetrical movements   5a. Motor Arm, Left 0-->No drift, limb holds 90 (or 45) degrees for full 10 secs   5b. Motor Arm, Right 0-->No drift, limb holds 90 (or 45) degrees for full 10 secs   6a. Motor Leg, Left 0-->No drift, leg holds 30 degree position for full 5 secs   6b. Motor Leg, right 0-->No drift, leg holds 30 degree position for full 5 secs   7.   Limb Ataxia 0-->Absent   8.   Sensory 0-->Normal, no sensory loss   9.   Best Language 3-->Mute, global aphasia, no usable speech or auditory comprehension   10. Dysarthria 2-->Severe dysarthria, patients speech is so slurred as to be unintelligible in the absence of or out of proportion to any dysphasia, or is mute/anarthric   11. Extinction and Inattention  0-->No abnormality   Total 8 (11/25/20 1947)       Imaging  I personally reviewed all imaging; relevant findings per HPI.     Lab Results Data   CBC  Recent Labs   Lab 11/30/20  0649 11/29/20 1833 11/29/20  0759   WBC 13.4* 13.5* 11.2*   RBC 5.19 4.89 4.52   HGB 15.6 14.6 13.5   HCT 49.9 46.8 43.5    255 232     Basic Metabolic Panel    Recent Labs   Lab 11/30/20  1320 11/30/20  0649 11/29/20 1833 11/29/20  0759   NA  --  156* 155* 155*   POTASSIUM 4.0 3.8 3.3* 3.4   CHLORIDE  --  122* 121* 122*   CO2   --  28 30 27   BUN  --  31* 31* 32*   CR  --  1.03 1.09 1.11   GLC  --  110* 112* 102*   SANDRA  --  8.9 9.0 9.1     Liver Panel  Recent Labs   Lab 11/25/20  1142   PROTTOTAL 7.5   ALBUMIN 3.5   BILITOTAL 1.2   ALKPHOS 76   AST 51*   ALT 24     INR    Recent Labs   Lab Test 11/25/20  1142 11/27/18  0232   INR 1.23* 1.08      Lipid Profile    Recent Labs   Lab Test 11/25/20 2020 11/27/18  0701 12/16/15  0857   CHOL 133 147 159   HDL 55 57 76   LDL 69 78 73   TRIG 46 58 52     A1C    Recent Labs   Lab Test 11/25/20 2020 11/27/18  0701   A1C 5.6 5.5     Troponin I  No results for input(s): TROPI in the last 168 hours.

## 2020-11-30 NOTE — PLAN OF CARE
HTN but within parameters. A-Fib on CCM. Neuros hard to assess. Left facial droop and inconsistently follows commands. NPO. Plan for NJ placement in xray, can do tomorrow. Flores with bright red output, good output. Had BM today. Up with assist of 1, GB and walker. Mitts removed this shift, sitter at bedside.

## 2020-11-30 NOTE — PROGRESS NOTES
CLINICAL NUTRITION SERVICES - BRIEF NOTE      Reason for RD note: Follow up on FT placement/TF start    New Findings/Chart Review:    Enteral Access: Plan for NG    Oral Diet/Intake: NPO    Labs: Na+ 156 (H), K+ 3.8 (WNL), Phos 3.6 (WNL), Mg++ 2.4 (H)     Meds: Reviewed, notable for: Thiamine and folvite    Interventions:  Ordered EN support (plan for FT placement today):  Isosource 1.5 via NGT @ goal 50 ml/hr (1200 ml/day) to provide 1800 kcals (26 kcal/kg/day), 82 g PRO (1.2 g/kg/day), 912 ml free H2O, 211 g CHO and 18 g Fiber daily.  - Start at 10 mL/hr and advance by 10 mL/hr every 8 hours until goal rate of 50 mL/hr is reached   - Do not start or advance TF rate unless Mg++ >1.5, K+ is >3, and phos >2  - Certavite 15 mL daily  - FWF 30 mL q4h    Nutrition will continue to follow per protocol.    Nava Jamil, RD, LD  Pager: [phone]

## 2020-11-30 NOTE — PROGRESS NOTES
11/30/20 1607   Quick Adds   Type of Visit Initial PT Evaluation   Living Environment   People in home alone   Current Living Arrangements house   Living Environment Comments Information retrieved from chart, pt currently non-verbal   Self-Care   Activity/Exercise/Self-Care Comment Unknown, pt unable to participate in questioning.   Disability/Function   Hearing Difficulty or Deaf no   Wear Glasses or Blind no   Concentrating, Remembering or Making Decisions Difficulty no   Difficulty Communicating no   Difficulty Eating/Swallowing no   Walking or Climbing Stairs Difficulty no   Dressing/Bathing Difficulty no   Toileting no   Doing Errands Independently Difficulty (such as shopping) no   Change in Functional Status Since Onset of Current Illness/Injury yes   General Information   Onset of Illness/Injury or Date of Surgery 11/25/20   Referring Physician Shalonda Lewis MD   Patient/Family Therapy Goals Statement (PT) unable to state   Pertinent History of Current Problem (include personal factors and/or comorbidities that impact the POC) Per chart, Simone Daniels is a 72yr old male with PMHx Afib not on anticoagulation, EtOH abuse, anxiety, GERD, and hx L parietal stroke w/ hemorrhagic conversion (2018) who presented to [facility] via EMS on 11/25/2020 after appearing disheveled, mute, and with abnormal 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 On arrival to the ED, pt found to have a new subacute R parietal infarct w/ overlying petechial hemorrhage and was subsequently transferred to [facility] for further cares   Existing Precautions/Restrictions fall   General Observations Activity orders: up with assist   Cognition   Orientation Status (Cognition) unable/difficult to assess   Affect/Mental Status (Cognition) confused;unable/difficult to assess   Follows Commands (Cognition) follows one-step commands;50-74% accuracy   Safety Deficit (Cognition) moderate deficit;ability to follow commands;awareness of  need for assistance;impulsivity;insight into deficits/self-awareness;judgment;problem-solving  (hand mitts donned for safety)   Memory Deficit (Cognition) unable/difficult to assess   Integumentary/Edema   Integumentary/Edema no deficits were identifed   Posture    Posture Forward head position;Protracted shoulders   Range of Motion (ROM)   ROM Quick Adds ROM WNL   Strength   Manual Muscle Testing Quick Adds Strength WFL   Bed Mobility   Comment (Bed Mobility) Anuj with HOB elevated   Transfers   Transfer Safety Comments CGA   Gait/Stairs (Locomotion)   Comment (Gait/Stairs) Evelina; flexed posture and decreased gait speed noted   Balance   Balance Comments CGA for static standing balance   Sensory Examination   Sensory Perception Comments Unable to assess   Clinical Impression   Criteria for Skilled Therapeutic Intervention yes, treatment indicated   PT Diagnosis (PT) Impaired functional mobility   Influenced by the following impairments Decreased strength and endurance, impaired balance   Functional limitations due to impairments Pt requires assist for safe functional mobility, is at increased fall risk   Clinical Presentation Stable/Uncomplicated   Clinical Presentation Rationale PMH and clinical judgment   Clinical Decision Making (Complexity) low complexity   Therapy Frequency (PT) 5x/week   Predicted Duration of Therapy Intervention (days/wks) 2 weeks   Planned Therapy Interventions (PT) balance training;bed mobility training;gait training;neuromuscular re-education;stair training;strengthening;transfer training   Anticipated Equipment Needs at Discharge (PT)   (TBD)   Risk & Benefits of therapy have been explained care plan/treatment goals reviewed;participants included;patient   PT Discharge Planning    PT Discharge Recommendation (DC Rec) Transitional Care Facility   PT Rationale for DC Rec Pt below proposed functional baseline and is a high fall risk, will benefit from continued skilled rehab to maximize  functional independence.   PT Brief overview of current status  Assist of 1 and gaitbelt, pt inconsistently uses FWW   Total Evaluation Time   Total Evaluation Time (Minutes) 5

## 2020-11-30 NOTE — PROGRESS NOTES
VIDEO EEG DATE: 2020  VIDEO EEG [id number]  VIDEO EEG DAY#: 2  VIDEO EEG SOURCE FILE DURATION: 24 hours 4 minutes    PATIENT INFORMATION: Simone Daniels is a 72 year old year old male who presents with encephalopathy, he was found impaired and aphasic. EEG is being done to evaluate for encephalopathy and seizure.    MEDICATIONS: see med tab in neuro works  These medications and doses were derived from the medical record at the time of this procedure.    TECHNICAL SUMMARY: EEG was recorded from 25 scalp electrodes placed according to the 10-20 international system. Additional electrodes were used for referencing, EKG, and to record from other cerebral regions as appropriate. Video was continuously recorded and was reviewed for clinical correlation. Electrodes were attached and both video and EEG were monitored and annotated by qualified EEG technologists. Video-EEG was reviewed and report generated by qualified physician.    BACKGROUND ACTIVITY:  During wakefulness, there was 7- 8 Hz  posterior dominant rhythm. There was 4-8 Hz theta slowing with occasional 2-4 delta slowing during waking, with relative amplitude attenuation. There was intermittent bilateral  fast activity in alpha-beta range.  aSymmetric sleep spindles were seen during slow wave sleep left more than right. There were no interictal epileptiform discharges.  There were left temporal wickets waves noted.     ACTIVATION PROCEDURE: STIMS.    During baseline testing patient is asked to stand up and he was able to follow commands.    INTERICTAL EPILEPTIFORM DISCHARGES: There were no interictal epileptiform discharges    ICTAL: No clinical events or electrographic seizures were recorded. Video was reviewed intermittently by EEG technologist and physician for clinical seizures.    IMPRESSION OF VIDEO EEG DAY # 2: This video electroencephalogram is abnormal due to the presences of generalized continuous slowing and These abnormalities indicate  mild-moderate electrograpgic encephalopathy. Clinical correlation is recommended. .      Shukri Katz [facility]   Neurology PGY4

## 2020-11-30 NOTE — PLAN OF CARE
Status: Admitted with new subacute parietal infarct w/ overlying petechial hemorrhage  Vitals: HTN. Chronic afib. HR at times 130-160s, especially with agitation or activity and decreases to low 100s within minutes. PRN metoprolol given by float nurse x1. Per MD metoprolol to be given if patient having sustained HR >120.   Neuros: Inconsistently follows commands. Mute. Slight L facial droop. Can be restless and impulsive  IV: PIV infusing Plasmalyte at 75mL/hr between abx  Resp/trach: LS coarse  Diet: Strict NPO, no exceptions. Per report, failed swallow with SLP again today  Bowel status: BS hypoactive  : Flores with bright red output, MDs aware. UA sent  Skin: Scab on forehead  Pain: Periodically winces and attempts to scratch groin  Activity: Has not been OOB this shift. Sitter at bedside and mitts on d/t line pulling  Plan: Plan for NG placement in IR tomorrow. Continue to monitor Flores status

## 2020-11-30 NOTE — SIGNIFICANT EVENT
Significant Event Note    Time of event: 6:24 PM November 29, 2020    Description of event:  Flores bag increasingly red with small clots in tube (non obstructing). Significant pain with urination.    Plan:  -Monitor for now, nurses to flush as needed for clots  -Leave catheter in place, likely providing tamponade  -UA, CBC  -Will consider urology consult in AM if continues/increases  -Hold subQ heparin        Discussed with: bedside nurse and supervising physician, Dr. Carlee Medina MD

## 2020-11-30 NOTE — PLAN OF CARE
Status: Admitted for evaluation of aphasia. Head CT showed R parietal lobe infarct with hemorrhagic transformation. Hx of jerica Busby, stopped taking apixaban 6/2019.   Vitals: HTN w/n parameters, PRN labetolol given x 1. Hx of JERICA Busby, on CCM. On RA overnight. All other VSS.   Neuros: Difficult to assess neuros, inconsistently follows few commands. Severe aphasia. Slight L facial droop. 1:1 sitter.  IV: PIV infusing Plasmalyte at 75mL/hr. One PIV infusing K+ replacement.   Resp/trach: LS clear  Diet: NPO  Bowel status: No BM this shift, BS present.  : Flores in place, red, bloody drainage, Urology aware.   Skin: Scab on upper forehead. EEG leads in place.   Pain: No non-verbal statements of pain overnight.   Activity: No OOB this shift, per report, A2 w/ GB. Mitts in place for pulling at lines.   Labs: Mg and K replaced overnight. Redraw in the AM.   Plan: Continue to monitor and follow POC.

## 2020-11-30 NOTE — PROGRESS NOTES
Preliminary Video EEG impression on November 29-30, 2020  Until 6am     This EEG is abnormal due to the presences of intermittent generalized polymorphic delta/theta slowing with shifting maxima in the left or right temporal region. There is a  6-7 hz symmetric parieto-occipital rhythm.     The majority of this file patient was asleep, sleep architecture is asymmetric with decreased amplitude of sleep spindles noted in the right frontocentral region.  Good examples of these are at 07: 53 and 07: 54.  The left hemisphere has well-formed sleep architecture (vertex waves, sleep spindles, POSTS, and a mixture of delta/theta slowing) was present.  The right hemisphere has a slight lower amplitude electrocerebral potentials compared to the left hemisphere.  Additionally there are sharply contoured delta activity noted in the left temporal region which are not thought to represent epileptiform discharges.    This EEG is consistent with a mild encephalopathy with maximum cortical dysfunction noted in the right hemisphere. No epileptiform discharges or electrographic seizures were seen in this record. If events of interest are noted, please press the event button and complete behavioral testing (ROAR testing) if possible. Clinical correlation is advised.     Kristal Moore MD  Staff Epilepsy Neurologist   [phone]

## 2020-11-30 NOTE — CONSULTS
IR consult for a NG tube requested.     Please contact XR department and at [phone] for NG placement. Order is XR feeding tube.     Thanks [facility] Interventional Radiology CNP ([phone]) (phone )

## 2020-12-01 ENCOUNTER — APPOINTMENT (OUTPATIENT)
Dept: OCCUPATIONAL THERAPY | Facility: CLINIC | Age: 75
DRG: 064 | End: 2020-12-01
Attending: PSYCHIATRY & NEUROLOGY
Payer: MEDICARE

## 2020-12-01 ENCOUNTER — APPOINTMENT (OUTPATIENT)
Dept: GENERAL RADIOLOGY | Facility: CLINIC | Age: 75
DRG: 064 | End: 2020-12-01
Attending: STUDENT IN AN ORGANIZED HEALTH CARE EDUCATION/TRAINING PROGRAM
Payer: MEDICARE

## 2020-12-01 ENCOUNTER — APPOINTMENT (OUTPATIENT)
Dept: PHYSICAL THERAPY | Facility: CLINIC | Age: 75
DRG: 064 | End: 2020-12-01
Attending: PSYCHIATRY & NEUROLOGY
Payer: MEDICARE

## 2020-12-01 ENCOUNTER — APPOINTMENT (OUTPATIENT)
Dept: SPEECH THERAPY | Facility: CLINIC | Age: 75
DRG: 064 | End: 2020-12-01
Attending: PSYCHIATRY & NEUROLOGY
Payer: MEDICARE

## 2020-12-01 LAB
ANION GAP SERPL CALCULATED.3IONS-SCNC: 4 MMOL/L (ref 3–14)
BUN SERPL-MCNC: 37 MG/DL (ref 7–30)
CALCIUM SERPL-MCNC: 8.8 MG/DL (ref 8.5–10.1)
CHLORIDE SERPL-SCNC: 124 MMOL/L (ref 94–109)
CO2 SERPL-SCNC: 30 MMOL/L (ref 20–32)
CREAT SERPL-MCNC: 1.1 MG/DL (ref 0.66–1.25)
ERYTHROCYTE [DISTWIDTH] IN BLOOD BY AUTOMATED COUNT: 14.2 % (ref 10–15)
GFR SERPL CREATININE-BSD FRML MDRD: 66 ML/MIN/{1.73_M2}
GLUCOSE BLDC GLUCOMTR-MCNC: 115 MG/DL (ref 70–99)
GLUCOSE BLDC GLUCOMTR-MCNC: 117 MG/DL (ref 70–99)
GLUCOSE BLDC GLUCOMTR-MCNC: 127 MG/DL (ref 70–99)
GLUCOSE BLDC GLUCOMTR-MCNC: 97 MG/DL (ref 70–99)
GLUCOSE SERPL-MCNC: 123 MG/DL (ref 70–99)
HCT VFR BLD AUTO: 49.4 % (ref 40–53)
HGB BLD-MCNC: 15 G/DL (ref 13.3–17.7)
MAGNESIUM SERPL-MCNC: 2 MG/DL (ref 1.6–2.3)
MCH RBC QN AUTO: 29.4 PG (ref 26.5–33)
MCHC RBC AUTO-ENTMCNC: 30.4 G/DL (ref 31.5–36.5)
MCV RBC AUTO: 97 FL (ref 78–100)
PHOSPHATE SERPL-MCNC: 2.8 MG/DL (ref 2.5–4.5)
PLATELET # BLD AUTO: 275 10E9/L (ref 150–450)
POTASSIUM SERPL-SCNC: 3.3 MMOL/L (ref 3.4–5.3)
POTASSIUM SERPL-SCNC: 4.4 MMOL/L (ref 3.4–5.3)
RBC # BLD AUTO: 5.1 10E12/L (ref 4.4–5.9)
SODIUM SERPL-SCNC: 158 MMOL/L (ref 133–144)
SODIUM SERPL-SCNC: 160 MMOL/L (ref 133–144)
SODIUM SERPL-SCNC: 161 MMOL/L (ref 133–144)
WBC # BLD AUTO: 14.1 10E9/L (ref 4–11)

## 2020-12-01 PROCEDURE — 250N000011 HC RX IP 250 OP 636: Performed by: NURSE PRACTITIONER

## 2020-12-01 PROCEDURE — 36415 COLL VENOUS BLD VENIPUNCTURE: CPT | Performed by: STUDENT IN AN ORGANIZED HEALTH CARE EDUCATION/TRAINING PROGRAM

## 2020-12-01 PROCEDURE — 85027 COMPLETE CBC AUTOMATED: CPT | Performed by: STUDENT IN AN ORGANIZED HEALTH CARE EDUCATION/TRAINING PROGRAM

## 2020-12-01 PROCEDURE — 92526 ORAL FUNCTION THERAPY: CPT | Mod: GN

## 2020-12-01 PROCEDURE — 97530 THERAPEUTIC ACTIVITIES: CPT | Mod: GP

## 2020-12-01 PROCEDURE — 74340 X-RAY GUIDE FOR GI TUBE: CPT | Mod: 26 | Performed by: RADIOLOGY

## 2020-12-01 PROCEDURE — 272N000078 HC NUTRITION PRODUCT INTERMEDIATE LITER

## 2020-12-01 PROCEDURE — 97116 GAIT TRAINING THERAPY: CPT | Mod: GP

## 2020-12-01 PROCEDURE — 74340 X-RAY GUIDE FOR GI TUBE: CPT

## 2020-12-01 PROCEDURE — 250N000013 HC RX MED GY IP 250 OP 250 PS 637: Performed by: STUDENT IN AN ORGANIZED HEALTH CARE EDUCATION/TRAINING PROGRAM

## 2020-12-01 PROCEDURE — 99232 SBSQ HOSP IP/OBS MODERATE 35: CPT | Mod: GC | Performed by: PSYCHIATRY & NEUROLOGY

## 2020-12-01 PROCEDURE — 44500 INTRO GASTROINTESTINAL TUBE: CPT | Mod: GC | Performed by: RADIOLOGY

## 2020-12-01 PROCEDURE — 84100 ASSAY OF PHOSPHORUS: CPT | Performed by: STUDENT IN AN ORGANIZED HEALTH CARE EDUCATION/TRAINING PROGRAM

## 2020-12-01 PROCEDURE — 120N000002 HC R&B MED SURG/OB UMMC

## 2020-12-01 PROCEDURE — 83735 ASSAY OF MAGNESIUM: CPT | Performed by: STUDENT IN AN ORGANIZED HEALTH CARE EDUCATION/TRAINING PROGRAM

## 2020-12-01 PROCEDURE — 250N000013 HC RX MED GY IP 250 OP 250 PS 637: Performed by: PSYCHIATRY & NEUROLOGY

## 2020-12-01 PROCEDURE — 84132 ASSAY OF SERUM POTASSIUM: CPT | Performed by: PSYCHIATRY & NEUROLOGY

## 2020-12-01 PROCEDURE — 84295 ASSAY OF SERUM SODIUM: CPT | Performed by: STUDENT IN AN ORGANIZED HEALTH CARE EDUCATION/TRAINING PROGRAM

## 2020-12-01 PROCEDURE — 999N001017 HC STATISTIC GLUCOSE BY METER IP

## 2020-12-01 PROCEDURE — 80048 BASIC METABOLIC PNL TOTAL CA: CPT | Performed by: STUDENT IN AN ORGANIZED HEALTH CARE EDUCATION/TRAINING PROGRAM

## 2020-12-01 PROCEDURE — 250N000011 HC RX IP 250 OP 636: Performed by: STUDENT IN AN ORGANIZED HEALTH CARE EDUCATION/TRAINING PROGRAM

## 2020-12-01 PROCEDURE — 36415 COLL VENOUS BLD VENIPUNCTURE: CPT | Performed by: PSYCHIATRY & NEUROLOGY

## 2020-12-01 PROCEDURE — 250N000009 HC RX 250: Performed by: RADIOLOGY

## 2020-12-01 PROCEDURE — 250N000009 HC RX 250: Performed by: NURSE PRACTITIONER

## 2020-12-01 PROCEDURE — 97530 THERAPEUTIC ACTIVITIES: CPT | Mod: GO | Performed by: OCCUPATIONAL THERAPIST

## 2020-12-01 RX ORDER — POTASSIUM CHLORIDE 1.5 G/1.58G
40 POWDER, FOR SOLUTION ORAL ONCE
Status: COMPLETED | OUTPATIENT
Start: 2020-12-01 | End: 2020-12-01

## 2020-12-01 RX ORDER — NYSTATIN 100000/ML
500000 SUSPENSION, ORAL (FINAL DOSE FORM) ORAL 4 TIMES DAILY
Status: DISCONTINUED | OUTPATIENT
Start: 2020-12-01 | End: 2020-12-11

## 2020-12-01 RX ORDER — LIDOCAINE HYDROCHLORIDE 20 MG/ML
15 SOLUTION OROPHARYNGEAL ONCE
Status: COMPLETED | OUTPATIENT
Start: 2020-12-01 | End: 2020-12-01

## 2020-12-01 RX ADMIN — AMPICILLIN SODIUM 1 G: 1 INJECTION, POWDER, FOR SOLUTION INTRAMUSCULAR; INTRAVENOUS at 12:12

## 2020-12-01 RX ADMIN — AMPICILLIN SODIUM 1 G: 1 INJECTION, POWDER, FOR SOLUTION INTRAMUSCULAR; INTRAVENOUS at 05:07

## 2020-12-01 RX ADMIN — NYSTATIN 500000 UNITS: 500000 SUSPENSION ORAL at 12:12

## 2020-12-01 RX ADMIN — Medication 12.5 MG: at 15:11

## 2020-12-01 RX ADMIN — NYSTATIN 500000 UNITS: 500000 SUSPENSION ORAL at 20:28

## 2020-12-01 RX ADMIN — MULTIVITAMIN 15 ML: LIQUID ORAL at 15:11

## 2020-12-01 RX ADMIN — NYSTATIN 500000 UNITS: 500000 SUSPENSION ORAL at 16:20

## 2020-12-01 RX ADMIN — THIAMINE HYDROCHLORIDE 100 MG: 100 INJECTION, SOLUTION INTRAMUSCULAR; INTRAVENOUS at 09:59

## 2020-12-01 RX ADMIN — LIDOCAINE HYDROCHLORIDE 15 ML: 20 SOLUTION ORAL; TOPICAL at 13:10

## 2020-12-01 RX ADMIN — POTASSIUM CHLORIDE 40 MEQ: 1.5 POWDER, FOR SOLUTION ORAL at 03:24

## 2020-12-01 RX ADMIN — AMPICILLIN SODIUM 1 G: 1 INJECTION, POWDER, FOR SOLUTION INTRAMUSCULAR; INTRAVENOUS at 16:20

## 2020-12-01 RX ADMIN — HEPARIN SODIUM 5000 UNITS: 5000 INJECTION, SOLUTION INTRAVENOUS; SUBCUTANEOUS at 12:12

## 2020-12-01 RX ADMIN — HEPARIN SODIUM 5000 UNITS: 5000 INJECTION, SOLUTION INTRAVENOUS; SUBCUTANEOUS at 03:24

## 2020-12-01 RX ADMIN — FOLIC ACID 1 MG: 5 INJECTION, SOLUTION INTRAMUSCULAR; INTRAVENOUS; SUBCUTANEOUS at 09:59

## 2020-12-01 RX ADMIN — ASPIRIN 81 MG CHEWABLE TABLET 81 MG: 81 TABLET CHEWABLE at 15:11

## 2020-12-01 RX ADMIN — HEPARIN SODIUM 5000 UNITS: 5000 INJECTION, SOLUTION INTRAVENOUS; SUBCUTANEOUS at 20:28

## 2020-12-01 RX ADMIN — Medication 12.5 MG: at 20:28

## 2020-12-01 ASSESSMENT — ACTIVITIES OF DAILY LIVING (ADL)
ADLS_ACUITY_SCORE: 11.5
ADLS_ACUITY_SCORE: 10.5
ADLS_ACUITY_SCORE: 10
ADLS_ACUITY_SCORE: 10
ADLS_ACUITY_SCORE: 10.5
ADLS_ACUITY_SCORE: 10.5

## 2020-12-01 NOTE — PLAN OF CARE
Pt admitted d/t aphasia, found to have a right parietal lobe infact with hemorrhagic transformation. Hx of afib, on CCM. 1:1 in place d/t being impulsive/restless and pulling at lines. [place] orientation and majority of neuros as pt is nonverbal and does not follow all commands. Left facial droop, strengths intact besides LLE 4/5. IV TKO between abx. NPO. Pt NJ protruding through mouth this AM, removed. New NJ placed this afternoon in IR with Xray. Okay to use- TF restarted at 1500 at 30ml/hr. Please increase to 40ml/hr at 2300. Goal 50ml/hr. Flores in place d/t retention, traumatic insertion, urine bloody/pink. Denies pain. Up with A1, GB, walker. Continue with POC.    Notified of critical Na level at 1433 from lab. Level is 161. Notified MD Sorto at 1436, was informed that MD will place order to increase free water for pt. Continue to monitor.

## 2020-12-01 NOTE — PROGRESS NOTES
[facility]     Stroke Progress Note    Interval Events  - NJ successfully placed yesterday: initiated TF and free water flushes overnight. However, end of NJ found to be coming out of the pt's mouth this AM. Removed and will plan to replace today  - Na re-check 158: increased free water to 100ml Q4h. Will re-check @ noon  - K re-check 3.3: supplemented 40mEq  - Flores still having red-colored output  - Suspicion of oral thrush, will order Nystatin  - Start Metoprolol 12.5mg BID for HTN and Afib rate control. 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 On arrival to the ED, pt found to have a new subacute R parietal infarct w/ overlying petechial hemorrhage and was subsequently transferred to [facility] for further cares    #Subacute R Parietal Lobe Infarct w/ hemorrhagic conversion likely 2/2 cardioembolic source in the setting of known Afib not on anticoagulation  #Afib  Pt had been placed on Eliquis in 2018 for prior L parietal stroke, but was inconsistent in taking it as prescribed and then ultimately discontinued it in 2019 after recurrent nosebleeds  - Q4h neurochecks  - SBP goal > 160  - Avoid hypotonic IVF  - ASA 81mg QD when enteral access achieved  - Metoprolol 12.5mg BID  - DISCONTINUE PTA Lisinopril  - Plan to start IV Heparin on post-bleed day 10 (12/6)  - SQ Heparin Q8h  - No indication for statin (LDL 69)  - Lipid Panel (LDL 69), HgbA1c (5.6)  - TTE (11/25): LV/RV size/function normal w/ EF 55-60%; Grade III diastolic dysfunction w/ severe LA enlargement and mild RA enlargement; intact atrial septum; mild  mitral annular calcification; aortic sclerosis w/o stenosis  - MRI Stroke Protocol: can consider in the future if pt calms; previously did not tolerate  - EEG: generalized slowing suggestive of mild-mod encephalopathy w/ maximum cortical dysfunction in the R hemisphere and w/o sign of seizure or epileptiform activity  - Telemetry  - PT/OT/SLP: TCU  - Stroke Education  - Stroke Class per [facility] ([facility])  - Depression Screen  - Apnea Screen  - Euthermia, Euglycemia  - Consider Cardiology referral upon discharge for diastolic dysfunction seen on TTE    #EtOH Abuse  - Discontinue CIWA  - Thiamine, Folate    #GREGORIA, improving  #Urinary Retention  #Traumatic Straight Cath   #Enterococcus UTI  Baseline Cr < 1, presented w/ Cr 1.3 likely . Pt has a hx of urinary retention (hx tamsulosin but not a current med) but is exhibiting marked amounts here which could be worsening pt's GREGORIA. Lack of PO intake and Enterococcus UTI may also be contributing. On 11/29, pt also suffered a traumatic straight cath. Curbside to Urology recommended placement of Flores for 5d to allow healing  - STOP Plasmalyte 75ml/hr  - IV Ampicillin 1g Q8h for 7d (ends 12/3)  - Flores for 5d (ends 12/4)  - Consider Urology referral upon discharge     # Hypernatremia  # Hyperchloremia  Pt has exhibited elevated Na and Cl, rising since time of admission. Likely 2/2 NPO status since arrival due to multiple failed swallow attempts. Will monitor for refeeding syndrome once NJ placed and TF initiated  - IR to replace NJ today  - Nutrition consult; will restart tube feeds once enteral access obtained  - 100ml Q4h free water  - If pt continues to fail swallow, will plan for PEG early next week    ?Oral Thrush  - Nystatin 500,000u BID     Diet: NPO; start TF per nutrition once NJ replaced  VTE PPx: SQ Heparin Q8h  Dispo: TCU  Code Status: FULL      The patient was discussed with Stroke Fellow, Dr. Bejarano.  The Stroke Staff is Dr. Bass.    Diann Leon,  MD  Neurology Resident, PGY1  Pager: [phone]  ______________________________________________________    Medications   Home Meds  Prior to Admission medications    Medication Sig Start Date End Date Taking? Authorizing Provider   CRANBERRY JUICE EXTRACT PO Take 40,000 Units by mouth daily    Reported, Patient   Garlic 1000 MG CAPS Take 1 capsule by mouth daily    Reported, Patient   lisinopril (PRINIVIL/ZESTRIL) 5 MG tablet Take 1 tablet (5 mg) by mouth daily For blood pressure control 1/18/19   Sophia Dacosta APRN CNP   NONFORMULARY Beet Root Juice    Reported, Patient   senna-docusate (SENOKOT-S/PERICOLACE) 8.6-50 MG tablet Take 1 tablet by mouth 2 times daily For constipation 12/11/18   Annabella Santos MD   tamsulosin (FLOMAX) 0.4 MG capsule Take 1 capsule (0.4 mg) by mouth daily 6/5/19   Missael Carvalho MD       Scheduled Meds    ampicillin  1 g Intravenous Q6H     aspirin  81 mg Per Feeding Tube Daily     folic acid  1 mg Intravenous Daily     heparin ANTICOAGULANT  5,000 Units Subcutaneous Q8H     metoprolol tartrate  12.5 mg Oral BID     multivitamins w/minerals  15 mL Per Feeding Tube Daily     nystatin  500,000 Units Oral 4x Daily     sodium chloride (PF)  10 mL Intravenous Once     sodium chloride bacteriostatic  10 mL Intravenous Once     thiamine  100 mg Intravenous Daily       Infusion Meds    dextrose         PRN Meds  acetaminophen **OR** acetaminophen **OR** acetaminophen, bisacodyl, dextrose, labetalol, LORazepam, metoprolol, ondansetron **OR** ondansetron, polyethylene glycol, senna-docusate       PHYSICAL EXAMINATION  Temp:  [96.4  F (35.8  C)-98.7  F (37.1  C)] 98.4  F (36.9  C)  Pulse:  [] 100  Resp:  [16-20] 18  BP: (120-169)/() 142/92  SpO2:  [99 %-100 %] 99 %     General:  patient lying in bed without any acute distress    HEENT:  normocephalic/atraumatic  Cardio:  RRR  Pulmonary:  no respiratory distress  Abdomen:  soft, non-tender, non-distended  Extremities:  no  edema  Skin:  intact, warm/dry     Neurologic  Mental Status:  Awake, alert, minimally verbal (grunts, soft moans), will occasionally shake head yes/no to questions but unclear if actual answering or not, also appears to intermittently follow commands but then the manish of his actions appear to be mimicry and often perseverates on a singular action, pt remains severely aphasic  Cranial Nerves:  visual fields intact to confrontation, PERRL, EOMI with normal smooth pursuit, facial movements symmetric  Motor:  normal muscle tone and bulk, no abnormal movements, able to move all limbs spontaneously, no pronator drift  Reflexes:  toes down-going  Sensory:  deferred  Coordination:  unable to assess 2/2 aphasia  Station/Gait: walking well w/ 1-assist    Stroke Scales    NIHSS  Interval transfer (11/25/20 1947)   Interval Comments     1a. Level of Consciousness 0-->Alert, keenly responsive   1b. LOC Questions 2-->Answers neither question correctly   1c. LOC Commands 1-->Performs one task correctly   2.   Best Gaze 0-->Normal   3.   Visual 0-->No visual loss   4.   Facial Palsy 0-->Normal symmetrical movements   5a. Motor Arm, Left 0-->No drift, limb holds 90 (or 45) degrees for full 10 secs   5b. Motor Arm, Right 0-->No drift, limb holds 90 (or 45) degrees for full 10 secs   6a. Motor Leg, Left 0-->No drift, leg holds 30 degree position for full 5 secs   6b. Motor Leg, right 0-->No drift, leg holds 30 degree position for full 5 secs   7.   Limb Ataxia 0-->Absent   8.   Sensory 0-->Normal, no sensory loss   9.   Best Language 3-->Mute, global aphasia, no usable speech or auditory comprehension   10. Dysarthria 2-->Severe dysarthria, patients speech is so slurred as to be unintelligible in the absence of or out of proportion to any dysphasia, or is mute/anarthric   11. Extinction and Inattention  0-->No abnormality   Total 8 (11/25/20 1947)       Imaging  I personally reviewed all imaging; relevant findings per HPI.     Lab  Results Data   CBC  Recent Labs   Lab 12/01/20  0223 11/30/20  0649 11/29/20  1833   WBC 14.1* 13.4* 13.5*   RBC 5.10 5.19 4.89   HGB 15.0 15.6 14.6   HCT 49.4 49.9 46.8    250 255     Basic Metabolic Panel    Recent Labs   Lab 12/01/20  0621 12/01/20  0223 11/30/20  1320 11/30/20  0649 11/29/20  1833   NA  --  158*  --  156* 155*   POTASSIUM 4.4 3.3* 4.0 3.8 3.3*   CHLORIDE  --  124*  --  122* 121*   CO2  --  30  --  28 30   BUN  --  37*  --  31* 31*   CR  --  1.10  --  1.03 1.09   GLC  --  123*  --  110* 112*   SANDRA  --  8.8  --  8.9 9.0     Liver Panel  Recent Labs   Lab 11/25/20  1142   PROTTOTAL 7.5   ALBUMIN 3.5   BILITOTAL 1.2   ALKPHOS 76   AST 51*   ALT 24     INR    Recent Labs   Lab Test 11/25/20  1142 11/27/18  0232   INR 1.23* 1.08      Lipid Profile    Recent Labs   Lab Test 11/25/20 2020 11/27/18  0701 12/16/15  0857   CHOL 133 147 159   HDL 55 57 76   LDL 69 78 73   TRIG 46 58 52     A1C    Recent Labs   Lab Test 11/25/20 2020 11/27/18  0701   A1C 5.6 5.5     Troponin I  No results for input(s): TROPI in the last 168 hours.

## 2020-12-01 NOTE — PLAN OF CARE
Status: Pt admitted for evaluation of aphasia. Head CT showed R parietal lobe infarct with hemorrhagic transformation. Hx of A fib. 1:1 in place for pulling at lines. No mitts used this shift.  Vitals: VSS on RA. On CCM.  Neuros: Unable to complete full neuro assessment. Pt nonverbal. Doesn't follow commands. Impulsive and restless at times. Slight L facial droop. 5/5 all extremities.  IV: PIV infusing plasma-lyte @ 75 mL/hr.  Resp/trach: Stable on RA. LS clear all fields.  Diet: NPO. NG TF @ 20 mL/hr. To be increased by 10 mL/hr @ 0930. Goal is 50 mL/hr.  Bowel status: BS active all quads. Last BM: 12/1/20.  : Flores in place w/ red/maroon colored output.  Skin: Skin intact.  Pain: Denies.  Activity: Up w/ 1 GB and walker.  Social: No calls or visitors this shift.  Plan: K+ level 3.3, replaced this shift and redraw scheduled for 0730. Continue to monitor and follow POC.

## 2020-12-01 NOTE — PROGRESS NOTES
Vitals: VSS on RA except HTN and tachycardia within parameters. CCM.  Neuros: Alert, nonverbal. Left facial droop and inconsistently follows commands. Impulsive and restless at times.  IV: IVF infusing at 75ml/hr.  Resp/trach: WNL on RA  Diet: NPO, TF initiated via NJ this shift. Running at 10ml/hr. Increase to 20ml/hr at 1:30am.  Bowel status: BS+x4, +BM this shift  : Flores patent, urine bright red  Pain: No nonverbal signs or symptoms of pain noted.  Activity: Up with assist of 1, GB and walker.  Social: Marley updated this shift.  Plan: Increase TF per order. Mitts on for pulling at tube. Sitter at bedside.

## 2020-12-02 ENCOUNTER — APPOINTMENT (OUTPATIENT)
Dept: SPEECH THERAPY | Facility: CLINIC | Age: 75
DRG: 064 | End: 2020-12-02
Attending: PSYCHIATRY & NEUROLOGY
Payer: MEDICARE

## 2020-12-02 ENCOUNTER — APPOINTMENT (OUTPATIENT)
Dept: OCCUPATIONAL THERAPY | Facility: CLINIC | Age: 75
DRG: 064 | End: 2020-12-02
Attending: PSYCHIATRY & NEUROLOGY
Payer: MEDICARE

## 2020-12-02 ENCOUNTER — APPOINTMENT (OUTPATIENT)
Dept: PHYSICAL THERAPY | Facility: CLINIC | Age: 75
DRG: 064 | End: 2020-12-02
Attending: PSYCHIATRY & NEUROLOGY
Payer: MEDICARE

## 2020-12-02 LAB
ANION GAP SERPL CALCULATED.3IONS-SCNC: 5 MMOL/L (ref 3–14)
BUN SERPL-MCNC: 33 MG/DL (ref 7–30)
CALCIUM SERPL-MCNC: 8.7 MG/DL (ref 8.5–10.1)
CHLORIDE SERPL-SCNC: 126 MMOL/L (ref 94–109)
CO2 SERPL-SCNC: 28 MMOL/L (ref 20–32)
CREAT SERPL-MCNC: 1.07 MG/DL (ref 0.66–1.25)
ERYTHROCYTE [DISTWIDTH] IN BLOOD BY AUTOMATED COUNT: 14 % (ref 10–15)
GFR SERPL CREATININE-BSD FRML MDRD: 68 ML/MIN/{1.73_M2}
GLUCOSE SERPL-MCNC: 109 MG/DL (ref 70–99)
HCT VFR BLD AUTO: 46.9 % (ref 40–53)
HGB BLD-MCNC: 14 G/DL (ref 13.3–17.7)
MAGNESIUM SERPL-MCNC: 2 MG/DL (ref 1.6–2.3)
MCH RBC QN AUTO: 29.7 PG (ref 26.5–33)
MCHC RBC AUTO-ENTMCNC: 29.9 G/DL (ref 31.5–36.5)
MCV RBC AUTO: 99 FL (ref 78–100)
PHOSPHATE SERPL-MCNC: 2.5 MG/DL (ref 2.5–4.5)
PLATELET # BLD AUTO: 226 10E9/L (ref 150–450)
POTASSIUM SERPL-SCNC: 3.4 MMOL/L (ref 3.4–5.3)
RBC # BLD AUTO: 4.72 10E12/L (ref 4.4–5.9)
SODIUM SERPL-SCNC: 156 MMOL/L (ref 133–144)
SODIUM SERPL-SCNC: 160 MMOL/L (ref 133–144)
WBC # BLD AUTO: 12.7 10E9/L (ref 4–11)

## 2020-12-02 PROCEDURE — 97116 GAIT TRAINING THERAPY: CPT | Mod: GP

## 2020-12-02 PROCEDURE — 250N000013 HC RX MED GY IP 250 OP 250 PS 637: Performed by: STUDENT IN AN ORGANIZED HEALTH CARE EDUCATION/TRAINING PROGRAM

## 2020-12-02 PROCEDURE — 93005 ELECTROCARDIOGRAM TRACING: CPT

## 2020-12-02 PROCEDURE — 250N000011 HC RX IP 250 OP 636: Performed by: STUDENT IN AN ORGANIZED HEALTH CARE EDUCATION/TRAINING PROGRAM

## 2020-12-02 PROCEDURE — 120N000002 HC R&B MED SURG/OB UMMC

## 2020-12-02 PROCEDURE — 85027 COMPLETE CBC AUTOMATED: CPT | Performed by: PSYCHIATRY & NEUROLOGY

## 2020-12-02 PROCEDURE — 97535 SELF CARE MNGMENT TRAINING: CPT | Mod: GO | Performed by: OCCUPATIONAL THERAPIST

## 2020-12-02 PROCEDURE — 99232 SBSQ HOSP IP/OBS MODERATE 35: CPT | Mod: GC | Performed by: PSYCHIATRY & NEUROLOGY

## 2020-12-02 PROCEDURE — 97110 THERAPEUTIC EXERCISES: CPT | Mod: GO | Performed by: OCCUPATIONAL THERAPIST

## 2020-12-02 PROCEDURE — 92526 ORAL FUNCTION THERAPY: CPT | Mod: GN

## 2020-12-02 PROCEDURE — 84295 ASSAY OF SERUM SODIUM: CPT | Performed by: STUDENT IN AN ORGANIZED HEALTH CARE EDUCATION/TRAINING PROGRAM

## 2020-12-02 PROCEDURE — 93010 ELECTROCARDIOGRAM REPORT: CPT | Performed by: INTERNAL MEDICINE

## 2020-12-02 PROCEDURE — 36415 COLL VENOUS BLD VENIPUNCTURE: CPT | Performed by: PSYCHIATRY & NEUROLOGY

## 2020-12-02 PROCEDURE — 36415 COLL VENOUS BLD VENIPUNCTURE: CPT | Performed by: STUDENT IN AN ORGANIZED HEALTH CARE EDUCATION/TRAINING PROGRAM

## 2020-12-02 PROCEDURE — 84100 ASSAY OF PHOSPHORUS: CPT | Performed by: PSYCHIATRY & NEUROLOGY

## 2020-12-02 PROCEDURE — 80048 BASIC METABOLIC PNL TOTAL CA: CPT | Performed by: PSYCHIATRY & NEUROLOGY

## 2020-12-02 PROCEDURE — 250N000009 HC RX 250: Performed by: NURSE PRACTITIONER

## 2020-12-02 PROCEDURE — 250N000011 HC RX IP 250 OP 636: Performed by: NURSE PRACTITIONER

## 2020-12-02 PROCEDURE — 83735 ASSAY OF MAGNESIUM: CPT | Performed by: PSYCHIATRY & NEUROLOGY

## 2020-12-02 PROCEDURE — 272N000078 HC NUTRITION PRODUCT INTERMEDIATE LITER

## 2020-12-02 RX ORDER — QUETIAPINE FUMARATE 25 MG/1
25 TABLET, FILM COATED ORAL AT BEDTIME
Status: DISCONTINUED | OUTPATIENT
Start: 2020-12-02 | End: 2020-12-02

## 2020-12-02 RX ADMIN — HEPARIN SODIUM 5000 UNITS: 5000 INJECTION, SOLUTION INTRAVENOUS; SUBCUTANEOUS at 04:13

## 2020-12-02 RX ADMIN — AMPICILLIN SODIUM 1 G: 1 INJECTION, POWDER, FOR SOLUTION INTRAMUSCULAR; INTRAVENOUS at 00:08

## 2020-12-02 RX ADMIN — NYSTATIN 500000 UNITS: 500000 SUSPENSION ORAL at 20:12

## 2020-12-02 RX ADMIN — AMPICILLIN SODIUM 1 G: 1 INJECTION, POWDER, FOR SOLUTION INTRAMUSCULAR; INTRAVENOUS at 06:11

## 2020-12-02 RX ADMIN — FOLIC ACID 1 MG: 5 INJECTION, SOLUTION INTRAMUSCULAR; INTRAVENOUS; SUBCUTANEOUS at 08:01

## 2020-12-02 RX ADMIN — HEPARIN SODIUM 5000 UNITS: 5000 INJECTION, SOLUTION INTRAVENOUS; SUBCUTANEOUS at 11:39

## 2020-12-02 RX ADMIN — AMPICILLIN SODIUM 1 G: 1 INJECTION, POWDER, FOR SOLUTION INTRAMUSCULAR; INTRAVENOUS at 11:28

## 2020-12-02 RX ADMIN — HEPARIN SODIUM 5000 UNITS: 5000 INJECTION, SOLUTION INTRAVENOUS; SUBCUTANEOUS at 18:44

## 2020-12-02 RX ADMIN — ASPIRIN 81 MG CHEWABLE TABLET 81 MG: 81 TABLET CHEWABLE at 07:55

## 2020-12-02 RX ADMIN — THIAMINE HYDROCHLORIDE 100 MG: 100 INJECTION, SOLUTION INTRAMUSCULAR; INTRAVENOUS at 07:55

## 2020-12-02 RX ADMIN — NYSTATIN 500000 UNITS: 500000 SUSPENSION ORAL at 07:55

## 2020-12-02 RX ADMIN — ACETAMINOPHEN 650 MG: 325 TABLET, FILM COATED ORAL at 18:44

## 2020-12-02 RX ADMIN — AMPICILLIN SODIUM 1 G: 1 INJECTION, POWDER, FOR SOLUTION INTRAMUSCULAR; INTRAVENOUS at 18:44

## 2020-12-02 RX ADMIN — NYSTATIN 500000 UNITS: 500000 SUSPENSION ORAL at 15:47

## 2020-12-02 RX ADMIN — MULTIVITAMIN 15 ML: LIQUID ORAL at 07:55

## 2020-12-02 RX ADMIN — Medication 12.5 MG: at 07:55

## 2020-12-02 RX ADMIN — NYSTATIN 500000 UNITS: 500000 SUSPENSION ORAL at 11:28

## 2020-12-02 RX ADMIN — Medication 12.5 MG: at 20:12

## 2020-12-02 ASSESSMENT — ACTIVITIES OF DAILY LIVING (ADL)
ADLS_ACUITY_SCORE: 9.5
ADLS_ACUITY_SCORE: 10
ADLS_ACUITY_SCORE: 10.5
ADLS_ACUITY_SCORE: 9.5
ADLS_ACUITY_SCORE: 9.5
ADLS_ACUITY_SCORE: 10.5

## 2020-12-02 NOTE — PLAN OF CARE
Status: R parietal lobe infarct with hemorrhagic transformation strokes  Vitals: VSS ex HTN within parameters. Hx afib, on CCM (stable A-fib ) on RA  Neuros: Neuros difficult to assess, no verbal response, unable to use writing board. Inconsistent with commands. Lifts his entire arm & leg up every time when assessing for strength & coordination, strength 5/5 throughout. Severe aphasia and slight L facial droop when at rest. 1:1 sitter to prevent attempts to pull NG  IV: PIV TKO  Resp/trach: LS clear  Diet: NPO. 150 mL free flush water every 2 hrs in attempt to lower sodium level. TF via NJ tube at goal rate of 50 mL/hr.  Bowel status: Last BM 12/01  : Flores for retention, urine bloody/pink, Team aware per report  Skin: Pt scratching groin area, has made several marks in his skin. Barrier cream applied. No source of itching visible.  Pain: Denies, no non-verbal signs of discomfort.  Activity: Up with A1, GB, and walker. HOB at 30 degrees. Walked unit with PT and sat in chair for much of this afternoon.  Plan: Continue to monitor and follow POC. Pt is to receive contrast this dorene at 2000 for PEG placement tomorrow. TF off at MN.    *1600 Na+ level decreased from 160 to 156.

## 2020-12-02 NOTE — PLAN OF CARE
Status: Admitted for evaluation of aphasia. Head CT showed R parietal lobe infarct with hemorrhagic transformation  Vitals: VSS ex HTN within parameters. Hx afib, on CCM, on RA  Neuros: Neuros difficult to assess, no verbal response, unable to use writing board. Inconsistent with commands. Lifts his entire arm & leg up every time when assessing for strength & coordination, strength 5/5 throughout. Severe aphasia and slight L facial droop. 1:1 sitter  IV: PIV TKO  Resp/trach: LS clear  Diet: NPO. 150 mL free flush water every 2 hrs. TF via NJ tube now at goal rate of 50 mL/hr since 0630 this AM  Bowel status: Last BM 12/01  : Flores for retention, urine bloody/pink, Team aware per report  Skin: Intact  Pain: Denies  Activity: Up with A1, GB, and walker. HOB at 30 degrees.  Plan: Please check electrolyte results & replace as needed. Continue to monitor and follow POC

## 2020-12-02 NOTE — CONSULTS
Patient is not appropriate for stroke education per unit and has no family. Unit will enter a new order if anything changes.

## 2020-12-02 NOTE — PLAN OF CARE
Status: Admitted for evaluation of aphasia. Head CT showed R parietal lobe infarct with hemorrhagic transformation  Vitals: VSS ex HTN within parameters. Hx afib, on CCM, on RA  Neuros: Neuros difficult to assess. Inconsistently follows few commands. Severe aphasia and slight L facial droop. 1:1 sitter  IV: PIV tko  Resp/trach: LS clear  Diet: NPO increased to 40mL/hr at 2245. Goal 50mL/hr  Bowel status: No BM this shift  : Flores in place. Urine bloody/pink  Skin: Intact  Pain: Denies  Activity: Up with 1 GB and walker  Plan: Continue to monitor and follow POC

## 2020-12-02 NOTE — CONSULTS
Patient is on IR schedule Thursday 12/3/2020 for a Gastrostomy tube placement.     -Labs WNL for procedure.    -Orders for NPO and contrast have been entered.   -Consent will be done prior to procedure. Per further conversation with the medical team the patient has no one to consent for him. IR has requested a note in the chart to state the need for medical necessity.   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 On arrival to the ED, pt found to have a new subacute R parietal infarct w/ overlying petechial hemorrhage and was subsequently transferred to [facility] for further cares.    Simone has not progressed on swallowing effectively and a g tube has been requested for nutrition.     Please contact the IR charge RN at [phone] for estimated time of procedure.     Discussed with Marla Leon Resident P [phone] today.    [facility] CNP Interventional Radiology ([phone])

## 2020-12-02 NOTE — PROGRESS NOTES
[facility]     Stroke Progress Note    Interval Events  - No acute events overnight  - Na recheck last night 160 -> free water increased to 150ml Q2h  - IR Consult for PEG placement. 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 On arrival to the ED, pt found to have a new subacute R parietal infarct w/ overlying petechial hemorrhage and was subsequently transferred to [facility] for further cares    #Subacute R Parietal Lobe Infarct w/ hemorrhagic conversion likely 2/2 cardioembolic source in the setting of known Afib not on anticoagulation  #Afib  Pt had been placed on Eliquis in 2018 for prior L parietal stroke, but was inconsistent in taking it as prescribed and then ultimately discontinued it in 2019 after recurrent nosebleeds  - Q4h neurochecks  - SBP goal > 160  - Avoid hypotonic IVF  - ASA 81mg QD   - Metoprolol 12.5mg BID  - Plan to start Heparin ggt on post-bleed day 10 (12/4)  - SQ Heparin Q8h  - No indication for statin (LDL 69)  - Lipid Panel (LDL 69), HgbA1c (5.6)  - TTE (11/25): LV/RV size/function normal w/ EF 55-60%; Grade III diastolic dysfunction w/ severe LA enlargement and mild RA enlargement; intact atrial septum; mild mitral annular calcification; aortic sclerosis w/o stenosis  - MRI Stroke Protocol: can consider in the future if pt calms; previously did not tolerate  - EEG: generalized slowing suggestive of mild-mod  encephalopathy w/ maximum cortical dysfunction in the R hemisphere and w/o sign of seizure or epileptiform activity  - Telemetry  - PT/OT/SLP: TCU  - Stroke Education  - Stroke Class per [facility] ([facility])  - Depression Screen  - Apnea Screen  - Euthermia, Euglycemia  - Consider Cardiology referral upon discharge for diastolic dysfunction seen on TTE    #EtOH Abuse  - Discontinue CIWA  - Thiamine, Folate    #GREGORIA, improving  #Urinary Retention  #Traumatic Straight Cath   #Enterococcus UTI  Baseline Cr < 1, presented w/ Cr 1.3 likely . Pt has a hx of urinary retention (hx tamsulosin but not a current med) but is exhibiting marked amounts here which could be worsening pt's GREGORIA. Lack of PO intake and Enterococcus UTI may also be contributing. On 11/29, pt also suffered a traumatic straight cath. Curbside to Urology recommended placement of Flores for 5d to allow healing  - IV Ampicillin 1g Q8h for 7d (ends 12/3)  - Flores for 5d (ends 12/4)  - Consider Urology referral upon discharge     # Hypernatremia  # Hyperchloremia  Pt has exhibited elevated Na and Cl, rising since time of admission. Likely 2/2 NPO status since arrival due to multiple failed swallow attempts. Will monitor for refeeding syndrome once NJ placed and TF initiated  - Nutrition following  - Na checks BID  - 150ml Q2h free water  - IR Consult for PEG placement    ?Oral Thrush  - Nystatin 500,000u BID     Diet: TF per nutrition  VTE PPx: SQ Heparin Q8h  Dispo: TCU  Code Status: FULL      The patient was discussed with Stroke Fellow, Dr. Dow.  The Stroke Staff is Dr. Bass.    Diann Leon MD  Neurology Resident, PGY1  Pager: [phone]  ______________________________________________________    Medications   Home Meds  Prior to Admission medications    Medication Sig Start Date End Date Taking? Authorizing Provider   CRANBERRY JUICE EXTRACT PO Take 40,000 Units by mouth daily    Reported, Patient   Garlic 1000 MG CAPS Take 1 capsule by mouth  daily    Reported, Patient   lisinopril (PRINIVIL/ZESTRIL) 5 MG tablet Take 1 tablet (5 mg) by mouth daily For blood pressure control 1/18/19   Sophia Dacosta APRN CNP   NONFORMULARY Beet Root Juice    Reported, Patient   senna-docusate (SENOKOT-S/PERICOLACE) 8.6-50 MG tablet Take 1 tablet by mouth 2 times daily For constipation 12/11/18   Annabella Santos MD   tamsulosin (FLOMAX) 0.4 MG capsule Take 1 capsule (0.4 mg) by mouth daily 6/5/19   Missael Carvalho MD       Scheduled Meds    ampicillin  1 g Intravenous Q6H     aspirin  81 mg Per Feeding Tube Daily     folic acid  1 mg Intravenous Daily     heparin ANTICOAGULANT  5,000 Units Subcutaneous Q8H     metoprolol tartrate  12.5 mg Oral BID     multivitamins w/minerals  15 mL Per Feeding Tube Daily     nystatin  500,000 Units Oral 4x Daily     QUEtiapine  25 mg Oral At Bedtime     sodium chloride (PF)  10 mL Intravenous Once     sodium chloride bacteriostatic  10 mL Intravenous Once     thiamine  100 mg Intravenous Daily       Infusion Meds    dextrose         PRN Meds  acetaminophen **OR** acetaminophen **OR** acetaminophen, bisacodyl, dextrose, labetalol, LORazepam, metoprolol, ondansetron **OR** ondansetron, polyethylene glycol, senna-docusate       PHYSICAL EXAMINATION  Temp:  [97.4  F (36.3  C)-98.3  F (36.8  C)] 98  F (36.7  C)  Pulse:  [82-97] 88  Resp:  [16-20] 16  BP: (122-164)/() 164/97  SpO2:  [97 %-100 %] 99 %     General:  patient lying in bed without any acute distress    HEENT:  normocephalic/atraumatic  Cardio:  RRR  Pulmonary:  no respiratory distress  Abdomen:  soft, non-tender, non-distended  Extremities:  no edema  Skin:  intact, warm/dry     Neurologic  Mental Status:  Awake, alert, minimally verbal (grunts, soft moans), will occasionally shake head yes/no to questions but unclear if actual answering or not, also appears to intermittently follow commands but then the manish of his actions appear to be mimicry and often perseverates on  a singular action, pt remains severely aphasic  Cranial Nerves:  visual fields intact to confrontation, PERRL, EOMI with normal smooth pursuit, facial movements symmetric  Motor:  normal muscle tone and bulk, no abnormal movements, able to move all limbs spontaneously, no pronator drift  Reflexes:  toes down-going  Sensory:  deferred  Coordination:  unable to assess 2/2 aphasia  Station/Gait: walking well w/ 1-assist    Stroke Scales    NIHSS  Interval transfer (11/25/20 1947)   Interval Comments     1a. Level of Consciousness 0-->Alert, keenly responsive   1b. LOC Questions 2-->Answers neither question correctly   1c. LOC Commands 1-->Performs one task correctly   2.   Best Gaze 0-->Normal   3.   Visual 0-->No visual loss   4.   Facial Palsy 0-->Normal symmetrical movements   5a. Motor Arm, Left 0-->No drift, limb holds 90 (or 45) degrees for full 10 secs   5b. Motor Arm, Right 0-->No drift, limb holds 90 (or 45) degrees for full 10 secs   6a. Motor Leg, Left 0-->No drift, leg holds 30 degree position for full 5 secs   6b. Motor Leg, right 0-->No drift, leg holds 30 degree position for full 5 secs   7.   Limb Ataxia 0-->Absent   8.   Sensory 0-->Normal, no sensory loss   9.   Best Language 3-->Mute, global aphasia, no usable speech or auditory comprehension   10. Dysarthria 2-->Severe dysarthria, patients speech is so slurred as to be unintelligible in the absence of or out of proportion to any dysphasia, or is mute/anarthric   11. Extinction and Inattention  0-->No abnormality   Total 8 (11/25/20 1947)       Imaging  I personally reviewed all imaging; relevant findings per HPI.     Lab Results Data   CBC  Recent Labs   Lab 12/02/20  0629 12/01/20  0223 11/30/20  0649   WBC 12.7* 14.1* 13.4*   RBC 4.72 5.10 5.19   HGB 14.0 15.0 15.6   HCT 46.9 49.4 49.9    275 250     Basic Metabolic Panel    Recent Labs   Lab 12/02/20  0629 12/01/20  2142 12/01/20  1402 12/01/20  0621 12/01/20  0223 11/30/20  0649  11/30/20  0649   * 160* 161*  --  158*  --  156*   POTASSIUM 3.4  --   --  4.4 3.3*   < > 3.8   CHLORIDE 126*  --   --   --  124*  --  122*   CO2 28  --   --   --  30  --  28   BUN 33*  --   --   --  37*  --  31*   CR 1.07  --   --   --  1.10  --  1.03   *  --   --   --  123*  --  110*   SANDRA 8.7  --   --   --  8.8  --  8.9    < > = values in this interval not displayed.     Liver Panel  No results for input(s): PROTTOTAL, ALBUMIN, BILITOTAL, ALKPHOS, AST, ALT, BILIDIRECT in the last 168 hours.  INR    Recent Labs   Lab Test 11/25/20  1142 11/27/18  0232   INR 1.23* 1.08      Lipid Profile    Recent Labs   Lab Test 11/25/20 2020 11/27/18  0701 12/16/15  0857   CHOL 133 147 159   HDL 55 57 76   LDL 69 78 73   TRIG 46 58 52     A1C    Recent Labs   Lab Test 11/25/20 2020 11/27/18  0701   A1C 5.6 5.5     Troponin I  No results for input(s): TROPI in the last 168 hours.

## 2020-12-03 ENCOUNTER — APPOINTMENT (OUTPATIENT)
Dept: INTERVENTIONAL RADIOLOGY/VASCULAR | Facility: CLINIC | Age: 75
DRG: 064 | End: 2020-12-03
Attending: NURSE PRACTITIONER
Payer: MEDICARE

## 2020-12-03 LAB
ANION GAP SERPL CALCULATED.3IONS-SCNC: 5 MMOL/L (ref 3–14)
BUN SERPL-MCNC: 30 MG/DL (ref 7–30)
CALCIUM SERPL-MCNC: 8.2 MG/DL (ref 8.5–10.1)
CHLORIDE SERPL-SCNC: 120 MMOL/L (ref 94–109)
CO2 SERPL-SCNC: 29 MMOL/L (ref 20–32)
CREAT SERPL-MCNC: 1.05 MG/DL (ref 0.66–1.25)
ERYTHROCYTE [DISTWIDTH] IN BLOOD BY AUTOMATED COUNT: 14.1 % (ref 10–15)
GFR SERPL CREATININE-BSD FRML MDRD: 69 ML/MIN/{1.73_M2}
GLUCOSE BLDC GLUCOMTR-MCNC: 101 MG/DL (ref 70–99)
GLUCOSE SERPL-MCNC: 97 MG/DL (ref 70–99)
HCT VFR BLD AUTO: 44.9 % (ref 40–53)
HGB BLD-MCNC: 14 G/DL (ref 13.3–17.7)
INTERPRETATION ECG - MUSE: NORMAL
MAGNESIUM SERPL-MCNC: 1.8 MG/DL (ref 1.6–2.3)
MCH RBC QN AUTO: 30.2 PG (ref 26.5–33)
MCHC RBC AUTO-ENTMCNC: 31.2 G/DL (ref 31.5–36.5)
MCV RBC AUTO: 97 FL (ref 78–100)
PHOSPHATE SERPL-MCNC: 2.4 MG/DL (ref 2.5–4.5)
PLATELET # BLD AUTO: 196 10E9/L (ref 150–450)
POTASSIUM SERPL-SCNC: 3.5 MMOL/L (ref 3.4–5.3)
RBC # BLD AUTO: 4.63 10E12/L (ref 4.4–5.9)
SODIUM SERPL-SCNC: 154 MMOL/L (ref 133–144)
SODIUM SERPL-SCNC: 154 MMOL/L (ref 133–144)
WBC # BLD AUTO: 12.6 10E9/L (ref 4–11)

## 2020-12-03 PROCEDURE — C1769 GUIDE WIRE: HCPCS

## 2020-12-03 PROCEDURE — 250N000009 HC RX 250: Performed by: NURSE PRACTITIONER

## 2020-12-03 PROCEDURE — 250N000013 HC RX MED GY IP 250 OP 250 PS 637: Performed by: STUDENT IN AN ORGANIZED HEALTH CARE EDUCATION/TRAINING PROGRAM

## 2020-12-03 PROCEDURE — 272N000078 HC NUTRITION PRODUCT INTERMEDIATE LITER

## 2020-12-03 PROCEDURE — 255N000002 HC RX 255 OP 636: Performed by: RADIOLOGY

## 2020-12-03 PROCEDURE — 80048 BASIC METABOLIC PNL TOTAL CA: CPT | Performed by: PSYCHIATRY & NEUROLOGY

## 2020-12-03 PROCEDURE — 258N000003 HC RX IP 258 OP 636: Performed by: PSYCHIATRY & NEUROLOGY

## 2020-12-03 PROCEDURE — 85027 COMPLETE CBC AUTOMATED: CPT | Performed by: PSYCHIATRY & NEUROLOGY

## 2020-12-03 PROCEDURE — 272N000151 HC KIT CR11

## 2020-12-03 PROCEDURE — 120N000002 HC R&B MED SURG/OB UMMC

## 2020-12-03 PROCEDURE — 999N001017 HC STATISTIC GLUCOSE BY METER IP

## 2020-12-03 PROCEDURE — 272N000588 ZZ HC TUBE GASTRO CR5

## 2020-12-03 PROCEDURE — 250N000011 HC RX IP 250 OP 636: Performed by: STUDENT IN AN ORGANIZED HEALTH CARE EDUCATION/TRAINING PROGRAM

## 2020-12-03 PROCEDURE — 250N000009 HC RX 250: Performed by: PSYCHIATRY & NEUROLOGY

## 2020-12-03 PROCEDURE — 84295 ASSAY OF SERUM SODIUM: CPT | Performed by: STUDENT IN AN ORGANIZED HEALTH CARE EDUCATION/TRAINING PROGRAM

## 2020-12-03 PROCEDURE — 36415 COLL VENOUS BLD VENIPUNCTURE: CPT | Performed by: PSYCHIATRY & NEUROLOGY

## 2020-12-03 PROCEDURE — 99232 SBSQ HOSP IP/OBS MODERATE 35: CPT | Mod: GC | Performed by: PSYCHIATRY & NEUROLOGY

## 2020-12-03 PROCEDURE — 84100 ASSAY OF PHOSPHORUS: CPT | Performed by: PSYCHIATRY & NEUROLOGY

## 2020-12-03 PROCEDURE — 49440 PLACE GASTROSTOMY TUBE PERC: CPT

## 2020-12-03 PROCEDURE — 250N000009 HC RX 250: Performed by: STUDENT IN AN ORGANIZED HEALTH CARE EDUCATION/TRAINING PROGRAM

## 2020-12-03 PROCEDURE — 250N000011 HC RX IP 250 OP 636: Performed by: NURSE PRACTITIONER

## 2020-12-03 PROCEDURE — 83735 ASSAY OF MAGNESIUM: CPT | Performed by: PSYCHIATRY & NEUROLOGY

## 2020-12-03 PROCEDURE — 0DH63UZ INSERTION OF FEEDING DEVICE INTO STOMACH, PERCUTANEOUS APPROACH: ICD-10-PCS | Performed by: RADIOLOGY

## 2020-12-03 PROCEDURE — C1887 CATHETER, GUIDING: HCPCS

## 2020-12-03 PROCEDURE — 99152 MOD SED SAME PHYS/QHP 5/>YRS: CPT

## 2020-12-03 PROCEDURE — 99152 MOD SED SAME PHYS/QHP 5/>YRS: CPT | Mod: GC | Performed by: RADIOLOGY

## 2020-12-03 PROCEDURE — 36415 COLL VENOUS BLD VENIPUNCTURE: CPT | Performed by: STUDENT IN AN ORGANIZED HEALTH CARE EDUCATION/TRAINING PROGRAM

## 2020-12-03 PROCEDURE — 49440 PLACE GASTROSTOMY TUBE PERC: CPT | Mod: GC | Performed by: RADIOLOGY

## 2020-12-03 RX ORDER — NALOXONE HYDROCHLORIDE 0.4 MG/ML
0.2 INJECTION, SOLUTION INTRAMUSCULAR; INTRAVENOUS; SUBCUTANEOUS
Status: DISCONTINUED | OUTPATIENT
Start: 2020-12-03 | End: 2020-12-05

## 2020-12-03 RX ORDER — IODIXANOL 320 MG/ML
50 INJECTION, SOLUTION INTRAVASCULAR ONCE
Status: COMPLETED | OUTPATIENT
Start: 2020-12-03 | End: 2020-12-03

## 2020-12-03 RX ORDER — NICOTINE POLACRILEX 4 MG
15-30 LOZENGE BUCCAL
Status: DISCONTINUED | OUTPATIENT
Start: 2020-12-03 | End: 2020-12-30 | Stop reason: HOSPADM

## 2020-12-03 RX ORDER — DEXTROSE MONOHYDRATE 25 G/50ML
25-50 INJECTION, SOLUTION INTRAVENOUS
Status: DISCONTINUED | OUTPATIENT
Start: 2020-12-03 | End: 2020-12-30 | Stop reason: HOSPADM

## 2020-12-03 RX ORDER — FENTANYL CITRATE 50 UG/ML
25-50 INJECTION, SOLUTION INTRAMUSCULAR; INTRAVENOUS EVERY 5 MIN PRN
Status: DISCONTINUED | OUTPATIENT
Start: 2020-12-03 | End: 2020-12-03

## 2020-12-03 RX ORDER — NALOXONE HYDROCHLORIDE 0.4 MG/ML
0.4 INJECTION, SOLUTION INTRAMUSCULAR; INTRAVENOUS; SUBCUTANEOUS
Status: DISCONTINUED | OUTPATIENT
Start: 2020-12-03 | End: 2020-12-05

## 2020-12-03 RX ORDER — MAGNESIUM SULFATE HEPTAHYDRATE 40 MG/ML
2 INJECTION, SOLUTION INTRAVENOUS ONCE
Status: COMPLETED | OUTPATIENT
Start: 2020-12-03 | End: 2020-12-03

## 2020-12-03 RX ORDER — FLUMAZENIL 0.1 MG/ML
0.2 INJECTION, SOLUTION INTRAVENOUS
Status: DISCONTINUED | OUTPATIENT
Start: 2020-12-03 | End: 2020-12-03

## 2020-12-03 RX ADMIN — Medication 12.5 MG: at 19:52

## 2020-12-03 RX ADMIN — FENTANYL CITRATE 50 MCG: 50 INJECTION, SOLUTION INTRAMUSCULAR; INTRAVENOUS at 14:27

## 2020-12-03 RX ADMIN — THIAMINE HYDROCHLORIDE 100 MG: 100 INJECTION, SOLUTION INTRAMUSCULAR; INTRAVENOUS at 08:24

## 2020-12-03 RX ADMIN — MAGNESIUM SULFATE IN WATER 2 G: 40 INJECTION, SOLUTION INTRAVENOUS at 10:47

## 2020-12-03 RX ADMIN — NYSTATIN 500000 UNITS: 500000 SUSPENSION ORAL at 19:52

## 2020-12-03 RX ADMIN — IODIXANOL 25 ML: 320 INJECTION, SOLUTION INTRAVASCULAR at 13:56

## 2020-12-03 RX ADMIN — NYSTATIN 500000 UNITS: 500000 SUSPENSION ORAL at 08:23

## 2020-12-03 RX ADMIN — HEPARIN SODIUM 5000 UNITS: 5000 INJECTION, SOLUTION INTRAVENOUS; SUBCUTANEOUS at 04:05

## 2020-12-03 RX ADMIN — ASPIRIN 81 MG CHEWABLE TABLET 81 MG: 81 TABLET CHEWABLE at 08:22

## 2020-12-03 RX ADMIN — FOLIC ACID 1 MG: 5 INJECTION, SOLUTION INTRAMUSCULAR; INTRAVENOUS; SUBCUTANEOUS at 08:22

## 2020-12-03 RX ADMIN — HEPARIN SODIUM 5000 UNITS: 5000 INJECTION, SOLUTION INTRAVENOUS; SUBCUTANEOUS at 19:52

## 2020-12-03 RX ADMIN — LIDOCAINE HYDROCHLORIDE 15 ML: 10 INJECTION, SOLUTION EPIDURAL; INFILTRATION; INTRACAUDAL; PERINEURAL at 14:27

## 2020-12-03 RX ADMIN — Medication 12.5 MG: at 08:22

## 2020-12-03 RX ADMIN — MIDAZOLAM 1 MG: 1 INJECTION INTRAMUSCULAR; INTRAVENOUS at 14:27

## 2020-12-03 RX ADMIN — SODIUM PHOSPHATE, MONOBASIC, MONOHYDRATE 9 MMOL: 276; 142 INJECTION, SOLUTION INTRAVENOUS at 20:40

## 2020-12-03 RX ADMIN — NYSTATIN 500000 UNITS: 500000 SUSPENSION ORAL at 16:07

## 2020-12-03 RX ADMIN — AMPICILLIN SODIUM 1 G: 1 INJECTION, POWDER, FOR SOLUTION INTRAMUSCULAR; INTRAVENOUS at 00:30

## 2020-12-03 RX ADMIN — MULTIVITAMIN 15 ML: LIQUID ORAL at 08:24

## 2020-12-03 RX ADMIN — AMPICILLIN SODIUM 1 G: 1 INJECTION, POWDER, FOR SOLUTION INTRAMUSCULAR; INTRAVENOUS at 05:10

## 2020-12-03 RX ADMIN — AMPICILLIN SODIUM 1 G: 1 INJECTION, POWDER, FOR SOLUTION INTRAMUSCULAR; INTRAVENOUS at 19:52

## 2020-12-03 RX ADMIN — ACETAMINOPHEN 650 MG: 650 SUPPOSITORY RECTAL at 16:08

## 2020-12-03 ASSESSMENT — ACTIVITIES OF DAILY LIVING (ADL)
ADLS_ACUITY_SCORE: 10
ADLS_ACUITY_SCORE: 9.5
ADLS_ACUITY_SCORE: 9.5
ADLS_ACUITY_SCORE: 10
ADLS_ACUITY_SCORE: 9.5
ADLS_ACUITY_SCORE: 9.5

## 2020-12-03 NOTE — PROGRESS NOTES
"Care Management Follow Up    Length of Stay (days): 8    Expected Discharge Date: 12/08/20     Concerns to be Addressed:       Patient plan of care discussed at interdisciplinary rounds:  yes    Anticipated Discharge Disposition:  SNF placement     Anticipated Discharge Services:  SNF  placement  Anticipated Discharge DME:  Not applicable    Patient/family educated on Medicare website which has current facility and service quality ratings:  No, for reason's listed below  Education Provided on the Discharge Plan:  No, for reasons listed below  Patient/Family in Agreement with the Plan:  Pt does not verbalize and functionally is not able to write.    Referrals Placed by CM/SW:  None at this time  Private pay costs discussed: Not applicable    Additional Information:  This patient in known to GILBERT from hospitalization  November of 2018.   Per 11/27/2018  entry: \"He stated his preference to have his friend, Jayne Fulton, designated as a health care agent and is willing to complete an advanced directive.\"   Per 11/27/2018  entry: \"distant relatives in [place], but has no desire to contact them or to have them contacted.\" Per 12/4/2018  progress note entry \"Marley indicates that she has known pt for the past 30 years. 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   At this time, there is no insurance information listed on pt's admissions face sheet.  SNF placement is anticipated.    This am, SW phoned [facility] and left a message for Eliza Amezquita ([phone]) in the Adult Protection Unit to call (aidee left a 8:33am and again at 11:37am), in regards to pursuing guardianship/conservatorship of this pt.     This am at 11:43am, SW phoned [facility] ([phone]) and left a message for Manager (Aylin) to call in regards to pursuing guardianship/conservatorship of this pt.     SILVIA Sanchez  Social Work, 6A  Phone:  [phone]  Pager:  [phone]  12/3/2020                ASHLYN Harmon

## 2020-12-03 NOTE — PROGRESS NOTES
CLINICAL NUTRITION SERVICES - BRIEF NOTE    Nutrition Prescription    RECOMMENDATIONS FOR MDs/PROVIDERS TO ORDER:  - none additional from prior assessments     Recommendations already ordered by Registered Dietitian (RD):  - Once new access/gastrostomy okay'd for use per MD: initiate prior regimen at goal.   - RD changed orders to reflect new access    Future/Additional Recommendations:  - EN initiation via NEW access/gastrostomy      Nutrition Progress Note - f/u for progress towards previous nutrition POC (see previous reassessment for details)     Celia Trivedi RD, LD, [facility]  Neuro ICU  Pager: [phone]

## 2020-12-03 NOTE — PROGRESS NOTES
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 On arrival to the ED, pt found to have a new subacute R parietal infarct w/ overlying petechial hemorrhage and was subsequently transferred to [facility] for further cares.    Since his arrival at [facility], the patient has been NPO, having been seen by SLP multiple times for swallow evaluation, which he continues to fail. As such, the patient was without nutrition for roughly 5 days, but did receive IVF. On 11/30/20, nursing attempted to place an NG tube at bedside, but was unable to do so. As a result, pt was taken to IR and had an NJ placed under fluoroscopy. Tube feeding and free water flushes were then initiated as the pt had by then developed notable hypernatremia. Speaking with SLP on 12/2/20, both the primary Neurology team and SLP agreed that the patient was unlikely to show improvement in his swallowing abilities in the short-term and that a PEG would be medically necessary to ensure that he is able to receive proper nutrition and treatment for hypernatremia. Continued use of an NJ is not preferred as the patient is planning to be discharged soon to a TCU for much-needed rehab after his most recent stroke and such facilities are unable to manage an NJ tube. Undergoing intensive rehab as close to the time of stroke as possible is standard practice of care and has shown the best outcomes in improvement of neurologic deficits in such cases.     Unfortunately, the patient's current situation is complicated by continued exhibition of severe expressive and receptive aphasia, rendering meaningful communication between patient and staff minimal to non-existent.  Unfortunately, as of 12/3/2020, this has not improved in any meaningful way and thus the patient remains incapable of providing informed consent for a PEG procedure. In addition, the patient is without known family members who are able to act as his medical decision maker and provide such consent as well. As such, consent for the procedure is provided by myself, Dr Shala Bass, acting as her primary physician during this hospital stay as a matter of medical necessity.    Shala Bass MD  Neurology Critical Care

## 2020-12-03 NOTE — PLAN OF CARE
Status: R parietal lobe infarct with hemorrhagic transformation strokes  Vitals: VSS ex HTN within parameters. Hx afib, on CCM (stable A-fib ) on RA  Neuros: Neuros difficult to assess, no verbal response, unable to use writing board. Inconsistent with commands. Lifts his entire arm & leg up every time when assessing for strength & coordination, strength 5/5 throughout. Severe aphasia and slight L facial droop when at rest. 1:1 sitter to prevent attempts to pull NG  IV: PIV TKO  Resp/trach: LS clear  Diet: NPO. TF turned off at midnight. Contrast given this evening.  Bowel status: Last BM 12/01  : Flores for retention, urine bloody/pink, Team aware per report  Skin: Pt scratching groin area, has made several marks in his skin. Barrier cream applied. No source of itching visible.  Pain: Denies, no non-verbal signs of discomfort.  Activity: Up with A1, GB, and walker. HOB at 30 degrees.   Plan: Continue to monitor and follow POC.

## 2020-12-03 NOTE — PROGRESS NOTES
[facility]     Stroke Progress Note    Interval Events  - Plan for PEG placement today  - EKG yesterday exhibited Afib w/ prolonged QTc (587)   *did not proceed with Seroquel   *Goal Mg > 2, K > 4  - Na 154. 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 On arrival to the ED, pt found to have a new subacute R parietal infarct w/ overlying petechial hemorrhage and was subsequently transferred to [facility] for further cares    #Subacute R Parietal Lobe Infarct w/ hemorrhagic conversion likely 2/2 cardioembolic source in the setting of known Afib not on anticoagulation  #Afib  Pt had been placed on Eliquis in 2018 for prior L parietal stroke, but was inconsistent in taking it as prescribed and then ultimately discontinued it in 2019 after recurrent nosebleeds  - Q4h neurochecks  - SBP goal > 160  - Avoid hypotonic IVF  - ASA 81mg QD   - Metoprolol 12.5mg BID  - DISCONTINUE PTA Lisinopril  - Plan to start IV Heparin on post-bleed day 10 (12/6)  - SQ Heparin Q8h  - No indication for statin (LDL 69)  - Lipid Panel (LDL 69), HgbA1c (5.6)  - TTE (11/25): LV/RV size/function normal w/ EF 55-60%; Grade III diastolic dysfunction w/ severe LA enlargement and mild RA enlargement; intact atrial septum; mild mitral annular calcification; aortic sclerosis w/o stenosis  - MRI Stroke Protocol: can consider in the future if pt calms; previously did not tolerate  - EEG: generalized slowing suggestive of mild-mod encephalopathy w/ maximum cortical dysfunction in the R hemisphere and w/o sign of seizure or epileptiform activity  -  Telemetry  - PT/OT/SLP: TCU  - Stroke Education  - Stroke Class per [facility] ([facility])  - Depression Screen  - Apnea Screen  - Euthermia, Euglycemia  - Consider Cardiology referral upon discharge for diastolic dysfunction seen on TTE    #EtOH Abuse  - Discontinue CIWA  - Thiamine, Folate    #GREGORIA, improving  #Urinary Retention  #Traumatic Straight Cath   #Enterococcus UTI  Baseline Cr < 1, presented w/ Cr 1.3 likely . Pt has a hx of urinary retention (hx tamsulosin but not a current med) but is exhibiting marked amounts here which could be worsening pt's GREGORIA. Lack of PO intake and Enterococcus UTI may also be contributing. On 11/29, pt also suffered a traumatic straight cath. Curbside to Urology recommended placement of Flores for 5d to allow healing  - IV Ampicillin 1g Q8h for 7d (ends 12/3)  - Flores for 5d (ends 12/4)  - Consider Urology referral upon discharge     # Hypernatremia  # Hyperchloremia  Pt has exhibited elevated Na and Cl, rising since time of admission. Likely 2/2 NPO status since arrival due to multiple failed swallow attempts. Will monitor for refeeding syndrome once NJ placed and TF initiated  - PEG placement today  - Nutrition consult; will restart tube feeds once enteral access obtained  - 200ml Q2h free water    ?Oral Thrush  - Nystatin 500,000u BID     Diet: TF per nutrition once PEG replaced  VTE PPx: SQ Heparin Q8h  Dispo: TCU  Code Status: FULL      The patient was discussed with Stroke Fellow, Dr. Bejarano.  The Stroke Staff is Dr. Bass.    Diann Leon MD  Neurology Resident, PGY1  Pager: [phone]  ______________________________________________________    Medications   Home Meds  Prior to Admission medications    Medication Sig Start Date End Date Taking? Authorizing Provider   CRANBERRY JUICE EXTRACT PO Take 40,000 Units by mouth daily    Reported, Patient   Garlic 1000 MG CAPS Take 1 capsule by mouth daily    Reported, Patient   lisinopril (PRINIVIL/ZESTRIL) 5 MG tablet  Take 1 tablet (5 mg) by mouth daily For blood pressure control 1/18/19   Sophia Dacosta APRN CNP   NONFORMULARY Beet Root Juice    Reported, Patient   senna-docusate (SENOKOT-S/PERICOLACE) 8.6-50 MG tablet Take 1 tablet by mouth 2 times daily For constipation 12/11/18   Annabella Santos MD   tamsulosin (FLOMAX) 0.4 MG capsule Take 1 capsule (0.4 mg) by mouth daily 6/5/19   Missael Carvalho MD       Scheduled Meds    ampicillin  1 g Intravenous Q6H     aspirin  81 mg Per Feeding Tube Daily     folic acid  1 mg Intravenous Daily     heparin ANTICOAGULANT  5,000 Units Subcutaneous Q8H     metoprolol tartrate  12.5 mg Oral BID     multivitamins w/minerals  15 mL Per Feeding Tube Daily     nystatin  500,000 Units Oral 4x Daily     sodium chloride (PF)  10 mL Intravenous Once     sodium chloride bacteriostatic  10 mL Intravenous Once     thiamine  100 mg Intravenous Daily       Infusion Meds    dextrose       - MEDICATION INSTRUCTIONS -       sodium chloride 0.9%         PRN Meds  acetaminophen **OR** acetaminophen **OR** acetaminophen, bisacodyl, dextrose, glucose **OR** dextrose **OR** glucagon, fentaNYL, flumazenil, HOLD MEDICATION, HOLD MEDICATION, labetalol, LORazepam, - MEDICATION INSTRUCTIONS -, metoprolol, midazolam, naloxone **OR** naloxone **OR** naloxone **OR** naloxone, ondansetron **OR** ondansetron, polyethylene glycol, senna-docusate, sodium chloride 0.9%       PHYSICAL EXAMINATION  Temp:  [96.8  F (36  C)-98.7  F (37.1  C)] 98  F (36.7  C)  Pulse:  [69-98] 77  Resp:  [12-16] 16  BP: ()/() 136/98  SpO2:  [96 %-100 %] 99 %     General:  patient lying in bed without any acute distress    HEENT:  normocephalic/atraumatic  Cardio:  RRR  Pulmonary:  no respiratory distress  Abdomen:  soft, non-tender, non-distended  Extremities:  no edema  Skin:  intact, warm/dry     Neurologic  Mental Status:  Awake, alert, minimally verbal (grunts, soft moans), will occasionally shake head yes/no to questions  but unclear if actual answering or not, also appears to intermittently follow commands but then the manish of his actions appear to be mimicry and often perseverates on a singular action, pt remains severely aphasic  Cranial Nerves:  visual fields intact to confrontation, PERRL, EOMI with normal smooth pursuit, facial movements symmetric  Motor:  normal muscle tone and bulk, no abnormal movements, able to move all limbs spontaneously, no pronator drift  Reflexes:  toes down-going  Sensory:  deferred  Coordination:  unable to assess 2/2 aphasia  Station/Gait: walking well w/ 1-assist    Stroke Scales    NIHSS  Interval transfer (11/25/20 1947)   Interval Comments     1a. Level of Consciousness 0-->Alert, keenly responsive   1b. LOC Questions 2-->Answers neither question correctly   1c. LOC Commands 1-->Performs one task correctly   2.   Best Gaze 0-->Normal   3.   Visual 0-->No visual loss   4.   Facial Palsy 0-->Normal symmetrical movements   5a. Motor Arm, Left 0-->No drift, limb holds 90 (or 45) degrees for full 10 secs   5b. Motor Arm, Right 0-->No drift, limb holds 90 (or 45) degrees for full 10 secs   6a. Motor Leg, Left 0-->No drift, leg holds 30 degree position for full 5 secs   6b. Motor Leg, right 0-->No drift, leg holds 30 degree position for full 5 secs   7.   Limb Ataxia 0-->Absent   8.   Sensory 0-->Normal, no sensory loss   9.   Best Language 3-->Mute, global aphasia, no usable speech or auditory comprehension   10. Dysarthria 2-->Severe dysarthria, patients speech is so slurred as to be unintelligible in the absence of or out of proportion to any dysphasia, or is mute/anarthric   11. Extinction and Inattention  0-->No abnormality   Total 8 (11/25/20 1947)       Imaging  I personally reviewed all imaging; relevant findings per HPI.     Lab Results Data   CBC  Recent Labs   Lab 12/03/20  0610 12/02/20  0629 12/01/20  0223   WBC 12.6* 12.7* 14.1*   RBC 4.63 4.72 5.10   HGB 14.0 14.0 15.0   HCT 44.9 46.9  49.4    226 275     Basic Metabolic Panel    Recent Labs   Lab 12/03/20  0610 12/02/20  1649 12/02/20  0629 12/01/20  0621 12/01/20  0621 12/01/20  0223   * 156* 160*   < >  --  158*   POTASSIUM 3.5  --  3.4  --  4.4 3.3*   CHLORIDE 120*  --  126*  --   --  124*   CO2 29  --  28  --   --  30   BUN 30  --  33*  --   --  37*   CR 1.05  --  1.07  --   --  1.10   GLC 97  --  109*  --   --  123*   SANDRA 8.2*  --  8.7  --   --  8.8    < > = values in this interval not displayed.     Liver Panel  No results for input(s): PROTTOTAL, ALBUMIN, BILITOTAL, ALKPHOS, AST, ALT, BILIDIRECT in the last 168 hours.  INR    Recent Labs   Lab Test 11/25/20  1142 11/27/18  0232   INR 1.23* 1.08      Lipid Profile    Recent Labs   Lab Test 11/25/20 2020 11/27/18  0701 12/16/15  0857   CHOL 133 147 159   HDL 55 57 76   LDL 69 78 73   TRIG 46 58 52     A1C    Recent Labs   Lab Test 11/25/20 2020 11/27/18  0701   A1C 5.6 5.5     Troponin I  No results for input(s): TROPI in the last 168 hours.

## 2020-12-03 NOTE — PLAN OF CARE
ST session cancelled. Pt is currently NPO for PEG placement in IR today. Will follow up as appropriate.

## 2020-12-03 NOTE — PHARMACY-CONSULT NOTE
Pharmacy Tube Feeding Consult    Medication reviewed for administration by feeding tube and for potential food/drug interactions.    Recommendation: No changes are needed at this time.     Pharmacy will continue to follow as new medications are ordered.    Kendy Bacon, PharmD  Pager: [phone]

## 2020-12-03 NOTE — PROCEDURES
[facility]     Procedure: IR Procedure Note    Date/Time: 12/3/2020 2:12 PM  Performed by: Leobardo Jurado MD  Authorized by: Leobardo Jurado MD     UNIVERSAL PROTOCOL   Site Marked: NA  Prior Images Obtained and Reviewed:  Yes  Required items: Required blood products, implants, devices and special equipment available    Patient identity confirmed:  Verbally with patient, arm band, provided demographic data and hospital-assigned identification number  Patient was reevaluated immediately before administering moderate or deep sedation or anesthesia  Confirmation Checklist:  Patient's identity using two indicators, relevant allergies, procedure was appropriate and matched the consent or emergent situation and correct equipment/implants were available  Time out: Immediately prior to the procedure a time out was called    Universal Protocol: the Joint Commission Universal Protocol was followed    Preparation: Patient was prepped and draped in usual sterile fashion           ANESTHESIA    Anesthesia: Local infiltration  Local Anesthetic:  Lidocaine 1% without epinephrine      SEDATION    Patient Sedated: Yes    Sedation Type:  Moderate (conscious) sedation  Sedation:  Fentanyl and midazolam  Vital signs: Vital signs monitored during sedation    See dictated procedure note for full details.  Findings: g tube    Specimens: none    Complications: None    Condition: Stable    Plan: npo    PROCEDURE   Patient Tolerance:  Patient tolerated the procedure well with no immediate complications    Length of time physician/provider present for 1:1 monitoring during sedation: 20

## 2020-12-04 ENCOUNTER — APPOINTMENT (OUTPATIENT)
Dept: OCCUPATIONAL THERAPY | Facility: CLINIC | Age: 75
DRG: 064 | End: 2020-12-04
Attending: PSYCHIATRY & NEUROLOGY
Payer: MEDICARE

## 2020-12-04 ENCOUNTER — APPOINTMENT (OUTPATIENT)
Dept: SPEECH THERAPY | Facility: CLINIC | Age: 75
DRG: 064 | End: 2020-12-04
Attending: PSYCHIATRY & NEUROLOGY
Payer: MEDICARE

## 2020-12-04 LAB
ANION GAP SERPL CALCULATED.3IONS-SCNC: 5 MMOL/L (ref 3–14)
BUN SERPL-MCNC: 30 MG/DL (ref 7–30)
CALCIUM SERPL-MCNC: 8.5 MG/DL (ref 8.5–10.1)
CHLORIDE SERPL-SCNC: 122 MMOL/L (ref 94–109)
CO2 SERPL-SCNC: 27 MMOL/L (ref 20–32)
CREAT SERPL-MCNC: 0.99 MG/DL (ref 0.66–1.25)
ERYTHROCYTE [DISTWIDTH] IN BLOOD BY AUTOMATED COUNT: 13.8 % (ref 10–15)
GFR SERPL CREATININE-BSD FRML MDRD: 75 ML/MIN/{1.73_M2}
GLUCOSE SERPL-MCNC: 121 MG/DL (ref 70–99)
HCT VFR BLD AUTO: 44.4 % (ref 40–53)
HGB BLD-MCNC: 13.8 G/DL (ref 13.3–17.7)
INR PPP: 1.11 (ref 0.86–1.14)
MCH RBC QN AUTO: 29.8 PG (ref 26.5–33)
MCHC RBC AUTO-ENTMCNC: 31.1 G/DL (ref 31.5–36.5)
MCV RBC AUTO: 96 FL (ref 78–100)
PLATELET # BLD AUTO: 220 10E9/L (ref 150–450)
POTASSIUM SERPL-SCNC: 3.5 MMOL/L (ref 3.4–5.3)
RBC # BLD AUTO: 4.63 10E12/L (ref 4.4–5.9)
SODIUM SERPL-SCNC: 151 MMOL/L (ref 133–144)
SODIUM SERPL-SCNC: 154 MMOL/L (ref 133–144)
WBC # BLD AUTO: 11.7 10E9/L (ref 4–11)

## 2020-12-04 PROCEDURE — 80048 BASIC METABOLIC PNL TOTAL CA: CPT | Performed by: PSYCHIATRY & NEUROLOGY

## 2020-12-04 PROCEDURE — 250N000013 HC RX MED GY IP 250 OP 250 PS 637: Performed by: STUDENT IN AN ORGANIZED HEALTH CARE EDUCATION/TRAINING PROGRAM

## 2020-12-04 PROCEDURE — 85027 COMPLETE CBC AUTOMATED: CPT | Performed by: PSYCHIATRY & NEUROLOGY

## 2020-12-04 PROCEDURE — 85610 PROTHROMBIN TIME: CPT | Performed by: PSYCHIATRY & NEUROLOGY

## 2020-12-04 PROCEDURE — 250N000011 HC RX IP 250 OP 636: Performed by: STUDENT IN AN ORGANIZED HEALTH CARE EDUCATION/TRAINING PROGRAM

## 2020-12-04 PROCEDURE — 36415 COLL VENOUS BLD VENIPUNCTURE: CPT | Performed by: PSYCHIATRY & NEUROLOGY

## 2020-12-04 PROCEDURE — 999N000128 HC STATISTIC PERIPHERAL IV START W/O US GUIDANCE

## 2020-12-04 PROCEDURE — 36415 COLL VENOUS BLD VENIPUNCTURE: CPT | Performed by: STUDENT IN AN ORGANIZED HEALTH CARE EDUCATION/TRAINING PROGRAM

## 2020-12-04 PROCEDURE — 120N000002 HC R&B MED SURG/OB UMMC

## 2020-12-04 PROCEDURE — 84295 ASSAY OF SERUM SODIUM: CPT | Performed by: STUDENT IN AN ORGANIZED HEALTH CARE EDUCATION/TRAINING PROGRAM

## 2020-12-04 PROCEDURE — 99232 SBSQ HOSP IP/OBS MODERATE 35: CPT | Mod: GC | Performed by: PSYCHIATRY & NEUROLOGY

## 2020-12-04 PROCEDURE — 97535 SELF CARE MNGMENT TRAINING: CPT | Mod: GO

## 2020-12-04 PROCEDURE — 97530 THERAPEUTIC ACTIVITIES: CPT | Mod: GO

## 2020-12-04 PROCEDURE — 92526 ORAL FUNCTION THERAPY: CPT | Mod: GN

## 2020-12-04 PROCEDURE — 272N000078 HC NUTRITION PRODUCT INTERMEDIATE LITER

## 2020-12-04 PROCEDURE — 250N000013 HC RX MED GY IP 250 OP 250 PS 637: Performed by: PSYCHIATRY & NEUROLOGY

## 2020-12-04 PROCEDURE — 250N000011 HC RX IP 250 OP 636: Performed by: NURSE PRACTITIONER

## 2020-12-04 RX ORDER — FOLIC ACID 1 MG/1
1 TABLET ORAL DAILY
Status: DISCONTINUED | OUTPATIENT
Start: 2020-12-04 | End: 2020-12-05

## 2020-12-04 RX ORDER — LANOLIN ALCOHOL/MO/W.PET/CERES
3 CREAM (GRAM) TOPICAL AT BEDTIME
Status: DISCONTINUED | OUTPATIENT
Start: 2020-12-04 | End: 2020-12-05

## 2020-12-04 RX ORDER — AMINO AC/PROTEIN HYDR/WHEY PRO 10G-100/30
2 LIQUID (ML) ORAL DAILY
Status: DISCONTINUED | OUTPATIENT
Start: 2020-12-04 | End: 2020-12-30 | Stop reason: HOSPADM

## 2020-12-04 RX ORDER — LANOLIN ALCOHOL/MO/W.PET/CERES
100 CREAM (GRAM) TOPICAL DAILY
Status: DISCONTINUED | OUTPATIENT
Start: 2020-12-04 | End: 2020-12-05

## 2020-12-04 RX ORDER — LANOLIN ALCOHOL/MO/W.PET/CERES
3 CREAM (GRAM) TOPICAL
Status: DISCONTINUED | OUTPATIENT
Start: 2020-12-04 | End: 2020-12-04

## 2020-12-04 RX ORDER — WARFARIN SODIUM 5 MG/1
5 TABLET ORAL
Status: COMPLETED | OUTPATIENT
Start: 2020-12-04 | End: 2020-12-04

## 2020-12-04 RX ADMIN — Medication 12.5 MG: at 20:38

## 2020-12-04 RX ADMIN — HEPARIN SODIUM 5000 UNITS: 5000 INJECTION, SOLUTION INTRAVENOUS; SUBCUTANEOUS at 12:49

## 2020-12-04 RX ADMIN — FOLIC ACID 1 MG: 1 TABLET ORAL at 08:47

## 2020-12-04 RX ADMIN — Medication 12.5 MG: at 08:44

## 2020-12-04 RX ADMIN — Medication 100 MG: at 08:45

## 2020-12-04 RX ADMIN — NYSTATIN 500000 UNITS: 500000 SUSPENSION ORAL at 12:51

## 2020-12-04 RX ADMIN — NYSTATIN 500000 UNITS: 500000 SUSPENSION ORAL at 16:34

## 2020-12-04 RX ADMIN — AMPICILLIN SODIUM 1 G: 1 INJECTION, POWDER, FOR SOLUTION INTRAMUSCULAR; INTRAVENOUS at 19:00

## 2020-12-04 RX ADMIN — NYSTATIN 500000 UNITS: 500000 SUSPENSION ORAL at 20:37

## 2020-12-04 RX ADMIN — WARFARIN SODIUM 5 MG: 5 TABLET ORAL at 20:37

## 2020-12-04 RX ADMIN — ASPIRIN 81 MG CHEWABLE TABLET 81 MG: 81 TABLET CHEWABLE at 08:44

## 2020-12-04 RX ADMIN — AMPICILLIN SODIUM 1 G: 1 INJECTION, POWDER, FOR SOLUTION INTRAMUSCULAR; INTRAVENOUS at 06:33

## 2020-12-04 RX ADMIN — AMPICILLIN SODIUM 1 G: 1 INJECTION, POWDER, FOR SOLUTION INTRAMUSCULAR; INTRAVENOUS at 23:45

## 2020-12-04 RX ADMIN — AMPICILLIN SODIUM 1 G: 1 INJECTION, POWDER, FOR SOLUTION INTRAMUSCULAR; INTRAVENOUS at 12:49

## 2020-12-04 RX ADMIN — NYSTATIN 500000 UNITS: 500000 SUSPENSION ORAL at 08:44

## 2020-12-04 RX ADMIN — MULTIVITAMIN 15 ML: LIQUID ORAL at 08:42

## 2020-12-04 RX ADMIN — MELATONIN TAB 3 MG 3 MG: 3 TAB at 22:46

## 2020-12-04 RX ADMIN — AMPICILLIN SODIUM 1 G: 1 INJECTION, POWDER, FOR SOLUTION INTRAMUSCULAR; INTRAVENOUS at 00:46

## 2020-12-04 ASSESSMENT — ACTIVITIES OF DAILY LIVING (ADL)
ADLS_ACUITY_SCORE: 10

## 2020-12-04 NOTE — PLAN OF CARE
Status: R parietal lobe infarct with hemorrhagic transformation strokes  Vitals: VSS ex Afib at times, well controlled with metoprolol  Neuros:  Neuros difficult to assess, no verbal response, unable to use writing board. Inconsistent with commands. Lifts his entire arm & leg up every time when assessing for strength & coordination, strength 5/5 throughout. Severe aphasia and slight L facial droop when at rest. pt restless but not pulling at lines.  IV: PIV at tko between abx  Resp/trach: WNL  Diet: tolerated TF at goal of 50 ml/hr with q2h 200 ml FWF  Bowel status: no BM today, passing flatus and audible and normoactive  : voiding without difficulty, continent  Skin: PEG site intact, scant dried drainage noted. Cleaned x 1 today.  Pain: denied  Activity: up with 1, GB, walker or IV pump in room  Social: friend Jayne updated by writer today, supportive  Plan: continue with POC, awaiting placement

## 2020-12-04 NOTE — PLAN OF CARE
"Status: R parietal lobe infarct with hemorrhagic transformation strokes  Vitals: VSS ex HTN within parameters. Hx afib, on CCM (stable A-fib ) on RA  Neuros: Neuros difficult to assess, no verbal response, unable to use writing board. Inconsistent with commands. Lifts his entire arm & leg up every time when assessing for strength & coordination, strength 5/5 throughout. Severe aphasia and slight L facial droop when at rest. 1:1 sitter to prevent attempts to pull lines.  IV: PIV TKO. Mag replaced today.   Resp/trach: LS clear  Diet: NPO. 150 mL free water flushes to 200cc every 2 hrs in attempt to lower sodium level. TF resumed at 1730 at 25cc/hr. Appears to be tolerating well.   GI: Last BM 12/20. PEG to gravity per order post IR for 4 hours. Total output of 100cc thin, green/brown drainage noted.  : Flores for retention, no longer pink tinged, leena colored.  Skin: Pt scratching groin area, has made several marks in his skin. Barrier cream applied. No source of itching visible. New PEG site dressing CDI.  Pain: Nodded \"yes\" when asked if had pain. Appeared to motion towards abd (new PEG site?) Tylenol suppository given x1 as PEG not able to be used yet at that time.  Activity: Up with A1, GB, and walker. HOB at 30 degrees.   Plan: Continue to monitor and follow POC.      "

## 2020-12-04 NOTE — IR NOTE
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 On arrival to the ED, pt found to have a new subacute R parietal infarct w/ overlying petechial hemorrhage  Simone has not progressed on swallowing effectively and a g tube has been requested for nutrition.     Interval History: 12/3  Left upper quadrant 18 fr ANYA gastrostomy tube placed percutaneously.      Physical Exam:   Vitals: /70 (BP Location: Left arm)   Pulse 69   Temp 97.8  F (36.6  C) (Axillary)   Resp 16   Ht 1.676 m (5' 5.98\")   Wt 68.5 kg (151 lb 0.2 oz)   SpO2 96%   BMI 24.39 kg/m     General: Stable. In no acute distress.    Drain: Gastrostomy Dressing is C/D/I.     Labs:  Recent Labs   Lab 20  0651 20  0610 20  0629   HGB 13.8 14.0 14.0   WBC 11.7* 12.6* 12.7*     Recent Labs   Lab 20  0651 20  0610 20  0629   CR 0.99 1.05 1.07      No results for input(s): PROTTOTAL, ALBUMIN, BILITOTAL, ALKPHOS, AST, ALT, BILIDIRECT in the last 168 hours.    Cultures:  No results for input(s): CULT in the last 168 hours.    Assessment: Simone was lying in bed alert and orientated  nurse at bed side. Simone dennapoleon's pain. Visually T-Tacks are intake, Balloon disk ratio is snug not pinching. Active gastric feeding occurring, reported no issues with this. All Cheng questions were answered    Plan:Please follow IR post orders    Discussed with Dr. Bowie    Interventional Radiology  Floresita Cueva IR CBRPA RA  Radiology Practitioner Assistant   [phone] Call " pager  [phone] pager

## 2020-12-04 NOTE — PHARMACY-ANTICOAGULATION SERVICE
Clinical Pharmacy - Warfarin Dosing Consult     Pharmacy has been consulted to manage this patient s warfarin therapy.  Indication: Atrial Fibrillation  Therapy Goal: INR 2-3  Warfarin Prior to Admission: No  Significant drug interactions: Ampicillin, Heparin, Melatonin may all increase the risk of bleeding  Recent documented change in oral intake/nutrition: Unknown  Dose Comments: INR 1.1, will give 5 mg tonight    INR   Date Value Ref Range Status   12/04/2020 1.11 0.86 - 1.14 Final   11/25/2020 1.23 (H) 0.86 - 1.14 Final       Recommend warfarin 5 mg today.  Pharmacy will monitor Simone Daniels daily and order warfarin doses to achieve specified goal.      Please contact pharmacy as soon as possible if the warfarin needs to be held for a procedure or if the warfarin goals change.

## 2020-12-04 NOTE — PLAN OF CARE
Status: R parietal lobe infarct with hemorrhagic transformation strokes  Vitals: VSS ex HTN within parameters. Hx afib, on CCM (stable A-fib ) on RA  Neuros: Neuros difficult to assess, no verbal response, unable to use writing board. Inconsistent with commands. Lifts his entire arm & leg up every time when assessing for strength & coordination, strength 5/5 throughout. Severe aphasia and slight L facial droop when at rest. 1:1 sitter pulled at 3am, pt restless but not pulling at lines.  IV: PIV TKO. Phos replaced overnight.   Resp/trach: LS clear  Diet: NPO. 200cc water flush every 2 hrs in attempt to lower sodium level. TF at 50cc/hr. Appears to be tolerating well.   GI: Last BM 12/2.   : Flores for retention, no longer pink tinged, elena colored.  Skin: Pt scratching groin area, has made several marks in his skin. Barrier cream applied. No source of itching visible. New PEG site dressing CDI.  Pain: Denied  Activity: Up with A1, GB, and walker. HOB at 30 degrees.   Plan: Continue to monitor and follow POC.

## 2020-12-04 NOTE — PROGRESS NOTES
[facility]     Stroke Progress Note    Interval Events  - PEG in place, now on TF and free water flushes 200ml Q2h  - Sitter removed at 3AM. Pt is restless, but not pulling at lines  - Start Warfarin. 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 On arrival to the ED, pt found to have a new subacute R parietal infarct w/ overlying petechial hemorrhage and was subsequently transferred to [facility] for further cares    #Subacute R Parietal Lobe Infarct w/ hemorrhagic conversion likely 2/2 cardioembolic source in the setting of known Afib not on anticoagulation  #Afib  Pt had been placed on Eliquis in 2018 for prior L parietal stroke, but was inconsistent in taking it as prescribed and then ultimately discontinued it in 2019 after recurrent nosebleeds  - Q4h neurochecks  - SBP goal > 160  - Avoid hypotonic IVF  - STOP ASA 81mg QD   - Metoprolol 12.5mg BID  - DISCONTINUE PTA Lisinopril  - Warfarin w/ pharmacy dosing  - STOP SQ Heparin Q8h  - Melatonin 3mg Qpm  - No indication for statin (LDL 69)  - Lipid Panel (LDL 69), HgbA1c (5.6)  - TTE (11/25): LV/RV size/function normal w/ EF 55-60%; Grade III diastolic dysfunction w/ severe LA enlargement and mild RA enlargement; intact atrial septum; mild mitral annular calcification; aortic sclerosis w/o stenosis  - MRI Stroke Protocol: can consider in the future if pt calms; previously did not tolerate  - EEG: generalized slowing suggestive of mild-mod encephalopathy w/ maximum cortical dysfunction in the R hemisphere and w/o sign of seizure or  epileptiform activity  - Telemetry  - PT/OT/SLP: TCU  - Stroke Education  - Stroke Class per [facility] ([facility])  - Depression Screen  - Apnea Screen  - Euthermia, Euglycemia  - Consider Cardiology referral upon discharge for diastolic dysfunction seen on TTE    #EtOH Abuse  - Discontinue CIWA  - Thiamine, Folate    #GREGORIA, improving  #Urinary Retention  #Traumatic Straight Cath   #Enterococcus UTI  Baseline Cr < 1, presented w/ Cr 1.3 likely . Pt has a hx of urinary retention (hx tamsulosin but not a current med) but is exhibiting marked amounts here which could be worsening pt's GREGORIA. Lack of PO intake and Enterococcus UTI may also be contributing. On 11/29, pt also suffered a traumatic straight cath. Curbside to Urology recommended placement of Flores for 5d to allow healing  - IV Ampicillin 1g Q8h for 7d (ends 12/5)  - Flores for 5d (ends 12/4)  - Consider Urology referral upon discharge     # Hypernatremia  # Hyperchloremia  Pt has exhibited elevated Na and Cl, rising since time of admission. Likely 2/2 NPO status since arrival due to multiple failed swallow attempts. Will monitor for refeeding syndrome once NJ placed and TF initiated  - PEG in place  - Nutrition consult; will restart tube feeds once enteral access obtained  - 200ml Q2h free water    ?Oral Thrush  - Nystatin 500,000u BID     Diet: TF per nutrition   VTE PPx: SQ Heparin Q8h  Dispo: TCU  Code Status: FULL      The patient was discussed with Stroke Fellow, Dr. Dow.  The Stroke Staff is Dr. Bass.    Diann Leon MD  Neurology Resident, PGY1  Pager: [phone]  ______________________________________________________    Medications   Home Meds  Prior to Admission medications    Medication Sig Start Date End Date Taking? Authorizing Provider   CRANBERRY JUICE EXTRACT PO Take 40,000 Units by mouth daily    Reported, Patient   Garlic 1000 MG CAPS Take 1 capsule by mouth daily    Reported, Patient   lisinopril (PRINIVIL/ZESTRIL) 5 MG tablet Take  1 tablet (5 mg) by mouth daily For blood pressure control 1/18/19   Sophia Dacosta APRN CNP   NONFORMULARY Beet Root Juice    Reported, Patient   senna-docusate (SENOKOT-S/PERICOLACE) 8.6-50 MG tablet Take 1 tablet by mouth 2 times daily For constipation 12/11/18   Annabella Santos MD   tamsulosin (FLOMAX) 0.4 MG capsule Take 1 capsule (0.4 mg) by mouth daily 6/5/19   Missael Carvalho MD       Scheduled Meds    ampicillin  1 g Intravenous Q6H     folic acid  1 mg Oral Daily     melatonin  3 mg Oral At Bedtime     metoprolol tartrate  12.5 mg Oral BID     multivitamins w/minerals  15 mL Per Feeding Tube Daily     nystatin  500,000 Units Oral 4x Daily     protein modular  2 packet Per Feeding Tube Daily     sodium chloride (PF)  10 mL Intravenous Once     sodium chloride bacteriostatic  10 mL Intravenous Once     vitamin B1  100 mg Oral Daily     warfarin ANTICOAGULANT  5 mg Oral ONCE at 18:00       Infusion Meds    dextrose       - MEDICATION INSTRUCTIONS -       Warfarin Therapy Reminder         PRN Meds  acetaminophen **OR** acetaminophen **OR** acetaminophen, bisacodyl, dextrose, glucose **OR** dextrose **OR** glucagon, HOLD MEDICATION, HOLD MEDICATION, labetalol, LORazepam, - MEDICATION INSTRUCTIONS -, metoprolol, naloxone **OR** naloxone **OR** naloxone **OR** naloxone, ondansetron **OR** ondansetron, polyethylene glycol, senna-docusate, Warfarin Therapy Reminder       PHYSICAL EXAMINATION  Temp:  [97.5  F (36.4  C)-98.2  F (36.8  C)] 98.2  F (36.8  C)  Pulse:  [65-81] 65  Resp:  [16] 16  BP: (100-136)/(56-84) 124/82  SpO2:  [95 %-98 %] 95 %     General:  patient lying in bed without any acute distress    HEENT:  normocephalic/atraumatic  Cardio:  RRR  Pulmonary:  no respiratory distress  Abdomen:  soft, non-tender, non-distended  Extremities:  no edema  Skin:  intact, warm/dry     Neurologic  Mental Status:  Awake, alert, minimally verbal (grunts, soft moans), will occasionally shake head yes/no to  questions but unclear if actual answering or not, also appears to intermittently follow commands but then the manish of his actions appear to be mimicry and often perseverates on a singular action, pt remains severely aphasic  Cranial Nerves:  visual fields intact to confrontation, PERRL, EOMI with normal smooth pursuit, facial movements symmetric  Motor:  normal muscle tone and bulk, no abnormal movements, able to move all limbs spontaneously, no pronator drift  Reflexes:  toes down-going  Sensory:  deferred  Coordination:  unable to assess 2/2 aphasia  Station/Gait: walking well w/ 1-assist    Stroke Scales    NIHSS  Interval transfer (11/25/20 1947)   Interval Comments     1a. Level of Consciousness 0-->Alert, keenly responsive   1b. LOC Questions 2-->Answers neither question correctly   1c. LOC Commands 1-->Performs one task correctly   2.   Best Gaze 0-->Normal   3.   Visual 0-->No visual loss   4.   Facial Palsy 0-->Normal symmetrical movements   5a. Motor Arm, Left 0-->No drift, limb holds 90 (or 45) degrees for full 10 secs   5b. Motor Arm, Right 0-->No drift, limb holds 90 (or 45) degrees for full 10 secs   6a. Motor Leg, Left 0-->No drift, leg holds 30 degree position for full 5 secs   6b. Motor Leg, right 0-->No drift, leg holds 30 degree position for full 5 secs   7.   Limb Ataxia 0-->Absent   8.   Sensory 0-->Normal, no sensory loss   9.   Best Language 3-->Mute, global aphasia, no usable speech or auditory comprehension   10. Dysarthria 2-->Severe dysarthria, patients speech is so slurred as to be unintelligible in the absence of or out of proportion to any dysphasia, or is mute/anarthric   11. Extinction and Inattention  0-->No abnormality   Total 8 (11/25/20 1947)       Imaging  I personally reviewed all imaging; relevant findings per HPI.     Lab Results Data   CBC  Recent Labs   Lab 12/04/20  0651 12/03/20  0610 12/02/20  0629   WBC 11.7* 12.6* 12.7*   RBC 4.63 4.63 4.72   HGB 13.8 14.0 14.0   HCT  44.4 44.9 46.9    196 226     Basic Metabolic Panel    Recent Labs   Lab 12/04/20  0651 12/03/20  1758 12/03/20  0610 12/02/20  0629 12/02/20  0629   * 154* 154*   < > 160*   POTASSIUM 3.5  --  3.5  --  3.4   CHLORIDE 122*  --  120*  --  126*   CO2 27  --  29  --  28   BUN 30  --  30  --  33*   CR 0.99  --  1.05  --  1.07   *  --  97  --  109*   SANDRA 8.5  --  8.2*  --  8.7    < > = values in this interval not displayed.     Liver Panel  No results for input(s): PROTTOTAL, ALBUMIN, BILITOTAL, ALKPHOS, AST, ALT, BILIDIRECT in the last 168 hours.  INR    Recent Labs   Lab Test 12/04/20  1347 11/25/20  1142 11/27/18  0232   INR 1.11 1.23* 1.08      Lipid Profile    Recent Labs   Lab Test 11/25/20 2020 11/27/18  0701 12/16/15  0857   CHOL 133 147 159   HDL 55 57 76   LDL 69 78 73   TRIG 46 58 52     A1C    Recent Labs   Lab Test 11/25/20 2020 11/27/18  0701   A1C 5.6 5.5     Troponin I  No results for input(s): TROPI in the last 168 hours.

## 2020-12-04 NOTE — PROGRESS NOTES
CLINICAL NUTRITION SERVICES - REASSESSMENT NOTE     Nutrition Prescription    RECOMMENDATIONS FOR MDs/PROVIDERS TO ORDER:  - Total daily fluids/adjustments per MD. FWF currently ordered as 200 ml q2hr  ml q1hr. With TFs @ GOAL + FWF @ 200 q2hr, total daily fluids = 3.3 liters     Malnutrition Status:    - Severe malnutrition in the context of acute on chronic illness    Recommendations already ordered by Registered Dietitian (RD):  - Ordered new weight   - Continue via current access/PEG: Isosource 1.5 @ goal 50 ml/hr (1200 ml/day) to provide 1800 kcals (26 kcal/kg/day), 82 g PRO (1.2 g/kg/day), 912 ml free H2O, 211 g CHO and 18 g Fiber daily.  - Prosource 2 packets. Total provisions = 104 gm PRO (1.5 gm/kg) and 1880 kcals (27 kcals/kg)  - Elevated HOB     Future/Additional Recommendations:  - Ongoing EN tolerance via new access/PEG  - Weight trends      EVALUATION OF THE PROGRESS TOWARD GOALS   Diet: NPO.     Nutrition Support: Isosource 1.5 @ goal 50 ml/hr (1200 ml/day) to provide 1800 kcals (26 kcal/kg/day), 82 g PRO (1.2 g/kg/day), 912 ml free H2O, 211 g CHO and 18 g Fiber daily.    Intake: 4-day averages = 479 ml, 718 kcals (10 kcals/kg or 42%), 33 gm PRO (0.5 gm/kg)     NEW FINDINGS   Nutrition/GI: Pt had PEG placed 12/3. Currently tolerating regimen via new access @ goal. LBM 12/2.    REASSESSED NUTRITION NEEDS (Protein)  Estimated Protein Needs: 83 - 104+ grams protein/day (1.2 - 1.5+ grams of pro/kg)   Justification: increased needs    Neuro: Subacute R Parietal Lobe Infarct w/ hemorrhagic conversion likely 2/2 cardioembolic source in the setting of known Afib not on anticoagulation. EEG: generalized slowing suggestive of mild-mod encephalopathy     Renal: GREGORIA - improving.     Labs/meds: Na+ 154 (H); phos: 2.4 (L)    Weights: last weight 11/27: 68.5 kg (stable from admit weight). Ordered new weight today.     MALNUTRITION  % Intake: </= 50% for >/= 5 days (severe) *continues to meet this criteria*  %  Weight Loss: Up to 20% in 1 year (non-severe)  Subcutaneous Fat Loss: Facial region:  mild  Muscle Loss: Temporal:  Mild/moderate, Facial & jaw region:  Mild/moderate, Thoracic region (clavicle, acromium bone, deltoid, trapezius, pectoral):  Mild/moderate, Upper arm (bicep, tricep):  Mild/moderate, Lower arm  (forearm):  Mild/moderate and Patellar region:  Mild/moderate  Fluid Accumulation/Edema: None noted  Malnutrition Diagnosis: Severe malnutrition in the context of acute on chronic illness    *physcial exam not reassessed at follow-up. Changes to diagnosis not suspected*    Previous Goals   Diet adv v nutrition support within 2-3 days.  Evaluation: Met    Previous Nutrition Diagnosis  Inadequate oral intake related to failed swallow test as evidenced by NPO Diet per SLP x4 days with no nutrition support.     Evaluation: Improving    CURRENT NUTRITION DIAGNOSIS  Inadequate protein-energy intake related to disruptions to EN infusions as evidenced by pt not meeting goal provisions with 4-day averages meeting 10 kcals/kg and 0.5 gm PRO/kg dosing weight       INTERVENTIONS  Implementation  Enteral Nutrition - continue as ordered; adjusted protein needs/regimen. Measure new weight to assess for changes.     Goals  Total avg nutritional intake to meet a minimum of 25 kcal/kg and 1.2 g PRO/kg daily (per dosing wt 69 kg).    Monitoring/Evaluation  Progress toward goals will be monitored and evaluated per protocol.     Celia Trivedi RD, AARTI, [facility]  Neuro ICU  Pager: [phone]

## 2020-12-04 NOTE — PLAN OF CARE
PT / 6A - Cancel.  Patient with other provided on AM attempt, schedule did not allow for check back.  PT rescheduled per POC.

## 2020-12-04 NOTE — PROGRESS NOTES
"Care Management Follow Up    Length of Stay (days): 9    Expected Discharge Date: 12/05/20     Concerns to be Addressed:       Patient plan of care discussed at interdisciplinary rounds: Yes    Anticipated Discharge Disposition:  SNF  placement     Anticipated Discharge Services:  SNF placement  Anticipated Discharge DME:  Not applicable    Patient/family educated on Medicare website which has current facility and service quality ratings:  No as pt is non verbal and functionally not able to use a writing board  Education Provided on the Discharge Plan:  Yes  Patient/Family in Agreement with the Plan:  Yes    Referrals Placed by CM/SW:  None at this time as pt does not have insurance  Private pay costs discussed: Not applicable    Additional Information:  SW is following for discharge planning.  SNF  Placement is anticipated.  Pt does not have insurance.  GILBERT phoned  [employer] Counselor, Nichole Tolentino on 12/3/2020, inquiring about Medicare as pt is 72 years old.  Nichole reviewed 2018 notes and states that it appears that pt did not earn enough work credits through social security and thus, is not medicare eligible.  Nichole states that a copy of the MA melina completed in 2018 is still present.      GILBERT received a 9:07am return call from [facility] and , Aylin Figueroa ([phone]) on voice mail.  GILBERT phoned Aylin at 10:12am, 10:19am, 11:31am and was successful in reaching Aylin at 12:52pm.  GILBERT spoke with Aylin about pursuing guardianship/conservatorship of this pt and provided reasons as to why this is indicated. GILBERT recommended emergency guardianship/conservatorship as immediate action is needed including:  Decisions as to where pt resides and receives/care and support, medical decision making, access to income and assets so that MA can be pursued, payer source for SNF placement ect...  Aylin faxed a \"Physicians Statement in Support of Guardianship/Conservatorship\" for completion " and requested that this GILBERT fax completed form to her attention (fax [phone]) along with current progress notes.  Aylin states that this information will be provided to the  who will decide whether or not to pursue this matter.  Physicians Statement in Support of Guardianship/Conservatorship completed and document signed by Dr. Bass.  Document and Medical Records faxed to Aylin Figueroa.    GILBERT phoned Aylin and told her to anticipate documents.  Aylin requested that GILBERT make a referral to [facility] which this GILBERT did ([phone]), report number [id number].    GILBERT will continue to follow for discharge planning.    SILVIA Sanchez  Social Work, 6A  Phone:  [phone]  Pager:  [phone]  12/4/2020          ASHLYN Harmon

## 2020-12-05 ENCOUNTER — APPOINTMENT (OUTPATIENT)
Dept: SPEECH THERAPY | Facility: CLINIC | Age: 75
DRG: 064 | End: 2020-12-05
Attending: PSYCHIATRY & NEUROLOGY
Payer: MEDICARE

## 2020-12-05 LAB
ANION GAP SERPL CALCULATED.3IONS-SCNC: 4 MMOL/L (ref 3–14)
BUN SERPL-MCNC: 30 MG/DL (ref 7–30)
CALCIUM SERPL-MCNC: 8.1 MG/DL (ref 8.5–10.1)
CHLORIDE SERPL-SCNC: 115 MMOL/L (ref 94–109)
CO2 SERPL-SCNC: 29 MMOL/L (ref 20–32)
CREAT SERPL-MCNC: 1.04 MG/DL (ref 0.66–1.25)
ERYTHROCYTE [DISTWIDTH] IN BLOOD BY AUTOMATED COUNT: 13.6 % (ref 10–15)
GFR SERPL CREATININE-BSD FRML MDRD: 70 ML/MIN/{1.73_M2}
GLUCOSE SERPL-MCNC: 86 MG/DL (ref 70–99)
HCT VFR BLD AUTO: 41.9 % (ref 40–53)
HGB BLD-MCNC: 12.8 G/DL (ref 13.3–17.7)
INR PPP: 1.09 (ref 0.86–1.14)
MCH RBC QN AUTO: 29.6 PG (ref 26.5–33)
MCHC RBC AUTO-ENTMCNC: 30.5 G/DL (ref 31.5–36.5)
MCV RBC AUTO: 97 FL (ref 78–100)
PLATELET # BLD AUTO: 207 10E9/L (ref 150–450)
POTASSIUM SERPL-SCNC: 3.5 MMOL/L (ref 3.4–5.3)
RBC # BLD AUTO: 4.33 10E12/L (ref 4.4–5.9)
SODIUM SERPL-SCNC: 147 MMOL/L (ref 133–144)
SODIUM SERPL-SCNC: 148 MMOL/L (ref 133–144)
WBC # BLD AUTO: 10.7 10E9/L (ref 4–11)

## 2020-12-05 PROCEDURE — 99232 SBSQ HOSP IP/OBS MODERATE 35: CPT | Mod: GC | Performed by: PSYCHIATRY & NEUROLOGY

## 2020-12-05 PROCEDURE — 250N000011 HC RX IP 250 OP 636: Performed by: STUDENT IN AN ORGANIZED HEALTH CARE EDUCATION/TRAINING PROGRAM

## 2020-12-05 PROCEDURE — 36415 COLL VENOUS BLD VENIPUNCTURE: CPT | Performed by: PSYCHIATRY & NEUROLOGY

## 2020-12-05 PROCEDURE — 85027 COMPLETE CBC AUTOMATED: CPT | Performed by: PSYCHIATRY & NEUROLOGY

## 2020-12-05 PROCEDURE — 250N000013 HC RX MED GY IP 250 OP 250 PS 637: Performed by: PSYCHIATRY & NEUROLOGY

## 2020-12-05 PROCEDURE — 250N000013 HC RX MED GY IP 250 OP 250 PS 637: Performed by: NURSE PRACTITIONER

## 2020-12-05 PROCEDURE — 36415 COLL VENOUS BLD VENIPUNCTURE: CPT | Performed by: STUDENT IN AN ORGANIZED HEALTH CARE EDUCATION/TRAINING PROGRAM

## 2020-12-05 PROCEDURE — 120N000002 HC R&B MED SURG/OB UMMC

## 2020-12-05 PROCEDURE — 92526 ORAL FUNCTION THERAPY: CPT | Mod: GN

## 2020-12-05 PROCEDURE — 250N000013 HC RX MED GY IP 250 OP 250 PS 637: Performed by: STUDENT IN AN ORGANIZED HEALTH CARE EDUCATION/TRAINING PROGRAM

## 2020-12-05 PROCEDURE — 272N000078 HC NUTRITION PRODUCT INTERMEDIATE LITER

## 2020-12-05 PROCEDURE — 80048 BASIC METABOLIC PNL TOTAL CA: CPT | Performed by: PSYCHIATRY & NEUROLOGY

## 2020-12-05 PROCEDURE — 84295 ASSAY OF SERUM SODIUM: CPT | Performed by: STUDENT IN AN ORGANIZED HEALTH CARE EDUCATION/TRAINING PROGRAM

## 2020-12-05 PROCEDURE — 85610 PROTHROMBIN TIME: CPT | Performed by: PSYCHIATRY & NEUROLOGY

## 2020-12-05 RX ORDER — AMOXICILLIN 250 MG
1 CAPSULE ORAL 2 TIMES DAILY
Status: DISCONTINUED | OUTPATIENT
Start: 2020-12-05 | End: 2020-12-30 | Stop reason: HOSPADM

## 2020-12-05 RX ORDER — FOLIC ACID 1 MG/1
1 TABLET ORAL DAILY
Status: DISCONTINUED | OUTPATIENT
Start: 2020-12-06 | End: 2020-12-30 | Stop reason: HOSPADM

## 2020-12-05 RX ORDER — LANOLIN ALCOHOL/MO/W.PET/CERES
3 CREAM (GRAM) TOPICAL AT BEDTIME
Status: DISCONTINUED | OUTPATIENT
Start: 2020-12-05 | End: 2020-12-06

## 2020-12-05 RX ORDER — LANOLIN ALCOHOL/MO/W.PET/CERES
100 CREAM (GRAM) TOPICAL DAILY
Status: DISCONTINUED | OUTPATIENT
Start: 2020-12-06 | End: 2020-12-30 | Stop reason: HOSPADM

## 2020-12-05 RX ORDER — WARFARIN SODIUM 5 MG/1
5 TABLET ORAL
Status: COMPLETED | OUTPATIENT
Start: 2020-12-05 | End: 2020-12-05

## 2020-12-05 RX ORDER — POLYETHYLENE GLYCOL 3350 17 G/17G
17 POWDER, FOR SOLUTION ORAL DAILY
Status: DISCONTINUED | OUTPATIENT
Start: 2020-12-05 | End: 2020-12-08

## 2020-12-05 RX ADMIN — AMPICILLIN SODIUM 1 G: 1 INJECTION, POWDER, FOR SOLUTION INTRAMUSCULAR; INTRAVENOUS at 12:57

## 2020-12-05 RX ADMIN — MELATONIN TAB 3 MG 3 MG: 3 TAB at 22:17

## 2020-12-05 RX ADMIN — DOCUSATE SODIUM AND SENNOSIDES 1 TABLET: 8.6; 5 TABLET, FILM COATED ORAL at 14:58

## 2020-12-05 RX ADMIN — NYSTATIN 500000 UNITS: 500000 SUSPENSION ORAL at 12:58

## 2020-12-05 RX ADMIN — ENOXAPARIN SODIUM 70 MG: 80 INJECTION SUBCUTANEOUS at 19:39

## 2020-12-05 RX ADMIN — AMPICILLIN SODIUM 1 G: 1 INJECTION, POWDER, FOR SOLUTION INTRAMUSCULAR; INTRAVENOUS at 17:28

## 2020-12-05 RX ADMIN — POLYETHYLENE GLYCOL 3350 17 G: 17 POWDER, FOR SOLUTION ORAL at 14:58

## 2020-12-05 RX ADMIN — AMPICILLIN SODIUM 1 G: 1 INJECTION, POWDER, FOR SOLUTION INTRAMUSCULAR; INTRAVENOUS at 23:44

## 2020-12-05 RX ADMIN — Medication 12.5 MG: at 19:43

## 2020-12-05 RX ADMIN — NYSTATIN 500000 UNITS: 500000 SUSPENSION ORAL at 17:21

## 2020-12-05 RX ADMIN — DOCUSATE SODIUM AND SENNOSIDES 1 TABLET: 8.6; 5 TABLET, FILM COATED ORAL at 19:43

## 2020-12-05 RX ADMIN — NYSTATIN 500000 UNITS: 500000 SUSPENSION ORAL at 07:39

## 2020-12-05 RX ADMIN — Medication 100 MG: at 07:38

## 2020-12-05 RX ADMIN — WARFARIN SODIUM 5 MG: 5 TABLET ORAL at 17:28

## 2020-12-05 RX ADMIN — Medication 12.5 MG: at 07:38

## 2020-12-05 RX ADMIN — MULTIVITAMIN 15 ML: LIQUID ORAL at 07:38

## 2020-12-05 RX ADMIN — NYSTATIN 500000 UNITS: 500000 SUSPENSION ORAL at 19:43

## 2020-12-05 RX ADMIN — ENOXAPARIN SODIUM 70 MG: 80 INJECTION SUBCUTANEOUS at 09:00

## 2020-12-05 RX ADMIN — FOLIC ACID 1 MG: 1 TABLET ORAL at 07:39

## 2020-12-05 RX ADMIN — AMPICILLIN SODIUM 1 G: 1 INJECTION, POWDER, FOR SOLUTION INTRAMUSCULAR; INTRAVENOUS at 06:01

## 2020-12-05 ASSESSMENT — ACTIVITIES OF DAILY LIVING (ADL)
ADLS_ACUITY_SCORE: 10

## 2020-12-05 NOTE — PROGRESS NOTES
Neuroscience Intensive Care Progress Note  2020    REASON FOR CRITICAL CARE ADMISSION: Hemorrhagic stroke     ADMITTING PHYSICIAN: Shala Bass MD     Date of Service (when I saw the patient): 20    24 hour events:  - One of 2 PEG buttons missing, PEG held in place with abdominal binder.      24 Hour Vital Signs Summary:  Temperatures:  Current - Temp: 97.6  F (36.4  C); Max - Temp  Av.7  F (36.5  C)  Min: 97.4  F (36.3  C)  Max: 98.2  F (36.8  C)  Respiration range: Resp  Av  Min: 12  Max: 20  Pulse range: Pulse  Av.2  Min: 62  Max: 83  Blood pressure range: Systolic (24hrs), Av , Min:122 , Max:151   ; Diastolic (24hrs), Av, Min:76, Max:91    Pulse oximetry range: SpO2  Av.3 %  Min: 92 %  Max: 100 %    Ventilator Settings  Resp: 18  RA      Intake/Output Summary (Last 24 hours) at 2020 1310  Last data filed at 2020 0811  Gross per 24 hour   Intake 2370 ml   Output 1550 ml   Net 820 ml         Current Medications:    ampicillin  1 g Intravenous Q6H     enoxaparin ANTICOAGULANT  1 mg/kg Subcutaneous Q12H     [START ON 2020] folic acid  1 mg Oral or Feeding Tube Daily     melatonin  3 mg Oral or Feeding Tube At Bedtime     metoprolol tartrate  12.5 mg Oral or Feeding Tube BID     [START ON 2020] multivitamins w/minerals  15 mL Oral or Feeding Tube Daily     nystatin  500,000 Units Oral 4x Daily     protein modular  2 packet Per Feeding Tube Daily     sodium chloride (PF)  10 mL Intravenous Once     sodium chloride bacteriostatic  10 mL Intravenous Once     [START ON 2020] vitamin B1  100 mg Oral or Feeding Tube Daily       PRN Medications:  acetaminophen **OR** acetaminophen **OR** acetaminophen, bisacodyl, dextrose, glucose **OR** dextrose **OR** glucagon, HOLD MEDICATION, labetalol, ondansetron **OR** ondansetron, polyethylene glycol, senna-docusate, Warfarin Therapy Reminder    Infusions:    dextrose       Warfarin Therapy Reminder         No  "Known Allergies    Physical Examination:  Vitals: /76 (BP Location: Left arm)   Pulse (P) 79   Temp 97.6  F (36.4  C) (Axillary)   Resp (P) 20   Ht 1.676 m (5' 5.98\")   Wt 68.5 kg (151 lb 0.2 oz)   SpO2 (P) 100%   BMI 24.39 kg/m    General:  patient lying in bed without any acute distress    HEENT:  normocephalic/atraumatic  Cardio:  RRR  Pulmonary:  no respiratory distress  Abdomen:  soft, non-tender, non-distended  Extremities:  no edema  Skin:  intact, warm/dry      Neurologic  Mental Status:  Awake, alert, minimally verbal (grunts, soft moans), will occasionally shake head yes/no to questions but unclear if actual answering or not, also appears to intermittently follow commands but then the manish of his actions appear to be mimicry and often perseverates on a singular action, pt remains severely aphasic  Cranial Nerves:  visual fields intact to confrontation, PERRL, EOMI with normal smooth pursuit, facial movements symmetric  Motor:  normal muscle tone and bulk, no abnormal movements, able to move all limbs spontaneously, no pronator drift  Reflexes:  toes down-going  Sensory:  deferred  Coordination:  unable to assess 2/2 aphasia  Station/Gait: walking well w/ 1-assist    Labs/Studies:  Recent Labs   Lab Test 12/05/20 0644 12/04/20  1845 12/04/20  0651 12/03/20  0610 12/03/20  0610   * 151* 154*   < > 154*   POTASSIUM 3.5  --  3.5  --  3.5   CHLORIDE 115*  --  122*  --  120*   CO2 29  --  27  --  29   ANIONGAP 4  --  5  --  5   GLC 86  --  121*  --  97   BUN 30  --  30  --  30   CR 1.04  --  0.99  --  1.05   SANDRA 8.1*  --  8.5  --  8.2*   WBC 10.7  --  11.7*  --  12.6*   RBC 4.33*  --  4.63  --  4.63   HGB 12.8*  --  13.8  --  14.0     --  220  --  196    < > = values in this interval not displayed.       Recent Labs   Lab Test 12/05/20 0644 12/04/20  1347 11/25/20  1142   INR 1.09 1.11 1.23*   PTT  --   --  27         Imaging:  All imaging personally reviewed. 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 On arrival to the ED, pt found to have a new subacute R parietal infarct w/ overlying petechial hemorrhage and was subsequently transferred to [facility] for further cares    Plan  Neuro:  #Subacute R Parietal Lobe Infarct w/hemorrhagic conversion likely 2/2 cardioembolic source in the setting of known Afib not on anticoagulation  - Neuro checks q4h  - SBP goal < 160  - PT/OT/SLP: TCU    #ETOH abuse  - Thiamine 100 mg daily  - Folic acid 1 mg daily    #Restless @ noc  - Melatonin 3mg at bedtime    CV:  #Afib  - Metoprolol 12.5 mg BID  - Warfarin daily (INR goal 2-3)  - Start Enoxaparin 70 mg q12h (Stop when therapeutic on Warfarin)    #TTE (11/25): EF 55-60%; Grade III diastolic dysfunction w/ severe LA enlargement and mild RA enlargement  - Consider Cardiology referral upon discharge for diastolic dysfunction    #Keep SBP < 160   -  Labetalol PRN    Pulm:  - NABEEL  - Continuous pulse ox monitoring  - Maintain O2 saturations > 92%    GI/Nutrition:  - Diet: NPO  - PEG--TF @ goal (50)  - Speech following  - Protein Pkt 2 daily  - Last BM--12/2  Bowel regimen: Schedule Senna-docusate and Miralax daily, supp PRN     Renal:  #Hypernatremia; 154->151->148  #Hyperchloremia; 122->115  -  ml q2h    #Urinary retention  #Traumatic straight cath  - Trial Flores removal today    - IV Fluids: None  - BMP daily    Endo:  - NABEEL  - Follow glucose levels    Heme:  #Anemia; 12.8  - CBC daily    ID:  #UTI; Enterococcus  - Ampicillin 1 g q8h x 7d (ends 12/6)  - Follow fever curve    PPX:  DVT Prophylaxis  - SCDs in place  - On Enoxaparin SQ for Warfarin bridge   GI Prophylaxis  - Not  indicated    Lines/Tubes/Drains:  - PIV x 2    Code Status: Full    Dispo: ICU    The patient was seen and discussed with the attending, Dr. Bass.    Heydi VUONG CNP  NeuroCritical Care Nurse Practitioner  Pager: [phone]  Ascom: *[phone] available M- [phone] to [phone]

## 2020-12-05 NOTE — PLAN OF CARE
Status: R parietal lobe infarct with hemorrhagic transformation strokes  Vitals: VSS ex Afib at times, well controlled with metoprolol  Neuros:  Neuros difficult to assess, no verbal response, unable to use writing board. Inconsistent with commands. Lifts his entire arm & leg up every time when assessing for strength & coordination, strength 5/5 throughout. Severe aphasia and slight L facial droop when at rest. pt restless but not pulling at lines.  IV: PIV at tko between abx  Resp/trach: WNL  Diet: tolerated TF at goal of 50 ml/hr with q2h 200 ml FWF  Bowel status: no BM today, passing flatus and audible and normoactive. Bowel meds added this afternoon and will remain scheduled.  : elena removed this AM per MD request, no void yet. Bladder scanned for 457 ml, straight cath'd for 375 ml at 1420, MD aware.   Skin: PEG site intact, scant dried drainage noted. Cleaned x 1 today.  Pain: denied  Activity: up with 1, GB, walker or IV pump in room  Social: friend Jayne updated by writer today, supportive  Plan: continue with POC, awaiting placement

## 2020-12-05 NOTE — PLAN OF CARE
Status: Pt here due to R parietal lobe infarct with hemorrhagic transformation  Vitals: VSS, hx afib, on CCM  Neuros: Neuros difficult to assess. Inconsistently follows few commands. Severe aphasia and slight L facial droop. Frequently points and gestures to different areas of the room  IV: PIV SL  Resp/trach: LS clear  Diet: NPO with TF at goal of 50mL/hr.   Bowel status: No BM this shift  : Flores in place   Skin: One of 2 PEG buttons found dislodged this shift in bed linens. MD aware and will reassess in the AM Currently held in place and no drainage with abdominal binder  Pain: Denied  Activity: Up with 1 and GB walker  Plan: Continue to monitor and follow POC

## 2020-12-05 NOTE — PROGRESS NOTES
Status: Pt here due to R parietal lobe infarct with hemorrhagic transformation  Vitals: VSS, hx afib. Cont CCM.  Neuros: Inconsistently follows commands.  Neuros difficult to assess.  L facial droop.  Severe aphasia, receptive and expressive.    IV: PIV SL b/w abx.  Resp/trach: WDL  Diet: NPO.  TF at goal, 50mL/hr.  Water flushes: 200mL Q2hrs, odd hours.    Bowel status: No BM this shift  : Flores in place.  Skin: One of 2 PEG buttons missing, per report from previous shift.  MD aware.  PEG held in place with abdominal binder.    Pain: denies  Activity: A1, GB, walker  Plan: Continue with current POC.

## 2020-12-06 ENCOUNTER — APPOINTMENT (OUTPATIENT)
Dept: SPEECH THERAPY | Facility: CLINIC | Age: 75
DRG: 064 | End: 2020-12-06
Attending: PSYCHIATRY & NEUROLOGY
Payer: MEDICARE

## 2020-12-06 LAB
ANION GAP SERPL CALCULATED.3IONS-SCNC: 7 MMOL/L (ref 3–14)
BUN SERPL-MCNC: 26 MG/DL (ref 7–30)
CALCIUM SERPL-MCNC: 8.4 MG/DL (ref 8.5–10.1)
CHLORIDE SERPL-SCNC: 111 MMOL/L (ref 94–109)
CO2 SERPL-SCNC: 27 MMOL/L (ref 20–32)
CREAT SERPL-MCNC: 0.94 MG/DL (ref 0.66–1.25)
ERYTHROCYTE [DISTWIDTH] IN BLOOD BY AUTOMATED COUNT: 13.4 % (ref 10–15)
GFR SERPL CREATININE-BSD FRML MDRD: 79 ML/MIN/{1.73_M2}
GLUCOSE SERPL-MCNC: 88 MG/DL (ref 70–99)
HCT VFR BLD AUTO: 42.3 % (ref 40–53)
HGB BLD-MCNC: 13.1 G/DL (ref 13.3–17.7)
INR PPP: 1.11 (ref 0.86–1.14)
MCH RBC QN AUTO: 29.7 PG (ref 26.5–33)
MCHC RBC AUTO-ENTMCNC: 31 G/DL (ref 31.5–36.5)
MCV RBC AUTO: 96 FL (ref 78–100)
PLATELET # BLD AUTO: 223 10E9/L (ref 150–450)
POTASSIUM SERPL-SCNC: 3.3 MMOL/L (ref 3.4–5.3)
POTASSIUM SERPL-SCNC: 3.8 MMOL/L (ref 3.4–5.3)
RBC # BLD AUTO: 4.41 10E12/L (ref 4.4–5.9)
SODIUM SERPL-SCNC: 145 MMOL/L (ref 133–144)
WBC # BLD AUTO: 10.3 10E9/L (ref 4–11)

## 2020-12-06 PROCEDURE — 250N000013 HC RX MED GY IP 250 OP 250 PS 637: Performed by: STUDENT IN AN ORGANIZED HEALTH CARE EDUCATION/TRAINING PROGRAM

## 2020-12-06 PROCEDURE — 92507 TX SP LANG VOICE COMM INDIV: CPT | Mod: GN

## 2020-12-06 PROCEDURE — 272N000078 HC NUTRITION PRODUCT INTERMEDIATE LITER

## 2020-12-06 PROCEDURE — 250N000011 HC RX IP 250 OP 636: Performed by: STUDENT IN AN ORGANIZED HEALTH CARE EDUCATION/TRAINING PROGRAM

## 2020-12-06 PROCEDURE — 92526 ORAL FUNCTION THERAPY: CPT | Mod: GN

## 2020-12-06 PROCEDURE — 85027 COMPLETE CBC AUTOMATED: CPT | Performed by: PSYCHIATRY & NEUROLOGY

## 2020-12-06 PROCEDURE — 99232 SBSQ HOSP IP/OBS MODERATE 35: CPT | Mod: GC | Performed by: PSYCHIATRY & NEUROLOGY

## 2020-12-06 PROCEDURE — 85610 PROTHROMBIN TIME: CPT | Performed by: PSYCHIATRY & NEUROLOGY

## 2020-12-06 PROCEDURE — 120N000002 HC R&B MED SURG/OB UMMC

## 2020-12-06 PROCEDURE — 250N000013 HC RX MED GY IP 250 OP 250 PS 637: Performed by: NURSE PRACTITIONER

## 2020-12-06 PROCEDURE — 250N000013 HC RX MED GY IP 250 OP 250 PS 637: Performed by: PSYCHIATRY & NEUROLOGY

## 2020-12-06 PROCEDURE — 84132 ASSAY OF SERUM POTASSIUM: CPT | Performed by: PSYCHIATRY & NEUROLOGY

## 2020-12-06 PROCEDURE — 36415 COLL VENOUS BLD VENIPUNCTURE: CPT | Performed by: PSYCHIATRY & NEUROLOGY

## 2020-12-06 PROCEDURE — 80048 BASIC METABOLIC PNL TOTAL CA: CPT | Performed by: PSYCHIATRY & NEUROLOGY

## 2020-12-06 RX ORDER — POTASSIUM CHLORIDE 1.5 G/1.58G
40 POWDER, FOR SOLUTION ORAL ONCE
Status: DISCONTINUED | OUTPATIENT
Start: 2020-12-06 | End: 2020-12-06

## 2020-12-06 RX ORDER — POTASSIUM CHLORIDE 1.5 G/1.58G
40 POWDER, FOR SOLUTION ORAL ONCE
Status: COMPLETED | OUTPATIENT
Start: 2020-12-06 | End: 2020-12-06

## 2020-12-06 RX ORDER — WARFARIN SODIUM 6 MG/1
6 TABLET ORAL
Status: COMPLETED | OUTPATIENT
Start: 2020-12-06 | End: 2020-12-06

## 2020-12-06 RX ORDER — POTASSIUM CHLORIDE 1.5 G/1.58G
20 POWDER, FOR SOLUTION ORAL ONCE
Status: COMPLETED | OUTPATIENT
Start: 2020-12-06 | End: 2020-12-06

## 2020-12-06 RX ORDER — LANOLIN ALCOHOL/MO/W.PET/CERES
3 CREAM (GRAM) TOPICAL DAILY
Status: DISCONTINUED | OUTPATIENT
Start: 2020-12-06 | End: 2020-12-30 | Stop reason: HOSPADM

## 2020-12-06 RX ADMIN — ENOXAPARIN SODIUM 70 MG: 80 INJECTION SUBCUTANEOUS at 19:35

## 2020-12-06 RX ADMIN — THIAMINE HCL TAB 100 MG 100 MG: 100 TAB at 07:58

## 2020-12-06 RX ADMIN — AMPICILLIN SODIUM 1 G: 1 INJECTION, POWDER, FOR SOLUTION INTRAMUSCULAR; INTRAVENOUS at 11:28

## 2020-12-06 RX ADMIN — ACETAMINOPHEN 650 MG: 325 TABLET, FILM COATED ORAL at 11:28

## 2020-12-06 RX ADMIN — ENOXAPARIN SODIUM 70 MG: 80 INJECTION SUBCUTANEOUS at 07:58

## 2020-12-06 RX ADMIN — MELATONIN TAB 3 MG 3 MG: 3 TAB at 18:15

## 2020-12-06 RX ADMIN — POTASSIUM CHLORIDE 40 MEQ: 1.5 POWDER, FOR SOLUTION ORAL at 07:53

## 2020-12-06 RX ADMIN — NYSTATIN 500000 UNITS: 500000 SUSPENSION ORAL at 16:52

## 2020-12-06 RX ADMIN — NYSTATIN 500000 UNITS: 500000 SUSPENSION ORAL at 19:35

## 2020-12-06 RX ADMIN — WARFARIN SODIUM 6 MG: 6 TABLET ORAL at 18:15

## 2020-12-06 RX ADMIN — POLYETHYLENE GLYCOL 3350 17 G: 17 POWDER, FOR SOLUTION ORAL at 07:53

## 2020-12-06 RX ADMIN — MULTIVIT AND MINERALS-FERROUS GLUCONATE 9 MG IRON/15 ML ORAL LIQUID 15 ML: at 07:54

## 2020-12-06 RX ADMIN — NYSTATIN 500000 UNITS: 500000 SUSPENSION ORAL at 07:53

## 2020-12-06 RX ADMIN — AMPICILLIN SODIUM 1 G: 1 INJECTION, POWDER, FOR SOLUTION INTRAMUSCULAR; INTRAVENOUS at 18:15

## 2020-12-06 RX ADMIN — DOCUSATE SODIUM AND SENNOSIDES 1 TABLET: 8.6; 5 TABLET, FILM COATED ORAL at 07:54

## 2020-12-06 RX ADMIN — NYSTATIN 500000 UNITS: 500000 SUSPENSION ORAL at 11:28

## 2020-12-06 RX ADMIN — Medication 12.5 MG: at 19:35

## 2020-12-06 RX ADMIN — POTASSIUM CHLORIDE 20 MEQ: 1.5 POWDER, FOR SOLUTION ORAL at 14:00

## 2020-12-06 RX ADMIN — FOLIC ACID 1 MG: 1 TABLET ORAL at 07:54

## 2020-12-06 RX ADMIN — Medication 12.5 MG: at 07:53

## 2020-12-06 RX ADMIN — AMPICILLIN SODIUM 1 G: 1 INJECTION, POWDER, FOR SOLUTION INTRAMUSCULAR; INTRAVENOUS at 06:35

## 2020-12-06 ASSESSMENT — ACTIVITIES OF DAILY LIVING (ADL)
ADLS_ACUITY_SCORE: 10

## 2020-12-06 ASSESSMENT — MIFFLIN-ST. JEOR: SCORE: 1337.95

## 2020-12-06 NOTE — PROGRESS NOTES
[facility]     Stroke Progress Note    Interval Events  - No acute events overnight  - Pt remains off sitter  - Removed Flores yesterday for trial of void, but has been requiring straight cathing so will need to replace  - Na 145: Decrease free water flushes to 100ml Q4h    Impression  Ischemic Stroke due to cardioembolism    Plan  Simone Daniels is a 72yr old male with PMHx Afib not on anticoagulation, EtOH abuse, anxiety, GERD, and hx L parietal stroke w/ hemorrhagic conversion (2018) who presented to [facility] via EMS on 11/25/2020 after appearing disheveled, mute, and with abnormal 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 On arrival to the ED, pt found to have a new subacute R parietal infarct w/ overlying petechial hemorrhage and was subsequently transferred to [facility] for further cares. Currently pursuing guardianship options    #Subacute R Parietal Lobe Infarct w/ hemorrhagic conversion likely 2/2 cardioembolic source in the setting of known Afib not on anticoagulation  #Afib  Pt had been placed on Eliquis in 2018 for prior L parietal stroke, but was inconsistent in taking it as prescribed and then ultimately discontinued it in 2019 after recurrent nosebleeds  - Q4h neurochecks  - SBP goal > 160  - Avoid hypotonic IVF  - Metoprolol 12.5mg BID  - DISCONTINUE PTA Lisinopril  - Warfarin w/ pharmacy dosing  - STOP SQ Heparin Q8h  - Melatonin 3mg Qpm  - No indication for statin (LDL 69)  - Lipid Panel (LDL 69), HgbA1c (5.6)  - TTE (11/25): LV/RV size/function normal w/ EF 55-60%; Grade III diastolic dysfunction w/ severe LA enlargement and mild RA enlargement; intact atrial septum; mild mitral annular calcification; aortic sclerosis w/o stenosis  - MRI Stroke Protocol: can consider in the future if pt calms; previously did not tolerate  - EEG: generalized slowing suggestive of mild-mod encephalopathy w/ maximum cortical dysfunction in the R hemisphere and  w/o sign of seizure or epileptiform activity  - Telemetry  - PT/OT/SLP: TCU  - Stroke Education  - Stroke Class per [facility] ([facility])  - Depression Screen  - Apnea Screen  - Euthermia, Euglycemia  - Consider Cardiology referral upon discharge for diastolic dysfunction seen on TTE    #EtOH Abuse  - Discontinue CIWA  - Thiamine, Folate    #GREGORIA, improving  #Urinary Retention  #Traumatic Straight Cath   #Enterococcus UTI  Baseline Cr < 1, presented w/ Cr 1.3 likely . Pt has a hx of urinary retention (hx tamsulosin but not a current med) but is exhibiting marked amounts here which could be worsening pt's GREGORIA. Lack of PO intake and Enterococcus UTI may also be contributing. Received 7d of Ampicillin. On 11/29, pt also suffered a traumatic straight cath. Curbside to Urology recommended placement of Flores for 5d to allow healing  - Consider Urology referral upon discharge     # Hypernatremia  # Hyperchloremia  Pt has exhibited elevated Na and Cl, rising since time of admission. Likely 2/2 NPO status since arrival due to multiple failed swallow attempts. Will monitor for refeeding syndrome once NJ placed and TF initiated  - PEG in place   - Nutrition following for TF  - 100ml Q4h free water    ?Oral Thrush  - Nystatin 500,000u BID     Diet: TF per nutrition   VTE PPx: SQ Heparin Q8h  Dispo: TCU  Code Status: FULL      The patient was discussed with Stroke Fellow, Dr. Dow.  The Stroke Staff is Dr. Bass.    Diann Leon MD  Neurology Resident, PGY1  Pager: [phone]  ______________________________________________________    Medications   Home Meds  Prior to Admission medications    Medication Sig Start Date End Date Taking? Authorizing Provider   CRANBERRY JUICE EXTRACT PO Take 40,000 Units by mouth daily    Reported, Patient   Garlic 1000 MG CAPS Take 1 capsule by mouth daily    Reported, Patient   lisinopril (PRINIVIL/ZESTRIL) 5 MG tablet Take 1 tablet (5 mg) by mouth daily For blood pressure control  1/18/19   Sophia Dacosta APRN CNP   NONFORMULARY Beet Root Juice    Reported, Patient   senna-docusate (SENOKOT-S/PERICOLACE) 8.6-50 MG tablet Take 1 tablet by mouth 2 times daily For constipation 12/11/18   Annabella Santos MD   tamsulosin (FLOMAX) 0.4 MG capsule Take 1 capsule (0.4 mg) by mouth daily 6/5/19   Missael Carvalho MD       Scheduled Meds    ampicillin  1 g Intravenous Q6H     enoxaparin ANTICOAGULANT  1 mg/kg Subcutaneous Q12H     folic acid  1 mg Oral or Feeding Tube Daily     melatonin  3 mg Oral or Feeding Tube At Bedtime     metoprolol tartrate  12.5 mg Oral or Feeding Tube BID     multivitamins w/minerals  15 mL Oral or Feeding Tube Daily     nystatin  500,000 Units Oral 4x Daily     polyethylene glycol  17 g Oral or Feeding Tube Daily     potassium chloride  20 mEq Oral or Feeding Tube Once     protein modular  2 packet Per Feeding Tube Daily     senna-docusate  1 tablet Per Feeding Tube BID     sodium chloride (PF)  10 mL Intravenous Once     sodium chloride bacteriostatic  10 mL Intravenous Once     vitamin B1  100 mg Oral or Feeding Tube Daily       Infusion Meds    dextrose       Warfarin Therapy Reminder         PRN Meds  acetaminophen **OR** acetaminophen **OR** acetaminophen, bisacodyl, dextrose, glucose **OR** dextrose **OR** glucagon, labetalol, ondansetron **OR** ondansetron, Warfarin Therapy Reminder       PHYSICAL EXAMINATION  Temp:  [95  F (35  C)-98  F (36.7  C)] 95.1  F (35.1  C)  Pulse:  [54-78] 68  Resp:  [14-20] 14  BP: (111-152)/(70-90) 111/75  SpO2:  [98 %-100 %] 100 %     General:  patient lying in bed without any acute distress    HEENT:  normocephalic/atraumatic  Cardio:  RRR  Pulmonary:  no respiratory distress  Abdomen:  soft, non-tender, non-distended  Extremities:  no edema  Skin:  intact, warm/dry     Neurologic  Mental Status:  Awake, alert, minimally verbal (grunts, soft moans), will occasionally shake head yes/no to questions but unclear if actual answering or  not, also appears to intermittently follow commands but then the manish of his actions appear to be mimicry and often perseverates on a singular action, pt remains severely aphasic  Cranial Nerves:  visual fields intact to confrontation, PERRL, EOMI with normal smooth pursuit, facial movements symmetric  Motor:  normal muscle tone and bulk, no abnormal movements, able to move all limbs spontaneously, no pronator drift  Reflexes:  toes down-going  Sensory:  deferred  Coordination:  unable to assess 2/2 aphasia  Station/Gait: walking well w/ 1-assist    Stroke Scales    NIHSS  Interval transfer (11/25/20 1947)   Interval Comments     1a. Level of Consciousness 0-->Alert, keenly responsive   1b. LOC Questions 2-->Answers neither question correctly   1c. LOC Commands 1-->Performs one task correctly   2.   Best Gaze 0-->Normal   3.   Visual 0-->No visual loss   4.   Facial Palsy 0-->Normal symmetrical movements   5a. Motor Arm, Left 0-->No drift, limb holds 90 (or 45) degrees for full 10 secs   5b. Motor Arm, Right 0-->No drift, limb holds 90 (or 45) degrees for full 10 secs   6a. Motor Leg, Left 0-->No drift, leg holds 30 degree position for full 5 secs   6b. Motor Leg, right 0-->No drift, leg holds 30 degree position for full 5 secs   7.   Limb Ataxia 0-->Absent   8.   Sensory 0-->Normal, no sensory loss   9.   Best Language 3-->Mute, global aphasia, no usable speech or auditory comprehension   10. Dysarthria 2-->Severe dysarthria, patients speech is so slurred as to be unintelligible in the absence of or out of proportion to any dysphasia, or is mute/anarthric   11. Extinction and Inattention  0-->No abnormality   Total 8 (11/25/20 1947)       Imaging  I personally reviewed all imaging; relevant findings per HPI.     Lab Results Data   CBC  Recent Labs   Lab 12/06/20  0606 12/05/20  0644 12/04/20  0651   WBC 10.3 10.7 11.7*   RBC 4.41 4.33* 4.63   HGB 13.1* 12.8* 13.8   HCT 42.3 41.9 44.4    207 220     Basic  Metabolic Panel    Recent Labs   Lab 12/06/20  1218 12/06/20  0606 12/05/20  1843 12/05/20  0644 12/04/20  0651 12/04/20  0651   NA  --  145* 147* 148*   < > 154*   POTASSIUM 3.8 3.3*  --  3.5  --  3.5   CHLORIDE  --  111*  --  115*  --  122*   CO2  --  27  --  29  --  27   BUN  --  26  --  30  --  30   CR  --  0.94  --  1.04  --  0.99   GLC  --  88  --  86  --  121*   SANDRA  --  8.4*  --  8.1*  --  8.5    < > = values in this interval not displayed.     Liver Panel  No results for input(s): PROTTOTAL, ALBUMIN, BILITOTAL, ALKPHOS, AST, ALT, BILIDIRECT in the last 168 hours.  INR    Recent Labs   Lab Test 12/06/20  0606 12/05/20  0644 12/04/20  1347   INR 1.11 1.09 1.11      Lipid Profile    Recent Labs   Lab Test 11/25/20 2020 11/27/18  0701 12/16/15  0857   CHOL 133 147 159   HDL 55 57 76   LDL 69 78 73   TRIG 46 58 52     A1C    Recent Labs   Lab Test 11/25/20 2020 11/27/18  0701   A1C 5.6 5.5     Troponin I  No results for input(s): TROPI in the last 168 hours.

## 2020-12-06 NOTE — PLAN OF CARE
Status: R parietal lobe infarct with hemorrhagic transformation strokes  Vitals: VSS ex Afib at times, well controlled with metoprolol  Neuros: Neuros difficult to assess. Inconsistently follows few commands. Severe aphasia and slight L facial droop. Strengths 5/5 throughout. Restless at times  IV: PIV SL  Resp/trach: WNL  Diet: Tolerating TF at goal of 50mL/hr with q2h 200 mL FWF  Bowel status: 1 small BM this shift  : Voided 1x with incontinence this shift. PVR bladder scan 18mL  Skin: PEG site intact  Pain: denied  Activity: A1 GB walker   Plan: Continue to monitor and follow POC

## 2020-12-06 NOTE — PROGRESS NOTES
Status: Pt here due to R parietal lobe infarct with hemorrhagic transformation  Vitals: VSS, hx afib. Cont CCM.  Neuros: Inconsistently follows commands.  Neuros difficult to assess.  L facial droop.  Severe aphasia, receptive and expressive.    IV: PIV SL b/w abx.  Resp/trach: WDL  Diet: NPO.  TF at goal, 50mL/hr.  Water flushes: 200mL Q2hrs,   Bowel status: BM x1 this shift.  Ambulated into bathroom, used toilet.   : Unable to void.  Straight cathed x2 this shift.  #1: 850ml at 0220. #2: 450mL at 0520.  (using coude catheter)  Skin: One of 2 PEG buttons missing, per report from previous shift.  MD aware.  PEG held in place with abdominal binder.    Pain: denies  Activity: A1, GB, walker. BA on at all times.  Patient can be impulsive.  Plan: Continue with current POC.

## 2020-12-06 NOTE — PLAN OF CARE
Status: R parietal lobe infarct with hemorrhagic transformation strokes  Vitals: VSS ex Afib at times, well controlled with metoprolol  Neuros:  Neuros difficult to assess, no verbal response, unable to use writing board. Inconsistent with commands. Lifts his entire arm & leg up every time when assessing for strength & coordination, strength 5/5 throughout. Severe aphasia and slight L facial droop when at rest. pt restless but not pulling at lines.  IV: PIV at tko between abx  Resp/trach: WNL  Diet: tolerated TF at goal of 50 ml/hr with q2h 200 ml FWF. Na+ normalizing. Abdominal binder in place.  Bowel status: BM x 2 today, inc  : elena replaced this AM for urinary retention after failed 24 hr trial.  Skin: PEG site intact, scant dried drainage noted. Cleaned x 1 today.  Pain: denied  Activity: up with 1, GB, walker or IV pump in room  Social: friend Jayne updated by writer today, supportive  Plan: continue with POC, awaiting placement

## 2020-12-07 ENCOUNTER — APPOINTMENT (OUTPATIENT)
Dept: PHYSICAL THERAPY | Facility: CLINIC | Age: 75
DRG: 064 | End: 2020-12-07
Attending: PSYCHIATRY & NEUROLOGY
Payer: MEDICARE

## 2020-12-07 ENCOUNTER — APPOINTMENT (OUTPATIENT)
Dept: SPEECH THERAPY | Facility: CLINIC | Age: 75
DRG: 064 | End: 2020-12-07
Attending: PSYCHIATRY & NEUROLOGY
Payer: MEDICARE

## 2020-12-07 ENCOUNTER — APPOINTMENT (OUTPATIENT)
Dept: OCCUPATIONAL THERAPY | Facility: CLINIC | Age: 75
DRG: 064 | End: 2020-12-07
Attending: PSYCHIATRY & NEUROLOGY
Payer: MEDICARE

## 2020-12-07 LAB
ANION GAP SERPL CALCULATED.3IONS-SCNC: 6 MMOL/L (ref 3–14)
BUN SERPL-MCNC: 21 MG/DL (ref 7–30)
CALCIUM SERPL-MCNC: 8.2 MG/DL (ref 8.5–10.1)
CHLORIDE SERPL-SCNC: 109 MMOL/L (ref 94–109)
CO2 SERPL-SCNC: 27 MMOL/L (ref 20–32)
CREAT SERPL-MCNC: 0.89 MG/DL (ref 0.66–1.25)
ERYTHROCYTE [DISTWIDTH] IN BLOOD BY AUTOMATED COUNT: 13.5 % (ref 10–15)
GFR SERPL CREATININE-BSD FRML MDRD: 84 ML/MIN/{1.73_M2}
GLUCOSE SERPL-MCNC: 86 MG/DL (ref 70–99)
HCT VFR BLD AUTO: 38.6 % (ref 40–53)
HGB BLD-MCNC: 12.4 G/DL (ref 13.3–17.7)
INR PPP: 1.26 (ref 0.86–1.14)
MAGNESIUM SERPL-MCNC: 2 MG/DL (ref 1.6–2.3)
MCH RBC QN AUTO: 29.9 PG (ref 26.5–33)
MCHC RBC AUTO-ENTMCNC: 32.1 G/DL (ref 31.5–36.5)
MCV RBC AUTO: 93 FL (ref 78–100)
PHOSPHATE SERPL-MCNC: 2.5 MG/DL (ref 2.5–4.5)
PHOSPHATE SERPL-MCNC: 2.5 MG/DL (ref 2.5–4.5)
PLATELET # BLD AUTO: 226 10E9/L (ref 150–450)
POTASSIUM SERPL-SCNC: 3.8 MMOL/L (ref 3.4–5.3)
RBC # BLD AUTO: 4.15 10E12/L (ref 4.4–5.9)
SODIUM SERPL-SCNC: 142 MMOL/L (ref 133–144)
WBC # BLD AUTO: 8.8 10E9/L (ref 4–11)

## 2020-12-07 PROCEDURE — 250N000013 HC RX MED GY IP 250 OP 250 PS 637: Performed by: PSYCHIATRY & NEUROLOGY

## 2020-12-07 PROCEDURE — 97116 GAIT TRAINING THERAPY: CPT | Mod: GP

## 2020-12-07 PROCEDURE — 84132 ASSAY OF SERUM POTASSIUM: CPT | Performed by: PSYCHIATRY & NEUROLOGY

## 2020-12-07 PROCEDURE — 120N000002 HC R&B MED SURG/OB UMMC

## 2020-12-07 PROCEDURE — 85610 PROTHROMBIN TIME: CPT | Performed by: PSYCHIATRY & NEUROLOGY

## 2020-12-07 PROCEDURE — 92523 SPEECH SOUND LANG COMPREHEN: CPT | Mod: GN

## 2020-12-07 PROCEDURE — 84100 ASSAY OF PHOSPHORUS: CPT | Performed by: PSYCHIATRY & NEUROLOGY

## 2020-12-07 PROCEDURE — 80048 BASIC METABOLIC PNL TOTAL CA: CPT | Performed by: PSYCHIATRY & NEUROLOGY

## 2020-12-07 PROCEDURE — 83735 ASSAY OF MAGNESIUM: CPT | Performed by: PSYCHIATRY & NEUROLOGY

## 2020-12-07 PROCEDURE — 250N000011 HC RX IP 250 OP 636: Performed by: STUDENT IN AN ORGANIZED HEALTH CARE EDUCATION/TRAINING PROGRAM

## 2020-12-07 PROCEDURE — 36415 COLL VENOUS BLD VENIPUNCTURE: CPT | Performed by: PSYCHIATRY & NEUROLOGY

## 2020-12-07 PROCEDURE — 85027 COMPLETE CBC AUTOMATED: CPT | Performed by: PSYCHIATRY & NEUROLOGY

## 2020-12-07 PROCEDURE — 99231 SBSQ HOSP IP/OBS SF/LOW 25: CPT | Performed by: STUDENT IN AN ORGANIZED HEALTH CARE EDUCATION/TRAINING PROGRAM

## 2020-12-07 PROCEDURE — 250N000013 HC RX MED GY IP 250 OP 250 PS 637: Performed by: STUDENT IN AN ORGANIZED HEALTH CARE EDUCATION/TRAINING PROGRAM

## 2020-12-07 PROCEDURE — 97535 SELF CARE MNGMENT TRAINING: CPT | Mod: GO | Performed by: OCCUPATIONAL THERAPIST

## 2020-12-07 PROCEDURE — 36415 COLL VENOUS BLD VENIPUNCTURE: CPT | Performed by: NURSE PRACTITIONER

## 2020-12-07 PROCEDURE — 250N000013 HC RX MED GY IP 250 OP 250 PS 637: Performed by: NURSE PRACTITIONER

## 2020-12-07 PROCEDURE — 272N000078 HC NUTRITION PRODUCT INTERMEDIATE LITER

## 2020-12-07 PROCEDURE — 84100 ASSAY OF PHOSPHORUS: CPT | Performed by: NURSE PRACTITIONER

## 2020-12-07 RX ORDER — POTASSIUM CHLORIDE 1.5 G/1.58G
20 POWDER, FOR SOLUTION ORAL ONCE
Status: COMPLETED | OUTPATIENT
Start: 2020-12-07 | End: 2020-12-07

## 2020-12-07 RX ADMIN — POTASSIUM CHLORIDE 20 MEQ: 1.5 POWDER, FOR SOLUTION ORAL at 07:53

## 2020-12-07 RX ADMIN — MELATONIN TAB 3 MG 3 MG: 3 TAB at 18:38

## 2020-12-07 RX ADMIN — Medication 12.5 MG: at 21:03

## 2020-12-07 RX ADMIN — MULTIVIT AND MINERALS-FERROUS GLUCONATE 9 MG IRON/15 ML ORAL LIQUID 15 ML: at 07:54

## 2020-12-07 RX ADMIN — NYSTATIN 500000 UNITS: 500000 SUSPENSION ORAL at 07:54

## 2020-12-07 RX ADMIN — Medication 2 PACKET: at 07:53

## 2020-12-07 RX ADMIN — FOLIC ACID 1 MG: 1 TABLET ORAL at 07:53

## 2020-12-07 RX ADMIN — WARFARIN SODIUM 7 MG: 6 TABLET ORAL at 18:37

## 2020-12-07 RX ADMIN — NYSTATIN 500000 UNITS: 500000 SUSPENSION ORAL at 21:03

## 2020-12-07 RX ADMIN — Medication 12.5 MG: at 07:54

## 2020-12-07 RX ADMIN — NYSTATIN 500000 UNITS: 500000 SUSPENSION ORAL at 12:17

## 2020-12-07 RX ADMIN — THIAMINE HCL TAB 100 MG 100 MG: 100 TAB at 07:53

## 2020-12-07 RX ADMIN — ENOXAPARIN SODIUM 70 MG: 80 INJECTION SUBCUTANEOUS at 07:54

## 2020-12-07 RX ADMIN — NYSTATIN 500000 UNITS: 500000 SUSPENSION ORAL at 15:47

## 2020-12-07 RX ADMIN — DOCUSATE SODIUM AND SENNOSIDES 1 TABLET: 8.6; 5 TABLET, FILM COATED ORAL at 07:54

## 2020-12-07 RX ADMIN — DOCUSATE SODIUM AND SENNOSIDES 1 TABLET: 8.6; 5 TABLET, FILM COATED ORAL at 21:03

## 2020-12-07 RX ADMIN — ENOXAPARIN SODIUM 70 MG: 80 INJECTION SUBCUTANEOUS at 21:10

## 2020-12-07 ASSESSMENT — ACTIVITIES OF DAILY LIVING (ADL)
ADLS_ACUITY_SCORE: 10.5
ADLS_ACUITY_SCORE: 10
ADLS_ACUITY_SCORE: 10.5

## 2020-12-07 NOTE — PLAN OF CARE
"2300-0730:  Neuro: Severe aphasia, strengths equal, nods \"yes\" to any questions answered. Difficult to assess orientation. Did not set off bed alarm overnight.  Cardiac: A.fib rates 60-70s, BPs 110-130s/70s  Resp: RA  GI/: Flores for retention with adequate UO, no BM.  Diet/Appetite: NPO with TF at goal of 60cc/hr, FWF 100cc q 4 hours   Skin: Bruising  Access: R) PIV SL  Drains: Flores, G-tube  Activity: Ax1, GB, walker  Pain: Denies  Pending placement    Will continue with plan of care and notify MD of any changes.     "

## 2020-12-07 NOTE — PROGRESS NOTES
12/07/20 1446   General Information   Onset of Illness/Injury or Date of Surgery 11/25/20   Referring Physician Christopher Bejarano MD   Patient/Family Therapy Goal Statement (SLP) None stated   Pertinent History of Current Problem SLP: Pt is a 72 year old man with history of atrial fibrillation not on anticoagulation (discontinued apixaban 6/2019), anxiety, GERD, and left parietal stroke with hemorrhagic conversion in 2018 who presented to [facility] for evaluation of aphasia. He was reportedly found behaving abnormally at a liquor store (disheveled and mute). Last known well is unclear. Head CT showed new area of subacute infarct in right parietal lobe with petechial hemorrhage. CTA was without LVO. Dr. Abrams (stroke fellow) was contacted for stroke alert. Ultimately, decision was made to transfer patient to [facility] for neuro-ICU level cares.    Type of Evaluation   Type of Evaluation Speech, Language, Cognition   Speech   Comment, Motor Speech Assessment Mostly grunting and humming, no real words during his attempts at verbal ouput    Western Aphasia Battery- Revised Bedside Record From   Spontaneous Speech Content Score (out of 10) 0   Spontaneous Speech Fluency Score (out of 10) 0   Auditory Verbal Comprehension Score (out of 10) 4   Sequential Commands Score (out of 10) 0   Repetition Score (out of 10) 0   Object Naming Score (out of 10) 0   Bedside Aphasia Sum 4   WAB-R Bedside Aphasia Score 6.67   Apraxia Score - optional (out of 10) 2   Bedside Aphasia Classification Broca's Aphasia   Aphasia Severity Level Very Severe Aphasia   Verbal Expression   Comment, Assessment (Verbal Expression) Pt was unable to participate in automatic naming tasks    Augmentative/Alternative Communication (AAC)   Gesture Mode (AAC) body movements;facial expressions;hand gestures;head nods/shakes;pointing   Comment, Assessment (AAC) Pt using these modes of communication with variable accuracy and success. pt apperaing to  understand some general information provided to him and he became appropriately emotional after evaluation. However, functional communication continues to be very impaired    Body Movements, Gestures (AAC) produced with model and cues   Facial Expressions, Gestures (AAC) used independently   Hand Gestures (AAC) produced with model and cues   Pointing, Gestures (AAC) produced with model and cues   General Therapy Interventions   Planned Therapy Interventions Language   Language Auditory comprehension;Reading comprehension;Verbal expression;Written expression   SLP Therapy Assessment/Plan   Criteria for Skilled Therapeutic Interventions Met (SLP Eval) yes;treatment indicated   SLP Diagnosis Severe comprehensive and expressive language deficits    Rehab Potential (SLP Eval) fair, will monitor progress closely   Therapy Frequency (SLP Eval) daily   Predicted Duration of Therapy Intervention (SLP Eval) 3 weeks   Comment, Therapy Assessment/Plan (SLP) Pt seen for speech/language evaluation. He is alert and agreeable. Pt participated in the Western Aphasia Battery (bedside) assessment. He recieved a score of a 6, which is severe. Pt intermittent appears to comprehend provided information, however this is very inconsistent and suspect some of this is just learned social behaviors vs actual comprehension. His yes/no responses fluctuates greatly. Pt often chuckles and jokes but suspect he is not always understanding. He became appropriately emotional at the end of the therapy session re: his struggles with speech. No functional verbal output t/o the evaluation. Pt grunting, some laughing, humming but nothing intelligibile. Initiate SLP serivces for severe communication deficits impacting pt's ability to both comprehend and express language.    Therapy Plan Review/Discharge Plan (SLP)   Therapy Plan Review (SLP) evaluation/treatment results reviewed;patient   SLP Discharge Planning    SLP Discharge Recommendation (DC Rec)  Transitional Care Facility   SLP Rationale for DC Rec Significantly below baseline for swallow and communication   SLP Brief overview of current status  Continue NPO status. Will also initiate SLP services for severe communication deficits     Total Evaluation Time   Total Evaluation Time (Minutes) 14

## 2020-12-07 NOTE — PROGRESS NOTES
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 On arrival to the ED, pt found to have a new subacute R parietal infarct w/ overlying petechial hemorrhage and was subsequently transferred to [facility] for further cares. Currently pursuing guardianship options    #Subacute R Parietal Lobe Infarct w/ hemorrhagic conversion likely 2/2 cardioembolic source in the setting of known Afib not on anticoagulation  #Afib  Pt had been placed on Eliquis in 2018 for prior L parietal stroke, but was inconsistent in taking it as prescribed and then ultimately discontinued it in 2019 after recurrent nosebleeds  - Q4h neurochecks  - SBP goal > 160  - Avoid hypotonic IVF  - Metoprolol 12.5mg BID  - DISCONTINUE PTA Lisinopril  - Warfarin w/ pharmacy dosing  - STOP SQ Heparin Q8h  - Melatonin 3mg Qpm  - No indication for statin (LDL 69)  - Lipid Panel (LDL 69), HgbA1c (5.6)  - TTE (11/25): LV/RV size/function normal w/ EF 55-60%; Grade III diastolic dysfunction w/ severe LA enlargement and mild RA enlargement; intact atrial septum; mild mitral annular calcification; aortic sclerosis w/o stenosis  - MRI Stroke Protocol: can consider in the future if pt calms; previously did not tolerate  - EEG: generalized slowing suggestive of mild-mod encephalopathy w/ maximum cortical dysfunction in the R hemisphere and w/o sign of seizure or epileptiform activity  - Telemetry  - PT/OT/SLP: TCU  - Stroke Education  - Stroke Class  per [facility] ([facility])  - Depression Screen  - Apnea Screen  - Euthermia, Euglycemia  - Consider Cardiology referral upon discharge for diastolic dysfunction seen on TTE    #EtOH Abuse  - Discontinue CIWA  - Thiamine, Folate    #GREGORIA, improving  #Urinary Retention  #Traumatic Straight Cath   #Enterococcus UTI  Baseline Cr < 1, presented w/ Cr 1.3 likely . Pt has a hx of urinary retention (hx tamsulosin but not a current med) but is exhibiting marked amounts here which could be worsening pt's GREGORIA. Lack of PO intake and Enterococcus UTI may also be contributing. Received 7d of Ampicillin. On 11/29, pt also suffered a traumatic straight cath. Curbside to Urology recommended placement of Flores for 5d to allow healing  - Consider Urology referral upon discharge     # Hypernatremia, resolved  # Hyperchloremia, resolved  Pt has exhibited elevated Na and Cl, rising since time of admission. Likely 2/2 NPO status since arrival due to multiple failed swallow attempts. Will monitor for refeeding syndrome once NJ placed and TF initiated  - PEG in place   - Nutrition following for TF    ?Oral Thrush  - Nystatin 500,000u BID     Diet: TF per nutrition   VTE PPx: SQ Heparin Q8h  Dispo: TCU  Code Status: FULL      The patient was discussed with attending General Neurlogist Dr Irene Leon MD  Neurology Resident, PGY1  Pager: [phone]  ______________________________________________________    Medications   Home Meds  Prior to Admission medications    Medication Sig Start Date End Date Taking? Authorizing Provider   CRANBERRY JUICE EXTRACT PO Take 40,000 Units by mouth daily    Reported, Patient   Garlic 1000 MG CAPS Take 1 capsule by mouth daily    Reported, Patient   lisinopril (PRINIVIL/ZESTRIL) 5 MG tablet Take 1 tablet (5 mg) by mouth daily For blood pressure control 1/18/19   Sophia Dacosta APRN CNP   NONFORMULARY Beet Root Juice    Reported, Patient   senna-docusate  (SENOKOT-S/PERICOLACE) 8.6-50 MG tablet Take 1 tablet by mouth 2 times daily For constipation 12/11/18   Annabella Santos MD   tamsulosin (FLOMAX) 0.4 MG capsule Take 1 capsule (0.4 mg) by mouth daily 6/5/19   Missael Carvalho MD       Scheduled Meds    enoxaparin ANTICOAGULANT  1 mg/kg Subcutaneous Q12H     folic acid  1 mg Oral or Feeding Tube Daily     melatonin  3 mg Oral or Feeding Tube Daily     metoprolol tartrate  12.5 mg Oral or Feeding Tube BID     multivitamins w/minerals  15 mL Oral or Feeding Tube Daily     nystatin  500,000 Units Oral 4x Daily     polyethylene glycol  17 g Oral or Feeding Tube Daily     protein modular  2 packet Per Feeding Tube Daily     senna-docusate  1 tablet Per Feeding Tube BID     vitamin B1  100 mg Oral or Feeding Tube Daily     warfarin ANTICOAGULANT  7 mg Oral ONCE at 18:00       Infusion Meds    dextrose       Warfarin Therapy Reminder         PRN Meds  acetaminophen **OR** acetaminophen **OR** acetaminophen, bisacodyl, dextrose, glucose **OR** dextrose **OR** glucagon, labetalol, ondansetron **OR** ondansetron, Warfarin Therapy Reminder       PHYSICAL EXAMINATION  Temp:  [97.4  F (36.3  C)-97.9  F (36.6  C)] 97.8  F (36.6  C)  Pulse:  [63-79] 66  Resp:  [16] 16  BP: (113-137)/(73-90) 125/83  SpO2:  [95 %-100 %] 100 %     General:  patient lying in bed without any acute distress    HEENT:  normocephalic/atraumatic  Cardio:  RRR  Pulmonary:  no respiratory distress  Abdomen:  soft, non-tender, non-distended  Extremities:  no edema  Skin:  intact, warm/dry     Neurologic  Mental Status:  Awake, alert, minimally verbal (grunts, soft moans), will occasionally shake head yes/no to questions but unclear if actual answering or not, also appears to intermittently follow commands but then the manish of his actions appear to be mimicry and often perseverates on a singular action, pt remains severely aphasic  Cranial Nerves:  visual fields intact to confrontation, PERRL, EOMI with normal  smooth pursuit, facial movements symmetric  Motor:  normal muscle tone and bulk, no abnormal movements, able to move all limbs spontaneously, no pronator drift  Reflexes:  toes down-going  Sensory:  deferred  Coordination:  unable to assess 2/2 aphasia  Station/Gait: walking well w/ 1-assist    Stroke Scales    NIHSS  Interval transfer (11/25/20 1947)   Interval Comments     1a. Level of Consciousness 0-->Alert, keenly responsive   1b. LOC Questions 2-->Answers neither question correctly   1c. LOC Commands 1-->Performs one task correctly   2.   Best Gaze 0-->Normal   3.   Visual 0-->No visual loss   4.   Facial Palsy 0-->Normal symmetrical movements   5a. Motor Arm, Left 0-->No drift, limb holds 90 (or 45) degrees for full 10 secs   5b. Motor Arm, Right 0-->No drift, limb holds 90 (or 45) degrees for full 10 secs   6a. Motor Leg, Left 0-->No drift, leg holds 30 degree position for full 5 secs   6b. Motor Leg, right 0-->No drift, leg holds 30 degree position for full 5 secs   7.   Limb Ataxia 0-->Absent   8.   Sensory 0-->Normal, no sensory loss   9.   Best Language 3-->Mute, global aphasia, no usable speech or auditory comprehension   10. Dysarthria 2-->Severe dysarthria, patients speech is so slurred as to be unintelligible in the absence of or out of proportion to any dysphasia, or is mute/anarthric   11. Extinction and Inattention  0-->No abnormality   Total 8 (11/25/20 1947)       Imaging  I personally reviewed all imaging; relevant findings per HPI.     Lab Results Data   CBC  Recent Labs   Lab 12/07/20 0523 12/06/20  0606 12/05/20  0644   WBC 8.8 10.3 10.7   RBC 4.15* 4.41 4.33*   HGB 12.4* 13.1* 12.8*   HCT 38.6* 42.3 41.9    223 207     Basic Metabolic Panel    Recent Labs   Lab 12/07/20  0523 12/06/20  1218 12/06/20  0606 12/05/20  1843 12/05/20  0644     --  145* 147* 148*   POTASSIUM 3.8 3.8 3.3*  --  3.5   CHLORIDE 109  --  111*  --  115*   CO2 27  --  27  --  29   BUN 21  --  26  --  30    CR 0.89  --  0.94  --  1.04   GLC 86  --  88  --  86   SANDRA 8.2*  --  8.4*  --  8.1*     Liver Panel  No results for input(s): PROTTOTAL, ALBUMIN, BILITOTAL, ALKPHOS, AST, ALT, BILIDIRECT in the last 168 hours.  INR    Recent Labs   Lab Test 12/07/20  0523 12/06/20  0606 12/05/20  0644   INR 1.26* 1.11 1.09      Lipid Profile    Recent Labs   Lab Test 11/25/20 2020 11/27/18  0701 12/16/15  0857   CHOL 133 147 159   HDL 55 57 76   LDL 69 78 73   TRIG 46 58 52     A1C    Recent Labs   Lab Test 11/25/20 2020 11/27/18  0701   A1C 5.6 5.5     Troponin I  No results for input(s): TROPI in the last 168 hours.

## 2020-12-07 NOTE — PROGRESS NOTES
"Care Management Follow Up    Length of Stay (days): 12    Expected Discharge Date: 12/11/20     Concerns to be Addressed:     Pursuit of conservatorship/guardianship, APS referral  Patient plan of care discussed at interdisciplinary rounds: Yes    Anticipated Discharge Disposition:  SNF placement for rehab     Anticipated Discharge Services:  SNF placement  Anticipated Discharge DME:  Not applicable    Patient/family educated on Medicare website which has current facility and service quality ratings:  Not applicable as pt cognitively not able to participate in meaningful discussions about placement  Education Provided on the Discharge Plan:  No  Patient/Family in Agreement with the Plan:   Not applicable  Referrals Placed by CM/SW:  No new referrals made at this time  Private pay costs discussed: Not applicable    Additional Information:  - Per Dr. Leon, pt remains medically ready for discharge  - GILBERT received a 12/4/2020 voice mail from Aylin Figueroa ([phone]) confirming receipt of \"Physicians Statement in Support of Guardianship/Conservatorship.  Aylin indicated that one [facility] receives the APS report in their system, they will open it up.  Aylin stated that she was hopeful that the petition would be filed on 12/7/2020.  Aylin also asked who the  at [facility] is for the petition for guardianship/conservatorship.  GILBERT phoned Aylin today and left a message informing her that this SW is the  for the pursuit of guardianship/conservatorship.    - GILBERT received a message to call Tess Mo with [facility] ([phone]). GILBERT phoned Tess and per her request, provided a brief synopsis as to why guardianship/conservatorship is requested.      Barriers to discharge, no insurance, pt is not able to provide information about income/assets or verifications of income/assets, pt is not able to participate in decision making, no payer source for SNF placement.    SW will continue to " follow.    SILVIA Sanchez  Social Work, 6A  Phone:  [phone]  Pager:  [phone]  12/7/2020          ASHLYN Harmon

## 2020-12-07 NOTE — PLAN OF CARE
Status: R parietal lobe infarct with hemorrhagic transformation strokes  Vitals: VSS ex Afib at times, well controlled with metoprolol  Neuros:  Neuros difficult to assess, no verbal response, unable to use writing board. Inconsistent with commands. Lifts his entire arm & leg up every time when assessing for strength & coordination, strength 5/5 throughout. Severe aphasia and slight L facial droop when at rest. pt restless but not pulling at lines.  IV: No PIV access, awaiting order from MD to ok. Finished IV abx today.  Resp/trach: WNL  Diet: tolerated TF at goal of 50 ml/hr with q4h 30 ml FWF. Abdominal binder in place.  Bowel status: No BM today.  : elena in place for urinary retention, last failed 24 hr void trial was 12/5-12/6  Skin: PEG site intact, scant dried drainage noted. Cleaned x 1 today.  Pain: denied  Activity: up with 1, GB, walker or IV pump in room  Social: friend Jayne updated by writer today, supportive  Plan: continue with POC, awaiting placement

## 2020-12-07 NOTE — PLAN OF CARE
"Status: Pt on 6A s/p R parietal lobe infarct with hemorrhagic transformation strokes.  Vitals: VSS on RA ex intermittent A Fib, on metoprolol. CCM.  Neuros: Oriented to self only with choices. Nodded \"yes\" to all other choices, even when they were incorrect. Incomprehensible sounds, otherwise nonverbal. Unable to use writing board or other communication device. Inconsistent with following commands. Strengths 5/5. Severe aphasia. L facial droop. Restless.  IV: PIV TKO.   Resp/trach: LS clear.   Diet: NPO with TF at goal of 50 mL/hr. Q4h 100 mL flushes. Abdominal binder in place.   Bowel status: BS+. Last BM on day shift.   : Flores in place for retention. Cares completed.   Skin: PEG site bleeding with dried drainage. Cleansed x1, dressing changed.   Pain: Denies.   Activity: Assist of one, GB, and walker or IV pump.   Social: No calls this shift.   Plan: Waiting for placement. Continue to monitor and follow plan of care.     "

## 2020-12-08 ENCOUNTER — APPOINTMENT (OUTPATIENT)
Dept: OCCUPATIONAL THERAPY | Facility: CLINIC | Age: 75
DRG: 064 | End: 2020-12-08
Attending: PSYCHIATRY & NEUROLOGY
Payer: MEDICARE

## 2020-12-08 ENCOUNTER — APPOINTMENT (OUTPATIENT)
Dept: PHYSICAL THERAPY | Facility: CLINIC | Age: 75
DRG: 064 | End: 2020-12-08
Attending: PSYCHIATRY & NEUROLOGY
Payer: MEDICARE

## 2020-12-08 ENCOUNTER — APPOINTMENT (OUTPATIENT)
Dept: SPEECH THERAPY | Facility: CLINIC | Age: 75
DRG: 064 | End: 2020-12-08
Attending: PSYCHIATRY & NEUROLOGY
Payer: MEDICARE

## 2020-12-08 LAB
ANION GAP SERPL CALCULATED.3IONS-SCNC: 7 MMOL/L (ref 3–14)
BUN SERPL-MCNC: 26 MG/DL (ref 7–30)
CALCIUM SERPL-MCNC: 8.4 MG/DL (ref 8.5–10.1)
CHLORIDE SERPL-SCNC: 113 MMOL/L (ref 94–109)
CO2 SERPL-SCNC: 21 MMOL/L (ref 20–32)
CREAT SERPL-MCNC: 0.85 MG/DL (ref 0.66–1.25)
ERYTHROCYTE [DISTWIDTH] IN BLOOD BY AUTOMATED COUNT: 13.8 % (ref 10–15)
GFR SERPL CREATININE-BSD FRML MDRD: 85 ML/MIN/{1.73_M2}
GLUCOSE BLDC GLUCOMTR-MCNC: 89 MG/DL (ref 70–99)
GLUCOSE BLDC GLUCOMTR-MCNC: 90 MG/DL (ref 70–99)
GLUCOSE BLDC GLUCOMTR-MCNC: 91 MG/DL (ref 70–99)
GLUCOSE BLDC GLUCOMTR-MCNC: 94 MG/DL (ref 70–99)
GLUCOSE SERPL-MCNC: 107 MG/DL (ref 70–99)
HCT VFR BLD AUTO: 41.4 % (ref 40–53)
HGB BLD-MCNC: 13 G/DL (ref 13.3–17.7)
INR PPP: 1.41 (ref 0.86–1.14)
MAGNESIUM SERPL-MCNC: 1.9 MG/DL (ref 1.6–2.3)
MCH RBC QN AUTO: 29.9 PG (ref 26.5–33)
MCHC RBC AUTO-ENTMCNC: 31.4 G/DL (ref 31.5–36.5)
MCV RBC AUTO: 95 FL (ref 78–100)
PLATELET # BLD AUTO: 226 10E9/L (ref 150–450)
POTASSIUM SERPL-SCNC: 4.7 MMOL/L (ref 3.4–5.3)
RBC # BLD AUTO: 4.35 10E12/L (ref 4.4–5.9)
SODIUM SERPL-SCNC: 140 MMOL/L (ref 133–144)
WBC # BLD AUTO: 9.7 10E9/L (ref 4–11)

## 2020-12-08 PROCEDURE — 97116 GAIT TRAINING THERAPY: CPT | Mod: GP

## 2020-12-08 PROCEDURE — 250N000013 HC RX MED GY IP 250 OP 250 PS 637: Performed by: PSYCHIATRY & NEUROLOGY

## 2020-12-08 PROCEDURE — 250N000013 HC RX MED GY IP 250 OP 250 PS 637: Performed by: STUDENT IN AN ORGANIZED HEALTH CARE EDUCATION/TRAINING PROGRAM

## 2020-12-08 PROCEDURE — 250N000009 HC RX 250: Performed by: STUDENT IN AN ORGANIZED HEALTH CARE EDUCATION/TRAINING PROGRAM

## 2020-12-08 PROCEDURE — 250N000013 HC RX MED GY IP 250 OP 250 PS 637: Performed by: NURSE PRACTITIONER

## 2020-12-08 PROCEDURE — 272N000078 HC NUTRITION PRODUCT INTERMEDIATE LITER

## 2020-12-08 PROCEDURE — 36415 COLL VENOUS BLD VENIPUNCTURE: CPT | Performed by: PSYCHIATRY & NEUROLOGY

## 2020-12-08 PROCEDURE — 97530 THERAPEUTIC ACTIVITIES: CPT | Mod: GP

## 2020-12-08 PROCEDURE — 92507 TX SP LANG VOICE COMM INDIV: CPT | Mod: GN

## 2020-12-08 PROCEDURE — 97530 THERAPEUTIC ACTIVITIES: CPT | Mod: GO | Performed by: OCCUPATIONAL THERAPIST

## 2020-12-08 PROCEDURE — 120N000002 HC R&B MED SURG/OB UMMC

## 2020-12-08 PROCEDURE — 84100 ASSAY OF PHOSPHORUS: CPT | Performed by: PSYCHIATRY & NEUROLOGY

## 2020-12-08 PROCEDURE — 85027 COMPLETE CBC AUTOMATED: CPT | Performed by: PSYCHIATRY & NEUROLOGY

## 2020-12-08 PROCEDURE — 99232 SBSQ HOSP IP/OBS MODERATE 35: CPT | Mod: GC | Performed by: PSYCHIATRY & NEUROLOGY

## 2020-12-08 PROCEDURE — 85610 PROTHROMBIN TIME: CPT | Performed by: PSYCHIATRY & NEUROLOGY

## 2020-12-08 PROCEDURE — 83735 ASSAY OF MAGNESIUM: CPT | Performed by: PSYCHIATRY & NEUROLOGY

## 2020-12-08 PROCEDURE — 93010 ELECTROCARDIOGRAM REPORT: CPT | Performed by: INTERNAL MEDICINE

## 2020-12-08 PROCEDURE — 250N000011 HC RX IP 250 OP 636: Performed by: STUDENT IN AN ORGANIZED HEALTH CARE EDUCATION/TRAINING PROGRAM

## 2020-12-08 PROCEDURE — 80048 BASIC METABOLIC PNL TOTAL CA: CPT | Performed by: PSYCHIATRY & NEUROLOGY

## 2020-12-08 PROCEDURE — 92526 ORAL FUNCTION THERAPY: CPT | Mod: GN

## 2020-12-08 PROCEDURE — 999N001017 HC STATISTIC GLUCOSE BY METER IP

## 2020-12-08 RX ORDER — LORAZEPAM 2 MG/ML
0.5 INJECTION INTRAMUSCULAR
Status: COMPLETED | OUTPATIENT
Start: 2020-12-08 | End: 2020-12-11

## 2020-12-08 RX ORDER — POLYETHYLENE GLYCOL 3350 17 G/17G
17 POWDER, FOR SOLUTION ORAL 2 TIMES DAILY
Status: DISCONTINUED | OUTPATIENT
Start: 2020-12-08 | End: 2020-12-09

## 2020-12-08 RX ADMIN — FOLIC ACID 1 MG: 1 TABLET ORAL at 08:34

## 2020-12-08 RX ADMIN — ENOXAPARIN SODIUM 70 MG: 80 INJECTION SUBCUTANEOUS at 08:34

## 2020-12-08 RX ADMIN — NYSTATIN 500000 UNITS: 500000 SUSPENSION ORAL at 08:34

## 2020-12-08 RX ADMIN — POLYETHYLENE GLYCOL 3350 17 G: 17 POWDER, FOR SOLUTION ORAL at 08:34

## 2020-12-08 RX ADMIN — NYSTATIN 500000 UNITS: 500000 SUSPENSION ORAL at 16:00

## 2020-12-08 RX ADMIN — DOCUSATE SODIUM AND SENNOSIDES 1 TABLET: 8.6; 5 TABLET, FILM COATED ORAL at 08:34

## 2020-12-08 RX ADMIN — ENOXAPARIN SODIUM 70 MG: 80 INJECTION SUBCUTANEOUS at 20:16

## 2020-12-08 RX ADMIN — NYSTATIN 500000 UNITS: 500000 SUSPENSION ORAL at 12:30

## 2020-12-08 RX ADMIN — Medication 12.5 MG: at 08:34

## 2020-12-08 RX ADMIN — DOXAZOSIN 1 MG: 4 TABLET ORAL at 12:30

## 2020-12-08 RX ADMIN — MELATONIN TAB 3 MG 3 MG: 3 TAB at 18:30

## 2020-12-08 RX ADMIN — ACETAMINOPHEN 650 MG: 325 TABLET, FILM COATED ORAL at 18:29

## 2020-12-08 RX ADMIN — MULTIVIT AND MINERALS-FERROUS GLUCONATE 9 MG IRON/15 ML ORAL LIQUID 15 ML: at 08:34

## 2020-12-08 RX ADMIN — Medication 2 PACKET: at 08:34

## 2020-12-08 RX ADMIN — WARFARIN SODIUM 7 MG: 6 TABLET ORAL at 18:29

## 2020-12-08 RX ADMIN — THIAMINE HCL TAB 100 MG 100 MG: 100 TAB at 08:34

## 2020-12-08 RX ADMIN — NYSTATIN 500000 UNITS: 500000 SUSPENSION ORAL at 20:16

## 2020-12-08 ASSESSMENT — ACTIVITIES OF DAILY LIVING (ADL)
ADLS_ACUITY_SCORE: 10

## 2020-12-08 NOTE — PLAN OF CARE
Status: R parietal lobe infarct with hemorrhagic transformation strokes  Vitals: VSS on CCM   Neuros:  Neuros are difficult to assess, no verbal response, unable to use writing board. inconsistent with following commands. Lifts arms and legs multiple times when assessing.  Strength 5/5 throughout. Severe aphasia and slight L facial droop. Restless while awake  IV: No PIV access, Mds aware.  Resp/trach: WNL  Diet: TF at goal of 50 ml/hr with q4h 30 ml FWF. Abdominal binder in place.  Bowel status: No BM today.  : elena in place for urinary retention, last failed 24 hr void trial was 12/5-12/6  Skin: PEG site intact  Pain: denied  Activity: up with 1, GB, walker or IV pump in room  Plan: continue with POC, awaiting placement

## 2020-12-08 NOTE — PLAN OF CARE
Status: Patient admitted on 11/25 with subacute R parietal infarct w/ overlying petechial hemorrhage   Vitals: VSS, CCM in pace with NSR  Neuros: Difficult to assess, severe apashia, incomprehensible sounds, groans, not able to use writing board, strengths 5/5, inconsistently follows commands, left facial droop  IV: No IV access  Resp/trach: Lung sounds clear throughout  Diet: NPO with TF infusing at goal of 50mL/hr  Bowel status: Large BM today after Senna and Miralax  : Elena catheter in place  Skin: Generalized bruising throughout  Pain: Denies  Activity: Up with A1, ambulated in hallway 2x today, up in recliner 2x  Social: Frustrated this afternoon after working with therapy unable to determine cause, tried to hit NST this evening, pulling at elena and TF, MD notified, order for Ativan PRN but should contact MD Dow prior to administering, continue to provide emotional support and reassurance   Plan: Discharge to SNF awaiting emergency guardianship, continue to monitor and follow POC

## 2020-12-08 NOTE — PROGRESS NOTES
Care Management Follow Up    Length of Stay (days): 13    Expected Discharge Date: 12/11/20     Concerns to be Addressed:     Disposition  Planning  Patient plan of care discussed at interdisciplinary rounds: Yes    Anticipated Discharge Disposition:  SNF placement     Anticipated Discharge Services:  SNF placement  Anticipated Discharge DME:  Not applicable    Patient/family educated on Medicare website which has current facility and service quality ratings:    Not applicable as pt cognitively not able to participate in meaningful discussions about placement  Education Provided on the Discharge Plan:  No  Patient/Family in Agreement with the Plan:  Not applicable at this time     Referrals Placed by CM/SW:  None at this time  Private pay costs discussed: Not applicable    Additional Information:  SW is following for discharge planning.   Pt ready for discharge on 12/7/2020.  Barriers to discharge, no insurance, pt is not able to provide information about income/assets or verifications of income/assets, pt is not able to participate in decision making, no payer source for SNF placement.    GILBERT phoned [facility] Manager of Health and Human Services (Aylin Figueroa [phone]) who confirms receipt of the [facility] referral.  Aylin states that she is hopeful that the request for emergency guardianship/conservatorship will be filed with the court today, that the  will review and a decision will be made.      GILBERT will await outcome of above.  SW will continue to follow for discharge planning.    SILVIA Sanchez  Social Work, 6A  Phone:  [phone]  Pager:  [phone]  12/8/2020             ASHLYN Harmon

## 2020-12-08 NOTE — PLAN OF CARE
Status: R parietal lobe infarct with hemorrhagic transformation strokes  Vitals: VSS on CCM   Neuros:  Neuros are difficult to assess, no verbal response, unable to use writing board. inconsistent with following commands. Lifts arms and legs multiple times when assessing.  Strength 5/5 throughout. Severe aphasia and slight L facial droop. Restless.  IV: No PIV access, awaiting order from MD to ok. Finished IV abx today.  Resp/trach: WNL  Diet: TF at goal of 50 ml/hr with q4h 30 ml FWF. Abdominal binder in place.  Bowel status: No BM today.  : elena in place for urinary retention, last failed 24 hr void trial was 12/5-12/6  Skin: PEG site intact Cleaned x 1 today.  Pain: denied  Activity: up with 1, GB, walker or IV pump in room  Social: Writer updated family   Plan: continue with POC, awaiting placement

## 2020-12-08 NOTE — PROGRESS NOTES
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 On arrival to the ED, pt found to have a new subacute R parietal infarct w/ overlying petechial hemorrhage and was subsequently transferred to [facility] for further cares. Currently pursuing guardianship options    #Subacute R Parietal Lobe Infarct w/ hemorrhagic conversion likely 2/2 cardioembolic source in the setting of known Afib not on anticoagulation  #Afib  Pt had been placed on Eliquis in 2018 for prior L parietal stroke, but was inconsistent in taking it as prescribed and then ultimately discontinued it in 2019 after recurrent nosebleeds  - Q4h neurochecks  - SBP goal > 160  - Avoid hypotonic IVF  - Metoprolol 12.5mg BID  - DISCONTINUE PTA Lisinopril  - Warfarin w/ pharmacy dosing  - STOP SQ Heparin Q8h  - Melatonin 3mg Qpm  - No indication for statin (LDL 69)  - Lipid Panel (LDL 69), HgbA1c (5.6)  - TTE (11/25): LV/RV size/function normal w/ EF 55-60%; Grade III diastolic dysfunction w/ severe LA enlargement and mild RA enlargement; intact atrial septum; mild mitral annular calcification; aortic sclerosis w/o stenosis  - MRI Stroke Protocol: can consider in the future if pt calms; previously did not tolerate  - EEG: generalized slowing suggestive of mild-mod encephalopathy w/ maximum cortical dysfunction in the R hemisphere and w/o sign of seizure or epileptiform activity  - Telemetry  - PT/OT/SLP: TCU  -  Stroke Education  - Stroke Class per [facility] ([facility]  - Depression Screen  - Apnea Screen  - Euthermia, Euglycemia  - Consider Cardiology referral upon discharge for diastolic dysfunction seen on TTE    #EtOH Abuse  - Discontinue CIWA  - Thiamine, Folate    #GREGORIA, improving  #Urinary Retention  #Traumatic Straight Cath   #Enterococcus UTI  Baseline Cr < 1, presented w/ Cr 1.3 likely . Pt has a hx of urinary retention (hx tamsulosin but not a current med) but is exhibiting marked amounts here which could be worsening pt's GREGORIA. Lack of PO intake and Enterococcus UTI may also be contributing. Received 7d of Ampicillin. On 11/29, pt also suffered a traumatic straight cath. Curbside to Urology recommended placement of Flores for 5d to allow healing  - Doxazosin 1mg daily  - Consider Urology referral upon discharge     # Hypernatremia, resolved  # Hyperchloremia, resolved  Pt has exhibited elevated Na and Cl, rising since time of admission. Likely 2/2 NPO status since arrival due to multiple failed swallow attempts. Will monitor for refeeding syndrome once NJ placed and TF initiated  - PEG in place   - Nutrition following for TF    ?Oral Thrush  - Nystatin 500,000u BID     Diet: TF per nutrition   VTE PPx: SQ Heparin Q8h  Dispo: TCU  Code Status: FULL    The patient was discussed with attending Stroke Neurlogist Dr Shalonda Leon MD  Neurology Resident, PGY1  Pager: [phone]  ______________________________________________________    Medications   Home Meds  Prior to Admission medications    Medication Sig Start Date End Date Taking? Authorizing Provider   CRANBERRY JUICE EXTRACT PO Take 40,000 Units by mouth daily    Reported, Patient   Garlic 1000 MG CAPS Take 1 capsule by mouth daily    Reported, Patient   lisinopril (PRINIVIL/ZESTRIL) 5 MG tablet Take 1 tablet (5 mg) by mouth daily For blood pressure control 1/18/19   Sophia Dacosta, CAROLANN CNP   NONFORMULARY Beet Root Juice     Reported, Patient   senna-docusate (SENOKOT-S/PERICOLACE) 8.6-50 MG tablet Take 1 tablet by mouth 2 times daily For constipation 12/11/18   Annabella Santos MD   tamsulosin (FLOMAX) 0.4 MG capsule Take 1 capsule (0.4 mg) by mouth daily 6/5/19   Missael Carvalho MD       Scheduled Meds    doxazosin  1 mg Oral or Feeding Tube Daily     enoxaparin ANTICOAGULANT  1 mg/kg Subcutaneous Q12H     folic acid  1 mg Oral or Feeding Tube Daily     melatonin  3 mg Oral or Feeding Tube Daily     metoprolol tartrate  12.5 mg Oral or Feeding Tube BID     multivitamins w/minerals  15 mL Oral or Feeding Tube Daily     nystatin  500,000 Units Oral 4x Daily     polyethylene glycol  17 g Oral or Feeding Tube BID     protein modular  2 packet Per Feeding Tube Daily     senna-docusate  1 tablet Per Feeding Tube BID     vitamin B1  100 mg Oral or Feeding Tube Daily       Infusion Meds    dextrose       Warfarin Therapy Reminder         PRN Meds  acetaminophen **OR** acetaminophen **OR** acetaminophen, bisacodyl, dextrose, glucose **OR** dextrose **OR** glucagon, labetalol, ondansetron **OR** ondansetron, Warfarin Therapy Reminder       PHYSICAL EXAMINATION  Temp:  [96.4  F (35.8  C)-97.8  F (36.6  C)] 97.3  F (36.3  C)  Pulse:  [58-79] 70  Resp:  [16-18] 18  BP: (110-137)/(71-90) 122/79  SpO2:  [97 %-100 %] 100 %     General:  patient lying in bed without any acute distress    HEENT:  normocephalic/atraumatic  Cardio:  RRR  Pulmonary:  no respiratory distress  Abdomen:  soft, non-tender, non-distended  Extremities:  no edema  Skin:  intact, warm/dry     Neurologic  Mental Status:  Awake, alert, minimally verbal (grunts, soft moans), will occasionally shake head yes/no to questions but unclear if actual answering or not, also appears to intermittently follow commands but then the manish of his actions appear to be mimicry and often perseverates on a singular action, pt remains severely aphasic  Cranial Nerves:  visual fields intact to  confrontation, PERRL, EOMI with normal smooth pursuit, facial movements symmetric  Motor:  normal muscle tone and bulk, no abnormal movements, able to move all limbs spontaneously, no pronator drift  Reflexes:  toes down-going  Sensory:  deferred  Coordination:  unable to assess 2/2 aphasia  Station/Gait: walking well w/ 1-assist    Stroke Scales    NIHSS  Interval transfer (11/25/20 1947)   Interval Comments     1a. Level of Consciousness 0-->Alert, keenly responsive   1b. LOC Questions 2-->Answers neither question correctly   1c. LOC Commands 1-->Performs one task correctly   2.   Best Gaze 0-->Normal   3.   Visual 0-->No visual loss   4.   Facial Palsy 0-->Normal symmetrical movements   5a. Motor Arm, Left 0-->No drift, limb holds 90 (or 45) degrees for full 10 secs   5b. Motor Arm, Right 0-->No drift, limb holds 90 (or 45) degrees for full 10 secs   6a. Motor Leg, Left 0-->No drift, leg holds 30 degree position for full 5 secs   6b. Motor Leg, right 0-->No drift, leg holds 30 degree position for full 5 secs   7.   Limb Ataxia 0-->Absent   8.   Sensory 0-->Normal, no sensory loss   9.   Best Language 3-->Mute, global aphasia, no usable speech or auditory comprehension   10. Dysarthria 2-->Severe dysarthria, patients speech is so slurred as to be unintelligible in the absence of or out of proportion to any dysphasia, or is mute/anarthric   11. Extinction and Inattention  0-->No abnormality   Total 8 (11/25/20 1947)       Imaging  I personally reviewed all imaging; relevant findings per HPI.     Lab Results Data   CBC  Recent Labs   Lab 12/08/20  0555 12/07/20  0523 12/06/20  0606   WBC 9.7 8.8 10.3   RBC 4.35* 4.15* 4.41   HGB 13.0* 12.4* 13.1*   HCT 41.4 38.6* 42.3    226 223     Basic Metabolic Panel    Recent Labs   Lab 12/08/20  0555 12/07/20  0523 12/06/20  1218 12/06/20  0606    142  --  145*   POTASSIUM 4.7 3.8 3.8 3.3*   CHLORIDE 113* 109  --  111*   CO2 21 27  --  27   BUN 26 21  --  26   CR  0.85 0.89  --  0.94   * 86  --  88   SANDRA 8.4* 8.2*  --  8.4*     Liver Panel  No results for input(s): PROTTOTAL, ALBUMIN, BILITOTAL, ALKPHOS, AST, ALT, BILIDIRECT in the last 168 hours.  INR    Recent Labs   Lab Test 12/08/20  0555 12/07/20  0523 12/06/20  0606   INR 1.41* 1.26* 1.11      Lipid Profile    Recent Labs   Lab Test 11/25/20 2020 11/27/18  0701 12/16/15  0857   CHOL 133 147 159   HDL 55 57 76   LDL 69 78 73   TRIG 46 58 52     A1C    Recent Labs   Lab Test 11/25/20 2020 11/27/18  0701   A1C 5.6 5.5     Troponin I  No results for input(s): TROPI in the last 168 hours.

## 2020-12-09 ENCOUNTER — APPOINTMENT (OUTPATIENT)
Dept: PHYSICAL THERAPY | Facility: CLINIC | Age: 75
DRG: 064 | End: 2020-12-09
Attending: PSYCHIATRY & NEUROLOGY
Payer: MEDICARE

## 2020-12-09 ENCOUNTER — APPOINTMENT (OUTPATIENT)
Dept: SPEECH THERAPY | Facility: CLINIC | Age: 75
DRG: 064 | End: 2020-12-09
Attending: PSYCHIATRY & NEUROLOGY
Payer: MEDICARE

## 2020-12-09 ENCOUNTER — APPOINTMENT (OUTPATIENT)
Dept: OCCUPATIONAL THERAPY | Facility: CLINIC | Age: 75
DRG: 064 | End: 2020-12-09
Attending: PSYCHIATRY & NEUROLOGY
Payer: MEDICARE

## 2020-12-09 LAB
INR PPP: 1.64 (ref 0.86–1.14)
INTERPRETATION ECG - MUSE: NORMAL
MAGNESIUM SERPL-MCNC: 2.2 MG/DL (ref 1.6–2.3)
PHOSPHATE SERPL-MCNC: 2.8 MG/DL (ref 2.5–4.5)
POTASSIUM SERPL-SCNC: 4.1 MMOL/L (ref 3.4–5.3)

## 2020-12-09 PROCEDURE — 85610 PROTHROMBIN TIME: CPT | Performed by: NURSE PRACTITIONER

## 2020-12-09 PROCEDURE — 97116 GAIT TRAINING THERAPY: CPT | Mod: GP | Performed by: REHABILITATION PRACTITIONER

## 2020-12-09 PROCEDURE — 250N000011 HC RX IP 250 OP 636: Performed by: STUDENT IN AN ORGANIZED HEALTH CARE EDUCATION/TRAINING PROGRAM

## 2020-12-09 PROCEDURE — 250N000013 HC RX MED GY IP 250 OP 250 PS 637: Performed by: NURSE PRACTITIONER

## 2020-12-09 PROCEDURE — 97110 THERAPEUTIC EXERCISES: CPT | Mod: GP | Performed by: REHABILITATION PRACTITIONER

## 2020-12-09 PROCEDURE — 97530 THERAPEUTIC ACTIVITIES: CPT | Mod: GO | Performed by: OCCUPATIONAL THERAPIST

## 2020-12-09 PROCEDURE — 250N000013 HC RX MED GY IP 250 OP 250 PS 637: Performed by: STUDENT IN AN ORGANIZED HEALTH CARE EDUCATION/TRAINING PROGRAM

## 2020-12-09 PROCEDURE — 92526 ORAL FUNCTION THERAPY: CPT | Mod: GN | Performed by: SPEECH-LANGUAGE PATHOLOGIST

## 2020-12-09 PROCEDURE — 84132 ASSAY OF SERUM POTASSIUM: CPT | Performed by: NURSE PRACTITIONER

## 2020-12-09 PROCEDURE — 84100 ASSAY OF PHOSPHORUS: CPT | Performed by: NURSE PRACTITIONER

## 2020-12-09 PROCEDURE — 36415 COLL VENOUS BLD VENIPUNCTURE: CPT | Performed by: NURSE PRACTITIONER

## 2020-12-09 PROCEDURE — 99232 SBSQ HOSP IP/OBS MODERATE 35: CPT | Mod: GC | Performed by: PSYCHIATRY & NEUROLOGY

## 2020-12-09 PROCEDURE — 272N000078 HC NUTRITION PRODUCT INTERMEDIATE LITER

## 2020-12-09 PROCEDURE — 250N000013 HC RX MED GY IP 250 OP 250 PS 637: Performed by: PSYCHIATRY & NEUROLOGY

## 2020-12-09 PROCEDURE — 120N000002 HC R&B MED SURG/OB UMMC

## 2020-12-09 PROCEDURE — 92507 TX SP LANG VOICE COMM INDIV: CPT | Mod: GN | Performed by: SPEECH-LANGUAGE PATHOLOGIST

## 2020-12-09 PROCEDURE — 250N000009 HC RX 250: Performed by: STUDENT IN AN ORGANIZED HEALTH CARE EDUCATION/TRAINING PROGRAM

## 2020-12-09 PROCEDURE — 83735 ASSAY OF MAGNESIUM: CPT | Performed by: NURSE PRACTITIONER

## 2020-12-09 RX ORDER — POLYETHYLENE GLYCOL 3350 17 G/17G
17 POWDER, FOR SOLUTION ORAL DAILY
Status: DISCONTINUED | OUTPATIENT
Start: 2020-12-09 | End: 2020-12-10

## 2020-12-09 RX ADMIN — NYSTATIN 500000 UNITS: 500000 SUSPENSION ORAL at 20:46

## 2020-12-09 RX ADMIN — FOLIC ACID 1 MG: 1 TABLET ORAL at 07:57

## 2020-12-09 RX ADMIN — MELATONIN TAB 3 MG 3 MG: 3 TAB at 20:46

## 2020-12-09 RX ADMIN — ENOXAPARIN SODIUM 70 MG: 80 INJECTION SUBCUTANEOUS at 20:45

## 2020-12-09 RX ADMIN — THIAMINE HCL TAB 100 MG 100 MG: 100 TAB at 07:57

## 2020-12-09 RX ADMIN — Medication 2 PACKET: at 07:55

## 2020-12-09 RX ADMIN — Medication 12.5 MG: at 07:56

## 2020-12-09 RX ADMIN — NYSTATIN 500000 UNITS: 500000 SUSPENSION ORAL at 16:41

## 2020-12-09 RX ADMIN — Medication 12.5 MG: at 20:47

## 2020-12-09 RX ADMIN — DOCUSATE SODIUM AND SENNOSIDES 1 TABLET: 8.6; 5 TABLET, FILM COATED ORAL at 07:56

## 2020-12-09 RX ADMIN — ENOXAPARIN SODIUM 70 MG: 80 INJECTION SUBCUTANEOUS at 07:56

## 2020-12-09 RX ADMIN — MULTIVIT AND MINERALS-FERROUS GLUCONATE 9 MG IRON/15 ML ORAL LIQUID 15 ML: at 07:56

## 2020-12-09 RX ADMIN — NYSTATIN 500000 UNITS: 500000 SUSPENSION ORAL at 12:39

## 2020-12-09 RX ADMIN — DOXAZOSIN 1 MG: 4 TABLET ORAL at 07:56

## 2020-12-09 RX ADMIN — WARFARIN SODIUM 7 MG: 6 TABLET ORAL at 18:58

## 2020-12-09 RX ADMIN — NYSTATIN 500000 UNITS: 500000 SUSPENSION ORAL at 07:58

## 2020-12-09 ASSESSMENT — ACTIVITIES OF DAILY LIVING (ADL)
ADLS_ACUITY_SCORE: 10

## 2020-12-09 ASSESSMENT — VISUAL ACUITY: OU: NOT TESTABLE

## 2020-12-09 NOTE — PLAN OF CARE
Incomplete             [id number]  No change from previous note, patient's neuros difficult to assess, incomprehensible sounds, moves all extremities spontaneously and intermittently follows commands, up in recliner 1x this evening. Plan for discharge to SNF, currently awaiting emergency guardianship. Continue to monitor and follow POC.

## 2020-12-09 NOTE — PLAN OF CARE
Status: R parietal lobe infarct with hemorrhagic transformation strokes  Vitals: VSS on RA. CCM  Neuros: Difficult to assess. Incomprehensible words, inconsistent responses to following commands. L facial droop  IV: No IV access  Resp/trach: clear and equal  Diet: NPO with TF infusing 50 mL/hr, new bag 0600  Bowel status: No BM this shift  : Flores. Cares completed  Skin: generalized bruising  Pain: denies this shift  Activity: Not OOB overnight, Up with 1  Plan: discharge to SNF, pending emergency guardianship.

## 2020-12-09 NOTE — PROGRESS NOTES
[facility]     Stroke Progress Note    Interval Events  - No acute events overnight  - Became frustrated last night for unknown reason, trying to hit staff and pull at elena. However, calmed w/o need for intervention  - EKG taken yesterday (12/8): Afib, no longer has QT prolongation (QTc 453). 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 On arrival to the ED, pt found to have a new subacute R parietal infarct w/ overlying petechial hemorrhage and was subsequently transferred to [facility] for further cares. Currently pursuing guardianship options    #Subacute R Parietal Lobe Infarct w/ hemorrhagic conversion likely 2/2 cardioembolic source in the setting of known Afib not on anticoagulation  #Afib  Pt had been placed on Eliquis in 2018 for prior L parietal stroke, but was inconsistent in taking it as prescribed and then ultimately discontinued it in 2019 after recurrent nosebleeds  - Q4h neurochecks  - SBP goal > 160  - Avoid hypotonic IVF  - Metoprolol 12.5mg BID  - DISCONTINUE PTA Lisinopril  - Warfarin w/ pharmacy dosing and bridge w/ SQ Lovenox 70mg BID  - Melatonin 3mg Qpm  - No indication for statin (LDL 69)  - Lipid Panel (LDL 69), HgbA1c (5.6)  - TTE (11/25): LV/RV size/function normal w/ EF 55-60%; Grade III diastolic dysfunction w/ severe LA enlargement and mild RA enlargement; intact atrial septum; mild mitral annular calcification; aortic sclerosis w/o stenosis  - MRI Stroke Protocol: can consider in  the future if pt calms; previously did not tolerate  - EEG: generalized slowing suggestive of mild-mod encephalopathy w/ maximum cortical dysfunction in the R hemisphere and w/o sign of seizure or epileptiform activity  - Telemetry  - PT/OT/SLP: TCU  - Stroke Education  - Stroke Class per [facility] ([facility])  - Depression Screen  - Apnea Screen  - Euthermia, Euglycemia  - Consider Cardiology referral upon discharge for diastolic dysfunction seen on TTE    #EtOH Abuse  - Discontinue CIWA  - Thiamine, Folate    #GREGORIA, improving  #Urinary Retention  #Traumatic Straight Cath   #Enterococcus UTI  Baseline Cr < 1, presented w/ Cr 1.3 likely . Pt has a hx of urinary retention (hx tamsulosin but not a current med) but is exhibiting marked amounts here which could be worsening pt's GREGORIA. Lack of PO intake and Enterococcus UTI may also be contributing. Received 7d of Ampicillin. On 11/29, pt also suffered a traumatic straight cath. Curbside to Urology recommended placement of Flores for 5d to allow healing  - Doxazosin 1mg daily  - Consider Urology referral upon discharge     # Hypernatremia, resolved  # Hyperchloremia, resolved  Pt has exhibited elevated Na and Cl, rising since time of admission. Likely 2/2 NPO status since arrival due to multiple failed swallow attempts. Will monitor for refeeding syndrome once NJ placed and TF initiated  - PEG in place   - Nutrition following for TF    ?Oral Thrush  - Nystatin 500,000u BID     Diet: TF per nutrition   VTE PPx: - Warfarin + bridge w/ SQ Lovenox 70mg BID  Dispo: TCU  Code Status: FULL    The patient was discussed with attending Stroke Neurlogist Dr Shalonda Leon MD  Neurology Resident, PGY1  Pager: [phone]  ______________________________________________________    Medications   Home Meds  Prior to Admission medications    Medication Sig Start Date End Date Taking? Authorizing Provider   CRANBERRY JUICE EXTRACT PO Take 40,000 Units by mouth  daily    Reported, Patient   Garlic 1000 MG CAPS Take 1 capsule by mouth daily    Reported, Patient   lisinopril (PRINIVIL/ZESTRIL) 5 MG tablet Take 1 tablet (5 mg) by mouth daily For blood pressure control 1/18/19   Sophia Dacosta APRN CNP   NONFORMULARY Beet Root Juice    Reported, Patient   senna-docusate (SENOKOT-S/PERICOLACE) 8.6-50 MG tablet Take 1 tablet by mouth 2 times daily For constipation 12/11/18   Annabella Santos MD   tamsulosin (FLOMAX) 0.4 MG capsule Take 1 capsule (0.4 mg) by mouth daily 6/5/19   Missael Carvalho MD       Scheduled Meds    doxazosin  1 mg Oral or Feeding Tube Daily     enoxaparin ANTICOAGULANT  1 mg/kg Subcutaneous Q12H     folic acid  1 mg Oral or Feeding Tube Daily     melatonin  3 mg Oral or Feeding Tube Daily     metoprolol tartrate  12.5 mg Oral or Feeding Tube BID     multivitamins w/minerals  15 mL Oral or Feeding Tube Daily     nystatin  500,000 Units Oral 4x Daily     polyethylene glycol  17 g Oral or Feeding Tube Daily     protein modular  2 packet Per Feeding Tube Daily     senna-docusate  1 tablet Per Feeding Tube BID     vitamin B1  100 mg Oral or Feeding Tube Daily       Infusion Meds    dextrose       Warfarin Therapy Reminder         PRN Meds  acetaminophen **OR** acetaminophen **OR** acetaminophen, bisacodyl, dextrose, glucose **OR** dextrose **OR** glucagon, labetalol, LORazepam, ondansetron **OR** ondansetron, Warfarin Therapy Reminder       PHYSICAL EXAMINATION  Temp:  [97.3  F (36.3  C)-98.1  F (36.7  C)] 97.8  F (36.6  C)  Pulse:  [56-73] 62  Resp:  [16-18] 16  BP: ()/(60-82) 127/82  SpO2:  [98 %-99 %] 99 %     General:  patient lying in bed without any acute distress    HEENT:  normocephalic/atraumatic  Cardio:  RRR  Pulmonary:  no respiratory distress  Abdomen:  soft, non-tender, non-distended  Extremities:  no edema  Skin:  intact, warm/dry     Neurologic  Mental Status:  Awake, alert, minimally verbal (grunts, soft moans), will occasionally  shake head yes/no to questions but unclear if actual answering or not, also appears to intermittently follow commands but then the manish of his actions appear to be mimicry and often perseverates on a singular action, pt remains severely aphasic  Cranial Nerves:  visual fields intact to confrontation, PERRL, EOMI with normal smooth pursuit, facial movements symmetric  Motor:  normal muscle tone and bulk, no abnormal movements, able to move all limbs spontaneously, no pronator drift  Reflexes:  toes down-going  Sensory:  deferred  Coordination:  unable to assess 2/2 aphasia  Station/Gait: walking well w/ 1-assist    Stroke Scales    NIHSS  Interval transfer (11/25/20 1947)   Interval Comments     1a. Level of Consciousness 0-->Alert, keenly responsive   1b. LOC Questions 2-->Answers neither question correctly   1c. LOC Commands 1-->Performs one task correctly   2.   Best Gaze 0-->Normal   3.   Visual 0-->No visual loss   4.   Facial Palsy 0-->Normal symmetrical movements   5a. Motor Arm, Left 0-->No drift, limb holds 90 (or 45) degrees for full 10 secs   5b. Motor Arm, Right 0-->No drift, limb holds 90 (or 45) degrees for full 10 secs   6a. Motor Leg, Left 0-->No drift, leg holds 30 degree position for full 5 secs   6b. Motor Leg, right 0-->No drift, leg holds 30 degree position for full 5 secs   7.   Limb Ataxia 0-->Absent   8.   Sensory 0-->Normal, no sensory loss   9.   Best Language 3-->Mute, global aphasia, no usable speech or auditory comprehension   10. Dysarthria 2-->Severe dysarthria, patients speech is so slurred as to be unintelligible in the absence of or out of proportion to any dysphasia, or is mute/anarthric   11. Extinction and Inattention  0-->No abnormality   Total 8 (11/25/20 1947)       Imaging  I personally reviewed all imaging; relevant findings per HPI.     Lab Results Data   CBC  Recent Labs   Lab 12/08/20  0555 12/07/20  0523 12/06/20  0606   WBC 9.7 8.8 10.3   RBC 4.35* 4.15* 4.41   HGB 13.0*  12.4* 13.1*   HCT 41.4 38.6* 42.3    226 223     Basic Metabolic Panel    Recent Labs   Lab 12/09/20  0551 12/08/20  0555 12/07/20  0523 12/06/20  0606 12/06/20  0606   NA  --  140 142  --  145*   POTASSIUM 4.1 4.7 3.8   < > 3.3*   CHLORIDE  --  113* 109  --  111*   CO2  --  21 27  --  27   BUN  --  26 21  --  26   CR  --  0.85 0.89  --  0.94   GLC  --  107* 86  --  88   SANDRA  --  8.4* 8.2*  --  8.4*    < > = values in this interval not displayed.     Liver Panel  No results for input(s): PROTTOTAL, ALBUMIN, BILITOTAL, ALKPHOS, AST, ALT, BILIDIRECT in the last 168 hours.  INR    Recent Labs   Lab Test 12/09/20  0551 12/08/20  0555 12/07/20 0523   INR 1.64* 1.41* 1.26*      Lipid Profile    Recent Labs   Lab Test 11/25/20 2020 11/27/18  0701 12/16/15  0857   CHOL 133 147 159   HDL 55 57 76   LDL 69 78 73   TRIG 46 58 52     A1C    Recent Labs   Lab Test 11/25/20 2020 11/27/18  0701   A1C 5.6 5.5     Troponin I  No results for input(s): TROPI in the last 168 hours.

## 2020-12-09 NOTE — PLAN OF CARE
Status: Patient admitted on 11/25 with subacute R parietal infarct w/ overlying petechial hemorrhage   Vitals: VSS  Neuros: Difficult to assess, severe apashia, incomprehensible sounds, groans, not able to use writing board, strengths 5/5, inconsistently follows commands, left facial droop  IV: No IV access  Resp/trach: Lung sounds clear throughout  Diet: NPO with TF infusing at goal of 50mL/hr  Bowel status: No BM today, last BM yesterday  : Flores catheter in place  Skin: Generalized bruising throughout  Pain: Denies  Activity: Up with A1, ambulated in hallway 2x today, up in recliner 1x  Social: Calm and cooperative with cares  Plan: Discharge to SNF awaiting emergency guardianship, continue to monitor and follow POC

## 2020-12-10 ENCOUNTER — APPOINTMENT (OUTPATIENT)
Dept: SPEECH THERAPY | Facility: CLINIC | Age: 75
DRG: 064 | End: 2020-12-10
Attending: PSYCHIATRY & NEUROLOGY
Payer: MEDICARE

## 2020-12-10 ENCOUNTER — APPOINTMENT (OUTPATIENT)
Dept: OCCUPATIONAL THERAPY | Facility: CLINIC | Age: 75
DRG: 064 | End: 2020-12-10
Attending: PSYCHIATRY & NEUROLOGY
Payer: MEDICARE

## 2020-12-10 ENCOUNTER — APPOINTMENT (OUTPATIENT)
Dept: PHYSICAL THERAPY | Facility: CLINIC | Age: 75
DRG: 064 | End: 2020-12-10
Attending: PSYCHIATRY & NEUROLOGY
Payer: MEDICARE

## 2020-12-10 LAB
INR PPP: 1.81 (ref 0.86–1.14)
MAGNESIUM SERPL-MCNC: 2.1 MG/DL (ref 1.6–2.3)
PHOSPHATE SERPL-MCNC: 2.4 MG/DL (ref 2.5–4.5)
POTASSIUM SERPL-SCNC: 3.9 MMOL/L (ref 3.4–5.3)

## 2020-12-10 PROCEDURE — 250N000013 HC RX MED GY IP 250 OP 250 PS 637: Performed by: STUDENT IN AN ORGANIZED HEALTH CARE EDUCATION/TRAINING PROGRAM

## 2020-12-10 PROCEDURE — 83735 ASSAY OF MAGNESIUM: CPT | Performed by: PSYCHIATRY & NEUROLOGY

## 2020-12-10 PROCEDURE — 99231 SBSQ HOSP IP/OBS SF/LOW 25: CPT | Mod: GC | Performed by: PSYCHIATRY & NEUROLOGY

## 2020-12-10 PROCEDURE — 250N000013 HC RX MED GY IP 250 OP 250 PS 637: Performed by: NURSE PRACTITIONER

## 2020-12-10 PROCEDURE — 97116 GAIT TRAINING THERAPY: CPT | Mod: GP

## 2020-12-10 PROCEDURE — 250N000013 HC RX MED GY IP 250 OP 250 PS 637: Performed by: PSYCHIATRY & NEUROLOGY

## 2020-12-10 PROCEDURE — 36415 COLL VENOUS BLD VENIPUNCTURE: CPT | Performed by: PSYCHIATRY & NEUROLOGY

## 2020-12-10 PROCEDURE — 97535 SELF CARE MNGMENT TRAINING: CPT | Mod: GO

## 2020-12-10 PROCEDURE — 36415 COLL VENOUS BLD VENIPUNCTURE: CPT | Performed by: NURSE PRACTITIONER

## 2020-12-10 PROCEDURE — 250N000009 HC RX 250: Performed by: STUDENT IN AN ORGANIZED HEALTH CARE EDUCATION/TRAINING PROGRAM

## 2020-12-10 PROCEDURE — 250N000011 HC RX IP 250 OP 636: Performed by: STUDENT IN AN ORGANIZED HEALTH CARE EDUCATION/TRAINING PROGRAM

## 2020-12-10 PROCEDURE — 84132 ASSAY OF SERUM POTASSIUM: CPT | Performed by: PSYCHIATRY & NEUROLOGY

## 2020-12-10 PROCEDURE — 84100 ASSAY OF PHOSPHORUS: CPT | Performed by: PSYCHIATRY & NEUROLOGY

## 2020-12-10 PROCEDURE — 85610 PROTHROMBIN TIME: CPT | Performed by: NURSE PRACTITIONER

## 2020-12-10 PROCEDURE — 92526 ORAL FUNCTION THERAPY: CPT | Mod: GN

## 2020-12-10 PROCEDURE — 120N000002 HC R&B MED SURG/OB UMMC

## 2020-12-10 PROCEDURE — 92507 TX SP LANG VOICE COMM INDIV: CPT | Mod: GN

## 2020-12-10 RX ORDER — POLYETHYLENE GLYCOL 3350 17 G/17G
17 POWDER, FOR SOLUTION ORAL 2 TIMES DAILY
Status: DISCONTINUED | OUTPATIENT
Start: 2020-12-10 | End: 2020-12-30 | Stop reason: HOSPADM

## 2020-12-10 RX ADMIN — MULTIVIT AND MINERALS-FERROUS GLUCONATE 9 MG IRON/15 ML ORAL LIQUID 15 ML: at 08:48

## 2020-12-10 RX ADMIN — ENOXAPARIN SODIUM 70 MG: 80 INJECTION SUBCUTANEOUS at 20:46

## 2020-12-10 RX ADMIN — NYSTATIN 500000 UNITS: 500000 SUSPENSION ORAL at 08:48

## 2020-12-10 RX ADMIN — DOCUSATE SODIUM AND SENNOSIDES 1 TABLET: 8.6; 5 TABLET, FILM COATED ORAL at 08:46

## 2020-12-10 RX ADMIN — POTASSIUM & SODIUM PHOSPHATES POWDER PACK 280-160-250 MG 1 PACKET: 280-160-250 PACK at 20:45

## 2020-12-10 RX ADMIN — NYSTATIN 500000 UNITS: 500000 SUSPENSION ORAL at 11:37

## 2020-12-10 RX ADMIN — Medication 12.5 MG: at 08:46

## 2020-12-10 RX ADMIN — POLYETHYLENE GLYCOL 3350 17 G: 17 POWDER, FOR SOLUTION ORAL at 08:50

## 2020-12-10 RX ADMIN — Medication 12.5 MG: at 20:44

## 2020-12-10 RX ADMIN — THIAMINE HCL TAB 100 MG 100 MG: 100 TAB at 08:45

## 2020-12-10 RX ADMIN — MELATONIN TAB 3 MG 3 MG: 3 TAB at 20:45

## 2020-12-10 RX ADMIN — NYSTATIN 500000 UNITS: 500000 SUSPENSION ORAL at 15:12

## 2020-12-10 RX ADMIN — Medication 2 PACKET: at 08:48

## 2020-12-10 RX ADMIN — POTASSIUM & SODIUM PHOSPHATES POWDER PACK 280-160-250 MG 1 PACKET: 280-160-250 PACK at 11:37

## 2020-12-10 RX ADMIN — WARFARIN SODIUM 7 MG: 6 TABLET ORAL at 17:31

## 2020-12-10 RX ADMIN — POTASSIUM & SODIUM PHOSPHATES POWDER PACK 280-160-250 MG 1 PACKET: 280-160-250 PACK at 15:12

## 2020-12-10 RX ADMIN — NYSTATIN 500000 UNITS: 500000 SUSPENSION ORAL at 20:44

## 2020-12-10 RX ADMIN — ENOXAPARIN SODIUM 70 MG: 80 INJECTION SUBCUTANEOUS at 08:47

## 2020-12-10 RX ADMIN — DOXAZOSIN 1 MG: 4 TABLET ORAL at 08:49

## 2020-12-10 RX ADMIN — FOLIC ACID 1 MG: 1 TABLET ORAL at 08:45

## 2020-12-10 ASSESSMENT — ACTIVITIES OF DAILY LIVING (ADL)
ADLS_ACUITY_SCORE: 10

## 2020-12-10 ASSESSMENT — VISUAL ACUITY
OU: NOT TESTABLE
OU: NOT TESTABLE

## 2020-12-10 NOTE — PLAN OF CARE
Status: Patient admitted on 11/25 with subacute R parietal infarct w/ overlying petechial hemorrhage   Vitals: VSS on RA.   Neuros: [place] neuros. Patient able to follow some commands. Severe global aphasia with incomprehensible sounds. BUE 5/5, BLE 4/5. L facial droop.   IV: NO PIV, ok per order  Resp/trach: LS clear  Diet: NPO with TF infusing at goal of 50mL/hr via PEG  Bowel status: Last BM 12/8  : Flores in place  Skin: Generalized bruising throughout  Pain: Denies, nods to being comfortable  Activity: Up with A1. Can be impulsive at times   Social: Calm and cooperative with cares  Plan: Discharge to SNF awaiting emergency guardianship, continue to monitor and follow POC

## 2020-12-10 NOTE — PROGRESS NOTES
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 On arrival to the ED, pt found to have a new subacute R parietal infarct w/ overlying petechial hemorrhage and was subsequently transferred to [facility] for further cares. Currently pursuing guardianship options    #Subacute R Parietal Lobe Infarct w/ hemorrhagic conversion likely 2/2 cardioembolic source in the setting of known Afib not on anticoagulation  #Afib  Pt had been placed on Eliquis in 2018 for prior L parietal stroke, but was inconsistent in taking it as prescribed and then ultimately discontinued it in 2019 after recurrent nosebleeds  - Q4h neurochecks  - SBP goal > 160  - Avoid hypotonic IVF  - Metoprolol 12.5mg BID  - DISCONTINUE PTA Lisinopril  - Warfarin w/ pharmacy dosing and bridge w/ SQ Lovenox 70mg BID  - Melatonin 3mg Qpm  - No indication for statin (LDL 69)  - Lipid Panel (LDL 69), HgbA1c (5.6)  - TTE (11/25): LV/RV size/function normal w/ EF 55-60%; Grade III diastolic dysfunction w/ severe LA enlargement and mild RA enlargement; intact atrial septum; mild mitral annular calcification; aortic sclerosis w/o stenosis  - MRI Stroke Protocol: can consider in the future if pt calms; previously did not tolerate  - EEG: generalized slowing suggestive of mild-mod encephalopathy w/ maximum cortical dysfunction in the R hemisphere and w/o sign of seizure or epileptiform  activity  - Telemetry  - PT/OT/SLP: TCU  - Stroke Education  - Stroke Class per [facility] ([facility])  - Depression Screen  - Apnea Screen  - Euthermia, Euglycemia  - Consider Cardiology referral upon discharge for diastolic dysfunction seen on TTE    #EtOH Abuse  - Discontinue CIWA  - Thiamine, Folate    #GREGORIA, improving  #Urinary Retention  #Traumatic Straight Cath   #Enterococcus UTI  Baseline Cr < 1, presented w/ Cr 1.3 likely . Pt has a hx of urinary retention (hx tamsulosin but not a current med) but is exhibiting marked amounts here which could be worsening pt's GREGORIA. Lack of PO intake and Enterococcus UTI may also be contributing. Received 7d of Ampicillin. On 11/29, pt also suffered a traumatic straight cath. Curbside to Urology recommended placement of Elena for 5d to allow healing  - Doxazosin 1mg daily  - Remove elena and trial of void  - Consider Urology referral upon discharge     # Hypernatremia, resolved  # Hyperchloremia, resolved  Pt has exhibited elevated Na and Cl, rising since time of admission. Likely 2/2 NPO status since arrival due to multiple failed swallow attempts. Will monitor for refeeding syndrome once NJ placed and TF initiated  - PEG in place   - Nutrition following for TF    ?Oral Thrush  - Nystatin 500,000u BID     Diet: TF per nutrition   VTE PPx: - Warfarin + bridge w/ SQ Lovenox 70mg BID  Dispo: TCU  Code Status: FULL    The patient was discussed with attending Stroke Neurlogist Dr Shalonda Leon MD  Neurology Resident, PGY1  Pager: [phone]  ______________________________________________________    Medications   Home Meds  Prior to Admission medications    Medication Sig Start Date End Date Taking? Authorizing Provider   CRANBERRY JUICE EXTRACT PO Take 40,000 Units by mouth daily    Reported, Patient   Garlic 1000 MG CAPS Take 1 capsule by mouth daily    Reported, Patient   lisinopril (PRINIVIL/ZESTRIL) 5 MG tablet Take 1 tablet (5 mg) by mouth  daily For blood pressure control 1/18/19   Sophia Dacosta APRN CNP   NONFORMULARY Beet Root Juice    Reported, Patient   senna-docusate (SENOKOT-S/PERICOLACE) 8.6-50 MG tablet Take 1 tablet by mouth 2 times daily For constipation 12/11/18   Annabella Santos MD   tamsulosin (FLOMAX) 0.4 MG capsule Take 1 capsule (0.4 mg) by mouth daily 6/5/19   Missael Carvalho MD       Scheduled Meds    doxazosin  1 mg Oral or Feeding Tube Daily     enoxaparin ANTICOAGULANT  1 mg/kg Subcutaneous Q12H     folic acid  1 mg Oral or Feeding Tube Daily     melatonin  3 mg Oral or Feeding Tube Daily     metoprolol tartrate  12.5 mg Oral or Feeding Tube BID     multivitamins w/minerals  15 mL Oral or Feeding Tube Daily     nystatin  500,000 Units Oral 4x Daily     polyethylene glycol  17 g Oral or Feeding Tube BID     potassium & sodium phosphates  1 packet Oral TID     protein modular  2 packet Per Feeding Tube Daily     senna-docusate  1 tablet Per Feeding Tube BID     vitamin B1  100 mg Oral or Feeding Tube Daily       Infusion Meds    dextrose       Warfarin Therapy Reminder         PRN Meds  acetaminophen **OR** acetaminophen **OR** acetaminophen, bisacodyl, dextrose, glucose **OR** dextrose **OR** glucagon, labetalol, LORazepam, ondansetron **OR** ondansetron, Warfarin Therapy Reminder       PHYSICAL EXAMINATION  Temp:  [96.4  F (35.8  C)-98.8  F (37.1  C)] 98.8  F (37.1  C)  Pulse:  [62-75] 73  Resp:  [16-18] 16  BP: ()/(67-77) 112/70  SpO2:  [94 %-100 %] 100 %     General:  patient lying in bed without any acute distress    HEENT:  normocephalic/atraumatic  Cardio:  RRR  Pulmonary:  no respiratory distress  Abdomen:  soft, non-tender, non-distended  Extremities:  no edema  Skin:  intact, warm/dry     Neurologic  Mental Status:  Awake, alert, minimally verbal (grunts, soft moans), will occasionally shake head yes/no to questions but unclear if actual answering or not, also appears to intermittently follow commands but  then the manish of his actions appear to be mimicry and often perseverates on a singular action, pt remains severely aphasic  Cranial Nerves:  visual fields intact to confrontation, PERRL, EOMI with normal smooth pursuit, facial movements symmetric  Motor:  normal muscle tone and bulk, no abnormal movements, able to move all limbs spontaneously, no pronator drift  Reflexes:  toes down-going  Sensory:  deferred  Coordination:  unable to assess 2/2 aphasia  Station/Gait: walking well w/ 1-assist    Stroke Scales    NIHSS  Interval transfer (11/25/20 1947)   Interval Comments     1a. Level of Consciousness 0-->Alert, keenly responsive   1b. LOC Questions 2-->Answers neither question correctly   1c. LOC Commands 1-->Performs one task correctly   2.   Best Gaze 0-->Normal   3.   Visual 0-->No visual loss   4.   Facial Palsy 0-->Normal symmetrical movements   5a. Motor Arm, Left 0-->No drift, limb holds 90 (or 45) degrees for full 10 secs   5b. Motor Arm, Right 0-->No drift, limb holds 90 (or 45) degrees for full 10 secs   6a. Motor Leg, Left 0-->No drift, leg holds 30 degree position for full 5 secs   6b. Motor Leg, right 0-->No drift, leg holds 30 degree position for full 5 secs   7.   Limb Ataxia 0-->Absent   8.   Sensory 0-->Normal, no sensory loss   9.   Best Language 3-->Mute, global aphasia, no usable speech or auditory comprehension   10. Dysarthria 2-->Severe dysarthria, patients speech is so slurred as to be unintelligible in the absence of or out of proportion to any dysphasia, or is mute/anarthric   11. Extinction and Inattention  0-->No abnormality   Total 8 (11/25/20 1947)       Imaging  I personally reviewed all imaging; relevant findings per HPI.     Lab Results Data   CBC  Recent Labs   Lab 12/08/20  0555 12/07/20  0523 12/06/20  0606   WBC 9.7 8.8 10.3   RBC 4.35* 4.15* 4.41   HGB 13.0* 12.4* 13.1*   HCT 41.4 38.6* 42.3    226 223     Basic Metabolic Panel    Recent Labs   Lab 12/10/20  4393  12/09/20  0551 12/08/20  0555 12/07/20  0523 12/06/20  0606 12/06/20  0606   NA  --   --  140 142  --  145*   POTASSIUM 3.9 4.1 4.7 3.8   < > 3.3*   CHLORIDE  --   --  113* 109  --  111*   CO2  --   --  21 27  --  27   BUN  --   --  26 21  --  26   CR  --   --  0.85 0.89  --  0.94   GLC  --   --  107* 86  --  88   SANDRA  --   --  8.4* 8.2*  --  8.4*    < > = values in this interval not displayed.     Liver Panel  No results for input(s): PROTTOTAL, ALBUMIN, BILITOTAL, ALKPHOS, AST, ALT, BILIDIRECT in the last 168 hours.  INR    Recent Labs   Lab Test 12/10/20  0628 12/09/20  0551 12/08/20  0555   INR 1.81* 1.64* 1.41*      Lipid Profile    Recent Labs   Lab Test 11/25/20 2020 11/27/18  0701 12/16/15  0857   CHOL 133 147 159   HDL 55 57 76   LDL 69 78 73   TRIG 46 58 52     A1C    Recent Labs   Lab Test 11/25/20 2020 11/27/18  0701   A1C 5.6 5.5     Troponin I  No results for input(s): TROPI in the last 168 hours.

## 2020-12-10 NOTE — PROGRESS NOTES
Status: Pt suffered right parietal infarct (hemorrhagic stroke), admitted on 11/25.  VS: VSS  Neuros: Pt is only oriented to name. L facial droop noted - severe global aphasia (sounds are mumbled every so often, incomprehensible). Pt is extremely adamant about communicating w/ this writer & fellow nurse. Ample time is provided for responses, pen and paper provided (pt attempts to draw picture - not legible). Cue cards given. Eventually able to communicate via responding to yes and no questions that he is thankful for the staff and care.   GI: Patient has not had bowel movement since 12/8.  : Flores bag to be discontinued per orders -pt is in chair resting.To be removed once pt is back in bed resting.  Respiratory: Clear and heard within all quadrants anteriorly.   Skin: No open wounds noted, Ganga score 19. No bruises or discoloration noted.  IV: No IV access  Activity: Pt walked X3 laps with this writer, good with no near falls. No walker used due to it increasing confusion - gait belt utilized. Met w/ PT and also completed activities successfully. Hygiene cares provided with assistance, pt washed face / writer brushed hair.   Pain: Denies.   Labs/Tests: Phosphorus low - electrolyte protocol followed. See results.   Plan of care:  Continue all cares per plan - encourage activity, ambulation, self-cares and communication development.     An Che, Student RN     Federico Chavez, RN

## 2020-12-10 NOTE — PLAN OF CARE
Care from 1500 - 1930  Status: Patient admitted on 11/25 with subacute R parietal infarct w/ overlying petechial hemorrhage   Vitals: VSS on RA  Neuros: Difficult to assess, severe global apashia, incomprehensible sounds. Not able to use writing board, strengths 5/5, inconsistently follows commands, L facial droop  IV: No IV access per order  Resp/trach: Lung sounds clear throughout  Diet: NPO with TF infusing at goal of 50mL/hr via PEG  Bowel status: Last BM 12/8  : Flores in place with adequate UO  Skin: Generalized bruising throughout  Pain: Denies  Activity: Up with A1. Can be impulsive at times   Social: Calm and cooperative with cares  Plan: Discharge to SNF awaiting emergency guardianship, continue to monitor and follow POC

## 2020-12-10 NOTE — PLAN OF CARE
Status: Patient admitted on 11/25 with subacute R parietal infarct w/ overlying petechial hemorrhage   Vitals: VSS on RA.   Neuros: Pt consistently oriented to self, severe global aphasia with incomprehensible sounds. Patient able to follow some commands, receptive asphasia seems to be improving.  BUE 5/5, BLE 4/5. L facial droop.   IV: NO PIV, ok per order  Resp/trach: LS clear  Diet: NPO with TF infusing at goal of 50 mL/hr via PEG  Bowel status: Last BM 12/8, bowel meds given   : Flores removed at 1300, due to void   Skin: Generalized bruising throughout  Pain: Denies, nods to being comfortable  Activity: Up with A1. Ambulated halls x 3 this shift. Can be impulsive at times   Social: Calm and cooperative with cares  Plan: Discharge to SNF awaiting emergency guardianship, continue to monitor and follow POC

## 2020-12-11 ENCOUNTER — APPOINTMENT (OUTPATIENT)
Dept: OCCUPATIONAL THERAPY | Facility: CLINIC | Age: 75
DRG: 064 | End: 2020-12-11
Attending: PSYCHIATRY & NEUROLOGY
Payer: MEDICARE

## 2020-12-11 LAB
ANION GAP SERPL CALCULATED.3IONS-SCNC: 6 MMOL/L (ref 3–14)
BUN SERPL-MCNC: 30 MG/DL (ref 7–30)
CALCIUM SERPL-MCNC: 8.5 MG/DL (ref 8.5–10.1)
CHLORIDE SERPL-SCNC: 113 MMOL/L (ref 94–109)
CO2 SERPL-SCNC: 26 MMOL/L (ref 20–32)
CREAT SERPL-MCNC: 0.82 MG/DL (ref 0.66–1.25)
ERYTHROCYTE [DISTWIDTH] IN BLOOD BY AUTOMATED COUNT: 14.1 % (ref 10–15)
GFR SERPL CREATININE-BSD FRML MDRD: 87 ML/MIN/{1.73_M2}
GLUCOSE SERPL-MCNC: 126 MG/DL (ref 70–99)
HCT VFR BLD AUTO: 38.9 % (ref 40–53)
HGB BLD-MCNC: 12.2 G/DL (ref 13.3–17.7)
INR PPP: 2.11 (ref 0.86–1.14)
MAGNESIUM SERPL-MCNC: 2.1 MG/DL (ref 1.6–2.3)
MCH RBC QN AUTO: 29.7 PG (ref 26.5–33)
MCHC RBC AUTO-ENTMCNC: 31.4 G/DL (ref 31.5–36.5)
MCV RBC AUTO: 95 FL (ref 78–100)
PHOSPHATE SERPL-MCNC: 3 MG/DL (ref 2.5–4.5)
PLATELET # BLD AUTO: 278 10E9/L (ref 150–450)
POTASSIUM SERPL-SCNC: 3.9 MMOL/L (ref 3.4–5.3)
RBC # BLD AUTO: 4.11 10E12/L (ref 4.4–5.9)
SODIUM SERPL-SCNC: 146 MMOL/L (ref 133–144)
WBC # BLD AUTO: 6.2 10E9/L (ref 4–11)

## 2020-12-11 PROCEDURE — 999N000127 HC STATISTIC PERIPHERAL IV START W US GUIDANCE

## 2020-12-11 PROCEDURE — 85610 PROTHROMBIN TIME: CPT | Performed by: NURSE PRACTITIONER

## 2020-12-11 PROCEDURE — 250N000013 HC RX MED GY IP 250 OP 250 PS 637: Performed by: PSYCHIATRY & NEUROLOGY

## 2020-12-11 PROCEDURE — 250N000013 HC RX MED GY IP 250 OP 250 PS 637: Performed by: STUDENT IN AN ORGANIZED HEALTH CARE EDUCATION/TRAINING PROGRAM

## 2020-12-11 PROCEDURE — 83735 ASSAY OF MAGNESIUM: CPT | Performed by: NURSE PRACTITIONER

## 2020-12-11 PROCEDURE — 250N000011 HC RX IP 250 OP 636: Performed by: STUDENT IN AN ORGANIZED HEALTH CARE EDUCATION/TRAINING PROGRAM

## 2020-12-11 PROCEDURE — 120N000002 HC R&B MED SURG/OB UMMC

## 2020-12-11 PROCEDURE — 99232 SBSQ HOSP IP/OBS MODERATE 35: CPT | Mod: GC | Performed by: PSYCHIATRY & NEUROLOGY

## 2020-12-11 PROCEDURE — 84132 ASSAY OF SERUM POTASSIUM: CPT | Performed by: NURSE PRACTITIONER

## 2020-12-11 PROCEDURE — 85027 COMPLETE CBC AUTOMATED: CPT | Performed by: NURSE PRACTITIONER

## 2020-12-11 PROCEDURE — 97535 SELF CARE MNGMENT TRAINING: CPT | Mod: GO | Performed by: OCCUPATIONAL THERAPIST

## 2020-12-11 PROCEDURE — 272N000078 HC NUTRITION PRODUCT INTERMEDIATE LITER

## 2020-12-11 PROCEDURE — 80048 BASIC METABOLIC PNL TOTAL CA: CPT | Performed by: NURSE PRACTITIONER

## 2020-12-11 PROCEDURE — 250N000013 HC RX MED GY IP 250 OP 250 PS 637: Performed by: NURSE PRACTITIONER

## 2020-12-11 PROCEDURE — 84100 ASSAY OF PHOSPHORUS: CPT | Performed by: NURSE PRACTITIONER

## 2020-12-11 PROCEDURE — 250N000009 HC RX 250: Performed by: STUDENT IN AN ORGANIZED HEALTH CARE EDUCATION/TRAINING PROGRAM

## 2020-12-11 PROCEDURE — 36415 COLL VENOUS BLD VENIPUNCTURE: CPT | Performed by: NURSE PRACTITIONER

## 2020-12-11 RX ORDER — OLANZAPINE 10 MG/2ML
5 INJECTION, POWDER, FOR SOLUTION INTRAMUSCULAR DAILY PRN
Status: DISCONTINUED | OUTPATIENT
Start: 2020-12-11 | End: 2020-12-11 | Stop reason: CLARIF

## 2020-12-11 RX ORDER — WARFARIN SODIUM 5 MG/1
5 TABLET ORAL
Status: COMPLETED | OUTPATIENT
Start: 2020-12-11 | End: 2020-12-11

## 2020-12-11 RX ADMIN — FOLIC ACID 1 MG: 1 TABLET ORAL at 08:44

## 2020-12-11 RX ADMIN — NYSTATIN 500000 UNITS: 500000 SUSPENSION ORAL at 08:44

## 2020-12-11 RX ADMIN — DOCUSATE SODIUM AND SENNOSIDES 1 TABLET: 8.6; 5 TABLET, FILM COATED ORAL at 20:00

## 2020-12-11 RX ADMIN — Medication 12.5 MG: at 08:44

## 2020-12-11 RX ADMIN — ENOXAPARIN SODIUM 70 MG: 80 INJECTION SUBCUTANEOUS at 08:47

## 2020-12-11 RX ADMIN — THIAMINE HCL TAB 100 MG 100 MG: 100 TAB at 08:44

## 2020-12-11 RX ADMIN — Medication 12.5 MG: at 20:00

## 2020-12-11 RX ADMIN — MELATONIN TAB 3 MG 3 MG: 3 TAB at 20:00

## 2020-12-11 RX ADMIN — LORAZEPAM 0.5 MG: 2 INJECTION INTRAMUSCULAR; INTRAVENOUS at 04:25

## 2020-12-11 RX ADMIN — WARFARIN SODIUM 5 MG: 5 TABLET ORAL at 18:22

## 2020-12-11 RX ADMIN — Medication 2 PACKET: at 08:45

## 2020-12-11 RX ADMIN — DOXAZOSIN 1 MG: 4 TABLET ORAL at 08:48

## 2020-12-11 RX ADMIN — MULTIVIT AND MINERALS-FERROUS GLUCONATE 9 MG IRON/15 ML ORAL LIQUID 15 ML: at 08:44

## 2020-12-11 ASSESSMENT — ACTIVITIES OF DAILY LIVING (ADL)
ADLS_ACUITY_SCORE: 10

## 2020-12-11 ASSESSMENT — MIFFLIN-ST. JEOR: SCORE: 1314.36

## 2020-12-11 ASSESSMENT — VISUAL ACUITY: OU: NOT TESTABLE

## 2020-12-11 NOTE — PROGRESS NOTES
CLINICAL NUTRITION SERVICES - REASSESSMENT NOTE     Nutrition Prescription    RECOMMENDATIONS FOR MDs/PROVIDERS TO ORDER:  - Total daily fluids/adjustments per MD     Malnutrition Status:    - Severe malnutrition in the context of acute on chronic illness    Recommendations already ordered by Registered Dietitian (RD):  - Continue via current access/PEG and increase: Isosource 1.5 @ goal 55 ml/hr (1320 ml/day) to provide 1980 kcals (29 kcal/kg/day), 90 g PRO (1.3 g/kg/day), 1003 ml free H2O, 232 g CHO and 20 g Fiber daily.  - Prosource 2 packets. Total provisions = 112 gm PRO (1.6 gm/kg) and 2060 kcals (30 kcals/kg)  - Elevated HOB     Future/Additional Recommendations:  - ongoing TF collin via current access/gastrostomy   - weight trends      EVALUATION OF THE PROGRESS TOWARD GOALS   Diet: NPO  Nutrition Support: - Isosource 1.5 @ goal 50 ml/hr (1200 ml/day) to provide 1800 kcals (26 kcal/kg/day), 82 g PRO (1.2 g/kg/day), 912 ml free H2O, 211 g CHO and 18 g Fiber daily.  - Prosource 2 packets. Total provisions = 104 gm PRO (1.5 gm/kg) and 1880 kcals (27 kcals/kg)  - Elevated HOB   Intake: 903 ml, 1355 kcals (20 kcals/kg), 84 gm PRO (1.2 gm/kg) + Prosource = 106 (1.5 gm/kg), 1435 (21 kcals/kg)     NEW FINDINGS   Nutrition/GI: Pt tolerating EN via PEG. LBM 12/8.     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 EEG: generalized slowing suggestive of mild-mod encephalopathy      Labs/meds: Na+ 146 (H); phos: 2.4 (L). Folic Acid; Certavite; B1; bowel meds     Weights: Weight down from admit: 69 kg --> 63.2 kg = 9% weight loss over ~ 2 weeks      MALNUTRITION  % Intake: /= 50% for >/= 5 days (severe)  % Weight Loss: > 5% in 1 month (severe)  Subcutaneous Fat Loss: Facial region:  mild  Muscle Loss: Temporal:  Mild/moderate, Facial & jaw region:  Mild/moderate, Thoracic region (clavicle, acromium bone, deltoid,  trapezius, pectoral):  Mild/moderate, Upper arm (bicep, tricep):  Mild/moderate, Lower arm  (forearm):  Mild/moderate and Patellar region:  Mild/moderate  Fluid Accumulation/Edema: None noted  Malnutrition Diagnosis: Severe malnutrition in the context of acute on chronic illness    Previous Goals   Total avg nutritional intake to meet a minimum of 25 kcal/kg and 1.2 g PRO/kg daily (per dosing wt 69 kg).  Evaluation: Met    Previous Nutrition Diagnosis  Inadequate protein-energy intake related to disruptions to EN infusions as evidenced by pt not meeting goal provisions with 4-day averages meeting 10 kcals/kg and 0.5 gm PRO/kg dosing weight   Evaluation: Improving    CURRENT NUTRITION DIAGNOSIS  Inadequate energy intake related to disruptions to EN infusions as evidenced by pt not meeting goal provisions with 7-day averages meeting 20 kcals/kg per dosing weight     INTERVENTIONS  Implementation  Enteral Nutrition - increase in the setting of weight trends     Goals  Total avg nutritional intake to meet a minimum of 25 kcal/kg and 1.5 g PRO/kg daily (per dosing wt 69 kg).    Monitoring/Evaluation  Progress toward goals will be monitored and evaluated per protocol.    Celia Trivedi RD, LD, [facility]  Neuro ICU  Pager: [phone]

## 2020-12-11 NOTE — PLAN OF CARE
SLP: Pt working with OT on SLP attempt.  SLP will attempt later in PM or as able per scheduling constraints, will continue to follow per POC.

## 2020-12-11 NOTE — PROGRESS NOTES
Care Management Follow Up    Length of Stay (days): 16    Expected Discharge Date: 12/11/20     Concerns to be Addressed:     Status of pursuit of Guardianship/Conservatorship  Patient plan of care discussed at interdisciplinary rounds: Yes    Anticipated Discharge Disposition:  SNF  Placement for rehab     Anticipated Discharge Services:  SNF  placement  Anticipated Discharge DME:  Not applicable at this time    Patient/family educated on Medicare website which has current facility and service quality ratings:  Not applicable at this time  Education Provided on the Discharge Plan:  Not applicable at this time  Patient/Family in Agreement with the Plan:  Not applicable at this time.    Referrals Placed by CM/SW:  Not applicable at this time  Private pay costs discussed: Not applicable at this time.    Additional Information:  SW is following pt for discharge planning.  Rehab placement is recommended and dependent upon progress made, pt may or may not require long term care.  Pt ready for discharge on 12/7/2020.  Barriers to discharge, no insurance, pt is not able to provide information about income/assets or verifications of income/assets, pt is not able to participate in decision making, no payer source for SNF placement.  As this is the case, MA melina cannot be completed. Pt is also not able to make decisions about where he receives care and support.    SW phoned [employer] with [facility] ([phone]) to inquire about if the petition has been filed with the  (requesting emergency guardianship/conservatorship) and if so when an outcome can be expected.    SW will await a return call from [employer]. SW will continue to follow for discharge planning.    SILVIA Sanchez  Social Work, 6A  Phone:  [phone]  Pager:  [phone]  12/11/2020          ASHLYN Harmon

## 2020-12-11 NOTE — PLAN OF CARE
Status: S/p Subacute R Parietal Lobe Infarct w/ hemorrhagic conversion   VS: VSS on RA  Neuros: Axself, difficult to assess orientation, severe expressive aphasia, improving receptive aphasia. Incomprehensive speech, follows some commands, responds to some yes/no questions. L droop. Depressed mood, very frustrated  GI: NPO, TF @55 via PEG. No BM this shift, BS+  : Voiding w/out difficulty   IV: PIV removed, okay per order  Activity: Ax1 w/gb. Walked on unit x2, Up in chair x1  Pain: Denies  Skin: WNL  Labs/Tests: Worked w/OT  Social: Friend, Marley, updated via phone  Plan of care: Medically ready for d/c, awaiting emergency guardianship and TCU plan

## 2020-12-11 NOTE — PROGRESS NOTES
[facility]     Stroke Progress Note    Interval Events  - No acute events overnight  - Failed trial of void yesterday. During trial, pt had signficant retention of urine causing distress. 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 On arrival to the ED, pt found to have a new subacute R parietal infarct w/ overlying petechial hemorrhage and was subsequently transferred to [facility] for further cares. Currently pursuing guardianship options    #Subacute R Parietal Lobe Infarct w/ hemorrhagic conversion likely 2/2 cardioembolic source in the setting of known Afib not on anticoagulation  #Afib  Pt had been placed on Eliquis in 2018 for prior L parietal stroke, but was inconsistent in taking it as prescribed and then ultimately discontinued it in 2019 after recurrent nosebleeds  - Q4h neurochecks  - SBP goal > 160  - Avoid hypotonic IVF  - Metoprolol 12.5mg BID  - DISCONTINUE PTA Lisinopril  - Warfarin w/ pharmacy dosing   - Melatonin 3mg Qpm  - No indication for statin (LDL 69)  - Lipid Panel (LDL 69), HgbA1c (5.6)  - TTE (11/25): LV/RV size/function normal w/ EF 55-60%; Grade III diastolic dysfunction w/ severe LA enlargement and mild RA enlargement; intact atrial septum; mild mitral annular calcification; aortic sclerosis w/o stenosis  - MRI Stroke Protocol: can consider in the future if pt calms; previously did not  tolerate  - EEG: generalized slowing suggestive of mild-mod encephalopathy w/ maximum cortical dysfunction in the R hemisphere and w/o sign of seizure or epileptiform activity  - Telemetry  - PT/OT/SLP: TCU  - Stroke Education  - Stroke Class per [facility]  - Depression Screen  - Apnea Screen  - Euthermia, Euglycemia  - Consider Cardiology referral upon discharge for diastolic dysfunction seen on TTE    #EtOH Abuse  - Discontinue CIWA  - Thiamine, Folate    #GREGORIA, improving  #Urinary Retention  #Traumatic Straight Cath   #Enterococcus UTI  Baseline Cr < 1, presented w/ Cr 1.3 likely . Pt has a hx of urinary retention (hx tamsulosin but not a current med) but is exhibiting marked amounts here which could be worsening pt's GREGORIA. Lack of PO intake and Enterococcus UTI may also be contributing. Received 7d of Ampicillin. On 11/29, pt also suffered a traumatic straight cath. Curbside to Urology recommended placement of Flores for 5d to allow healing. Since this time, pt has attempted multiple trials of void, but none have been successful  - Doxazosin 1mg daily  - Flores replaced  - Consider Urology referral upon discharge     # Hypernatremia, resolved  # Hyperchloremia, resolved  Pt has exhibited elevated Na and Cl, rising since time of admission. Likely 2/2 NPO status since arrival due to multiple failed swallow attempts. Will monitor for refeeding syndrome once NJ placed and TF initiated  - PEG in place   - Nutrition following for TF    ?Oral Thrush, resolved  - STOP Nystatin 500,000u BID     Diet: TF per nutrition   VTE PPx: - Warfarin per pharmacy dosing  Dispo: TCU  Code Status: FULL    The patient was discussed with attending Stroke Neurlogist Dr Tony Leon MD  Neurology Resident, PGY1  Pager: [phone]  ______________________________________________________    Medications   Home Meds  Prior to Admission medications    Medication Sig Start Date End Date Taking? Authorizing  Provider   CRANBERRY JUICE EXTRACT PO Take 40,000 Units by mouth daily    Reported, Patient   Garlic 1000 MG CAPS Take 1 capsule by mouth daily    Reported, Patient   lisinopril (PRINIVIL/ZESTRIL) 5 MG tablet Take 1 tablet (5 mg) by mouth daily For blood pressure control 1/18/19   Sophia Dacosta APRN CNP   NONFORMULARY Beet Root Juice    Reported, Patient   senna-docusate (SENOKOT-S/PERICOLACE) 8.6-50 MG tablet Take 1 tablet by mouth 2 times daily For constipation 12/11/18   Annabella Santos MD   tamsulosin (FLOMAX) 0.4 MG capsule Take 1 capsule (0.4 mg) by mouth daily 6/5/19   Missael Carvalho MD       Scheduled Meds    doxazosin  1 mg Oral or Feeding Tube Daily     folic acid  1 mg Oral or Feeding Tube Daily     melatonin  3 mg Oral or Feeding Tube Daily     metoprolol tartrate  12.5 mg Oral or Feeding Tube BID     multivitamins w/minerals  15 mL Oral or Feeding Tube Daily     polyethylene glycol  17 g Oral or Feeding Tube BID     protein modular  2 packet Per Feeding Tube Daily     senna-docusate  1 tablet Per Feeding Tube BID     vitamin B1  100 mg Oral or Feeding Tube Daily       Infusion Meds    dextrose       Warfarin Therapy Reminder         PRN Meds  acetaminophen **OR** acetaminophen **OR** acetaminophen, bisacodyl, dextrose, glucose **OR** dextrose **OR** glucagon, labetalol, ondansetron **OR** ondansetron, Warfarin Therapy Reminder       PHYSICAL EXAMINATION  Temp:  [96.8  F (36  C)-98.2  F (36.8  C)] 98.2  F (36.8  C)  Pulse:  [58-87] 69  Resp:  [14-16] 16  BP: ()/(61-89) 130/89  SpO2:  [98 %-100 %] 100 %     General:  patient lying in bed without any acute distress    HEENT:  normocephalic/atraumatic, thrush is improved  Cardio:  RRR  Pulmonary:  no respiratory distress  Abdomen:  soft, non-tender, non-distended  Extremities:  no edema  Skin:  intact, warm/dry     Neurologic  Mental Status:  Awake, alert, minimally verbal (grunts, soft moans), will occasionally shake head yes/no to questions  but doesn't appear to be actually answering and is instead simply mimicking behavior, will often perseverates on a singular action, pt remains severely aphasic  Cranial Nerves:  visual fields intact to confrontation, PERRL, EOMI with normal smooth pursuit, facial movements symmetric, tongue midline  Motor:  normal muscle tone and bulk, no abnormal movements, able to move all limbs spontaneously, no pronator drift  Reflexes:  toes down-going  Sensory:  deferred  Coordination:  unable to assess 2/2 aphasia  Station/Gait: walking well w/ 1-assist    Stroke Scales    NIHSS  Interval transfer (11/25/20 1947)   Interval Comments     1a. Level of Consciousness 0-->Alert, keenly responsive   1b. LOC Questions 2-->Answers neither question correctly   1c. LOC Commands 1-->Performs one task correctly   2.   Best Gaze 0-->Normal   3.   Visual 0-->No visual loss   4.   Facial Palsy 0-->Normal symmetrical movements   5a. Motor Arm, Left 0-->No drift, limb holds 90 (or 45) degrees for full 10 secs   5b. Motor Arm, Right 0-->No drift, limb holds 90 (or 45) degrees for full 10 secs   6a. Motor Leg, Left 0-->No drift, leg holds 30 degree position for full 5 secs   6b. Motor Leg, right 0-->No drift, leg holds 30 degree position for full 5 secs   7.   Limb Ataxia 0-->Absent   8.   Sensory 0-->Normal, no sensory loss   9.   Best Language 3-->Mute, global aphasia, no usable speech or auditory comprehension   10. Dysarthria 2-->Severe dysarthria, patients speech is so slurred as to be unintelligible in the absence of or out of proportion to any dysphasia, or is mute/anarthric   11. Extinction and Inattention  0-->No abnormality   Total 8 (11/25/20 1947)       Imaging  I personally reviewed all imaging; relevant findings per HPI.     Lab Results Data   CBC  Recent Labs   Lab 12/11/20  0541 12/08/20  0555 12/07/20  0523   WBC 6.2 9.7 8.8   RBC 4.11* 4.35* 4.15*   HGB 12.2* 13.0* 12.4*   HCT 38.9* 41.4 38.6*    226 226     Basic  Metabolic Panel    Recent Labs   Lab 12/11/20  0541 12/10/20  0752 12/09/20  0551 12/08/20  0555 12/07/20  0523   *  --   --  140 142   POTASSIUM 3.9 3.9 4.1 4.7 3.8   CHLORIDE 113*  --   --  113* 109   CO2 26  --   --  21 27   BUN 30  --   --  26 21   CR 0.82  --   --  0.85 0.89   *  --   --  107* 86   SANDRA 8.5  --   --  8.4* 8.2*     Liver Panel  No results for input(s): PROTTOTAL, ALBUMIN, BILITOTAL, ALKPHOS, AST, ALT, BILIDIRECT in the last 168 hours.  INR    Recent Labs   Lab Test 12/11/20  0541 12/10/20  0628 12/09/20  0551   INR 2.11* 1.81* 1.64*      Lipid Profile    Recent Labs   Lab Test 11/25/20 2020 11/27/18  0701 12/16/15  0857   CHOL 133 147 159   HDL 55 57 76   LDL 69 78 73   TRIG 46 58 52     A1C    Recent Labs   Lab Test 11/25/20 2020 11/27/18  0701   A1C 5.6 5.5     Troponin I  No results for input(s): TROPI in the last 168 hours.

## 2020-12-11 NOTE — DISCHARGE SUMMARY
[facility]     Neurology Stroke Discharge Summary    Date of Admission: 11/25/2020  Date of Discharge: 12/30/2020    Disposition: Discharged to long-term care facility  Primary Care Physician: No Ref-Primary, Physician      Admission Diagnosis:   AMS    Discharge Diagnosis:   Ischemic Stroke due to cardioembolism    Problem Leading to Hospitalization (from [facility]):   Simone Daniels is a 72yr old male w/ PMHx Afib not on anticoagulation (discontinued 6/2019), anxiety, GERD, EtOH abuse, and hx L parietal stroke w/ hemorrhagic conversion (2018) w/ residual cognitive deficits who presented to [facility] on 11/25/2020 after found to be 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 Staff there noted that he appeared disheveled, mute, and confused and called EMS. Upon arrival in [facility], Head CT revealed a new subacute infarct in the R parietal lobe w/ petechial hemorrhage. CTA was unremarkable. Did not meet criteria for either tPA or thrombectomy and so was transferred to [facility] for ICU level care    Please see H&P dated 11/25/2020 for further details about presentation.    Brief Hospital Course:   Upon arrival to [facility] ICU, pt was hemodynamically stable, but remained severely aphasic - both expressive and receptive - but was able to move all extremities. He was started on ASA and placed on CIWA protocol due to concern for EtOH withdrawal. IV thiamine and folate also started. Repeat CTH was stable, but ASA was not started as pt had failed swallow eval and so had no enteral access. An MRI could not be completed due to pt intolerance. However, at this time, was found to be suffering from a Enterococcus UTI and started on Ampicillin. Further work-up was largely unremarkable and included an EEG showing intermittent slowing in the L anterior head, a TTE (11/25) exhibiting grade III diastolic dysfunction w/ enlarged bilateral atria but an intact septum, HgbA1c 5.6,  and LDL 69. However, pt did have a short-un of Afib/flutter on 11/30 and was subsequently placed on Metopolol for rate control given desire to avoid anticoagulation so close to time of bleed. Was eventually able to start anticoagulation with Warfarin on post-bleed day 10 (12/4, bridging w/ Lovenox.     As pt's hospital course continued, showed little improvement in exam and continued to suffer from severe global aphasia, only able to make small grunting sounds and mimic actions w/o meaningful communication. As such, pt continued to fail swallow evaluations, necessitating NG placement and ultimately a PEG. This last was done on the basis of medical necessity given the pt was unable to consent for himself and no family member/designate POA or guardian could be found. However, as enteral access had been somewhat delayed, pt exhibited hypernatremia, which resolved with the addition of free water flushes to his diet. Besides difficulty w/ swallowing, the pt also dealt with persistent urinary retention which he had had to some degree at baseline. Was getting straight-cathed until he suffered a traumatic straight cath on 11/29. Urology was consulted and suggested Flores placement to allow for healing. This was done, but even after an extended time pt continued to fail trial of void despite the addition of Doxazosin. As such, the pt retained his Flores upon discharge.      Pt's discharge was delayed due the need for a court-appointed guardian given the pt's new, persistent severe aphasia. Ultimately, [employer] was appointed in an emergency capacity on 12/12. Pt was then able to discharge on 12/30/20 to TCU in stable condition with plan to continue Warfarin and Metoprolol. Follow-up referrals were provided for outpatient Urology.     Found to have new subacute R parietal lobe infarct w/ hemorrhagic conversion      IV tPA was not given due to signs of partial hemorrhagic conversion w/ initial imaging.       Work-up as stated below under Pertinent Investigations.    Etiology is thought to be cardioembolic in the setting of known Afib not on anticoagulation.      Rehab evaluation: OT, PT and SLP.     Smoking Cessation: already quit smoking    BP Long-term Goal: 140/90 or less    Antithrombotic/Anticoagulant Agent: warfarin with goal INR 2-3.  Follow-up: Continuing on warfarin and has had medication changes - schedule within 3 days for Anticoagulation clinic    Statins: Not initiated given LDL 69    Hgb A1C Goal: < 7.0    Complications: UTI.     Other problems addressed during this hospitalization:  Paroxysmal Afib: Pt had been placed on Eliquis in 2018 for prior L parietal stroke, but was inconsistent in taking it as prescribed and then ultimately discontinued it in 2019 after recurrent nosebleeds. As Afib thought to be the underlying etiology of most recent stroke, anti-coagulation was restarted on this admission with Warfarin.    EtOH Abuse: Started on thiamine and folate    GREGORIA, Urinary Retention, Traumatic Straight Cath, Enterococcus UTI: Pt baseline Cr < 1, but presented w/ Cr 1.3. Pt has a hx of urinary retention (hx tamsulosin but not a current med) but was exhibiting marked amounts here which could've worsened pt's GREGORIA. Lack of PO intake and Enterococcus UTI also likely contributed. Completed 7d course of Ampicillin for UTI. On 11/29, pt also suffered a traumatic straight cath.  Urology recommended placement of Flores for 5d to allow healing. Since that time, pt attempted multiple trials of void, but none were successful and so Flores was in place upon discharge.  Has been seen by Urology which recommends outpatient follow up for urinary retention.    Hypernatremia: Pt has exhibited elevated Na and Cl, rising since time of admission. Likely 2/2 NPO status since arrival due to multiple failed swallow attempts. Resolved w/ addition of free water flushes to diet.    PERTINENT INVESTIGATIONS    Labs  Lipid Panel:    Recent Labs   Lab Test 11/25/20 2020   CHOL 133   HDL 55   LDL 69   TRIG 46     A1C:   Lab Results   Component Value Date    A1C 5.6 11/25/2020     INR:   Recent Labs   Lab 12/30/20  0615 12/29/20  0539 12/28/20  0617   INR 1.77* 1.75* 1.63*      Coag Panel / Hypercoag Workup: Not indicated  Pending test results: None    Echo (11/26/2020)  No cardiac source for embolus identified. The atrial septum is intact as assessed by color Doppler and agitated saline bubble study .    IMAGING  CT Head (11/27/2020)  1. Continued evolution of extensive right MCA distribution infarct. No significant change in right parieto-occipital foci of petechial hemorrhage. No new hemorrhage.  2. Old left parieto-occipital infarct.    CT Head (11/25/2020)  1. Stable appearance of developing acute to subacute infarct of the right parieto-occipital region with unchanged areas of intraparenchymal hemorrhage. There is local mass effect with effacement of sulci. No midline shift.  2. Chronic infarct involving the left parieto-occipital region.  3. Mild to moderate generalized parenchymal volume loss with sequelae of chronic small vessel ischemic disease.    CTA Head Neck (11/25/2020)  1. No definite high-grade stenosis or large vessel occlusion involving the major craniocervical arterial vasculature.  2. Developing infarct with areas of associated parenchymal hemorrhage centered in the right parietal lobe and extending to involve the occipitotemporal region.   3. No aneurysm or high flow vascular malformation identified.    CT Head (11/25/2020)  1. Developing acute to subacute infarct centered in the right parietal lobe and also involving the right occipitotemporal junction, with evidence for associated hemorrhagic conversion/small volume intraparenchymal hematoma. No significant mass effect/herniation.  2. Chronic left cerebral hemisphere infarct involving the parietal lobe and occipitotemporal region.  3. Brain atrophy and presumed chronic  small vessel ischemic disease, as described.    Endovascular procedure: None     Cardiac Monitoring: Patient had > 24 hrs of cardiac monitor while in hospital.    Findings: Patient has history of atrial fibrillation and is on warfarin.     Sleep Apnea Screen:   Unable to screen ischemic stroke patient due to severe aphasia    PHQ-9 Depression Screen Score: Unable to assess due to severe aphasia    Education discussed with: guardian ([employer]) on medical management and follow-up recommendations/plan.    During daily rounds, the plan of care was discussed and developed with guardian ([employer]).  Plan of care includes:   - STOP Lisinopril 5mg every day  - START these medications:   Warfarin   Metoprolol 12.5mg BID   Thiamine 100mg daily   Folate 1mg daily   Melatonin 3mg Qpm   Doxazosin 1mg daily  - Establish care with primary care physician in 1-2 weeks, referral provided  - Make an appointment with Urology in 1-2 weeks for follow-up on urinary retention and long-term catheter needs.      PHYSICAL EXAMINATION  Vital Signs:  B/P: 120/90, T: 94.6, P: 70, R: 16    General:  patient lying in bed without any acute distress     HEENT:  normocephalic/atraumatic  Cardio:  RRR  Pulmonary:  no respiratory distress  Abdomen:  soft, non-tender, non-distended, PEG in place w/o sign of infection  Extremities:  no edema  Skin:  intact, warm/dry     Neurologic  Mental Status:  Awake, alert, minimally verbal (grunts, soft moans), will occasionally shake head yes/no to questions but doesn't appear to be actually answering and is instead simply mimicking behavior, will often perseverates on a singular action, pt remains severely aphasic  Cranial Nerves:  visual fields intact to confrontation, PERRL, EOMI with normal smooth pursuit, facial movements symmetric, tongue midline  Motor:  normal muscle tone and bulk, no abnormal movements, able to move all limbs spontaneously, no pronator  drift  Reflexes:  toes down-going  Sensory:  deferred  Coordination:  unable to assess 2/2 aphasia  Station/Gait: walking well w/ 1-assist    National Institutes of Health Stroke Scale (on day of discharge)  NIHSS Total Score: 9    Modified [place] Scale (on day of discharge): 3-Moderate disability; requiring some help, but able to walk without assistance    Medications    Current Discharge Medication List      START taking these medications    Details   doxazosin (CARDURA) 0.6 mg/mL SUSP suspension 6.67 mLs (4 mg) by Oral or Feeding Tube route daily  Qty: 200 mL, Refills: 0    Associated Diagnoses: Urinary retention with incomplete bladder emptying      folic acid (FOLVITE) 1 MG tablet 1 tablet (1 mg) by Oral or Feeding Tube route daily  Qty:      Comments: For health maintenance  Associated Diagnoses: Vitamin deficiency      metoprolol tartrate (LOPRESSOR) 25 MG tablet 0.5 tablets (12.5 mg) by Oral or Feeding Tube route 2 times daily  Qty:      Comments: For Afib  Associated Diagnoses: Chronic atrial fibrillation (H)      multivitamins w/minerals (CERTAVITE) liquid 15 mLs by Oral or Feeding Tube route daily  Qty:      Comments: For health maintenance  Associated Diagnoses: Vitamin deficiency      thiamine (B-1) 100 MG tablet 1 tablet (100 mg) by Oral or Feeding Tube route daily  Qty:      Comments: For health maintenance  Associated Diagnoses: Vitamin deficiency      warfarin ANTICOAGULANT (COUMADIN) 7.5 MG tablet Take 1 tablet (7.5 mg) by mouth daily  Qty: 30 tablet, Refills: 0    Associated Diagnoses: Chronic atrial fibrillation (H)      Warfarin Therapy Reminder 1 each continuous prn (for Afib)  Qty:      Comments: For Afib  Associated Diagnoses: Chronic atrial fibrillation (H)         CONTINUE these medications which have NOT CHANGED    Details   melatonin 3 MG tablet 1 tablet (3 mg) by Oral or Feeding Tube route daily  Qty:      Comments: For insomnia  Associated Diagnoses: Insomnia, unspecified type       CRANBERRY JUICE EXTRACT PO Take 40,000 Units by mouth daily   General PTA supplement   Garlic 1000 MG CAPS Take 1 capsule by mouth daily   General PTA supplement      STOP taking these medications       lisinopril (PRINIVIL/ZESTRIL) 5 MG tablet Comments:   Reason for Stopping:         NONFORMULARY Comments:   Reason for Stopping:         senna-docusate (SENOKOT-S/PERICOLACE) 8.6-50 MG tablet Comments:   Reason for Stopping:         tamsulosin (FLOMAX) 0.4 MG capsule Comments:   Reason for Stopping:               Additional recommendations and follow up:       INTERNAL MEDICINE REFERRAL      CARDIOLOGY EVAL ADULT REFERRAL      UROLOGY ADULT REFERRAL      Discharge Instructions    If questions or problems arise regarding tube function (e.g. leaking, dislodges, etc.) Contact Interventional Radiology department 24 hours a day.    For procedures that were done at the [facility] sites,   8:00-4:30 PM Monday through Friday    Contact:[phone].    For afterhours and weekends call the [facility] main phone line [phone] and ask for the [facility] IR on call physician number.    If DIRECTED by the RADIOLOGIST, related to specific problems with the tube functioning,  go to the Emergency Department.     General info for SNF    Length of Stay Estimate: Long Term Care  Condition at Discharge: Stable  Level of care:skilled   Rehabilitation Potential: Good  Admission H&P remains valid and up-to-date: Yes  Recent Chemotherapy: N/A  Use Nursing Home Standing Orders: Yes     Mantoux instructions    Give two-step Mantoux (PPD) Per Facility Policy Yes     Reason for your hospital stay    You were hospitalized for symptoms concerning for a stroke     Flores catheter    To straight gravity drainage. Change catheter every 2 weeks and PRN for leaking or decreased uring output with signs of bladder distention. DO NOT change catheter without a specific MD order IF diagnosis of benign prostatic hypertrophy (BPH),  neurogenic bladder, or other urological conditions     Activity - Up ad kb     Additional Discharge Instructions    - STOP Lisinopril 5mg every day  - START these medications:   Warfarin (per pharmacy / warfarin clinic dosing)   Metoprolol 12.5mg BID   Thiamine 100mg daily   Folate 1mg daily   Melatonin 3mg Qpm   Doxazosin 4mg daily  - Establish care with primary care physician in 1-2 weeks, referral provided  - Make an appointment with Cardiology in 3-4 weeks for follow-up on you possible diastolic heart failure  - Make an appointment with Urology in 2-3 weeks for follow-up on urinary retention and long-term catheter needs     Follow Up and recommended labs and tests    Follow up with primary care provider in 1-2 weeks.  No follow up labs or test are needed.  Follow up with Cardiology, in 3-4 weeks.  No follow up labs or test are needed.  Follow up with Urology in 2-3 weeks.  No follow up labs or test are needed.     Full Code     Physical Therapy Adult Consult    Evaluate and treat as clinically indicated.    Reason: recent stroke     Occupational Therapy Adult Consult    Evaluate and treat as clinically indicated.    Reason: recent stroke     Speech Language Path Adult Consult    Evaluate and treat as clinically indicated.    Reason: recent stroke     Fall precautions     Advance Diet as Tolerated    Follow this diet upon discharge: Orders Placed This Encounter      Adult Formula Drip Feeding: Continuous Isosource 1.5; Gastrostomy; Goal Rate: 55; mL/hr; Medication - Feeding Tube Flush Frequency: At least 15-30 mL water before and after medication administration and with tube clogging; 12/11: increased to NEW ...       Patient was seen and discussed with the Attending, Dr. Lewis.    Taj Taylor MD   Neurology PGY1    Addendum:  DOS changed to 12/30  [facility]

## 2020-12-12 LAB
INR PPP: 2.26 (ref 0.86–1.14)
MAGNESIUM SERPL-MCNC: 2.2 MG/DL (ref 1.6–2.3)
PHOSPHATE SERPL-MCNC: 2.7 MG/DL (ref 2.5–4.5)
POTASSIUM SERPL-SCNC: 4.2 MMOL/L (ref 3.4–5.3)

## 2020-12-12 PROCEDURE — 250N000013 HC RX MED GY IP 250 OP 250 PS 637: Performed by: STUDENT IN AN ORGANIZED HEALTH CARE EDUCATION/TRAINING PROGRAM

## 2020-12-12 PROCEDURE — 250N000013 HC RX MED GY IP 250 OP 250 PS 637: Performed by: PSYCHIATRY & NEUROLOGY

## 2020-12-12 PROCEDURE — 84100 ASSAY OF PHOSPHORUS: CPT | Performed by: NURSE PRACTITIONER

## 2020-12-12 PROCEDURE — 85610 PROTHROMBIN TIME: CPT | Performed by: NURSE PRACTITIONER

## 2020-12-12 PROCEDURE — 36415 COLL VENOUS BLD VENIPUNCTURE: CPT | Performed by: NURSE PRACTITIONER

## 2020-12-12 PROCEDURE — 272N000078 HC NUTRITION PRODUCT INTERMEDIATE LITER

## 2020-12-12 PROCEDURE — 99232 SBSQ HOSP IP/OBS MODERATE 35: CPT | Mod: GC | Performed by: PSYCHIATRY & NEUROLOGY

## 2020-12-12 PROCEDURE — 84132 ASSAY OF SERUM POTASSIUM: CPT | Performed by: NURSE PRACTITIONER

## 2020-12-12 PROCEDURE — 120N000002 HC R&B MED SURG/OB UMMC

## 2020-12-12 PROCEDURE — 250N000013 HC RX MED GY IP 250 OP 250 PS 637: Performed by: NURSE PRACTITIONER

## 2020-12-12 PROCEDURE — 83735 ASSAY OF MAGNESIUM: CPT | Performed by: NURSE PRACTITIONER

## 2020-12-12 PROCEDURE — 250N000009 HC RX 250: Performed by: STUDENT IN AN ORGANIZED HEALTH CARE EDUCATION/TRAINING PROGRAM

## 2020-12-12 RX ORDER — LANOLIN ALCOHOL/MO/W.PET/CERES
3 CREAM (GRAM) TOPICAL DAILY
DISCHARGE
Start: 2020-12-13 | End: 2021-05-02

## 2020-12-12 RX ORDER — WARFARIN SODIUM 6 MG/1
6 TABLET ORAL
Status: COMPLETED | OUTPATIENT
Start: 2020-12-12 | End: 2020-12-12

## 2020-12-12 RX ORDER — LANOLIN ALCOHOL/MO/W.PET/CERES
100 CREAM (GRAM) TOPICAL DAILY
DISCHARGE
Start: 2020-12-13 | End: 2021-03-30

## 2020-12-12 RX ORDER — FOLIC ACID 1 MG/1
1 TABLET ORAL DAILY
DISCHARGE
Start: 2020-12-13 | End: 2021-04-14

## 2020-12-12 RX ORDER — METOPROLOL TARTRATE 25 MG/1
12.5 TABLET, FILM COATED ORAL 2 TIMES DAILY
DISCHARGE
Start: 2020-12-12 | End: 2021-04-07

## 2020-12-12 RX ADMIN — POTASSIUM & SODIUM PHOSPHATES POWDER PACK 280-160-250 MG 1 PACKET: 280-160-250 PACK at 19:33

## 2020-12-12 RX ADMIN — Medication 12.5 MG: at 19:32

## 2020-12-12 RX ADMIN — MELATONIN TAB 3 MG 3 MG: 3 TAB at 18:27

## 2020-12-12 RX ADMIN — FOLIC ACID 1 MG: 1 TABLET ORAL at 08:41

## 2020-12-12 RX ADMIN — WARFARIN SODIUM 6 MG: 6 TABLET ORAL at 18:27

## 2020-12-12 RX ADMIN — MULTIVIT AND MINERALS-FERROUS GLUCONATE 9 MG IRON/15 ML ORAL LIQUID 15 ML: at 08:41

## 2020-12-12 RX ADMIN — Medication 12.5 MG: at 08:45

## 2020-12-12 RX ADMIN — Medication 2 PACKET: at 08:46

## 2020-12-12 RX ADMIN — DOXAZOSIN 1 MG: 4 TABLET ORAL at 08:41

## 2020-12-12 RX ADMIN — THIAMINE HCL TAB 100 MG 100 MG: 100 TAB at 08:41

## 2020-12-12 RX ADMIN — ACETAMINOPHEN 650 MG: 325 TABLET, FILM COATED ORAL at 19:33

## 2020-12-12 RX ADMIN — POTASSIUM & SODIUM PHOSPHATES POWDER PACK 280-160-250 MG 1 PACKET: 280-160-250 PACK at 09:57

## 2020-12-12 RX ADMIN — DOCUSATE SODIUM AND SENNOSIDES 1 TABLET: 8.6; 5 TABLET, FILM COATED ORAL at 08:41

## 2020-12-12 RX ADMIN — DOCUSATE SODIUM AND SENNOSIDES 1 TABLET: 8.6; 5 TABLET, FILM COATED ORAL at 19:33

## 2020-12-12 ASSESSMENT — ACTIVITIES OF DAILY LIVING (ADL)
ADLS_ACUITY_SCORE: 10

## 2020-12-12 NOTE — PLAN OF CARE
Afebrile,vitals stable.Alert ,oriented to self only,severe expressive aphasia,receptive aphasia not consistent,difficult to assess,follows command inconsistently,answered yes or no question.Incomprehensive speech,pt very frustrated,points to objects.NPO,tube feeding running at 55 ml/hr via PEG tube..,no bowel movement this shift.Flores cath patent,adequate urine output..Ambulated in rivera with 1 assist and gait belt.Awaiting emergency guardianship..Plan to discharge to TCU.

## 2020-12-12 NOTE — PLAN OF CARE
Status: pt s/p subacute R parietal Lobe Infarct w/ hemorrhagic conversion   VS: VSS on RA  Neuros: Alert, oriented to self only. Severe expressive aphasia, improving receptive aphasia, difficult to assess. Incomprehensive speech, follows some commands, responds to some yes/no questions. L droop. Depressed mood, very frustrated  GI: NPO. Tube feeding via PEG @ goal rate of 55 mL/hr. BS+, Last BM 12/10  : Flores in place for retention, cares completed  IV: No PIV, okay per MD order  Activity: Up with A1, and GB.   Pain: Denies, no overt signs of pain  Skin: WNL  Social: No calls overnight  Plan of care: Medically ready for d/c, awaiting emergency guardianship and TCU plan

## 2020-12-12 NOTE — PROGRESS NOTES
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 On arrival to the ED, pt found to have a new subacute R parietal infarct w/ overlying petechial hemorrhage and was subsequently transferred to [facility] for further cares. Currently pursuing guardianship options    #Subacute R Parietal Lobe Infarct w/ hemorrhagic conversion likely 2/2 cardioembolic source in the setting of known Afib not on anticoagulation  #Afib  Pt had been placed on Eliquis in 2018 for prior L parietal stroke, but was inconsistent in taking it as prescribed and then ultimately discontinued it in 2019 after recurrent nosebleeds  - Q4h neurochecks  - SBP goal > 160  - Avoid hypotonic IVF  - Metoprolol 12.5mg BID  - DISCONTINUE PTA Lisinopril  - Warfarin w/ pharmacy dosing   - Melatonin 3mg Qpm  - No indication for statin (LDL 69)  - Lipid Panel (LDL 69), HgbA1c (5.6)  - TTE (11/25): LV/RV size/function normal w/ EF 55-60%; Grade III diastolic dysfunction w/ severe LA enlargement and mild RA enlargement; intact atrial septum; mild mitral annular calcification; aortic sclerosis w/o stenosis  - MRI Stroke Protocol: can consider in the future if pt calms; previously did not tolerate  - EEG: generalized slowing suggestive of mild-mod encephalopathy w/ maximum cortical dysfunction in the R hemisphere and w/o sign of seizure or epileptiform activity  - Telemetry  - PT/OT/SLP: TCU  - Stroke Education  - Stroke Class per [facility]  ([facility])  - Depression Screen  - Apnea Screen  - Euthermia, Euglycemia  - Consider Cardiology referral upon discharge for diastolic dysfunction seen on TTE    #EtOH Abuse  - Discontinue CIWA  - Thiamine, Folate    #GREGORIA, improving  #Urinary Retention  #Traumatic Straight Cath   #Enterococcus UTI  Baseline Cr < 1, presented w/ Cr 1.3 likely . Pt has a hx of urinary retention (hx tamsulosin but not a current med) but is exhibiting marked amounts here which could be worsening pt's GREGORIA. Lack of PO intake and Enterococcus UTI may also be contributing. Received 7d of Ampicillin. On 11/29, pt also suffered a traumatic straight cath. Curbside to Urology recommended placement of Flores for 5d to allow healing. Since this time, pt has attempted multiple trials of void, but none have been successful  - Doxazosin 1mg daily  - Flores replaced  - Consider Urology referral upon discharge     # Hypernatremia, resolved  # Hyperchloremia, resolved  Pt has exhibited elevated Na and Cl, rising since time of admission. Likely 2/2 NPO status since arrival due to multiple failed swallow attempts. Will monitor for refeeding syndrome once NJ placed and TF initiated  - PEG in place   - Nutrition following for TF    ?Oral Thrush, resolved  - STOP Nystatin 500,000u BID     Diet: TF per nutrition   VTE PPx: - Warfarin per pharmacy dosing  Dispo: TCU  Code Status: FULL    The patient was discussed with attending Stroke Neurlogist Dr Tony Leon MD  Neurology Resident, PGY1  Pager: [phone]  ______________________________________________________    Medications   Home Meds  Prior to Admission medications    Medication Sig Start Date End Date Taking? Authorizing Provider   CRANBERRY JUICE EXTRACT PO Take 40,000 Units by mouth daily    Reported, Patient   Garlic 1000 MG CAPS Take 1 capsule by mouth daily    Reported, Patient   lisinopril (PRINIVIL/ZESTRIL) 5 MG tablet Take 1 tablet (5 mg) by mouth daily For blood pressure control  1/18/19   Sophia Dacosta APRN CNP   NONFORMULARY Beet Root Juice    Reported, Patient   senna-docusate (SENOKOT-S/PERICOLACE) 8.6-50 MG tablet Take 1 tablet by mouth 2 times daily For constipation 12/11/18   Annabella Santos MD   tamsulosin (FLOMAX) 0.4 MG capsule Take 1 capsule (0.4 mg) by mouth daily 6/5/19   Missael Carvalho MD       Scheduled Meds    doxazosin  1 mg Oral or Feeding Tube Daily     folic acid  1 mg Oral or Feeding Tube Daily     melatonin  3 mg Oral or Feeding Tube Daily     metoprolol tartrate  12.5 mg Oral or Feeding Tube BID     multivitamins w/minerals  15 mL Oral or Feeding Tube Daily     polyethylene glycol  17 g Oral or Feeding Tube BID     potassium & sodium phosphates  1 packet Oral or Feeding Tube BID     protein modular  2 packet Per Feeding Tube Daily     senna-docusate  1 tablet Per Feeding Tube BID     vitamin B1  100 mg Oral or Feeding Tube Daily       Infusion Meds    dextrose       Warfarin Therapy Reminder         PRN Meds  acetaminophen **OR** acetaminophen **OR** acetaminophen, bisacodyl, dextrose, glucose **OR** dextrose **OR** glucagon, labetalol, ondansetron **OR** ondansetron, Warfarin Therapy Reminder       PHYSICAL EXAMINATION  Temp:  [94.6  F (34.8  C)-98.1  F (36.7  C)] 98.1  F (36.7  C)  Pulse:  [70-81] 81  Resp:  [16] 16  BP: (120-141)/(83-91) 136/87  SpO2:  [92 %-99 %] 92 %     General:  patient lying in bed without any acute distress    HEENT:  normocephalic/atraumatic, thrush is improved  Cardio:  RRR  Pulmonary:  no respiratory distress  Abdomen:  soft, non-tender, non-distended  Extremities:  no edema  Skin:  intact, warm/dry     Neurologic  Mental Status:  Awake, alert, minimally verbal (grunts, soft moans), will occasionally shake head yes/no to questions but doesn't appear to be actually answering and is instead simply mimicking behavior, will often perseverates on a singular action, pt remains severely aphasic  Cranial Nerves:  visual fields intact to  confrontation, PERRL, EOMI with normal smooth pursuit, facial movements symmetric, tongue midline  Motor:  normal muscle tone and bulk, no abnormal movements, able to move all limbs spontaneously, no pronator drift  Reflexes:  toes down-going  Sensory:  deferred  Coordination:  unable to assess 2/2 aphasia  Station/Gait: walking well w/ 1-assist    Stroke Scales    NIHSS  Interval transfer (11/25/20 1947)   Interval Comments     1a. Level of Consciousness 0-->Alert, keenly responsive   1b. LOC Questions 2-->Answers neither question correctly   1c. LOC Commands 1-->Performs one task correctly   2.   Best Gaze 0-->Normal   3.   Visual 0-->No visual loss   4.   Facial Palsy 0-->Normal symmetrical movements   5a. Motor Arm, Left 0-->No drift, limb holds 90 (or 45) degrees for full 10 secs   5b. Motor Arm, Right 0-->No drift, limb holds 90 (or 45) degrees for full 10 secs   6a. Motor Leg, Left 0-->No drift, leg holds 30 degree position for full 5 secs   6b. Motor Leg, right 0-->No drift, leg holds 30 degree position for full 5 secs   7.   Limb Ataxia 0-->Absent   8.   Sensory 0-->Normal, no sensory loss   9.   Best Language 3-->Mute, global aphasia, no usable speech or auditory comprehension   10. Dysarthria 2-->Severe dysarthria, patients speech is so slurred as to be unintelligible in the absence of or out of proportion to any dysphasia, or is mute/anarthric   11. Extinction and Inattention  0-->No abnormality   Total 8 (11/25/20 1947)       Imaging  I personally reviewed all imaging; relevant findings per HPI.     Lab Results Data   CBC  Recent Labs   Lab 12/11/20  0541 12/08/20  0555 12/07/20  0523   WBC 6.2 9.7 8.8   RBC 4.11* 4.35* 4.15*   HGB 12.2* 13.0* 12.4*   HCT 38.9* 41.4 38.6*    226 226     Basic Metabolic Panel    Recent Labs   Lab 12/12/20  0611 12/11/20  0541 12/10/20  0752 12/08/20  0555 12/08/20  0555 12/07/20  0523   NA  --  146*  --   --  140 142   POTASSIUM 4.2 3.9 3.9   < > 4.7 3.8    CHLORIDE  --  113*  --   --  113* 109   CO2  --  26  --   --  21 27   BUN  --  30  --   --  26 21   CR  --  0.82  --   --  0.85 0.89   GLC  --  126*  --   --  107* 86   SANDRA  --  8.5  --   --  8.4* 8.2*    < > = values in this interval not displayed.     Liver Panel  No results for input(s): PROTTOTAL, ALBUMIN, BILITOTAL, ALKPHOS, AST, ALT, BILIDIRECT in the last 168 hours.  INR    Recent Labs   Lab Test 12/12/20  0611 12/11/20  0541 12/10/20  0628   INR 2.26* 2.11* 1.81*      Lipid Profile    Recent Labs   Lab Test 11/25/20 2020 11/27/18  0701 12/16/15  0857   CHOL 133 147 159   HDL 55 57 76   LDL 69 78 73   TRIG 46 58 52     A1C    Recent Labs   Lab Test 11/25/20 2020 11/27/18  0701   A1C 5.6 5.5     Troponin I  No results for input(s): TROPI in the last 168 hours.

## 2020-12-12 NOTE — PLAN OF CARE
[id number]    Status: S/p Subacute R Parietal Lobe Infarct w/ hemorrhagic conversion  Vitals: VSS on RA  Neuros: A&O to self. Difficult to assess orientation, severe expressive aphasia, receptive aphasia. Incomprehensive speech, follows some commands, responds to some yes/no questions. L droop. Flat expression. Visual tracking up, down, and right, but not to left.   Resp/trach: LS clear and equal bilaterally  Diet: NPO, TF @55 via PEG.   Bowel status: BS+, last BM 12/10  : Voiding w/o difficulty  Skin: WNL  Pain: Denies  Social: No calls this shift  Plan: Medically ready for discharge, awaiting emergency guardianship and TCU plan

## 2020-12-13 LAB
INR PPP: 2.29 (ref 0.86–1.14)
MAGNESIUM SERPL-MCNC: 2 MG/DL (ref 1.6–2.3)
PHOSPHATE SERPL-MCNC: 2.8 MG/DL (ref 2.5–4.5)
POTASSIUM SERPL-SCNC: 3.8 MMOL/L (ref 3.4–5.3)

## 2020-12-13 PROCEDURE — 85610 PROTHROMBIN TIME: CPT | Performed by: NURSE PRACTITIONER

## 2020-12-13 PROCEDURE — 120N000002 HC R&B MED SURG/OB UMMC

## 2020-12-13 PROCEDURE — 250N000009 HC RX 250: Performed by: STUDENT IN AN ORGANIZED HEALTH CARE EDUCATION/TRAINING PROGRAM

## 2020-12-13 PROCEDURE — 250N000013 HC RX MED GY IP 250 OP 250 PS 637: Performed by: PSYCHIATRY & NEUROLOGY

## 2020-12-13 PROCEDURE — 84132 ASSAY OF SERUM POTASSIUM: CPT | Performed by: NURSE PRACTITIONER

## 2020-12-13 PROCEDURE — 99231 SBSQ HOSP IP/OBS SF/LOW 25: CPT | Mod: GC | Performed by: PSYCHIATRY & NEUROLOGY

## 2020-12-13 PROCEDURE — 84100 ASSAY OF PHOSPHORUS: CPT | Performed by: NURSE PRACTITIONER

## 2020-12-13 PROCEDURE — 250N000013 HC RX MED GY IP 250 OP 250 PS 637: Performed by: STUDENT IN AN ORGANIZED HEALTH CARE EDUCATION/TRAINING PROGRAM

## 2020-12-13 PROCEDURE — 272N000078 HC NUTRITION PRODUCT INTERMEDIATE LITER

## 2020-12-13 PROCEDURE — 83735 ASSAY OF MAGNESIUM: CPT | Performed by: NURSE PRACTITIONER

## 2020-12-13 PROCEDURE — 250N000013 HC RX MED GY IP 250 OP 250 PS 637: Performed by: NURSE PRACTITIONER

## 2020-12-13 PROCEDURE — 36415 COLL VENOUS BLD VENIPUNCTURE: CPT | Performed by: NURSE PRACTITIONER

## 2020-12-13 RX ORDER — WARFARIN SODIUM 6 MG/1
6 TABLET ORAL
Status: COMPLETED | OUTPATIENT
Start: 2020-12-13 | End: 2020-12-13

## 2020-12-13 RX ADMIN — MELATONIN TAB 3 MG 3 MG: 3 TAB at 20:16

## 2020-12-13 RX ADMIN — Medication 12.5 MG: at 09:05

## 2020-12-13 RX ADMIN — MULTIVIT AND MINERALS-FERROUS GLUCONATE 9 MG IRON/15 ML ORAL LIQUID 15 ML: at 09:05

## 2020-12-13 RX ADMIN — POLYETHYLENE GLYCOL 3350 17 G: 17 POWDER, FOR SOLUTION ORAL at 20:16

## 2020-12-13 RX ADMIN — THIAMINE HCL TAB 100 MG 100 MG: 100 TAB at 09:05

## 2020-12-13 RX ADMIN — Medication 12.5 MG: at 20:15

## 2020-12-13 RX ADMIN — POLYETHYLENE GLYCOL 3350 17 G: 17 POWDER, FOR SOLUTION ORAL at 09:05

## 2020-12-13 RX ADMIN — DOCUSATE SODIUM AND SENNOSIDES 1 TABLET: 8.6; 5 TABLET, FILM COATED ORAL at 09:05

## 2020-12-13 RX ADMIN — FOLIC ACID 1 MG: 1 TABLET ORAL at 09:05

## 2020-12-13 RX ADMIN — DOXAZOSIN 1 MG: 4 TABLET ORAL at 09:05

## 2020-12-13 RX ADMIN — WARFARIN SODIUM 6 MG: 6 TABLET ORAL at 20:18

## 2020-12-13 RX ADMIN — Medication 2 PACKET: at 09:06

## 2020-12-13 RX ADMIN — DOCUSATE SODIUM AND SENNOSIDES 1 TABLET: 8.6; 5 TABLET, FILM COATED ORAL at 20:16

## 2020-12-13 ASSESSMENT — ACTIVITIES OF DAILY LIVING (ADL)
ADLS_ACUITY_SCORE: 10
ADLS_ACUITY_SCORE: 10
ADLS_ACUITY_SCORE: 10.5
ADLS_ACUITY_SCORE: 10

## 2020-12-13 NOTE — PLAN OF CARE
Status: pt s/p subacute R parietal Lobe Infarct w/ hemorrhagic conversion   VS: VSS on RA  Neuros: Alert, oriented to self only. Severe expressive aphasia, improving receptive aphasia, difficult to assess. Incomprehensive speech, follows some commands, responds to some yes/no questions. L droop. Depressed mood, very frustrated  GI: NPO. Tube feeding via PEG @ goal rate of 55 mL/hr. BS+, Last BM 12/10  : Flores in place for retention  IV: No PIV, okay per MD order  Activity: Up with A1, and GB. Agitated this doerne, setting bed alarm several times. Got OOB alone and walked towards bathroom. Attempted BM, but was unsuccessful.  Pain: Denies, no non-verbal signs of pain  Skin: WNL  Social: No phone calls this shift  Plan of care: Medically ready for d/c, awaiting emergency guardianship and TCU plan

## 2020-12-13 NOTE — PLAN OF CARE
Status: pt s/p subacute R parietal Lobe Infarct w/ hemorrhagic conversion   VS: VSS on RA  Neuros: Alert, oriented to self only. Difficult to do thorough assessment, pt has severe expressive aphasia, improving receptive aphasia. Incomprehensible speech, follows some commands, responds to some yes/no questions. L droop. Depressed mood, frustrated  GI: NPO. Tube feeding via PEG @ goal rate of 55 mL/hr. Last BM 12/10, on BM meds  : Flores in place for retention with adequate output  IV: No PIV, okay per MD order  Activity: Up with A1, and GB. Can be impulsive to go to bathroom at times.  Pain: Denies, no non-verbal signs of pain  Skin: WNL  Social: No phone calls this shift  Plan of care: Medically ready for d/c, awaiting emergency guardianship and TCU plan

## 2020-12-13 NOTE — PLAN OF CARE
Afebrile,vitals are stable,oriented to self only.Speech incomprehensible,severe receptive and expressive aphasia.Followed command inconsistently.Calm and cooperative this shift.Denied pain.NPO,tube feeding running at 55 ml/hr via PEG tube..No bowel movement this shift,received miralax in addition to scheduled senna.Flores cath patent.Adequate amount of urine output.Ambulated rivera with assist of 1 and gait belt.Pending emergency guardianship. .Plan to discharge  to TCU.

## 2020-12-13 NOTE — PROGRESS NOTES
[facility]     Stroke Progress Note    Interval Events  Guardian appointed 12/11.    Some impulsivity overnight, vitally stable.    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 He was found to have a new subacute R parietal infarct w/ overlying petechial hemorrhage and was subsequently transferred to [facility] for further cares. Guardianship has been achieved, now awaiting insurance coverage for TCU placement. Medically ready for discharge.    #Subacute R Parietal Lobe Infarct w/ hemorrhagic conversion likely 2/2 cardioembolic source in the setting of known Afib not on anticoagulation  #Afib  Pt had been placed on Eliquis in 2018 for prior L parietal stroke, but was inconsistent in taking it as prescribed and then ultimately discontinued it in 2019 after recurrent nosebleeds  - Q4h neurochecks  - SBP goal > 160  - Avoid hypotonic IVF  - Metoprolol 12.5mg BID  - DISCONTINUE PTA Lisinopril  - Warfarin w/ pharmacy dosing   - Melatonin 3mg Qpm  - No indication for statin (LDL 69)  - Lipid Panel (LDL 69), HgbA1c (5.6)  - TTE (11/25): LV/RV size/function normal w/ EF 55-60%; Grade III diastolic dysfunction w/ severe LA enlargement and mild RA enlargement; intact atrial septum; mild mitral annular calcification; aortic sclerosis w/o stenosis  - MRI Stroke Protocol: can consider in the future if pt calms; previously did not tolerate  - EEG: generalized slowing suggestive of mild-mod encephalopathy w/ maximum cortical dysfunction in the R hemisphere and w/o sign of seizure or epileptiform activity  - Telemetry  - PT/OT/SLP: TCU  - Stroke Education  - Stroke Class  per [facility] ([facility])  - Depression Screen  - Apnea Screen  - Euthermia, Euglycemia  - Consider Cardiology referral upon discharge for diastolic dysfunction seen on TTE    #EtOH Abuse  - Discontinue CIWA  - Thiamine, Folate    #GREGORIA, improving  #Urinary Retention  #Traumatic Straight Cath   #Enterococcus UTI  Baseline Cr < 1, presented w/ Cr 1.3 likely . Pt has a hx of urinary retention (hx tamsulosin but not a current med) but is exhibiting marked amounts here which could be worsening pt's GREGORIA. Lack of PO intake and Enterococcus UTI may also be contributing. Received 7d of Ampicillin. On 11/29, pt also suffered a traumatic straight cath. Curbside to Urology recommended placement of Flores for 5d to allow healing. Since this time, pt has attempted multiple trials of void, but none have been successful  - Doxazosin 1mg daily  - Flores replaced  - Consider Urology referral upon discharge     # Hypernatremia, resolved  # Hyperchloremia, resolved  Pt has exhibited elevated Na and Cl, rising since time of admission. Likely 2/2 NPO status since arrival due to multiple failed swallow attempts. Will monitor for refeeding syndrome once NJ placed and TF initiated  - PEG in place   - Nutrition following for TF    ?Oral Thrush, resolved    Diet: TF per nutrition   VTE PPx: - Warfarin per pharmacy dosing  Dispo: TCU  Code Status: FULL    The patient was discussed with attending the attending, Dr. Issa.    Marilia Dow MD  Vascular Neurology Fellow, PGY-5  ______________________________________________________    Medications   Home Meds  Prior to Admission medications    Medication Sig Start Date End Date Taking? Authorizing Provider   CRANBERRY JUICE EXTRACT PO Take 40,000 Units by mouth daily    Reported, Patient   Garlic 1000 MG CAPS Take 1 capsule by mouth daily    Reported, Patient   lisinopril (PRINIVIL/ZESTRIL) 5 MG tablet Take 1 tablet (5 mg) by mouth daily For blood pressure control 1/18/19   Praneeth  CAROLANN Irving CNP   NONFORMULARY Beet Root Juice    Reported, Patient   senna-docusate (SENOKOT-S/PERICOLACE) 8.6-50 MG tablet Take 1 tablet by mouth 2 times daily For constipation 12/11/18   Annabella Santos MD   tamsulosin (FLOMAX) 0.4 MG capsule Take 1 capsule (0.4 mg) by mouth daily 6/5/19   Missael Carvalho MD       Scheduled Meds    doxazosin  1 mg Oral or Feeding Tube Daily     folic acid  1 mg Oral or Feeding Tube Daily     melatonin  3 mg Oral or Feeding Tube Daily     metoprolol tartrate  12.5 mg Oral or Feeding Tube BID     multivitamins w/minerals  15 mL Oral or Feeding Tube Daily     polyethylene glycol  17 g Oral or Feeding Tube BID     protein modular  2 packet Per Feeding Tube Daily     senna-docusate  1 tablet Per Feeding Tube BID     vitamin B1  100 mg Oral or Feeding Tube Daily       Infusion Meds    dextrose       Warfarin Therapy Reminder         PRN Meds  acetaminophen **OR** acetaminophen **OR** acetaminophen, bisacodyl, dextrose, glucose **OR** dextrose **OR** glucagon, labetalol, ondansetron **OR** ondansetron, Warfarin Therapy Reminder       PHYSICAL EXAMINATION  Temp:  [93.5  F (34.2  C)-98  F (36.7  C)] 97.4  F (36.3  C)  Pulse:  [57-81] 80  Resp:  [16-18] 18  BP: (108-157)/() 126/80  SpO2:  [96 %-100 %] 98 %     General:  patient lying in bed without any acute distress    HEENT:  normocephalic/atraumatic  Cardio:  RRR  Pulmonary:  no respiratory distress  Abdomen:  soft, non-tender, non-distended  Extremities:  no edema  Skin:  intact, warm/dry     Neurologic  Mental Status:  Awake, alert, minimally verbal (grunts, soft moans), will occasionally shake head yes/no to questions but doesn't appear to be actually answering and is instead simply mimicking behavior, will often perseverates on a singular action, pt remains severely aphasic  Cranial Nerves:  visual fields intact to confrontation, PERRL, EOMI with normal smooth pursuit, facial movements symmetric, tongue midline  Motor:   normal muscle tone and bulk, no abnormal movements, able to move all limbs spontaneously, no pronator drift  Reflexes:  toes down-going  Sensory:  deferred  Coordination:  unable to assess 2/2 aphasia  Station/Gait: walking well w/ 1-assist    Stroke Scales    NIHSS  Interval transfer (11/25/20 1947)   Interval Comments     1a. Level of Consciousness 0-->Alert, keenly responsive   1b. LOC Questions 2-->Answers neither question correctly   1c. LOC Commands 1-->Performs one task correctly   2.   Best Gaze 0-->Normal   3.   Visual 0-->No visual loss   4.   Facial Palsy 0-->Normal symmetrical movements   5a. Motor Arm, Left 0-->No drift, limb holds 90 (or 45) degrees for full 10 secs   5b. Motor Arm, Right 0-->No drift, limb holds 90 (or 45) degrees for full 10 secs   6a. Motor Leg, Left 0-->No drift, leg holds 30 degree position for full 5 secs   6b. Motor Leg, right 0-->No drift, leg holds 30 degree position for full 5 secs   7.   Limb Ataxia 0-->Absent   8.   Sensory 0-->Normal, no sensory loss   9.   Best Language 3-->Mute, global aphasia, no usable speech or auditory comprehension   10. Dysarthria 2-->Severe dysarthria, patients speech is so slurred as to be unintelligible in the absence of or out of proportion to any dysphasia, or is mute/anarthric   11. Extinction and Inattention  0-->No abnormality   Total 8 (11/25/20 1947)       Imaging  I personally reviewed all imaging; relevant findings per HPI.     Lab Results Data   CBC  Recent Labs   Lab 12/11/20  0541 12/08/20  0555 12/07/20  0523   WBC 6.2 9.7 8.8   RBC 4.11* 4.35* 4.15*   HGB 12.2* 13.0* 12.4*   HCT 38.9* 41.4 38.6*    226 226     Basic Metabolic Panel    Recent Labs   Lab 12/13/20  0618 12/12/20  0611 12/11/20  0541 12/08/20  0555 12/08/20  0555 12/07/20  0523   NA  --   --  146*  --  140 142   POTASSIUM 3.8 4.2 3.9   < > 4.7 3.8   CHLORIDE  --   --  113*  --  113* 109   CO2  --   --  26  --  21 27   BUN  --   --  30  --  26 21   CR  --   --   0.82  --  0.85 0.89   GLC  --   --  126*  --  107* 86   SANDRA  --   --  8.5  --  8.4* 8.2*    < > = values in this interval not displayed.     Liver Panel  No results for input(s): PROTTOTAL, ALBUMIN, BILITOTAL, ALKPHOS, AST, ALT, BILIDIRECT in the last 168 hours.  INR    Recent Labs   Lab Test 12/13/20 0618 12/12/20  0611 12/11/20  0541   INR 2.29* 2.26* 2.11*      Lipid Profile    Recent Labs   Lab Test 11/25/20 2020 11/27/18  0701 12/16/15  0857   CHOL 133 147 159   HDL 55 57 76   LDL 69 78 73   TRIG 46 58 52     A1C    Recent Labs   Lab Test 11/25/20 2020 11/27/18  0701   A1C 5.6 5.5     Troponin I  No results for input(s): TROPI in the last 168 hours.

## 2020-12-14 ENCOUNTER — APPOINTMENT (OUTPATIENT)
Dept: SPEECH THERAPY | Facility: CLINIC | Age: 75
DRG: 064 | End: 2020-12-14
Attending: PSYCHIATRY & NEUROLOGY
Payer: MEDICARE

## 2020-12-14 ENCOUNTER — CARE COORDINATION (OUTPATIENT)
Dept: CARDIOLOGY | Facility: CLINIC | Age: 75
End: 2020-12-14

## 2020-12-14 ENCOUNTER — APPOINTMENT (OUTPATIENT)
Dept: PHYSICAL THERAPY | Facility: CLINIC | Age: 75
DRG: 064 | End: 2020-12-14
Attending: PSYCHIATRY & NEUROLOGY
Payer: MEDICARE

## 2020-12-14 LAB
ANION GAP SERPL CALCULATED.3IONS-SCNC: <1 MMOL/L (ref 3–14)
BUN SERPL-MCNC: 34 MG/DL (ref 7–30)
CALCIUM SERPL-MCNC: 9.1 MG/DL (ref 8.5–10.1)
CHLORIDE SERPL-SCNC: 115 MMOL/L (ref 94–109)
CO2 SERPL-SCNC: 31 MMOL/L (ref 20–32)
CREAT SERPL-MCNC: 0.91 MG/DL (ref 0.66–1.25)
ERYTHROCYTE [DISTWIDTH] IN BLOOD BY AUTOMATED COUNT: 14.4 % (ref 10–15)
GFR SERPL CREATININE-BSD FRML MDRD: 83 ML/MIN/{1.73_M2}
GLUCOSE SERPL-MCNC: 93 MG/DL (ref 70–99)
HCT VFR BLD AUTO: 44 % (ref 40–53)
HGB BLD-MCNC: 13.7 G/DL (ref 13.3–17.7)
INR PPP: 2.08 (ref 0.86–1.14)
MAGNESIUM SERPL-MCNC: 2.2 MG/DL (ref 1.6–2.3)
MCH RBC QN AUTO: 30 PG (ref 26.5–33)
MCHC RBC AUTO-ENTMCNC: 31.1 G/DL (ref 31.5–36.5)
MCV RBC AUTO: 96 FL (ref 78–100)
PHOSPHATE SERPL-MCNC: 2.9 MG/DL (ref 2.5–4.5)
PLATELET # BLD AUTO: 344 10E9/L (ref 150–450)
POTASSIUM SERPL-SCNC: 4.1 MMOL/L (ref 3.4–5.3)
RBC # BLD AUTO: 4.57 10E12/L (ref 4.4–5.9)
SODIUM SERPL-SCNC: 146 MMOL/L (ref 133–144)
WBC # BLD AUTO: 8.9 10E9/L (ref 4–11)

## 2020-12-14 PROCEDURE — 92526 ORAL FUNCTION THERAPY: CPT | Mod: GN

## 2020-12-14 PROCEDURE — 92507 TX SP LANG VOICE COMM INDIV: CPT | Mod: GN

## 2020-12-14 PROCEDURE — 97530 THERAPEUTIC ACTIVITIES: CPT | Mod: GP | Performed by: REHABILITATION PRACTITIONER

## 2020-12-14 PROCEDURE — 250N000013 HC RX MED GY IP 250 OP 250 PS 637: Performed by: PSYCHIATRY & NEUROLOGY

## 2020-12-14 PROCEDURE — 99232 SBSQ HOSP IP/OBS MODERATE 35: CPT | Mod: GC | Performed by: PSYCHIATRY & NEUROLOGY

## 2020-12-14 PROCEDURE — 250N000013 HC RX MED GY IP 250 OP 250 PS 637: Performed by: STUDENT IN AN ORGANIZED HEALTH CARE EDUCATION/TRAINING PROGRAM

## 2020-12-14 PROCEDURE — 250N000013 HC RX MED GY IP 250 OP 250 PS 637: Performed by: NURSE PRACTITIONER

## 2020-12-14 PROCEDURE — 272N000078 HC NUTRITION PRODUCT INTERMEDIATE LITER

## 2020-12-14 PROCEDURE — 120N000002 HC R&B MED SURG/OB UMMC

## 2020-12-14 PROCEDURE — 97116 GAIT TRAINING THERAPY: CPT | Mod: GP | Performed by: REHABILITATION PRACTITIONER

## 2020-12-14 PROCEDURE — 84100 ASSAY OF PHOSPHORUS: CPT | Performed by: NURSE PRACTITIONER

## 2020-12-14 PROCEDURE — 36415 COLL VENOUS BLD VENIPUNCTURE: CPT | Performed by: NURSE PRACTITIONER

## 2020-12-14 PROCEDURE — 85610 PROTHROMBIN TIME: CPT | Performed by: NURSE PRACTITIONER

## 2020-12-14 PROCEDURE — 250N000009 HC RX 250: Performed by: STUDENT IN AN ORGANIZED HEALTH CARE EDUCATION/TRAINING PROGRAM

## 2020-12-14 PROCEDURE — U0003 INFECTIOUS AGENT DETECTION BY NUCLEIC ACID (DNA OR RNA); SEVERE ACUTE RESPIRATORY SYNDROME CORONAVIRUS 2 (SARS-COV-2) (CORONAVIRUS DISEASE [COVID-19]), AMPLIFIED PROBE TECHNIQUE, MAKING USE OF HIGH THROUGHPUT TECHNOLOGIES AS DESCRIBED BY CMS-2020-01-R: HCPCS | Performed by: STUDENT IN AN ORGANIZED HEALTH CARE EDUCATION/TRAINING PROGRAM

## 2020-12-14 PROCEDURE — 84132 ASSAY OF SERUM POTASSIUM: CPT | Performed by: NURSE PRACTITIONER

## 2020-12-14 PROCEDURE — 85027 COMPLETE CBC AUTOMATED: CPT | Performed by: NURSE PRACTITIONER

## 2020-12-14 PROCEDURE — 80048 BASIC METABOLIC PNL TOTAL CA: CPT | Performed by: NURSE PRACTITIONER

## 2020-12-14 PROCEDURE — 83735 ASSAY OF MAGNESIUM: CPT | Performed by: NURSE PRACTITIONER

## 2020-12-14 PROCEDURE — 97112 NEUROMUSCULAR REEDUCATION: CPT | Mod: GP | Performed by: REHABILITATION PRACTITIONER

## 2020-12-14 RX ADMIN — Medication 12.5 MG: at 08:44

## 2020-12-14 RX ADMIN — Medication 2 PACKET: at 08:45

## 2020-12-14 RX ADMIN — MELATONIN TAB 3 MG 3 MG: 3 TAB at 19:25

## 2020-12-14 RX ADMIN — DOXAZOSIN 1 MG: 4 TABLET ORAL at 08:44

## 2020-12-14 RX ADMIN — WARFARIN SODIUM 7 MG: 6 TABLET ORAL at 19:25

## 2020-12-14 RX ADMIN — Medication 12.5 MG: at 19:25

## 2020-12-14 RX ADMIN — POLYETHYLENE GLYCOL 3350 17 G: 17 POWDER, FOR SOLUTION ORAL at 19:28

## 2020-12-14 RX ADMIN — MULTIVIT AND MINERALS-FERROUS GLUCONATE 9 MG IRON/15 ML ORAL LIQUID 15 ML: at 08:44

## 2020-12-14 RX ADMIN — FOLIC ACID 1 MG: 1 TABLET ORAL at 08:44

## 2020-12-14 RX ADMIN — DOCUSATE SODIUM AND SENNOSIDES 1 TABLET: 8.6; 5 TABLET, FILM COATED ORAL at 19:28

## 2020-12-14 RX ADMIN — DOCUSATE SODIUM AND SENNOSIDES 1 TABLET: 8.6; 5 TABLET, FILM COATED ORAL at 08:44

## 2020-12-14 RX ADMIN — THIAMINE HCL TAB 100 MG 100 MG: 100 TAB at 08:44

## 2020-12-14 ASSESSMENT — ACTIVITIES OF DAILY LIVING (ADL)
ADLS_ACUITY_SCORE: 10.5

## 2020-12-14 NOTE — PLAN OF CARE
Status: pt s/p subacute R parietal Lobe Infarct w/ hemorrhagic conversion   VS: VSS on RA  Neuros: Alert, oriented to self only. Difficult to do thorough assessment, pt has severe expressive aphasia, improving receptive aphasia. Incomprehensible speech, follows some commands, responds to some yes/no questions. L droop. Depressed mood, easily frustrated  GI: NPO. Tube feeding via PEG @ goal rate of 55 mL/hr. Last BM 12/10, on BM meds  : Flores in place for retention with adequate output  IV: No PIV, okay per MD order  Activity: Up with A1, and GB. Can be impulsive to go to bathroom at times.  Pain: Denies, no non-verbal signs of pain  Skin: WNL  Social: No phone calls this shift  Plan of care: Medically ready for d/c, awaiting emergency guardianship and TCU plan

## 2020-12-14 NOTE — PLAN OF CARE
OT/6A: Attempting to see patient for OT session this PM. Patient appears frustrated. Pointing at therapist then pointing at door repeatedly. When asked if patient wants therapist to leave, nodding head yes. Will reschedule for tomorrow.

## 2020-12-14 NOTE — PROGRESS NOTES
Referral- Heart Failure    REFERRING CLINIC INFORMATION:    Referring Clinic: Internal inpatient referral   Referring Provider: Diann Leon MD  Provider Contact Info: Phone: [phone]; Fax: [phone]  Nursing Care Team Contact Info: None Given    REFERRING PATIENT INFORMATION:    Patient Phone Number:   Home Phone [phone]   Mobile [phone]      Consent to Communicate:    Insurance Obtained:       PATIENT/REFERRAL Hx:     December 14, 2020  Received referral from inpatient stay. He was admitted from 11/25/20-12/11/20 and was released into long care facility where he currently resides. According to 11/25/20 echo patient has mild concentric wall thickening consistent with left ventricular hypertrophy is present. Grade III or advanced diastolic dysfunction.       IMAGING REQUESTED        In epic    PLAN:       Sending to [facility] Nurse to Triage      ATTEMPTS TO CONTACT:  1. December 17, 2020 Many attempts none of the patients numbers have VM so unable to leave a VM.   2. December 17, 2020 Spoke to Caleb at [facility], who has temp guardianship. They are in the process of getting pt MA and then he will be placed in a SNF. Once that happens he will call and we can get pt set up to see Dr. Rodriguez.     Leobardo Craft, [facility]  Heart Failure, Advanced Heart Failure & CORE  Referral Specialist &     [facility]  Cardiology  Office: [phone]  [phone]

## 2020-12-14 NOTE — PLAN OF CARE
Status: pt s/p subacute R parietal Lobe Infarct w/ hemorrhagic conversion   VS: VSS on RA  Neuros: Alert, oriented to self only. Difficult to do thorough assessment, pt has severe expressive aphasia, improving receptive aphasia. Incomprehensible speech, follows some commands, responds to some yes/no questions. L droop. Depressed mood, frustrated at times.  GI: NPO. Tube feeding via PEG @ goal rate of 55 mL/hr. Small, continent BM this dorene.  : Flores in place for retention with adequate output  IV: No PIV, okay per MD order  Activity: Up with A1, and GB. Can be impulsive to go to bathroom at times. Walked in rivera x2 with RN and pt holding onto IV pole.  Pain: Denies, no non-verbal signs of pain  Skin: WNL  Social: No phone calls this shift  Plan of care: Medically ready for d/c, awaiting emergency guardianship and TCU plan

## 2020-12-14 NOTE — PLAN OF CARE
Status: Ischemic stroke   VS: VSS on RA  Neuros: Unable to assess orientation. Has global aphasia and is moving everything, not talking. Gestures that he wants elena and PEG out and he just wants to go home.  GI: NPO. Tube feeding via PEG @ goal rate of 55 mL/hr. BM last evening.  : Elena in place for retention .  IV: No PIV, okay per MD order  Activity: Up for walks in the rivera with 1 assist to hold on to IV pole. He sits at edge of bed frequently, but stays there and sits back down if alarm goes off and he forgets.  Pain: No signs of pain  Plan of care: Medically ready for discharge. Waiting for TCU plan.

## 2020-12-14 NOTE — PROGRESS NOTES
"Care Management Follow Up    Length of Stay (days): 19    Expected Discharge Date: 20     Concerns to be Addressed:     Disposition planning  Patient plan of care discussed at interdisciplinary rounds: Yes    Anticipated Discharge Disposition:  SNF  Placement for rehab and potentially long term care     Anticipated Discharge Services:  SNF  placement  Anticipated Discharge DME:  Not applicable    Patient/family educated on Medicare website which has current facility and service quality ratings:   Not at this time  Education Provided on the Discharge Plan:  Not at this time  Patient/Family in Agreement with the Plan:  Not at this time    Referrals Placed by CM/SW:  None at this time  Private pay costs discussed: Not applicable    Additional Information:  SW is following pt for discharge planning. SNF placement for rehab and potential long term care is anticipated at time of discharge.  Barriers to discharge, no insurance, pt is not able to provide information about income/assets or verifications of income/assets, pt is not able to participate in decision making, no payer source for SNF placement.  As this is the case, MA melina cannot be completed. Pt is also not able to make decisions about where he receives care and support.  Appointment of Emergency Guardianship/Conservatorship documents were received on 2020.   \"[email or web address] [phone] \" has been appointed as emergency guardian and conservator.  The mcnulty of Guardianship/Conservatorship  on 3/10/2021.      GILBERT phoned [employer] Counselor, Nichole Tolentino ([phone]) and confirmed that Nichole is aware of the appointment of guardianship/conservatorship.  Nichole states that she will work with [employer] for completion of MA melina/verifications of income/assets.  Nichole confirms that she will let this  know when the melina is submitted to the [facility] and will provide a copy of the completed melina.    [facility] ([phone]) and left a message inquiring as to whether or not this case will move toward full guardianship/ conservatorship (as currently it is an emergency guardianship/conservatorship) and if so, when could this SW anticipate that a hearing would be held.    GILBERT received a voice mail to phone Caleb Savanna with [employer] ([phone]).  GILBERT phoned Caleb, left a message for him to call and provided this GILBERT's desk and pager number.    Referrals to SNF's will be delayed until pt is MA pending as at this time, pt does not have an active payer source.    SILVIA Sanchez  [employer], 6A  Phone:  [phone]  Pager:  [phone]  12/14/2020      GILBERT received a return call from Caleb Corrales with [employer] ([phone]).  Caleb states that he has been in contact with [employer]  [employer] Counselor Nichole Tolentino.  GILBERT updated Caleb in regards to discharge recommendation, medical diagnosis ect.  Caleb states that he will try to get MA melina completed as soon as possible.    SILVIA Sanchez  [employer], 6A  Phone:  [phone]  Pager:  [phone]  12/14/2020      ASHLYN Harmon

## 2020-12-14 NOTE — PROGRESS NOTES
[facility]     Stroke Progress Note    Interval Events  - Patient reportedly frustrated and depressed overnight  - Guardian appointed 12/11.    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 He was found to have a new subacute R parietal infarct w/ overlying petechial hemorrhage and was subsequently transferred to [facility] for further cares. Guardianship has been achieved, now awaiting insurance coverage for TCU placement. Medically ready for discharge.    #Subacute R Parietal Lobe Infarct w/ hemorrhagic conversion likely 2/2 cardioembolic source in the setting of known Afib not on anticoagulation  #Afib  Pt had been placed on Eliquis in 2018 for prior L parietal stroke, but was inconsistent in taking it as prescribed and then ultimately discontinued it in 2019 after recurrent nosebleeds  - Q4h neurochecks  - SBP goal > 160  - Avoid hypotonic IVF  - Metoprolol 12.5mg BID  - Discontinued PTA Lisinopril  - Warfarin w/ pharmacy dosing   - Melatonin 3mg Qpm  - No indication for statin (LDL 69)  - Lipid Panel (LDL 69), HgbA1c (5.6)  - TTE (11/25): LV/RV size/function normal w/ EF 55-60%; Grade III diastolic dysfunction w/ severe LA enlargement and mild RA enlargement; intact atrial septum; mild mitral annular calcification; aortic sclerosis w/o stenosis  - MRI Stroke Protocol: can consider in the future if pt calms; previously did not tolerate  - EEG: generalized slowing suggestive of mild-mod encephalopathy w/ maximum cortical dysfunction in the R hemisphere and w/o sign of seizure or epileptiform activity  - Telemetry  - PT/OT/SLP: TCU  - Stroke Education  -  Stroke Class per Patient Learning Center ([facility])  - Depression Screen  - Apnea Screen  - Euthermia, Euglycemia  - Consider Cardiology referral upon discharge for diastolic dysfunction seen on TTE    #EtOH Abuse  - Discontinue CIWA  - Thiamine, Folate    #GREGORIA, improving  #Urinary Retention  #Traumatic Straight Cath   #Enterococcus UTI  Baseline Cr < 1, presented w/ Cr 1.3 likely . Pt has a hx of urinary retention (hx tamsulosin but not a current med) but is exhibiting marked amounts here which could be worsening pt's GREGORIA. Lack of PO intake and Enterococcus UTI may also be contributing. Received 7d of Ampicillin. On 11/29, pt also suffered a traumatic straight cath. Curbside to Urology recommended placement of Flores for 5d to allow healing. Since this time, pt has attempted multiple trials of void, but none have been successful  - Doxazosin 1mg daily  - Flores replaced  - Consider Urology referral upon discharge     # Hypernatremia, resolved  # Hyperchloremia, resolved  Pt has exhibited elevated Na and Cl, rising since time of admission. Likely 2/2 NPO status since arrival due to multiple failed swallow attempts. Will monitor for refeeding syndrome once NJ placed and TF initiated  - PEG in place   - Nutrition following for TF    ?Oral Thrush, resolved    Diet: TF per nutrition   VTE PPx: - Warfarin per pharmacy dosing  Dispo: TCU  Code Status: FULL    The patient was discussed with attending the attending, Dr. Issa.    Toni Maloney MD  PGY-1 Neurology Resident  ______________________________________________________    Medications   Home Meds  Prior to Admission medications    Medication Sig Start Date End Date Taking? Authorizing Provider   CRANBERRY JUICE EXTRACT PO Take 40,000 Units by mouth daily    Reported, Patient   Garlic 1000 MG CAPS Take 1 capsule by mouth daily    Reported, Patient   lisinopril (PRINIVIL/ZESTRIL) 5 MG tablet Take 1 tablet (5 mg) by mouth daily For blood pressure control 1/18/19   Praneeth  CAROLANN Irving CNP   NONFORMULARY Beet Root Juice    Reported, Patient   senna-docusate (SENOKOT-S/PERICOLACE) 8.6-50 MG tablet Take 1 tablet by mouth 2 times daily For constipation 12/11/18   Annabella Santos MD   tamsulosin (FLOMAX) 0.4 MG capsule Take 1 capsule (0.4 mg) by mouth daily 6/5/19   Missael Carvalho MD       Scheduled Meds    doxazosin  1 mg Oral or Feeding Tube Daily     folic acid  1 mg Oral or Feeding Tube Daily     melatonin  3 mg Oral or Feeding Tube Daily     metoprolol tartrate  12.5 mg Oral or Feeding Tube BID     multivitamins w/minerals  15 mL Oral or Feeding Tube Daily     polyethylene glycol  17 g Oral or Feeding Tube BID     protein modular  2 packet Per Feeding Tube Daily     senna-docusate  1 tablet Per Feeding Tube BID     vitamin B1  100 mg Oral or Feeding Tube Daily       Infusion Meds    dextrose       Warfarin Therapy Reminder         PRN Meds  acetaminophen **OR** acetaminophen **OR** acetaminophen, bisacodyl, dextrose, glucose **OR** dextrose **OR** glucagon, labetalol, ondansetron **OR** ondansetron, Warfarin Therapy Reminder       PHYSICAL EXAMINATION  Temp:  [96.4  F (35.8  C)-98  F (36.7  C)] 97.1  F (36.2  C)  Pulse:  [66-82] 66  Resp:  [16] 16  BP: (112-145)/(75-79) 128/76  SpO2:  [96 %-100 %] 100 %     General:  Patient lying in bed in NAD  HEENT:  normocephalic/atraumatic  Cardio:  RRR  Pulmonary:  no respiratory distress  Abdomen:  soft, non-tender, non-distended  Extremities:  no edema  Skin:  intact, warm/dry     Neurologic  Mental Status:  Awake, alert, occasionally grunts and moans, does not appear to be meaningfully responding to questions.  Cranial Nerves:  Visual fields intact to confrontation, PERRL, EOMI with smooth pursuit, facial movements symmetric, hearing appears to be intact.  Motor:  normal muscle tone and bulk, no abnormal movements, able to move all limbs spontaneously, no pronator drift  Reflexes:  deferred  Sensory:  deferred  Coordination:  unable  to assess 2/2 aphasia  Station/Gait: walking well w/ 1-assist    Stroke Scales    NIHSS  Interval transfer (11/25/20 1947)   Interval Comments     1a. Level of Consciousness 0-->Alert, keenly responsive   1b. LOC Questions 2-->Answers neither question correctly   1c. LOC Commands 1-->Performs one task correctly   2.   Best Gaze 0-->Normal   3.   Visual 0-->No visual loss   4.   Facial Palsy 0-->Normal symmetrical movements   5a. Motor Arm, Left 0-->No drift, limb holds 90 (or 45) degrees for full 10 secs   5b. Motor Arm, Right 0-->No drift, limb holds 90 (or 45) degrees for full 10 secs   6a. Motor Leg, Left 0-->No drift, leg holds 30 degree position for full 5 secs   6b. Motor Leg, right 0-->No drift, leg holds 30 degree position for full 5 secs   7.   Limb Ataxia 0-->Absent   8.   Sensory 0-->Normal, no sensory loss   9.   Best Language 3-->Mute, global aphasia, no usable speech or auditory comprehension   10. Dysarthria 2-->Severe dysarthria, patients speech is so slurred as to be unintelligible in the absence of or out of proportion to any dysphasia, or is mute/anarthric   11. Extinction and Inattention  0-->No abnormality   Total 8 (11/25/20 1947)       Imaging  I personally reviewed all imaging; relevant findings per HPI.     Lab Results Data   CBC  Recent Labs   Lab 12/14/20 0534 12/11/20 0541 12/08/20  0555   WBC 8.9 6.2 9.7   RBC 4.57 4.11* 4.35*   HGB 13.7 12.2* 13.0*   HCT 44.0 38.9* 41.4    278 226     Basic Metabolic Panel    Recent Labs   Lab 12/14/20 0534 12/13/20  0618 12/12/20  0611 12/11/20  0541 12/08/20  0555 12/08/20  0555   *  --   --  146*  --  140   POTASSIUM 4.1 3.8 4.2 3.9   < > 4.7   CHLORIDE 115*  --   --  113*  --  113*   CO2 31  --   --  26  --  21   BUN 34*  --   --  30  --  26   CR 0.91  --   --  0.82  --  0.85   GLC 93  --   --  126*  --  107*   SANDRA 9.1  --   --  8.5  --  8.4*    < > = values in this interval not displayed.     Liver Panel  No results for input(s):  PROTTOTAL, ALBUMIN, BILITOTAL, ALKPHOS, AST, ALT, BILIDIRECT in the last 168 hours.  INR    Recent Labs   Lab Test 12/14/20  0534 12/13/20  0618 12/12/20  0611   INR 2.08* 2.29* 2.26*      Lipid Profile    Recent Labs   Lab Test 11/25/20 2020 11/27/18  0701 12/16/15  0857   CHOL 133 147 159   HDL 55 57 76   LDL 69 78 73   TRIG 46 58 52     A1C    Recent Labs   Lab Test 11/25/20 2020 11/27/18  0701   A1C 5.6 5.5     Troponin I  No results for input(s): TROPI in the last 168 hours.

## 2020-12-15 ENCOUNTER — APPOINTMENT (OUTPATIENT)
Dept: OCCUPATIONAL THERAPY | Facility: CLINIC | Age: 75
DRG: 064 | End: 2020-12-15
Attending: PSYCHIATRY & NEUROLOGY
Payer: MEDICARE

## 2020-12-15 ENCOUNTER — APPOINTMENT (OUTPATIENT)
Dept: SPEECH THERAPY | Facility: CLINIC | Age: 75
DRG: 064 | End: 2020-12-15
Attending: PSYCHIATRY & NEUROLOGY
Payer: MEDICARE

## 2020-12-15 ENCOUNTER — APPOINTMENT (OUTPATIENT)
Dept: GENERAL RADIOLOGY | Facility: CLINIC | Age: 75
DRG: 064 | End: 2020-12-15
Attending: PSYCHIATRY & NEUROLOGY
Payer: MEDICARE

## 2020-12-15 LAB
GLUCOSE BLDC GLUCOMTR-MCNC: 117 MG/DL (ref 70–99)
INR PPP: 2.22 (ref 0.86–1.14)
MAGNESIUM SERPL-MCNC: 2.2 MG/DL (ref 1.6–2.3)
PHOSPHATE SERPL-MCNC: 2.6 MG/DL (ref 2.5–4.5)
POTASSIUM SERPL-SCNC: 4.1 MMOL/L (ref 3.4–5.3)
SARS-COV-2 RNA SPEC QL NAA+PROBE: NOT DETECTED
SPECIMEN SOURCE: NORMAL

## 2020-12-15 PROCEDURE — 92526 ORAL FUNCTION THERAPY: CPT | Mod: GN

## 2020-12-15 PROCEDURE — 73560 X-RAY EXAM OF KNEE 1 OR 2: CPT | Mod: LT

## 2020-12-15 PROCEDURE — 250N000013 HC RX MED GY IP 250 OP 250 PS 637: Performed by: STUDENT IN AN ORGANIZED HEALTH CARE EDUCATION/TRAINING PROGRAM

## 2020-12-15 PROCEDURE — 250N000013 HC RX MED GY IP 250 OP 250 PS 637: Performed by: PSYCHIATRY & NEUROLOGY

## 2020-12-15 PROCEDURE — 73502 X-RAY EXAM HIP UNI 2-3 VIEWS: CPT | Mod: 26 | Performed by: RADIOLOGY

## 2020-12-15 PROCEDURE — 92507 TX SP LANG VOICE COMM INDIV: CPT | Mod: GN

## 2020-12-15 PROCEDURE — 250N000013 HC RX MED GY IP 250 OP 250 PS 637: Performed by: NURSE PRACTITIONER

## 2020-12-15 PROCEDURE — 85610 PROTHROMBIN TIME: CPT | Performed by: NURSE PRACTITIONER

## 2020-12-15 PROCEDURE — 84100 ASSAY OF PHOSPHORUS: CPT | Performed by: NURSE PRACTITIONER

## 2020-12-15 PROCEDURE — 36415 COLL VENOUS BLD VENIPUNCTURE: CPT | Performed by: NURSE PRACTITIONER

## 2020-12-15 PROCEDURE — 73502 X-RAY EXAM HIP UNI 2-3 VIEWS: CPT

## 2020-12-15 PROCEDURE — 272N000078 HC NUTRITION PRODUCT INTERMEDIATE LITER

## 2020-12-15 PROCEDURE — 250N000009 HC RX 250: Performed by: STUDENT IN AN ORGANIZED HEALTH CARE EDUCATION/TRAINING PROGRAM

## 2020-12-15 PROCEDURE — 84132 ASSAY OF SERUM POTASSIUM: CPT | Performed by: NURSE PRACTITIONER

## 2020-12-15 PROCEDURE — 999N001017 HC STATISTIC GLUCOSE BY METER IP

## 2020-12-15 PROCEDURE — 83735 ASSAY OF MAGNESIUM: CPT | Performed by: NURSE PRACTITIONER

## 2020-12-15 PROCEDURE — 73560 X-RAY EXAM OF KNEE 1 OR 2: CPT | Mod: 26 | Performed by: RADIOLOGY

## 2020-12-15 PROCEDURE — 97535 SELF CARE MNGMENT TRAINING: CPT | Mod: GO | Performed by: OCCUPATIONAL THERAPIST

## 2020-12-15 PROCEDURE — 99232 SBSQ HOSP IP/OBS MODERATE 35: CPT | Mod: GC | Performed by: PSYCHIATRY & NEUROLOGY

## 2020-12-15 PROCEDURE — 120N000002 HC R&B MED SURG/OB UMMC

## 2020-12-15 RX ADMIN — THIAMINE HCL TAB 100 MG 100 MG: 100 TAB at 08:43

## 2020-12-15 RX ADMIN — MELATONIN TAB 3 MG 3 MG: 3 TAB at 20:14

## 2020-12-15 RX ADMIN — MULTIVIT AND MINERALS-FERROUS GLUCONATE 9 MG IRON/15 ML ORAL LIQUID 15 ML: at 08:43

## 2020-12-15 RX ADMIN — DICLOFENAC SODIUM TOPICAL GEL, 1%, 4 G: 10 GEL TOPICAL at 18:11

## 2020-12-15 RX ADMIN — WARFARIN SODIUM 7 MG: 6 TABLET ORAL at 18:10

## 2020-12-15 RX ADMIN — DOXAZOSIN 1 MG: 4 TABLET ORAL at 08:44

## 2020-12-15 RX ADMIN — FOLIC ACID 1 MG: 1 TABLET ORAL at 08:43

## 2020-12-15 RX ADMIN — Medication 12.5 MG: at 08:42

## 2020-12-15 RX ADMIN — Medication 12.5 MG: at 20:14

## 2020-12-15 RX ADMIN — DOCUSATE SODIUM AND SENNOSIDES 1 TABLET: 8.6; 5 TABLET, FILM COATED ORAL at 20:14

## 2020-12-15 RX ADMIN — Medication 2 PACKET: at 08:47

## 2020-12-15 ASSESSMENT — VISUAL ACUITY
OU: OTHER (SEE COMMENT)
OU: OTHER (SEE COMMENT)

## 2020-12-15 ASSESSMENT — ACTIVITIES OF DAILY LIVING (ADL)
ADLS_ACUITY_SCORE: 10.5

## 2020-12-15 ASSESSMENT — MIFFLIN-ST. JEOR: SCORE: 1308.46

## 2020-12-15 NOTE — PLAN OF CARE
Status: Ischemic stroke   VS: VSS on RA  Neuros: Unable to assess orientation, mostly nonverbal or incomprehensible speech. Intermittently follows commands, L. Facial droop, GOTTI spontaneously 5/5t/o   GI: NPO with  TF infusing via PEG @ goal rate of 55 mL/hr. PEG  site dressing CDI   : Flores in place for retention, adequate UO .  IV: No PIV, okay per MD order  Activity: A1/GB  Pain: No c/o pain this shift   Plan of care: Referrals to SNF's will be delayed until pt is MA pending as at this time, pt does not have an active payer source, SW following. Continue to monitor and follow POC.

## 2020-12-15 NOTE — PLAN OF CARE
Problem: Pain Acute  Goal: Acceptable Pain Control and Functional Ability  12/14/2020 2237 by Denise Lance, RN  Outcome: No Change     Temp: 97.3  F (36.3  C) Temp src: Oral BP: (!) 140/54 Pulse: 82   Resp: 16 SpO2: 99 % O2 Device: None (Room air)       -NPO, on tube feeding at 55 ml/hr via PEG tube  -non-verbal  -ambulated in the rivera with assist of 1, and gait belt  -no PIV access  -BM yesterday, given scheduled Senna and Miralax  -elena with good output, refused elena care  -appears comfortable without discomfort  -restless at times triggering bed-alarm but easily directable    Plan: TCU placement.  Continue with plan of care.

## 2020-12-15 NOTE — PLAN OF CARE
"Status: R parietal infarct with hemorrhagic conversion.hx of Afib. Went for L knee, hip, pelvis xrays.  VS: VSS on RA  Neuros: Unable to assess orientation. Has global aphasia and is moving everything, not talking. Gesturing again, but unable to understand most.  GI: NPO. Tube feeding via PEG @ goal rate of 55 mL/hr. BM this shift in bathroom.  : Flores in place for retention .  IV: No PIV, okay per MD order  Activity: Walking in halls with 1 assist for IV pole.Sitting in the chair for most of shift..  Pain: Shakes head \"no\" when asked if he has pain.  Plan of care: Medically ready for discharge, waiting for plan.  "

## 2020-12-15 NOTE — PROGRESS NOTES
[facility]     Stroke Progress Note    Interval Events  - No acute events overnight  - Guardian appointed 12/11, awaiting MA for discharge    Subjective:  No acute events overnight. Patient reportedly declined OT yesterday. Patient mute, but smiling and cooperative with mimed actions.     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 He was found to have a new subacute R parietal infarct w/ overlying petechial hemorrhage and was subsequently transferred to [facility] for further cares. Guardianship has been achieved, now awaiting insurance coverage for TCU placement. Medically ready for discharge.    #Subacute R Parietal Lobe Infarct w/ hemorrhagic conversion likely 2/2 cardioembolic source in the setting of known Afib not on anticoagulation  #Afib  Pt had been placed on Eliquis in 2018 for prior L parietal stroke, but was inconsistent in taking it as prescribed and then ultimately discontinued it in 2019 after recurrent nosebleeds  - Q4h neurochecks  - SBP goal > 160  - Avoid hypotonic IVF  - Metoprolol 12.5mg BID  - Discontinued PTA Lisinopril  - Warfarin w/ pharmacy dosing   - Melatonin 3mg Qpm  - No indication for statin (LDL 69)  - Lipid Panel (LDL 69), HgbA1c (5.6)  - TTE (11/25): LV/RV size/function normal w/ EF 55-60%; Grade III diastolic dysfunction w/ severe LA enlargement and mild RA enlargement; intact atrial septum; mild mitral annular calcification; aortic sclerosis w/o stenosis  - MRI Stroke Protocol: can consider in the future if pt calms; previously did not tolerate  - EEG: generalized slowing suggestive of mild-mod encephalopathy w/ maximum  cortical dysfunction in the R hemisphere and w/o sign of seizure or epileptiform activity  - Telemetry  - PT/OT/SLP: TCU  - Stroke Education  - Stroke Class per Patient Learning Center ([facility])  - Depression Screen  - Apnea Screen  - Euthermia, Euglycemia  - Consider Cardiology referral upon discharge for diastolic dysfunction seen on TTE    #EtOH Abuse  - Discontinue CIWA  - Thiamine, Folate    #Antalgic gait favoring right knee  Patient with antalgic gait, favoring right side. Unclear if there is any pain or if there history of trauma to left knee/hip given the patient's aphasia. Will obtain a L hip and knee XR today to assess for fracture or other abnormality.   - L Hip and Knee XR ordered       #GREGORIA, improving  #Urinary Retention  #Traumatic Straight Cath   #Enterococcus UTI  Baseline Cr < 1, presented w/ Cr 1.3 likely . Pt has a hx of urinary retention (hx tamsulosin but not a current med) but is exhibiting marked amounts here which could be worsening pt's GREGORIA. Lack of PO intake and Enterococcus UTI may also be contributing. Received 7d of Ampicillin. On 11/29, pt also suffered a traumatic straight cath. Curbside to Urology recommended placement of Flores for 5d to allow healing. Since this time, pt has attempted multiple trials of void, but none have been successful  - Doxazosin 1mg daily  - Flores replaced  - Consider Urology referral upon discharge     # Hypernatremia, resolved  # Hyperchloremia, resolved  Pt has exhibited elevated Na and Cl, rising since time of admission. Likely 2/2 NPO status since arrival due to multiple failed swallow attempts. Will monitor for refeeding syndrome once NJ placed and TF initiated  - PEG in place   - Nutrition following for TF    Diet: TF per nutrition   VTE PPx: - Warfarin per pharmacy dosing  Dispo: TCU once MA can be established  Code Status: FULL    The patient was discussed with attending the attending, Dr. Issa.    Toni Maloney MD  PGY-1 Neurology  Resident  ______________________________________________________    Medications   Home Meds  Prior to Admission medications    Medication Sig Start Date End Date Taking? Authorizing Provider   CRANBERRY JUICE EXTRACT PO Take 40,000 Units by mouth daily    Reported, Patient   Garlic 1000 MG CAPS Take 1 capsule by mouth daily    Reported, Patient   lisinopril (PRINIVIL/ZESTRIL) 5 MG tablet Take 1 tablet (5 mg) by mouth daily For blood pressure control 1/18/19   Sophia Dacosta APRN CNP   NONFORMULARY Beet Root Juice    Reported, Patient   senna-docusate (SENOKOT-S/PERICOLACE) 8.6-50 MG tablet Take 1 tablet by mouth 2 times daily For constipation 12/11/18   Annabella Santos MD   tamsulosin (FLOMAX) 0.4 MG capsule Take 1 capsule (0.4 mg) by mouth daily 6/5/19   Missael Carvalho MD       Scheduled Meds    doxazosin  1 mg Oral or Feeding Tube Daily     folic acid  1 mg Oral or Feeding Tube Daily     melatonin  3 mg Oral or Feeding Tube Daily     metoprolol tartrate  12.5 mg Oral or Feeding Tube BID     multivitamins w/minerals  15 mL Oral or Feeding Tube Daily     polyethylene glycol  17 g Oral or Feeding Tube BID     protein modular  2 packet Per Feeding Tube Daily     senna-docusate  1 tablet Per Feeding Tube BID     vitamin B1  100 mg Oral or Feeding Tube Daily       Infusion Meds    dextrose       Warfarin Therapy Reminder         PRN Meds  acetaminophen **OR** acetaminophen **OR** acetaminophen, bisacodyl, dextrose, glucose **OR** dextrose **OR** glucagon, labetalol, ondansetron **OR** ondansetron, Warfarin Therapy Reminder       PHYSICAL EXAMINATION  Temp:  [97.3  F (36.3  C)-98.3  F (36.8  C)] 98.3  F (36.8  C)  Pulse:  [76-96] 76  Resp:  [16] 16  BP: (117-146)/(54-85) 130/82  SpO2:  [96 %-100 %] 99 %     General:  Patient lying in bed in NAD  HEENT:  normocephalic/atraumatic  Cardio:  RRR  Pulmonary:  no respiratory distress  Abdomen:  soft, non-tender, non-distended  Extremities:  no edema  Skin:  intact,  warm/dry     Neurologic  Mental Status:  Awake, alert, globally aphasic but follows mimed commands. Unable to answer any questions  Cranial Nerves:  Visual fields intact to confrontation, PERRL, EOMI with smooth pursuit, facial movements symmetric, hearing appears to be intact.  Motor:  normal muscle tone and bulk, no abnormal movements, able to move all limbs spontaneously, no pronator drift  Reflexes:  deferred  Sensory:  deferred  Coordination:  unable to assess 2/2 aphasia  Station/Gait: Assist of 1. Antalgic gait, favoring his right leg    Stroke Scales    NIHSS  Interval transfer (11/25/20 1947)   Interval Comments     1a. Level of Consciousness 0-->Alert, keenly responsive   1b. LOC Questions 2-->Answers neither question correctly   1c. LOC Commands 1-->Performs one task correctly   2.   Best Gaze 0-->Normal   3.   Visual 0-->No visual loss   4.   Facial Palsy 0-->Normal symmetrical movements   5a. Motor Arm, Left 0-->No drift, limb holds 90 (or 45) degrees for full 10 secs   5b. Motor Arm, Right 0-->No drift, limb holds 90 (or 45) degrees for full 10 secs   6a. Motor Leg, Left 0-->No drift, leg holds 30 degree position for full 5 secs   6b. Motor Leg, right 0-->No drift, leg holds 30 degree position for full 5 secs   7.   Limb Ataxia 0-->Absent   8.   Sensory 0-->Normal, no sensory loss   9.   Best Language 3-->Mute, global aphasia, no usable speech or auditory comprehension   10. Dysarthria 2-->Severe dysarthria, patients speech is so slurred as to be unintelligible in the absence of or out of proportion to any dysphasia, or is mute/anarthric   11. Extinction and Inattention  0-->No abnormality   Total 8 (12/14/20 1041)       Imaging  I personally reviewed all imaging; relevant findings per HPI.     Lab Results Data   CBC  Recent Labs   Lab 12/14/20  0534 12/11/20  0541   WBC 8.9 6.2   RBC 4.57 4.11*   HGB 13.7 12.2*   HCT 44.0 38.9*    278     Basic Metabolic Panel    Recent Labs   Lab  12/15/20  0543 12/14/20  0534 12/13/20  0618 12/11/20  0541 12/11/20  0541   NA  --  146*  --   --  146*   POTASSIUM 4.1 4.1 3.8   < > 3.9   CHLORIDE  --  115*  --   --  113*   CO2  --  31  --   --  26   BUN  --  34*  --   --  30   CR  --  0.91  --   --  0.82   GLC  --  93  --   --  126*   SANDRA  --  9.1  --   --  8.5    < > = values in this interval not displayed.     Liver Panel  No results for input(s): PROTTOTAL, ALBUMIN, BILITOTAL, ALKPHOS, AST, ALT, BILIDIRECT in the last 168 hours.  INR    Recent Labs   Lab Test 12/15/20  0543 12/14/20  0534 12/13/20 0618   INR 2.22* 2.08* 2.29*      Lipid Profile    Recent Labs   Lab Test 11/25/20 2020 11/27/18  0701 12/16/15  0857   CHOL 133 147 159   HDL 55 57 76   LDL 69 78 73   TRIG 46 58 52     A1C    Recent Labs   Lab Test 11/25/20 2020 11/27/18  0701   A1C 5.6 5.5     Troponin I  No results for input(s): TROPI in the last 168 hours.

## 2020-12-16 ENCOUNTER — APPOINTMENT (OUTPATIENT)
Dept: OCCUPATIONAL THERAPY | Facility: CLINIC | Age: 75
DRG: 064 | End: 2020-12-16
Attending: PSYCHIATRY & NEUROLOGY
Payer: MEDICARE

## 2020-12-16 ENCOUNTER — APPOINTMENT (OUTPATIENT)
Dept: SPEECH THERAPY | Facility: CLINIC | Age: 75
DRG: 064 | End: 2020-12-16
Attending: PSYCHIATRY & NEUROLOGY
Payer: MEDICARE

## 2020-12-16 LAB — INR PPP: 2.58 (ref 0.86–1.14)

## 2020-12-16 PROCEDURE — 120N000002 HC R&B MED SURG/OB UMMC

## 2020-12-16 PROCEDURE — 92507 TX SP LANG VOICE COMM INDIV: CPT | Mod: GN

## 2020-12-16 PROCEDURE — 250N000013 HC RX MED GY IP 250 OP 250 PS 637: Performed by: STUDENT IN AN ORGANIZED HEALTH CARE EDUCATION/TRAINING PROGRAM

## 2020-12-16 PROCEDURE — 85610 PROTHROMBIN TIME: CPT | Performed by: NURSE PRACTITIONER

## 2020-12-16 PROCEDURE — 272N000078 HC NUTRITION PRODUCT INTERMEDIATE LITER

## 2020-12-16 PROCEDURE — 99232 SBSQ HOSP IP/OBS MODERATE 35: CPT | Mod: GC | Performed by: PSYCHIATRY & NEUROLOGY

## 2020-12-16 PROCEDURE — 97530 THERAPEUTIC ACTIVITIES: CPT | Mod: GO | Performed by: OCCUPATIONAL THERAPIST

## 2020-12-16 PROCEDURE — 97535 SELF CARE MNGMENT TRAINING: CPT | Mod: GO | Performed by: OCCUPATIONAL THERAPIST

## 2020-12-16 PROCEDURE — 85610 PROTHROMBIN TIME: CPT | Performed by: PSYCHIATRY & NEUROLOGY

## 2020-12-16 PROCEDURE — 250N000013 HC RX MED GY IP 250 OP 250 PS 637: Performed by: PSYCHIATRY & NEUROLOGY

## 2020-12-16 PROCEDURE — 250N000013 HC RX MED GY IP 250 OP 250 PS 637: Performed by: NURSE PRACTITIONER

## 2020-12-16 PROCEDURE — 36415 COLL VENOUS BLD VENIPUNCTURE: CPT | Performed by: PSYCHIATRY & NEUROLOGY

## 2020-12-16 PROCEDURE — 92526 ORAL FUNCTION THERAPY: CPT | Mod: GN

## 2020-12-16 PROCEDURE — 250N000009 HC RX 250: Performed by: STUDENT IN AN ORGANIZED HEALTH CARE EDUCATION/TRAINING PROGRAM

## 2020-12-16 RX ADMIN — DICLOFENAC SODIUM TOPICAL GEL, 1%, 4 G: 10 GEL TOPICAL at 21:36

## 2020-12-16 RX ADMIN — DICLOFENAC SODIUM TOPICAL GEL, 1%, 4 G: 10 GEL TOPICAL at 08:30

## 2020-12-16 RX ADMIN — Medication 2 PACKET: at 08:32

## 2020-12-16 RX ADMIN — MULTIVIT AND MINERALS-FERROUS GLUCONATE 9 MG IRON/15 ML ORAL LIQUID 15 ML: at 08:30

## 2020-12-16 RX ADMIN — POTASSIUM & SODIUM PHOSPHATES POWDER PACK 280-160-250 MG 1 PACKET: 280-160-250 PACK at 21:35

## 2020-12-16 RX ADMIN — MELATONIN TAB 3 MG 3 MG: 3 TAB at 18:58

## 2020-12-16 RX ADMIN — WARFARIN SODIUM 7 MG: 6 TABLET ORAL at 18:58

## 2020-12-16 RX ADMIN — POTASSIUM & SODIUM PHOSPHATES POWDER PACK 280-160-250 MG 1 PACKET: 280-160-250 PACK at 08:30

## 2020-12-16 RX ADMIN — DOXAZOSIN 1 MG: 4 TABLET ORAL at 08:30

## 2020-12-16 RX ADMIN — FOLIC ACID 1 MG: 1 TABLET ORAL at 08:30

## 2020-12-16 RX ADMIN — DICLOFENAC SODIUM TOPICAL GEL, 1%, 4 G: 10 GEL TOPICAL at 16:06

## 2020-12-16 RX ADMIN — THIAMINE HCL TAB 100 MG 100 MG: 100 TAB at 08:30

## 2020-12-16 RX ADMIN — Medication 12.5 MG: at 21:36

## 2020-12-16 RX ADMIN — DICLOFENAC SODIUM TOPICAL GEL, 1%, 4 G: 10 GEL TOPICAL at 13:51

## 2020-12-16 RX ADMIN — Medication 12.5 MG: at 08:30

## 2020-12-16 RX ADMIN — DOCUSATE SODIUM AND SENNOSIDES 1 TABLET: 8.6; 5 TABLET, FILM COATED ORAL at 08:30

## 2020-12-16 ASSESSMENT — ACTIVITIES OF DAILY LIVING (ADL)
ADLS_ACUITY_SCORE: 10
ADLS_ACUITY_SCORE: 10.5
ADLS_ACUITY_SCORE: 10
ADLS_ACUITY_SCORE: 10

## 2020-12-16 NOTE — PLAN OF CARE
Neuro: [place] orientation d/t global aphasia. Restless at times. Redirection provided.  Cardiac: HR's 80-90's. VSS. Afebrile.  Respiratory: Sating >92% on RA. LS clear, equal bilat.   GI/: Adequate yellow UOP via elena d/t retention. No BM.  Diet/appetite: NPO. TF's at goal, 55mL/hr, flushed with 30mL water before/after meds. Denied N/V.  Activity: SBA, up to EOB and in halls.  Pain: C/o L knee pain. Scheduled Voltaren gel given.  Skin: No new deficits noted.  LDA's: No access.    Plan: Continue with POC. Notify primary team with changes.

## 2020-12-16 NOTE — PROGRESS NOTES
[facility]     Stroke Progress Note    Interval Events  - No acute events overnight    Subjective:  No acute events overnight. Patient reportedly declined OT yesterday. Patient mute, but smiling and cooperative with mimed actions.     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 He was found to have a new subacute R parietal infarct w/ overlying petechial hemorrhage and was subsequently transferred to [facility] for further cares. Guardianship has been achieved, now awaiting insurance coverage for TCU placement. Medically ready for discharge.    - Guardian appointed 12/11, awaiting MA for discharge.  - Guardian will need to review and account for all pt's assets before MA application can be submitted.    #Subacute R Parietal Lobe Infarct w/ hemorrhagic conversion likely 2/2 cardioembolic source in the setting of known Afib not on anticoagulation  #Afib  Pt had been placed on Eliquis in 2018 for prior L parietal stroke, but was inconsistent in taking it as prescribed and then ultimately discontinued it in 2019 after recurrent nosebleeds  - Q4h neurochecks  - SBP goal > 160  - Avoid hypotonic IVF  - Metoprolol 12.5mg BID  - Discontinued PTA Lisinopril  - Warfarin w/ pharmacy dosing   - Melatonin 3mg Qpm  - No indication for statin (LDL 69)  - Lipid Panel (LDL 69), HgbA1c (5.6)  - TTE (11/25): LV/RV size/function normal w/ EF 55-60%; Grade III diastolic dysfunction w/ severe LA enlargement and mild RA enlargement; intact atrial septum; mild mitral annular calcification; aortic sclerosis w/o stenosis  - MRI Stroke Protocol: can consider in the future if pt  calms; previously did not tolerate  - EEG: generalized slowing suggestive of mild-mod encephalopathy w/ maximum cortical dysfunction in the R hemisphere and w/o sign of seizure or epileptiform activity  - Telemetry  - PT/OT/SLP: TCU  - Stroke Education  - Stroke Class per [facility] ([facility])  - Depression Screen  - Apnea Screen  - Euthermia, Euglycemia  - Consider Cardiology referral upon discharge for diastolic dysfunction seen on TTE    #EtOH Abuse  - Discontinue CIWA  - Thiamine, Folate    #Antalgic gait favoring right knee  Patient with antalgic gait, favoring right side. Unclear if there is any pain or if there history of trauma to left knee/hip given the patient's aphasia. Will obtain a L hip and knee XR today to assess for fracture or other abnormality.   - L Hip and Knee XR ordered       #GREGORIA, improving  #Urinary Retention  #Traumatic Straight Cath   #Enterococcus UTI  Baseline Cr < 1, presented w/ Cr 1.3 likely . Pt has a hx of urinary retention (hx tamsulosin but not a current med) but is exhibiting marked amounts here which could be worsening pt's GREGORIA. Lack of PO intake and Enterococcus UTI may also be contributing. Received 7d of Ampicillin. On 11/29, pt also suffered a traumatic straight cath. Curbside to Urology recommended placement of Flores for 5d to allow healing. Since this time, pt has attempted multiple trials of void, but none have been successful  - Doxazosin 1mg daily  - Flores replaced  - Consider Urology referral upon discharge     # Hypernatremia, resolved  # Hyperchloremia, resolved  Pt has exhibited elevated Na and Cl, rising since time of admission. Likely 2/2 NPO status since arrival due to multiple failed swallow attempts. Will monitor for refeeding syndrome once NJ placed and TF initiated  - PEG in place   - Nutrition following for TF    Diet: TF per nutrition   VTE PPx: - Warfarin per pharmacy dosing  Dispo: TCU once MA can be established  Code Status: FULL    The patient  was discussed with stroke fellow, Dr. Abrams.    Taj Taylor MD  Neurology Resident - PGY1    ______________________________________________________    Medications   Home Meds  Prior to Admission medications    Medication Sig Start Date End Date Taking? Authorizing Provider   CRANBERRY JUICE EXTRACT PO Take 40,000 Units by mouth daily    Reported, Patient   Garlic 1000 MG CAPS Take 1 capsule by mouth daily    Reported, Patient   lisinopril (PRINIVIL/ZESTRIL) 5 MG tablet Take 1 tablet (5 mg) by mouth daily For blood pressure control 1/18/19   Sophia Dacosta APRN CNP   NONFORMULARY Beet Root Juice    Reported, Patient   senna-docusate (SENOKOT-S/PERICOLACE) 8.6-50 MG tablet Take 1 tablet by mouth 2 times daily For constipation 12/11/18   Annabella Santos MD   tamsulosin (FLOMAX) 0.4 MG capsule Take 1 capsule (0.4 mg) by mouth daily 6/5/19   Missael Carvalho MD       Scheduled Meds    diclofenac  4 g Topical 4x Daily     doxazosin  1 mg Oral or Feeding Tube Daily     folic acid  1 mg Oral or Feeding Tube Daily     melatonin  3 mg Oral or Feeding Tube Daily     metoprolol tartrate  12.5 mg Oral or Feeding Tube BID     multivitamins w/minerals  15 mL Oral or Feeding Tube Daily     polyethylene glycol  17 g Oral or Feeding Tube BID     potassium & sodium phosphates  1 packet Oral or Feeding Tube BID     protein modular  2 packet Per Feeding Tube Daily     senna-docusate  1 tablet Per Feeding Tube BID     vitamin B1  100 mg Oral or Feeding Tube Daily       Infusion Meds    dextrose       Warfarin Therapy Reminder         PRN Meds  acetaminophen **OR** acetaminophen **OR** acetaminophen, bisacodyl, dextrose, glucose **OR** dextrose **OR** glucagon, labetalol, ondansetron **OR** ondansetron, Warfarin Therapy Reminder       PHYSICAL EXAMINATION  Temp:  [97.3  F (36.3  C)-98.3  F (36.8  C)] 98  F (36.7  C)  Pulse:  [55-96] 65  Resp:  [16] 16  BP: (116-140)/(70-90) 133/70  SpO2:  [90 %-100 %] 99 %     General:   Patient lying in bed in NAD  HEENT:  normocephalic/atraumatic  Cardio:  RRR  Pulmonary:  no respiratory distress  Extremities:  no edema  Skin:  intact, warm/dry     Neurologic  Mental Status:  Awake, alert, globally aphasic but will attempt some part of neuro exam from memory.  Does no consistently mime. Unable to answer any questions  Cranial Nerves:  Visual fields intact to confrontation, PERRL, EOMI with smooth pursuit, facial movements symmetric, hearing appears to be intact.  Motor:  normal muscle tone and bulk, no abnormal movements, able to move all limbs spontaneously against gravity  Reflexes:  deferred  Sensory:  deferred  Coordination:  unable to assess 2/2 aphasia  Station/Gait: Assist of 1. Antalgic gait, favoring his right leg    Stroke Scales    NIHSS  Interval transfer (11/25/20 1947)   Interval Comments     1a. Level of Consciousness 0-->Alert, keenly responsive   1b. LOC Questions 2-->Answers neither question correctly   1c. LOC Commands 1-->Performs one task correctly   2.   Best Gaze 0-->Normal   3.   Visual 0-->No visual loss   4.   Facial Palsy 0-->Normal symmetrical movements   5a. Motor Arm, Left 0-->No drift, limb holds 90 (or 45) degrees for full 10 secs   5b. Motor Arm, Right 0-->No drift, limb holds 90 (or 45) degrees for full 10 secs   6a. Motor Leg, Left 0-->No drift, leg holds 30 degree position for full 5 secs   6b. Motor Leg, right 0-->No drift, leg holds 30 degree position for full 5 secs   7.   Limb Ataxia 0-->Absent   8.   Sensory 0-->Normal, no sensory loss   9.   Best Language 3-->Mute, global aphasia, no usable speech or auditory comprehension   10. Dysarthria 2-->Severe dysarthria, patients speech is so slurred as to be unintelligible in the absence of or out of proportion to any dysphasia, or is mute/anarthric   11. Extinction and Inattention  0-->No abnormality   Total 8 (12/14/20 1041)       Imaging  I personally reviewed all imaging; relevant findings per HPI.     Lab  Results Data   CBC  Recent Labs   Lab 12/14/20  0534 12/11/20  0541   WBC 8.9 6.2   RBC 4.57 4.11*   HGB 13.7 12.2*   HCT 44.0 38.9*    278     Basic Metabolic Panel    Recent Labs   Lab 12/15/20  0543 12/14/20  0534 12/13/20  0618 12/11/20  0541 12/11/20  0541   NA  --  146*  --   --  146*   POTASSIUM 4.1 4.1 3.8   < > 3.9   CHLORIDE  --  115*  --   --  113*   CO2  --  31  --   --  26   BUN  --  34*  --   --  30   CR  --  0.91  --   --  0.82   GLC  --  93  --   --  126*   SANDRA  --  9.1  --   --  8.5    < > = values in this interval not displayed.     Liver Panel  No results for input(s): PROTTOTAL, ALBUMIN, BILITOTAL, ALKPHOS, AST, ALT, BILIDIRECT in the last 168 hours.  INR    Recent Labs   Lab Test 12/15/20  0543 12/14/20  0534 12/13/20  0618   INR 2.22* 2.08* 2.29*      Lipid Profile    Recent Labs   Lab Test 11/25/20 2020 11/27/18  0701 12/16/15  0857   CHOL 133 147 159   HDL 55 57 76   LDL 69 78 73   TRIG 46 58 52     A1C    Recent Labs   Lab Test 11/25/20 2020 11/27/18  0701   A1C 5.6 5.5     Troponin I  No results for input(s): TROPI in the last 168 hours.

## 2020-12-16 NOTE — PROGRESS NOTES
"Care Management Follow Up    Length of Stay (days): 21    Expected Discharge Date: 20     Concerns to be Addressed:    Disposition planning   Patient plan of care discussed at interdisciplinary rounds: Yes    Anticipated Discharge Disposition:  SNF placement for rehab and potentially long term care     Anticipated Discharge Services:  SNF placement for rehab and potentially long term care  Anticipated Discharge DME:  Not applicable at this time    Patient/family educated on Medicare website which has current facility and service quality ratings:  Not applicable at this time      Education Provided on the Discharge Plan:  Not applicable at this time  Patient/Family in Agreement with the Plan:  Not applicable at this time    Referrals Placed by CM/SW:  None at this time  Private pay costs discussed: Not applicable at this time.    Additional Information:  SW is following pt for discharge planning. SNF placement for rehab and potential long term care is anticipated at time of discharge.  Barriers to discharge, no insurance, pt is not able to provide information about income/assets or verifications of income/assets, pt is not able to participate in decision making, no payer source for SNF placement.  As this is the case, MA melina cannot be completed. Pt is also not able to make decisions about where he receives care and support.  Appointment of Emergency Guardianship/Conservatorship documents were received on 2020.   \"[employer] [phone] \" has been appointed as emergency guardian and conservator.  The mcnulty of Guardianship/Conservatorship  on 3/10/2021.     Update:  - on 2020, GILBERT spoke with Caleb Sawyer from [employer] who indicated that he would be actively pursuing information about pt's finances and would be assisting with completion of MA melina as soon as possible.  Caleb indicates that he had already received a call (and spoke with) from [employer] " Counselor, Nichole Tolentino.    - on 12/14/2020, GILBERT received a return call from Mike Cabral [facility] who indicated that a general hearing for continuation of guardianship/conservatorship won't be filed/take place for 2-3 months.  Mike states that he would anticipate the court hearing for full conservatorship/guardianship to take place in February or March of 2021.  In the meantime, pt is under a court ordered  Emergency guardianship/conservatorship until 3/10/2021.   - on 12/15/2020 GILBERT received a voice mail from Mikejuan Cabral with [facility] ([phone]) requesting that SW phone him with an update  - on 12/16/2020, SW phoned Mike and left a message updating in regards to current nursing and therapy notes (address orientation, communication, functional status, elena, peg tube).    SW will continue to follow for discharge planning. GILBERT anticipates making SNF referrals when MA melina completed and sent to the [facility].    SILVIA Sanchez  Social Work, 6A  Phone:  [phone]  Pager:  [phone]  12/16/2020          ASHLYN Harmon

## 2020-12-16 NOTE — PLAN OF CARE
Pt here with R parietal infract, vss, neuros include: difficult to assess d/t LOC, pt has global aphasia, perseverates on ADLs like washing hands/face, see flow-sheet for detail neuros. No PIV, NPO with TF @ goal rate, voiding via elena, had BM this morning and up AO1 w/GB. Continue to monitor per orders.

## 2020-12-16 NOTE — PLAN OF CARE
"Time: 1900 - 2330     Reason for admission/Dx:   Stroke  Hemorrhagic stroke (H)      /87   Pulse 89   Temp 97.5  F (36.4  C) (Axillary)   Resp 16   Ht 1.676 m (5' 5.98\")   Wt 62.6 kg (138 lb)   SpO2 100%   BMI 22.28 kg/m       Shift changes: VSS, on RA. Alert, unable to assess orientation. No changes to neuros. No OOB this shift, although up with stand by. No new labs. Pt asymptomatic, without acute distress. Resting comfortably in bed. No PIV, okay per MD. TF @ goal.  LBM 12/15, miralax held. Mark patent.     Plan: No acute changes this shift.  Continue with current plan of care.    "

## 2020-12-16 NOTE — PLAN OF CARE
Status: R parietal infarct with hemorrhagic conversion.hx of Afib.   VS: VSS on RA, HTN within parameters intermittent bradycardia (55-60bpm)  Neuros: Unable to assess orientation, mostly nonverbal or incomprehensible speech. Intermittently follows commands, GOTTI spontaneously 5/5t/o   GI: NPO with  TF infusing via PEG @ goal rate of 55 mL/hr. PEG  site dressing CDI   : Flores in place for retention, adequate UO .  IV: No PIV, okay per MD order  Activity: A1/GB  Pain: No c/o pain this shift   Plan of care: TCU once MA can be established. Continue to monitor and follow POC.

## 2020-12-17 ENCOUNTER — APPOINTMENT (OUTPATIENT)
Dept: SPEECH THERAPY | Facility: CLINIC | Age: 75
DRG: 064 | End: 2020-12-17
Attending: PSYCHIATRY & NEUROLOGY
Payer: MEDICARE

## 2020-12-17 ENCOUNTER — APPOINTMENT (OUTPATIENT)
Dept: PHYSICAL THERAPY | Facility: CLINIC | Age: 75
DRG: 064 | End: 2020-12-17
Attending: PSYCHIATRY & NEUROLOGY
Payer: MEDICARE

## 2020-12-17 LAB
ANION GAP SERPL CALCULATED.3IONS-SCNC: 5 MMOL/L (ref 3–14)
BUN SERPL-MCNC: 36 MG/DL (ref 7–30)
CALCIUM SERPL-MCNC: 8.8 MG/DL (ref 8.5–10.1)
CHLORIDE SERPL-SCNC: 122 MMOL/L (ref 94–109)
CO2 SERPL-SCNC: 26 MMOL/L (ref 20–32)
CREAT SERPL-MCNC: 0.82 MG/DL (ref 0.66–1.25)
ERYTHROCYTE [DISTWIDTH] IN BLOOD BY AUTOMATED COUNT: 14.7 % (ref 10–15)
GFR SERPL CREATININE-BSD FRML MDRD: 87 ML/MIN/{1.73_M2}
GLUCOSE SERPL-MCNC: 121 MG/DL (ref 70–99)
HCT VFR BLD AUTO: 44.3 % (ref 40–53)
HGB BLD-MCNC: 13.8 G/DL (ref 13.3–17.7)
INR PPP: 2.95 (ref 0.86–1.14)
MAGNESIUM SERPL-MCNC: 2.1 MG/DL (ref 1.6–2.3)
MCH RBC QN AUTO: 30.5 PG (ref 26.5–33)
MCHC RBC AUTO-ENTMCNC: 31.2 G/DL (ref 31.5–36.5)
MCV RBC AUTO: 98 FL (ref 78–100)
PLATELET # BLD AUTO: 312 10E9/L (ref 150–450)
POTASSIUM SERPL-SCNC: 3.8 MMOL/L (ref 3.4–5.3)
RBC # BLD AUTO: 4.52 10E12/L (ref 4.4–5.9)
SODIUM SERPL-SCNC: 153 MMOL/L (ref 133–144)
WBC # BLD AUTO: 12.1 10E9/L (ref 4–11)

## 2020-12-17 PROCEDURE — 120N000002 HC R&B MED SURG/OB UMMC

## 2020-12-17 PROCEDURE — 80048 BASIC METABOLIC PNL TOTAL CA: CPT | Performed by: NURSE PRACTITIONER

## 2020-12-17 PROCEDURE — 84132 ASSAY OF SERUM POTASSIUM: CPT | Performed by: NURSE PRACTITIONER

## 2020-12-17 PROCEDURE — 250N000013 HC RX MED GY IP 250 OP 250 PS 637: Performed by: STUDENT IN AN ORGANIZED HEALTH CARE EDUCATION/TRAINING PROGRAM

## 2020-12-17 PROCEDURE — 250N000009 HC RX 250: Performed by: STUDENT IN AN ORGANIZED HEALTH CARE EDUCATION/TRAINING PROGRAM

## 2020-12-17 PROCEDURE — 250N000013 HC RX MED GY IP 250 OP 250 PS 637: Performed by: PSYCHIATRY & NEUROLOGY

## 2020-12-17 PROCEDURE — 272N000078 HC NUTRITION PRODUCT INTERMEDIATE LITER

## 2020-12-17 PROCEDURE — 36415 COLL VENOUS BLD VENIPUNCTURE: CPT | Performed by: NURSE PRACTITIONER

## 2020-12-17 PROCEDURE — 97116 GAIT TRAINING THERAPY: CPT | Mod: GP | Performed by: REHABILITATION PRACTITIONER

## 2020-12-17 PROCEDURE — 85610 PROTHROMBIN TIME: CPT | Performed by: NURSE PRACTITIONER

## 2020-12-17 PROCEDURE — 92526 ORAL FUNCTION THERAPY: CPT | Mod: GN

## 2020-12-17 PROCEDURE — 83735 ASSAY OF MAGNESIUM: CPT | Performed by: NURSE PRACTITIONER

## 2020-12-17 PROCEDURE — 92507 TX SP LANG VOICE COMM INDIV: CPT | Mod: GN

## 2020-12-17 PROCEDURE — 99231 SBSQ HOSP IP/OBS SF/LOW 25: CPT | Mod: GC | Performed by: PSYCHIATRY & NEUROLOGY

## 2020-12-17 PROCEDURE — 97110 THERAPEUTIC EXERCISES: CPT | Mod: GP | Performed by: REHABILITATION PRACTITIONER

## 2020-12-17 PROCEDURE — 85027 COMPLETE CBC AUTOMATED: CPT | Performed by: NURSE PRACTITIONER

## 2020-12-17 RX ORDER — NYSTATIN 100000/ML
500000 SUSPENSION, ORAL (FINAL DOSE FORM) ORAL 4 TIMES DAILY PRN
Status: DISCONTINUED | OUTPATIENT
Start: 2020-12-17 | End: 2020-12-30 | Stop reason: HOSPADM

## 2020-12-17 RX ORDER — WARFARIN SODIUM 5 MG/1
5 TABLET ORAL
Status: COMPLETED | OUTPATIENT
Start: 2020-12-17 | End: 2020-12-17

## 2020-12-17 RX ADMIN — MULTIVIT AND MINERALS-FERROUS GLUCONATE 9 MG IRON/15 ML ORAL LIQUID 15 ML: at 07:57

## 2020-12-17 RX ADMIN — MELATONIN TAB 3 MG 3 MG: 3 TAB at 19:32

## 2020-12-17 RX ADMIN — Medication 12.5 MG: at 19:32

## 2020-12-17 RX ADMIN — DICLOFENAC SODIUM TOPICAL GEL, 1%, 4 G: 10 GEL TOPICAL at 20:51

## 2020-12-17 RX ADMIN — FOLIC ACID 1 MG: 1 TABLET ORAL at 07:55

## 2020-12-17 RX ADMIN — Medication 12.5 MG: at 07:55

## 2020-12-17 RX ADMIN — WARFARIN SODIUM 5 MG: 5 TABLET ORAL at 18:12

## 2020-12-17 RX ADMIN — Medication 2 PACKET: at 07:56

## 2020-12-17 RX ADMIN — THIAMINE HCL TAB 100 MG 100 MG: 100 TAB at 07:55

## 2020-12-17 RX ADMIN — DOXAZOSIN 1 MG: 4 TABLET ORAL at 07:55

## 2020-12-17 RX ADMIN — DICLOFENAC SODIUM TOPICAL GEL, 1%, 4 G: 10 GEL TOPICAL at 07:55

## 2020-12-17 ASSESSMENT — ACTIVITIES OF DAILY LIVING (ADL)
ADLS_ACUITY_SCORE: 10

## 2020-12-17 ASSESSMENT — MIFFLIN-ST. JEOR: SCORE: 1300.75

## 2020-12-17 NOTE — PROGRESS NOTES
"Care Management Follow Up    Length of Stay (days): 22    Expected Discharge Date: 20     Concerns to be Addressed:    Disposition planning   Patient plan of care discussed at interdisciplinary rounds: Yes    Anticipated Discharge Disposition:  SNF placement for rehab and potentially long term care     Anticipated Discharge Services:  SNF placement for rehab and potentially long term care  Anticipated Discharge DME:  Not applicable    Patient/family educated on Medicare website which has current facility and service quality ratings:  Not applicable at this time  Education Provided on the Discharge Plan:   Not applicable at this time  Patient/Family in Agreement with the Plan:    Not applicable at this time    Referrals Placed by CM/SW:   None at this time  Private pay costs discussed:   Not applicable at this time    Additional Information:  SW is following pt for discharge planning. SNF placement for rehab and potential long term care is anticipated at time of discharge.  Barriers to discharge, no insurance, pt is not able to provide information about income/assets or verifications of income/assets, pt is not able to participate in decision making, no payer source for SNF placement.  As this is the case, MA melina cannot be completed. Pt is also not able to make decisions about where he receives care and support.  Appointment of Emergency Guardianship/Conservatorship documents were received on 2020.   \"[employer] [phone] \" has been appointed as emergency guardian and conservator.  The mcnulty of Guardianship/Conservatorship  on 3/10/2021.     GILBERT received a call from Caleb Corrales with [employer] requesting this SW's email address (provided) as he will be faxing a \"Intent to dispose of personal property notice\" that he has requested that SW provide to pt.   GILBERT will do so when document arrives.   Caleb also inquired as to how long pt will remain hospitalized.  GILBERT " informed Caleb that SW will make SNF referrals when pt is MA pending as at this time, pt does not have a payer source to cover the cost of SNF.  Caleb states that they will try to get this done as quickly as possible.    SW will continue to follow for discharge planning.    SILVIA Sanchez  Social Work, 6A  Phone:  [phone]  Pager:  [phone]  12/17/2020              ASHLYN Harmon

## 2020-12-17 NOTE — PROGRESS NOTES
[facility]     Stroke Progress Note    Interval Events  - No acute events overnight  - Possible daughter, Chanel Burnett, contacting hospital  - Per SW and APS, do not provide information to Chanel Burnett    Subjective:  No acute events overnight. Patient mute, cooperative with mimed actions, some frustration trying to express self.     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 He was found to have a new subacute R parietal infarct w/ overlying petechial hemorrhage and was subsequently transferred to [facility] for further cares. Guardianship has been achieved, now awaiting insurance coverage for TCU placement. Medically ready for discharge.    - Guardian appointed 12/11, awaiting MA for discharge.  - Guardian will need to review and account for all pt's assets before MA application can be submitted.  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 likely 2/2 cardioembolic source in the setting of known Afib not on anticoagulation  #Afib  Pt had been placed on Eliquis in 2018 for prior L parietal stroke, but was inconsistent in taking it as prescribed and then ultimately discontinued it in 2019 after recurrent nosebleeds  - Q4h neurochecks  - SBP goal > 160  - Avoid hypotonic IVF  - Metoprolol 12.5mg BID  - Discontinued PTA Lisinopril  - Warfarin w/ pharmacy dosing   - Melatonin 3mg Qpm  - No indication for statin (LDL 69)  - Lipid Panel (LDL 69), HgbA1c (5.6)  - TTE (11/25): LV/RV size/function normal w/ EF 55-60%; Grade III diastolic dysfunction w/ severe LA enlargement and mild RA enlargement;  intact atrial septum; mild mitral annular calcification; aortic sclerosis w/o stenosis  - MRI Stroke Protocol: can consider in the future if pt calms; previously did not tolerate  - EEG: generalized slowing suggestive of mild-mod encephalopathy w/ maximum cortical dysfunction in the R hemisphere and w/o sign of seizure or epileptiform activity  - Telemetry  - PT/OT/SLP: TCU  - Stroke Education  - Stroke Class per [facility] ([facility])  - Depression Screen  - Apnea Screen  - Euthermia, Euglycemia  - Consider Cardiology referral upon discharge for diastolic dysfunction seen on TTE    #EtOH Abuse  - Discontinue CIWA  - Thiamine, Folate    #Antalgic gait favoring right knee  Patient with antalgic gait, favoring right side. Unclear if there is any pain or if there history of trauma to left knee/hip given the patient's aphasia. Will obtain a L hip and knee XR today to assess for fracture or other abnormality.   - L Hip and Knee XR ordered       #GREGORIA, improving  #Urinary Retention  #Traumatic Straight Cath   #Enterococcus UTI  Baseline Cr < 1, presented w/ Cr 1.3 likely . Pt has a hx of urinary retention (hx tamsulosin but not a current med) but is exhibiting marked amounts here which could be worsening pt's GREGORIA. Lack of PO intake and Enterococcus UTI may also be contributing. Received 7d of Ampicillin. On 11/29, pt also suffered a traumatic straight cath. Curbside to Urology recommended placement of Flores for 5d to allow healing. Since this time, pt has attempted multiple trials of void, but none have been successful  - Doxazosin 1mg daily  - Flores replaced  - Consider Urology referral upon discharge     # Hypernatremia, resolved  # Hyperchloremia, resolved  Pt has exhibited elevated Na and Cl, rising since time of admission. Likely 2/2 NPO status since arrival due to multiple failed swallow attempts. Will monitor for refeeding syndrome once NJ placed and TF initiated  - PEG in place   - Nutrition following for  TF    Diet: TF per nutrition   VTE PPx: - Warfarin per pharmacy dosing  Dispo: TCU once MA can be established.  Guardian now reviewing assets before MA application.  Code Status: FULL    The patient was discussed with stroke fellow, Dr. Abrams.    Taj Taylor MD  Neurology Resident - PGY1    ______________________________________________________    Medications   Home Meds  Prior to Admission medications    Medication Sig Start Date End Date Taking? Authorizing Provider   CRANBERRY JUICE EXTRACT PO Take 40,000 Units by mouth daily    Reported, Patient   Garlic 1000 MG CAPS Take 1 capsule by mouth daily    Reported, Patient   lisinopril (PRINIVIL/ZESTRIL) 5 MG tablet Take 1 tablet (5 mg) by mouth daily For blood pressure control 1/18/19   Sophia Dacosta APRN CNP   NONFORMULARY Beet Root Juice    Reported, Patient   senna-docusate (SENOKOT-S/PERICOLACE) 8.6-50 MG tablet Take 1 tablet by mouth 2 times daily For constipation 12/11/18   Annabella Santos MD   tamsulosin (FLOMAX) 0.4 MG capsule Take 1 capsule (0.4 mg) by mouth daily 6/5/19   Missael Carvalho MD       Scheduled Meds    diclofenac  4 g Topical 4x Daily     doxazosin  1 mg Oral or Feeding Tube Daily     folic acid  1 mg Oral or Feeding Tube Daily     melatonin  3 mg Oral or Feeding Tube Daily     metoprolol tartrate  12.5 mg Oral or Feeding Tube BID     multivitamins w/minerals  15 mL Oral or Feeding Tube Daily     polyethylene glycol  17 g Oral or Feeding Tube BID     protein modular  2 packet Per Feeding Tube Daily     senna-docusate  1 tablet Per Feeding Tube BID     vitamin B1  100 mg Oral or Feeding Tube Daily     warfarin ANTICOAGULANT  5 mg Oral ONCE at 18:00       Infusion Meds    dextrose       Warfarin Therapy Reminder         PRN Meds  acetaminophen **OR** acetaminophen **OR** acetaminophen, bisacodyl, dextrose, glucose **OR** dextrose **OR** glucagon, labetalol, ondansetron **OR** ondansetron, Warfarin Therapy Reminder        PHYSICAL EXAMINATION  Temp:  [97.5  F (36.4  C)-98.7  F (37.1  C)] 97.5  F (36.4  C)  Pulse:  [] 94  Resp:  [16-18] 18  BP: (119-149)/(85-91) 119/85  SpO2:  [95 %-100 %] 100 %     General:  Patient lying in bed in NAD  HEENT:  normocephalic/atraumatic  Cardio:  RRR  Pulmonary:  no respiratory distress  Extremities:  no edema  Skin:  intact, warm/dry     Neurologic  Mental Status:  Awake, alert, globally aphasic but will attempt some part of neuro exam from memory.  Does no consistently mime. Unable to answer any questions  Cranial Nerves:  Visual fields intact to confrontation, PERRL, EOMI with smooth pursuit, facial movements symmetric, hearing appears to be intact.  Motor:  normal muscle tone and bulk, no abnormal movements, able to move all limbs spontaneously against gravity  Reflexes:  deferred  Sensory:  deferred  Coordination:  unable to assess 2/2 aphasia  Station/Gait: deferred    Stroke Scales    NIHSS  Interval transfer (11/25/20 1947)   Interval Comments     1a. Level of Consciousness 0-->Alert, keenly responsive   1b. LOC Questions 2-->Answers neither question correctly   1c. LOC Commands 1-->Performs one task correctly   2.   Best Gaze 0-->Normal   3.   Visual 0-->No visual loss   4.   Facial Palsy 0-->Normal symmetrical movements   5a. Motor Arm, Left 0-->No drift, limb holds 90 (or 45) degrees for full 10 secs   5b. Motor Arm, Right 0-->No drift, limb holds 90 (or 45) degrees for full 10 secs   6a. Motor Leg, Left 0-->No drift, leg holds 30 degree position for full 5 secs   6b. Motor Leg, right 0-->No drift, leg holds 30 degree position for full 5 secs   7.   Limb Ataxia 0-->Absent   8.   Sensory 0-->Normal, no sensory loss   9.   Best Language 3-->Mute, global aphasia, no usable speech or auditory comprehension   10. Dysarthria 2-->Severe dysarthria, patients speech is so slurred as to be unintelligible in the absence of or out of proportion to any dysphasia, or is mute/anarthric   11.  Extinction and Inattention  0-->No abnormality   Total 8 (12/14/20 1041)       Imaging  I personally reviewed all imaging; relevant findings per HPI.     Lab Results Data   CBC  Recent Labs   Lab 12/17/20  0555 12/14/20  0534 12/11/20  0541   WBC 12.1* 8.9 6.2   RBC 4.52 4.57 4.11*   HGB 13.8 13.7 12.2*   HCT 44.3 44.0 38.9*    344 278     Basic Metabolic Panel    Recent Labs   Lab 12/17/20  0555 12/15/20  0543 12/14/20  0534 12/11/20  0541 12/11/20  0541   *  --  146*  --  146*   POTASSIUM 3.8 4.1 4.1   < > 3.9   CHLORIDE 122*  --  115*  --  113*   CO2 26  --  31  --  26   BUN 36*  --  34*  --  30   CR 0.82  --  0.91  --  0.82   *  --  93  --  126*   SANDRA 8.8  --  9.1  --  8.5    < > = values in this interval not displayed.     Liver Panel  No results for input(s): PROTTOTAL, ALBUMIN, BILITOTAL, ALKPHOS, AST, ALT, BILIDIRECT in the last 168 hours.  INR    Recent Labs   Lab Test 12/17/20  0555 12/16/20  1412 12/15/20  0543   INR 2.95* 2.58* 2.22*      Lipid Profile    Recent Labs   Lab Test 11/25/20 2020 11/27/18  0701 12/16/15  0857   CHOL 133 147 159   HDL 55 57 76   LDL 69 78 73   TRIG 46 58 52     A1C    Recent Labs   Lab Test 11/25/20 2020 11/27/18  0701   A1C 5.6 5.5     Troponin I  No results for input(s): TROPI in the last 168 hours.

## 2020-12-17 NOTE — PROGRESS NOTES
[facility]     Stroke Progress Note    Interval Events  - No acute events overnight    Subjective:  No acute events overnight. Patient mute, but smiling and cooperative with mimed actions.     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 He was found to have a new subacute R parietal infarct w/ overlying petechial hemorrhage and was subsequently transferred to [facility] for further cares. Guardianship has been achieved, now awaiting insurance coverage for TCU placement. Medically ready for discharge.    - Guardian appointed 12/11, awaiting MA for discharge.  - Guardian will need to review and account for all pt's assets before MA application can be submitted.    #Subacute R Parietal Lobe Infarct w/ hemorrhagic conversion likely 2/2 cardioembolic source in the setting of known Afib not on anticoagulation  #Afib  Pt had been placed on Eliquis in 2018 for prior L parietal stroke, but was inconsistent in taking it as prescribed and then ultimately discontinued it in 2019 after recurrent nosebleeds  - Q4h neurochecks  - SBP goal > 160  - Avoid hypotonic IVF  - Metoprolol 12.5mg BID  - Discontinued PTA Lisinopril  - Warfarin w/ pharmacy dosing   - Melatonin 3mg Qpm  - No indication for statin (LDL 69)  - Lipid Panel (LDL 69), HgbA1c (5.6)  - TTE (11/25): LV/RV size/function normal w/ EF 55-60%; Grade III diastolic dysfunction w/ severe LA enlargement and mild RA enlargement; intact atrial septum; mild mitral annular calcification; aortic sclerosis w/o stenosis  - MRI Stroke Protocol: can consider in the future if pt calms; previously did not tolerate  - EEG:  generalized slowing suggestive of mild-mod encephalopathy w/ maximum cortical dysfunction in the R hemisphere and w/o sign of seizure or epileptiform activity  - Telemetry  - PT/OT/SLP: TCU  - Stroke Education  - Stroke Class per [facility] ([facility])  - Depression Screen  - Apnea Screen  - Euthermia, Euglycemia  - Consider Cardiology referral upon discharge for diastolic dysfunction seen on TTE    #EtOH Abuse  - Discontinue CIWA  - Thiamine, Folate    #Antalgic gait favoring right knee  Patient with antalgic gait, favoring right side. Unclear if there is any pain or if there history of trauma to left knee/hip given the patient's aphasia. Will obtain a L hip and knee XR today to assess for fracture or other abnormality.   - L Hip and Knee XR ordered       #GREGORIA, improving  #Urinary Retention  #Traumatic Straight Cath   #Enterococcus UTI  Baseline Cr < 1, presented w/ Cr 1.3 likely . Pt has a hx of urinary retention (hx tamsulosin but not a current med) but is exhibiting marked amounts here which could be worsening pt's GREGORIA. Lack of PO intake and Enterococcus UTI may also be contributing. Received 7d of Ampicillin. On 11/29, pt also suffered a traumatic straight cath. Curbside to Urology recommended placement of Flores for 5d to allow healing. Since this time, pt has attempted multiple trials of void, but none have been successful  - Doxazosin 1mg daily  - Flores replaced  - Consider Urology referral upon discharge     # Hypernatremia, resolved  # Hyperchloremia, resolved  Pt has exhibited elevated Na and Cl, rising since time of admission. Likely 2/2 NPO status since arrival due to multiple failed swallow attempts. Will monitor for refeeding syndrome once NJ placed and TF initiated  - PEG in place   - Nutrition following for TF    Diet: TF per nutrition   VTE PPx: - Warfarin per pharmacy dosing  Dispo: TCU once MA can be established.  Guardian now reviewing assets before MA application.  Code Status:  FULL    The patient was discussed with stroke fellow, Dr. Abrams.    Taj Taylor MD  Neurology Resident - PGY1    ______________________________________________________    Medications   Home Meds  Prior to Admission medications    Medication Sig Start Date End Date Taking? Authorizing Provider   CRANBERRY JUICE EXTRACT PO Take 40,000 Units by mouth daily    Reported, Patient   Garlic 1000 MG CAPS Take 1 capsule by mouth daily    Reported, Patient   lisinopril (PRINIVIL/ZESTRIL) 5 MG tablet Take 1 tablet (5 mg) by mouth daily For blood pressure control 1/18/19   Sophia Dacosta APRN CNP   NONFORMULARY Beet Root Juice    Reported, Patient   senna-docusate (SENOKOT-S/PERICOLACE) 8.6-50 MG tablet Take 1 tablet by mouth 2 times daily For constipation 12/11/18   Annabella Santos MD   tamsulosin (FLOMAX) 0.4 MG capsule Take 1 capsule (0.4 mg) by mouth daily 6/5/19   Missael Carvalho MD       Scheduled Meds    diclofenac  4 g Topical 4x Daily     doxazosin  1 mg Oral or Feeding Tube Daily     folic acid  1 mg Oral or Feeding Tube Daily     melatonin  3 mg Oral or Feeding Tube Daily     metoprolol tartrate  12.5 mg Oral or Feeding Tube BID     multivitamins w/minerals  15 mL Oral or Feeding Tube Daily     polyethylene glycol  17 g Oral or Feeding Tube BID     potassium & sodium phosphates  1 packet Oral or Feeding Tube BID     protein modular  2 packet Per Feeding Tube Daily     senna-docusate  1 tablet Per Feeding Tube BID     vitamin B1  100 mg Oral or Feeding Tube Daily       Infusion Meds    dextrose       Warfarin Therapy Reminder         PRN Meds  acetaminophen **OR** acetaminophen **OR** acetaminophen, bisacodyl, dextrose, glucose **OR** dextrose **OR** glucagon, labetalol, ondansetron **OR** ondansetron, Warfarin Therapy Reminder       PHYSICAL EXAMINATION  Temp:  [97.3  F (36.3  C)-98.4  F (36.9  C)] 98.3  F (36.8  C)  Pulse:  [] 105  Resp:  [16] 16  BP: (125-140)/(70-90) 125/86  SpO2:  [90  %-99 %] 99 %     General:  Patient lying in bed in NAD  HEENT:  normocephalic/atraumatic  Cardio:  RRR  Pulmonary:  no respiratory distress  Extremities:  no edema  Skin:  intact, warm/dry     Neurologic  Mental Status:  Awake, alert, globally aphasic but will attempt some part of neuro exam from memory.  Does no consistently mime. Unable to answer any questions  Cranial Nerves:  Visual fields intact to confrontation, PERRL, EOMI with smooth pursuit, facial movements symmetric, hearing appears to be intact.  Motor:  normal muscle tone and bulk, no abnormal movements, able to move all limbs spontaneously against gravity  Reflexes:  deferred  Sensory:  deferred  Coordination:  unable to assess 2/2 aphasia  Station/Gait: deferred    Stroke Scales    NIHSS  Interval transfer (11/25/20 1947)   Interval Comments     1a. Level of Consciousness 0-->Alert, keenly responsive   1b. LOC Questions 2-->Answers neither question correctly   1c. LOC Commands 1-->Performs one task correctly   2.   Best Gaze 0-->Normal   3.   Visual 0-->No visual loss   4.   Facial Palsy 0-->Normal symmetrical movements   5a. Motor Arm, Left 0-->No drift, limb holds 90 (or 45) degrees for full 10 secs   5b. Motor Arm, Right 0-->No drift, limb holds 90 (or 45) degrees for full 10 secs   6a. Motor Leg, Left 0-->No drift, leg holds 30 degree position for full 5 secs   6b. Motor Leg, right 0-->No drift, leg holds 30 degree position for full 5 secs   7.   Limb Ataxia 0-->Absent   8.   Sensory 0-->Normal, no sensory loss   9.   Best Language 3-->Mute, global aphasia, no usable speech or auditory comprehension   10. Dysarthria 2-->Severe dysarthria, patients speech is so slurred as to be unintelligible in the absence of or out of proportion to any dysphasia, or is mute/anarthric   11. Extinction and Inattention  0-->No abnormality   Total 8 (12/14/20 1041)       Imaging  I personally reviewed all imaging; relevant findings per HPI.     Lab Results Data    CBC  Recent Labs   Lab 12/14/20  0534 12/11/20  0541   WBC 8.9 6.2   RBC 4.57 4.11*   HGB 13.7 12.2*   HCT 44.0 38.9*    278     Basic Metabolic Panel    Recent Labs   Lab 12/15/20  0543 12/14/20  0534 12/13/20  0618 12/11/20  0541 12/11/20  0541   NA  --  146*  --   --  146*   POTASSIUM 4.1 4.1 3.8   < > 3.9   CHLORIDE  --  115*  --   --  113*   CO2  --  31  --   --  26   BUN  --  34*  --   --  30   CR  --  0.91  --   --  0.82   GLC  --  93  --   --  126*   SANDRA  --  9.1  --   --  8.5    < > = values in this interval not displayed.     Liver Panel  No results for input(s): PROTTOTAL, ALBUMIN, BILITOTAL, ALKPHOS, AST, ALT, BILIDIRECT in the last 168 hours.  INR    Recent Labs   Lab Test 12/16/20  1412 12/15/20  0543 12/14/20  0534   INR 2.58* 2.22* 2.08*      Lipid Profile    Recent Labs   Lab Test 11/25/20 2020 11/27/18  0701 12/16/15  0857   CHOL 133 147 159   HDL 55 57 76   LDL 69 78 73   TRIG 46 58 52     A1C    Recent Labs   Lab Test 11/25/20 2020 11/27/18  0701   A1C 5.6 5.5     Troponin I  No results for input(s): TROPI in the last 168 hours.

## 2020-12-17 NOTE — PROGRESS NOTES
Pt here with R parietal infract, vss, neuros include: difficult to assess d/t LOC, pt has global aphasia, perseverates ADLs.  NO PIV. NPO with TF @ 55. Tolerate well. Flores with in draining yellow urine. Up with SBA of 1 ambulating in rivera x 2. Denies pain. Will continue to monitor.

## 2020-12-17 NOTE — PLAN OF CARE
Status: R parietal infarct with hemorrhagic conversion. Hx of Afib.   VS: VSS on RA  Neuros: Alert. Oriented to self. Unable to assess other orientations. Mostly nonverbal or incomprehensible speech. Follows some commands. Strength 5/5 throughout. Impulsive at times.  GI: NPO.TF via PEG @ goal rate of 55 mL/hr, q4h free flushes. PEG  site dressing CDI. Formed BM x1 overnight  : Flores in place for retention, adequate UO .  IV: No PIV, okay per MD order  Activity: A1/GB  Pain: No c/o pain this shift   Plan of care: TCU once MA can be established. Continue to monitor and follow POC.

## 2020-12-17 NOTE — PLAN OF CARE
Pt here with R parietal infract, vss, neuros include: difficult to assess d/t LOC, pt has global aphasia, perseverates on ADLs like washing hands/face, see flow-sheet for detail neuros. No PIV, NPO with TF @ goal rate, voiding via elena, had BM this morning and up AO1 w/GB, ambulated hallways with nursing and therapy staff. Continue to monitor per orders.

## 2020-12-17 NOTE — INTERIM SUMMARY
Brief Update:    Called pt's daughter, Chanel Burnett, to give update on her father.   Update was big picture/general.  Let her know that SW will be following up tomorrow.  Will continue to keep her updated from medical perspective.    Taj Taylor MD  Neurology Resident - PGY1

## 2020-12-17 NOTE — PROGRESS NOTES
Nurse Unit Lead Progress Note.    Data: Just received a call from patients daughter. Chanel Burnett [phone]. Did not know there was a daughter.    Action: Talked to Neuro Stroke - gave them her information and they will call her back and give her an update.     Plan: Will notify Social Work as well.     Kylah Rodriguez, MALCOLMN, RN, PHN, CNRN  Charge, Evenings [address]A  [phone]

## 2020-12-18 ENCOUNTER — APPOINTMENT (OUTPATIENT)
Dept: SPEECH THERAPY | Facility: CLINIC | Age: 75
DRG: 064 | End: 2020-12-18
Attending: PSYCHIATRY & NEUROLOGY
Payer: MEDICARE

## 2020-12-18 ENCOUNTER — APPOINTMENT (OUTPATIENT)
Dept: OCCUPATIONAL THERAPY | Facility: CLINIC | Age: 75
DRG: 064 | End: 2020-12-18
Attending: PSYCHIATRY & NEUROLOGY
Payer: MEDICARE

## 2020-12-18 LAB
INR PPP: 2.97 (ref 0.86–1.14)
PHOSPHATE SERPL-MCNC: 2.8 MG/DL (ref 2.5–4.5)

## 2020-12-18 PROCEDURE — 250N000009 HC RX 250: Performed by: STUDENT IN AN ORGANIZED HEALTH CARE EDUCATION/TRAINING PROGRAM

## 2020-12-18 PROCEDURE — 36415 COLL VENOUS BLD VENIPUNCTURE: CPT | Performed by: NURSE PRACTITIONER

## 2020-12-18 PROCEDURE — 99232 SBSQ HOSP IP/OBS MODERATE 35: CPT | Mod: GC | Performed by: PSYCHIATRY & NEUROLOGY

## 2020-12-18 PROCEDURE — 250N000013 HC RX MED GY IP 250 OP 250 PS 637: Performed by: PSYCHIATRY & NEUROLOGY

## 2020-12-18 PROCEDURE — 250N000013 HC RX MED GY IP 250 OP 250 PS 637: Performed by: STUDENT IN AN ORGANIZED HEALTH CARE EDUCATION/TRAINING PROGRAM

## 2020-12-18 PROCEDURE — 272N000078 HC NUTRITION PRODUCT INTERMEDIATE LITER

## 2020-12-18 PROCEDURE — 85610 PROTHROMBIN TIME: CPT | Performed by: NURSE PRACTITIONER

## 2020-12-18 PROCEDURE — 120N000002 HC R&B MED SURG/OB UMMC

## 2020-12-18 PROCEDURE — 92526 ORAL FUNCTION THERAPY: CPT | Mod: GN

## 2020-12-18 PROCEDURE — 97535 SELF CARE MNGMENT TRAINING: CPT | Mod: GO

## 2020-12-18 PROCEDURE — 92507 TX SP LANG VOICE COMM INDIV: CPT | Mod: GN

## 2020-12-18 PROCEDURE — 36415 COLL VENOUS BLD VENIPUNCTURE: CPT | Performed by: PSYCHIATRY & NEUROLOGY

## 2020-12-18 PROCEDURE — 84100 ASSAY OF PHOSPHORUS: CPT | Performed by: PSYCHIATRY & NEUROLOGY

## 2020-12-18 RX ORDER — WARFARIN SODIUM 5 MG/1
5 TABLET ORAL
Status: COMPLETED | OUTPATIENT
Start: 2020-12-18 | End: 2020-12-18

## 2020-12-18 RX ADMIN — DICLOFENAC SODIUM TOPICAL GEL, 1%, 4 G: 10 GEL TOPICAL at 19:36

## 2020-12-18 RX ADMIN — Medication 12.5 MG: at 07:38

## 2020-12-18 RX ADMIN — MELATONIN TAB 3 MG 3 MG: 3 TAB at 19:35

## 2020-12-18 RX ADMIN — DOXAZOSIN 1 MG: 4 TABLET ORAL at 07:38

## 2020-12-18 RX ADMIN — Medication 12.5 MG: at 19:35

## 2020-12-18 RX ADMIN — DICLOFENAC SODIUM TOPICAL GEL, 1%, 4 G: 10 GEL TOPICAL at 07:38

## 2020-12-18 RX ADMIN — MULTIVIT AND MINERALS-FERROUS GLUCONATE 9 MG IRON/15 ML ORAL LIQUID 15 ML: at 07:38

## 2020-12-18 RX ADMIN — WARFARIN SODIUM 5 MG: 5 TABLET ORAL at 19:35

## 2020-12-18 RX ADMIN — FOLIC ACID 1 MG: 1 TABLET ORAL at 07:38

## 2020-12-18 RX ADMIN — Medication 2 PACKET: at 07:39

## 2020-12-18 RX ADMIN — THIAMINE HCL TAB 100 MG 100 MG: 100 TAB at 07:38

## 2020-12-18 ASSESSMENT — ACTIVITIES OF DAILY LIVING (ADL)
ADLS_ACUITY_SCORE: 10

## 2020-12-18 NOTE — PLAN OF CARE
Status: Pt on 6A w/R parietal infarct with hemorrhagic conversion  VS: VSS on RA; BP soft  Neuros: A&O to self; otherwise unable to assess due to aphasia. Non verbal except for occasional mumbling. Follows some commands. Strength 5/5 throughout. BA on at all times for safety.   Diet: NPO; TF at goal of 55cc/hr.   GI: No BM this shift; +BS  : Flores in place for retention w/adequate UOP; cares completed  IV: No PIV,Ok per MD  Activity: Up w/1A and GB; ambulated in halls w/RN  Pain: No c/o pain  Social: Pt's daughters called the unit this evening stating she was not aware her father was here. MD paged and contacted daughter (see note) SW also contacted regarding this.   Plan of care: Awaiting placement once MA is set up. Continue to monitor and follow POC.

## 2020-12-18 NOTE — PROGRESS NOTES
CLINICAL NUTRITION SERVICES - REASSESSMENT NOTE     Nutrition Prescription     RECOMMENDATIONS FOR MDs/PROVIDERS TO ORDER:  - Total daily fluids/adjustments per MD   - Minimize disruptions to EN infusions      Malnutrition Status:    - Severe malnutrition in the context of acute on chronic illness     Recommendations already ordered by Registered Dietitian (RD):  - Continue via current access/PEG: Isosource 1.5 @ goal 55 ml/hr (1320 ml/day) to provide 1980 kcals (29 kcal/kg/day), 90 g PRO (1.3 g/kg/day), 1003 ml free H2O, 232 g CHO and 20 g Fiber daily.  - Prosource 2 packets. Total provisions = 112 gm PRO (1.6 gm/kg) and 2060 kcals (30 kcals/kg)  - Elevated HOB      Future/Additional Recommendations:  - ongoing TF via current access/gastrostomy   - weight trends/further EN adjustments       EVALUATION OF THE PROGRESS TOWARD GOALS   Diet: NPO  Nutrition Support: - Isosource 1.5 @ goal 50 ml/hr (1200 ml/day) to provide 1800 kcals (26 kcal/kg/day), 82 g PRO (1.2 g/kg/day), 912 ml free H2O, 211 g CHO and 18 g Fiber daily.+ Prosource 2 packets. Total provisions = 104 gm PRO (1.5 gm/kg) and 1880 kcals (27 kcals/kg)  Intake: 1128 ml, 1692 kcals (25 kcals/kg), 77 gm PRO (1.1 gm/kg) + Prosource = 99 (1.4 gm/kg), 1772 (26 kcals/kg)     NEW FINDINGS   Nutrition/GI: Pt tolerating EN via PEG; recently increased slightly. Intakes improved over the last week, however, weight continues to trend down. LBM 12/17.     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 EEG: generalized slowing suggestive of mild-mod encephalopathy      Labs/meds: Na+ 153 (H). Folic Acid; Certavite; B1; bowel meds     Weights: Weight down from admit: 69 kg --> 61.8 kg = 10% weight loss over ~ 3 weeks      MALNUTRITION  % Intake: /= 50% for >/= 5 days (severe)  % Weight Loss: > 5% in 1 month (severe)  Subcutaneous Fat Loss: Facial region:  mild  Muscle  Loss: Temporal:  Mild/moderate, Facial & jaw region:  Mild/moderate, Thoracic region (clavicle, acromium bone, deltoid, trapezius, pectoral):  Mild/moderate, Upper arm (bicep, tricep):  Mild/moderate, Lower arm  (forearm):  Mild/moderate and Patellar region:  Mild/moderate  Fluid Accumulation/Edema: None noted  Malnutrition Diagnosis: Severe malnutrition in the context of acute on chronic illness     Previous Goals   Total avg nutritional intake to meet a minimum of 25 kcal/kg and 1.2 g PRO/kg daily (per dosing wt 69 kg).  Evaluation: met     Previous Nutrition Diagnosis  Inadequate energy intake related to disruptions to EN infusions as evidenced by pt not meeting goal provisions with 7-day averages meeting 20 kcals/kg per dosing weight    Evaluation: Improving     CURRENT NUTRITION DIAGNOSIS  Inadequate oral intake related to inability to take oral PO as evidenced by pt requires long-term FT to meet 100% nutrition needs.     INTERVENTIONS  Implementation  Enteral Nutrition - continue increased rate in the setting of weight trends and continue to monitor     Goals  Total avg nutritional intake to meet a minimum of 25 kcal/kg and 1.5 g PRO/kg daily (per dosing wt 69 kg).     Monitoring/Evaluation  Progress toward goals will be monitored and evaluated per protocol.     Celia Trivedi RD, AARTI, [facility]  Neuro ICU  Pager: [phone]

## 2020-12-18 NOTE — PROGRESS NOTES
"Care Management Follow Up    Length of Stay (days): 23    Expected Discharge Date: 20     Concerns to be Addressed:   See below    Patient plan of care discussed at interdisciplinary rounds: Yes    Anticipated Discharge Disposition:  Rehab placement in a community SNF     Anticipated Discharge Services:  Rehab placement in a community SNF  Anticipated Discharge DME:  Not applicable    Patient/family educated on Medicare website which has current facility and service quality ratings:  Not applicable at this time  Education Provided on the Discharge Plan:  Not applicable at this time  Patient/Family in Agreement with the Plan:  Not applicable at this time    Referrals Placed by CM/SW:  Not applicable at this time  Private pay costs discussed:   Not applicable at this time    Additional Information:   SW is following pt for discharge planning. SNF placement for rehab and potential long term care is anticipated at time of discharge.  Barriers to discharge, no insurance, pt is not able to provide information about income/assets or verifications of income/assets, pt is not able to participate in decision making, no payer source for SNF placement.  As this is the case, MA melina cannot be completed. Pt is also not able to make decisions about where he receives care and support.  Appointment of Emergency Guardianship/Conservatorship documents were received on 2020.   \"[facility] [employer] [facility] [employer] [phone] \" has been appointed as emergency guardian and conservator.  The mcnulty of Guardianship/Conservatorship  on 3/10/2021.     Upon arrival this am, GILBERT received a note from nursing that a Chanel Burnett ([phone]) had called last evening and identified herself as being pt's daughter.      GILBERT returned a call to pt's friend, Marley Fulton who indicates that Mike Struck from [facility] left a message for her to call and she wondered if he is \"Legitimate.\"  GILBERT informed Jayne that Mike is legitmate and it " "would be beneficial to Mike for information seeking if Marley called him back. GILBERT asked Marley is she is familiar with a \"Chanel\" from pt's past. Marley states that pt has a daughter named \"Chanel.\"  Marley states that \"many years ago\" she met Chanel just 1x. Marley stated that her understanding is that pt and Chanel have been estranged for years.  Pt states that pt once told her that he had an out of wedlock young counter and that Chanel had been given up for adoption.  GILBERT phoned Mike Cisneros at 8:47am, ([phone]) and relayed information stated above in regards to Chanel.  Mike stated that he spoke with  Pt's friend Marley.  Mike states that he plans to do some research on Chanel but adds that pt has a guardianship/conservatorship in place and the guardian/conservator should continue to be the designated decision maker/.    GILBERT spoke with 6A floor nurse, nurse manager, Charge Nurse and Dr. Zarco and reminded that the Guardian/conservator is the  and decision maker.  At this time, information should not be released to Chanel Burnett.    SILVIA Sanchez  Social Work, 6A  Phone:  [phone]  Pager:  [phone]  12/18/2020          ASHLYN Harmon      "

## 2020-12-18 NOTE — PLAN OF CARE
Status: Pt here with R parietal infarct with hemorrhagic conversion. Hx of Afib.   VS: VSS on RA  Neuros: Alert & oriented to self only. Unable to assess other orientations d/t severe aphasia and follow only some commands. Mostly nonverbal or incomprehensible speech. Strength 5/5 throughout. Impulsive at times.  GI: NPO. Continuous TF via PEG @ goal rate of 55 mL/hr, q4h free flushes. PEG site dressing CDI. Last BM 12/17  : Flores in place for retention, cares completed  IV: No PIV, okay per MD order  Activity: Up with A1, & GB  Pain: No overt signs of pain   Social: pt's daughter Chanel became aware of pt's hospitalization yesterday, Updated by MD. No calls overnight  Plan of care: Awaiting placement once MA is established. Continue with current POC.

## 2020-12-19 LAB
INR PPP: 2.97 (ref 0.86–1.14)
MAGNESIUM SERPL-MCNC: 2.3 MG/DL (ref 1.6–2.3)
PHOSPHATE SERPL-MCNC: 2.8 MG/DL (ref 2.5–4.5)
POTASSIUM SERPL-SCNC: 4 MMOL/L (ref 3.4–5.3)

## 2020-12-19 PROCEDURE — 250N000013 HC RX MED GY IP 250 OP 250 PS 637: Performed by: PSYCHIATRY & NEUROLOGY

## 2020-12-19 PROCEDURE — 85610 PROTHROMBIN TIME: CPT | Performed by: NURSE PRACTITIONER

## 2020-12-19 PROCEDURE — 84100 ASSAY OF PHOSPHORUS: CPT | Performed by: NURSE PRACTITIONER

## 2020-12-19 PROCEDURE — 120N000002 HC R&B MED SURG/OB UMMC

## 2020-12-19 PROCEDURE — 250N000013 HC RX MED GY IP 250 OP 250 PS 637: Performed by: STUDENT IN AN ORGANIZED HEALTH CARE EDUCATION/TRAINING PROGRAM

## 2020-12-19 PROCEDURE — 36415 COLL VENOUS BLD VENIPUNCTURE: CPT | Performed by: NURSE PRACTITIONER

## 2020-12-19 PROCEDURE — 99232 SBSQ HOSP IP/OBS MODERATE 35: CPT | Mod: GC | Performed by: PSYCHIATRY & NEUROLOGY

## 2020-12-19 PROCEDURE — 250N000009 HC RX 250: Performed by: STUDENT IN AN ORGANIZED HEALTH CARE EDUCATION/TRAINING PROGRAM

## 2020-12-19 PROCEDURE — 83735 ASSAY OF MAGNESIUM: CPT | Performed by: NURSE PRACTITIONER

## 2020-12-19 PROCEDURE — 84132 ASSAY OF SERUM POTASSIUM: CPT | Performed by: NURSE PRACTITIONER

## 2020-12-19 PROCEDURE — 272N000078 HC NUTRITION PRODUCT INTERMEDIATE LITER

## 2020-12-19 RX ORDER — WARFARIN SODIUM 5 MG/1
5 TABLET ORAL
Status: COMPLETED | OUTPATIENT
Start: 2020-12-19 | End: 2020-12-19

## 2020-12-19 RX ADMIN — DICLOFENAC SODIUM TOPICAL GEL, 1%, 4 G: 10 GEL TOPICAL at 11:05

## 2020-12-19 RX ADMIN — DICLOFENAC SODIUM TOPICAL GEL, 1%, 4 G: 10 GEL TOPICAL at 08:32

## 2020-12-19 RX ADMIN — WARFARIN SODIUM 5 MG: 5 TABLET ORAL at 18:44

## 2020-12-19 RX ADMIN — Medication 12.5 MG: at 21:00

## 2020-12-19 RX ADMIN — THIAMINE HCL TAB 100 MG 100 MG: 100 TAB at 08:28

## 2020-12-19 RX ADMIN — DOXAZOSIN 1 MG: 4 TABLET ORAL at 08:28

## 2020-12-19 RX ADMIN — DICLOFENAC SODIUM TOPICAL GEL, 1%, 4 G: 10 GEL TOPICAL at 20:59

## 2020-12-19 RX ADMIN — DICLOFENAC SODIUM TOPICAL GEL, 1%, 4 G: 10 GEL TOPICAL at 16:14

## 2020-12-19 RX ADMIN — Medication 12.5 MG: at 08:28

## 2020-12-19 RX ADMIN — MULTIVIT AND MINERALS-FERROUS GLUCONATE 9 MG IRON/15 ML ORAL LIQUID 15 ML: at 08:28

## 2020-12-19 RX ADMIN — Medication 2 PACKET: at 08:29

## 2020-12-19 RX ADMIN — FOLIC ACID 1 MG: 1 TABLET ORAL at 08:28

## 2020-12-19 RX ADMIN — MELATONIN TAB 3 MG 3 MG: 3 TAB at 20:59

## 2020-12-19 ASSESSMENT — ACTIVITIES OF DAILY LIVING (ADL)
ADLS_ACUITY_SCORE: 10

## 2020-12-19 NOTE — PLAN OF CARE
Incomprehensible speech. No indicators of pain. TF at goal. Strict NPO. Up for walk, to bathroom and hair wash. Sets off bed alarm. VSS on RA.

## 2020-12-19 NOTE — PLAN OF CARE
Unable to speak and uses motions to communicate. Alert and oriented when given choices. Denies pain. Strength 5/5 and did not follow every command. Up to the BR this morning and had BM. Flores with good output. Tube feeding running at 55 ml/hr. Bed alarm on. Up with assist of 1 and gait belt. No iv access.

## 2020-12-19 NOTE — PLAN OF CARE
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 Perseverates on needing to wash hands and face.   IV: No PIV okayed  Labs/Electrolytes: WDL  Diet: NPO. PEG running @55 (goal)  Bowel status: no BM   : Flores in for retention.   Pain: No c/o pain  Activity: Up SBA, GB   Plan: Awaiting placement medically ready to discharge. Continue to follow POC

## 2020-12-19 NOTE — PROGRESS NOTES
[facility]     Stroke Progress Note    Interval Events  - No acute events overnight    Subjective:  No acute events overnight.     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 He was found to have a new subacute R parietal infarct w/ overlying petechial hemorrhage and was subsequently transferred to [facility] for further cares. Guardianship has been achieved, now awaiting insurance coverage for TCU placement. Medically ready for discharge.    - Guardian appointed 12/11, awaiting MA for discharge.  - Guardian will need to review and account for all pt's assets before MA application can be submitted.  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 likely 2/2 cardioembolic source in the setting of known Afib not on anticoagulation  #Afib  Pt had been placed on Eliquis in 2018 for prior L parietal stroke, but was inconsistent in taking it as prescribed and then ultimately discontinued it in 2019 after recurrent nosebleeds  - Q4h neurochecks  - SBP goal > 160  - Avoid hypotonic IVF  - Metoprolol 12.5mg BID  - Discontinued PTA Lisinopril  - Warfarin w/ pharmacy dosing   - Melatonin 3mg Qpm  - No indication for statin (LDL 69)  - Lipid Panel (LDL 69), HgbA1c (5.6)  - TTE (11/25): LV/RV size/function normal w/ EF 55-60%; Grade III diastolic dysfunction w/ severe LA enlargement and mild RA enlargement; intact atrial septum; mild mitral annular calcification; aortic sclerosis w/o stenosis  - MRI Stroke Protocol: can consider in the future if pt calms; previously did not tolerate  - EEG: generalized  slowing suggestive of mild-mod encephalopathy w/ maximum cortical dysfunction in the R hemisphere and w/o sign of seizure or epileptiform activity  - Telemetry  - PT/OT/SLP: TCU  - Stroke Education  - Stroke Class per [facility] ([facility])  - Depression Screen  - Apnea Screen  - Euthermia, Euglycemia  - Consider Cardiology referral upon discharge for diastolic dysfunction seen on TTE    #EtOH Abuse  - Discontinue CIWA  - Thiamine, Folate    #Antalgic gait favoring right knee  Patient with antalgic gait, favoring right side. Unclear if there is any pain or if there history of trauma to left knee/hip given the patient's aphasia. Will obtain a L hip and knee XR today to assess for fracture or other abnormality.   - L Hip and Knee XR ordered       #GREGORIA, improving  #Urinary Retention  #Traumatic Straight Cath   #Enterococcus UTI  Baseline Cr < 1, presented w/ Cr 1.3 likely . Pt has a hx of urinary retention (hx tamsulosin but not a current med) but is exhibiting marked amounts here which could be worsening pt's GREGORIA. Lack of PO intake and Enterococcus UTI may also be contributing. Received 7d of Ampicillin. On 11/29, pt also suffered a traumatic straight cath. Curbside to Urology recommended placement of Flores for 5d to allow healing. Since this time, pt has attempted multiple trials of void, but none have been successful  - Doxazosin 1mg daily  - Flores replaced  - Consider Urology referral upon discharge     # Hypernatremia, resolved  # Hyperchloremia, resolved  Pt has exhibited elevated Na and Cl, rising since time of admission. Likely 2/2 NPO status since arrival due to multiple failed swallow attempts. Will monitor for refeeding syndrome once NJ placed and TF initiated  - PEG in place   - Nutrition following for TF    Diet: TF per nutrition   VTE PPx: - Warfarin per pharmacy dosing  Dispo: TCU once MA can be established.  Guardian now reviewing assets before MA application.  Code Status: FULL    Sean  EJ Abrams  Stroke Fellow     ______________________________________________________    Medications   Home Meds  Prior to Admission medications    Medication Sig Start Date End Date Taking? Authorizing Provider   CRANBERRY JUICE EXTRACT PO Take 40,000 Units by mouth daily    Reported, Patient   Garlic 1000 MG CAPS Take 1 capsule by mouth daily    Reported, Patient   lisinopril (PRINIVIL/ZESTRIL) 5 MG tablet Take 1 tablet (5 mg) by mouth daily For blood pressure control 1/18/19   Sophia Dacosta APRN CNP   NONFORMULARY Beet Root Juice    Reported, Patient   senna-docusate (SENOKOT-S/PERICOLACE) 8.6-50 MG tablet Take 1 tablet by mouth 2 times daily For constipation 12/11/18   Annabella Santos MD   tamsulosin (FLOMAX) 0.4 MG capsule Take 1 capsule (0.4 mg) by mouth daily 6/5/19   Missael Carvalho MD       Scheduled Meds    diclofenac  4 g Topical 4x Daily     doxazosin  1 mg Oral or Feeding Tube Daily     folic acid  1 mg Oral or Feeding Tube Daily     melatonin  3 mg Oral or Feeding Tube Daily     metoprolol tartrate  12.5 mg Oral or Feeding Tube BID     multivitamins w/minerals  15 mL Oral or Feeding Tube Daily     polyethylene glycol  17 g Oral or Feeding Tube BID     protein modular  2 packet Per Feeding Tube Daily     senna-docusate  1 tablet Per Feeding Tube BID     vitamin B1  100 mg Oral or Feeding Tube Daily     warfarin ANTICOAGULANT  5 mg Oral ONCE at 18:00       Infusion Meds    dextrose       Warfarin Therapy Reminder         PRN Meds  acetaminophen **OR** acetaminophen **OR** acetaminophen, bisacodyl, dextrose, glucose **OR** dextrose **OR** glucagon, labetalol, nystatin, ondansetron **OR** ondansetron, Warfarin Therapy Reminder       PHYSICAL EXAMINATION  Temp:  [97.4  F (36.3  C)-98.3  F (36.8  C)] 97.8  F (36.6  C)  Pulse:  [] 101  Resp:  [16-18] 16  BP: (123-143)/(75-98) 142/75  SpO2:  [95 %-100 %] 99 %     General:  Patient lying in bed in NAD  HEENT:  normocephalic/atraumatic  Cardio:   RRR  Pulmonary:  no respiratory distress  Extremities:  no edema  Skin:  intact, warm/dry     Neurologic  Mental Status:  Awake, alert, globally aphasic but will attempt some part of neuro exam from memory.  Does no consistently mime. Unable to answer any questions  Cranial Nerves:  Visual fields intact to confrontation, PERRL, EOMI with smooth pursuit, facial movements symmetric, hearing appears to be intact.  Motor:  normal muscle tone and bulk, no abnormal movements, able to move all limbs spontaneously against gravity  Reflexes:  deferred  Sensory:  deferred  Coordination:  unable to assess 2/2 aphasia  Station/Gait: deferred      Imaging  I personally reviewed all imaging; relevant findings per HPI.     Lab Results Data   CBC  Recent Labs   Lab 12/17/20  0555 12/14/20  0534   WBC 12.1* 8.9   RBC 4.52 4.57   HGB 13.8 13.7   HCT 44.3 44.0    344     Basic Metabolic Panel    Recent Labs   Lab 12/19/20 0728 12/17/20  0555 12/15/20  0543 12/14/20  0534   NA  --  153*  --  146*   POTASSIUM 4.0 3.8 4.1 4.1   CHLORIDE  --  122*  --  115*   CO2  --  26  --  31   BUN  --  36*  --  34*   CR  --  0.82  --  0.91   GLC  --  121*  --  93   SANDRA  --  8.8  --  9.1     Liver Panel  No results for input(s): PROTTOTAL, ALBUMIN, BILITOTAL, ALKPHOS, AST, ALT, BILIDIRECT in the last 168 hours.  INR    Recent Labs   Lab Test 12/19/20 0728 12/18/20 0620 12/17/20  0555   INR 2.97* 2.97* 2.95*      Lipid Profile    Recent Labs   Lab Test 11/25/20 2020 11/27/18  0701 12/16/15  0857   CHOL 133 147 159   HDL 55 57 76   LDL 69 78 73   TRIG 46 58 52     A1C    Recent Labs   Lab Test 11/25/20 2020 11/27/18  0701   A1C 5.6 5.5     Troponin I  No results for input(s): TROPI in the last 168 hours.

## 2020-12-20 LAB
ANION GAP SERPL CALCULATED.3IONS-SCNC: <1 MMOL/L (ref 3–14)
BUN SERPL-MCNC: 44 MG/DL (ref 7–30)
CALCIUM SERPL-MCNC: 9 MG/DL (ref 8.5–10.1)
CHLORIDE SERPL-SCNC: 124 MMOL/L (ref 94–109)
CO2 SERPL-SCNC: 33 MMOL/L (ref 20–32)
CREAT SERPL-MCNC: 1.03 MG/DL (ref 0.66–1.25)
ERYTHROCYTE [DISTWIDTH] IN BLOOD BY AUTOMATED COUNT: 14.6 % (ref 10–15)
GFR SERPL CREATININE-BSD FRML MDRD: 71 ML/MIN/{1.73_M2}
GLUCOSE BLDC GLUCOMTR-MCNC: 113 MG/DL (ref 70–99)
GLUCOSE BLDC GLUCOMTR-MCNC: 115 MG/DL (ref 70–99)
GLUCOSE BLDC GLUCOMTR-MCNC: 135 MG/DL (ref 70–99)
GLUCOSE BLDC GLUCOMTR-MCNC: 85 MG/DL (ref 70–99)
GLUCOSE BLDC GLUCOMTR-MCNC: 87 MG/DL (ref 70–99)
GLUCOSE BLDC GLUCOMTR-MCNC: 93 MG/DL (ref 70–99)
GLUCOSE BLDC GLUCOMTR-MCNC: 95 MG/DL (ref 70–99)
GLUCOSE SERPL-MCNC: 67 MG/DL (ref 70–99)
HCT VFR BLD AUTO: 44.5 % (ref 40–53)
HGB BLD-MCNC: 13.2 G/DL (ref 13.3–17.7)
INR PPP: 2.98 (ref 0.86–1.14)
MAGNESIUM SERPL-MCNC: 2.3 MG/DL (ref 1.6–2.3)
MCH RBC QN AUTO: 29.7 PG (ref 26.5–33)
MCHC RBC AUTO-ENTMCNC: 29.7 G/DL (ref 31.5–36.5)
MCV RBC AUTO: 100 FL (ref 78–100)
PHOSPHATE SERPL-MCNC: 2.6 MG/DL (ref 2.5–4.5)
PLATELET # BLD AUTO: 274 10E9/L (ref 150–450)
POTASSIUM SERPL-SCNC: 3.6 MMOL/L (ref 3.4–5.3)
RBC # BLD AUTO: 4.44 10E12/L (ref 4.4–5.9)
SODIUM SERPL-SCNC: 158 MMOL/L (ref 133–144)
WBC # BLD AUTO: 7.7 10E9/L (ref 4–11)

## 2020-12-20 PROCEDURE — 250N000009 HC RX 250: Performed by: STUDENT IN AN ORGANIZED HEALTH CARE EDUCATION/TRAINING PROGRAM

## 2020-12-20 PROCEDURE — 120N000002 HC R&B MED SURG/OB UMMC

## 2020-12-20 PROCEDURE — 250N000013 HC RX MED GY IP 250 OP 250 PS 637: Performed by: STUDENT IN AN ORGANIZED HEALTH CARE EDUCATION/TRAINING PROGRAM

## 2020-12-20 PROCEDURE — 250N000013 HC RX MED GY IP 250 OP 250 PS 637: Performed by: NURSE PRACTITIONER

## 2020-12-20 PROCEDURE — 272N000078 HC NUTRITION PRODUCT INTERMEDIATE LITER

## 2020-12-20 PROCEDURE — 85610 PROTHROMBIN TIME: CPT | Performed by: NURSE PRACTITIONER

## 2020-12-20 PROCEDURE — 85027 COMPLETE CBC AUTOMATED: CPT | Performed by: NURSE PRACTITIONER

## 2020-12-20 PROCEDURE — 36415 COLL VENOUS BLD VENIPUNCTURE: CPT | Performed by: NURSE PRACTITIONER

## 2020-12-20 PROCEDURE — 83735 ASSAY OF MAGNESIUM: CPT | Performed by: NURSE PRACTITIONER

## 2020-12-20 PROCEDURE — 99231 SBSQ HOSP IP/OBS SF/LOW 25: CPT | Mod: GC | Performed by: PSYCHIATRY & NEUROLOGY

## 2020-12-20 PROCEDURE — 80048 BASIC METABOLIC PNL TOTAL CA: CPT | Performed by: NURSE PRACTITIONER

## 2020-12-20 PROCEDURE — 250N000013 HC RX MED GY IP 250 OP 250 PS 637: Performed by: PSYCHIATRY & NEUROLOGY

## 2020-12-20 PROCEDURE — 999N001017 HC STATISTIC GLUCOSE BY METER IP

## 2020-12-20 PROCEDURE — 84100 ASSAY OF PHOSPHORUS: CPT | Performed by: NURSE PRACTITIONER

## 2020-12-20 RX ORDER — POTASSIUM CHLORIDE 20MEQ/15ML
10 LIQUID (ML) ORAL ONCE
Status: COMPLETED | OUTPATIENT
Start: 2020-12-20 | End: 2020-12-20

## 2020-12-20 RX ORDER — WARFARIN SODIUM 4 MG/1
4 TABLET ORAL
Status: COMPLETED | OUTPATIENT
Start: 2020-12-20 | End: 2020-12-20

## 2020-12-20 RX ADMIN — DICLOFENAC SODIUM TOPICAL GEL, 1%, 4 G: 10 GEL TOPICAL at 19:55

## 2020-12-20 RX ADMIN — DICLOFENAC SODIUM TOPICAL GEL, 1%, 4 G: 10 GEL TOPICAL at 16:04

## 2020-12-20 RX ADMIN — MULTIVIT AND MINERALS-FERROUS GLUCONATE 9 MG IRON/15 ML ORAL LIQUID 15 ML: at 07:53

## 2020-12-20 RX ADMIN — MELATONIN TAB 3 MG 3 MG: 3 TAB at 19:55

## 2020-12-20 RX ADMIN — THIAMINE HCL TAB 100 MG 100 MG: 100 TAB at 07:52

## 2020-12-20 RX ADMIN — Medication 12.5 MG: at 07:52

## 2020-12-20 RX ADMIN — Medication 12.5 MG: at 19:55

## 2020-12-20 RX ADMIN — ACETAMINOPHEN 650 MG: 325 TABLET, FILM COATED ORAL at 04:30

## 2020-12-20 RX ADMIN — DOCUSATE SODIUM AND SENNOSIDES 1 TABLET: 8.6; 5 TABLET, FILM COATED ORAL at 07:52

## 2020-12-20 RX ADMIN — POLYETHYLENE GLYCOL 3350 17 G: 17 POWDER, FOR SOLUTION ORAL at 19:55

## 2020-12-20 RX ADMIN — Medication 2 PACKET: at 07:53

## 2020-12-20 RX ADMIN — FOLIC ACID 1 MG: 1 TABLET ORAL at 07:52

## 2020-12-20 RX ADMIN — DOXAZOSIN 1 MG: 4 TABLET ORAL at 07:52

## 2020-12-20 RX ADMIN — DOCUSATE SODIUM AND SENNOSIDES 1 TABLET: 8.6; 5 TABLET, FILM COATED ORAL at 19:55

## 2020-12-20 RX ADMIN — DICLOFENAC SODIUM TOPICAL GEL, 1%, 4 G: 10 GEL TOPICAL at 07:52

## 2020-12-20 RX ADMIN — DICLOFENAC SODIUM TOPICAL GEL, 1%, 4 G: 10 GEL TOPICAL at 12:06

## 2020-12-20 RX ADMIN — WARFARIN SODIUM 4 MG: 4 TABLET ORAL at 18:08

## 2020-12-20 RX ADMIN — POTASSIUM & SODIUM PHOSPHATES POWDER PACK 280-160-250 MG 1 PACKET: 280-160-250 PACK at 19:55

## 2020-12-20 RX ADMIN — POTASSIUM CHLORIDE 10 MEQ: 20 SOLUTION ORAL at 07:52

## 2020-12-20 ASSESSMENT — ACTIVITIES OF DAILY LIVING (ADL)
ADLS_ACUITY_SCORE: 10
ADLS_ACUITY_SCORE: 9
ADLS_ACUITY_SCORE: 9
ADLS_ACUITY_SCORE: 10
ADLS_ACUITY_SCORE: 10
ADLS_ACUITY_SCORE: 9

## 2020-12-20 NOTE — PROGRESS NOTES
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 He was found to have a new subacute R parietal infarct w/ overlying petechial hemorrhage and was subsequently transferred to [facility] for further cares. Guardianship has been achieved, now awaiting insurance coverage for TCU placement. Medically ready for discharge.    - Guardian appointed 12/11, awaiting MA for discharge.  - Guardian will need to review and account for all pt's assets before MA application can be submitted.  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 likely 2/2 cardioembolic source in the setting of known Afib not on anticoagulation  #Afib  Pt had been placed on Eliquis in 2018 for prior L parietal stroke, but was inconsistent in taking it as prescribed and then ultimately discontinued it in 2019 after recurrent nosebleeds  - Q4h neurochecks  - SBP goal > 160  - Avoid hypotonic IVF  - Metoprolol 12.5mg BID  - Discontinued PTA Lisinopril  - Warfarin w/ pharmacy dosing   - Melatonin 3mg Qpm  - No indication for statin (LDL 69)  - Lipid Panel (LDL 69), HgbA1c (5.6)  - TTE (11/25): LV/RV size/function normal w/ EF 55-60%; Grade III diastolic dysfunction w/ severe LA enlargement and mild RA enlargement; intact atrial septum; mild mitral annular calcification; aortic sclerosis w/o stenosis  - MRI Stroke Protocol: can consider in the future if pt calms; previously did not tolerate  - EEG: generalized slowing suggestive of mild-mod encephalopathy w/  maximum cortical dysfunction in the R hemisphere and w/o sign of seizure or epileptiform activity  - Telemetry  - PT/OT/SLP: TCU  - Stroke Education  - Stroke Class per [facility] ([facility])  - Depression Screen  - Apnea Screen  - Euthermia, Euglycemia  - Consider Cardiology referral upon discharge for diastolic dysfunction seen on TTE    #EtOH Abuse  - Discontinue CIWA  - Thiamine, Folate    #Antalgic gait favoring right knee  Patient with antalgic gait, favoring right side. Unclear if there is any pain or if there history of trauma to left knee/hip given the patient's aphasia. Will obtain a L hip and knee XR today to assess for fracture or other abnormality.   - L Hip and Knee XR ordered       #GREGORIA, improving  #Urinary Retention  #Traumatic Straight Cath   #Enterococcus UTI  Baseline Cr < 1, presented w/ Cr 1.3 likely . Pt has a hx of urinary retention (hx tamsulosin but not a current med) but is exhibiting marked amounts here which could be worsening pt's GREGORIA. Lack of PO intake and Enterococcus UTI may also be contributing. Received 7d of Ampicillin. On 11/29, pt also suffered a traumatic straight cath. Curbside to Urology recommended placement of Flores for 5d to allow healing. Since this time, pt has attempted multiple trials of void, but none have been successful  - Doxazosin 1mg daily  - Flores replaced  - Consider Urology referral upon discharge     # Hypernatremia  # Hyperchloremia  Pt has exhibited elevated Na and Cl, rising since time of admission. Likely 2/2 NPO status since arrival due to multiple failed swallow attempts. Will monitor for refeeding syndrome once NJ placed and TF initiated  - PEG in place   - Nutrition following for TF  - Start Free Water through PEG @ 100 mL q4h   - reevaluate rate in AM    Diet: TF per nutrition   VTE PPx: - Warfarin per pharmacy dosing  Dispo: TCU once MA can be established.  Guardian now reviewing assets before MA application.  Code Status: GRIS PERDOMO  MD Claudia  Neurology Resident - PGY1      ______________________________________________________    Medications   Home Meds  Prior to Admission medications    Medication Sig Start Date End Date Taking? Authorizing Provider   CRANBERRY JUICE EXTRACT PO Take 40,000 Units by mouth daily    Reported, Patient   Garlic 1000 MG CAPS Take 1 capsule by mouth daily    Reported, Patient   lisinopril (PRINIVIL/ZESTRIL) 5 MG tablet Take 1 tablet (5 mg) by mouth daily For blood pressure control 1/18/19   Sophia Dacosta APRN CNP   NONFORMULARY Beet Root Juice    Reported, Patient   senna-docusate (SENOKOT-S/PERICOLACE) 8.6-50 MG tablet Take 1 tablet by mouth 2 times daily For constipation 12/11/18   Annabella Santos MD   tamsulosin (FLOMAX) 0.4 MG capsule Take 1 capsule (0.4 mg) by mouth daily 6/5/19   Missael Carvalho MD       Scheduled Meds    diclofenac  4 g Topical 4x Daily     doxazosin  1 mg Oral or Feeding Tube Daily     folic acid  1 mg Oral or Feeding Tube Daily     melatonin  3 mg Oral or Feeding Tube Daily     metoprolol tartrate  12.5 mg Oral or Feeding Tube BID     multivitamins w/minerals  15 mL Oral or Feeding Tube Daily     polyethylene glycol  17 g Oral or Feeding Tube BID     protein modular  2 packet Per Feeding Tube Daily     senna-docusate  1 tablet Per Feeding Tube BID     vitamin B1  100 mg Oral or Feeding Tube Daily       Infusion Meds    dextrose       Warfarin Therapy Reminder         PRN Meds  acetaminophen **OR** acetaminophen **OR** acetaminophen, bisacodyl, dextrose, glucose **OR** dextrose **OR** glucagon, labetalol, nystatin, ondansetron **OR** ondansetron, Warfarin Therapy Reminder       PHYSICAL EXAMINATION  Temp:  [97.5  F (36.4  C)-97.8  F (36.6  C)] 97.6  F (36.4  C)  Pulse:  [] 78  Resp:  [14-16] 16  BP: (123-149)/(75-90) 137/85  SpO2:  [95 %-100 %] 99 %     General:  Patient lying in bed in NAD  HEENT:  normocephalic/atraumatic  Cardio:  RRR  Pulmonary:  no respiratory  distress  Extremities:  no edema  Skin:  intact, warm/dry     Neurologic  Mental Status:  Awake, alert, globally aphasic but will attempt some part of neuro exam from memory.  Does not consistently mime. Unable to answer any questions  Cranial Nerves:  Visual fields intact to confrontation, PERRL, EOMI with smooth pursuit, facial movements symmetric, hearing appears to be intact.  Motor:  normal muscle tone and bulk, no abnormal movements, able to move all limbs spontaneously against gravity  Reflexes:  deferred  Sensory:  deferred  Coordination:  unable to assess 2/2 aphasia  Station/Gait: deferred      Imaging  I personally reviewed all imaging; relevant findings per HPI.     Lab Results Data   CBC  Recent Labs   Lab 12/20/20  0541 12/17/20  0555 12/14/20  0534   WBC 7.7 12.1* 8.9   RBC 4.44 4.52 4.57   HGB 13.2* 13.8 13.7   HCT 44.5 44.3 44.0    312 344     Basic Metabolic Panel    Recent Labs   Lab 12/19/20  0728 12/17/20  0555 12/15/20  0543 12/14/20  0534   NA  --  153*  --  146*   POTASSIUM 4.0 3.8 4.1 4.1   CHLORIDE  --  122*  --  115*   CO2  --  26  --  31   BUN  --  36*  --  34*   CR  --  0.82  --  0.91   GLC  --  121*  --  93   SANDRA  --  8.8  --  9.1     Liver Panel  No results for input(s): PROTTOTAL, ALBUMIN, BILITOTAL, ALKPHOS, AST, ALT, BILIDIRECT in the last 168 hours.  INR    Recent Labs   Lab Test 12/19/20 0728 12/18/20  0620 12/17/20  0555   INR 2.97* 2.97* 2.95*      Lipid Profile    Recent Labs   Lab Test 11/25/20 2020 11/27/18  0701 12/16/15  0857   CHOL 133 147 159   HDL 55 57 76   LDL 69 78 73   TRIG 46 58 52     A1C    Recent Labs   Lab Test 11/25/20 2020 11/27/18  0701   A1C 5.6 5.5     Troponin I  No results for input(s): TROPI in the last 168 hours.

## 2020-12-20 NOTE — PROGRESS NOTES
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   IV: No PIV okayed  Labs/Electrolytes: WDL  Diet: NPO. PEG  Tube, Isosource formula infusing at 55 ml/hr which is the goal. Flushes completed with medication administration.  Bowel status: no BM   : Flores in for retention and urine output since midnight 850 ml straw color urine.   Pain:  Tylenol 650 administered via TF for generalized and slight abdominal pain  Activity: Up SBA, GB. Independent with turns in bed. Oral cares performed this morning.Dressing to TF site cleansed with water and 2x2 drain guaze applied and secured.Care clustered and sleeping between cares. Call light within reach. No acute changes overnight.  Plan: Awaiting placement medically ready to discharge. Continue to follow POC     "

## 2020-12-20 NOTE — PLAN OF CARE
Subacute R parietal infarct w/ overlying petechial hemorrhage.  Afebrile, slightly hypertensive but within parameters.  Alert but unable to assess due to severe aphasia,  Nonverbal.  Denies pain or nausea.  Tube feeding in fusing @ goal rate of 55 ml/hr.  Flores with adequate urine output.  No PIV.  Up with A1 and GB.  Set off bed alarm repeatedly.  Continue plan of care.

## 2020-12-20 NOTE — PLAN OF CARE
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 Perseverates on needing to wash hands and face.   IV: No PIV okayed  Labs/Electrolytes: BG 67- 480 of apple juice given through PEG. Potassium replaced, phosphorus ordered to be replaced.  Diet: NPO. PEG running @55 (goal). 100ml Q4hr free water flushes  Bowel status: no BM   : Flores in for retention.   Pain: No c/o pain  Activity: Up SBA, GB   Plan: Awaiting placement medically ready to discharge. Continue to follow POC

## 2020-12-21 ENCOUNTER — APPOINTMENT (OUTPATIENT)
Dept: PHYSICAL THERAPY | Facility: CLINIC | Age: 75
DRG: 064 | End: 2020-12-21
Attending: PSYCHIATRY & NEUROLOGY
Payer: MEDICARE

## 2020-12-21 ENCOUNTER — APPOINTMENT (OUTPATIENT)
Dept: OCCUPATIONAL THERAPY | Facility: CLINIC | Age: 75
DRG: 064 | End: 2020-12-21
Attending: PSYCHIATRY & NEUROLOGY
Payer: MEDICARE

## 2020-12-21 LAB
ANION GAP SERPL CALCULATED.3IONS-SCNC: <1 MMOL/L (ref 3–14)
BUN SERPL-MCNC: 39 MG/DL (ref 7–30)
CALCIUM SERPL-MCNC: 8.9 MG/DL (ref 8.5–10.1)
CHLORIDE SERPL-SCNC: 124 MMOL/L (ref 94–109)
CO2 SERPL-SCNC: 31 MMOL/L (ref 20–32)
CREAT SERPL-MCNC: 0.87 MG/DL (ref 0.66–1.25)
GFR SERPL CREATININE-BSD FRML MDRD: 85 ML/MIN/{1.73_M2}
GLUCOSE SERPL-MCNC: 104 MG/DL (ref 70–99)
INR PPP: 3.45 (ref 0.86–1.14)
MAGNESIUM SERPL-MCNC: 2.2 MG/DL (ref 1.6–2.3)
POTASSIUM SERPL-SCNC: 4.3 MMOL/L (ref 3.4–5.3)
POTASSIUM SERPL-SCNC: 4.3 MMOL/L (ref 3.4–5.3)
SODIUM SERPL-SCNC: 155 MMOL/L (ref 133–144)

## 2020-12-21 PROCEDURE — 250N000013 HC RX MED GY IP 250 OP 250 PS 637: Performed by: NURSE PRACTITIONER

## 2020-12-21 PROCEDURE — 97112 NEUROMUSCULAR REEDUCATION: CPT | Mod: GP

## 2020-12-21 PROCEDURE — 250N000013 HC RX MED GY IP 250 OP 250 PS 637: Performed by: STUDENT IN AN ORGANIZED HEALTH CARE EDUCATION/TRAINING PROGRAM

## 2020-12-21 PROCEDURE — 250N000009 HC RX 250: Performed by: STUDENT IN AN ORGANIZED HEALTH CARE EDUCATION/TRAINING PROGRAM

## 2020-12-21 PROCEDURE — 120N000002 HC R&B MED SURG/OB UMMC

## 2020-12-21 PROCEDURE — 250N000013 HC RX MED GY IP 250 OP 250 PS 637: Performed by: PSYCHIATRY & NEUROLOGY

## 2020-12-21 PROCEDURE — 85610 PROTHROMBIN TIME: CPT | Performed by: NURSE PRACTITIONER

## 2020-12-21 PROCEDURE — 83735 ASSAY OF MAGNESIUM: CPT | Performed by: NURSE PRACTITIONER

## 2020-12-21 PROCEDURE — 97116 GAIT TRAINING THERAPY: CPT | Mod: GP

## 2020-12-21 PROCEDURE — 97535 SELF CARE MNGMENT TRAINING: CPT | Mod: GO | Performed by: OCCUPATIONAL THERAPIST

## 2020-12-21 PROCEDURE — 272N000078 HC NUTRITION PRODUCT INTERMEDIATE LITER

## 2020-12-21 PROCEDURE — 80048 BASIC METABOLIC PNL TOTAL CA: CPT | Performed by: STUDENT IN AN ORGANIZED HEALTH CARE EDUCATION/TRAINING PROGRAM

## 2020-12-21 PROCEDURE — 36415 COLL VENOUS BLD VENIPUNCTURE: CPT | Performed by: NURSE PRACTITIONER

## 2020-12-21 PROCEDURE — 99231 SBSQ HOSP IP/OBS SF/LOW 25: CPT | Mod: GC | Performed by: PSYCHIATRY & NEUROLOGY

## 2020-12-21 RX ORDER — WARFARIN SODIUM 1 MG/1
1 TABLET ORAL
Status: DISCONTINUED | OUTPATIENT
Start: 2020-12-21 | End: 2020-12-21

## 2020-12-21 RX ORDER — WARFARIN SODIUM 1 MG/1
1 TABLET ORAL
Status: COMPLETED | OUTPATIENT
Start: 2020-12-21 | End: 2020-12-21

## 2020-12-21 RX ORDER — WARFARIN SODIUM 2 MG/1
2 TABLET ORAL
Status: DISCONTINUED | OUTPATIENT
Start: 2020-12-21 | End: 2020-12-21

## 2020-12-21 RX ADMIN — DICLOFENAC SODIUM TOPICAL GEL, 1%, 4 G: 10 GEL TOPICAL at 21:15

## 2020-12-21 RX ADMIN — FOLIC ACID 1 MG: 1 TABLET ORAL at 08:34

## 2020-12-21 RX ADMIN — DICLOFENAC SODIUM TOPICAL GEL, 1%, 4 G: 10 GEL TOPICAL at 16:39

## 2020-12-21 RX ADMIN — THIAMINE HCL TAB 100 MG 100 MG: 100 TAB at 08:34

## 2020-12-21 RX ADMIN — WARFARIN SODIUM 1 MG: 1 TABLET ORAL at 18:47

## 2020-12-21 RX ADMIN — Medication 2 PACKET: at 08:34

## 2020-12-21 RX ADMIN — DICLOFENAC SODIUM TOPICAL GEL, 1%, 4 G: 10 GEL TOPICAL at 12:24

## 2020-12-21 RX ADMIN — POTASSIUM & SODIUM PHOSPHATES POWDER PACK 280-160-250 MG 1 PACKET: 280-160-250 PACK at 08:33

## 2020-12-21 RX ADMIN — DOXAZOSIN 1 MG: 4 TABLET ORAL at 08:34

## 2020-12-21 RX ADMIN — Medication 12.5 MG: at 08:34

## 2020-12-21 RX ADMIN — MELATONIN TAB 3 MG 3 MG: 3 TAB at 20:05

## 2020-12-21 RX ADMIN — DICLOFENAC SODIUM TOPICAL GEL, 1%, 4 G: 10 GEL TOPICAL at 08:33

## 2020-12-21 RX ADMIN — DOCUSATE SODIUM AND SENNOSIDES 1 TABLET: 8.6; 5 TABLET, FILM COATED ORAL at 08:34

## 2020-12-21 RX ADMIN — MULTIVIT AND MINERALS-FERROUS GLUCONATE 9 MG IRON/15 ML ORAL LIQUID 15 ML: at 08:34

## 2020-12-21 RX ADMIN — Medication 12.5 MG: at 20:05

## 2020-12-21 ASSESSMENT — ACTIVITIES OF DAILY LIVING (ADL)
ADLS_ACUITY_SCORE: 10
ADLS_ACUITY_SCORE: 9
ADLS_ACUITY_SCORE: 10
ADLS_ACUITY_SCORE: 9
ADLS_ACUITY_SCORE: 10
ADLS_ACUITY_SCORE: 9

## 2020-12-21 NOTE — PLAN OF CARE
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 Perseverates on needing to wash hands and face.   IV: No PIV okayed  Labs/Electrolytes: WDL  Diet: NPO. PEG running @55 (goal). 100ml Q4hr free water flushes  Bowel status: loose BMx1  : Flores pulled at 1000, DTV  Pain: No c/o pain  Activity: Up SBA, GB. Ambulated in halls x4   Plan: Awaiting placement medically ready to discharge. Continue to follow POC

## 2020-12-21 NOTE — PLAN OF CARE
Physical Therapy Discharge Summary    Reason for therapy discharge:    No further expectations of functional progress.    Progress towards therapy goal(s). See goals on Care Plan in [facility] electronic health record for goal details.  Goals met    Therapy recommendation(s):    Pt is IND with mobility, gait is antalgic per baseline due to knee pain, but pt is not open to AD trials to manage this. He is safely ambulating from a balance perspective however. Unable to perform formal assessment due to global aphasia. No further PT recommended at this time.

## 2020-12-21 NOTE — PROGRESS NOTES
"Care Management Follow Up    Length of Stay (days): 26    Expected Discharge Date: 20     Concerns to be Addressed:     Communication  Patient plan of care discussed at interdisciplinary rounds: Yes    Anticipated Discharge Disposition:  Rehab placement in a community SNF      Anticipated Discharge Services:  Rehab placement in a community SNF  Anticipated Discharge DME:  Not applicable    Patient/family educated on Medicare website which has current facility and service quality ratings: Not applicable at this time    Education Provided on the Discharge Plan:  Not applicable at this time  Patient/Family in Agreement with the Plan:  Not applicable at this time    Referrals Placed by CM/SW:  None at this time  Private pay costs discussed: Not applicable at this time.    Additional Information:  SW is following pt for discharge planning. SNF placement for rehab and potential long term care is anticipated at time of discharge.  Barriers to discharge, no insurance, pt is not able to provide information about income/assets or verifications of income/assets, pt is not able to participate in decision making, no payer source for SNF placement.  As this is the case, MA melina cannot be completed. Pt is also not able to make decisions about where he receives care and support.  Appointment of Emergency Guardianship/Conservatorship documents were received on 2020.   \"[employer] [phone] \" has been appointed as emergency guardian and conservator.  The mcnulty of Guardianship/Conservatorship  on 3/10/2021.     Upon arrival to work, SW received a written message for phone pt's \"daughter\" Chanel Lex at [phone].  GILBERT also received a voice mail message from Chanel Burnett requesting that SW call.    GIBLERT phoned [facility] (Mike Cabral) and left a message for him to call if he has learned any pertinent information about Chanel Burnett.  SW phoned [employer] (appointed as " "emergency Guardian/Conservator) and spoke with Vera Corrales.  GILBERT informed Vera that this SW did not receive \"Intent to Dispose of Personal Property Notice\" from them.  Vera stated that she would re-fax the document.  .  GILBERT spoke with Vera about the calls from Chanel Burnett.  Vera state states that they have learned that pt had 2 daughters (Chanel and Thao) that were given up for adoption \"a long time ago.\" When asked, Vera does not know if pt gave up parental rights.  Vera states that their research has revealed that pt wanted nothing to do with Chanel and Thao.  Vera states that per review of pt's personal effect's, Vera and Chanel had tried to reach out to pt in the past through cards.  Vera states that no information should be given to Chanel or Thao or anyone else.  When asked, Vera confirms that SW can provide the contact phone number to [email or web address] if someone calls with questions including Chanel Burnett.  SW phoned Chanel Burnett and provided the phone number to [email or web address]. GILBERT updated 6A nursing and Neuro Stroke (Dr. Bauer) and reinforced that no information should be shared in regards to pt other than with  Guardian/Conservator - [email or web address].     SW received \"Intent to Dispose of Personal Property Notice\" from [email or web address] which SW provided to pt.  Pt looked at the document, made sounds (not words), appeared calm and extended his hand for SW to shake.      GILBERT will continue to follow.    SILVIA Sanchez  Social Work, 6A  Phone:  [phone]  Pager:  [phone]  12/21/2020          ASHLYN Harmon      "

## 2020-12-21 NOTE — PROGRESS NOTES
1315   PT RECEIVED FROM CATH LAB,  S/P LEADLESS PPM EXCHANGED VIA RIGHT GROIN.  RIGHT GROIN SITE WITH SUTURES AND DRESSING INTACT.  SMALL AMOUNT OF BLOOD ON DRESSING (SITE OF QUARTER).  5 LBS SAND BAG APPLIED.  DR ORONA IS HERE TALKING TO PATIENT AND ATTEMPTED TO CALL  WITH NO ANSWER.  REPORT RECEIVED FROM DR PAREKH. ORAL AIRWAY DC.  PT IS VERY SLEEPY AND ORAL AIRWAY STILL IN.  OXYGEN MASK ON @ 4L.    1330   PT SLIGHTLY FOLLOWS COMMANDS.  ORAL AIRWAY DC.  RIGHT GROIN SITE CHECKED AND UNCHANGED.  SANG BAG STILL APPLIED.      1345  PT MOVES ALL EXTREMITIES.  RIGHT GROIN SITE CHECKED AND DRAINAGE CIRCLED.    1400   UPDATE PT'S CONDITION TO DAUGHTER.      1500    RIGHT GROIN SITE CHECKED AND REMAINS UNCHANGED. PT VOIDS VIA BEDPAN.    1530   REPORT CALLED TO JORGE LUIS STRICKLAND.  PT TRANSPORTED TO  [facility] VIA [facility],  ON MONITOR,  NO OXGYEN,  ACCOMPANIED BY RN.   Per , patient is still an inpatient as they are having difficulty finding care facility that can take patient at present.  Will close referral for now and follow notes.  If patient needs outpatient consult for diastolic dysfunction in near future, will re- open.

## 2020-12-21 NOTE — PLAN OF CARE
"  /72   Pulse 55   Temp 97.5  F (36.4  C) (Axillary)   Resp 16   Ht 1.676 m (5' 5.98\")   Wt 61.8 kg (136 lb 4.8 oz)   SpO2 99%   BMI 22.01 kg/m      Time: 1092-5221.  Reason for admission: Subacute right parietal infarct with overlying petechial hemorrhage.     VS: VSS on RA with O2 sats in high 90s. Afebrile.   Activity: Up with SBA, steady on feet. Intermittently impulsive, bed alarm on for safety.   Neuros: Difficult to assess d/t inability to speak. Strengths 5/5. Denies pain. Occasionally gets fixated on washing hands/face.   Cardiac: Normotensive, 120s/70s. HR stable in 60s. Denies chest pain.   Respiratory: LS clear. Denies SOB or GRANDE. Stable on RA. No cough noted.   GI/: Flores in place for retention, good urine output. No BM on this shift, LBM 12/18. +BS, +gas. Denies nausea or vomiting. PEG tube with continuous tube feeds at goal rate of 55 mL/hr.   Diet: Strict NPO. TFs at goal rate of 55 mL/hr.   Skin: WDL.   Lines: No IV access, MD aware and okay with no PIV.   Labs: Phos low at 2.6, replacement scheduled for this evening with recheck tomorrow morning.     New changes this shift/Plan: VSS on RA, afebrile. Denies pain. Difficult to assess neuros due to inability to speak. LS clear. Flores with goo output, -BM, tolerating TFs via PEG well, denies nausea. No IV access, okay by provider. Phos replacement tonight.   Will continue to monitor & follow POC.     "

## 2020-12-21 NOTE — PROGRESS NOTES
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 He was found to have a new subacute R parietal infarct w/ overlying petechial hemorrhage and was subsequently transferred to [facility] for further cares. Guardianship has been achieved, now awaiting insurance coverage for TCU placement. Medically ready for discharge.    - Guardian appointed 12/11, awaiting MA for discharge.  - Guardian will need to review and account for all pt's assets before MA application can be submitted.  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 likely 2/2 cardioembolic source in the setting of known Afib not on anticoagulation  #Afib  Pt had been placed on Eliquis in 2018 for prior L parietal stroke, but was inconsistent in taking it as prescribed and then ultimately discontinued it in 2019 after recurrent nosebleeds  - Q4h neurochecks  - SBP goal > 160  - Avoid hypotonic IVF  - Metoprolol 12.5mg BID  - Discontinued PTA Lisinopril  - Warfarin w/ pharmacy dosing   - Melatonin 3mg Qpm  - No indication for statin (LDL 69)  - Lipid Panel (LDL 69), HgbA1c (5.6)  - TTE (11/25): LV/RV size/function normal w/ EF 55-60%; Grade III diastolic dysfunction w/ severe LA enlargement and mild RA enlargement; intact atrial septum; mild mitral annular calcification; aortic sclerosis w/o stenosis  - MRI Stroke Protocol: can consider in the future if pt calms; previously did not tolerate  - EEG: generalized slowing  suggestive of mild-mod encephalopathy w/ maximum cortical dysfunction in the R hemisphere and w/o sign of seizure or epileptiform activity  - Telemetry  - PT/OT/SLP: TCU  - Stroke Education  - Stroke Class per [facility] ([facility])  - Depression Screen  - Apnea Screen  - Euthermia, Euglycemia  - Consider Cardiology referral upon discharge for diastolic dysfunction seen on TTE    #EtOH Abuse  - Discontinue CIWA  - Thiamine, Folate    #Antalgic gait favoring right knee  Patient with antalgic gait, favoring right side. Unclear if there is any pain or if there history of trauma to left knee/hip given the patient's aphasia. Will obtain a L hip and knee XR today to assess for fracture or other abnormality.   - L Hip and Knee XR ordered       #GREGORIA, improving  #Urinary Retention  #Traumatic Straight Cath   #Enterococcus UTI  Baseline Cr < 1, presented w/ Cr 1.3 likely . Pt has a hx of urinary retention (hx tamsulosin but not a current med) but is exhibiting marked amounts here which could be worsening pt's GREGORIA. Lack of PO intake and Enterococcus UTI may also be contributing. Received 7d of Ampicillin. On 11/29, pt also suffered a traumatic straight cath. Curbside to Urology recommended placement of Elena for 5d to allow healing. Since this time, pt has attempted multiple trials of void, but none have been successful  - Doxazosin 1mg daily  - Elena replaced  - Consider Urology referral upon discharge  - Trial to discontinue elena today    # Hypernatremia  # Hyperchloremia  Pt has exhibited elevated Na and Cl, rising since time of admission. Likely 2/2 NPO status since arrival due to multiple failed swallow attempts. Will monitor for refeeding syndrome once NJ placed and TF initiated  - PEG in place   - Nutrition following for TF  - Continue Free Water through PEG @ 100 mL q4h    Diet: TF per nutrition   VTE PPx: - Warfarin per pharmacy dosing  Dispo: TCU once MA can be established.  Guardian now reviewing assets  before MA application.  Code Status: FULL    Taj Taylor MD  Neurology Resident - PGY1      ______________________________________________________    Medications   Home Meds  Prior to Admission medications    Medication Sig Start Date End Date Taking? Authorizing Provider   CRANBERRY JUICE EXTRACT PO Take 40,000 Units by mouth daily    Reported, Patient   Garlic 1000 MG CAPS Take 1 capsule by mouth daily    Reported, Patient   lisinopril (PRINIVIL/ZESTRIL) 5 MG tablet Take 1 tablet (5 mg) by mouth daily For blood pressure control 1/18/19   Sophia Dacosta APRN CNP   NONFORMULARY Beet Root Juice    Reported, Patient   senna-docusate (SENOKOT-S/PERICOLACE) 8.6-50 MG tablet Take 1 tablet by mouth 2 times daily For constipation 12/11/18   Annabella Santos MD   tamsulosin (FLOMAX) 0.4 MG capsule Take 1 capsule (0.4 mg) by mouth daily 6/5/19   Missael Carvalho MD       Scheduled Meds    diclofenac  4 g Topical 4x Daily     doxazosin  1 mg Oral or Feeding Tube Daily     folic acid  1 mg Oral or Feeding Tube Daily     melatonin  3 mg Oral or Feeding Tube Daily     metoprolol tartrate  12.5 mg Oral or Feeding Tube BID     multivitamins w/minerals  15 mL Oral or Feeding Tube Daily     polyethylene glycol  17 g Oral or Feeding Tube BID     potassium & sodium phosphates  1 packet Oral or Feeding Tube BID     protein modular  2 packet Per Feeding Tube Daily     senna-docusate  1 tablet Per Feeding Tube BID     vitamin B1  100 mg Oral or Feeding Tube Daily       Infusion Meds    dextrose       Warfarin Therapy Reminder         PRN Meds  acetaminophen **OR** acetaminophen **OR** acetaminophen, bisacodyl, dextrose, glucose **OR** dextrose **OR** glucagon, labetalol, nystatin, ondansetron **OR** ondansetron, Warfarin Therapy Reminder       PHYSICAL EXAMINATION  Temp:  [97.4  F (36.3  C)-97.7  F (36.5  C)] 97.7  F (36.5  C)  Pulse:  [55-82] 82  Resp:  [14-16] 16  BP: (115-141)/(72-97) 130/78  SpO2:  [96 %-100 %] 98 %      General:  Patient lying in bed in NAD  HEENT:  normocephalic/atraumatic  Cardio:  RRR  Pulmonary:  no respiratory distress  Extremities:  no edema  Skin:  intact, warm/dry     Neurologic  Mental Status:  Awake, alert, globally aphasic but will attempt some part of neuro exam from memory.  Does not consistently mime. Unable to answer any questions  Cranial Nerves:  Visual fields intact to confrontation, PERRL, EOMI with smooth pursuit, facial movements symmetric, hearing appears to be intact.  Motor:  normal muscle tone and bulk, no abnormal movements, able to move all limbs spontaneously against gravity  Reflexes:  deferred  Sensory:  deferred  Coordination:  unable to assess 2/2 aphasia  Station/Gait: deferred      Imaging  I personally reviewed all imaging; relevant findings per HPI.     Lab Results Data   CBC  Recent Labs   Lab 12/20/20  0541 12/17/20  0555   WBC 7.7 12.1*   RBC 4.44 4.52   HGB 13.2* 13.8   HCT 44.5 44.3    312     Basic Metabolic Panel    Recent Labs   Lab 12/20/20  0541 12/19/20  0728 12/17/20  0555   *  --  153*   POTASSIUM 3.6 4.0 3.8   CHLORIDE 124*  --  122*   CO2 33*  --  26   BUN 44*  --  36*   CR 1.03  --  0.82   GLC 67*  --  121*   SANDRA 9.0  --  8.8     Liver Panel  No results for input(s): PROTTOTAL, ALBUMIN, BILITOTAL, ALKPHOS, AST, ALT, BILIDIRECT in the last 168 hours.  INR    Recent Labs   Lab Test 12/21/20  0608 12/20/20  0541 12/19/20  0728   INR 3.45* 2.98* 2.97*      Lipid Profile    Recent Labs   Lab Test 11/25/20 2020 11/27/18  0701 12/16/15  0857   CHOL 133 147 159   HDL 55 57 76   LDL 69 78 73   TRIG 46 58 52     A1C    Recent Labs   Lab Test 11/25/20 2020 11/27/18  0701   A1C 5.6 5.5     Troponin I  No results for input(s): TROPI in the last 168 hours.

## 2020-12-21 NOTE — PLAN OF CARE
Time: 8630-5409  Reason for admission: Subacute right parietal infarct with overlying petechial hemorrhage.      VS: VSS on RA with O2 sats in high 90s. Afebrile.   Activity: Up with SBA, steady on feet. Intermittently impulsive, bed alarm on for safety.   Neuros: Difficult to assess d/t inability to speak. Strengths 5/5. Denies pain. Occasionally gets fixated on washing hands/face.   Cardiac: Denies chest pain.   Respiratory: LS clear. Denies SOB or GRANDE. Stable on RA. No cough noted.   GI/: Flores in place for retention, good urine output. No BM on this shift, LBM 12/18. +BS, +gas. Denies N/V. PEG tube with continuous tube feeds at goal rate of 55 mL/hr.   Diet: Strict NPO. TFs at goal rate of 55 mL/hr.   Skin: WDL.   Lines: No IV access, MD aware and okay with no PIV.   Labs: Phos low at 2.6, replaced this evening, recheck tomorrow morning.   Will continue to monitor & follow POC.

## 2020-12-21 NOTE — PLAN OF CARE
Alert. Denies pain. Unable to speak. Extremity strength 5/5 in all extremities. Tube feeding running at 55 ml/hr. Flores with good output. Bed alarm on.

## 2020-12-21 NOTE — PLAN OF CARE
SLP: Pt working with OT on SLP attempt.  SLP will attempt later in PM or tomorrow per scheduling constraints

## 2020-12-22 ENCOUNTER — APPOINTMENT (OUTPATIENT)
Dept: OCCUPATIONAL THERAPY | Facility: CLINIC | Age: 75
DRG: 064 | End: 2020-12-22
Attending: PSYCHIATRY & NEUROLOGY
Payer: MEDICARE

## 2020-12-22 ENCOUNTER — APPOINTMENT (OUTPATIENT)
Dept: SPEECH THERAPY | Facility: CLINIC | Age: 75
DRG: 064 | End: 2020-12-22
Attending: PSYCHIATRY & NEUROLOGY
Payer: MEDICARE

## 2020-12-22 LAB
ANION GAP SERPL CALCULATED.3IONS-SCNC: 4 MMOL/L (ref 3–14)
BUN SERPL-MCNC: 40 MG/DL (ref 7–30)
CALCIUM SERPL-MCNC: 9.1 MG/DL (ref 8.5–10.1)
CHLORIDE SERPL-SCNC: 120 MMOL/L (ref 94–109)
CO2 SERPL-SCNC: 32 MMOL/L (ref 20–32)
CREAT SERPL-MCNC: 0.95 MG/DL (ref 0.66–1.25)
GFR SERPL CREATININE-BSD FRML MDRD: 78 ML/MIN/{1.73_M2}
GLUCOSE BLDC GLUCOMTR-MCNC: 111 MG/DL (ref 70–99)
GLUCOSE BLDC GLUCOMTR-MCNC: 123 MG/DL (ref 70–99)
GLUCOSE BLDC GLUCOMTR-MCNC: 89 MG/DL (ref 70–99)
GLUCOSE BLDC GLUCOMTR-MCNC: 91 MG/DL (ref 70–99)
GLUCOSE BLDC GLUCOMTR-MCNC: 92 MG/DL (ref 70–99)
GLUCOSE BLDC GLUCOMTR-MCNC: 97 MG/DL (ref 70–99)
GLUCOSE SERPL-MCNC: 65 MG/DL (ref 70–99)
INR PPP: 2.3 (ref 0.86–1.14)
POTASSIUM SERPL-SCNC: 3.9 MMOL/L (ref 3.4–5.3)
SODIUM SERPL-SCNC: 155 MMOL/L (ref 133–144)

## 2020-12-22 PROCEDURE — 97535 SELF CARE MNGMENT TRAINING: CPT | Mod: GO | Performed by: OCCUPATIONAL THERAPIST

## 2020-12-22 PROCEDURE — 85610 PROTHROMBIN TIME: CPT | Performed by: NURSE PRACTITIONER

## 2020-12-22 PROCEDURE — 80048 BASIC METABOLIC PNL TOTAL CA: CPT | Performed by: NURSE PRACTITIONER

## 2020-12-22 PROCEDURE — 250N000013 HC RX MED GY IP 250 OP 250 PS 637: Performed by: STUDENT IN AN ORGANIZED HEALTH CARE EDUCATION/TRAINING PROGRAM

## 2020-12-22 PROCEDURE — 272N000078 HC NUTRITION PRODUCT INTERMEDIATE LITER

## 2020-12-22 PROCEDURE — 250N000013 HC RX MED GY IP 250 OP 250 PS 637: Performed by: PSYCHIATRY & NEUROLOGY

## 2020-12-22 PROCEDURE — 36415 COLL VENOUS BLD VENIPUNCTURE: CPT | Performed by: NURSE PRACTITIONER

## 2020-12-22 PROCEDURE — 999N001017 HC STATISTIC GLUCOSE BY METER IP

## 2020-12-22 PROCEDURE — 99232 SBSQ HOSP IP/OBS MODERATE 35: CPT | Mod: GC | Performed by: PSYCHIATRY & NEUROLOGY

## 2020-12-22 PROCEDURE — 92507 TX SP LANG VOICE COMM INDIV: CPT | Mod: GN

## 2020-12-22 PROCEDURE — 250N000009 HC RX 250: Performed by: STUDENT IN AN ORGANIZED HEALTH CARE EDUCATION/TRAINING PROGRAM

## 2020-12-22 PROCEDURE — 120N000002 HC R&B MED SURG/OB UMMC

## 2020-12-22 PROCEDURE — 92526 ORAL FUNCTION THERAPY: CPT | Mod: GN

## 2020-12-22 RX ORDER — WARFARIN SODIUM 3 MG/1
3 TABLET ORAL
Status: COMPLETED | OUTPATIENT
Start: 2020-12-22 | End: 2020-12-22

## 2020-12-22 RX ADMIN — FOLIC ACID 1 MG: 1 TABLET ORAL at 07:34

## 2020-12-22 RX ADMIN — Medication 12.5 MG: at 19:56

## 2020-12-22 RX ADMIN — DICLOFENAC SODIUM TOPICAL GEL, 1%, 4 G: 10 GEL TOPICAL at 19:56

## 2020-12-22 RX ADMIN — DICLOFENAC SODIUM TOPICAL GEL, 1%, 4 G: 10 GEL TOPICAL at 07:34

## 2020-12-22 RX ADMIN — Medication 2 PACKET: at 07:35

## 2020-12-22 RX ADMIN — DICLOFENAC SODIUM TOPICAL GEL, 1%, 4 G: 10 GEL TOPICAL at 16:50

## 2020-12-22 RX ADMIN — MELATONIN TAB 3 MG 3 MG: 3 TAB at 19:54

## 2020-12-22 RX ADMIN — MULTIVIT AND MINERALS-FERROUS GLUCONATE 9 MG IRON/15 ML ORAL LIQUID 15 ML: at 07:34

## 2020-12-22 RX ADMIN — ACETAMINOPHEN 650 MG: 325 TABLET, FILM COATED ORAL at 07:34

## 2020-12-22 RX ADMIN — DOXAZOSIN 1 MG: 4 TABLET ORAL at 07:36

## 2020-12-22 RX ADMIN — Medication 12.5 MG: at 07:34

## 2020-12-22 RX ADMIN — WARFARIN SODIUM 3 MG: 3 TABLET ORAL at 19:56

## 2020-12-22 RX ADMIN — THIAMINE HCL TAB 100 MG 100 MG: 100 TAB at 07:34

## 2020-12-22 RX ADMIN — DICLOFENAC SODIUM TOPICAL GEL, 1%, 4 G: 10 GEL TOPICAL at 12:00

## 2020-12-22 RX ADMIN — ACETAMINOPHEN 650 MG: 325 TABLET, FILM COATED ORAL at 00:22

## 2020-12-22 ASSESSMENT — ACTIVITIES OF DAILY LIVING (ADL)
ADLS_ACUITY_SCORE: 10

## 2020-12-22 NOTE — PLAN OF CARE
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 Perseverates on needing to wash hands and face.   IV: No PIV okayed  Labs/Electrolytes: WDL  Diet: NPO. PEG running @55 (goal). 100ml Q4hr free water flushes  Bowel status: no BM  : DTV  Pain: No c/o pain  Activity: Up SBA, GB. Ambulated in halls x2  Plan: Awaiting placement medically ready to discharge. Continue to follow POC

## 2020-12-22 NOTE — PROGRESS NOTES
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 He was found to have a new subacute R parietal infarct w/ overlying petechial hemorrhage and was subsequently transferred to [facility] for further cares. Guardianship has been achieved, now awaiting insurance coverage for TCU placement. Medically ready for discharge.    - Guardian appointed 12/11, awaiting MA for discharge.  - Guardian will need to review and account for all pt's assets before MA application can be submitted.  - Court ordered guardian still in effect per SW  - Refer all calls from family/ other to his guardian.    #Subacute R Parietal Lobe Infarct w/ hemorrhagic conversion likely 2/2 cardioembolic source in the setting of known Afib not on anticoagulation  #Afib  Pt had been placed on Eliquis in 2018 for prior L parietal stroke, but was inconsistent in taking it as prescribed and then ultimately discontinued it in 2019 after recurrent nosebleeds  - Q4h neurochecks  - SBP goal > 160  - Avoid hypotonic IVF  - Metoprolol 12.5mg BID  - Discontinued PTA Lisinopril  - Warfarin w/ pharmacy dosing   - Melatonin 3mg Qpm  - No indication for statin (LDL 69)  - Lipid Panel (LDL 69), HgbA1c (5.6)  - TTE (11/25): LV/RV size/function normal w/ EF 55-60%; Grade III diastolic dysfunction w/ severe LA enlargement and mild RA enlargement; intact atrial septum; mild mitral annular calcification; aortic sclerosis w/o stenosis  - MRI Stroke Protocol: can consider in the future if pt calms;  previously did not tolerate  - EEG: generalized slowing suggestive of mild-mod encephalopathy w/ maximum cortical dysfunction in the R hemisphere and w/o sign of seizure or epileptiform activity  - Telemetry  - PT/OT/SLP: TCU  - Stroke Education  - Stroke Class per [facility] ([facility])  - Depression Screen  - Apnea Screen  - Euthermia, Euglycemia  - Consider Cardiology referral upon discharge for diastolic dysfunction seen on TTE    #EtOH Abuse  - Discontinue CIWA  - Thiamine, Folate    #Antalgic gait favoring right knee  Patient with antalgic gait, favoring right side. Unclear if there is any pain or if there history of trauma to left knee/hip given the patient's aphasia. Will obtain a L hip and knee XR today to assess for fracture or other abnormality.   - L Hip and Knee XR ordered       #GREGORIA, improving  #Urinary Retention  #Traumatic Straight Cath   #Enterococcus UTI  Baseline Cr < 1, presented w/ Cr 1.3 likely . Pt has a hx of urinary retention (hx tamsulosin but not a current med) but is exhibiting marked amounts here which could be worsening pt's GREGORIA. Lack of PO intake and Enterococcus UTI may also be contributing. Received 7d of Ampicillin. On 11/29, pt also suffered a traumatic straight cath. Curbside to Urology recommended placement of Elena for 5d to allow healing. Since this time, pt has attempted multiple trials of void, but none have been successful.  Discontinuing elena again and increasing doxazosin to help with voiding.  Will continue to double weekly (8 mg daily max) as needed.  - Increase doxazosin to 2 mg daily  - Elena discontinued  - Continue to straight cath  - Consider Urology referral upon discharge      # Hypernatremia  # Hyperchloremia  Pt has exhibited elevated Na and Cl, rising since time of admission. Likely 2/2 NPO status since arrival due to multiple failed swallow attempts. Will monitor for refeeding syndrome once NJ placed and TF initiated  - PEG in place   - Nutrition  following for TF  - Increase Free Water through PEG @ 200 mL q4h  - Daily BMP until hypernatremia has resolved    Diet: TF per nutrition   VTE PPx: - Warfarin per pharmacy dosing  Dispo: TCU once MA can be established.  Guardian now reviewing assets before MA application.  Code Status: FULL    Taj Taylor MD  Neurology Resident - PGY1      ______________________________________________________    Medications   Home Meds  Prior to Admission medications    Medication Sig Start Date End Date Taking? Authorizing Provider   CRANBERRY JUICE EXTRACT PO Take 40,000 Units by mouth daily    Reported, Patient   Garlic 1000 MG CAPS Take 1 capsule by mouth daily    Reported, Patient   lisinopril (PRINIVIL/ZESTRIL) 5 MG tablet Take 1 tablet (5 mg) by mouth daily For blood pressure control 1/18/19   Sophia Dacosta APRN CNP   NONFORMULARY Beet Root Juice    Reported, Patient   senna-docusate (SENOKOT-S/PERICOLACE) 8.6-50 MG tablet Take 1 tablet by mouth 2 times daily For constipation 12/11/18   Annabella Santos MD   tamsulosin (FLOMAX) 0.4 MG capsule Take 1 capsule (0.4 mg) by mouth daily 6/5/19   Missael Carvalho MD       Scheduled Meds    diclofenac  4 g Topical 4x Daily     doxazosin  1 mg Oral or Feeding Tube Daily     folic acid  1 mg Oral or Feeding Tube Daily     melatonin  3 mg Oral or Feeding Tube Daily     metoprolol tartrate  12.5 mg Oral or Feeding Tube BID     multivitamins w/minerals  15 mL Oral or Feeding Tube Daily     polyethylene glycol  17 g Oral or Feeding Tube BID     protein modular  2 packet Per Feeding Tube Daily     senna-docusate  1 tablet Per Feeding Tube BID     vitamin B1  100 mg Oral or Feeding Tube Daily       Infusion Meds    dextrose       Warfarin Therapy Reminder         PRN Meds  acetaminophen **OR** acetaminophen **OR** acetaminophen, bisacodyl, dextrose, glucose **OR** dextrose **OR** glucagon, labetalol, nystatin, ondansetron **OR** ondansetron, Warfarin Therapy Reminder        PHYSICAL EXAMINATION  Temp:  [96.7  F (35.9  C)-98.6  F (37  C)] 96.7  F (35.9  C)  Pulse:  [80-88] 86  Resp:  [16] 16  BP: (113-162)/(74-97) 113/97  SpO2:  [97 %-100 %] 98 %     General:  Patient lying in bed in NAD  HEENT:  normocephalic/atraumatic  Cardio:  RRR  Pulmonary:  no respiratory distress  Extremities:  no edema  Skin:  intact, warm/dry     Neurologic  Mental Status:  Awake, alert, globally aphasic but will attempt some part of neuro exam from memory.  Does not consistently mime. Unable to answer any questions  Cranial Nerves:  Visual fields intact to confrontation, PERRL, EOMI with smooth pursuit, facial movements symmetric, hearing appears to be intact.  Motor:  normal muscle tone and bulk, no abnormal movements, able to move all limbs spontaneously against gravity  Reflexes:  deferred  Sensory:  deferred  Coordination:  unable to assess 2/2 aphasia  Station/Gait: deferred      Imaging  I personally reviewed all imaging; relevant findings per HPI.     Lab Results Data   CBC  Recent Labs   Lab 12/20/20  0541 12/17/20  0555   WBC 7.7 12.1*   RBC 4.44 4.52   HGB 13.2* 13.8   HCT 44.5 44.3    312     Basic Metabolic Panel    Recent Labs   Lab 12/21/20  0608 12/20/20  0541 12/19/20  0728 12/17/20  0555   * 158*  --  153*   POTASSIUM 4.3  4.3 3.6 4.0 3.8   CHLORIDE 124* 124*  --  122*   CO2 31 33*  --  26   BUN 39* 44*  --  36*   CR 0.87 1.03  --  0.82   * 67*  --  121*   SANDRA 8.9 9.0  --  8.8     Liver Panel  No results for input(s): PROTTOTAL, ALBUMIN, BILITOTAL, ALKPHOS, AST, ALT, BILIDIRECT in the last 168 hours.  INR    Recent Labs   Lab Test 12/21/20  0608 12/20/20  0541 12/19/20  0728   INR 3.45* 2.98* 2.97*      Lipid Profile    Recent Labs   Lab Test 11/25/20 2020 11/27/18  0701 12/16/15  0857   CHOL 133 147 159   HDL 55 57 76   LDL 69 78 73   TRIG 46 58 52     A1C    Recent Labs   Lab Test 11/25/20 2020 11/27/18  0701   A1C 5.6 5.5     Troponin I  No results for input(s):  RYAN in the last 168 hours.

## 2020-12-22 NOTE — PLAN OF CARE
Status: subacute R parietal infarct w/ overlying petechial hemorrhage  Vitals: VSS   Neuros: Neuros difficult to assess due to global aphasia. Strengths 5/5. Follows few commands  IV: No PIV  Resp/trach: WNL  Diet: NPO. PEG running @ 55mL (goal). 100mL Q4h free water flushes  Bowel status: 1x small formed BM this shift  : Able to urinate in only very small amounts. Straight cathed at 2100 for 400mL  Skin: Two small open areas to buttocks  Pain: Denies  Activity: Up SBA/GB  Social:  Updated friend Marley over the phone tonight  Plan: Awaiting placement medically ready to discharge. Continue to follow POC

## 2020-12-22 NOTE — PLAN OF CARE
Status: Pt admitted 11/25 subacute  R parietal lobe infarct with hemorrhagic conversion. Hx of L parietal IPH with resultant aphasia.   Vitals: VSS, RA.   Neuros: Difficult to assess neuros d/t global aphasia. 5/5 all extremities. Intermittently follows commands. Intermittently restless and anxious.   IV: OK no PIV.   Labs/Electrolytes: Glucose checked q4h this shift. 89, 91. Pt is on high replacement protocol. Awaiting morning labs to determine electrolyte replacement needs.   Resp/trach: LS CTA.   Diet: NPO. PEG running continuous TF at 55 mL/hr (goal), with Q4H 100 mL water flushes.   Bowel status: BS+. No BM overnight.   : Pt retaining urine. Unable to void. Straight cathed for 525 mL at 0400.   Skin: Blanchable redness to bottom.   Pain: Pt appeared to be in pain, nodding when asked. Unable to determine specifics with pain. Given PRN tylenol.   Activity: Up w/ SBA, GB.   Plan: SW is following for discharge planning. Pt medically ready for rehab. Continue to follow POC.

## 2020-12-23 LAB
ANION GAP SERPL CALCULATED.3IONS-SCNC: 3 MMOL/L (ref 3–14)
BUN SERPL-MCNC: 41 MG/DL (ref 7–30)
CALCIUM SERPL-MCNC: 9 MG/DL (ref 8.5–10.1)
CHLORIDE SERPL-SCNC: 122 MMOL/L (ref 94–109)
CO2 SERPL-SCNC: 30 MMOL/L (ref 20–32)
CREAT SERPL-MCNC: 0.87 MG/DL (ref 0.66–1.25)
ERYTHROCYTE [DISTWIDTH] IN BLOOD BY AUTOMATED COUNT: 14.5 % (ref 10–15)
GFR SERPL CREATININE-BSD FRML MDRD: 85 ML/MIN/{1.73_M2}
GLUCOSE SERPL-MCNC: 91 MG/DL (ref 70–99)
HCT VFR BLD AUTO: 45.9 % (ref 40–53)
HGB BLD-MCNC: 14 G/DL (ref 13.3–17.7)
INR PPP: 2.11 (ref 0.86–1.14)
MCH RBC QN AUTO: 30.4 PG (ref 26.5–33)
MCHC RBC AUTO-ENTMCNC: 30.5 G/DL (ref 31.5–36.5)
MCV RBC AUTO: 100 FL (ref 78–100)
PLATELET # BLD AUTO: 206 10E9/L (ref 150–450)
POTASSIUM SERPL-SCNC: 3.9 MMOL/L (ref 3.4–5.3)
RBC # BLD AUTO: 4.6 10E12/L (ref 4.4–5.9)
SODIUM SERPL-SCNC: 154 MMOL/L (ref 133–144)
WBC # BLD AUTO: 11.4 10E9/L (ref 4–11)

## 2020-12-23 PROCEDURE — 90662 IIV NO PRSV INCREASED AG IM: CPT | Performed by: PSYCHIATRY & NEUROLOGY

## 2020-12-23 PROCEDURE — 250N000013 HC RX MED GY IP 250 OP 250 PS 637: Performed by: STUDENT IN AN ORGANIZED HEALTH CARE EDUCATION/TRAINING PROGRAM

## 2020-12-23 PROCEDURE — 250N000013 HC RX MED GY IP 250 OP 250 PS 637: Performed by: PSYCHIATRY & NEUROLOGY

## 2020-12-23 PROCEDURE — 250N000011 HC RX IP 250 OP 636: Performed by: PSYCHIATRY & NEUROLOGY

## 2020-12-23 PROCEDURE — 250N000009 HC RX 250: Performed by: STUDENT IN AN ORGANIZED HEALTH CARE EDUCATION/TRAINING PROGRAM

## 2020-12-23 PROCEDURE — 272N000078 HC NUTRITION PRODUCT INTERMEDIATE LITER

## 2020-12-23 PROCEDURE — 120N000002 HC R&B MED SURG/OB UMMC

## 2020-12-23 PROCEDURE — 250N000013 HC RX MED GY IP 250 OP 250 PS 637: Performed by: NURSE PRACTITIONER

## 2020-12-23 PROCEDURE — 80048 BASIC METABOLIC PNL TOTAL CA: CPT | Performed by: NURSE PRACTITIONER

## 2020-12-23 PROCEDURE — 99231 SBSQ HOSP IP/OBS SF/LOW 25: CPT | Mod: GC | Performed by: PSYCHIATRY & NEUROLOGY

## 2020-12-23 PROCEDURE — G0008 ADMIN INFLUENZA VIRUS VAC: HCPCS | Performed by: PSYCHIATRY & NEUROLOGY

## 2020-12-23 PROCEDURE — 36415 COLL VENOUS BLD VENIPUNCTURE: CPT | Performed by: NURSE PRACTITIONER

## 2020-12-23 PROCEDURE — 85610 PROTHROMBIN TIME: CPT | Performed by: NURSE PRACTITIONER

## 2020-12-23 PROCEDURE — 85027 COMPLETE CBC AUTOMATED: CPT | Performed by: NURSE PRACTITIONER

## 2020-12-23 RX ORDER — WARFARIN SODIUM 3 MG/1
3 TABLET ORAL
Status: COMPLETED | OUTPATIENT
Start: 2020-12-23 | End: 2020-12-23

## 2020-12-23 RX ADMIN — MELATONIN TAB 3 MG 3 MG: 3 TAB at 20:34

## 2020-12-23 RX ADMIN — MULTIVIT AND MINERALS-FERROUS GLUCONATE 9 MG IRON/15 ML ORAL LIQUID 15 ML: at 10:31

## 2020-12-23 RX ADMIN — DICLOFENAC SODIUM TOPICAL GEL, 1%, 4 G: 10 GEL TOPICAL at 10:38

## 2020-12-23 RX ADMIN — FOLIC ACID 1 MG: 1 TABLET ORAL at 10:32

## 2020-12-23 RX ADMIN — DOXAZOSIN 2 MG: 4 TABLET ORAL at 10:38

## 2020-12-23 RX ADMIN — DICLOFENAC SODIUM TOPICAL GEL, 1%, 4 G: 10 GEL TOPICAL at 13:57

## 2020-12-23 RX ADMIN — Medication 12.5 MG: at 20:34

## 2020-12-23 RX ADMIN — Medication 12.5 MG: at 10:31

## 2020-12-23 RX ADMIN — DICLOFENAC SODIUM TOPICAL GEL, 1%, 4 G: 10 GEL TOPICAL at 20:35

## 2020-12-23 RX ADMIN — THIAMINE HCL TAB 100 MG 100 MG: 100 TAB at 10:32

## 2020-12-23 RX ADMIN — WARFARIN SODIUM 3 MG: 3 TABLET ORAL at 18:17

## 2020-12-23 RX ADMIN — Medication 2 PACKET: at 10:32

## 2020-12-23 RX ADMIN — INFLUENZA A VIRUS A/MICHIGAN/45/2015 X-275 (H1N1) ANTIGEN (FORMALDEHYDE INACTIVATED), INFLUENZA A VIRUS A/SINGAPORE/INFIMH-16-0019/2016 IVR-186 (H3N2) ANTIGEN (FORMALDEHYDE INACTIVATED), INFLUENZA B VIRUS B/PHUKET/3073/2013 ANTIGEN (FORMALDEHYDE INACTIVATED), AND INFLUENZA B VIRUS B/MARYLAND/15/2016 BX-69A ANTIGEN (FORMALDEHYDE INACTIVATED) 0.7 ML: 60; 60; 60; 60 INJECTION, SUSPENSION INTRAMUSCULAR at 10:50

## 2020-12-23 RX ADMIN — DICLOFENAC SODIUM TOPICAL GEL, 1%, 4 G: 10 GEL TOPICAL at 17:36

## 2020-12-23 ASSESSMENT — ACTIVITIES OF DAILY LIVING (ADL)
ADLS_ACUITY_SCORE: 10

## 2020-12-23 NOTE — PROGRESS NOTES
This writer spoke to appointed guardian with [facility] [employer] and received verbal consent for flu vaccination.

## 2020-12-23 NOTE — PLAN OF CARE
Status: subacute R parietal infarct w/ overlying petechial hemorrhage  Vitals: VSS on RA  Neuros: AUSTIN orientation status d/t global aphasia. Strengths 5/5. Follows few commands. Anxious/restless at times  IV: No PIV, ok'd by MD   Resp/trach: WNL  Diet: NPO. PEG running @ 55mL (goal). 200mL Q4h free water flushes  Bowel status: No BM this shift  : Unable to void, straight cath for bladder scan volume > 500mL. Straight cath for 610mL at 0000  Skin: Blanchable redness to bottom  Pain: Denies   Activity: Up SBA, GB.   Plan: TCU once MA can be established. Guardian now reviewing assets before MA application. Continue to follow POC    **Straight cath at 0600 for 500mL

## 2020-12-23 NOTE — PLAN OF CARE
Status: subacute R parietal infarct w/ overlying petechial hemorrhage  Vitals: VSS  Neuros: Neuros difficult to assess due to global aphasia. Strengths 5/5. Follows few commands. Anxious/restless at times  IV: No PIV  Resp/trach: WNL  Diet: NPO. PEG running @ 55mL (goal). 100mL Q4h free water flushes  Bowel status: No BM this shift  : Unable to void. Bladder scanned at 2100 for 400mL. Orders to straight cath for bladder scan volume > 500mL  Skin: Blanchable redness to bottom  Pain: Denies   Activity: Up SBA, GB. Ambulated in halls x1  Social: Guardian was updated over the phone this shift. Verbal consent given for flu vaccination  Plan: Awaiting placement medically ready to discharge. Continue to follow POC

## 2020-12-23 NOTE — PROGRESS NOTES
Status: subacute R parietal infarct w/ overlying petechial hemorrhage  Vitals: VSS on RA  Neuros: Difficult to assess orientation.  Global aphasia.  Strengths 5/5.  Follows few commands.  Anxious/restless at times    IV: No PIV, ok per MD  Labs/Electrolytes: K+ 3.9, Na+ 154  Resp/trach: WNL  Diet: NPO.  TF via PEG @55mL/hr.  (goal). 200mL Q4h free water flushes.  Bowel status: BS+.  No BM this shift  : Unable to void.  MD aware.  Flores catheter ordered.  Flores placed.    Skin: Blanchable redness to bottom  Pain: denies  Activity: Up SBA, GB  Plan: TCU once MA established.  Guardian now reviewing assets before MA application.  Continue to follow POC.

## 2020-12-23 NOTE — PROGRESS NOTES
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 He was found to have a new subacute R parietal infarct w/ overlying petechial hemorrhage and was subsequently transferred to [facility] for further cares. Guardianship has been achieved, now awaiting insurance coverage for TCU placement. Medically ready for discharge.    - Guardian appointed 12/11, awaiting MA for discharge.  - Guardian will need to review and account for all pt's assets before MA application can be submitted.  - Court ordered guardian still in effect per SW  - Refer all calls from family/ other to his guardian.    #Subacute R Parietal Lobe Infarct w/ hemorrhagic conversion likely 2/2 cardioembolic source in the setting of known Afib not on anticoagulation  #Afib  Pt had been placed on Eliquis in 2018 for prior L parietal stroke, but was inconsistent in taking it as prescribed and then ultimately discontinued it in 2019 after recurrent nosebleeds  - Q4h neurochecks  - SBP goal > 160  - Avoid hypotonic IVF  - Metoprolol 12.5mg BID  - Discontinued PTA Lisinopril  - Warfarin w/ pharmacy dosing   - Melatonin 3mg Qpm  - No indication for statin (LDL 69)  - Lipid Panel (LDL 69), HgbA1c (5.6)  - TTE (11/25): LV/RV size/function normal w/ EF 55-60%; Grade III diastolic dysfunction w/ severe LA enlargement and mild RA enlargement; intact atrial septum; mild mitral annular calcification; aortic sclerosis w/o stenosis  - MRI Stroke Protocol: can consider in the future if pt calms; previously did  not tolerate  - EEG: generalized slowing suggestive of mild-mod encephalopathy w/ maximum cortical dysfunction in the R hemisphere and w/o sign of seizure or epileptiform activity  - Telemetry  - PT/OT/SLP: TCU  - Stroke Education  - Stroke Class per [facility] Learning [facility] ([facility])  - Depression Screen  - Apnea Screen  - Euthermia, Euglycemia  - Consider Cardiology referral upon discharge for diastolic dysfunction seen on TTE    #EtOH Abuse  - Discontinue CIWA  - Thiamine, Folate    #Antalgic gait favoring right knee  Patient with antalgic gait, favoring right side. Unclear if there is any pain or if there history of trauma to left knee/hip given the patient's aphasia. Will obtain a L hip and knee XR today to assess for fracture or other abnormality.   - L Hip and Knee XR ordered       #GREGORIA, improving  #Urinary Retention  #Traumatic Straight Cath   #Enterococcus UTI  Baseline Cr < 1, presented w/ Cr 1.3 likely . Pt has a hx of urinary retention (hx tamsulosin but not a current med) but is exhibiting marked amounts here which could be worsening pt's GREGORIA. Lack of PO intake and Enterococcus UTI may also be contributing. Received 7d of Ampicillin. On 11/29, pt also suffered a traumatic straight cath. Curbside to Urology recommended placement of Elena for 5d to allow healing. Since this time, pt has attempted multiple trials of void, but none have been successful. Increasing doxazosin to help with voiding.  Will continue to double weekly (8 mg daily max) as needed.  - Continue doxazosin to 2 mg daily  - Replace elena for 5 days  - Consider Urology referral upon discharge      # Hypernatremia  # Hyperchloremia  Pt has exhibited elevated Na and Cl, rising since time of admission. Likely 2/2 NPO status since arrival due to multiple failed swallow attempts. Will monitor for refeeding syndrome once NJ placed and TF initiated  - PEG in place   - Nutrition following for TF  - Continue Free Water through PEG @ 200 mL q4h  - Daily  BMP until hypernatremia has resolved    Diet: TF per nutrition   VTE PPx: - Warfarin per pharmacy dosing  Dispo: TCU once MA can be established.  Guardian now reviewing assets before MA application.  Code Status: FULL    Taj Taylor MD  Neurology Resident - PGY1      ______________________________________________________    Medications   Home Meds  Prior to Admission medications    Medication Sig Start Date End Date Taking? Authorizing Provider   CRANBERRY JUICE EXTRACT PO Take 40,000 Units by mouth daily    Reported, Patient   Garlic 1000 MG CAPS Take 1 capsule by mouth daily    Reported, Patient   lisinopril (PRINIVIL/ZESTRIL) 5 MG tablet Take 1 tablet (5 mg) by mouth daily For blood pressure control 1/18/19   Sophia Dacosta APRN CNP   NONFORMULARY Beet Root Juice    Reported, Patient   senna-docusate (SENOKOT-S/PERICOLACE) 8.6-50 MG tablet Take 1 tablet by mouth 2 times daily For constipation 12/11/18   Annabella Santos MD   tamsulosin (FLOMAX) 0.4 MG capsule Take 1 capsule (0.4 mg) by mouth daily 6/5/19   Missael Carvalho MD       Scheduled Meds    diclofenac  4 g Topical 4x Daily     doxazosin  2 mg Oral or Feeding Tube Daily     folic acid  1 mg Oral or Feeding Tube Daily     influenza vac high-dose quad  0.7 mL Intramuscular Prior to discharge     melatonin  3 mg Oral or Feeding Tube Daily     metoprolol tartrate  12.5 mg Oral or Feeding Tube BID     multivitamins w/minerals  15 mL Oral or Feeding Tube Daily     polyethylene glycol  17 g Oral or Feeding Tube BID     protein modular  2 packet Per Feeding Tube Daily     senna-docusate  1 tablet Per Feeding Tube BID     vitamin B1  100 mg Oral or Feeding Tube Daily       Infusion Meds    dextrose       Warfarin Therapy Reminder         PRN Meds  acetaminophen **OR** acetaminophen **OR** acetaminophen, bisacodyl, dextrose, glucose **OR** dextrose **OR** glucagon, labetalol, nystatin, ondansetron **OR** ondansetron, Warfarin Therapy Reminder        PHYSICAL EXAMINATION  Temp:  [97.3  F (36.3  C)-98  F (36.7  C)] 97.4  F (36.3  C)  Pulse:  [73-90] 89  Resp:  [16-18] 16  BP: (106-132)/(71-92) 125/72  SpO2:  [96 %-100 %] 99 %     General:  Patient lying in bed in NAD  HEENT:  normocephalic/atraumatic  Cardio:  RRR  Pulmonary:  no respiratory distress  Extremities:  no edema  Skin:  intact, warm/dry     Neurologic  Mental Status:  Awake, alert, globally aphasic but will attempt some part of neuro exam from memory.  Does not consistently mime. Unable to answer any questions  Cranial Nerves:  Visual fields intact to confrontation, PERRL, EOMI with smooth pursuit, facial movements symmetric, hearing appears to be intact.  Motor:  normal muscle tone and bulk, no abnormal movements, able to move all limbs spontaneously against gravity  Reflexes:  deferred  Sensory:  deferred  Coordination:  unable to assess 2/2 aphasia  Station/Gait: deferred      Imaging  I personally reviewed all imaging; relevant findings per HPI.     Lab Results Data   CBC  Recent Labs   Lab 12/23/20  0605 12/20/20  0541 12/17/20  0555   WBC 11.4* 7.7 12.1*   RBC 4.60 4.44 4.52   HGB 14.0 13.2* 13.8   HCT 45.9 44.5 44.3    274 312     Basic Metabolic Panel    Recent Labs   Lab 12/23/20  0605 12/22/20  0625 12/21/20  0608   * 155* 155*   POTASSIUM 3.9 3.9 4.3  4.3   CHLORIDE 122* 120* 124*   CO2 30 32 31   BUN 41* 40* 39*   CR 0.87 0.95 0.87   GLC 91 65* 104*   SANDRA 9.0 9.1 8.9     Liver Panel  No results for input(s): PROTTOTAL, ALBUMIN, BILITOTAL, ALKPHOS, AST, ALT, BILIDIRECT in the last 168 hours.  INR    Recent Labs   Lab Test 12/23/20  0605 12/22/20  0625 12/21/20  0608   INR 2.11* 2.30* 3.45*      Lipid Profile    Recent Labs   Lab Test 11/25/20 2020 11/27/18  0701 12/16/15  0857   CHOL 133 147 159   HDL 55 57 76   LDL 69 78 73   TRIG 46 58 52     A1C    Recent Labs   Lab Test 11/25/20 2020 11/27/18  0701   A1C 5.6 5.5     Troponin I  No results for input(s): TROPI in the  last 168 hours.

## 2020-12-23 NOTE — PROGRESS NOTES
CLINICAL NUTRITION SERVICES - REASSESSMENT NOTE      Nutrition Prescription     RECOMMENDATIONS FOR MDs/PROVIDERS TO ORDER:  - Total daily fluids/adjustments per MD   - Minimize disruptions to EN infusions      Malnutrition Status:    - Severe malnutrition in the context of acute on chronic illness     Recommendations already ordered by Registered Dietitian (RD):  - Continue via current access/PEG: Isosource 1.5 @ goal 55 ml/hr (1320 ml/day) to provide 1980 kcals (29 kcal/kg/day), 90 g PRO (1.3 g/kg/day), 1003 ml free H2O, 232 g CHO and 20 g Fiber daily.  - Prosource 2 packets. Total provisions = 112 gm PRO (1.6 gm/kg) and 2060 kcals (30 kcals/kg)  - Elevated HOB   - Ordered new weight to assess for further EN increase.      Future/Additional Recommendations:  - ongoing TF via current access/gastrostomy   - weight trends/further EN adjustments       EVALUATION OF THE PROGRESS TOWARD GOALS   Diet: NPO    Nutrition Support: - Isosource 1.5 @ goal 50 ml/hr (1200 ml/day) to provide 1800 kcals (26 kcal/kg/day), 82 g PRO (1.2 g/kg/day), 912 ml free H2O, 211 g CHO and 18 g Fiber daily.+ Prosource 2 packets. Total provisions = 104 gm PRO (1.5 gm/kg) and 1880 kcals (27 kcals/kg)    Intake: 1059 ml, 1589 kcals (23 kcals/kg), and 72 gm PRO (1.0 gm/kg) + PRO modular = 83 gm PRO (1.2 gm/kg) and 1669 kcals (24 kcals/kg)     NEW FINDINGS   Nutrition/GI: Pt tolerating EN via PEG. Free Water through PEG @ 200 mL q4h.      Neuro: reviewed. No major changes from last follow-up.       Labs/meds: Na+ 154 (H). Folic Acid; Certavite; B1; bowel meds.     Weights: Weight down from admit: 69 kg --> 61.8 kg = 10% weight loss over ~ 3 weeks. No new weight since last assessment.     MALNUTRITION  % Intake: /= 50% for >/= 5 days (severe)  % Weight Loss: > 5% in 1 month (severe)  Subcutaneous Fat Loss: Facial region:  mild  Muscle Loss: Temporal:  Mild/moderate, Facial & jaw region:  Mild/moderate, Thoracic region (clavicle, acromium bone,  deltoid, trapezius, pectoral):  Mild/moderate, Upper arm (bicep, tricep):  Mild/moderate, Lower arm  (forearm):  Mild/moderate and Patellar region:  Mild/moderate  Fluid Accumulation/Edema: None noted  Malnutrition Diagnosis: Severe malnutrition in the context of acute on chronic illness     Previous Goals   Total avg nutritional intake to meet a minimum of 25 kcal/kg and 1.5 g PRO/kg daily (per dosing wt 69 kg).  Evaluation: not met     Previous Nutrition Diagnosis  Inadequate oral intake related to inability to take oral PO as evidenced by pt requires long-term FT to meet 100% nutrition needs.  Evaluation:no change      CURRENT NUTRITION DIAGNOSIS  Inadequate oral intake related to inability to take oral PO as evidenced by pt requires long-term FT to meet 100% nutrition needs.     INTERVENTIONS  Implementation  Enteral Nutrition - continue to monitor  Weight trends - increase regimen further pending new weight      Goals  Total avg nutritional intake to meet a minimum of 25 kcal/kg and 1.5 g PRO/kg daily (per dosing wt 69 kg).     Monitoring/Evaluation  Progress toward goals will be monitored and evaluated per protocol.     Celia Trivedi RD, AARTI, [facility]  Neuro ICU  Pager: [phone]

## 2020-12-23 NOTE — PROGRESS NOTES
Care Management Follow Up    Length of Stay (days): 28    Expected Discharge Date: 12/28/20     Concerns to be Addressed:     Disposition planning  Patient plan of care discussed at interdisciplinary rounds:  Yes    Anticipated Discharge Disposition:  Disposition planning       Anticipated Discharge Services:  Rehab placement in a community SNF with likely long term care  Anticipated Discharge DME:  Not applicable    Patient/family educated on Medicare website which has current facility and service quality ratings:  GILBERT spoke with representative (Vera) with [facility] (they are pt's appointed emergency guardian/conservator, who states that pt can be referred anywhere, prefer close to home, but if no facility to accept, ok with referrals further from home, does not want pt referred to [facility].    Education Provided on the Discharge Plan:  Yes  Patient/Family in Agreement with the Plan:  Yes    Referrals Placed by CM/SW:  SNF referrals made  Private pay costs discussed: Not applicable at this time    Additional Information:  GILBERT received a call from Nichole Tolentino  [employer] Counsel[employer] stating that pt's MA melina has been submitted to [facility] and pt is now MA pending status.  Nichole emailed a copy of the melina to this SW.    GILBERT phoned Admissions at [facility] and [facility] and left a message referring pt (assessment materials sent to both locations via [employer])    GILBERT phoned Admissions (Latisha Carl) for [facility].   SW referred pt and sent assessment materials.  Per Latisha, they have one opening.    GILBERT phoned Admissions (Leo) at [facility] who states that they have openings but due to staffing will not consider admits [id number]/28/2020. GILBERT sent a referral via Epic.      GILBERT will continue to follow for discharge planning.    SILVIA Sanchez  Social Work, 6A  Phone:  [phone]  Pager:  [phone]  12/23/2020          Lakisha Mckeon,  BSW

## 2020-12-24 ENCOUNTER — APPOINTMENT (OUTPATIENT)
Dept: SPEECH THERAPY | Facility: CLINIC | Age: 75
DRG: 064 | End: 2020-12-24
Attending: PSYCHIATRY & NEUROLOGY
Payer: MEDICARE

## 2020-12-24 LAB
ANION GAP SERPL CALCULATED.3IONS-SCNC: 1 MMOL/L (ref 3–14)
BUN SERPL-MCNC: 37 MG/DL (ref 7–30)
CALCIUM SERPL-MCNC: 8.6 MG/DL (ref 8.5–10.1)
CHLORIDE SERPL-SCNC: 121 MMOL/L (ref 94–109)
CO2 SERPL-SCNC: 30 MMOL/L (ref 20–32)
CREAT SERPL-MCNC: 0.91 MG/DL (ref 0.66–1.25)
GFR SERPL CREATININE-BSD FRML MDRD: 83 ML/MIN/{1.73_M2}
GLUCOSE SERPL-MCNC: 114 MG/DL (ref 70–99)
INR PPP: 2.23 (ref 0.86–1.14)
MAGNESIUM SERPL-MCNC: 2.1 MG/DL (ref 1.6–2.3)
PHOSPHATE SERPL-MCNC: 2.5 MG/DL (ref 2.5–4.5)
POTASSIUM SERPL-SCNC: 4.1 MMOL/L (ref 3.4–5.3)
SODIUM SERPL-SCNC: 152 MMOL/L (ref 133–144)

## 2020-12-24 PROCEDURE — 99231 SBSQ HOSP IP/OBS SF/LOW 25: CPT | Mod: GC | Performed by: PSYCHIATRY & NEUROLOGY

## 2020-12-24 PROCEDURE — 250N000013 HC RX MED GY IP 250 OP 250 PS 637: Performed by: PSYCHIATRY & NEUROLOGY

## 2020-12-24 PROCEDURE — 80048 BASIC METABOLIC PNL TOTAL CA: CPT | Performed by: NURSE PRACTITIONER

## 2020-12-24 PROCEDURE — 120N000002 HC R&B MED SURG/OB UMMC

## 2020-12-24 PROCEDURE — 83735 ASSAY OF MAGNESIUM: CPT | Performed by: NURSE PRACTITIONER

## 2020-12-24 PROCEDURE — 92507 TX SP LANG VOICE COMM INDIV: CPT | Mod: GN

## 2020-12-24 PROCEDURE — 250N000013 HC RX MED GY IP 250 OP 250 PS 637: Performed by: STUDENT IN AN ORGANIZED HEALTH CARE EDUCATION/TRAINING PROGRAM

## 2020-12-24 PROCEDURE — 250N000013 HC RX MED GY IP 250 OP 250 PS 637: Performed by: NURSE PRACTITIONER

## 2020-12-24 PROCEDURE — 84100 ASSAY OF PHOSPHORUS: CPT | Performed by: NURSE PRACTITIONER

## 2020-12-24 PROCEDURE — 83735 ASSAY OF MAGNESIUM: CPT | Performed by: PSYCHIATRY & NEUROLOGY

## 2020-12-24 PROCEDURE — 250N000009 HC RX 250: Performed by: STUDENT IN AN ORGANIZED HEALTH CARE EDUCATION/TRAINING PROGRAM

## 2020-12-24 PROCEDURE — 36415 COLL VENOUS BLD VENIPUNCTURE: CPT | Performed by: NURSE PRACTITIONER

## 2020-12-24 PROCEDURE — 272N000078 HC NUTRITION PRODUCT INTERMEDIATE LITER

## 2020-12-24 PROCEDURE — 92526 ORAL FUNCTION THERAPY: CPT | Mod: GN

## 2020-12-24 PROCEDURE — 85610 PROTHROMBIN TIME: CPT | Performed by: NURSE PRACTITIONER

## 2020-12-24 RX ORDER — WARFARIN SODIUM 3 MG/1
3 TABLET ORAL
Status: COMPLETED | OUTPATIENT
Start: 2020-12-24 | End: 2020-12-24

## 2020-12-24 RX ADMIN — WARFARIN SODIUM 3 MG: 3 TABLET ORAL at 18:35

## 2020-12-24 RX ADMIN — FOLIC ACID 1 MG: 1 TABLET ORAL at 08:02

## 2020-12-24 RX ADMIN — DICLOFENAC SODIUM TOPICAL GEL, 1%, 4 G: 10 GEL TOPICAL at 08:03

## 2020-12-24 RX ADMIN — MELATONIN TAB 3 MG 3 MG: 3 TAB at 20:50

## 2020-12-24 RX ADMIN — Medication 2 PACKET: at 08:03

## 2020-12-24 RX ADMIN — DOXAZOSIN 2 MG: 4 TABLET ORAL at 08:03

## 2020-12-24 RX ADMIN — DOCUSATE SODIUM AND SENNOSIDES 1 TABLET: 8.6; 5 TABLET, FILM COATED ORAL at 20:50

## 2020-12-24 RX ADMIN — DICLOFENAC SODIUM TOPICAL GEL, 1%, 4 G: 10 GEL TOPICAL at 17:16

## 2020-12-24 RX ADMIN — THIAMINE HCL TAB 100 MG 100 MG: 100 TAB at 08:02

## 2020-12-24 RX ADMIN — Medication 12.5 MG: at 20:50

## 2020-12-24 RX ADMIN — DICLOFENAC SODIUM TOPICAL GEL, 1%, 4 G: 10 GEL TOPICAL at 21:32

## 2020-12-24 RX ADMIN — POLYETHYLENE GLYCOL 3350 17 G: 17 POWDER, FOR SOLUTION ORAL at 20:50

## 2020-12-24 RX ADMIN — POTASSIUM & SODIUM PHOSPHATES POWDER PACK 280-160-250 MG 1 PACKET: 280-160-250 PACK at 20:50

## 2020-12-24 RX ADMIN — Medication 12.5 MG: at 08:00

## 2020-12-24 RX ADMIN — DICLOFENAC SODIUM TOPICAL GEL, 1%, 4 G: 10 GEL TOPICAL at 12:56

## 2020-12-24 RX ADMIN — POLYETHYLENE GLYCOL 3350 17 G: 17 POWDER, FOR SOLUTION ORAL at 08:02

## 2020-12-24 RX ADMIN — DOCUSATE SODIUM AND SENNOSIDES 1 TABLET: 8.6; 5 TABLET, FILM COATED ORAL at 08:01

## 2020-12-24 RX ADMIN — MULTIVIT AND MINERALS-FERROUS GLUCONATE 9 MG IRON/15 ML ORAL LIQUID 15 ML: at 08:00

## 2020-12-24 RX ADMIN — ACETAMINOPHEN 650 MG: 325 TABLET, FILM COATED ORAL at 14:00

## 2020-12-24 RX ADMIN — POTASSIUM & SODIUM PHOSPHATES POWDER PACK 280-160-250 MG 1 PACKET: 280-160-250 PACK at 12:57

## 2020-12-24 ASSESSMENT — ACTIVITIES OF DAILY LIVING (ADL)
ADLS_ACUITY_SCORE: 10

## 2020-12-24 ASSESSMENT — MIFFLIN-ST. JEOR: SCORE: 1309.82

## 2020-12-24 NOTE — PLAN OF CARE
Status: Was found to have Subacute R parietal infarct w/ overlying petechial hemorrhage on 11/25/20  Vitals: VSS on RA  Neuros: Difficult to assess. Global aphasia.  Strengths 5/5.  Intermittently follows commands. Can be anxious/restless/impulsive at times    IV: No PIV, ok per MD  Labs/Electrolytes: 1st dose of Phos replaced this morning.   Resp/trach: WNL  Diet: NPO. TF via PEG @ goal of 55mL/hr. 200mL FWF Q4h.  Bowel status: BS+.  No BM this shift, Bowel meds given   : Flores in place with adequate output.   Skin: Blanchable redness to bottom  Pain: denies  Activity: Up SBA, GB, likes to go for walks.  Plan: TCU once MA established.

## 2020-12-24 NOTE — PLAN OF CARE
Status: Was found to have Subacute R parietal infarct w/ overlying petechial hemorrhage on 11/25/20  Vitals: VSS on RA  Neuros: Difficult to assess. Global aphasia.  Strengths 5/5.  Intermittently follows commands.  Anxious/restless at times    IV: No PIV, ok per MD  Labs/Electrolytes: none replaced this shift.   Resp/trach: WNL  Diet: NPO.  TF via PEG @ goal of 55mL/hr. 200mL FWF Q4h.  Bowel status: BS+.  No BM this shift.   : Flores in place with adequate output.   Skin: Blanchable redness to bottom  Pain: denies  Activity: Up SBA, GB  Plan: TCU once MA established.  Continue to follow POC.

## 2020-12-24 NOTE — PROGRESS NOTES
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 He was found to have a new subacute R parietal infarct w/ overlying petechial hemorrhage and was subsequently transferred to [facility] for further cares. Guardianship has been achieved, now awaiting insurance coverage for TCU placement. Medically ready for discharge.    - Guardian appointed 12/11, awaiting MA for discharge.  - Guardian will need to review and account for all pt's assets before MA application can be submitted.  - Court ordered guardian still in effect per SW  - Refer all calls from family/ other to his guardian.    #Subacute R Parietal Lobe Infarct w/ hemorrhagic conversion likely 2/2 cardioembolic source in the setting of known Afib not on anticoagulation  #Afib  Pt had been placed on Eliquis in 2018 for prior L parietal stroke, but was inconsistent in taking it as prescribed and then ultimately discontinued it in 2019 after recurrent nosebleeds  - Q4h neurochecks  - SBP goal normotension  - Metoprolol 12.5mg BID  - Discontinued PTA Lisinopril  - Warfarin w/ pharmacy dosing   - Melatonin 3mg Qpm  - No indication for statin (LDL 69)  -  HgbA1c (5.6)  - TTE (11/25): LV/RV size/function normal w/ EF 55-60%; Grade III diastolic dysfunction w/ severe LA enlargement and mild RA enlargement; intact atrial septum; mild mitral annular calcification; aortic sclerosis w/o stenosis  - MRI Stroke Protocol: can consider in the future if pt calms; previously did not tolerate  - EEG: generalized slowing suggestive of mild-mod  encephalopathy w/ maximum cortical dysfunction in the R hemisphere and w/o sign of seizure or epileptiform activity  - Telemetry  - PT/OT/SLP: TCU  - Stroke Education  - Stroke Class per [facility] ([facility])  - Depression Screen  - Apnea Screen  - Euthermia, Euglycemia  - Consider Cardiology referral upon discharge for diastolic dysfunction seen on TTE    #EtOH Abuse  - Thiamine, Folate    #Urinary Retention  #Traumatic Straight Cath    On 11/29, pt also suffered a traumatic straight cath. Curbside to Urology recommended placement of Elena for 5d to allow healing. Since this time, pt has attempted multiple trials of void, but none have been successful. Increasing doxazosin to help with voiding.  Will continue to double weekly (8 mg daily max) as needed.  - Continue doxazosin to 2 mg daily  - Replace elena for 5 days  - Consider Urology referral upon discharge    #Enterococcus UTI -  S/p 7d of Ampicillin.     # Hypernatremia  # Hyperchloremia  Pt has exhibited elevated Na and Cl, rising since time of admission. Likely 2/2 NPO status since arrival due to multiple failed swallow attempts.   - PEG in place   - Nutrition following for TF  - Continue Free Water through PEG @ 200 mL q4h  - Daily BMP until hypernatremia has resolved    Diet: TF per nutrition   VTE PPx: - Warfarin per pharmacy dosing  Dispo: TCU once MA can be established.  Guardian now reviewing assets before MA application.  Code Status: FULL    Sean Abrams  Stroke Fellow       ______________________________________________________    Medications   Home Meds  Prior to Admission medications    Medication Sig Start Date End Date Taking? Authorizing Provider   CRANBERRY JUICE EXTRACT PO Take 40,000 Units by mouth daily    Reported, Patient   Garlic 1000 MG CAPS Take 1 capsule by mouth daily    Reported, Patient   lisinopril (PRINIVIL/ZESTRIL) 5 MG tablet Take 1 tablet (5 mg) by mouth daily For blood pressure control 1/18/19   Sophia Dacosta  CAROLANN London CNP   NONFORMULARY Beet Root Juice    Reported, Patient   senna-docusate (SENOKOT-S/PERICOLACE) 8.6-50 MG tablet Take 1 tablet by mouth 2 times daily For constipation 12/11/18   Annabella Santos MD   tamsulosin (FLOMAX) 0.4 MG capsule Take 1 capsule (0.4 mg) by mouth daily 6/5/19   Missael Carvalho MD       Scheduled Meds    diclofenac  4 g Topical 4x Daily     doxazosin  2 mg Oral or Feeding Tube Daily     folic acid  1 mg Oral or Feeding Tube Daily     melatonin  3 mg Oral or Feeding Tube Daily     metoprolol tartrate  12.5 mg Oral or Feeding Tube BID     multivitamins w/minerals  15 mL Oral or Feeding Tube Daily     polyethylene glycol  17 g Oral or Feeding Tube BID     potassium & sodium phosphates  1 packet Oral or Feeding Tube BID     protein modular  2 packet Per Feeding Tube Daily     senna-docusate  1 tablet Per Feeding Tube BID     vitamin B1  100 mg Oral or Feeding Tube Daily     warfarin ANTICOAGULANT  3 mg Oral ONCE at 18:00       Infusion Meds    dextrose       Warfarin Therapy Reminder         PRN Meds  acetaminophen **OR** acetaminophen **OR** acetaminophen, bisacodyl, dextrose, glucose **OR** dextrose **OR** glucagon, labetalol, nystatin, ondansetron **OR** ondansetron, Warfarin Therapy Reminder       PHYSICAL EXAMINATION  Temp:  [97.6  F (36.4  C)-98  F (36.7  C)] 97.6  F (36.4  C)  Pulse:  [68-95] 68  Resp:  [16] 16  BP: (119-135)/(77-88) 135/88  SpO2:  [93 %-100 %] 97 %     General:  Patient lying in bed in NAD  HEENT:  normocephalic/atraumatic  Cardio:  RRR  Pulmonary:  no respiratory distress  Extremities:  no edema  Skin:  intact, warm/dry     Neurologic  Mental Status:  Awake, alert, globally aphasic but will attempt some part of neuro exam from memory.  Does not consistently mime. Unable to answer any questions  Cranial Nerves:  Visual fields intact to confrontation, PERRL, EOMI with smooth pursuit, facial movements symmetric, hearing appears to be intact.  Motor:  normal muscle tone  and bulk, no abnormal movements, able to move all limbs spontaneously against gravity  Reflexes:  deferred  Sensory:  deferred  Coordination:  unable to assess 2/2 aphasia  Station/Gait: deferred      Imaging  I personally reviewed all imaging; relevant findings per HPI.     Lab Results Data   CBC  Recent Labs   Lab 12/23/20  0605 12/20/20  0541   WBC 11.4* 7.7   RBC 4.60 4.44   HGB 14.0 13.2*   HCT 45.9 44.5    274     Basic Metabolic Panel    Recent Labs   Lab 12/24/20  0634 12/23/20  0605 12/22/20  0625   * 154* 155*   POTASSIUM 4.1 3.9 3.9   CHLORIDE 121* 122* 120*   CO2 30 30 32   BUN 37* 41* 40*   CR 0.91 0.87 0.95   * 91 65*   SANDRA 8.6 9.0 9.1     Liver Panel  No results for input(s): PROTTOTAL, ALBUMIN, BILITOTAL, ALKPHOS, AST, ALT, BILIDIRECT in the last 168 hours.  INR    Recent Labs   Lab Test 12/24/20  0634 12/23/20  0605 12/22/20  0625   INR 2.23* 2.11* 2.30*      Lipid Profile    Recent Labs   Lab Test 11/25/20 2020 11/27/18  0701 12/16/15  0857   CHOL 133 147 159   HDL 55 57 76   LDL 69 78 73   TRIG 46 58 52     A1C    Recent Labs   Lab Test 11/25/20 2020 11/27/18  0701   A1C 5.6 5.5     Troponin I  No results for input(s): TROPI in the last 168 hours.

## 2020-12-24 NOTE — PROGRESS NOTES
SW received a call from Admissions at [facility] stating that they are full and will not accept MA pending.    SILVIA Sanchez  Social Work, 6A  Phone:  [phone]  Pager:  [phone]  12/24/2020

## 2020-12-24 NOTE — PROGRESS NOTES
Care Management Follow Up    Length of Stay (days): 29    Expected Discharge Date: 12/24/20     Concerns to be Addressed:     Disposition planning  Patient plan of care discussed at interdisciplinary rounds: Yes    Anticipated Discharge Disposition:  Rehab placement in a community SNF with anticipated need for long term care     Anticipated Discharge Services:  Rehab placement in a community SNF with anticipated need for long term care  Anticipated Discharge DME:  Not applicable    Patient/family educated on Medicare website which has current facility and service quality ratings: On 12/23/2020 GILBERT spoke with representative (Vera) with [employer] (they are pt's appointed emergency guardian/conservator), who states that pt can be referred anywhere, prefer close to home, but if no facility to accept, ok with referrals further from home, does not want pt referred to [facility].   Education Provided on the Discharge Plan:  Education provided to Guardian/Conservator  Patient/Family in Agreement with the Plan:  Yes    Referrals Placed by CM/SW:  SNF referrals made  Private pay costs discussed: Not applicable at this time    Additional Information:  SW is following for discharge planning.  Pt remains medically ready for discharge.  Pt is MA pending.  Pt has emergency guardianship/conservatorship in place.  SNF referrals were initiated yesterday.      On 12/23, [facility] ([place] [phone]) concluded that they would not consider an admit before 12/28/2020 due to staffing    On 12/23, [facility] ([facility] [phone]) concluded that they will not have bed availability until after 1/1/2021.    On 12/23, [facility] ([facility] [phone]) concluded that they will not consider MA pending.    On 12/23, [facility] ([place] [phone]), filled all available openings and does not anticipate openings until the week of 12/28/2020.    GILBERT sent additional referral to  [facility]s ([facility] [phone]) this am.  SW will continue to follow for discharge planning.    SILVIA Sanchez  Social Work, 6A  Phone:  [phone]  Pager:  [phone]  12/24/2020          MALCOLM HarmonW

## 2020-12-24 NOTE — PLAN OF CARE
Status: Was found to have Subacute R parietal infarct w/ overlying petechial hemorrhage on 11/25/20  Vitals: VSS on RA  Neuros: Difficult to assess. Global aphasia.  Strengths 5/5.  Intermittently follows commands. Can be anxious/restless/impulsive at times    IV: No PIV, ok per MD  Labs/Electrolytes: none replaced this shift.   Resp/trach: WNL  Diet: NPO. TF via PEG @ goal of 55mL/hr. 200mL FWF Q4h.  Bowel status: BS+.  No BM this shift.   : Flores in place with adequate output.   Skin: Blanchable redness to bottom  Pain: denies  Activity: Up SBA, GB, likes to go for walks.  Plan: TCU once MA established.  Continue to follow POC.

## 2020-12-25 LAB
ANION GAP SERPL CALCULATED.3IONS-SCNC: 2 MMOL/L (ref 3–14)
BUN SERPL-MCNC: 35 MG/DL (ref 7–30)
CALCIUM SERPL-MCNC: 8.5 MG/DL (ref 8.5–10.1)
CHLORIDE SERPL-SCNC: 119 MMOL/L (ref 94–109)
CO2 SERPL-SCNC: 29 MMOL/L (ref 20–32)
CREAT SERPL-MCNC: 0.85 MG/DL (ref 0.66–1.25)
GFR SERPL CREATININE-BSD FRML MDRD: 85 ML/MIN/{1.73_M2}
GLUCOSE SERPL-MCNC: 80 MG/DL (ref 70–99)
INR PPP: 1.79 (ref 0.86–1.14)
MAGNESIUM SERPL-MCNC: 2.1 MG/DL (ref 1.6–2.3)
POTASSIUM SERPL-SCNC: 3.8 MMOL/L (ref 3.4–5.3)
SODIUM SERPL-SCNC: 150 MMOL/L (ref 133–144)

## 2020-12-25 PROCEDURE — 250N000013 HC RX MED GY IP 250 OP 250 PS 637: Performed by: STUDENT IN AN ORGANIZED HEALTH CARE EDUCATION/TRAINING PROGRAM

## 2020-12-25 PROCEDURE — 85610 PROTHROMBIN TIME: CPT | Performed by: NURSE PRACTITIONER

## 2020-12-25 PROCEDURE — 120N000002 HC R&B MED SURG/OB UMMC

## 2020-12-25 PROCEDURE — 83735 ASSAY OF MAGNESIUM: CPT | Performed by: NURSE PRACTITIONER

## 2020-12-25 PROCEDURE — 272N000078 HC NUTRITION PRODUCT INTERMEDIATE LITER

## 2020-12-25 PROCEDURE — 250N000013 HC RX MED GY IP 250 OP 250 PS 637: Performed by: NURSE PRACTITIONER

## 2020-12-25 PROCEDURE — 250N000013 HC RX MED GY IP 250 OP 250 PS 637: Performed by: PSYCHIATRY & NEUROLOGY

## 2020-12-25 PROCEDURE — 80048 BASIC METABOLIC PNL TOTAL CA: CPT | Performed by: NURSE PRACTITIONER

## 2020-12-25 PROCEDURE — 36415 COLL VENOUS BLD VENIPUNCTURE: CPT | Performed by: NURSE PRACTITIONER

## 2020-12-25 PROCEDURE — 250N000009 HC RX 250: Performed by: STUDENT IN AN ORGANIZED HEALTH CARE EDUCATION/TRAINING PROGRAM

## 2020-12-25 PROCEDURE — 99232 SBSQ HOSP IP/OBS MODERATE 35: CPT | Mod: GC | Performed by: PSYCHIATRY & NEUROLOGY

## 2020-12-25 RX ORDER — POTASSIUM CHLORIDE 750 MG/1
10 TABLET, EXTENDED RELEASE ORAL ONCE
Status: COMPLETED | OUTPATIENT
Start: 2020-12-25 | End: 2020-12-25

## 2020-12-25 RX ORDER — POTASSIUM CHLORIDE 20MEQ/15ML
10 LIQUID (ML) ORAL ONCE
Status: DISCONTINUED | OUTPATIENT
Start: 2020-12-25 | End: 2020-12-25

## 2020-12-25 RX ORDER — WARFARIN SODIUM 4 MG/1
4 TABLET ORAL
Status: COMPLETED | OUTPATIENT
Start: 2020-12-25 | End: 2020-12-25

## 2020-12-25 RX ADMIN — POLYETHYLENE GLYCOL 3350 17 G: 17 POWDER, FOR SOLUTION ORAL at 20:58

## 2020-12-25 RX ADMIN — DOXAZOSIN 2 MG: 4 TABLET ORAL at 07:58

## 2020-12-25 RX ADMIN — Medication 2 PACKET: at 08:01

## 2020-12-25 RX ADMIN — DICLOFENAC SODIUM TOPICAL GEL, 1%, 4 G: 10 GEL TOPICAL at 17:30

## 2020-12-25 RX ADMIN — DICLOFENAC SODIUM TOPICAL GEL, 1%, 4 G: 10 GEL TOPICAL at 07:57

## 2020-12-25 RX ADMIN — Medication 12.5 MG: at 20:58

## 2020-12-25 RX ADMIN — POTASSIUM CHLORIDE 10 MEQ: 750 TABLET, EXTENDED RELEASE ORAL at 08:00

## 2020-12-25 RX ADMIN — MELATONIN TAB 3 MG 3 MG: 3 TAB at 20:58

## 2020-12-25 RX ADMIN — DOCUSATE SODIUM AND SENNOSIDES 1 TABLET: 8.6; 5 TABLET, FILM COATED ORAL at 20:58

## 2020-12-25 RX ADMIN — DICLOFENAC SODIUM TOPICAL GEL, 1%, 4 G: 10 GEL TOPICAL at 21:05

## 2020-12-25 RX ADMIN — Medication 12.5 MG: at 07:59

## 2020-12-25 RX ADMIN — DOCUSATE SODIUM AND SENNOSIDES 1 TABLET: 8.6; 5 TABLET, FILM COATED ORAL at 07:58

## 2020-12-25 RX ADMIN — MULTIVIT AND MINERALS-FERROUS GLUCONATE 9 MG IRON/15 ML ORAL LIQUID 15 ML: at 07:58

## 2020-12-25 RX ADMIN — THIAMINE HCL TAB 100 MG 100 MG: 100 TAB at 08:01

## 2020-12-25 RX ADMIN — FOLIC ACID 1 MG: 1 TABLET ORAL at 08:00

## 2020-12-25 RX ADMIN — WARFARIN SODIUM 4 MG: 4 TABLET ORAL at 17:36

## 2020-12-25 RX ADMIN — POLYETHYLENE GLYCOL 3350 17 G: 17 POWDER, FOR SOLUTION ORAL at 07:59

## 2020-12-25 ASSESSMENT — ACTIVITIES OF DAILY LIVING (ADL)
ADLS_ACUITY_SCORE: 10

## 2020-12-25 NOTE — PLAN OF CARE
Status: subacute R parietal infarct w/ overlying petechial hemorrhage  Vitals: VSS on RA  Neuros: AUSTIN orientation status d/t global aphasia. Strengths 5/5. Intermittently follows commands.   IV: No PIV, ok'd by MD   Resp/trach: WNL  Diet: NPO. PEG running @ 55mL (goal). 200mL Q4h free water flushes  Bowel status: No BM this shift, LBM 12/21. BS+  : Flores catheter in place for retention with adequate UO  Skin: Blanchable redness to bottom  Pain: Denies   Activity: Up SBA, GB.   Plan:  Pt remains medically ready for discharge.  Pt is MA pending.  Pt has emergency guardianship/conservatorship in place.  SNF referrals were initiated yesterday.  Continue to follow POC.

## 2020-12-25 NOTE — PLAN OF CARE
Status: Was found to have subacute R parietal infarct w/ overlying petechial hemorrhage on 11/25/20  Vitals: VSS on RA  Neuros: Difficult to assess, global aphasia, strengths 5/5, intermittent command following, agitated/restless at times. No new changes  IV: No PIV, ok w/ MDs  Labs/Electrolytes: Potassium replaced this shift. Recheck tomorrow morning.   Resp/trach: Lung sounds are clear  Diet: NPO, TF via PEG @ goal of 55 mL/hr. MD wants speech to follow up on swallowing.   Bowel status: Bowel sounds are active, Large BM this shift  : Flores in place, adequate output  Skin: Blanchable redness to bottom  Pain: No S/S of pain  Activity: SBA, GB, likes walks  Social: No phone calls for the pt this shift  Plan: TCU once MA established

## 2020-12-25 NOTE — PROGRESS NOTES
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 He was found to have a new subacute R parietal infarct w/ overlying petechial hemorrhage and was subsequently transferred to [facility] for further cares. Guardianship has been achieved, now awaiting insurance coverage for TCU placement. Medically ready for discharge.    - Guardian appointed 12/11, awaiting MA for discharge.  - Guardian will need to review and account for all pt's assets before MA application can be submitted.  - Court ordered guardian still in effect per SW  - Refer all calls from family/ other to his guardian.    #Subacute R Parietal Lobe Infarct w/ hemorrhagic conversion likely 2/2 cardioembolic source in the setting of known Afib not on anticoagulation  #Afib  Pt had been placed on Eliquis in 2018 for prior L parietal stroke, but was inconsistent in taking it as prescribed and then ultimately discontinued it in 2019 after recurrent nosebleeds  - Q4h neurochecks  - SBP goal normotension  - Metoprolol 12.5mg BID  - Discontinued PTA Lisinopril  - Warfarin w/ pharmacy dosing   - Melatonin 3mg Qpm  - No indication for statin (LDL 69)  -  HgbA1c (5.6)  - TTE (11/25): LV/RV size/function normal w/ EF 55-60%; Grade III diastolic dysfunction w/ severe LA enlargement and mild RA enlargement; intact atrial septum; mild mitral annular calcification; aortic sclerosis w/o stenosis  - MRI Stroke Protocol: can consider in the future if pt calms; previously did not tolerate  - EEG: generalized slowing suggestive of mild-mod  encephalopathy w/ maximum cortical dysfunction in the R hemisphere and w/o sign of seizure or epileptiform activity  - Telemetry  - PT/OT/SLP: TCU  - Stroke Education  - Stroke Class per [facility] ([facility])  - Depression Screen  - Apnea Screen  - Euthermia, Euglycemia  - Consider Cardiology referral upon discharge for diastolic dysfunction seen on TTE    #EtOH Abuse  - Thiamine, Folate    #Urinary Retention  #Traumatic Straight Cath    On 11/29, pt also suffered a traumatic straight cath. Curbside to Urology recommended placement of Elena for 5d to allow healing. Since this time, pt has attempted multiple trials of void, but none have been successful. Increasing doxazosin to help with voiding.  Will continue to double weekly (8 mg daily max) as needed.  - Continue doxazosin to 2 mg daily  - Continue elena, discontinue in 5 days  - Consider Urology referral upon discharge    #Enterococcus UTI -  S/p 7d of Ampicillin.     # Hypernatremia  # Hyperchloremia  Pt has exhibited elevated Na and Cl, rising since time of admission. Likely 2/2 NPO status since arrival due to multiple failed swallow attempts.   - PEG in place   - Nutrition following for TF  - Continue Free Water through PEG @ 200 mL q4h  - Daily BMP until hypernatremia has resolved    Diet: TF per nutrition   VTE PPx: - Warfarin per pharmacy dosing  Dispo: TCU once MA can be established.  Guardian now reviewing assets before MA application.  Code Status: FULL    Taj Taylor MD  Neurology Resident - [id number]          ______________________________________________________    Medications   Home Meds  Prior to Admission medications    Medication Sig Start Date End Date Taking? Authorizing Provider   CRANBERRY JUICE EXTRACT PO Take 40,000 Units by mouth daily    Reported, Patient   Garlic 1000 MG CAPS Take 1 capsule by mouth daily    Reported, Patient   lisinopril (PRINIVIL/ZESTRIL) 5 MG tablet Take 1 tablet (5 mg) by mouth daily For blood pressure  control 1/18/19   Sophia Dacosta APRN CNP   NONFORMULARY Beet Root Juice    Reported, Patient   senna-docusate (SENOKOT-S/PERICOLACE) 8.6-50 MG tablet Take 1 tablet by mouth 2 times daily For constipation 12/11/18   Annabella Santos MD   tamsulosin (FLOMAX) 0.4 MG capsule Take 1 capsule (0.4 mg) by mouth daily 6/5/19   Missael Carvalho MD       Scheduled Meds    diclofenac  4 g Topical 4x Daily     doxazosin  2 mg Oral or Feeding Tube Daily     folic acid  1 mg Oral or Feeding Tube Daily     melatonin  3 mg Oral or Feeding Tube Daily     metoprolol tartrate  12.5 mg Oral or Feeding Tube BID     multivitamins w/minerals  15 mL Oral or Feeding Tube Daily     polyethylene glycol  17 g Oral or Feeding Tube BID     protein modular  2 packet Per Feeding Tube Daily     senna-docusate  1 tablet Per Feeding Tube BID     vitamin B1  100 mg Oral or Feeding Tube Daily       Infusion Meds    dextrose       Warfarin Therapy Reminder         PRN Meds  acetaminophen **OR** acetaminophen **OR** acetaminophen, bisacodyl, dextrose, glucose **OR** dextrose **OR** glucagon, labetalol, nystatin, ondansetron **OR** ondansetron, Warfarin Therapy Reminder       PHYSICAL EXAMINATION  Temp:  [97.3  F (36.3  C)-97.6  F (36.4  C)] 97.3  F (36.3  C)  Pulse:  [68-77] 77  Resp:  [16-18] 16  BP: (108-135)/(73-91) 110/79  SpO2:  [96 %-99 %] 98 %     General:  Patient lying in bed in NAD  HEENT:  normocephalic/atraumatic  Cardio:  RRR  Pulmonary:  no respiratory distress  Extremities:  no edema  Skin:  intact, warm/dry     Neurologic  Mental Status:  Awake, alert, globally aphasic but will attempt some part of neuro exam from memory.  Does not consistently mime. Unable to answer any questions  Cranial Nerves:  PERRL, EOMI with smooth pursuit, facial movements symmetric, hearing appears to be intact.  Motor:  normal muscle tone and bulk, no abnormal movements, able to move all limbs spontaneously against gravity  Reflexes:  deferred  Sensory:   deferred  Coordination:  unable to assess 2/2 aphasia  Station/Gait: deferred      Imaging  I personally reviewed all imaging; relevant findings per HPI.     Lab Results Data   CBC  Recent Labs   Lab 12/23/20  0605 12/20/20  0541   WBC 11.4* 7.7   RBC 4.60 4.44   HGB 14.0 13.2*   HCT 45.9 44.5    274     Basic Metabolic Panel    Recent Labs   Lab 12/24/20  0634 12/23/20  0605 12/22/20  0625   * 154* 155*   POTASSIUM 4.1 3.9 3.9   CHLORIDE 121* 122* 120*   CO2 30 30 32   BUN 37* 41* 40*   CR 0.91 0.87 0.95   * 91 65*   SANDRA 8.6 9.0 9.1     Liver Panel  No results for input(s): PROTTOTAL, ALBUMIN, BILITOTAL, ALKPHOS, AST, ALT, BILIDIRECT in the last 168 hours.  INR    Recent Labs   Lab Test 12/24/20  0634 12/23/20  0605 12/22/20  0625   INR 2.23* 2.11* 2.30*      Lipid Profile    Recent Labs   Lab Test 11/25/20 2020 11/27/18  0701 12/16/15  0857   CHOL 133 147 159   HDL 55 57 76   LDL 69 78 73   TRIG 46 58 52     A1C    Recent Labs   Lab Test 11/25/20 2020 11/27/18  0701   A1C 5.6 5.5     Troponin I  No results for input(s): TROPI in the last 168 hours.

## 2020-12-25 NOTE — PLAN OF CARE
Status: Was found to have subacute R parietal infarct w/ overlying petechial hemorrhage on 11/25/20  Vitals: VSS on RA  Neuros: Difficult to assess, global aphasia, strengths 5/5, intermittent command following, agitated/restless at times  IV: No PIV, ok w/ MDs  Labs/Electrolytes: 2nd dose of Phos replaced this dorene  Resp/trach: Lung sounds are clear  Diet: NPO, TF via PEG @ goal of 55 mL/hr  Bowel status: Bowel sounds are active, no BM this shift, bowel meds given, last BM 12/21  : Flores in place, adequate output  Skin: Blanchable redness to bottom, pt received a shower this am, washed face a couple times this shift  Pain: Denied  Activity: SBA, GB, likes walks  Social: No phone calls for the pt this shift  Plan: TCU once MA established

## 2020-12-26 LAB
ANION GAP SERPL CALCULATED.3IONS-SCNC: 3 MMOL/L (ref 3–14)
BUN SERPL-MCNC: 33 MG/DL (ref 7–30)
CALCIUM SERPL-MCNC: 8.5 MG/DL (ref 8.5–10.1)
CHLORIDE SERPL-SCNC: 118 MMOL/L (ref 94–109)
CO2 SERPL-SCNC: 29 MMOL/L (ref 20–32)
CREAT SERPL-MCNC: 0.86 MG/DL (ref 0.66–1.25)
ERYTHROCYTE [DISTWIDTH] IN BLOOD BY AUTOMATED COUNT: 14.5 % (ref 10–15)
GFR SERPL CREATININE-BSD FRML MDRD: 85 ML/MIN/{1.73_M2}
GLUCOSE SERPL-MCNC: 87 MG/DL (ref 70–99)
HCT VFR BLD AUTO: 38.4 % (ref 40–53)
HGB BLD-MCNC: 12.2 G/DL (ref 13.3–17.7)
INR PPP: 1.87 (ref 0.86–1.14)
MAGNESIUM SERPL-MCNC: 2.2 MG/DL (ref 1.6–2.3)
MCH RBC QN AUTO: 31.1 PG (ref 26.5–33)
MCHC RBC AUTO-ENTMCNC: 31.8 G/DL (ref 31.5–36.5)
MCV RBC AUTO: 98 FL (ref 78–100)
PLATELET # BLD AUTO: 150 10E9/L (ref 150–450)
POTASSIUM SERPL-SCNC: 3.9 MMOL/L (ref 3.4–5.3)
RBC # BLD AUTO: 3.92 10E12/L (ref 4.4–5.9)
SODIUM SERPL-SCNC: 150 MMOL/L (ref 133–144)
WBC # BLD AUTO: 7.5 10E9/L (ref 4–11)

## 2020-12-26 PROCEDURE — 85027 COMPLETE CBC AUTOMATED: CPT | Performed by: NURSE PRACTITIONER

## 2020-12-26 PROCEDURE — 250N000013 HC RX MED GY IP 250 OP 250 PS 637: Performed by: STUDENT IN AN ORGANIZED HEALTH CARE EDUCATION/TRAINING PROGRAM

## 2020-12-26 PROCEDURE — 250N000013 HC RX MED GY IP 250 OP 250 PS 637: Performed by: PSYCHIATRY & NEUROLOGY

## 2020-12-26 PROCEDURE — 272N000078 HC NUTRITION PRODUCT INTERMEDIATE LITER

## 2020-12-26 PROCEDURE — 250N000009 HC RX 250: Performed by: STUDENT IN AN ORGANIZED HEALTH CARE EDUCATION/TRAINING PROGRAM

## 2020-12-26 PROCEDURE — 36415 COLL VENOUS BLD VENIPUNCTURE: CPT | Performed by: NURSE PRACTITIONER

## 2020-12-26 PROCEDURE — 85610 PROTHROMBIN TIME: CPT | Performed by: NURSE PRACTITIONER

## 2020-12-26 PROCEDURE — 120N000002 HC R&B MED SURG/OB UMMC

## 2020-12-26 PROCEDURE — 99231 SBSQ HOSP IP/OBS SF/LOW 25: CPT | Mod: GC | Performed by: PSYCHIATRY & NEUROLOGY

## 2020-12-26 PROCEDURE — 80048 BASIC METABOLIC PNL TOTAL CA: CPT | Performed by: NURSE PRACTITIONER

## 2020-12-26 PROCEDURE — 83735 ASSAY OF MAGNESIUM: CPT | Performed by: NURSE PRACTITIONER

## 2020-12-26 PROCEDURE — 84132 ASSAY OF SERUM POTASSIUM: CPT | Performed by: NURSE PRACTITIONER

## 2020-12-26 RX ORDER — WARFARIN SODIUM 4 MG/1
4 TABLET ORAL
Status: COMPLETED | OUTPATIENT
Start: 2020-12-26 | End: 2020-12-26

## 2020-12-26 RX ADMIN — Medication 2 PACKET: at 08:19

## 2020-12-26 RX ADMIN — Medication 12.5 MG: at 08:18

## 2020-12-26 RX ADMIN — Medication 12.5 MG: at 19:43

## 2020-12-26 RX ADMIN — FOLIC ACID 1 MG: 1 TABLET ORAL at 08:19

## 2020-12-26 RX ADMIN — MELATONIN TAB 3 MG 3 MG: 3 TAB at 19:43

## 2020-12-26 RX ADMIN — MULTIVIT AND MINERALS-FERROUS GLUCONATE 9 MG IRON/15 ML ORAL LIQUID 15 ML: at 08:18

## 2020-12-26 RX ADMIN — THIAMINE HCL TAB 100 MG 100 MG: 100 TAB at 08:19

## 2020-12-26 RX ADMIN — DICLOFENAC SODIUM TOPICAL GEL, 1%, 4 G: 10 GEL TOPICAL at 17:21

## 2020-12-26 RX ADMIN — DICLOFENAC SODIUM TOPICAL GEL, 1%, 4 G: 10 GEL TOPICAL at 08:20

## 2020-12-26 RX ADMIN — DOXAZOSIN 2 MG: 4 TABLET ORAL at 08:18

## 2020-12-26 RX ADMIN — DICLOFENAC SODIUM TOPICAL GEL, 1%, 4 G: 10 GEL TOPICAL at 19:43

## 2020-12-26 RX ADMIN — WARFARIN SODIUM 4 MG: 4 TABLET ORAL at 18:37

## 2020-12-26 ASSESSMENT — ACTIVITIES OF DAILY LIVING (ADL)
ADLS_ACUITY_SCORE: 10

## 2020-12-26 NOTE — PLAN OF CARE
Status: Pt admitted 11/25 with subacute R parietal infarct with overlying petechial hemorrhage.   Vitals: VSS on RA  Neuros: Difficult to assess most neuros. Intermittently follows commands. Incomprehensible sounds. Strengths 5/5.   IV: Order for no PIV.   Labs/Electrolytes: Elevated sodium, 200 mL flushes q4 hours  Resp/trach: LS coarse   Diet: NPO. Continuous TF at goal 55 mL/hr with 200 mL water flushes q4h.   Bowel status: No BM this evening. Scheduled bowel meds given.  : Flores in place with adequate UO.   Skin: Blanchable redness to bottom. Patient washed his face several times this shift  Pain: No nonverbal s/s of pain present. Restless at times  Activity: Up w/ SBA. Walked unit this evening.   Plan: Plan for TCU once MA is established, SW following. Continue to follow plan of care.

## 2020-12-26 NOTE — PLAN OF CARE
Status: Pt admitted 11/25 with subacute R parietal infarct with overlying petechial hemorrhage.   Vitals: VSS on RA.   Neuros: VSS on RA  Neuros: Difficult to assess, global aphasia, strengths 5/5, intermittently follows commands.  IV: Order for no PIV.   Labs/Electrolytes: Watching sodium, currently trending down. Potassium replaced 12/25, recheck this AM  Resp/trach: LS diminished bases.   Diet: NPO. Continuous TF at goal 55 mL/hr with 200 mL water flushes q4h.   Bowel status: 1 large BM overnight  : Flores in place with adequate UO.   Skin: Blanchable redness to bottom  Pain: No nonverbal s/s of pain present.   Activity: Up w/ SBA.  Plan: Plan for TCU once MA is established, SW following. Continue to follow plan of care.

## 2020-12-26 NOTE — PLAN OF CARE
Status: Pt admitted 11/25 with subacute R parietal infarct with overlying petechial hemorrhage.   Vitals: VSS, RA.   Neuros: VSS on RA  Neuros: Difficult to assess, global aphasia, strengths 5/5, intermittently follows commands.  IV: Order for no PIV.   Labs/Electrolytes: Watching sodium, currently trending down. Potassium replaced this morning, AM recheck 12/26.   Resp/trach: LS coarse with diminished bases.   Diet: NPO. Continuous TF at goal 55 mL/hr with 200 mL water flushes q4h.   Bowel status: No BM this evening. Scheduled bowel meds given.  : Flores in place with adequate UO.   Skin: Blanchable redness to bottom. Lotion applied to pt's skin per request.   Pain: No nonverbal s/s of pain present.   Activity: Up w/ SBA. Walked unit this evening.   Plan: Plan for TCU once MA is established, SW following. Continue to follow plan of care.

## 2020-12-26 NOTE — PROGRESS NOTES
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 He was found to have a new subacute R parietal infarct w/ overlying petechial hemorrhage and was subsequently transferred to [facility] for further cares. Guardianship has been achieved, now awaiting insurance coverage for TCU placement. Medically ready for discharge.    - Guardian appointed 12/11, awaiting MA for discharge.  - Guardian will need to review and account for all pt's assets before MA application can be submitted.  - Court ordered guardian still in effect per SW  - Refer all calls from family/ other to his guardian.    #Subacute R Parietal Lobe Infarct w/ hemorrhagic conversion likely 2/2 cardioembolic source in the setting of known Afib not on anticoagulation  #Afib  Pt had been placed on Eliquis in 2018 for prior L parietal stroke, but was inconsistent in taking it as prescribed and then ultimately discontinued it in 2019 after recurrent nosebleeds  - Q4h neurochecks [Q8 overnight]  - SBP goal normotension  - Discontinued PTA Lisinopril  - Warfarin w/ pharmacy dosing [not been therapeutic for 48 hours - d/w pharmacist - dose increased to 4mg HS]  - No indication for statin (LDL 69)  -  HgbA1c (5.6)  - TTE (11/25): LV/RV size/function normal w/ EF 55-60%; Grade III diastolic dysfunction w/ severe LA enlargement and mild RA enlargement; intact atrial septum; mild mitral annular calcification; aortic sclerosis w/o stenosis  - MRI Stroke Protocol: can consider in the future if pt calms; previously did not tolerate  -  EEG: generalized slowing suggestive of mild-mod encephalopathy w/ maximum cortical dysfunction in the R hemisphere and w/o sign of seizure or epileptiform activity  - Metoprolol 12.5mg BID  - Telemetry  - PT/OT/SLP: TCU  - Stroke Education  - Stroke Class per [facility] ([facility])  - Depression Screen  - Apnea Screen  - Euthermia, Euglycemia  - Consider Cardiology referral upon discharge for diastolic dysfunction seen on TTE    #EtOH Abuse  - Thiamine, Folate    #Urinary Retention  #Traumatic Straight Cath    On 11/29, pt also suffered a traumatic straight cath. Curbside to Urology recommended placement of Elena for 5d to allow healing. Since this time, pt has attempted multiple trials of void, but none have been successful. Increasing doxazosin to help with voiding.  Will continue to double weekly (8 mg daily max) as needed.  - Continue doxazosin to 2 mg daily  - Continue elena, discontinue in 5 days  - Consider Urology referral upon discharge    #Enterococcus UTI -  S/p 7d of Ampicillin.     # Hypernatremia  # Hyperchloremia  Pt has exhibited elevated Na and Cl, rising since time of admission. Likely 2/2 NPO status since arrival due to multiple failed swallow attempts.   - PEG in place   - Nutrition following for TF  - Continue Free Water through PEG @ 200 mL q4h  - Daily BMP until hypernatremia has resolved    Diet: TF per nutrition   VTE PPx: - Warfarin per pharmacy dosing  Dispo: TCU once MA can be established.  Guardian now reviewing assets before MA application.  Code Status: FULL    Sean Aliza  Stroke Fellow           ______________________________________________________    Medications   Home Meds  Prior to Admission medications    Medication Sig Start Date End Date Taking? Authorizing Provider   CRANBERRY JUICE EXTRACT PO Take 40,000 Units by mouth daily    Reported, Patient   Garlic 1000 MG CAPS Take 1 capsule by mouth daily    Reported, Patient   lisinopril (PRINIVIL/ZESTRIL) 5 MG tablet Take  1 tablet (5 mg) by mouth daily For blood pressure control 1/18/19   Sophia Dacosta APRN CNP   NONFORMULARY Beet Root Juice    Reported, Patient   senna-docusate (SENOKOT-S/PERICOLACE) 8.6-50 MG tablet Take 1 tablet by mouth 2 times daily For constipation 12/11/18   Annabella Santos MD   tamsulosin (FLOMAX) 0.4 MG capsule Take 1 capsule (0.4 mg) by mouth daily 6/5/19   Missael Carvalho MD       Scheduled Meds    diclofenac  4 g Topical 4x Daily     doxazosin  2 mg Oral or Feeding Tube Daily     folic acid  1 mg Oral or Feeding Tube Daily     melatonin  3 mg Oral or Feeding Tube Daily     metoprolol tartrate  12.5 mg Oral or Feeding Tube BID     multivitamins w/minerals  15 mL Oral or Feeding Tube Daily     polyethylene glycol  17 g Oral or Feeding Tube BID     protein modular  2 packet Per Feeding Tube Daily     senna-docusate  1 tablet Per Feeding Tube BID     vitamin B1  100 mg Oral or Feeding Tube Daily     warfarin ANTICOAGULANT  4 mg Oral ONCE at 18:00       Infusion Meds    dextrose       Warfarin Therapy Reminder         PRN Meds  acetaminophen **OR** acetaminophen **OR** acetaminophen, bisacodyl, dextrose, glucose **OR** dextrose **OR** glucagon, labetalol, nystatin, ondansetron **OR** ondansetron, Warfarin Therapy Reminder       PHYSICAL EXAMINATION  Temp:  [97.4  F (36.3  C)-98.4  F (36.9  C)] 97.4  F (36.3  C)  Pulse:  [65-97] 75  Resp:  [16] 16  BP: (105-141)/(58-80) 108/65  SpO2:  [94 %-100 %] 100 %     General:  Patient lying in bed in NAD  HEENT:  normocephalic/atraumatic  Cardio:  RRR  Pulmonary:  no respiratory distress  Extremities:  no edema  Skin:  intact, warm/dry     Neurologic  Mental Status:  Awake, alert, globally aphasic but will attempt some part of neuro exam from memory.  Does not consistently mime. Unable to answer any questions  Cranial Nerves:  PERRL, EOMI with smooth pursuit, facial movements symmetric, hearing appears to be intact.  Motor:  normal muscle tone and bulk, no  abnormal movements, able to move all limbs spontaneously against gravity  Reflexes:  deferred  Sensory:  deferred  Coordination:  unable to assess 2/2 aphasia  Station/Gait: deferred      Imaging  I personally reviewed all imaging; relevant findings per HPI.     Lab Results Data   CBC  Recent Labs   Lab 12/26/20  0544 12/23/20  0605 12/20/20  0541   WBC 7.5 11.4* 7.7   RBC 3.92* 4.60 4.44   HGB 12.2* 14.0 13.2*   HCT 38.4* 45.9 44.5    206 274     Basic Metabolic Panel    Recent Labs   Lab 12/26/20  0544 12/25/20  0551 12/24/20  0634   * 150* 152*   POTASSIUM 3.9 3.8 4.1   CHLORIDE 118* 119* 121*   CO2 29 29 30   BUN 33* 35* 37*   CR 0.86 0.85 0.91   GLC 87 80 114*   SANDRA 8.5 8.5 8.6     Liver Panel  No results for input(s): PROTTOTAL, ALBUMIN, BILITOTAL, ALKPHOS, AST, ALT, BILIDIRECT in the last 168 hours.  INR    Recent Labs   Lab Test 12/26/20  0544 12/25/20  0551 12/24/20  0634   INR 1.87* 1.79* 2.23*      Lipid Profile    Recent Labs   Lab Test 11/25/20 2020 11/27/18  0701 12/16/15  0857   CHOL 133 147 159   HDL 55 57 76   LDL 69 78 73   TRIG 46 58 52     A1C    Recent Labs   Lab Test 11/25/20 2020 11/27/18  0701   A1C 5.6 5.5     Troponin I  No results for input(s): TROPI in the last 168 hours.

## 2020-12-27 LAB
ANION GAP SERPL CALCULATED.3IONS-SCNC: 5 MMOL/L (ref 3–14)
BUN SERPL-MCNC: 31 MG/DL (ref 7–30)
CALCIUM SERPL-MCNC: 8.3 MG/DL (ref 8.5–10.1)
CHLORIDE SERPL-SCNC: 117 MMOL/L (ref 94–109)
CO2 SERPL-SCNC: 25 MMOL/L (ref 20–32)
CREAT SERPL-MCNC: 0.75 MG/DL (ref 0.66–1.25)
GFR SERPL CREATININE-BSD FRML MDRD: 90 ML/MIN/{1.73_M2}
GLUCOSE SERPL-MCNC: 118 MG/DL (ref 70–99)
INR PPP: 1.6 (ref 0.86–1.14)
POTASSIUM SERPL-SCNC: 3.9 MMOL/L (ref 3.4–5.3)
SODIUM SERPL-SCNC: 148 MMOL/L (ref 133–144)

## 2020-12-27 PROCEDURE — 250N000013 HC RX MED GY IP 250 OP 250 PS 637: Performed by: STUDENT IN AN ORGANIZED HEALTH CARE EDUCATION/TRAINING PROGRAM

## 2020-12-27 PROCEDURE — 80048 BASIC METABOLIC PNL TOTAL CA: CPT | Performed by: NURSE PRACTITIONER

## 2020-12-27 PROCEDURE — 272N000078 HC NUTRITION PRODUCT INTERMEDIATE LITER

## 2020-12-27 PROCEDURE — 36415 COLL VENOUS BLD VENIPUNCTURE: CPT | Performed by: NURSE PRACTITIONER

## 2020-12-27 PROCEDURE — 99231 SBSQ HOSP IP/OBS SF/LOW 25: CPT | Mod: GC | Performed by: PSYCHIATRY & NEUROLOGY

## 2020-12-27 PROCEDURE — 85610 PROTHROMBIN TIME: CPT | Performed by: NURSE PRACTITIONER

## 2020-12-27 PROCEDURE — 250N000013 HC RX MED GY IP 250 OP 250 PS 637: Performed by: PSYCHIATRY & NEUROLOGY

## 2020-12-27 PROCEDURE — 120N000002 HC R&B MED SURG/OB UMMC

## 2020-12-27 PROCEDURE — 250N000013 HC RX MED GY IP 250 OP 250 PS 637: Performed by: NURSE PRACTITIONER

## 2020-12-27 PROCEDURE — 250N000009 HC RX 250: Performed by: STUDENT IN AN ORGANIZED HEALTH CARE EDUCATION/TRAINING PROGRAM

## 2020-12-27 RX ORDER — WARFARIN SODIUM 6 MG/1
6 TABLET ORAL
Status: COMPLETED | OUTPATIENT
Start: 2020-12-27 | End: 2020-12-27

## 2020-12-27 RX ORDER — HYDROXYZINE HYDROCHLORIDE 10 MG/1
5 TABLET, FILM COATED ORAL
Status: DISCONTINUED | OUTPATIENT
Start: 2020-12-27 | End: 2020-12-30 | Stop reason: HOSPADM

## 2020-12-27 RX ADMIN — DICLOFENAC SODIUM TOPICAL GEL, 1%, 4 G: 10 GEL TOPICAL at 20:13

## 2020-12-27 RX ADMIN — WARFARIN SODIUM 6 MG: 6 TABLET ORAL at 18:24

## 2020-12-27 RX ADMIN — DOCUSATE SODIUM AND SENNOSIDES 1 TABLET: 8.6; 5 TABLET, FILM COATED ORAL at 20:12

## 2020-12-27 RX ADMIN — Medication 2 PACKET: at 08:04

## 2020-12-27 RX ADMIN — Medication 12.5 MG: at 20:12

## 2020-12-27 RX ADMIN — FOLIC ACID 1 MG: 1 TABLET ORAL at 08:03

## 2020-12-27 RX ADMIN — DOCUSATE SODIUM AND SENNOSIDES 1 TABLET: 8.6; 5 TABLET, FILM COATED ORAL at 08:03

## 2020-12-27 RX ADMIN — DICLOFENAC SODIUM TOPICAL GEL, 1%, 4 G: 10 GEL TOPICAL at 18:25

## 2020-12-27 RX ADMIN — DICLOFENAC SODIUM TOPICAL GEL, 1%, 4 G: 10 GEL TOPICAL at 08:03

## 2020-12-27 RX ADMIN — Medication 12.5 MG: at 08:03

## 2020-12-27 RX ADMIN — MELATONIN TAB 3 MG 3 MG: 3 TAB at 20:12

## 2020-12-27 RX ADMIN — DOXAZOSIN 2 MG: 4 TABLET ORAL at 08:03

## 2020-12-27 RX ADMIN — MULTIVIT AND MINERALS-FERROUS GLUCONATE 9 MG IRON/15 ML ORAL LIQUID 15 ML: at 08:03

## 2020-12-27 RX ADMIN — Medication 5 MG: at 21:56

## 2020-12-27 RX ADMIN — ACETAMINOPHEN 650 MG: 325 TABLET, FILM COATED ORAL at 18:25

## 2020-12-27 ASSESSMENT — ACTIVITIES OF DAILY LIVING (ADL)
ADLS_ACUITY_SCORE: 10.5
ADLS_ACUITY_SCORE: 10
ADLS_ACUITY_SCORE: 10.5
ADLS_ACUITY_SCORE: 10.5

## 2020-12-27 ASSESSMENT — VISUAL ACUITY: OU: NORMAL ACUITY

## 2020-12-27 NOTE — PROGRESS NOTES
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 He was found to have a new subacute R parietal infarct w/ overlying petechial hemorrhage and was subsequently transferred to [facility] for further cares. Guardianship has been achieved, now awaiting insurance coverage for TCU placement. Medically ready for discharge.    - Guardian appointed 12/11, awaiting MA for discharge.  - Guardian will need to review and account for all pt's assets before MA application can be submitted.  - Court ordered guardian still in effect per SW  - Refer all calls from family/ other to his guardian.    #Subacute R Parietal Lobe Infarct w/ hemorrhagic conversion likely 2/2 cardioembolic source in the setting of known Afib not on anticoagulation  #Afib  Pt had been placed on Eliquis in 2018 for prior L parietal stroke, but was inconsistent in taking it as prescribed and then ultimately discontinued it in 2019 after recurrent nosebleeds  - Q4h neurochecks [Q8 overnight]  - SBP goal normotension  - Discontinued PTA Lisinopril  - Warfarin w/ pharmacy dosing [not been therapeutic for 48 hours - d/w pharmacist - dose increased to 4mg HS]  - No indication for statin (LDL 69)  -  HgbA1c (5.6)  - TTE (11/25): LV/RV size/function normal w/ EF 55-60%; Grade III diastolic dysfunction w/ severe LA enlargement and mild RA enlargement; intact atrial septum; mild mitral annular calcification; aortic sclerosis w/o stenosis  - MRI Stroke Protocol: can consider in the future if pt calms; previously did not tolerate  -  EEG: generalized slowing suggestive of mild-mod encephalopathy w/ maximum cortical dysfunction in the R hemisphere and w/o sign of seizure or epileptiform activity  - Metoprolol 12.5mg BID  - Telemetry  - PT/OT/SLP: TCU  - Stroke Education  - Stroke Class per [facility] ([facility])  - Depression Screen  - Apnea Screen  - Euthermia, Euglycemia  - Consider Cardiology referral upon discharge for diastolic dysfunction seen on TTE    #EtOH Abuse  - Thiamine, Folate    #Urinary Retention  #Traumatic Straight Cath    On 11/29, pt also suffered a traumatic straight cath. Curbside to Urology recommended placement of Elena for 5d to allow healing. Since this time, pt has attempted multiple trials of void, but none have been successful. Increasing doxazosin to help with voiding.  Will continue to double weekly (8 mg daily max) as needed.  - Continue doxazosin to 2 mg daily  - Continue elena, discontinue in 5 days  - Consider Urology referral upon discharge    #Enterococcus UTI -  S/p 7d of Ampicillin.     # Hypernatremia  # Hyperchloremia  Pt has exhibited elevated Na and Cl, rising since time of admission. Likely 2/2 NPO status since arrival due to multiple failed swallow attempts.   - PEG in place   - Nutrition following for TF  - Continue Free Water through PEG @ 200 mL q4h  - Daily BMP until hypernatremia has resolved    Diet: TF per nutrition   VTE PPx: - Warfarin per pharmacy dosing  Dispo: TCU once MA can be established.  Guardian now reviewing assets before MA application.  Code Status: FULL    Sean Aliza  Stroke Fellow           ______________________________________________________    Medications   Home Meds  Prior to Admission medications    Medication Sig Start Date End Date Taking? Authorizing Provider   CRANBERRY JUICE EXTRACT PO Take 40,000 Units by mouth daily    Reported, Patient   Garlic 1000 MG CAPS Take 1 capsule by mouth daily    Reported, Patient   lisinopril (PRINIVIL/ZESTRIL) 5 MG tablet Take  1 tablet (5 mg) by mouth daily For blood pressure control 1/18/19   Sophia Dacosta APRN CNP   NONFORMULARY Beet Root Juice    Reported, Patient   senna-docusate (SENOKOT-S/PERICOLACE) 8.6-50 MG tablet Take 1 tablet by mouth 2 times daily For constipation 12/11/18   Annabella Santos MD   tamsulosin (FLOMAX) 0.4 MG capsule Take 1 capsule (0.4 mg) by mouth daily 6/5/19   Missael Carvalho MD       Scheduled Meds    diclofenac  4 g Topical 4x Daily     doxazosin  2 mg Oral or Feeding Tube Daily     folic acid  1 mg Oral or Feeding Tube Daily     melatonin  3 mg Oral or Feeding Tube Daily     metoprolol tartrate  12.5 mg Oral or Feeding Tube BID     multivitamins w/minerals  15 mL Oral or Feeding Tube Daily     polyethylene glycol  17 g Oral or Feeding Tube BID     protein modular  2 packet Per Feeding Tube Daily     senna-docusate  1 tablet Per Feeding Tube BID     vitamin B1  100 mg Oral or Feeding Tube Daily       Infusion Meds    dextrose       Warfarin Therapy Reminder         PRN Meds  acetaminophen **OR** acetaminophen **OR** acetaminophen, bisacodyl, dextrose, glucose **OR** dextrose **OR** glucagon, labetalol, nystatin, ondansetron **OR** ondansetron, Warfarin Therapy Reminder       PHYSICAL EXAMINATION  Temp:  [97.4  F (36.3  C)-98.3  F (36.8  C)] 98.3  F (36.8  C)  Pulse:  [62-75] 62  Resp:  [16] 16  BP: (102-128)/(65-86) 105/68  SpO2:  [97 %-100 %] 98 %     General:  Patient lying in bed in NAD  HEENT:  normocephalic/atraumatic  Cardio:  RRR  Pulmonary:  no respiratory distress  Extremities:  no edema  Skin:  intact, warm/dry     Neurologic  Mental Status:  Awake, alert, globally aphasic but will attempt some part of neuro exam from memory.  Does not consistently mime. Unable to answer any questions  Cranial Nerves:  PERRL, EOMI with smooth pursuit, facial movements symmetric, hearing appears to be intact.  Motor:  normal muscle tone and bulk, no abnormal movements, able to move all limbs spontaneously  against gravity  Reflexes:  deferred  Sensory:  deferred  Coordination:  unable to assess 2/2 aphasia  Station/Gait: semi-antalgic, brisk gait. walks with medication pole in one hand and gown in other, semi hunched posture.        Imaging  I personally reviewed all imaging; relevant findings per HPI.     Lab Results Data   CBC  Recent Labs   Lab 12/26/20  0544 12/23/20  0605   WBC 7.5 11.4*   RBC 3.92* 4.60   HGB 12.2* 14.0   HCT 38.4* 45.9    206     Basic Metabolic Panel    Recent Labs   Lab 12/26/20  0544 12/25/20  0551 12/24/20  0634   * 150* 152*   POTASSIUM 3.9 3.8 4.1   CHLORIDE 118* 119* 121*   CO2 29 29 30   BUN 33* 35* 37*   CR 0.86 0.85 0.91   GLC 87 80 114*   SANDRA 8.5 8.5 8.6     Liver Panel  No results for input(s): PROTTOTAL, ALBUMIN, BILITOTAL, ALKPHOS, AST, ALT, BILIDIRECT in the last 168 hours.  INR    Recent Labs   Lab Test 12/26/20  0544 12/25/20  0551 12/24/20  0634   INR 1.87* 1.79* 2.23*      Lipid Profile    Recent Labs   Lab Test 11/25/20 2020 11/27/18  0701 12/16/15  0857   CHOL 133 147 159   HDL 55 57 76   LDL 69 78 73   TRIG 46 58 52     A1C    Recent Labs   Lab Test 11/25/20 2020 11/27/18  0701   A1C 5.6 5.5     Troponin I  No results for input(s): TROPI in the last 168 hours.

## 2020-12-27 NOTE — PLAN OF CARE
Status: Pt admitted 11/25 with subacute R parietal infarct with overlying petechial hemorrhage.   Vitals: VSS on RA  Neuros: Difficult to assess most neuros. Intermittently follows commands. Incomprehensible sounds. Strengths 5/5.   IV: Order for no PIV.   Resp/trach: LS coarse   Diet: NPO. Continuous TF at goal 55 mL/hr with 200 mL water flushes q4h.   Bowel status: No BM this shift, BS+  : Flores in place with adequate UOP.   Skin: Blanchable redness to bottom.  Pain: No nonverbal s/s of pain present. Restless at times  Activity: Up w/ SBA.   Plan: Plan for TCU once MA is established, SW following. Continue to follow plan of care

## 2020-12-28 ENCOUNTER — APPOINTMENT (OUTPATIENT)
Dept: OCCUPATIONAL THERAPY | Facility: CLINIC | Age: 75
DRG: 064 | End: 2020-12-28
Attending: PSYCHIATRY & NEUROLOGY
Payer: MEDICARE

## 2020-12-28 ENCOUNTER — APPOINTMENT (OUTPATIENT)
Dept: SPEECH THERAPY | Facility: CLINIC | Age: 75
DRG: 064 | End: 2020-12-28
Attending: PSYCHIATRY & NEUROLOGY
Payer: MEDICARE

## 2020-12-28 LAB — INR PPP: 1.63 (ref 0.86–1.14)

## 2020-12-28 PROCEDURE — 250N000013 HC RX MED GY IP 250 OP 250 PS 637: Performed by: STUDENT IN AN ORGANIZED HEALTH CARE EDUCATION/TRAINING PROGRAM

## 2020-12-28 PROCEDURE — 99231 SBSQ HOSP IP/OBS SF/LOW 25: CPT | Mod: GC | Performed by: PSYCHIATRY & NEUROLOGY

## 2020-12-28 PROCEDURE — 250N000013 HC RX MED GY IP 250 OP 250 PS 637: Performed by: NURSE PRACTITIONER

## 2020-12-28 PROCEDURE — 92507 TX SP LANG VOICE COMM INDIV: CPT | Mod: GN

## 2020-12-28 PROCEDURE — 85610 PROTHROMBIN TIME: CPT | Performed by: NURSE PRACTITIONER

## 2020-12-28 PROCEDURE — 250N000013 HC RX MED GY IP 250 OP 250 PS 637: Performed by: PSYCHIATRY & NEUROLOGY

## 2020-12-28 PROCEDURE — 97535 SELF CARE MNGMENT TRAINING: CPT | Mod: GO | Performed by: OCCUPATIONAL THERAPIST

## 2020-12-28 PROCEDURE — 272N000078 HC NUTRITION PRODUCT INTERMEDIATE LITER

## 2020-12-28 PROCEDURE — 92526 ORAL FUNCTION THERAPY: CPT | Mod: GN

## 2020-12-28 PROCEDURE — 120N000002 HC R&B MED SURG/OB UMMC

## 2020-12-28 PROCEDURE — 250N000009 HC RX 250: Performed by: STUDENT IN AN ORGANIZED HEALTH CARE EDUCATION/TRAINING PROGRAM

## 2020-12-28 PROCEDURE — 36415 COLL VENOUS BLD VENIPUNCTURE: CPT | Performed by: NURSE PRACTITIONER

## 2020-12-28 RX ORDER — WARFARIN SODIUM 7.5 MG/1
7.5 TABLET ORAL
Status: COMPLETED | OUTPATIENT
Start: 2020-12-28 | End: 2020-12-28

## 2020-12-28 RX ADMIN — MELATONIN TAB 3 MG 3 MG: 3 TAB at 19:30

## 2020-12-28 RX ADMIN — Medication 2 PACKET: at 08:31

## 2020-12-28 RX ADMIN — Medication 12.5 MG: at 08:32

## 2020-12-28 RX ADMIN — MULTIVIT AND MINERALS-FERROUS GLUCONATE 9 MG IRON/15 ML ORAL LIQUID 15 ML: at 08:35

## 2020-12-28 RX ADMIN — DOXAZOSIN 4 MG: 4 TABLET ORAL at 09:52

## 2020-12-28 RX ADMIN — DOCUSATE SODIUM AND SENNOSIDES 1 TABLET: 8.6; 5 TABLET, FILM COATED ORAL at 08:32

## 2020-12-28 RX ADMIN — DICLOFENAC SODIUM TOPICAL GEL, 1%, 4 G: 10 GEL TOPICAL at 13:01

## 2020-12-28 RX ADMIN — FOLIC ACID 1 MG: 1 TABLET ORAL at 08:32

## 2020-12-28 RX ADMIN — WARFARIN SODIUM 7.5 MG: 7.5 TABLET ORAL at 18:23

## 2020-12-28 RX ADMIN — THIAMINE HCL TAB 100 MG 100 MG: 100 TAB at 08:32

## 2020-12-28 RX ADMIN — DICLOFENAC SODIUM TOPICAL GEL, 1%, 4 G: 10 GEL TOPICAL at 19:30

## 2020-12-28 RX ADMIN — Medication 5 MG: at 21:52

## 2020-12-28 RX ADMIN — DICLOFENAC SODIUM TOPICAL GEL, 1%, 4 G: 10 GEL TOPICAL at 08:31

## 2020-12-28 RX ADMIN — Medication 12.5 MG: at 19:30

## 2020-12-28 RX ADMIN — DICLOFENAC SODIUM TOPICAL GEL, 1%, 4 G: 10 GEL TOPICAL at 15:52

## 2020-12-28 ASSESSMENT — VISUAL ACUITY: OU: NOT TESTABLE

## 2020-12-28 ASSESSMENT — ACTIVITIES OF DAILY LIVING (ADL)
ADLS_ACUITY_SCORE: 10.5
ADLS_ACUITY_SCORE: 10.5
ADLS_ACUITY_SCORE: 10
ADLS_ACUITY_SCORE: 10.5
ADLS_ACUITY_SCORE: 10.5
ADLS_ACUITY_SCORE: 10

## 2020-12-28 NOTE — PLAN OF CARE
Status: L parietal stroke with hemorrhagic conversion.   Vitals: VSS on RA  Neuros: GOTTI strong, not to command. Global aphasia. Incomprehensible sounds. Did understand when I told him that I had to take his catheter out though.   IV: Order for no PIV.   Diet: NPO. Continuous TF at goal 55 mL/hr with 200 mL water flushes q4h.   Bowel status: Had BM.  : Flores DC'd at 1pm. No void yet.   Skin: Blanchable redness to bottom.  Pain: No signs of of pain .  Activity: Up w/ SBA.   Plan: F/U on voiding. Will have video swallow tomorrow at 8:30am.TCU is the plan.

## 2020-12-28 NOTE — PROGRESS NOTES
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 He was found to have a new subacute R parietal infarct w/ overlying petechial hemorrhage and was subsequently transferred to [facility] for further cares. Guardianship has been achieved, now awaiting insurance coverage for TCU placement. Medically ready for discharge.    - Guardian appointed 12/11, awaiting MA for discharge.  - Guardian will need to review and account for all pt's assets before MA application can be submitted.  - Court ordered guardian still in effect per SW  - Refer all calls from family/ other to his guardian.    #Subacute R Parietal Lobe Infarct w/ hemorrhagic conversion likely 2/2 cardioembolic source in the setting of known Afib not on anticoagulation  #Afib  Pt had been placed on Eliquis in 2018 for prior L parietal stroke, but was inconsistent in taking it as prescribed and then ultimately discontinued it in 2019 after recurrent nosebleeds  - Q4h neurochecks [Q8 overnight]  - SBP goal normotension  - Discontinued PTA Lisinopril  - Warfarin w/ pharmacy dosing [not been therapeutic for 48 hours - d/w pharmacist - dose increased to 4mg HS]  - No indication for statin (LDL 69)  -  HgbA1c (5.6)  - TTE (11/25): LV/RV size/function normal w/ EF 55-60%; Grade III diastolic dysfunction w/ severe LA enlargement and mild RA enlargement; intact atrial septum; mild mitral annular calcification; aortic sclerosis w/o stenosis  - MRI Stroke Protocol: can consider in  the future if pt calms; previously did not tolerate  - EEG: generalized slowing suggestive of mild-mod encephalopathy w/ maximum cortical dysfunction in the R hemisphere and w/o sign of seizure or epileptiform activity  - Metoprolol 12.5mg BID  - Telemetry  - PT/OT/SLP: TCU  - Stroke Education  - Stroke Class per [facility] ([facility])  - Depression Screen  - Apnea Screen  - Euthermia, Euglycemia  - Consider Cardiology referral upon discharge for diastolic dysfunction seen on TTE    #EtOH Abuse  - Thiamine, Folate    #Urinary Retention  #Traumatic Straight Cath    On 11/29, pt also suffered a traumatic straight cath. Curbside to Urology recommended placement of Elena for 5d to allow healing. Since this time, pt has attempted multiple trials of void, but none have been successful. Increasing doxazosin to help with voiding.  Will continue to double weekly (8 mg daily max) as needed.  - Increase doxazosin to 4 mg daily  - Discontinue elena today for voiding trial  - Consider Urology referral upon discharge    #Enterococcus UTI -  S/p 7d of Ampicillin.     # Hypernatremia  # Hyperchloremia  Pt has exhibited elevated Na and Cl, rising since time of admission. Likely 2/2 NPO status since arrival due to multiple failed swallow attempts.   - PEG in place   - Nutrition following for TF  - Continue Free Water through PEG @ 200 mL q4h  - Daily BMP until hypernatremia has resolved    Diet: TF per nutrition   VTE PPx: - Warfarin per pharmacy dosing  Dispo: TCU once MA can be established.  Guardian now reviewing assets before MA application.  Code Status: FULL    Sean Abrams  Stroke Fellow           ______________________________________________________    Medications   Home Meds  Prior to Admission medications    Medication Sig Start Date End Date Taking? Authorizing Provider   CRANBERRY JUICE EXTRACT PO Take 40,000 Units by mouth daily    Reported, Patient   Garlic 1000 MG CAPS Take 1 capsule by mouth daily     Reported, Patient   lisinopril (PRINIVIL/ZESTRIL) 5 MG tablet Take 1 tablet (5 mg) by mouth daily For blood pressure control 1/18/19   Sophia Dacosta APRN CNP   NONFORMULARY Beet Root Juice    Reported, Patient   senna-docusate (SENOKOT-S/PERICOLACE) 8.6-50 MG tablet Take 1 tablet by mouth 2 times daily For constipation 12/11/18   Annabella Santos MD   tamsulosin (FLOMAX) 0.4 MG capsule Take 1 capsule (0.4 mg) by mouth daily 6/5/19   Missael Carvalho MD       Scheduled Meds    diclofenac  4 g Topical 4x Daily     doxazosin  2 mg Oral or Feeding Tube Daily     folic acid  1 mg Oral or Feeding Tube Daily     melatonin  3 mg Oral or Feeding Tube Daily     metoprolol tartrate  12.5 mg Oral or Feeding Tube BID     multivitamins w/minerals  15 mL Oral or Feeding Tube Daily     polyethylene glycol  17 g Oral or Feeding Tube BID     protein modular  2 packet Per Feeding Tube Daily     senna-docusate  1 tablet Per Feeding Tube BID     vitamin B1  100 mg Oral or Feeding Tube Daily       Infusion Meds    dextrose       Warfarin Therapy Reminder         PRN Meds  acetaminophen **OR** acetaminophen **OR** acetaminophen, bisacodyl, dextrose, glucose **OR** dextrose **OR** glucagon, hydrOXYzine, labetalol, nystatin, ondansetron **OR** ondansetron, Warfarin Therapy Reminder       PHYSICAL EXAMINATION  Temp:  [96.8  F (36  C)-98  F (36.7  C)] 97.8  F (36.6  C)  Pulse:  [66-79] 68  Resp:  [16] 16  BP: (100-130)/(66-81) 101/67  SpO2:  [96 %-100 %] 98 %     General:  Patient lying in bed in NAD  HEENT:  normocephalic/atraumatic  Cardio:  RRR  Pulmonary:  no respiratory distress  Extremities:  no edema  Skin:  intact, warm/dry     Neurologic  Mental Status:  Awake, alert, globally aphasic but will attempt some part of neuro exam from memory.  Does not consistently mime. Unable to answer any questions  Cranial Nerves:  PERRL, EOMI with smooth pursuit, facial movements symmetric, hearing appears to be intact.  Motor:  normal muscle  tone and bulk, no abnormal movements, able to move all limbs spontaneously against gravity  Reflexes:  deferred  Sensory:  deferred  Coordination:  unable to assess 2/2 aphasia  Station/Gait: semi-antalgic, brisk gait. walks with medication pole in one hand and gown in other, semi hunched posture.        Imaging  I personally reviewed all imaging; relevant findings per HPI.     Lab Results Data   CBC  Recent Labs   Lab 12/26/20  0544 12/23/20  0605   WBC 7.5 11.4*   RBC 3.92* 4.60   HGB 12.2* 14.0   HCT 38.4* 45.9    206     Basic Metabolic Panel    Recent Labs   Lab 12/27/20  0638 12/26/20  0544 12/25/20  0551   * 150* 150*   POTASSIUM 3.9 3.9 3.8   CHLORIDE 117* 118* 119*   CO2 25 29 29   BUN 31* 33* 35*   CR 0.75 0.86 0.85   * 87 80   SANDRA 8.3* 8.5 8.5     Liver Panel  No results for input(s): PROTTOTAL, ALBUMIN, BILITOTAL, ALKPHOS, AST, ALT, BILIDIRECT in the last 168 hours.  INR    Recent Labs   Lab Test 12/27/20  0638 12/26/20  0544 12/25/20  0551   INR 1.60* 1.87* 1.79*      Lipid Profile    Recent Labs   Lab Test 11/25/20 2020 11/27/18  0701 12/16/15  0857   CHOL 133 147 159   HDL 55 57 76   LDL 69 78 73   TRIG 46 58 52     A1C    Recent Labs   Lab Test 11/25/20 2020 11/27/18  0701   A1C 5.6 5.5     Troponin I  No results for input(s): TROPI in the last 168 hours.

## 2020-12-28 NOTE — PLAN OF CARE
Status: Pt admitted 11/25 with subacute R parietal infarct with overlying petechial hemorrhage.   Vitals: VSS on RA  Neuros: Difficult to assess most neuros. Intermittently follows commands. Incomprehensible sounds. Strengths 5/5.   IV: Order for no PIV.   Resp/trach: LS coarse   Diet: NPO. Continuous TF at goal 55 mL/hr with 200 mL water flushes q4h.   Bowel status: No BM this shift, BS+  : Flores in place with adequate UOP.   Skin: Blanchable redness to bottom.  Pain: No nonverbal s/s of pain present. Restless at times - given atarax yesterday evening which helped  Activity: Up w/ SBA.   Plan: Plan for TCU once MA is established, SW following. Continue to follow plan of care

## 2020-12-28 NOTE — PROGRESS NOTES
Care Management Follow Up    Length of Stay (days): 33    Expected Discharge Date: 12/29/20     Concerns to be Addressed:  Discharge planning     Patient plan of care discussed at interdisciplinary rounds: Yes    Anticipated Discharge Disposition:  TCU with MA pending    Patient/family educated on Medicare website which has current facility and service quality ratings: yes  Education Provided on the Discharge Plan:  yes  Patient/Family in Agreement with the Plan:  yes    Referrals Placed by CM/SW:  SNF referrals  Private pay costs discussed: Not applicable    Additional Information:  Following up on discharge planning. Resent referrals to facilities near pt's home that may be able to accept MA pending and were willing to consider admissions this week.    Referral status:  1) [facility] (ph [phone])- resent referral 12/28- will assess- requesting copy of MA melina  2) [facility] (ph [phone])- resent referral 12/28  3) [facility] (myrtle Good, ph [phone])- resent referral 12/28  4) [facility] (ph [phone])- does not accept MA pending  5) [facility] (ph [phone])- does not accept MA pending    SHELLIE Juarez, [facility] Medical CenterSW  6C - covering 6A  [facility]  Pager [phone]  Phone [phone]

## 2020-12-29 ENCOUNTER — APPOINTMENT (OUTPATIENT)
Dept: SPEECH THERAPY | Facility: CLINIC | Age: 75
DRG: 064 | End: 2020-12-29
Attending: PSYCHIATRY & NEUROLOGY
Payer: MEDICARE

## 2020-12-29 ENCOUNTER — APPOINTMENT (OUTPATIENT)
Dept: GENERAL RADIOLOGY | Facility: CLINIC | Age: 75
DRG: 064 | End: 2020-12-29
Attending: PSYCHIATRY & NEUROLOGY
Payer: MEDICARE

## 2020-12-29 LAB
ANION GAP SERPL CALCULATED.3IONS-SCNC: 5 MMOL/L (ref 3–14)
BUN SERPL-MCNC: 28 MG/DL (ref 7–30)
CALCIUM SERPL-MCNC: 8.2 MG/DL (ref 8.5–10.1)
CHLORIDE SERPL-SCNC: 112 MMOL/L (ref 94–109)
CO2 SERPL-SCNC: 27 MMOL/L (ref 20–32)
CREAT SERPL-MCNC: 0.81 MG/DL (ref 0.66–1.25)
ERYTHROCYTE [DISTWIDTH] IN BLOOD BY AUTOMATED COUNT: 14.3 % (ref 10–15)
GFR SERPL CREATININE-BSD FRML MDRD: 87 ML/MIN/{1.73_M2}
GLUCOSE SERPL-MCNC: 74 MG/DL (ref 70–99)
HCT VFR BLD AUTO: 35 % (ref 40–53)
HGB BLD-MCNC: 11 G/DL (ref 13.3–17.7)
INR PPP: 1.75 (ref 0.86–1.14)
MAGNESIUM SERPL-MCNC: 2.1 MG/DL (ref 1.6–2.3)
MCH RBC QN AUTO: 30.1 PG (ref 26.5–33)
MCHC RBC AUTO-ENTMCNC: 31.4 G/DL (ref 31.5–36.5)
MCV RBC AUTO: 96 FL (ref 78–100)
PHOSPHATE SERPL-MCNC: 2.8 MG/DL (ref 2.5–4.5)
PLATELET # BLD AUTO: 156 10E9/L (ref 150–450)
POTASSIUM SERPL-SCNC: 3.8 MMOL/L (ref 3.4–5.3)
POTASSIUM SERPL-SCNC: 3.8 MMOL/L (ref 3.4–5.3)
RBC # BLD AUTO: 3.65 10E12/L (ref 4.4–5.9)
SODIUM SERPL-SCNC: 144 MMOL/L (ref 133–144)
WBC # BLD AUTO: 6.2 10E9/L (ref 4–11)

## 2020-12-29 PROCEDURE — 250N000013 HC RX MED GY IP 250 OP 250 PS 637: Performed by: STUDENT IN AN ORGANIZED HEALTH CARE EDUCATION/TRAINING PROGRAM

## 2020-12-29 PROCEDURE — 250N000009 HC RX 250: Performed by: STUDENT IN AN ORGANIZED HEALTH CARE EDUCATION/TRAINING PROGRAM

## 2020-12-29 PROCEDURE — 99207 PR NO CHARGE LOS: CPT | Performed by: STUDENT IN AN ORGANIZED HEALTH CARE EDUCATION/TRAINING PROGRAM

## 2020-12-29 PROCEDURE — 85027 COMPLETE CBC AUTOMATED: CPT | Performed by: NURSE PRACTITIONER

## 2020-12-29 PROCEDURE — 74230 X-RAY XM SWLNG FUNCJ C+: CPT | Mod: 26 | Performed by: RADIOLOGY

## 2020-12-29 PROCEDURE — 92611 MOTION FLUOROSCOPY/SWALLOW: CPT | Mod: GN

## 2020-12-29 PROCEDURE — 250N000013 HC RX MED GY IP 250 OP 250 PS 637: Performed by: PSYCHIATRY & NEUROLOGY

## 2020-12-29 PROCEDURE — 80048 BASIC METABOLIC PNL TOTAL CA: CPT | Performed by: STUDENT IN AN ORGANIZED HEALTH CARE EDUCATION/TRAINING PROGRAM

## 2020-12-29 PROCEDURE — 74230 X-RAY XM SWLNG FUNCJ C+: CPT

## 2020-12-29 PROCEDURE — 84100 ASSAY OF PHOSPHORUS: CPT | Performed by: NURSE PRACTITIONER

## 2020-12-29 PROCEDURE — 120N000002 HC R&B MED SURG/OB UMMC

## 2020-12-29 PROCEDURE — 85610 PROTHROMBIN TIME: CPT | Performed by: NURSE PRACTITIONER

## 2020-12-29 PROCEDURE — 99231 SBSQ HOSP IP/OBS SF/LOW 25: CPT | Mod: GC | Performed by: PSYCHIATRY & NEUROLOGY

## 2020-12-29 PROCEDURE — 36415 COLL VENOUS BLD VENIPUNCTURE: CPT | Performed by: NURSE PRACTITIONER

## 2020-12-29 PROCEDURE — 272N000078 HC NUTRITION PRODUCT INTERMEDIATE LITER

## 2020-12-29 PROCEDURE — 83735 ASSAY OF MAGNESIUM: CPT | Performed by: NURSE PRACTITIONER

## 2020-12-29 RX ORDER — POTASSIUM CHLORIDE 20MEQ/15ML
10 LIQUID (ML) ORAL ONCE
Status: COMPLETED | OUTPATIENT
Start: 2020-12-29 | End: 2020-12-29

## 2020-12-29 RX ORDER — WARFARIN SODIUM 7.5 MG/1
7.5 TABLET ORAL DAILY
Qty: 30 TABLET | Refills: 0 | Status: SHIPPED | OUTPATIENT
Start: 2020-12-29 | End: 2021-02-05

## 2020-12-29 RX ORDER — WARFARIN SODIUM 7.5 MG/1
7.5 TABLET ORAL
Status: COMPLETED | OUTPATIENT
Start: 2020-12-29 | End: 2020-12-29

## 2020-12-29 RX ADMIN — DOXAZOSIN 4 MG: 4 TABLET ORAL at 08:06

## 2020-12-29 RX ADMIN — DICLOFENAC SODIUM TOPICAL GEL, 1%, 4 G: 10 GEL TOPICAL at 13:52

## 2020-12-29 RX ADMIN — WARFARIN SODIUM 7.5 MG: 7.5 TABLET ORAL at 18:03

## 2020-12-29 RX ADMIN — DICLOFENAC SODIUM TOPICAL GEL, 1%, 4 G: 10 GEL TOPICAL at 16:14

## 2020-12-29 RX ADMIN — Medication 2 PACKET: at 08:08

## 2020-12-29 RX ADMIN — DICLOFENAC SODIUM TOPICAL GEL, 1%, 4 G: 10 GEL TOPICAL at 08:13

## 2020-12-29 RX ADMIN — Medication 12.5 MG: at 20:14

## 2020-12-29 RX ADMIN — MELATONIN TAB 3 MG 3 MG: 3 TAB at 20:14

## 2020-12-29 RX ADMIN — MULTIVIT AND MINERALS-FERROUS GLUCONATE 9 MG IRON/15 ML ORAL LIQUID 15 ML: at 08:06

## 2020-12-29 RX ADMIN — Medication 5 MG: at 23:04

## 2020-12-29 RX ADMIN — POTASSIUM CHLORIDE 10 MEQ: 20 SOLUTION ORAL at 10:35

## 2020-12-29 RX ADMIN — THIAMINE HCL TAB 100 MG 100 MG: 100 TAB at 08:06

## 2020-12-29 RX ADMIN — FOLIC ACID 1 MG: 1 TABLET ORAL at 08:05

## 2020-12-29 RX ADMIN — Medication 12.5 MG: at 08:05

## 2020-12-29 RX ADMIN — DICLOFENAC SODIUM TOPICAL GEL, 1%, 4 G: 10 GEL TOPICAL at 20:14

## 2020-12-29 ASSESSMENT — ACTIVITIES OF DAILY LIVING (ADL)
ADLS_ACUITY_SCORE: 10

## 2020-12-29 NOTE — PLAN OF CARE
VSS.  Pt denied pain and no nonverbal indicators of pain noted.   Severe global aphagia (expressive aphagia > receptive aphagia).  Pt able to follow some commands, nodded head yes correctly to name and situation with choices x 2.  No PIV-okay per order.  NPO.  PEG running ml/hr with 200cc flushes q4h.  Last Na+ 148, trending down.  Unable to void, straight cathed at 0350 for 400ml.  Up with SBA, GB, walker.  Plan is for video swallow study today at 0830.  Continue with POC.

## 2020-12-29 NOTE — CONSULTS
Urology Consult    Name: Simone Daniels    MRN: [id number]   YOB: 1948               Chief Complaint:   Urinary Retention - Consult Required by Rehab Facility    Patient with expressive aphasia, history obtained from chart review, patient's RN          History of Present Illness:   Simone Daniels is a 72 year old male with PMH of A Fib (not currently on anticoagulation), anxiety, GERD, left parietal hemorrhagic infarct in 2018 and urologic history of hematuria from traumatic catheterization and urinary retention managed with straight cath who was recently admitted with a new stroke with persistent urinary retention requiring straight catheterization with subsequent traumatic catheterization and placement of indwelling elena catheter.     Patient has subsequently failed multiple voiding trials with straight catheterization volumes <500cc. He has been on doxazosin to assist with his urinary retention, and was also treated for a urinary tract infection during his hospitalization. Per his nurse, he has full strength in all of his extremities, but has poor communication due to expressive aphasia. He would be unable to perform straight catheterization and is minimally bothered by his indwelling elena catheter.           Past Medical History:     Past Medical History:   Diagnosis Date     LOW BACK PAIN           Past Surgical History:     Past Surgical History:   Procedure Laterality Date     IR GASTROSTOMY TUBE PERCUTANEOUS PLCMNT  12/3/2020     SURGICAL HISTORY OF -       Cervical disc (5,6,&7)     SURGICAL HISTORY OF -       (L) Knee fx tibial plateau     SURGICAL HISTORY OF -       (L) Knee surgery - re-fx tibial plateau          Social History:     Social History     Tobacco Use     Smoking status: Former Smoker     Packs/day: 2.00     Years: 18.00     Pack years: 36.00     Types: Cigarettes     Quit date: 1980     Years since quittin.0     Smokeless tobacco: Never Used    Substance Use Topics     Alcohol use: Yes     Frequency: 2-4 times a month     Drinks per session: 1 or 2     Binge frequency: Never     Comment: occaisonal          Family History:     Family History   Problem Relation Age of Onset     C.A.D. Mother         heart disease in her 60s     C.A.TAMMI. Brother         1/2 brother with heart disease     Diabetes No family hx of           Allergies:   No Known Allergies         Medications:     Current Facility-Administered Medications   Medication     acetaminophen (TYLENOL) tablet 650 mg    Or     acetaminophen (TYLENOL) solution 650 mg    Or     acetaminophen (TYLENOL) Suppository 650 mg     bisacodyl (DULCOLAX) Suppository 10 mg     dextrose 10% infusion     glucose gel 15-30 g    Or     dextrose 50 % injection 25-50 mL    Or     glucagon injection 1 mg     diclofenac (VOLTAREN) 1 % topical gel 4 g     doxazosin (CARDURA) suspension 4 mg     folic acid (FOLVITE) tablet 1 mg     hydrOXYzine (ATARAX) half-tab 5 mg     labetalol (NORMODYNE/TRANDATE) injection 10 mg     melatonin tablet 3 mg     metoprolol tartrate (LOPRESSOR) half-tab 12.5 mg     multivitamins w/minerals (CERTAVITE) liquid 15 mL     nystatin (MYCOSTATIN) suspension 500,000 Units     ondansetron (ZOFRAN-ODT) ODT tab 4 mg    Or     ondansetron (ZOFRAN) injection 4 mg     polyethylene glycol (MIRALAX) Packet 17 g     protein modular (PROSOURCE TF) 2 packet     senna-docusate (SENOKOT-S/PERICOLACE) 8.6-50 MG per tablet 1 tablet     thiamine (B-1) tablet 100 mg     warfarin ANTICOAGULANT (COUMADIN) tablet 7.5 mg     Warfarin Therapy Reminder (Check START DATE - warfarin may be starting in the FUTURE)           Physical Exam:   VS:  T: 97.4    HR: 71    BP: 132/69    RR: 16   GEN:  Awake, alert, in no acute distress  CV: Hemodynamically stable, no cyanosis  LUNGS: Non-labored breathing on room air  ALCARAZ: Draining clear yellow urine  EXT:  No edema.  SKIN:  No rashes.  NEURO:  Moves all extremities. Aphasic           Data:   All laboratory data reviewed:    Recent Labs   Lab 12/29/20  0539 12/26/20  0544 12/23/20  0605   WBC 6.2 7.5 11.4*   HGB 11.0* 12.2* 14.0    150 206     Recent Labs   Lab 12/29/20  0539 12/27/20  0638 12/26/20  0544 12/25/20  0551 12/24/20  0634    148* 150* 150* 152*   POTASSIUM 3.8  3.8 3.9 3.9 3.8 4.1   CHLORIDE 112* 117* 118* 119* 121*   CO2 27 25 29 29 30   BUN 28 31* 33* 35* 37*   CR 0.81 0.75 0.86 0.85 0.91   GLC 74 118* 87 80 114*   SANDRA 8.2* 8.3* 8.5 8.5 8.6   MAG 2.1  --  2.2 2.1 2.1   PHOS 2.8  --   --   --  2.5     PSA 4/2019 ~25           Impression and Plan:   Impression:   Simone Daniels is a 72 year old male with PMH of A Fib not on anticoagulation, multiple strokes, urinary retention previously managed with CIC, and one time elevated PSA to 25 in April 2019, previously evaluated by urology while hospitalized with urinary retention but with no outpatient urologic follow-up, now with plans for discharge to rehab facility on 12/30, currently with indwelling elena catheter in place and on doxazosin after multiple failed voiding trials.     Given ongoing deficits following his stroke, patient would unlikely be able to perform CIC. Current indwelling elena catheter was placed on 12/29 and patient to be discharged to rehab facility.    No change in in catheter plan at this time. Patient may discharge with catheter and can continue on doxazosin for assistance with his urinary retention. Patient will be scheduled for outpatient urology follow-up in 2-3 weeks for voiding trial and further discussion/management of his symptoms. Patient should be medically optimized at that time by avoiding narcotic pain medications, anticholinergics, antihistamines, and treating and issues with constipation      Plan:     - No acute urologic interventions indicated at this time, okay to discharge with elena catheter    - Elena catheter should be exchanged approximately ever month to decrease  risk of infections    - Medically optimize for voiding trial - avoid narcotics, anticholinergics, antihistamines, treat constipation  - Continue doxazosin    - Urology will coordinate outpatient follow-up in 2-3 weeks. Please contact resident/PA on call with any questions or concerns.     This patient's exam findings, labs, and imaging discussed with urology staff surgeon Dr. Conti, who developed the treatment plan.    Fernando Watt MD MS  Urology Resident, PGY-2

## 2020-12-29 NOTE — PROGRESS NOTES
Care Management Follow Up    Length of Stay (days): 34    Expected Discharge Date: 12/31/20     Concerns to be Addressed:    Disposition planning   Patient plan of care discussed at interdisciplinary rounds: Yes    Anticipated Discharge Disposition:  SNF placement for rehab and likely long term care     Anticipated Discharge Services:  SNF placement for rehab and likely long term care  Anticipated Discharge DME:  Not applicable    Patient/family educated on Medicare website which has current facility and service quality ratings:  Guardian/Conservator are determining location preferences    Education Provided on the Discharge Plan:  Yes  Patient/Family in Agreement with the Plan:  Guardian Conservator are agreeable to the discharge recommendation.    Referrals Placed by CM/SW:  SNF referrals made  Private pay costs discussed: Not applicable    Additional Information:  SW is following pt for discharge planning.  Pt medically ready for discharge.  Pt is MA pending.  Pt has a emergency guardianship/conseratorship in place through [employer].    SW received a voice mail to call [employer] (Caleb Corrales) with a discharge planning update.    GILBERT  Phoned Admissions  for [facility] to determine whether or not they can accept pt for admit.  Latisha requested information about the outcome of pt's video swallow which GILBERT provided. Latisha indicated that she would get back to this  with a decision.    Admissions Tyler) at [facility] requested a copy of pt's MA melina.  Thao also requested update in regards to outcome of swallow eval which this SW provided.  Thao states that she will get back to this SW regarding whether or not they can accept pt for admit.    GILBERT phoned [employer] and left a message updating in regards to discharge planning.    GILBERT received a follow up call from Admissions Marielena) at [facility] who states that pt is assessed and approved for admit.    Latisha states that they can accept pt for admit tomorrow.      SW will coordinate discharge.      SILVIA Sanchez  Social Work, 6A  Phone:  [phone]  Pager:  [phone]  12/29/2020               ASHLYN Harmon

## 2020-12-29 NOTE — PROGRESS NOTES
Care Management Discharge Note    Discharge Date: 12/30/20       Discharge Disposition:  [facility] ([phone])  Discharge Services:  Rehab and likely long term care at [facility]    Discharge DME:  Not applicable    Discharge Transportation:   [facility] ([phone]) to provide w/c transport at 12 noon.  SW spoke with pt's floor nurse (Sophia) who states that pt can be transported by w/c and will maintain seated position.    Private pay costs discussed: Not applicable    PAS Confirmation Code:  [id number].  Patient/family educated on Medicare website which has current facility and service quality ratings:  yes      Education Provided on the Discharge Plan:  Yes  Persons Notified of Discharge Plans: Pt, Caleb Sawyer with [facility], 6A Nursing and Dr. Taylor  Patient/Family in Agreement with the Plan:  Guardian/Conservator - [employer], voices understanding of the discharge plan and agreement with the discharge plan.    Handoff Referral Completed: No as no PCP listed on Admissions Facesheet    Additional Information:  Dr. Taylor has confirmed readiness for discharge.  Admissions  (Latisha) for [facility] has confirmed acceptance for admit, [facility] availability should they deem it necessary (as Caleb Sawyer informed this SW today that he learned that pt has a history of leaving hospitals) and that they can straight cath pt if needed.      SILVIA Sanchez  Social Work, 6A  Phone:  [phone]  Pager:  [phone]  12/29/2020            ASHLYN Harmon

## 2020-12-29 NOTE — PROGRESS NOTES
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 He was found to have a new subacute R parietal infarct w/ overlying petechial hemorrhage and was subsequently transferred to [facility] for further cares. Guardianship has been achieved, now awaiting insurance coverage for TCU placement. Medically ready for discharge.    - Guardian appointed 12/11, awaiting MA for discharge.  - Guardian will need to review and account for all pt's assets before MA application can be submitted.  - Court ordered guardian still in effect per SW  - Refer all calls from family/ other to his guardian.    #Subacute R Parietal Lobe Infarct w/ hemorrhagic conversion likely 2/2 cardioembolic source in the setting of known Afib not on anticoagulation  #Afib  Pt had been placed on Eliquis in 2018 for prior L parietal stroke, but was inconsistent in taking it as prescribed and then ultimately discontinued it in 2019 after recurrent nosebleeds  - Q4h neurochecks [Q8 overnight]  - SBP goal normotension  - Discontinued PTA Lisinopril  - Warfarin w/ pharmacy dosing [not been therapeutic for 48 hours - d/w pharmacist - dose increased to 4mg HS]  - No indication for statin (LDL 69)  -  HgbA1c (5.6)  - TTE (11/25): LV/RV size/function normal w/ EF 55-60%; Grade III diastolic dysfunction w/ severe LA enlargement and mild RA enlargement; intact atrial septum; mild mitral annular calcification; aortic sclerosis w/o stenosis  - MRI Stroke Protocol: can consider in the future if pt calms;  previously did not tolerate  - EEG: generalized slowing suggestive of mild-mod encephalopathy w/ maximum cortical dysfunction in the R hemisphere and w/o sign of seizure or epileptiform activity  - Metoprolol 12.5mg BID  - Telemetry  - PT/OT/SLP: TCU  - Stroke Education  - Stroke Class per [facility] ([facility])  - Depression Screen  - Apnea Screen  - Euthermia, Euglycemia  - Consider Cardiology referral upon discharge for diastolic dysfunction seen on TTE    #EtOH Abuse  - Thiamine, Folate    #Urinary Retention  #Traumatic Straight Cath    On 11/29, pt also suffered a traumatic straight cath. Urology recommended placement of Flores for 5d to allow healing. Since this time, pt has attempted multiple trials of void, but none have been successful. Increasing doxazosin to help with voiding.  Will continue to double weekly (8 mg daily max) as needed.  - Increase doxazosin to 4 mg daily  - Continue to straight cath for > 500 mL  - Urology consulted for discharge planning  - Consider Urology referral upon discharge    #Enterococcus UTI -  S/p 7d of Ampicillin.     # Hypernatremia  # Hyperchloremia  Pt has exhibited elevated Na and Cl, rising since time of admission. Likely 2/2 NPO status since arrival due to multiple failed swallow attempts.   - PEG in place   - Nutrition following for TF  - Continue Free Water through PEG @ 200 mL q4h  - Daily BMP until hypernatremia has resolved    Diet: TF per nutrition   VTE PPx: - Warfarin per pharmacy dosing  Dispo: TCU once MA can be established.  Guardian now reviewing assets before MA application.  Code Status: FULL    Taj Taylor MD  Neurology Resident - PGY1              ______________________________________________________    Medications   Home Meds  Prior to Admission medications    Medication Sig Start Date End Date Taking? Authorizing Provider   CRANBERRY JUICE EXTRACT PO Take 40,000 Units by mouth daily    Reported, Patient   Garlic 1000 MG CAPS Take 1  capsule by mouth daily    Reported, Patient   lisinopril (PRINIVIL/ZESTRIL) 5 MG tablet Take 1 tablet (5 mg) by mouth daily For blood pressure control 1/18/19   Sophia Dcaosta APRN CNP   NONFORMULARY Beet Root Juice    Reported, Patient   senna-docusate (SENOKOT-S/PERICOLACE) 8.6-50 MG tablet Take 1 tablet by mouth 2 times daily For constipation 12/11/18   Annabella Santos MD   tamsulosin (FLOMAX) 0.4 MG capsule Take 1 capsule (0.4 mg) by mouth daily 6/5/19   Missael Carvalho MD       Scheduled Meds    diclofenac  4 g Topical 4x Daily     doxazosin  4 mg Oral or Feeding Tube Daily     folic acid  1 mg Oral or Feeding Tube Daily     melatonin  3 mg Oral or Feeding Tube Daily     metoprolol tartrate  12.5 mg Oral or Feeding Tube BID     multivitamins w/minerals  15 mL Oral or Feeding Tube Daily     polyethylene glycol  17 g Oral or Feeding Tube BID     protein modular  2 packet Per Feeding Tube Daily     senna-docusate  1 tablet Per Feeding Tube BID     vitamin B1  100 mg Oral or Feeding Tube Daily       Infusion Meds    dextrose       Warfarin Therapy Reminder         PRN Meds  acetaminophen **OR** acetaminophen **OR** acetaminophen, bisacodyl, dextrose, glucose **OR** dextrose **OR** glucagon, hydrOXYzine, labetalol, nystatin, ondansetron **OR** ondansetron, Warfarin Therapy Reminder       PHYSICAL EXAMINATION  Temp:  [97.4  F (36.3  C)-98.3  F (36.8  C)] (P) 97.6  F (36.4  C)  Pulse:  [64-80] (P) 80  Resp:  [12-18] (P) 18  BP: ()/(54-75) (P) 106/66  SpO2:  [96 %-99 %] (P) 96 %     General:  Patient lying in bed in NAD  HEENT:  normocephalic/atraumatic  Cardio:  RRR  Pulmonary:  no respiratory distress  Extremities:  no edema  Skin:  intact, warm/dry     Neurologic  Mental Status:  Awake, alert, globally aphasic but will attempt some part of neuro exam from memory.  Does not consistently mime. Unable to answer any questions  Cranial Nerves:  PERRL, EOMI with smooth pursuit, facial movements symmetric,  hearing appears to be intact.  Motor:  normal muscle tone and bulk, no abnormal movements, able to move all limbs spontaneously against gravity  Reflexes:  deferred  Sensory:  deferred  Coordination:  unable to assess 2/2 aphasia  Station/Gait: semi-antalgic, brisk gait. walks with medication pole in one hand and gown in other, semi hunched posture.        Imaging  I personally reviewed all imaging; relevant findings per HPI.     Lab Results Data   CBC  Recent Labs   Lab 12/29/20 0539 12/26/20 0544 12/23/20  0605   WBC 6.2 7.5 11.4*   RBC 3.65* 3.92* 4.60   HGB 11.0* 12.2* 14.0   HCT 35.0* 38.4* 45.9    150 206     Basic Metabolic Panel    Recent Labs   Lab 12/29/20 0539 12/27/20  0638 12/26/20  0544 12/25/20  0551   NA  --  148* 150* 150*   POTASSIUM 3.8 3.9 3.9 3.8   CHLORIDE  --  117* 118* 119*   CO2  --  25 29 29   BUN  --  31* 33* 35*   CR  --  0.75 0.86 0.85   GLC  --  118* 87 80   SANDRA  --  8.3* 8.5 8.5     Liver Panel  No results for input(s): PROTTOTAL, ALBUMIN, BILITOTAL, ALKPHOS, AST, ALT, BILIDIRECT in the last 168 hours.  INR    Recent Labs   Lab Test 12/29/20 0539 12/28/20  0617 12/27/20  0638   INR 1.75* 1.63* 1.60*      Lipid Profile    Recent Labs   Lab Test 11/25/20 2020 11/27/18  0701 12/16/15  0857   CHOL 133 147 159   HDL 55 57 76   LDL 69 78 73   TRIG 46 58 52     A1C    Recent Labs   Lab Test 11/25/20 2020 11/27/18  0701   A1C 5.6 5.5     Troponin I  No results for input(s): TROPI in the last 168 hours.

## 2020-12-29 NOTE — PLAN OF CARE
"Status: L parietal stroke with hemorrhagic conversion.   Vitals: VSS on RA  Neuros: He said \"yah\" today x 1.GOTTI strong, not to command. Global aphasia. Incomprehensible sounds.   Diet: He went for swallow eval this AM and was approved for DD1 diet with thin liqs. He choked on 2 sips of milk so then gave him honey thick liqs and that went better. He ate 100% of both meals + extra ice cream and juice for both. Needed to be fed at first, but after a while he was able to get spoon to mouth. Had TF running at 45 ml an hour for 6/8 hours this shift.  Bowel status: Had 2 BM's this shift.  : was only able to void 50 ml and had approx 600 ml left in bladder. MD was notified and we replaced elena at 1:50pm.   Pain: No signs of of pain .  Activity: Up w/ SBA in halls. He does limp.   [place]          "

## 2020-12-29 NOTE — PLAN OF CARE
Status: Pt admitted for L parietal stroke with hemorrhagic conversion   Vitals: VSS on RA  Neuros: Alert, [place] to assess orientation, global aphasia, incomprehensible sounds, 5/5 throughout   IV: No PIV  Labs/Electrolytes: WNL  Resp/trach: No SOB  noted  Diet: NPO, PEG infusing at 55mL/hr w/200mL water flushes q4hr   Bowel status: BS+, smear BM this shift  : Unable to void, bladder scanned for 535mL, straight cath'd for 700mL @2240, Atarax given prior to straight cath   Skin: Blanchable redness on bottom   Pain: Denies   Activity: Up SBA/GB  Plan: Continue to monitor and follow POC, video swallow study scheduled for tomorrow at 0830, awaiting placement

## 2020-12-29 NOTE — PROGRESS NOTES
12/29/20 0853   General Information   Onset of Illness/Injury or Date of Surgery 11/25/20   Referring Physician Rosa Maria Lewis MD   Patient/Family Therapy Goal Statement (SLP) None stated   Pertinent History of Current Problem SLP: Pt is a 72 year old man with history of atrial fibrillation not on anticoagulation (discontinued apixaban 6/2019), anxiety, GERD, and left parietal stroke with hemorrhagic conversion in 2018 who presented to [facility] for evaluation of aphasia. He was reportedly found behaving abnormally at a liquor store (disheveled and mute). Last known well is unclear. Head CT showed new area of subacute infarct in right parietal lobe with petechial hemorrhage. CTA was without LVO. Dr. Abrams (stroke fellow) was contacted for stroke alert. Ultimately, decision was made to transfer patient to [facility] for neuro-ICU level cares.    General Observations Pt is globally aphasic but able to follow functional diections to complete procedure    Type of Evaluation   Type of Evaluation Swallow Evaluation   General Swallowing Observations   Swallowing Evaluation Videofluoroscopic swallow study (VFSS)   VFSS Evaluation   Radiologist Resident    Views Taken left lateral   Physical Location of Procedure [facility] radiology suite    VFSS Textures Trialed Thin Liquids;Purees;Solid   VFSS Eval: Thin Liquid Texture Trial   Mode of Presentation, Thin Liquid cup;spoon;self-fed   Preparatory Phase WFL;Holding   Oral Phase, Thin Liquid Poor AP movement;Residue in oral cavity   Pharyngeal Phase, Thin Liquid Delayed swallow reflex;Residue in valleculae;Residue in pyriform sinus   Rosenbek's Penetration Aspiration Scale: Thin Liquid Trial Results 2 - contrast enters airway, remains above the vocal cords, no residue remains (penetration)   Diagnostic Statement Transient penetration x 1, otherwise pt consumed thin liquids without penetration or aspiration. Mild-mod amounts of pharyngeal  residue. Attempted straw sip, however d/t pt's aphasia he didn't know what to do with it    VFSS Evaluation: Puree Solid Texture Trial   Mode of Presentation, Puree spoon;fed by clinician   Preparatory Phase WFL;Holding   Oral Phase, Puree Poor AP movement   Pharyngeal Phase, Puree Delayed swallow reflex;Residue in valleculae;Residue in pyriform sinus   Rosenbek's Penetration Aspiration Scale: Puree Food Trial Results 1 - no aspiration, contrast does not enter airway   Diagnostic Statement No penetration or aspiration. Moderate amounts of pharyngeal residue   VFSS Evaluation: Solid Food Texture Trial   Mode of Presentation, Solid fed by clinician   Preparatory Phase Insufficient mastication   Oral Phase, Solid Poor AP movement;Residue in oral cavity   Pharyngeal Phase, Solid Delayed swallow reflex;Residue in valleculae;Residue in pyriform sinus   Rosenbek's Penetration Aspiration Scale: Solid Food Trial Results 1 - no aspiration, contrast does not enter airway   Diagnostic Statement Very prolonged posterior propulsion, insufficient mastication. Moderate pharyngeal residue. No penetration or aspiration    Esophageal Phase of Swallow   Patient reports or presents with symptoms of esophageal dysphagia No   Swallowing Recommendations   Diet Consistency Recommendations thin liquids;dysphagia level 1 (pureed)   Supervision Level for Intake 1:1 supervision needed   Mode of Delivery Recommendations bolus size, small;slow rate of intake   Swallowing Maneuver Recommendations alternate food and liquid intake   Monitoring/Assistance Required (Eating/Swallowing) check mouth frequently for oral residue/pocketing;cue for finger/lingual sweep if oral pocketing present;stop eating activities when fatigue is present;monitor for cough or change in vocal quality with intake   Recommended Feeding/Eating Techniques (Swallow Eval) maintain upright sitting position for eating;maintain upright posture during/after eating for 30  minutes;minimize distractions during oral intake;provide assist with feeding   Medication Administration Recommendations, Swallowing (SLP) Through TF for now    General Therapy Interventions   Planned Therapy Interventions Dysphagia Treatment   Dysphagia treatment Modified diet education;Instruction of safe swallow strategies   SLP Therapy Assessment/Plan   Criteria for Skilled Therapeutic Interventions Met (SLP Eval) yes;treatment indicated   SLP Diagnosis Mild-moderate oropharyngeal dysphagia   Rehab Potential (SLP Eval) fair, will monitor progress closely   Therapy Frequency (SLP Eval) 5 times/wk   Predicted Duration of Therapy Intervention (SLP Eval) 2 weeks   Comment, Therapy Assessment/Plan (SLP) Pt seen for video swallow evaluation. He is alert and agreeable, needed coaching and cues given his global aphasia. Pt consumed thin liquids by spoon and cup sip. Transient penetration x 1, however no additional instances of penetration and no aspiration. Pt unable to use a straw secondary to aphasia. Pt consumed puree with prolonged posterior propulsion but functional. Insufficient mastication w/ solids d/t poor dentition and oral prep. Moderate pharyngeal residue with solids, mild-moderate with liquids. Pt's swallow function is notable for prolonged posterior propulsion, delayed swallow initiation, reduced tongue base retraction, and reduced pharyngeal constriction. Pt ate much quicker and appeared more efficient at bedside, suspect he didn't like the barium. Recommend dysphagia diet 1/puree and thin liquids with 1:1 supervision and assistance. Pt must be seated fully upright, take small bites/sips, eat/drink at a slow rate. Continue SLP services for dysphagia and speech/language goals    Therapy Plan Review/Discharge Plan (SLP)   Therapy Plan Review (SLP) evaluation/treatment results reviewed;patient   SLP Discharge Planning    SLP Discharge Recommendation (DC Rec) Long term care facility   SLP Rationale for DC Rec  Significantly below baseline for swallow and communication   SLP Brief overview of current status  Recommend dysphagia diet 1/puree and thin liquids with 1:1 supervision and assistance. Pt must be seated fully upright, take small bites/sips, eat/drink at a slow rate. Continue SLP services for dysphagia and speech/language goals     Total Evaluation Time   Total Evaluation Time (Minutes) 10

## 2020-12-30 ENCOUNTER — APPOINTMENT (OUTPATIENT)
Dept: OCCUPATIONAL THERAPY | Facility: CLINIC | Age: 75
DRG: 064 | End: 2020-12-30
Attending: PSYCHIATRY & NEUROLOGY
Payer: MEDICARE

## 2020-12-30 VITALS
SYSTOLIC BLOOD PRESSURE: 96 MMHG | WEIGHT: 138.3 LBS | OXYGEN SATURATION: 97 % | DIASTOLIC BLOOD PRESSURE: 60 MMHG | HEIGHT: 66 IN | RESPIRATION RATE: 16 BRPM | HEART RATE: 82 BPM | BODY MASS INDEX: 22.23 KG/M2 | TEMPERATURE: 98.1 F

## 2020-12-30 LAB
INR PPP: 1.77 (ref 0.86–1.14)
MAGNESIUM SERPL-MCNC: 1.8 MG/DL (ref 1.6–2.3)
PHOSPHATE SERPL-MCNC: 2.4 MG/DL (ref 2.5–4.5)
POTASSIUM SERPL-SCNC: 3.8 MMOL/L (ref 3.4–5.3)

## 2020-12-30 PROCEDURE — 99239 HOSP IP/OBS DSCHRG MGMT >30: CPT | Mod: GC | Performed by: PSYCHIATRY & NEUROLOGY

## 2020-12-30 PROCEDURE — 97535 SELF CARE MNGMENT TRAINING: CPT | Mod: GO | Performed by: OCCUPATIONAL THERAPIST

## 2020-12-30 PROCEDURE — 85610 PROTHROMBIN TIME: CPT | Performed by: NURSE PRACTITIONER

## 2020-12-30 PROCEDURE — 84100 ASSAY OF PHOSPHORUS: CPT | Performed by: NURSE PRACTITIONER

## 2020-12-30 PROCEDURE — 84132 ASSAY OF SERUM POTASSIUM: CPT | Performed by: NURSE PRACTITIONER

## 2020-12-30 PROCEDURE — 36415 COLL VENOUS BLD VENIPUNCTURE: CPT | Performed by: NURSE PRACTITIONER

## 2020-12-30 PROCEDURE — 83735 ASSAY OF MAGNESIUM: CPT | Performed by: NURSE PRACTITIONER

## 2020-12-30 PROCEDURE — 250N000013 HC RX MED GY IP 250 OP 250 PS 637: Performed by: STUDENT IN AN ORGANIZED HEALTH CARE EDUCATION/TRAINING PROGRAM

## 2020-12-30 PROCEDURE — 250N000009 HC RX 250: Performed by: STUDENT IN AN ORGANIZED HEALTH CARE EDUCATION/TRAINING PROGRAM

## 2020-12-30 PROCEDURE — 250N000013 HC RX MED GY IP 250 OP 250 PS 637: Performed by: PSYCHIATRY & NEUROLOGY

## 2020-12-30 RX ADMIN — ACETAMINOPHEN 650 MG: 325 TABLET, FILM COATED ORAL at 08:45

## 2020-12-30 RX ADMIN — FOLIC ACID 1 MG: 1 TABLET ORAL at 08:45

## 2020-12-30 RX ADMIN — THIAMINE HCL TAB 100 MG 100 MG: 100 TAB at 08:47

## 2020-12-30 RX ADMIN — DOXAZOSIN 4 MG: 4 TABLET ORAL at 08:45

## 2020-12-30 RX ADMIN — Medication 2 PACKET: at 08:46

## 2020-12-30 RX ADMIN — MULTIVIT AND MINERALS-FERROUS GLUCONATE 9 MG IRON/15 ML ORAL LIQUID 15 ML: at 08:45

## 2020-12-30 RX ADMIN — DICLOFENAC SODIUM TOPICAL GEL, 1%, 4 G: 10 GEL TOPICAL at 08:45

## 2020-12-30 ASSESSMENT — VISUAL ACUITY
OU: OTHER (SEE COMMENT)
OU: OTHER (SEE COMMENT)

## 2020-12-30 ASSESSMENT — ACTIVITIES OF DAILY LIVING (ADL)
ADLS_ACUITY_SCORE: 10

## 2020-12-30 NOTE — PLAN OF CARE
D/I:No changes. PO'ing well. Neuros unchanged. Flushed TF. Mark hooked to leg bag for transport. Sent TF, syringes and connections to facility incase they did not have. He had lunch before he went. Report given to Mita at [facility] [phone]. She was updated on not giving info to daughter as he has health care agent .He left at 12 noon .

## 2020-12-30 NOTE — PLAN OF CARE
Status: Pt admitted for L parietal stroke with hemorrhagic conversion   Vitals: VSS on RA  Neuros: Neuros difficult to assess d/t global aphasia. 5/5 strengths throughout  IV: No PIV, order for no PIV  Resp/trach: WNL  Diet: DD1 (pureed) with thins. Needs a bit of assistance with eating. Good appetite/intake. Per report, some difficulty with swallowing thins, honey thickened liquids given this shift. TF running at goal of 55mL/hr  Bowel status: BM this shift  : Elena in place, will discharge to TCU with elena in place   Skin: Has some scabbing to legs, appears to be itchy at times. PRN atarax available for itching  Pain: Denies  Activity: Up SBA   [place]

## 2020-12-30 NOTE — PLAN OF CARE
VSS.  Pt denied pain and no nonverbal indicators of pain noted.   Severe global aphagia (expressive aphagia > receptive aphagia).  Pt able to follow some commands, nodded head yes correctly to name x 2, otherwise difficult to assess orientation.  No PIV-okay per order.  On DD1 with thins with 1:1 supervision; no intake overnight.  PEG running 55 ml/hr with 200cc flushes q4h.  Last Na+ 144 trending down.  Flores in for retention; good UOP.  Last BM 12/29.  Up with SBA, GB.  Pt is discharging today at 1200 to [place].   Continue with POC.

## 2020-12-30 NOTE — PROGRESS NOTES
GILBERT faxed pt's discharge orders and summary to [facility] ([facility] and secure email sent to [facility] (Guardian/Conservator).    SILVIA Sanchez  Social Work, 6A  Phone:  [phone]  Pager:  [phone]  12/30/2020

## 2020-12-30 NOTE — PLAN OF CARE
Occupational Therapy Discharge Summary    Reason for therapy discharge:    Discharged to transitional care facility.    Progress towards therapy goal(s). See goals on Care Plan in [facility] electronic health record for goal details.  Goals partially met.  Barriers to achieving goals:   discharge from facility.    Therapy recommendation(s):    Continued therapy is recommended.  Rationale/Recommendations:  Continued OT to maximize progress towards independence with ADLs. At time of discharge, pt able to self feed with set up and min A at times, min A for UB dressing, Mod-max A for g/h tasks..  Command following limited by aphasia. OT sessions focused on familiar ADL tasks. Pt difficulty with object recognition and appropriate tool use, requiring therapist demonstration or hand over hand assist to achieve appropriate engagement from pt in ADLs. Ambulating with supervision with no AE.

## 2020-12-31 ENCOUNTER — NURSING HOME VISIT (OUTPATIENT)
Dept: GERIATRICS | Facility: CLINIC | Age: 75
End: 2020-12-31
Payer: MEDICAID

## 2020-12-31 VITALS
BODY MASS INDEX: 24.22 KG/M2 | OXYGEN SATURATION: 96 % | WEIGHT: 150 LBS | RESPIRATION RATE: 16 BRPM | SYSTOLIC BLOOD PRESSURE: 124 MMHG | HEART RATE: 73 BPM | TEMPERATURE: 97.4 F | DIASTOLIC BLOOD PRESSURE: 83 MMHG

## 2020-12-31 DIAGNOSIS — R33.9 URINARY RETENTION WITH INCOMPLETE BLADDER EMPTYING: ICD-10-CM

## 2020-12-31 DIAGNOSIS — Z97.8 FOLEY CATHETER IN PLACE: ICD-10-CM

## 2020-12-31 DIAGNOSIS — Z93.1 PEG (PERCUTANEOUS ENDOSCOPIC GASTROSTOMY) STATUS (H): ICD-10-CM

## 2020-12-31 DIAGNOSIS — Z79.01 LONG TERM CURRENT USE OF ANTICOAGULANT THERAPY: ICD-10-CM

## 2020-12-31 DIAGNOSIS — I69.30 LATE EFFECTS OF CEREBRAL ISCHEMIC STROKE: Primary | ICD-10-CM

## 2020-12-31 DIAGNOSIS — I10 ESSENTIAL HYPERTENSION: ICD-10-CM

## 2020-12-31 DIAGNOSIS — K59.01 SLOW TRANSIT CONSTIPATION: ICD-10-CM

## 2020-12-31 DIAGNOSIS — I48.91 NEW ONSET A-FIB (H): ICD-10-CM

## 2020-12-31 PROCEDURE — 99306 1ST NF CARE HIGH MDM 50: CPT | Performed by: NURSE PRACTITIONER

## 2020-12-31 NOTE — LETTER
12/31/2020        RE: Simone Daniels  [employer]  [address]  [facility] [address]        [facility]  PRIMARY CARE PROVIDER AND CLINIC:  Physician No Ref-Primary, No address on file  Chief Complaint   Patient presents with     Hospital F/U     [place] Medical Record Number:  [id number]  Place of Service where encounter took place:  [facility] [place], operated [facility] [[id number]]    Simone Daniels  is a 72 year old  (5/26/1948), admitted to the above facility from  [facility]. Hospital stay 11/25/20 through 12/30/20..  Admitted to this facility for  rehab, medical management and nursing care.    HPI:    HPI information obtained from: facility chart records, facility staff, patient report, [facility] chart review and Vera FAUSTIN. .   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  S/he was admitted to the hospital after found to be behaving abnormally (disheveled, mute, confused) and found to have new subacute infarct in R parietal lobe (R MCA distribution) with petechial hemorrhage and severe expressive and receptive aphasia, failed swallow eval. CTA was unremarkable. The hospital stay was complicated with enterococcus UTI and was treated.  EEG showed intermittent slowing in the L anterior head, a TTE on 11/25/20 showed grade 3 diastolic dysfunction with enlarged bilateral atria but intact septum.  He had a short run of afib/flutter on 11/30 and started on metoprolol.  Warfarin started on day 10 post bleed.  Continued to fail swallowing evaluate so a PEG tube was placed. He also has persistent urinary retention-failed several voiding trials in the hospital and continues to have indwelling elena cath-started on doxazosin. He suffered a traumatic strait cath on 11/29/20.  His extended stay at the hospital  "was in part caused by the need for a court appointed guardian due to his new persistent severe aphasia, limited family who can help him.   Updates on Status Since Skilled nursing Admission: Simone is unable to verbalize except for grunts and facial expressions.  He shakes his head \"no\" to yes/no questions in regards to pain, dyspnea, cough, congestion.  Nursing reports no observed pain or dyspnea .       CODE STATUS/ADVANCE DIRECTIVES DISCUSSION:   CPR/Full code    Patient's living condition: lives alone  ALLERGIES: Patient has no known allergies.  PAST MEDICAL HISTORY:  has a past medical history of LOW BACK PAIN.  PAST SURGICAL HISTORY:   has a past surgical history that includes surgical history of -  (1979); surgical history of - ; surgical history of -  (1981); and IR Gastrostomy Tube Percutaneous Plcmnt (12/3/2020).  FAMILY HISTORY: family history includes C.A.D. in his brother and mother.  SOCIAL HISTORY:   reports that he quit smoking about 41 years ago. His smoking use included cigarettes. He has a 36.00 pack-year smoking history. He has never used smokeless tobacco. He reports current alcohol use. He reports that he does not use drugs.    Post Discharge Medication Reconciliation Status: discharge medications reconciled and changed, per note/orders  Current Outpatient Medications   Medication Sig Dispense Refill     CRANBERRY JUICE EXTRACT PO Take 40,000 Units by mouth daily       doxazosin (CARDURA) 0.6 mg/mL SUSP suspension 6.67 mLs (4 mg) by Oral or Feeding Tube route daily 200 mL 0     folic acid (FOLVITE) 1 MG tablet 1 tablet (1 mg) by Oral or Feeding Tube route daily       melatonin 3 MG tablet 1 tablet (3 mg) by Oral or Feeding Tube route daily       metoprolol tartrate (LOPRESSOR) 25 MG tablet 0.5 tablets (12.5 mg) by Oral or Feeding Tube route 2 times daily       multivitamins w/minerals (CERTAVITE) liquid 15 mLs by Oral or Feeding Tube route daily       thiamine (B-1) 100 MG tablet 1 tablet (100 " mg) by Oral or Feeding Tube route daily       warfarin ANTICOAGULANT (COUMADIN) 7.5 MG tablet Take 1 tablet (7.5 mg) by mouth daily 30 tablet 0     Warfarin Therapy Reminder 1 each continuous prn (for Afib)       Garlic 1000 MG CAPS Take 1 capsule by mouth daily         ROS:  10 point ROS of systems including Constitutional, Eyes, Respiratory, Cardiovascular, Gastroenterology, Genitourinary, Integumentary, Musculoskeletal, Psychiatric were all negative except for pertinent positives noted in my HPI.    Vitals:  /83   Pulse 73   Temp 97.4  F (36.3  C)   Resp 16   Wt 68 kg (150 lb)   SpO2 96%   BMI 24.22 kg/m    Exam:  GENERAL APPEARANCE:  Alert, in no acute distress   HEAD:  Normal, normocephalic, atraumatic  EYE EXAM: normal external eye, conjunctiva, lids, CHRISTI  NECK EXAM: supple, no JVD  CHEST/RESP:  respiratory effort normal, lung sounds CTA  , no respiratory distress  CV:  Rate reg, rhythm reg, no murmur, no peripheral edema   GI/ABDOMEN:  normal bowel sounds, soft, nontender, no palpable masses  M/S:   extremities normal, gait normal-able to ambulate with staff assist short distances, unsteady, normal muscle tone, and range of motion normal  SKIN EXAM: PEG tube site CDI  NEUROLOGIC EXAM: Cranial nerves 2-12 are normal tested and grossly at patient's baseline.  No tremor, gross motor movement at baseline.   PSYCH:  Alert and unable to determine orientation due to severe aphasia    Lab/Diagnostic data:  Recent labs in EPIC reviewed by me today    Date INR New Dose/Orders   12/27 1.6 6 mg   12/28 1.63 7.5 mg   12/29 1.75 7.5 mg   12/30 1.77 7.5 mg   12/31 2.2 7.5 mg daily    1/4/21            ASSESSMENT/PLAN:  Late effects of cerebral ischemic stroke  PEG Tube  Patient with finding of subacute R MCA infarct on 11/25/20 with expressive and receptive aphasia, difficulty swallowing requiring all food/nutrition to be administered through PEG tube.  PEG tube site is CDI and well healed.  He has been cleared  to start on thin liquids with pureed diet with 1:1 supervision per speech therapy. Isosource 1.5 at 55 ml/hour continuous.  He has no significant sided weakness noted.   -water flushes per dietician recommendations  -ST to evaluate and treat    New onset a-fib (H)  Long term current use of anticoagulant therapy  Patient with new onset of afib, on warfarin and metoprolol for rate control.  INR today 2.2. see orders below     Essential hypertension  SBP trending  since admission.  On metoprolol for rate control, with HR trending 70s. stable    Urinary retention with incomplete bladder emptying  Indwelling Elena  Patient with ongoing urinary retention and several failed voiding trials.  Has recommendation to follow up with Urology as outpatient.  Currently indwelling elena is draining clear yellow urine.     Slow transit constipation  Nursing staff reports some difficulty with constipation today.  Will need to monitor, as we are adding water flushes to his tube feedings which should solve this problem.         Discussed plan of care with court appointed guardian, Vera Savanna, at [phone] by telephone.  Reviewed status, goals, and plans to begin therapy. Reviewed POLST as Full Code. Reviewed need for Urology follow up and Cardiology follow up as noted on discharge recommendations.  She would like to maintain all cares and follow up in-house for now due to difficulty with his aphasia and need for escorts to appointments.  She would like his urinary retention managed in house with routine cath changes at this point-but wants to consider Urology follow up in the future.  She verbalizes no need for Cardiology follow up right now as long as his condition is stable and managed with medications.      Orders written by provider at facility    Warfarin 7.5 mg daily     Recheck INR on 1/4/21    Discontinue garlic supplement    Do not change cath q2 weeks    Elena cath cares per faciitly protocol     No need to make  follow up appts with cardiology, Urology, or internal medicine at this time    Total time spent with patient visit at the [facility] was 45 min including patient visit, review of past records and phone call to patient contact. Greater than 50% of total time spent with counseling and coordinating care due to multiple medical comorbid conditions .     Electronically signed by:  CAROLANN Cha CNP           Sincerely,        CAROLANN Cha CNP

## 2020-12-31 NOTE — PLAN OF CARE
Speech Language Therapy Discharge Summary    Reason for therapy discharge:    Discharged to transitional care facility.    Progress towards therapy goal(s). See goals on Care Plan in [facility] electronic health record for goal details.  Goals partially met.  Barriers to achieving goals:   discharge from facility.    Therapy recommendation(s):    Continued therapy is recommended.  Rationale/Recommendations:  SLP at next level of care for ongoing management of dysphagia and functional communication.    At time of discharge - Recommend dysphagia diet 1/puree and thin liquids with 1:1 supervision and assistance. Pt must be seated fully upright, take small bites/sips, eat/drink at a slow rate. Continue SLP services for dysphagia and speech/language goals. Ongoing treatment for global aphasia targeting basic/functional communication

## 2020-12-31 NOTE — PROGRESS NOTES
[facility]  PRIMARY CARE PROVIDER AND CLINIC:  Physician No Ref-Primary, No address on file  Chief Complaint   Patient presents with     Hospital F/U     [facility] Medical Record Number:  [id number]  Place of Service where encounter took place:  [facility] ([facility] [[id number]]    Simone Daniels  is a 72 year old  (5/26/1948), admitted to the above facility from  [facility]. Hospital stay 11/25/20 through 12/30/20..  Admitted to this facility for  rehab, medical management and nursing care.    HPI:    HPI information obtained from: facility chart records, facility staff, patient report, [facility] chart review and Vera FAUSTIN. .   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  S/he was admitted to the hospital after found to be behaving abnormally (disheveled, mute, confused) and found to have new subacute infarct in R parietal lobe (R MCA distribution) with petechial hemorrhage and severe expressive and receptive aphasia, failed swallow eval. CTA was unremarkable. The hospital stay was complicated with enterococcus UTI and was treated.  EEG showed intermittent slowing in the L anterior head, a TTE on 11/25/20 showed grade 3 diastolic dysfunction with enlarged bilateral atria but intact septum.  He had a short run of afib/flutter on 11/30 and started on metoprolol.  Warfarin started on day 10 post bleed.  Continued to fail swallowing evaluate so a PEG tube was placed. He also has persistent urinary retention-failed several voiding trials in the hospital and continues to have indwelling elena cath-started on doxazosin. He suffered a traumatic strait cath on 11/29/20.  His extended stay at the hospital was in part caused by the need for a court appointed guardian due to his new persistent severe aphasia, limited  "family who can help him.   Updates on Status Since Skilled nursing Admission: Simone is unable to verbalize except for grunts and facial expressions.  He shakes his head \"no\" to yes/no questions in regards to pain, dyspnea, cough, congestion.  Nursing reports no observed pain or dyspnea .       CODE STATUS/ADVANCE DIRECTIVES DISCUSSION:   CPR/Full code    Patient's living condition: lives alone  ALLERGIES: Patient has no known allergies.  PAST MEDICAL HISTORY:  has a past medical history of LOW BACK PAIN.  PAST SURGICAL HISTORY:   has a past surgical history that includes surgical history of -  (1979); surgical history of - ; surgical history of -  (1981); and IR Gastrostomy Tube Percutaneous Plcmnt (12/3/2020).  FAMILY HISTORY: family history includes C.A.D. in his brother and mother.  SOCIAL HISTORY:   reports that he quit smoking about 41 years ago. His smoking use included cigarettes. He has a 36.00 pack-year smoking history. He has never used smokeless tobacco. He reports current alcohol use. He reports that he does not use drugs.    Post Discharge Medication Reconciliation Status: discharge medications reconciled and changed, per note/orders  Current Outpatient Medications   Medication Sig Dispense Refill     CRANBERRY JUICE EXTRACT PO Take 40,000 Units by mouth daily       doxazosin (CARDURA) 0.6 mg/mL SUSP suspension 6.67 mLs (4 mg) by Oral or Feeding Tube route daily 200 mL 0     folic acid (FOLVITE) 1 MG tablet 1 tablet (1 mg) by Oral or Feeding Tube route daily       melatonin 3 MG tablet 1 tablet (3 mg) by Oral or Feeding Tube route daily       metoprolol tartrate (LOPRESSOR) 25 MG tablet 0.5 tablets (12.5 mg) by Oral or Feeding Tube route 2 times daily       multivitamins w/minerals (CERTAVITE) liquid 15 mLs by Oral or Feeding Tube route daily       thiamine (B-1) 100 MG tablet 1 tablet (100 mg) by Oral or Feeding Tube route daily       warfarin ANTICOAGULANT (COUMADIN) 7.5 MG tablet Take 1 tablet (7.5 " mg) by mouth daily 30 tablet 0     Warfarin Therapy Reminder 1 each continuous prn (for Afib)       Garlic 1000 MG CAPS Take 1 capsule by mouth daily         ROS:  10 point ROS of systems including Constitutional, Eyes, Respiratory, Cardiovascular, Gastroenterology, Genitourinary, Integumentary, Musculoskeletal, Psychiatric were all negative except for pertinent positives noted in my HPI.    Vitals:  /83   Pulse 73   Temp 97.4  F (36.3  C)   Resp 16   Wt 68 kg (150 lb)   SpO2 96%   BMI 24.22 kg/m    Exam:  GENERAL APPEARANCE:  Alert, in no acute distress   HEAD:  Normal, normocephalic, atraumatic  EYE EXAM: normal external eye, conjunctiva, lids, CHRISTI  NECK EXAM: supple, no JVD  CHEST/RESP:  respiratory effort normal, lung sounds CTA  , no respiratory distress  CV:  Rate reg, rhythm reg, no murmur, no peripheral edema   GI/ABDOMEN:  normal bowel sounds, soft, nontender, no palpable masses  M/S:   extremities normal, gait normal-able to ambulate with staff assist short distances, unsteady, normal muscle tone, and range of motion normal  SKIN EXAM: PEG tube site CDI  NEUROLOGIC EXAM: Cranial nerves 2-12 are normal tested and grossly at patient's baseline.  No tremor, gross motor movement at baseline.   PSYCH:  Alert and unable to determine orientation due to severe aphasia    Lab/Diagnostic data:  Recent labs in [facility] reviewed by me today    Date INR New Dose/Orders   12/27 1.6 6 mg   12/28 1.63 7.5 mg   12/29 1.75 7.5 mg   12/30 1.77 7.5 mg   12/31 2.2 7.5 mg daily    1/4/21            ASSESSMENT/PLAN:  Late effects of cerebral ischemic stroke  PEG Tube  Patient with finding of subacute R MCA infarct on 11/25/20 with expressive and receptive aphasia, difficulty swallowing requiring all food/nutrition to be administered through PEG tube.  PEG tube site is CDI and well healed.  He has been cleared to start on thin liquids with pureed diet with 1:1 supervision per speech therapy. Isosource 1.5 at 55 ml/hour  continuous.  He has no significant sided weakness noted.   -water flushes per dietician recommendations  -ST to evaluate and treat    New onset a-fib (H)  Long term current use of anticoagulant therapy  Patient with new onset of afib, on warfarin and metoprolol for rate control.  INR today 2.2. see orders below     Essential hypertension  SBP trending  since admission.  On metoprolol for rate control, with HR trending 70s. stable    Urinary retention with incomplete bladder emptying  Indwelling Elena  Patient with ongoing urinary retention and several failed voiding trials.  Has recommendation to follow up with Urology as outpatient.  Currently indwelling elena is draining clear yellow urine.     Slow transit constipation  Nursing staff reports some difficulty with constipation today.  Will need to monitor, as we are adding water flushes to his tube feedings which should solve this problem.         Discussed plan of care with court appointed guardian, Vera Savanna, at [phone] by telephone.  Reviewed status, goals, and plans to begin therapy. Reviewed POLST as Full Code. Reviewed need for Urology follow up and Cardiology follow up as noted on discharge recommendations.  She would like to maintain all cares and follow up in-house for now due to difficulty with his aphasia and need for escorts to appointments.  She would like his urinary retention managed in house with routine cath changes at this point-but wants to consider Urology follow up in the future.  She verbalizes no need for Cardiology follow up right now as long as his condition is stable and managed with medications.      Orders written by provider at facility    Warfarin 7.5 mg daily     Recheck INR on 1/4/21    Discontinue garlic supplement    Do not change cath q2 weeks    Elena cath cares per faciitly protocol     No need to make follow up appts with cardiology, Urology, or internal medicine at this time    Total time spent with patient  visit at the skilled nursing facility was 45 min including patient visit, review of past records and phone call to patient contact. Greater than 50% of total time spent with counseling and coordinating care due to multiple medical comorbid conditions .     Electronically signed by:  CAROLANN Cha CNP

## 2021-01-01 ENCOUNTER — APPOINTMENT (OUTPATIENT)
Dept: INTERVENTIONAL RADIOLOGY/VASCULAR | Facility: CLINIC | Age: 76
End: 2021-01-01
Attending: RADIOLOGY
Payer: MEDICARE

## 2021-01-01 ENCOUNTER — LAB REQUISITION (OUTPATIENT)
Dept: LAB | Facility: CLINIC | Age: 76
End: 2021-01-01
Payer: COMMERCIAL

## 2021-01-01 ENCOUNTER — LAB REQUISITION (OUTPATIENT)
Dept: LAB | Facility: CLINIC | Age: 76
End: 2021-01-01
Payer: MEDICARE

## 2021-01-01 ENCOUNTER — NURSING HOME VISIT (OUTPATIENT)
Dept: GERIATRICS | Facility: CLINIC | Age: 76
End: 2021-01-01
Payer: MEDICARE

## 2021-01-01 ENCOUNTER — DOCUMENTATION ONLY (OUTPATIENT)
Dept: OTHER | Facility: CLINIC | Age: 76
End: 2021-01-01

## 2021-01-01 ENCOUNTER — NURSING HOME VISIT (OUTPATIENT)
Dept: GERIATRICS | Facility: CLINIC | Age: 76
End: 2021-01-01
Payer: COMMERCIAL

## 2021-01-01 ENCOUNTER — HOSPITAL ENCOUNTER (EMERGENCY)
Facility: CLINIC | Age: 76
Discharge: HOME | End: 2021-01-01
Attending: FAMILY MEDICINE | Admitting: RADIOLOGY
Payer: MEDICARE

## 2021-01-01 ENCOUNTER — TELEPHONE (OUTPATIENT)
Dept: GERIATRICS | Facility: CLINIC | Age: 76
End: 2021-01-01

## 2021-01-01 ENCOUNTER — TELEPHONE (OUTPATIENT)
Dept: GERIATRICS | Facility: CLINIC | Age: 76
End: 2021-01-01
Payer: MEDICARE

## 2021-01-01 VITALS
BODY MASS INDEX: 24.85 KG/M2 | DIASTOLIC BLOOD PRESSURE: 76 MMHG | HEIGHT: 66 IN | SYSTOLIC BLOOD PRESSURE: 110 MMHG | WEIGHT: 154.6 LBS | RESPIRATION RATE: 18 BRPM | HEART RATE: 62 BPM | OXYGEN SATURATION: 99 % | TEMPERATURE: 98.2 F

## 2021-01-01 VITALS
RESPIRATION RATE: 16 BRPM | HEIGHT: 66 IN | DIASTOLIC BLOOD PRESSURE: 78 MMHG | OXYGEN SATURATION: 96 % | SYSTOLIC BLOOD PRESSURE: 139 MMHG | HEART RATE: 70 BPM | WEIGHT: 155.8 LBS | TEMPERATURE: 96.6 F | BODY MASS INDEX: 25.04 KG/M2

## 2021-01-01 VITALS
SYSTOLIC BLOOD PRESSURE: 125 MMHG | HEART RATE: 89 BPM | BODY MASS INDEX: 25.39 KG/M2 | DIASTOLIC BLOOD PRESSURE: 75 MMHG | WEIGHT: 158 LBS | HEIGHT: 66 IN | RESPIRATION RATE: 20 BRPM | TEMPERATURE: 98.3 F | OXYGEN SATURATION: 97 %

## 2021-01-01 VITALS
RESPIRATION RATE: 16 BRPM | SYSTOLIC BLOOD PRESSURE: 102 MMHG | WEIGHT: 158 LBS | BODY MASS INDEX: 25.39 KG/M2 | DIASTOLIC BLOOD PRESSURE: 67 MMHG | HEIGHT: 66 IN | HEART RATE: 63 BPM | TEMPERATURE: 98.4 F | OXYGEN SATURATION: 96 %

## 2021-01-01 VITALS
BODY MASS INDEX: 25.55 KG/M2 | TEMPERATURE: 97.4 F | HEART RATE: 83 BPM | OXYGEN SATURATION: 94 % | RESPIRATION RATE: 16 BRPM | DIASTOLIC BLOOD PRESSURE: 87 MMHG | HEIGHT: 66 IN | WEIGHT: 159 LBS | SYSTOLIC BLOOD PRESSURE: 121 MMHG

## 2021-01-01 VITALS
DIASTOLIC BLOOD PRESSURE: 71 MMHG | WEIGHT: 157.6 LBS | RESPIRATION RATE: 16 BRPM | HEART RATE: 73 BPM | SYSTOLIC BLOOD PRESSURE: 113 MMHG | TEMPERATURE: 97.5 F | BODY MASS INDEX: 25.33 KG/M2 | HEIGHT: 66 IN | OXYGEN SATURATION: 95 %

## 2021-01-01 VITALS
HEART RATE: 75 BPM | DIASTOLIC BLOOD PRESSURE: 82 MMHG | SYSTOLIC BLOOD PRESSURE: 119 MMHG | TEMPERATURE: 98 F | RESPIRATION RATE: 16 BRPM | OXYGEN SATURATION: 99 %

## 2021-01-01 DIAGNOSIS — R47.01 EXPRESSIVE APHASIA: ICD-10-CM

## 2021-01-01 DIAGNOSIS — Z86.73 PERSONAL HISTORY OF TRANSIENT ISCHEMIC ATTACK (TIA), AND CEREBRAL INFARCTION WITHOUT RESIDUAL DEFICITS: ICD-10-CM

## 2021-01-01 DIAGNOSIS — I48.21 PERMANENT ATRIAL FIBRILLATION (H): ICD-10-CM

## 2021-01-01 DIAGNOSIS — Z97.8 FOLEY CATHETER IN PLACE: ICD-10-CM

## 2021-01-01 DIAGNOSIS — Z79.01 LONG TERM CURRENT USE OF ANTICOAGULANT THERAPY: ICD-10-CM

## 2021-01-01 DIAGNOSIS — N13.9 OBSTRUCTIVE UROPATHY: ICD-10-CM

## 2021-01-01 DIAGNOSIS — I69.30 LATE EFFECTS OF CEREBRAL ISCHEMIC STROKE: Primary | ICD-10-CM

## 2021-01-01 DIAGNOSIS — R33.9 URINARY RETENTION WITH INCOMPLETE BLADDER EMPTYING: ICD-10-CM

## 2021-01-01 DIAGNOSIS — K94.23: ICD-10-CM

## 2021-01-01 DIAGNOSIS — Z87.440 HISTORY OF UTI: ICD-10-CM

## 2021-01-01 DIAGNOSIS — R82.90 ABNORMAL URINE ODOR: ICD-10-CM

## 2021-01-01 DIAGNOSIS — M17.9 OSTEOARTHRITIS OF KNEE, UNSPECIFIED LATERALITY, UNSPECIFIED OSTEOARTHRITIS TYPE: ICD-10-CM

## 2021-01-01 DIAGNOSIS — N30.01 ACUTE CYSTITIS WITH HEMATURIA: Primary | ICD-10-CM

## 2021-01-01 DIAGNOSIS — I69.30 LATE EFFECTS OF CEREBRAL ISCHEMIC STROKE: ICD-10-CM

## 2021-01-01 DIAGNOSIS — G30.0 EARLY ONSET ALZHEIMER'S DISEASE WITH BEHAVIORAL DISTURBANCE (H): ICD-10-CM

## 2021-01-01 DIAGNOSIS — R31.9 HEMATURIA, UNSPECIFIED: ICD-10-CM

## 2021-01-01 DIAGNOSIS — I51.89 DIASTOLIC DYSFUNCTION: ICD-10-CM

## 2021-01-01 DIAGNOSIS — R30.0 DYSURIA: ICD-10-CM

## 2021-01-01 DIAGNOSIS — N39.0 URINARY TRACT INFECTION, SITE NOT SPECIFIED: ICD-10-CM

## 2021-01-01 DIAGNOSIS — F41.9 ANXIETY: ICD-10-CM

## 2021-01-01 DIAGNOSIS — R30.0 DYSURIA: Primary | ICD-10-CM

## 2021-01-01 DIAGNOSIS — F02.818 EARLY ONSET ALZHEIMER'S DISEASE WITH BEHAVIORAL DISTURBANCE (H): ICD-10-CM

## 2021-01-01 DIAGNOSIS — N18.9 CHRONIC KIDNEY DISEASE, UNSPECIFIED: ICD-10-CM

## 2021-01-01 PROBLEM — I10 ESSENTIAL HYPERTENSION: Status: ACTIVE | Noted: 2021-01-01

## 2021-01-01 PROBLEM — Z93.1 PEG (PERCUTANEOUS ENDOSCOPIC GASTROSTOMY) STATUS (H): Status: ACTIVE | Noted: 2021-01-01

## 2021-01-01 LAB
ALBUMIN UR-MCNC: 100 MG/DL
ALBUMIN UR-MCNC: 100 MG/DL
ALBUMIN UR-MCNC: >499 MG/DL
ALBUMIN UR-MCNC: >499 MG/DL
ALBUMIN UR-MCNC: >600 MG/DL
AMORPH CRY #/AREA URNS HPF: ABNORMAL /HPF
ANION GAP SERPL CALCULATED.3IONS-SCNC: 6 MMOL/L (ref 3–14)
APPEARANCE UR: ABNORMAL
BACTERIA #/AREA URNS HPF: ABNORMAL /HPF
BACTERIA UR CULT: ABNORMAL
BACTERIA UR CULT: NORMAL
BILIRUB UR QL STRIP: NEGATIVE
BUN SERPL-MCNC: 20 MG/DL (ref 7–30)
CALCIUM SERPL-MCNC: 8.4 MG/DL (ref 8.5–10.1)
CAOX CRY #/AREA URNS HPF: ABNORMAL /HPF
CAOX CRY #/AREA URNS HPF: ABNORMAL /HPF
CHLORIDE BLD-SCNC: 109 MMOL/L (ref 94–109)
CO2 SERPL-SCNC: 27 MMOL/L (ref 20–32)
COLOR UR AUTO: ABNORMAL
COLOR UR AUTO: YELLOW
CREAT SERPL-MCNC: 0.84 MG/DL (ref 0.66–1.25)
ERYTHROCYTE [DISTWIDTH] IN BLOOD BY AUTOMATED COUNT: 13.9 % (ref 10–15)
GFR SERPL CREATININE-BSD FRML MDRD: 83 ML/MIN/1.73M2
GLUCOSE BLD-MCNC: 68 MG/DL (ref 70–99)
GLUCOSE UR STRIP-MCNC: NEGATIVE MG/DL
HCT VFR BLD AUTO: 41.4 % (ref 40–53)
HGB BLD-MCNC: 13.5 G/DL (ref 13.3–17.7)
HGB UR QL STRIP: ABNORMAL
HGB UR QL STRIP: NEGATIVE
KETONES UR STRIP-MCNC: 5 MG/DL
KETONES UR STRIP-MCNC: NEGATIVE MG/DL
LEUKOCYTE ESTERASE UR QL STRIP: ABNORMAL
MCH RBC QN AUTO: 30.2 PG (ref 26.5–33)
MCHC RBC AUTO-ENTMCNC: 32.6 G/DL (ref 31.5–36.5)
MCV RBC AUTO: 93 FL (ref 78–100)
MUCOUS THREADS #/AREA URNS LPF: PRESENT /LPF
NITRATE UR QL: NEGATIVE
NITRATE UR QL: POSITIVE
PH UR STRIP: 5 [PH] (ref 5–7)
PH UR STRIP: 5 [PH] (ref 5–7)
PH UR STRIP: 6 [PH] (ref 5–7)
PH UR STRIP: 8.5 PH (ref 5–7)
PH UR STRIP: 9 [PH] (ref 5–7)
PLATELET # BLD AUTO: 225 10E3/UL (ref 150–450)
POTASSIUM BLD-SCNC: 4 MMOL/L (ref 3.4–5.3)
RBC # BLD AUTO: 4.47 10E6/UL (ref 4.4–5.9)
RBC #/AREA URNS AUTO: 50 /HPF (ref 0–2)
RBC URINE: 18 /HPF
RBC URINE: >182 /HPF
SODIUM SERPL-SCNC: 142 MMOL/L (ref 133–144)
SOURCE: ABNORMAL
SP GR UR STRIP: 1.01 (ref 1–1.03)
SP GR UR STRIP: 1.02 (ref 1–1.03)
TRI-PHOS CRY #/AREA URNS HPF: ABNORMAL /HPF
UROBILINOGEN UR STRIP-MCNC: 2 MG/DL
UROBILINOGEN UR STRIP-MCNC: 2 MG/DL
UROBILINOGEN UR STRIP-MCNC: NORMAL MG/DL
UROBILINOGEN UR STRIP-MCNC: NORMAL MG/DL
UROBILINOGEN UR STRIP-MCNC: NORMAL MG/DL (ref 0–2)
WBC # BLD AUTO: 6.3 10E3/UL (ref 4–11)
WBC #/AREA URNS AUTO: 80 /HPF (ref 0–5)
WBC CLUMPS #/AREA URNS HPF: PRESENT /HPF
WBC CLUMPS #/AREA URNS HPF: PRESENT /HPF
WBC URINE: 33 /HPF
WBC URINE: >182 /HPF

## 2021-01-01 PROCEDURE — 99283 EMERGENCY DEPT VISIT LOW MDM: CPT | Performed by: FAMILY MEDICINE

## 2021-01-01 PROCEDURE — 87088 URINE BACTERIA CULTURE: CPT | Mod: ORL | Performed by: FAMILY MEDICINE

## 2021-01-01 PROCEDURE — 99309 SBSQ NF CARE MODERATE MDM 30: CPT | Performed by: NURSE PRACTITIONER

## 2021-01-01 PROCEDURE — 99309 SBSQ NF CARE MODERATE MDM 30: CPT | Performed by: FAMILY MEDICINE

## 2021-01-01 PROCEDURE — 49450 REPLACE G/C TUBE PERC: CPT

## 2021-01-01 PROCEDURE — 87086 URINE CULTURE/COLONY COUNT: CPT | Mod: ORL | Performed by: FAMILY MEDICINE

## 2021-01-01 PROCEDURE — 85027 COMPLETE CBC AUTOMATED: CPT | Mod: ORL | Performed by: FAMILY MEDICINE

## 2021-01-01 PROCEDURE — 272N000588 ZZ HC TUBE GASTRO CR5

## 2021-01-01 PROCEDURE — 81001 URINALYSIS AUTO W/SCOPE: CPT | Mod: ORL | Performed by: FAMILY MEDICINE

## 2021-01-01 PROCEDURE — C1769 GUIDE WIRE: HCPCS

## 2021-01-01 PROCEDURE — 80048 BASIC METABOLIC PNL TOTAL CA: CPT | Mod: ORL | Performed by: FAMILY MEDICINE

## 2021-01-01 PROCEDURE — 81001 URINALYSIS AUTO W/SCOPE: CPT | Mod: ORL | Performed by: NURSE PRACTITIONER

## 2021-01-01 PROCEDURE — 99285 EMERGENCY DEPT VISIT HI MDM: CPT | Mod: 25

## 2021-01-01 PROCEDURE — 49450 REPLACE G/C TUBE PERC: CPT | Mod: GC | Performed by: RADIOLOGY

## 2021-01-01 PROCEDURE — 87086 URINE CULTURE/COLONY COUNT: CPT | Mod: ORL | Performed by: NURSE PRACTITIONER

## 2021-01-01 RX ORDER — SULFAMETHOXAZOLE/TRIMETHOPRIM 800-160 MG
1 TABLET ORAL 2 TIMES DAILY
Qty: 10 TABLET | Refills: 0 | COMMUNITY
Start: 2021-01-01 | End: 2021-01-01

## 2021-01-01 RX ORDER — NICOTINE POLACRILEX 4 MG
15-30 LOZENGE BUCCAL
Status: CANCELLED | OUTPATIENT
Start: 2021-01-01

## 2021-01-01 RX ORDER — TROLAMINE SALICYLATE 10 G/100G
CREAM TOPICAL 3 TIMES DAILY
Start: 2021-01-01

## 2021-01-01 RX ORDER — DEXTROSE MONOHYDRATE 25 G/50ML
25-50 INJECTION, SOLUTION INTRAVENOUS
Status: CANCELLED | OUTPATIENT
Start: 2021-01-01

## 2021-01-01 RX ORDER — CEPHALEXIN 500 MG/1
500 CAPSULE ORAL 3 TIMES DAILY
Qty: 21 CAPSULE | Refills: 0 | COMMUNITY
Start: 2021-01-01 | End: 2021-01-01

## 2021-01-01 ASSESSMENT — MIFFLIN-ST. JEOR
SCORE: 1392.62
SCORE: 1394.43
SCORE: 1394.43
SCORE: 1383.97
SCORE: 1358.1
SCORE: 1359.01

## 2021-01-01 NOTE — ED NOTES
Multiple attempts made by this RN and IR to contact guardian for consent. Called all phone numbers patient has listed in emergency contacts, as well as his TCU RN ([phone]) for any additional phone numbers she may have. No answer on any of the numbers. G tube is how patient gets medication and is being deemed an emergency to replace tube by ER MD and IR. Patient is nodding and agreeable.

## 2021-01-01 NOTE — PROCEDURES
[facility]     Procedure: IR Procedure Note    Date/Time: 1/1/2021 5:16 PM  Performed by: Leobardo Jurado MD  Authorized by: Leobardo Jurado MD     UNIVERSAL PROTOCOL   Site Marked: NA  Prior Images Obtained and Reviewed:  Yes  Required items: Required blood products, implants, devices and special equipment available    Patient identity confirmed:  Verbally with patient, arm band, provided demographic data and hospital-assigned identification number  Patient was reevaluated immediately before administering moderate or deep sedation or anesthesia  Confirmation Checklist:  Patient's identity using two indicators, relevant allergies, procedure was appropriate and matched the consent or emergent situation and correct equipment/implants were available  Time out: Immediately prior to the procedure a time out was called    Universal Protocol: the Joint Commission Universal Protocol was followed    Preparation: Patient was prepped and draped in usual sterile fashion           ANESTHESIA    Anesthesia: Local infiltration  Local Anesthetic:  Lidocaine 1% without epinephrine      SEDATION    Patient Sedated: No    See dictated procedure note for full details.  Findings: New 18 K[place] ANYA G tube placed    Specimens: none    Complications: None    Condition: Stable    Plan: May use tube now    PROCEDURE   Patient Tolerance:  Patient tolerated the procedure well with no immediate complications    Length of time physician/provider present for 1:1 monitoring during sedation: 0

## 2021-01-01 NOTE — ED AVS SNAPSHOT
[facility] Emergency Department  [address] [address]  Phone: [phone]                                    Simone Daniels   MRN: [id number]    Department: [facility] Emergency Department   Date of Visit: 1/1/2021           After Visit Summary Signature Page    I have received my discharge instructions, and my questions have been answered. I have discussed any challenges I see with this plan with the nurse or doctor.    ..........................................................................................................................................  Patient/Patient Representative Signature      ..........................................................................................................................................  Patient Representative Print Name and Relationship to Patient    ..................................................               ................................................  Date                                   Time    ..........................................................................................................................................  Reviewed by Signature/Title    ...................................................              ..............................................  Date                                               Time          22EPIC Rev 08/18

## 2021-01-01 NOTE — DISCHARGE INSTRUCTIONS
OK to return to current nursing home to resume all current treatments and medications and nutritional support per previous ordered.  Call MD if any concerns.  Normal G tube care.

## 2021-01-01 NOTE — ED PROVIDER NOTES
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  Per review of patient's chart, patient had a G-tube placed by IR on 12/3/2020.  Patient's G-tube broke near the entry site today.  Patient was sent in here to the Emergency Department for management of the G-tube.  Patient is nonverbal at baseline so history is limited.  Patient does not appear to be in any distress.  There is no bleeding at the G-tube site.    Past Medical History  Past Medical History:   Diagnosis Date     LOW BACK PAIN      Past Surgical History:   Procedure Laterality Date     IR GASTROSTOMY TUBE PERCUTANEOUS PLCMNT  12/3/2020     SURGICAL HISTORY OF -   1979    Cervical disc (5,6,&7)     SURGICAL HISTORY OF -       (L) Knee fx tibial plateau     SURGICAL HISTORY OF -   1981    (L) Knee surgery - re-fx tibial plateau          CRANBERRY JUICE EXTRACT PO       doxazosin (CARDURA) 0.6 mg/mL SUSP suspension       folic acid (FOLVITE) 1 MG tablet       Garlic 1000 MG CAPS       melatonin 3 MG tablet       metoprolol tartrate (LOPRESSOR) 25 MG tablet       multivitamins w/minerals (CERTAVITE) liquid       thiamine (B-1) 100 MG tablet       warfarin ANTICOAGULANT (COUMADIN) 7.5 MG tablet       Warfarin Therapy Reminder      No Known Allergies  Family History  Family History   Problem Relation Age of Onset     C.A.D. Mother         heart disease in her 60s     C.A.D. Brother         1/2 brother with heart disease     Diabetes No family hx of      Social History   Social History      Tobacco Use     Smoking status: Former Smoker     Packs/day: 2.00     Years: 18.00     Pack years: 36.00     Types: Cigarettes     Quit date: 1980     Years since quittin.0     Smokeless tobacco: Never Used   Substance Use Topics     Alcohol use: Yes     Frequency: 2-4 times a month     Drinks per session: 1 or 2     Binge frequency: Never     Comment: occaisonal     Drug use: No      Past medical history, past surgical history, medications, allergies, family history, and social history were reviewed with the patient. No additional pertinent items.       Review of Systems   Unable to perform ROS: Patient nonverbal     A complete review of systems was attempted but limited due to patient being nonverbal at baseline.    Physical Exam   BP: 123/80  Pulse: 84  Temp: 98  F (36.7  C)  Resp: 16  SpO2: 99 %  Physical Exam  Vitals signs and nursing note reviewed.   Constitutional:       General: He is not in acute distress.     Appearance: He is well-developed. He is not diaphoretic.   HENT:      Head: Normocephalic and atraumatic.      Nose: Nose normal.   Eyes:      General: No scleral icterus.     Extraocular Movements: Extraocular movements intact.      Pupils: Pupils are equal, round, and reactive to light.   Neck:      Musculoskeletal: Normal range of motion and neck supple.   Cardiovascular:      Rate and Rhythm: Normal rate.   Pulmonary:      Effort: No respiratory distress.   Abdominal:      General: There is no distension.      Palpations: Abdomen is soft.      Tenderness: There is no abdominal tenderness.      Comments: Abdomen is soft nontender.  Patient's G-tube site reveals the tract apparatus still intact with inner tube removed.   Skin:     General: Skin is warm and dry.      Findings: No rash.   Neurological:      General: No focal deficit present.      Mental Status: He is alert. Mental status is at baseline.      Comments: Patient nonverbal.  Noted aphasia consistent with history.    Psychiatric:      Comments: Patient aphasic but cooperative ? Understand.         ED Course      Procedures           Reviewed records patient had this placed early December by interventional etiology.  This point did place a consult for them.  No labs otherwise needed etc.  Patient is stable here in the ER.  Patient was seen here initially by interventional radiology a Flores catheter was placed through the apparatus to maintain patency as noted the hardware still intact otherwise in the tract area.  It appears just needed to new lead and a new inner tube that just needs to be exchanged with a Flores catheter that was placed here in the ER.    Several attempts were made to contact patient's legal guardian etc.  It is a financial mortgage company that no one responded on a holiday today.  Discussed with nursing home also etc.  Patient receives feedings etc. medications through the G-tube.  Based on patient's history with his aphasia etc. aspiration risk patient is stable here but appears to be a medical necessity for this G-tube to be made functional as it currently is malfunctioning.  All it needs is to place a new inner tube in the device which could be done by intervention radiology.    Patient to be sent to interventional radiology for simple tube exchange.    Will reassess upon return and disposition back to current nursing home continue current care and treatment.              No results found for any visits on 01/01/21.  Medications - No data to display     Assessments & Plan (with Medical Decision Making)  32-year-old male history of CNS event who has chronic aphasia and had a G-tube placed by advanced radiology less than a month ago.  Patient apparently the pulled the tumor broke at the site.  Was seen here in the ER interventional radiology did see the patient a Flores catheter was placed in the hardware that still there keeping the tract intact.  Recommendations this point are to replace the inner tube  with a new tube so this simply is a Flores catheter exchanged with a appropriate tube here in the ER.  We attempted to get consent via patient's legal guardian as patient is aphasic no one is reachable at this point.  Discussed with nursing home etc.  Plan at this point is is is appears to be medical necessity intervention radiology will just simply exchange the inner tubes so patient could continue to use this G-tube for his medications calorie fluids etc.  Sign out to pm MD in the ER.           I have reviewed the nursing notes. I have reviewed the findings, diagnosis, plan and need for follow up with the patient.    New Prescriptions    No medications on file       Final diagnoses:   Gastrostomy mechanical complication (H)       --  I, Jordon Matthews, am serving as a trained medical scribe to document services personally performed by Geoff Rizo MD, based on the provider's statements to me.     I, Geoff Rizo MD, was physically present and have reviewed and verified the accuracy of this note documented by Jordon Matthews.    Geoff Rizo MD  [facility] EMERGENCY DEPARTMENT  1/1/2021    This note was created at least in part by the use of dragon voice dictation system. Inadvertent typographical errors may still exist.  Geoff Rizo MD.    Patient evaluated in the emergency department during the COVID-19 pandemic period. Careful attention to patients safety was addressed throughout the evaluation. Evaluation and treatment management was initiated with disposition made efficiently and appropriate as possible to minimize any risk of potential exposure to patient during this evaluation.       Geoff Rizo MD  01/01/21 [id number]

## 2021-01-01 NOTE — ED TRIAGE NOTES
Pt BIBA from nursing home, pulled out G tube (staff reported slightly altered mental status). T max 99.2    Pt is baseline nonverbal, able to communicate by nodding yes and no. A&Ox4 during triage assessment.

## 2021-01-01 NOTE — ED NOTES
Bed: ED11  Expected date: 1/1/21  Expected time: 2:08 PM  Means of arrival: Ambulance  Comments:  [place] [id number] with a 71 yo M who pulled his G-tube out. Low grade temp of 99.7 F. Triaged yellow in 10 mins

## 2021-01-01 NOTE — PROGRESS NOTES
Patient Name: Simone Daniels  Medical Record Number: [id number]  Today's Date: 1/1/2021    Procedure: Gastrostomy Tube Change  Proceduralist: Dr. Merlene Patel    Procedure Start: 1700  Procedure end: 1715    Report given to: TAYLOR Sanchez ED  : n/a    Other Notes: Pt arrived to IR room #2 from ED unit. Consent reviewed. Pt denies any questions or concerns regarding procedure. Pt positioned supine and monitored per protocol. Dr. Jurado placed new 18 fr Gastrostomy tube and confirmed placement with fluoroscopy. Pt tolerated procedure without any noted complications. Pt transferred back to ED unit.

## 2021-01-04 ENCOUNTER — NURSING HOME VISIT (OUTPATIENT)
Dept: GERIATRICS | Facility: CLINIC | Age: 76
End: 2021-01-04
Payer: MEDICAID

## 2021-01-04 VITALS
DIASTOLIC BLOOD PRESSURE: 69 MMHG | BODY MASS INDEX: 24.11 KG/M2 | OXYGEN SATURATION: 98 % | RESPIRATION RATE: 17 BRPM | TEMPERATURE: 97.9 F | SYSTOLIC BLOOD PRESSURE: 103 MMHG | HEIGHT: 66 IN | WEIGHT: 150 LBS | HEART RATE: 92 BPM

## 2021-01-04 VITALS
HEIGHT: 66 IN | OXYGEN SATURATION: 93 % | BODY MASS INDEX: 24.11 KG/M2 | DIASTOLIC BLOOD PRESSURE: 69 MMHG | SYSTOLIC BLOOD PRESSURE: 103 MMHG | HEART RATE: 72 BPM | RESPIRATION RATE: 14 BRPM | WEIGHT: 150 LBS | TEMPERATURE: 96.9 F

## 2021-01-04 DIAGNOSIS — I69.30 LATE EFFECTS OF CEREBRAL ISCHEMIC STROKE: Primary | ICD-10-CM

## 2021-01-04 DIAGNOSIS — I48.91 NEW ONSET A-FIB (H): ICD-10-CM

## 2021-01-04 DIAGNOSIS — Z79.01 LONG TERM CURRENT USE OF ANTICOAGULANT THERAPY: ICD-10-CM

## 2021-01-04 DIAGNOSIS — Z93.1 PEG (PERCUTANEOUS ENDOSCOPIC GASTROSTOMY) STATUS (H): ICD-10-CM

## 2021-01-04 PROCEDURE — 99309 SBSQ NF CARE MODERATE MDM 30: CPT | Performed by: NURSE PRACTITIONER

## 2021-01-04 ASSESSMENT — MIFFLIN-ST. JEOR
SCORE: 1363.15
SCORE: 1363.15

## 2021-01-04 NOTE — LETTER
1/4/2021        RE: Simone Daniels  [facility] [employer]  [address]  [facility] [address]        [place] [facility]  [place] Medical Record Number:  [id number]  [facility] encounter took place[facility] [[id number]]  Chief Complaint   Patient presents with     Nursing Home Acute       HPI:    Simone Daniels  is a 72 year old (5/26/1948), who is being seen today for an episodic care visit at: [facility] [[id number]].     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  S/he was admitted to the hospital after found to be behaving abnormally (disheveled, mute, confused) and found to have new subacute infarct in R parietal lobe (R MCA distribution) with petechial hemorrhage and severe expressive and receptive aphasia, failed swallow eval. CTA was unremarkable. The hospital stay was complicated with enterococcus UTI and was treated.  EEG showed intermittent slowing in the L anterior head, a TTE on 11/25/20 showed grade 3 diastolic dysfunction with enlarged bilateral atria but intact septum.  He had a short run of afib/flutter on 11/30 and started on metoprolol.  Warfarin started on day 10 post bleed.  Continued to fail swallowing evaluate so a PEG tube was placed. He also has persistent urinary retention-failed several voiding trials in the hospital and continues to have indwelling elena cath-started on doxazosin. He suffered a traumatic strait cath on 11/29/20.  His extended stay at the hospital was in part caused by the need for a court appointed guardian due to his new persistent severe aphasia, limited family who can help him.     Recent changes:    12/31 - guardian approves treatment in place without follow up with cardiology or Urology unless absolutely needed  1/1/21 - to ED for  "urgent replacement of PEG tube due to traumatic inadvertent removal     Today's concern is:     Late effects of cerebral ischemic stroke  PEG (percutaneous endoscopic gastrostomy) status (H)  New onset a-fib (H)  Long term current use of anticoagulant therapy    Simone is minimally able to communicate-offering only grunts/hand gestures which are difficult to interpret and are not reliable.       Past Medical, Surgical, and Family History reviewed in Epic today.    MEDICATIONS:  Current medication list reviewed    REVIEW OF SYSTEMS:  Unobtainable secondary to aphasia.     OBJECTIVE:  /69   Pulse 72   Temp 96.9  F (36.1  C)   Resp 14   Ht 1.676 m (5' 6\")   Wt 68 kg (150 lb)   SpO2 93%   BMI 24.21 kg/m    Exam:  GENERAL APPEARANCE:  Alert, in no distress   CHEST/RESP:  respiratory effort normal, no respiratory distress, lung sounds CTA    CV:  Rate and rhythm reg, no murmur/rub/gallop, no peripheral edema  M/S:   gait & station normal-unsteady on his feet but walking without assistive device, digits and nails within normal limits   SKIN:  Noted skin surrounding PEG is CDI, without erythema  PSYCH:  Unclear baseline mentation, he is alert but not able to make needs known due to expressive and receptive aphasia    Labs:   Recent labs in [facility] reviewed by me today.      Date INR New Dose/Orders   12/27 1.6 6 mg   12/28 1.63 7.5 mg   12/29 1.75 7.5 mg   12/30 1.77 7.5 mg   12/31 2.2 7.5 mg daily    1/4/21  2.8 7 mg daily                ASSESSMENT/PLAN:  Late effects of cerebral ischemic stroke  Global aphasia  Patient with global aphasia and inability to communicate which complicates ability to provide care. He is ambulatory, impulsive, and needs supervision to maintain safety.     PEG (percutaneous endoscopic gastrostomy) status (H)  PEG replaced at ED on 1/1/21. Now on 24 h feedings but increasingly active during the day and forgets to bring the pole with him  -dietician to change to nocturnal " feedings    New onset a-fib (H)  Long term current use of anticoagulant therapy  INR today 2.8, within normal limits. No new symptoms bleeding or other concerns      Orders written by provider at facility    OK to change tube feedings to nocturnal per dietician recommendations to reduce risk of pulling out tube again    Warfarin 7 mg daily     Recheck INR 1/7/21    Labs 1/6/21 to include  o CBC, CMP, magnesium, phosphorous    Electronically signed by:  CAROLANN Cha CNP              Sincerely,        CAROLANN Cha CNP

## 2021-01-04 NOTE — PROGRESS NOTES
[place] [facility]  [place] Medical Record Number:  [id number]  Place of Service where encounter took place:  [facility] [[id number]]  Chief Complaint   Patient presents with     Nursing Home Acute       HPI:    Simone Daniels  is a 72 year old (5/26/1948), who is being seen today for an episodic care visit at: [facility] ([facility]) [[id number]].     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  S/he was admitted to the hospital after found to be behaving abnormally (disheveled, mute, confused) and found to have new subacute infarct in R parietal lobe (R MCA distribution) with petechial hemorrhage and severe expressive and receptive aphasia, failed swallow eval. CTA was unremarkable. The hospital stay was complicated with enterococcus UTI and was treated.  EEG showed intermittent slowing in the L anterior head, a TTE on 11/25/20 showed grade 3 diastolic dysfunction with enlarged bilateral atria but intact septum.  He had a short run of afib/flutter on 11/30 and started on metoprolol.  Warfarin started on day 10 post bleed.  Continued to fail swallowing evaluate so a PEG tube was placed. He also has persistent urinary retention-failed several voiding trials in the hospital and continues to have indwelling elena cath-started on doxazosin. He suffered a traumatic strait cath on 11/29/20.  His extended stay at the hospital was in part caused by the need for a court appointed guardian due to his new persistent severe aphasia, limited family who can help him.     Recent changes:    12/31 - guardian approves treatment in place without follow up with cardiology or Urology unless absolutely needed  1/1/21 - to ED for urgent replacement of PEG tube due to traumatic inadvertent removal     Today's concern is:     Late effects of  "cerebral ischemic stroke  PEG (percutaneous endoscopic gastrostomy) status (H)  New onset a-fib (H)  Long term current use of anticoagulant therapy    Simone is minimally able to communicate-offering only grunts/hand gestures which are difficult to interpret and are not reliable.       Past Medical, Surgical, and Family History reviewed in Epic today.    MEDICATIONS:  Current medication list reviewed    REVIEW OF SYSTEMS:  Unobtainable secondary to aphasia.     OBJECTIVE:  /69   Pulse 72   Temp 96.9  F (36.1  C)   Resp 14   Ht 1.676 m (5' 6\")   Wt 68 kg (150 lb)   SpO2 93%   BMI 24.21 kg/m    Exam:  GENERAL APPEARANCE:  Alert, in no distress   CHEST/RESP:  respiratory effort normal, no respiratory distress, lung sounds CTA    CV:  Rate and rhythm reg, no murmur/rub/gallop, no peripheral edema  M/S:   gait & station normal-unsteady on his feet but walking without assistive device, digits and nails within normal limits   SKIN:  Noted skin surrounding PEG is CDI, without erythema  PSYCH:  Unclear baseline mentation, he is alert but not able to make needs known due to expressive and receptive aphasia    Labs:   Recent labs in [facility] reviewed by me today.      Date INR New Dose/Orders   12/27 1.6 6 mg   12/28 1.63 7.5 mg   12/29 1.75 7.5 mg   12/30 1.77 7.5 mg   12/31 2.2 7.5 mg daily    1/4/21  2.8 7 mg daily                ASSESSMENT/PLAN:  Late effects of cerebral ischemic stroke  Global aphasia  Patient with global aphasia and inability to communicate which complicates ability to provide care. He is ambulatory, impulsive, and needs supervision to maintain safety.     PEG (percutaneous endoscopic gastrostomy) status (H)  PEG replaced at ED on 1/1/21. Now on 24 h feedings but increasingly active during the day and forgets to bring the pole with him  -dietician to change to nocturnal feedings    New onset a-fib (H)  Long term current use of anticoagulant therapy  INR today 2.8, within normal limits. No new " symptoms bleeding or other concerns      Orders written by provider at facility    OK to change tube feedings to nocturnal per dietician recommendations to reduce risk of pulling out tube again    Warfarin 7 mg daily     Recheck INR 1/7/21    Labs 1/6/21 to include  o CBC, CMP, magnesium, phosphorous    Electronically signed by:  CAROLANN Cha CNP         [facility] Moyer), Cardiac Electrophysiology; Cardiovascular Disease; Internal Medicine  [address] [place] [address]  Phone: [phone]  Fax: [phone]

## 2021-01-05 ENCOUNTER — VIRTUAL VISIT (OUTPATIENT)
Dept: GERIATRICS | Facility: CLINIC | Age: 76
End: 2021-01-05
Payer: MEDICARE

## 2021-01-05 DIAGNOSIS — I10 ESSENTIAL HYPERTENSION: ICD-10-CM

## 2021-01-05 DIAGNOSIS — G81.91 RIGHT HEMIPARESIS (H): Primary | ICD-10-CM

## 2021-01-05 DIAGNOSIS — R33.9 URINARY RETENTION WITH INCOMPLETE BLADDER EMPTYING: ICD-10-CM

## 2021-01-05 DIAGNOSIS — Z97.8 FOLEY CATHETER IN PLACE: ICD-10-CM

## 2021-01-05 DIAGNOSIS — Z79.01 ON COUMADIN FOR ATRIAL FIBRILLATION (H): ICD-10-CM

## 2021-01-05 DIAGNOSIS — I48.91 ON COUMADIN FOR ATRIAL FIBRILLATION (H): ICD-10-CM

## 2021-01-05 DIAGNOSIS — I69.30 LATE EFFECTS OF CEREBRAL ISCHEMIC STROKE: ICD-10-CM

## 2021-01-05 DIAGNOSIS — I48.11 LONGSTANDING PERSISTENT ATRIAL FIBRILLATION (H): ICD-10-CM

## 2021-01-05 DIAGNOSIS — Z79.01 LONG TERM CURRENT USE OF ANTICOAGULANT THERAPY: ICD-10-CM

## 2021-01-05 PROCEDURE — 99305 1ST NF CARE MODERATE MDM 35: CPT | Mod: 95 | Performed by: FAMILY MEDICINE

## 2021-01-05 NOTE — PROGRESS NOTES
" [place] [facility]  Simone Daniels is being evaluated via a billable video.   The patient has been notified of following:  \"This video visit will be conducted via a call between you and your provider. We have found that certain health care needs can be provided without the need for an in-person physical exam.  This service lets us provide the care you need with a video conversation. If during the course of the call the provider feels a video visit is not appropriate, you will not be charged for this service.\"   The provider has received verbal consent for a Video Visit from the patient and or first contact? Yes  Patient/facility staff would like the video invitation sent by: N/A   Video Start Time: 11:36  Which Facility the Patient is at during the time of visit: [facility]  PRIMARY CARE PROVIDER AND CLINIC:  Physician No Ref-Primary, No address on file  Chief Complaint   Patient presents with     Hospital F/U     Video Visit     [place] Medical Record Number:  [id number]  Simone Daniels  is a 72 year old  (5/26/1948), admitted to the above facility from  [facility]. Hospital stay 11/25/20 through 12/30/20..  Admitted to this facility for  rehab, medical management and nursing care.  HPI:    HPI information obtained from: facility staff, patient report and [facility] chart review.   Brief Summary of Hospital Course:   - pt with PMH notable for left parietal CVA with hemorrhagic conversion, A-fib not on OAC, hospitalized with  Subacute infarction in right parietal lob with petechial hemorrhage- started on coumadin later; and severe expressive/receptive aphasia, and dysphagia (s/p PEG tube placement)   - short run A-fib/flutter started on metoprolol. TTE revealed GIII diastolic dysfunction with b/l atrial enlargement  - stay c/w enterococcus UTI, and urinary retention s/p Flores's catheter insertion and started on droxacin.   -  - Guardian assigned by court    Updates on " Status Since Skilled nursing Admission:   01/0121: sent to ED for PEG tube break near entry, elena's cath placed in the hardware, with a plan to have IR to exchange the inner tube.       Today:  - Pt seen in the presence of RN who graciously assisted with the virtual visit  - RN reports no concern.   - stroke/dysphagia/aphasic: walks independently. No falls  - Rehab: endorses making some improving.   - PEG tube: no concern.   =====================================    CODE STATUS/ADVANCE DIRECTIVES DISCUSSION:   CPR/Full code   Patient's living condition: lives alone  ALLERGIES: Patient has no known allergies.  PAST MEDICAL HISTORY:  has a past medical history of LOW BACK PAIN.  PAST SURGICAL HISTORY:   has a past surgical history that includes surgical history of -  (1979); surgical history of - ; surgical history of -  (1981); and IR Gastrostomy Tube Percutaneous Plcmnt (12/3/2020).  FAMILY HISTORY: family history includes C.A.D. in his brother and mother.  SOCIAL HISTORY:   reports that he quit smoking about 41 years ago. His smoking use included cigarettes. He has a 36.00 pack-year smoking history. He has never used smokeless tobacco. He reports current alcohol use. He reports that he does not use drugs.  Current Outpatient Medications   Medication Sig Dispense Refill     CRANBERRY JUICE EXTRACT PO Take 40,000 Units by mouth daily       doxazosin (CARDURA) 0.6 mg/mL SUSP suspension 6.67 mLs (4 mg) by Oral or Feeding Tube route daily 200 mL 0     folic acid (FOLVITE) 1 MG tablet 1 tablet (1 mg) by Oral or Feeding Tube route daily       Garlic 1000 MG CAPS Take 1 capsule by mouth daily       melatonin 3 MG tablet 1 tablet (3 mg) by Oral or Feeding Tube route daily       metoprolol tartrate (LOPRESSOR) 25 MG tablet 0.5 tablets (12.5 mg) by Oral or Feeding Tube route 2 times daily       multivitamins w/minerals (CERTAVITE) liquid 15 mLs by Oral or Feeding Tube route daily       thiamine (B-1) 100 MG tablet 1 tablet  "(100 mg) by Oral or Feeding Tube route daily       warfarin ANTICOAGULANT (COUMADIN) 7.5 MG tablet Take 1 tablet (7.5 mg) by mouth daily 30 tablet 0     Warfarin Therapy Reminder 1 each continuous prn (for Afib)      discharge medications reconciled and changed, per note/orders    ROS: Unobtainable secondary to aphasia.   Vitals:/69   Pulse 92   Temp 97.9  F (36.6  C)   Resp 17   Ht 1.676 m (5' 6\")   Wt 68 kg (150 lb)   SpO2 98%   BMI 24.21 kg/m     Limited Visit Exam done given COVID-19 precautions:  GENERAL APPEARANCE:  in no distress  RESP:  unlabored breathing  M/S:   no joint deformity noted  SKIN:  no rash noted  NEURO:   no purposeful movement in upper and lower extremities  PSYCH:  affect and mood normal    Lab/Diagnostic data: Reviewed in the chart and EHR.      ASSESSMENT/PLAN:  ------------------------------  Right hemiparesis (H)  Hx of right parietal infarction with hemorrhagic conversion (2018)  Recent subacute infarction with petechial hemorrhage  Expressive and Receptive aphasia  Severe Dysphagia s/p PEG tube (H)  Debility  - not on statin.    - started rehab program, making a progress, continue until desired goal is achieved.   - Continues to requried  extensive  nursing assistance with ADLs      Chronic A-fib:  ON coumadin for A-fib (H)  - OAC was discontinued on 6/2019.   - noted to have A-fib/ flutter while hospitalized, placed on coumadin and metoprolol  - CVR  -  on coumadin with INR followed closely by GNP      Urinary retention with incomplete bladder emptying 2/2 mechanical obstruction  Failed multiple PVR, now with elena's catheter  - started on doxazosin.  - elena's care as per SNF protocol. Guardian holding on urology follow up for now      Hx of alcohol abuse: on folic acid and thiamin. No concern.     Order: See above, otherwise, continue the rest of the current POC.     Electronically signed by:  Rome Mobley MD     Video-Visit Details  Type of service:  Video " Visit  Video End Time (time video stopped): 11:39  Distant Location (provider location):  [facility]

## 2021-01-05 NOTE — LETTER
"    1/5/2021        RE: Simone Daniels  [employer]  [address]  [facility] [address]         [facility]  Simone Daniels is being evaluated via a billable video.   The patient has been notified of following:  \"This video visit will be conducted via a call between you and your provider. We have found that certain health care needs can be provided without the need for an in-person physical exam.  This service lets us provide the care you need with a video conversation. If during the course of the call the provider feels a video visit is not appropriate, you will not be charged for this service.\"   The provider has received verbal consent for a Video Visit from the patient and or first contact? Yes  Patient/facility staff would like the video invitation sent by: N/A   Video Start Time: 11:36  Which Facility the Patient is at during the time of visit: [facility]  PRIMARY CARE PROVIDER AND CLINIC:  Physician No Ref-Primary, No address on file  Chief Complaint   Patient presents with     Hospital F/U     Video Visit     [facility] Medical Record Number:  [id number]  Simone Daniels  is a 72 year old  (5/26/1948), admitted to the above facility from  [facility]. Hospital stay 11/25/20 through 12/30/20..  Admitted to this facility for  rehab, medical management and nursing care.  HPI:    HPI information obtained from: facility staff, patient report and [facility] chart review.   Brief Summary of Hospital Course:   - pt with PMH notable for left parietal CVA with hemorrhagic conversion, A-fib not on OAC, hospitalized with  Subacute infarction in right parietal lob with petechial hemorrhage- started on coumadin later; and severe expressive/receptive aphasia, and dysphagia (s/p PEG tube placement)   - short run A-fib/flutter started on metoprolol. TTE revealed GIII diastolic dysfunction with b/l atrial enlargement  - stay c/w enterococcus UTI, and urinary " retention s/p Elena's catheter insertion and started on droxacin.   -  - Guardian assigned by court    Updates on Status Since Skilled nursing Admission:   01/0121: sent to ED for PEG tube break near entry, elena's cath placed in the hardware, with a plan to have IR to exchange the inner tube.       Today:  - Pt seen in the presence of RN who graciously assisted with the virtual visit  - RN reports no concern.   - stroke/dysphagia/aphasic: walks independently. No falls  - Rehab: endorses making some improving.   - PEG tube: no concern.   =====================================    CODE STATUS/ADVANCE DIRECTIVES DISCUSSION:   CPR/Full code   Patient's living condition: lives alone  ALLERGIES: Patient has no known allergies.  PAST MEDICAL HISTORY:  has a past medical history of LOW BACK PAIN.  PAST SURGICAL HISTORY:   has a past surgical history that includes surgical history of -  (1979); surgical history of - ; surgical history of -  (1981); and IR Gastrostomy Tube Percutaneous Plcmnt (12/3/2020).  FAMILY HISTORY: family history includes C.A.D. in his brother and mother.  SOCIAL HISTORY:   reports that he quit smoking about 41 years ago. His smoking use included cigarettes. He has a 36.00 pack-year smoking history. He has never used smokeless tobacco. He reports current alcohol use. He reports that he does not use drugs.  Current Outpatient Medications   Medication Sig Dispense Refill     CRANBERRY JUICE EXTRACT PO Take 40,000 Units by mouth daily       doxazosin (CARDURA) 0.6 mg/mL SUSP suspension 6.67 mLs (4 mg) by Oral or Feeding Tube route daily 200 mL 0     folic acid (FOLVITE) 1 MG tablet 1 tablet (1 mg) by Oral or Feeding Tube route daily       Garlic 1000 MG CAPS Take 1 capsule by mouth daily       melatonin 3 MG tablet 1 tablet (3 mg) by Oral or Feeding Tube route daily       metoprolol tartrate (LOPRESSOR) 25 MG tablet 0.5 tablets (12.5 mg) by Oral or Feeding Tube route 2 times daily       multivitamins  "w/minerals (CERTAVITE) liquid 15 mLs by Oral or Feeding Tube route daily       thiamine (B-1) 100 MG tablet 1 tablet (100 mg) by Oral or Feeding Tube route daily       warfarin ANTICOAGULANT (COUMADIN) 7.5 MG tablet Take 1 tablet (7.5 mg) by mouth daily 30 tablet 0     Warfarin Therapy Reminder 1 each continuous prn (for Afib)      discharge medications reconciled and changed, per note/orders    ROS: Unobtainable secondary to aphasia.   Vitals:/69   Pulse 92   Temp 97.9  F (36.6  C)   Resp 17   Ht 1.676 m (5' 6\")   Wt 68 kg (150 lb)   SpO2 98%   BMI 24.21 kg/m     Limited Visit Exam done given COVID-19 precautions:  GENERAL APPEARANCE:  in no distress  RESP:  unlabored breathing  M/S:   no joint deformity noted  SKIN:  no rash noted  NEURO:   no purposeful movement in upper and lower extremities  PSYCH:  affect and mood normal    Lab/Diagnostic data: Reviewed in the chart and EHR.      ASSESSMENT/PLAN:  ------------------------------  Right hemiparesis (H)  Hx of right parietal infarction with hemorrhagic conversion (2018)  Recent subacute infarction with petechial hemorrhage  Expressive and Receptive aphasia  Severe Dysphagia s/p PEG tube (H)  Debility  - not on statin.    - started rehab program, making a progress, continue until desired goal is achieved.   - Continues to requried  extensive  nursing assistance with ADLs      Chronic A-fib:  ON coumadin for A-fib (H)  - OAC was discontinued on 6/2019.   - noted to have A-fib/ flutter while hospitalized, placed on coumadin and metoprolol  - CVR  -  on coumadin with INR followed closely by GNP      Urinary retention with incomplete bladder emptying 2/2 mechanical obstruction  Failed multiple PVR, now with elena's catheter  - started on doxazosin.  - elena's care as per SNF protocol. Guardian holding on urology follow up for now      Hx of alcohol abuse: on folic acid and thiamin. No concern.     Order: See above, otherwise, continue the rest of the " current POC.     Electronically signed by:  Rome Mobley MD     Video-Visit Details  Type of service:  Video Visit  Video End Time (time video stopped): 11:39  Distant Location (provider location):  [facility]                   Sincerely,        Rome Mobley MD

## 2021-01-06 ENCOUNTER — HOSPITAL LABORATORY (OUTPATIENT)
Facility: OTHER | Age: 76
End: 2021-01-06

## 2021-01-06 LAB
ALBUMIN SERPL-MCNC: 2.7 G/DL (ref 3.4–5)
ALP SERPL-CCNC: 89 U/L (ref 40–150)
ALT SERPL W P-5'-P-CCNC: 47 U/L (ref 0–70)
ANION GAP SERPL CALCULATED.3IONS-SCNC: <1 MMOL/L (ref 3–14)
AST SERPL W P-5'-P-CCNC: 38 U/L (ref 0–45)
BILIRUB SERPL-MCNC: 0.4 MG/DL (ref 0.2–1.3)
BUN SERPL-MCNC: 21 MG/DL (ref 7–30)
CALCIUM SERPL-MCNC: 8 MG/DL (ref 8.5–10.1)
CHLORIDE SERPL-SCNC: 106 MMOL/L (ref 94–109)
CO2 SERPL-SCNC: 34 MMOL/L (ref 20–32)
CREAT SERPL-MCNC: 0.81 MG/DL (ref 0.66–1.25)
ERYTHROCYTE [DISTWIDTH] IN BLOOD BY AUTOMATED COUNT: 15.4 % (ref 10–15)
GFR SERPL CREATININE-BSD FRML MDRD: 87 ML/MIN/{1.73_M2}
GLUCOSE SERPL-MCNC: 55 MG/DL (ref 70–99)
HCT VFR BLD AUTO: 35 % (ref 40–53)
HGB BLD-MCNC: 11.2 G/DL (ref 13.3–17.7)
MAGNESIUM SERPL-MCNC: 2.1 MG/DL (ref 1.6–2.3)
MCH RBC QN AUTO: 31.1 PG (ref 26.5–33)
MCHC RBC AUTO-ENTMCNC: 32 G/DL (ref 31.5–36.5)
MCV RBC AUTO: 97 FL (ref 78–100)
PHOSPHATE SERPL-MCNC: 3.1 MG/DL (ref 2.5–4.5)
PLATELET # BLD AUTO: 230 10E9/L (ref 150–450)
POTASSIUM SERPL-SCNC: 3.8 MMOL/L (ref 3.4–5.3)
PROT SERPL-MCNC: 6.3 G/DL (ref 6.8–8.8)
RBC # BLD AUTO: 3.6 10E12/L (ref 4.4–5.9)
SODIUM SERPL-SCNC: 140 MMOL/L (ref 133–144)
WBC # BLD AUTO: 5 10E9/L (ref 4–11)

## 2021-01-07 ENCOUNTER — NURSING HOME VISIT (OUTPATIENT)
Dept: GERIATRICS | Facility: CLINIC | Age: 76
End: 2021-01-07
Payer: MEDICAID

## 2021-01-07 VITALS
WEIGHT: 155.8 LBS | DIASTOLIC BLOOD PRESSURE: 75 MMHG | SYSTOLIC BLOOD PRESSURE: 104 MMHG | BODY MASS INDEX: 25.15 KG/M2 | TEMPERATURE: 96.7 F | RESPIRATION RATE: 14 BRPM | HEART RATE: 70 BPM | OXYGEN SATURATION: 94 %

## 2021-01-07 DIAGNOSIS — Z51.81 ENCOUNTER FOR THERAPEUTIC DRUG MONITORING: ICD-10-CM

## 2021-01-07 DIAGNOSIS — Z79.01 LONG TERM CURRENT USE OF ANTICOAGULANT THERAPY: ICD-10-CM

## 2021-01-07 DIAGNOSIS — I48.91 NEW ONSET A-FIB (H): Primary | ICD-10-CM

## 2021-01-07 PROCEDURE — 99309 SBSQ NF CARE MODERATE MDM 30: CPT | Performed by: NURSE PRACTITIONER

## 2021-01-07 RX ORDER — ALUMINA, MAGNESIA, AND SIMETHICONE 2400; 2400; 240 MG/30ML; MG/30ML; MG/30ML
30 SUSPENSION ORAL 4 TIMES DAILY PRN
COMMUNITY
End: 2021-04-14

## 2021-01-07 NOTE — PROGRESS NOTES
Patient being seen in Nursing home. If needed they will refer back to us.     [facility]  Heart Failure, Advanced Heart Failure & CORE  Referral Specialist &     [facility]  Cardiology  Office: [phone]  [phone]

## 2021-01-07 NOTE — PROGRESS NOTES
[place] [facility]  [facility] Medical Record Number:  [id number]  Place of Service where encounter took place: [facility] [[id number]]    HPI:    Simone Daniels is a 72 year old  (5/26/1948), who is being seen today for an episodic care visit at the above location.   HPI information obtained from: facility chart records, facility staff, patient report and [facility] chart review. Today's concern is INR/Coumadin management for A. Fib    ROS/Subjective:  Bleeding Signs/Symptoms:  None  Thromboembolic Signs/Symptoms:  None  Medication Changes:  No  Dietary Changes:  No  Activity Changes: No  Bacterial/Viral Infection:  No  Missed Coumadin Doses:  None  On ASA: No  Other Concerns:  No    OBJECTIVE:  /75   Pulse 70   Temp 96.7  F (35.9  C)   Resp 14   Wt 70.7 kg (155 lb 12.8 oz)   SpO2 94%   BMI 25.15 kg/m    Last INR: 2.8 on 1/4/21  INR Today:  2.9  Current Dose:  7 mg daily     INR Flow sheet at [facility]:  Date INR New Dose/Orders   12/27 1.6 6 mg   12/28 1.63 7.5 mg   12/29 1.75 7.5 mg   12/30 1.77 7.5 mg   12/31 2.2 7.5 mg daily    1/4/21  2.8 7 mg daily                ASSESSMENT:  New onset a-fib (H)  Long term current use of anticoagulant therapy  Encounter for therapeutic drug monitoring  Therapeutic INR for goal of 2-3    PLAN:   Orders written by provider at facility  New Dose: warfarin 6 mg daily     Next INR: 1/11/21     Electronically signed by:  CAROLANN Cha CNP

## 2021-01-07 NOTE — LETTER
1/7/2021        RE: Simone Daniels  [employer]  [address]  [facility] [address]        [facility]  [facility] Record Number:  [id number]  Place of Service where encounter took place[facility] [[id number]]    HPI:    Simone Daniels is a 72 year old  (5/26/1948), who is being seen today for an episodic care visit at the above location.   HPI information obtained from: facility chart records, facility staff, patient report and [facility] chart review. Today's concern is INR/Coumadin management for A. Fib    ROS/Subjective:  Bleeding Signs/Symptoms:  None  Thromboembolic Signs/Symptoms:  None  Medication Changes:  No  Dietary Changes:  No  Activity Changes: No  Bacterial/Viral Infection:  No  Missed Coumadin Doses:  None  On ASA: No  Other Concerns:  No    OBJECTIVE:  /75   Pulse 70   Temp 96.7  F (35.9  C)   Resp 14   Wt 70.7 kg (155 lb 12.8 oz)   SpO2 94%   BMI 25.15 kg/m    Last INR: 2.8 on 1/4/21  INR Today:  2.9  Current Dose:  7 mg daily     INR Flow sheet at [facility]:  Date INR New Dose/Orders   12/27 1.6 6 mg   12/28 1.63 7.5 mg   12/29 1.75 7.5 mg   12/30 1.77 7.5 mg   12/31 2.2 7.5 mg daily    1/4/21  2.8 7 mg daily                ASSESSMENT:  New onset a-fib (H)  Long term current use of anticoagulant therapy  Encounter for therapeutic drug monitoring  Therapeutic INR for goal of 2-3    PLAN:   Orders written by provider at facility  New Dose: warfarin 6 mg daily     Next INR: 1/11/21     Electronically signed by:  CAROLANN Cha CNP           Sincerely,        CAROLANN Cha CNP

## 2021-01-11 ENCOUNTER — HOSPITAL LABORATORY (OUTPATIENT)
Facility: OTHER | Age: 76
End: 2021-01-11

## 2021-01-11 ENCOUNTER — HOSPITAL ENCOUNTER (EMERGENCY)
Facility: CLINIC | Age: 76
Discharge: HOME | End: 2021-01-11
Attending: FAMILY MEDICINE | Admitting: FAMILY MEDICINE
Payer: MEDICARE

## 2021-01-11 ENCOUNTER — APPOINTMENT (OUTPATIENT)
Dept: GENERAL RADIOLOGY | Facility: CLINIC | Age: 76
End: 2021-01-11
Attending: FAMILY MEDICINE
Payer: MEDICARE

## 2021-01-11 VITALS
RESPIRATION RATE: 14 BRPM | SYSTOLIC BLOOD PRESSURE: 106 MMHG | DIASTOLIC BLOOD PRESSURE: 72 MMHG | TEMPERATURE: 98.3 F | OXYGEN SATURATION: 98 % | HEART RATE: 75 BPM

## 2021-01-11 DIAGNOSIS — R79.1 SUBTHERAPEUTIC INTERNATIONAL NORMALIZED RATIO (INR): ICD-10-CM

## 2021-01-11 DIAGNOSIS — U07.1 2019 NOVEL CORONAVIRUS DISEASE (COVID-19): ICD-10-CM

## 2021-01-11 LAB
ANION GAP SERPL CALCULATED.3IONS-SCNC: 4 MMOL/L (ref 3–14)
BASOPHILS # BLD AUTO: 0 10E9/L (ref 0–0.2)
BASOPHILS NFR BLD AUTO: 0.3 %
BUN SERPL-MCNC: 19 MG/DL (ref 7–30)
CALCIUM SERPL-MCNC: 8.1 MG/DL (ref 8.5–10.1)
CHLORIDE SERPL-SCNC: 106 MMOL/L (ref 94–109)
CO2 SERPL-SCNC: 25 MMOL/L (ref 20–32)
CREAT SERPL-MCNC: 0.86 MG/DL (ref 0.66–1.25)
DIFFERENTIAL METHOD BLD: ABNORMAL
EOSINOPHIL # BLD AUTO: 0 10E9/L (ref 0–0.7)
EOSINOPHIL NFR BLD AUTO: 0.5 %
ERYTHROCYTE [DISTWIDTH] IN BLOOD BY AUTOMATED COUNT: 15.6 % (ref 10–15)
GFR SERPL CREATININE-BSD FRML MDRD: 85 ML/MIN/{1.73_M2}
GLUCOSE SERPL-MCNC: 100 MG/DL (ref 70–99)
HCT VFR BLD AUTO: 34 % (ref 40–53)
HGB BLD-MCNC: 11 G/DL (ref 13.3–17.7)
IMM GRANULOCYTES # BLD: 0 10E9/L (ref 0–0.4)
IMM GRANULOCYTES NFR BLD: 0.3 %
INR PPP: 1.84 (ref 0.86–1.14)
LYMPHOCYTES # BLD AUTO: 0.3 10E9/L (ref 0.8–5.3)
LYMPHOCYTES NFR BLD AUTO: 7.1 %
MCH RBC QN AUTO: 31.1 PG (ref 26.5–33)
MCHC RBC AUTO-ENTMCNC: 32.4 G/DL (ref 31.5–36.5)
MCV RBC AUTO: 96 FL (ref 78–100)
MONOCYTES # BLD AUTO: 0.5 10E9/L (ref 0–1.3)
MONOCYTES NFR BLD AUTO: 14.8 %
NEUTROPHILS # BLD AUTO: 2.8 10E9/L (ref 1.6–8.3)
NEUTROPHILS NFR BLD AUTO: 77 %
NRBC # BLD AUTO: 0 10*3/UL
NRBC BLD AUTO-RTO: 0 /100
PLATELET # BLD AUTO: 230 10E9/L (ref 150–450)
POTASSIUM SERPL-SCNC: 4.1 MMOL/L (ref 3.4–5.3)
RBC # BLD AUTO: 3.54 10E12/L (ref 4.4–5.9)
SODIUM SERPL-SCNC: 135 MMOL/L (ref 133–144)
WBC # BLD AUTO: 3.6 10E9/L (ref 4–11)

## 2021-01-11 PROCEDURE — 85610 PROTHROMBIN TIME: CPT | Performed by: FAMILY MEDICINE

## 2021-01-11 PROCEDURE — 80048 BASIC METABOLIC PNL TOTAL CA: CPT | Performed by: FAMILY MEDICINE

## 2021-01-11 PROCEDURE — 99284 EMERGENCY DEPT VISIT MOD MDM: CPT | Performed by: FAMILY MEDICINE

## 2021-01-11 PROCEDURE — 71046 X-RAY EXAM CHEST 2 VIEWS: CPT

## 2021-01-11 PROCEDURE — 99285 EMERGENCY DEPT VISIT HI MDM: CPT | Mod: 25 | Performed by: FAMILY MEDICINE

## 2021-01-11 PROCEDURE — 85025 COMPLETE CBC W/AUTO DIFF WBC: CPT | Performed by: FAMILY MEDICINE

## 2021-01-11 PROCEDURE — 99284 EMERGENCY DEPT VISIT MOD MDM: CPT | Mod: 25 | Performed by: FAMILY MEDICINE

## 2021-01-11 RX ORDER — ACETAMINOPHEN 500 MG
1000 TABLET ORAL EVERY 6 HOURS PRN
COMMUNITY
End: 2021-03-29

## 2021-01-11 RX ORDER — ACETAMINOPHEN 650 MG/1
650 SUPPOSITORY RECTAL EVERY 4 HOURS PRN
COMMUNITY
End: 2021-02-05

## 2021-01-11 RX ORDER — DOXAZOSIN 4 MG/1
4 TABLET ORAL AT BEDTIME
COMMUNITY
End: 2021-04-14

## 2021-01-11 RX ORDER — ONDANSETRON 4 MG/1
4 TABLET, FILM COATED ORAL EVERY 6 HOURS PRN
COMMUNITY
End: 2021-01-30

## 2021-01-11 RX ORDER — MULTIPLE VITAMINS W/ MINERALS TAB 9MG-400MCG
1 TAB ORAL DAILY
COMMUNITY

## 2021-01-11 ASSESSMENT — ENCOUNTER SYMPTOMS
FEVER: 1
SHORTNESS OF BREATH: 0
COUGH: 0

## 2021-01-11 NOTE — ED NOTES
Leighton brady the [facility] called and requested patient get PCR test done while here if possible as he is due for it.

## 2021-01-11 NOTE — ED TRIAGE NOTES
Patient brought in by EMS. Patient denies any symptoms. EMS reports that patient is from [facility] and they wanted him to be seen because he is COVID positive and has a fever that is not breaking with Tylenol and has diminished lung sounds with low O2 Saturations. EMS reports patient to have clear lung sounds through out, O2 saturations 96-98% on room air.     Notes from [facility] report patient's vitals signs to be the following at 0945: T: 100.1, P-91, RR-20, BP-132/78. 1000- T-101.1 Tylenol 1000 mg PO given, resident restless, diminished lung sounds, tube feeding completed at 0900. 1045- O2 saturation- 92%, Temp- 100.0

## 2021-01-11 NOTE — ED NOTES
Writer called and spoke with Tiffany at [facility]. Patient tested positive around 0400 this morning. Tiffany reports that his fever was up to 102 and that patient had Tylenol just prior to EMS arrival. Tiffany reports that he had a fast transition and since he is a full code they have to transfer him to a hospital. Tiffany is requesting a PCR antibodies for COVID.

## 2021-01-11 NOTE — ED PROVIDER NOTES
History     Chief Complaint   Patient presents with     Fever     Patient nonverbal; from [facility], they report diminished lung sounds and 101 fever this morning     HPI  Simone Daniels is a 72 year old male who presents with history of left parietal hemorrhagic infarction, nonverbal, long-term use of anticoagulants on warfarin, PEG tube feedings, hypertension and incomplete bladder emptying with Flores catheter in place.  Presents after having tested early this morning for Covid and positive after fever to T-max of 102.  His temp came down to 100 after Tylenol.  He presents here nontoxic with normal blood pressures.  Full CODE STATUS.  No respiratory distress.  No cough.  No obvious signs of shortness of breath.      Allergies:  No Known Allergies    Problem List:    Patient Active Problem List    Diagnosis Date Noted     Flores catheter in place 01/01/2021     Priority: Medium     Long term current use of anticoagulant therapy 01/01/2021     Priority: Medium     PEG (percutaneous endoscopic gastrostomy) status (H) 01/01/2021     Priority: Medium     Late effects of cerebral ischemic stroke 01/01/2021     Priority: Medium     Essential hypertension 01/01/2021     Priority: Medium     Hemorrhagic stroke (H) 11/25/2020     Priority: Medium     Elevated prostate specific antigen (PSA) 04/19/2019     Priority: Medium     Urge incontinence of urine 04/19/2019     Priority: Medium     Chronic atrial fibrillation (H) 04/19/2019     Priority: Medium     Urinary retention with incomplete bladder emptying 12/11/2018     Priority: Medium     Left parietal hemorrhagic infarction (H) 11/27/2018     Priority: Medium     Post herpetic neuralgia 06/19/2017     Priority: Medium     CARDIOVASCULAR SCREENING; LDL GOAL LESS THAN 160 10/31/2010     Priority: Medium     ESOPHAGEAL REFLUX 10/19/2006     Priority: Medium     Left knee osteoarthritis 04/08/2006     Priority: Medium     Anxiety state 05/26/2005     Priority: Medium      Problem list name updated by automated process. Provider to review          Past Medical History:    Past Medical History:   Diagnosis Date     Cerebral infarction (H)      LOW BACK PAIN        Past Surgical History:    Past Surgical History:   Procedure Laterality Date     IR GASTROSTOMY TUBE PERCUTANEOUS PLCMNT  12/3/2020     SURGICAL HISTORY OF -       Cervical disc (5,6,&7)     SURGICAL HISTORY OF -       (L) Knee fx tibial plateau     SURGICAL HISTORY OF -       (L) Knee surgery - re-fx tibial plateau       Family History:    Family History   Problem Relation Age of Onset     C.A.D. Mother         heart disease in her 60s     C.A.D. Brother         1/2 brother with heart disease     Diabetes No family hx of        Social History:  Marital Status:  Single [1]  Social History     Tobacco Use     Smoking status: Former Smoker     Packs/day: 2.00     Years: 18.00     Pack years: 36.00     Types: Cigarettes     Quit date: 1980     Years since quittin.0     Smokeless tobacco: Never Used   Substance Use Topics     Alcohol use: Yes     Frequency: 2-4 times a month     Drinks per session: 1 or 2     Binge frequency: Never     Comment: occaisonal     Drug use: No        Medications:         alum & mag hydroxide-simethicone (MAALOX  ES) 400-400-40 MG/5ML SUSP suspension       CRANBERRY JUICE EXTRACT PO       doxazosin (CARDURA) 0.6 mg/mL SUSP suspension       folic acid (FOLVITE) 1 MG tablet       melatonin 3 MG tablet       metoprolol tartrate (LOPRESSOR) 25 MG tablet       multivitamins w/minerals (CERTAVITE) liquid       thiamine (B-1) 100 MG tablet       warfarin ANTICOAGULANT (COUMADIN) 7.5 MG tablet       Warfarin Therapy Reminder          Review of Systems   Unable to perform ROS: Patient nonverbal   Constitutional: Positive for fever.   Respiratory: Negative for cough and shortness of breath.    Skin: Negative for rash.       Physical Exam   BP: 113/72  Pulse: 78  Temp: 98.9  F (37.2  C)  Resp:  18  SpO2: 98 %      Physical Exam  Constitutional:       General: He is not in acute distress.     Appearance: He is not diaphoretic.   HENT:      Head: Atraumatic.   Eyes:      Conjunctiva/sclera: Conjunctivae normal.   Neck:      Musculoskeletal: Neck supple.   Cardiovascular:      Rate and Rhythm: Normal rate and regular rhythm.      Heart sounds: No murmur.   Pulmonary:      Effort: No respiratory distress.      Breath sounds: No wheezing.   Musculoskeletal:      Right lower leg: No edema.      Left lower leg: No edema.   Skin:     Coloration: Skin is not pale.      Findings: No rash.   Neurological:      Mental Status: He is alert.      Motor: No weakness.         ED Course        Procedures               Critical Care time:  none               Results for orders placed or performed during the hospital encounter of 01/11/21 (from the past 24 hour(s))   CBC with platelets differential   Result Value Ref Range    WBC 3.6 (L) 4.0 - 11.0 10e9/L    RBC Count 3.54 (L) 4.4 - 5.9 10e12/L    Hemoglobin 11.0 (L) 13.3 - 17.7 g/dL    Hematocrit 34.0 (L) 40.0 - 53.0 %    MCV 96 78 - 100 fl    MCH 31.1 26.5 - 33.0 pg    MCHC 32.4 31.5 - 36.5 g/dL    RDW 15.6 (H) 10.0 - 15.0 %    Platelet Count 230 150 - 450 10e9/L    Diff Method Automated Method     % Neutrophils 77.0 %    % Lymphocytes 7.1 %    % Monocytes 14.8 %    % Eosinophils 0.5 %    % Basophils 0.3 %    % Immature Granulocytes 0.3 %    Nucleated RBCs 0 0 /100    Absolute Neutrophil 2.8 1.6 - 8.3 10e9/L    Absolute Lymphocytes 0.3 (L) 0.8 - 5.3 10e9/L    Absolute Monocytes 0.5 0.0 - 1.3 10e9/L    Absolute Eosinophils 0.0 0.0 - 0.7 10e9/L    Absolute Basophils 0.0 0.0 - 0.2 10e9/L    Abs Immature Granulocytes 0.0 0 - 0.4 10e9/L    Absolute Nucleated RBC 0.0    Basic metabolic panel   Result Value Ref Range    Sodium 135 133 - 144 mmol/L    Potassium 4.1 3.4 - 5.3 mmol/L    Chloride 106 94 - 109 mmol/L    Carbon Dioxide 25 20 - 32 mmol/L    Anion Gap 4 3 - 14 mmol/L     Glucose 100 (H) 70 - 99 mg/dL    Urea Nitrogen 19 7 - 30 mg/dL    Creatinine 0.86 0.66 - 1.25 mg/dL    GFR Estimate 85 >60 mL/min/[1.73_m2]    GFR Estimate If Black >90 >60 mL/min/[1.73_m2]    Calcium 8.1 (L) 8.5 - 10.1 mg/dL   INR   Result Value Ref Range    INR 1.84 (H) 0.86 - 1.14   Chest XR,  PA & LAT    Narrative    CHEST TWO VIEWS 1/11/2021 1:34 PM     HISTORY: fever    COMPARISON: 11/25/2020.       Impression    IMPRESSION: The cardiac silhouette is within normal limits. No  evidence for infiltrate or effusion. Degenerative changes are noted  through the spine.    MELISSA NICHOLSON MD       Medications - No data to display    Assessments & Plan (with Medical Decision Making)     MDM: Simone Daniels is a 72 year old male who presents with history of left parietal hemorrhagic CVA, nonverbal, on PEG tube feedings who presents with positive COVID-19 after spiking a temperature overnight from nursing home here.  No significant respiratory symptoms temp was down after Tylenol.  No serious findings on his examination.  No signs of toxicity.  No accessory muscle use.  His chest x-ray demonstrates no pneumonia.  We will have him follow-up with the nursing home staff.  Made recommendations as below related to Covid.  Have placed an order for monoclonal antibody.  Precautions given for return.    I have reviewed the nursing notes.    I have reviewed the findings, diagnosis, plan and need for follow up with the patient.       New Prescriptions    No medications on file       Final diagnoses:   2019 novel coronavirus disease (COVID-19) - continue to monitor oxygen saturation, concentrate on deep breathing. avioid lying flat. tylenol for fever.  monitoing by NH medical staff.  monoclonal antibody referral is placed and they should be in contact in the next day   Subtherapeutic international normalized ratio (INR) - to be addressed by NH staff       1/11/2021   [facility] EMERGENCY DEPT     Leo Zhao  MD KIMMIE  01/11/21 152

## 2021-01-11 NOTE — DISCHARGE INSTRUCTIONS
ICD-10-CM    1. 2019 novel coronavirus disease (COVID-19)  U07.1 COVID-19 [facility] Referral     COVID-19 Monoclonal Antibodies Referral    continue to monitor oxygen saturation, concentrate on deep breathing. avioid lying flat. tylenol for fever.  monitoing by NH medical staff.  monoclonal antibody referral is placed and they should be in contact in the next day   2. Subtherapeutic international normalized ratio (INR)  R79.1     to be addressed by NH staff

## 2021-01-12 ENCOUNTER — TELEPHONE (OUTPATIENT)
Dept: UROLOGY | Facility: CLINIC | Age: 76
End: 2021-01-12

## 2021-01-12 ENCOUNTER — VIRTUAL VISIT (OUTPATIENT)
Dept: GERIATRICS | Facility: CLINIC | Age: 76
End: 2021-01-12
Payer: MEDICAID

## 2021-01-12 VITALS
WEIGHT: 155.7 LBS | RESPIRATION RATE: 20 BRPM | OXYGEN SATURATION: 93 % | DIASTOLIC BLOOD PRESSURE: 72 MMHG | BODY MASS INDEX: 25.13 KG/M2 | HEART RATE: 98 BPM | SYSTOLIC BLOOD PRESSURE: 117 MMHG | TEMPERATURE: 103.6 F

## 2021-01-12 DIAGNOSIS — I69.30 LATE EFFECTS OF CEREBRAL ISCHEMIC STROKE: ICD-10-CM

## 2021-01-12 DIAGNOSIS — I69.320 APHASIA AS LATE EFFECT OF CEREBROVASCULAR ACCIDENT: ICD-10-CM

## 2021-01-12 DIAGNOSIS — U07.1 2019 NOVEL CORONAVIRUS DISEASE (COVID-19): Primary | ICD-10-CM

## 2021-01-12 DIAGNOSIS — U07.1 2019 NOVEL CORONAVIRUS DISEASE (COVID-19): ICD-10-CM

## 2021-01-12 DIAGNOSIS — M25.562 ARTHRALGIA OF LEFT LOWER LEG: ICD-10-CM

## 2021-01-12 DIAGNOSIS — R23.9 ALTERATION IN SKIN INTEGRITY: ICD-10-CM

## 2021-01-12 DIAGNOSIS — Z97.8 FOLEY CATHETER IN PLACE: ICD-10-CM

## 2021-01-12 DIAGNOSIS — I61.9 HEMORRHAGIC STROKE (H): ICD-10-CM

## 2021-01-12 DIAGNOSIS — R33.9 URINARY RETENTION WITH INCOMPLETE BLADDER EMPTYING: ICD-10-CM

## 2021-01-12 LAB
SARS-COV-2 RNA RESP QL NAA+PROBE: ABNORMAL
SPECIMEN SOURCE: ABNORMAL

## 2021-01-12 PROCEDURE — 99309 SBSQ NF CARE MODERATE MDM 30: CPT | Mod: 95 | Performed by: NURSE PRACTITIONER

## 2021-01-12 RX ORDER — HEPARIN SODIUM,PORCINE 10 UNIT/ML
5 VIAL (ML) INTRAVENOUS
Status: CANCELLED | OUTPATIENT
Start: 2021-01-12

## 2021-01-12 RX ORDER — HEPARIN SODIUM (PORCINE) LOCK FLUSH IV SOLN 100 UNIT/ML 100 UNIT/ML
5 SOLUTION INTRAVENOUS
Status: CANCELLED | OUTPATIENT
Start: 2021-01-12

## 2021-01-12 NOTE — LETTER
"    1/12/2021        RE: Simone Daniels  [employer]  [address]  [place] [address]        [place] [facility]   Simoen Daniels is being evaluated via a billable video visit.   The patient has been notified of following:  \"This video visit will be conducted via a call between you and your provider. We have found that certain health care needs can be provided without the need for an in-person physical exam.  This service lets us provide the care you need with a video conversation. If during the course of the call the provider feels a video visit is not appropriate, you will not be charged for this service.\"   The provider has received verbal consent for a Video Visit from the patient or first contact? Yes  Patient  or facility staff would like the video invitation sent by: N/A   Video Start Time: 10:25am    [facility] Medical Record Number:  [id number]  [facility] of [facility] at the time of visit[facility]     Chief Complaint   Patient presents with     Video Visit     Nursing Home Acute     HPI:  Simone Daniels  is a 72 year old (5/26/1948), who is being seen today for a visit.  HPI information obtained from: facility chart records, patient report and [facility] chart review. Today's concern is:    1. 2019 novel coronavirus disease (COVID-19)    2. Alteration in skin integrity    3. Aphasia as late effect of cerebrovascular accident    4. Late effects of cerebral ischemic stroke    5. Hemorrhagic stroke (H)    6. Urinary retention with incomplete bladder emptying    7. Flores catheter in place    8. Arthralgia of left lower leg      Came to see Simone today with assist of a NP student via video visit.  Visit done per request of regular NP.  Simone went to the [place] ED on 1/11/2021.  Earlier that day he was diagnosed with COVID virus but a fever of 102 was sent to the ED.  Full Code status.  Could not find anything significant at the ED visit and so sent back to the nursing " home for care.    Today Simone is sitting up in a comfortable chair in his room.  Alert and pleasant.  Could answer simple questions with gestures.    Denied pain in his legs.  In fact after the visit, he got up and followed the student out to the nursing desk with assist of device.    Was asked to make sure that the Doxazosin was the correct dose as transcribed wrong.  What could be seen in chart appeared to be correct now.  4mg daily.      Also checking for left hand swelling.    Past Medical and Surgical History reviewed in Epic today.  MEDICATIONS:    Current Outpatient Medications   Medication Sig Dispense Refill     acetaminophen (TYLENOL) 500 MG tablet Take 1,000 mg by mouth every 6 hours as needed for pain       acetaminophen (TYLENOL) 650 MG suppository Place 650 mg rectally every 4 hours as needed       alum & mag hydroxide-simethicone (MAALOX  ES) 400-400-40 MG/5ML SUSP suspension Take 30 mLs by mouth every 4 hours as needed for indigestion       Cranberry 450 MG TABS Take 1 tablet by mouth daily       doxazosin (CARDURA) 4 MG tablet Take 4 mg by mouth At Bedtime       folic acid (FOLVITE) 1 MG tablet 1 tablet (1 mg) by Oral or Feeding Tube route daily (Patient taking differently: Take 1 mg by mouth daily )       melatonin 3 MG tablet 1 tablet (3 mg) by Oral or Feeding Tube route daily (Patient taking differently: Take 3 mg by mouth daily )       metoprolol tartrate (LOPRESSOR) 25 MG tablet 0.5 tablets (12.5 mg) by Oral or Feeding Tube route 2 times daily (Patient taking differently: Take 12.5 mg by mouth 2 times daily )       multivitamin w/minerals (THERA-VIT-M) tablet Take 1 tablet by mouth daily       ondansetron (ZOFRAN) 4 MG tablet Take 4 mg by mouth every 6 hours as needed for nausea       thiamine (B-1) 100 MG tablet 1 tablet (100 mg) by Oral or Feeding Tube route daily (Patient taking differently: Take 100 mg by mouth daily )       warfarin ANTICOAGULANT (COUMADIN) 7.5 MG tablet Take 1 tablet  (7.5 mg) by mouth daily 30 tablet 0     Warfarin Therapy Reminder 1 each continuous prn (for Afib)       REVIEW OF SYSTEMS: unable to say words but gestures used.  Denied pain in joints - legs, knees especially asked  No chest pain or shortness of breath.  No oxygen used.    Objective: /72   Pulse 98   Temp 103.6  F (39.8  C)   Resp 20   Wt 70.6 kg (155 lb 11.2 oz)   SpO2 93%   BMI 25.13 kg/m    Limited visit exam done given COVID-19 precautions.   GENERAL APPEARANCE:  Alert, in no distress, cooperative, seemed to be able to answer questions correctly  EYES:  EOM normal, conjunctiva and lids normal  RESP:  lungs are clear.  no pneumonia noted on CXR at hospital 1/11.  CV:  Palpation and auscultation of heart done , regular rate and rhythm, no murmur, rub, or gallop, no edema  ABDOMEN:  normal bowel sounds, soft, nontender, no hepatosplenomegaly or other masses, no guarding or rebound, PEG tube in place  M/S:   Gait and station normal, walked out after the NP student with a steady gait.  No particular swelling on left hand.  Joints appear larger.  SKIN:  Skin is pale/pink.  Right hand middle finger appeared macerated and dark pink.  Almost like a bandaid was on the finger and forgotten.  Now appears like a band of maceration.  Noted to have scabs on left shin.  PSYCH:  affect and mood normal, responded to his name, answered simple questions and same answer even after repeating some of them to him.  Aphasic.      Labs:   Component      Latest Ref Rng & Units 1/11/2021   COVID-19 Virus PCR to [facility] - Source       Nasopharyngeal   COVID-19 Virus PCR to [facility] - Result       Detected, Abnormal Result (AA)     Component      Latest Ref Rng & Units 1/11/2021   INR      0.86 - 1.14 1.84 (H)     Component      Latest Ref Rng & Units 1/11/2021   WBC      4.0 - 11.0 10e9/L 3.6 (L)   RBC Count      4.4 - 5.9 10e12/L 3.54 (L)   Hemoglobin      13.3 - 17.7 g/dL 11.0 (L)   Hematocrit      40.0 - 53.0 % 34.0 (L)    MCV      78 - 100 fl 96   MCH      26.5 - 33.0 pg 31.1   MCHC      31.5 - 36.5 g/dL 32.4   RDW      10.0 - 15.0 % 15.6 (H)   Platelet Count      150 - 450 10e9/L 230   Diff Method       Automated Method   % Neutrophils      % 77.0   % Lymphocytes      % 7.1   % Monocytes      % 14.8   % Eosinophils      % 0.5   % Basophils      % 0.3   % Immature Granulocytes      % 0.3   Nucleated RBCs      0 /100 0   Absolute Neutrophil      1.6 - 8.3 10e9/L 2.8   Absolute Lymphocytes      0.8 - 5.3 10e9/L 0.3 (L)   Absolute Monocytes      0.0 - 1.3 10e9/L 0.5   Absolute Eosinophils      0.0 - 0.7 10e9/L 0.0   Absolute Basophils      0.0 - 0.2 10e9/L 0.0   Abs Immature Granulocytes      0 - 0.4 10e9/L 0.0   Absolute Nucleated RBC       0.0   Sodium      133 - 144 mmol/L 135   Potassium      3.4 - 5.3 mmol/L 4.1   Chloride      94 - 109 mmol/L 106   Carbon Dioxide      20 - 32 mmol/L 25   Anion Gap      3 - 14 mmol/L 4   Glucose      70 - 99 mg/dL 100 (H)   Urea Nitrogen      7 - 30 mg/dL 19   Creatinine      0.66 - 1.25 mg/dL 0.86   GFR Estimate      >60 mL/min/1.73:m2 85   GFR Estimate If Black      >60 mL/min/1.73:m2 >90   Calcium      8.5 - 10.1 mg/dL 8.1 (L)     ASSESSMENT/PLAN:  2019 novel coronavirus disease (COVID-19)  - no pneumonia.  No oxygen used currently.  Continue to monitor fever status.  Is on quarantine on the COVID unit within the care center.    Alteration in skin integrity  Will send the  a email with orders above the macerated area noted on right middle finger.  Just to prevent infection setting in will use bacitracin thin layer and rub into the skin twice a day for 3 days    Aphasia as late effect of cerebrovascular accident  Late effects of cerebral ischemic stroke  Hemorrhagic stroke (H)  Continues to be in recovery stage.  Has coumadin for atrial fibrillation.  Seemed to be doing fair today with answering questions, cooperative and moving extremities.    No changes.      Urinary retention with  incomplete bladder emptying  Flores catheter in place  - remains on cardura 4mg daily.  Urine today in leg bag was clear and yellow.  Is being followed by urology.      Arthralgia of left lower leg  Denied pain in legs today and so did not order any scheduled Tylenol.  Did ask him twice if he felt the pain needed medication and he denied this.  Continue to monitor.      Orders written by provider at facility  1.  Bacitracin to right middle finger, rub in and do not cover, twice a day for 3 days to assist in healing and prevent infection.     Electronically signed by:  CAROLANN Osorio CNP     Video-Visit Details  Type of service:  Video Visit  Video End Time (time video stopped): 10:45am  Distant Location (provider location):  [place]               Sincerely,        CAROLANN Osorio CNP

## 2021-01-12 NOTE — PROGRESS NOTES
"[facility]   Simone Daniels is being evaluated via a billable video visit.   The patient has been notified of following:  \"This video visit will be conducted via a call between you and your provider. We have found that certain health care needs can be provided without the need for an in-person physical exam.  This service lets us provide the care you need with a video conversation. If during the course of the call the provider feels a video visit is not appropriate, you will not be charged for this service.\"   The provider has received verbal consent for a Video Visit from the patient or first contact? Yes  Patient  or facility staff would like the video invitation sent by: N/A   Video Start Time: 10:25am    [place] Medical Record Number:  [id number]  Place of Location at the time of visit: [facility]     Chief Complaint   Patient presents with     Video Visit     Nursing Home Acute     HPI:  Simone Daniels  is a 72 year old (5/26/1948), who is being seen today for a visit.  HPI information obtained from: facility chart records, patient report and [facility] chart review. Today's concern is:    1. 2019 novel coronavirus disease (COVID-19)    2. Alteration in skin integrity    3. Aphasia as late effect of cerebrovascular accident    4. Late effects of cerebral ischemic stroke    5. Hemorrhagic stroke (H)    6. Urinary retention with incomplete bladder emptying    7. Flores catheter in place    8. Arthralgia of left lower leg      Came to see Simone today with assist of a NP student via video visit.  Visit done per request of regular NP.  Simone went to the [place] ED on 1/11/2021.  Earlier that day he was diagnosed with COVID virus but a fever of 102 was sent to the ED.  Full Code status.  Could not find anything significant at the ED visit and so sent back to the nursing home for care.    Today Simone is sitting up in a comfortable chair in his room.  Alert and pleasant.  Could " answer simple questions with gestures.    Denied pain in his legs.  In fact after the visit, he got up and followed the student out to the nursing desk with assist of device.    Was asked to make sure that the Doxazosin was the correct dose as transcribed wrong.  What could be seen in chart appeared to be correct now.  4mg daily.      Also checking for left hand swelling.    Past Medical and Surgical History reviewed in Epic today.  MEDICATIONS:    Current Outpatient Medications   Medication Sig Dispense Refill     acetaminophen (TYLENOL) 500 MG tablet Take 1,000 mg by mouth every 6 hours as needed for pain       acetaminophen (TYLENOL) 650 MG suppository Place 650 mg rectally every 4 hours as needed       alum & mag hydroxide-simethicone (MAALOX  ES) 400-400-40 MG/5ML SUSP suspension Take 30 mLs by mouth every 4 hours as needed for indigestion       Cranberry 450 MG TABS Take 1 tablet by mouth daily       doxazosin (CARDURA) 4 MG tablet Take 4 mg by mouth At Bedtime       folic acid (FOLVITE) 1 MG tablet 1 tablet (1 mg) by Oral or Feeding Tube route daily (Patient taking differently: Take 1 mg by mouth daily )       melatonin 3 MG tablet 1 tablet (3 mg) by Oral or Feeding Tube route daily (Patient taking differently: Take 3 mg by mouth daily )       metoprolol tartrate (LOPRESSOR) 25 MG tablet 0.5 tablets (12.5 mg) by Oral or Feeding Tube route 2 times daily (Patient taking differently: Take 12.5 mg by mouth 2 times daily )       multivitamin w/minerals (THERA-VIT-M) tablet Take 1 tablet by mouth daily       ondansetron (ZOFRAN) 4 MG tablet Take 4 mg by mouth every 6 hours as needed for nausea       thiamine (B-1) 100 MG tablet 1 tablet (100 mg) by Oral or Feeding Tube route daily (Patient taking differently: Take 100 mg by mouth daily )       warfarin ANTICOAGULANT (COUMADIN) 7.5 MG tablet Take 1 tablet (7.5 mg) by mouth daily 30 tablet 0     Warfarin Therapy Reminder 1 each continuous prn (for Afib)       REVIEW  OF SYSTEMS: unable to say words but gestures used.  Denied pain in joints - legs, knees especially asked  No chest pain or shortness of breath.  No oxygen used.    Objective: /72   Pulse 98   Temp 103.6  F (39.8  C)   Resp 20   Wt 70.6 kg (155 lb 11.2 oz)   SpO2 93%   BMI 25.13 kg/m    Limited visit exam done given COVID-19 precautions.   GENERAL APPEARANCE:  Alert, in no distress, cooperative, seemed to be able to answer questions correctly  EYES:  EOM normal, conjunctiva and lids normal  RESP:  lungs are clear.  no pneumonia noted on CXR at hospital 1/11.  CV:  Palpation and auscultation of heart done , regular rate and rhythm, no murmur, rub, or gallop, no edema  ABDOMEN:  normal bowel sounds, soft, nontender, no hepatosplenomegaly or other masses, no guarding or rebound, PEG tube in place  M/S:   Gait and station normal, walked out after the NP student with a steady gait.  No particular swelling on left hand.  Joints appear larger.  SKIN:  Skin is pale/pink.  Right hand middle finger appeared macerated and dark pink.  Almost like a bandaid was on the finger and forgotten.  Now appears like a band of maceration.  Noted to have scabs on left shin.  PSYCH:  affect and mood normal, responded to his name, answered simple questions and same answer even after repeating some of them to him.  Aphasic.      Labs:   Component      Latest Ref Rng & Units 1/11/2021   COVID-19 Virus PCR to [facility] - Source       Nasopharyngeal   COVID-19 Virus PCR to [facility] - Result       Detected, Abnormal Result (AA)     Component      Latest Ref Rng & Units 1/11/2021   INR      0.86 - 1.14 1.84 (H)     Component      Latest Ref Rng & Units 1/11/2021   WBC      4.0 - 11.0 10e9/L 3.6 (L)   RBC Count      4.4 - 5.9 10e12/L 3.54 (L)   Hemoglobin      13.3 - 17.7 g/dL 11.0 (L)   Hematocrit      40.0 - 53.0 % 34.0 (L)   MCV      78 - 100 fl 96   MCH      26.5 - 33.0 pg 31.1   MCHC      31.5 - 36.5 g/dL 32.4   RDW      10.0 - 15.0 %  15.6 (H)   Platelet Count      150 - 450 10e9/L 230   Diff Method       Automated Method   % Neutrophils      % 77.0   % Lymphocytes      % 7.1   % Monocytes      % 14.8   % Eosinophils      % 0.5   % Basophils      % 0.3   % Immature Granulocytes      % 0.3   Nucleated RBCs      0 /100 0   Absolute Neutrophil      1.6 - 8.3 10e9/L 2.8   Absolute Lymphocytes      0.8 - 5.3 10e9/L 0.3 (L)   Absolute Monocytes      0.0 - 1.3 10e9/L 0.5   Absolute Eosinophils      0.0 - 0.7 10e9/L 0.0   Absolute Basophils      0.0 - 0.2 10e9/L 0.0   Abs Immature Granulocytes      0 - 0.4 10e9/L 0.0   Absolute Nucleated RBC       0.0   Sodium      133 - 144 mmol/L 135   Potassium      3.4 - 5.3 mmol/L 4.1   Chloride      94 - 109 mmol/L 106   Carbon Dioxide      20 - 32 mmol/L 25   Anion Gap      3 - 14 mmol/L 4   Glucose      70 - 99 mg/dL 100 (H)   Urea Nitrogen      7 - 30 mg/dL 19   Creatinine      0.66 - 1.25 mg/dL 0.86   GFR Estimate      >60 mL/min/1.73:m2 85   GFR Estimate If Black      >60 mL/min/1.73:m2 >90   Calcium      8.5 - 10.1 mg/dL 8.1 (L)     ASSESSMENT/PLAN:  2019 novel coronavirus disease (COVID-19)  - no pneumonia.  No oxygen used currently.  Continue to monitor fever status.  Is on quarantine on the COVID unit within the care center.    Alteration in skin integrity  Will send the  a email with orders above the macerated area noted on right middle finger.  Just to prevent infection setting in will use bacitracin thin layer and rub into the skin twice a day for 3 days    Aphasia as late effect of cerebrovascular accident  Late effects of cerebral ischemic stroke  Hemorrhagic stroke (H)  Continues to be in recovery stage.  Has coumadin for atrial fibrillation.  Seemed to be doing fair today with answering questions, cooperative and moving extremities.    No changes.      Urinary retention with incomplete bladder emptying  Flores catheter in place  - remains on cardura 4mg daily.  Urine today in leg bag was  clear and yellow.  Is being followed by urology.      Arthralgia of left lower leg  Denied pain in legs today and so did not order any scheduled Tylenol.  Did ask him twice if he felt the pain needed medication and he denied this.  Continue to monitor.      Orders written by provider at facility  1.  Bacitracin to right middle finger, rub in and do not cover, twice a day for 3 days to assist in healing and prevent infection.     Electronically signed by:  CAROLANN Osorio CNP     Video-Visit Details  Type of service:  Video Visit  Video End Time (time video stopped): 10:45am  Distant Location (provider location):  [place] [facility]

## 2021-01-13 ENCOUNTER — NURSING HOME VISIT (OUTPATIENT)
Dept: GERIATRICS | Facility: CLINIC | Age: 76
End: 2021-01-13
Payer: MEDICAID

## 2021-01-13 VITALS
RESPIRATION RATE: 18 BRPM | TEMPERATURE: 98.9 F | OXYGEN SATURATION: 98 % | SYSTOLIC BLOOD PRESSURE: 121 MMHG | DIASTOLIC BLOOD PRESSURE: 87 MMHG | HEART RATE: 96 BPM | WEIGHT: 155.7 LBS | BODY MASS INDEX: 25.13 KG/M2

## 2021-01-13 DIAGNOSIS — R33.9 URINARY RETENTION WITH INCOMPLETE BLADDER EMPTYING: ICD-10-CM

## 2021-01-13 DIAGNOSIS — I69.30 LATE EFFECTS OF CEREBRAL ISCHEMIC STROKE: ICD-10-CM

## 2021-01-13 DIAGNOSIS — I48.91 NEW ONSET A-FIB (H): ICD-10-CM

## 2021-01-13 DIAGNOSIS — Z79.01 LONG TERM CURRENT USE OF ANTICOAGULANT THERAPY: ICD-10-CM

## 2021-01-13 DIAGNOSIS — Z97.8 FOLEY CATHETER IN PLACE: ICD-10-CM

## 2021-01-13 DIAGNOSIS — U07.1 CLINICAL DIAGNOSIS OF COVID-19: Primary | ICD-10-CM

## 2021-01-13 DIAGNOSIS — G81.91 RIGHT HEMIPARESIS (H): ICD-10-CM

## 2021-01-13 PROCEDURE — 99309 SBSQ NF CARE MODERATE MDM 30: CPT | Performed by: NURSE PRACTITIONER

## 2021-01-13 NOTE — LETTER
1/13/2021        RE: Simone Daniels  [facility]  [address]  [facility] [address]        [place] [facility]  [facility] Medical Record Number:  [id number]  Place of Service where encounter took place:  [facility] [[id number]]  Chief Complaint   Patient presents with     Nursing Home Acute       HPI:    Simone Daniels  is a 72 year old (5/26/1948), who is being seen today for an episodic care visit at: [facility] [[id number]].     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  S/he was admitted to the hospital after found to be behaving abnormally (disheveled, mute, confused) and found to have new subacute infarct in R parietal lobe (R MCA distribution) with petechial hemorrhage and severe expressive and receptive aphasia, failed swallow eval. CTA was unremarkable. The hospital stay was complicated with enterococcus UTI and was treated.  EEG showed intermittent slowing in the L anterior head, a TTE on 11/25/20 showed grade 3 diastolic dysfunction with enlarged bilateral atria but intact septum.  He had a short run of afib/flutter on 11/30 and started on metoprolol.  Warfarin started on day 10 post bleed.  Continued to fail swallowing evaluate so a PEG tube was placed. He also has persistent urinary retention-failed several voiding trials in the hospital and continues to have indwelling elena cath-started on doxazosin. He suffered a traumatic strait cath on 11/29/20.  His extended stay at the hospital was in part caused by the need for a court appointed guardian due to his new persistent severe aphasia, limited family who can help him.      Recent changes:    12/31 - guardian approves treatment in place without follow up with cardiology or Urology unless absolutely needed    1/1/21 - to ED  for urgent replacement of PEG tube due to traumatic inadvertent removal     1/10/21 - covid + per antigen testing        Tested positive from antigen test at [facility] on 1/10/21. Confirmed by PCR on 1/11/21.    Currently experiencing moderate symptoms.     Code status is confirmed Full Code  and is updated at the facility and [facility].      Staff will certainly ensure access to whatever food is appetizing and will encourage fluid intake, additional supplements prn.     PRN Tylenol, zofran, O2 available/ordered.     Today's concern is:     Clinical diagnosis of COVID-19  New onset a-fib (H)  Long term current use of anticoagulant therapy  Right hemiparesis (H)  Late effects of cerebral ischemic stroke  Urinary retention with incomplete bladder emptying  Flores catheter in place    Simone is unable to answer questions due to receptive and expressive aphasia.  He is alert, walking in the halls, and smiling.  Nursing reports occasional temp, crackles in lungs.       Past Medical, Surgical, and Family History reviewed in Epic today.    MEDICATIONS:  Current medication list reviewed    REVIEW OF SYSTEMS:  Unobtainable secondary to aphasia.     OBJECTIVE:  /87   Pulse 96   Temp 98.9  F (37.2  C)   Resp 18   Wt 70.6 kg (155 lb 11.2 oz)   SpO2 98%   BMI 25.13 kg/m    Exam:  GENERAL APPEARANCE:  Alert, in no apparent distress   CHEST/RESP:  respiratory effort normal, no respiratory distress, lung sounds faint basilar crackles   CV:  Rate and rhythm reg, no murmur/rub/gallop, 1+ peripheral edema in L hand  M/S:   gait & station normal-walking without assistive device, digits and nails within normal limits   PSYCH:  Unable to determine orientation due to aphasia but appears to understand simple one-step directions    Labs:   Recent labs in [facility] reviewed by me today.      Date INR New Dose/Orders   12/27 1.6 6 mg   12/28 1.63 7.5 mg   12/29 1.75 7.5 mg   12/30 1.77 7.5 mg   12/31 2.2 7.5 mg daily    1/4/21  2.8  7 mg daily     1/7 2.9 6 mg daily   1/11 1.9 7 mg daily    1/13 2.4 7 mg daily                    ASSESSMENT/PLAN:  Clinical diagnosis of COVID-19  Currently is stable, but high risk for worsening symptoms given disease process in frail elderly. Will need close supervision as he is unable to express how he is feeling.     Started droplet isolation precautions    Zofran 4 mg po tid prn nausea    Tylenol 650 mg po or rectal QID PRN for fever or pain    Extra strength tylenol 1000 mg po QID PRN for fever or pain    Do not exceed 4000 mg tylenol from all sources per 24 hours    O2 at 2-4 lpm per NC to keep sats >89% diagnosis hypoxia    Nutritional supplement BID PRN PO for weight loss or loss of appetite     Hold insulin if not taking in PO    Hold BP medications if sBP is <100    Hold current dose of diuretic if active emesis    New onset a-fib (H)  Long term current use of anticoagulant therapy  INR today 2.4.  INR therapeutic. no signs of bleeding. Condition and other health concerns as noted above with new covid infection  -warfarin and next INR as ordered below     Right hemiparesis (H)  Late effects of cerebral ischemic stroke  No new symptoms, stable with ongoing R hemiparesis, aphasia which is limiting the ability to complete cognitive testing     Urinary retention with incomplete bladder emptying  Flores catheter in place  Stable without new symptoms.  Urine clear.     Orders written by provider at facility    Warfarin 7 mg daily     Recheck INR on 1/15/21     Electronically signed by:  CAROLANN Cha CNP            Sincerely,        CAROLANN Cha CNP

## 2021-01-13 NOTE — PROGRESS NOTES
[place] [facility]  [place] Medical Record Number:  [id number]  Place of Service where encounter took place:  [facility] ([facility]) [[id number]]  Chief Complaint   Patient presents with     Nursing Home Acute       HPI:    Simone Daniels  is a 72 year old (5/26/1948), who is being seen today for an episodic care visit at: [facility] ([facility]) [[id number]].     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  S/he was admitted to the hospital after found to be behaving abnormally (disheveled, mute, confused) and found to have new subacute infarct in R parietal lobe (R MCA distribution) with petechial hemorrhage and severe expressive and receptive aphasia, failed swallow eval. CTA was unremarkable. The hospital stay was complicated with enterococcus UTI and was treated.  EEG showed intermittent slowing in the L anterior head, a TTE on 11/25/20 showed grade 3 diastolic dysfunction with enlarged bilateral atria but intact septum.  He had a short run of afib/flutter on 11/30 and started on metoprolol.  Warfarin started on day 10 post bleed.  Continued to fail swallowing evaluate so a PEG tube was placed. He also has persistent urinary retention-failed several voiding trials in the hospital and continues to have indwelling elena cath-started on doxazosin. He suffered a traumatic strait cath on 11/29/20.  His extended stay at the hospital was in part caused by the need for a court appointed guardian due to his new persistent severe aphasia, limited family who can help him.      Recent changes:    12/31 - guardian approves treatment in place without follow up with cardiology or Urology unless absolutely needed    1/1/21 - to ED for urgent replacement of PEG tube due to traumatic inadvertent removal     1/10/21 - covid + per antigen  testing        Tested positive from antigen test at [facility] on 1/10/21. Confirmed by PCR on 1/11/21.    Currently experiencing moderate symptoms.     Code status is confirmed Full Code  and is updated at the facility and [facility].      Staff will certainly ensure access to whatever food is appetizing and will encourage fluid intake, additional supplements prn.     PRN Tylenol, zofran, O2 available/ordered.     Today's concern is:     Clinical diagnosis of COVID-19  New onset a-fib (H)  Long term current use of anticoagulant therapy  Right hemiparesis (H)  Late effects of cerebral ischemic stroke  Urinary retention with incomplete bladder emptying  Flores catheter in place    Simone is unable to answer questions due to receptive and expressive aphasia.  He is alert, walking in the halls, and smiling.  Nursing reports occasional temp, crackles in lungs.       Past Medical, Surgical, and Family History reviewed in Epic today.    MEDICATIONS:  Current medication list reviewed    REVIEW OF SYSTEMS:  Unobtainable secondary to aphasia.     OBJECTIVE:  /87   Pulse 96   Temp 98.9  F (37.2  C)   Resp 18   Wt 70.6 kg (155 lb 11.2 oz)   SpO2 98%   BMI 25.13 kg/m    Exam:  GENERAL APPEARANCE:  Alert, in no apparent distress   CHEST/RESP:  respiratory effort normal, no respiratory distress, lung sounds faint basilar crackles   CV:  Rate and rhythm reg, no murmur/rub/gallop, 1+ peripheral edema in L hand  M/S:   gait & station normal-walking without assistive device, digits and nails within normal limits   PSYCH:  Unable to determine orientation due to aphasia but appears to understand simple one-step directions    Labs:   Recent labs in [facility] reviewed by me today.      Date INR New Dose/Orders   12/27 1.6 6 mg   12/28 1.63 7.5 mg   12/29 1.75 7.5 mg   12/30 1.77 7.5 mg   12/31 2.2 7.5 mg daily    1/4/21  2.8 7 mg daily     1/7 2.9 6 mg daily   1/11 1.9 7 mg daily    1/13 2.4 7 mg daily                     ASSESSMENT/PLAN:  Clinical diagnosis of COVID-19  Currently is stable, but high risk for worsening symptoms given disease process in frail elderly. Will need close supervision as he is unable to express how he is feeling.     Started droplet isolation precautions    Zofran 4 mg po tid prn nausea    Tylenol 650 mg po or rectal QID PRN for fever or pain    Extra strength tylenol 1000 mg po QID PRN for fever or pain    Do not exceed 4000 mg tylenol from all sources per 24 hours    O2 at 2-4 lpm per NC to keep sats >89% diagnosis hypoxia    Nutritional supplement BID PRN PO for weight loss or loss of appetite     Hold insulin if not taking in PO    Hold BP medications if sBP is <100    Hold current dose of diuretic if active emesis    New onset a-fib (H)  Long term current use of anticoagulant therapy  INR today 2.4.  INR therapeutic. no signs of bleeding. Condition and other health concerns as noted above with new covid infection  -warfarin and next INR as ordered below     Right hemiparesis (H)  Late effects of cerebral ischemic stroke  No new symptoms, stable with ongoing R hemiparesis, aphasia which is limiting the ability to complete cognitive testing     Urinary retention with incomplete bladder emptying  Flores catheter in place  Stable without new symptoms.  Urine clear.     Orders written by provider at facility    Warfarin 7 mg daily     Recheck INR on 1/15/21     Electronically signed by:  CAROLANN Cha CNP

## 2021-01-18 ENCOUNTER — NURSING HOME VISIT (OUTPATIENT)
Dept: GERIATRICS | Facility: CLINIC | Age: 76
End: 2021-01-18
Payer: MEDICAID

## 2021-01-18 VITALS
BODY MASS INDEX: 25.02 KG/M2 | RESPIRATION RATE: 16 BRPM | OXYGEN SATURATION: 93 % | HEART RATE: 94 BPM | SYSTOLIC BLOOD PRESSURE: 95 MMHG | TEMPERATURE: 98.7 F | WEIGHT: 155.7 LBS | DIASTOLIC BLOOD PRESSURE: 55 MMHG | HEIGHT: 66 IN

## 2021-01-18 DIAGNOSIS — G81.91 RIGHT HEMIPARESIS (H): ICD-10-CM

## 2021-01-18 DIAGNOSIS — R33.9 URINARY RETENTION WITH INCOMPLETE BLADDER EMPTYING: ICD-10-CM

## 2021-01-18 DIAGNOSIS — U07.1 CLINICAL DIAGNOSIS OF COVID-19: Primary | ICD-10-CM

## 2021-01-18 DIAGNOSIS — I48.91 NEW ONSET A-FIB (H): ICD-10-CM

## 2021-01-18 DIAGNOSIS — Z93.1 PEG (PERCUTANEOUS ENDOSCOPIC GASTROSTOMY) STATUS (H): ICD-10-CM

## 2021-01-18 DIAGNOSIS — Z97.8 FOLEY CATHETER IN PLACE: ICD-10-CM

## 2021-01-18 DIAGNOSIS — Z79.01 LONG TERM CURRENT USE OF ANTICOAGULANT THERAPY: ICD-10-CM

## 2021-01-18 PROCEDURE — 99309 SBSQ NF CARE MODERATE MDM 30: CPT | Performed by: NURSE PRACTITIONER

## 2021-01-18 ASSESSMENT — MIFFLIN-ST. JEOR: SCORE: 1389

## 2021-01-18 NOTE — LETTER
1/18/2021        RE: Simone Daniels  [facility]  [address]  [facility] [place] [facility] [address]        [place] [facility]  [place] Medical Record Number:  [id number]  Place of Service where encounter took place:  [facility] ([facility]) [[id number]]  Chief Complaint   Patient presents with     Nursing Home Acute       HPI:    Simone Daniels  is a 72 year old (5/26/1948), who is being seen today for an episodic care visit at: [facility] ([facility]) [[id number]].     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  S/he was admitted to the hospital after found to be behaving abnormally (disheveled, mute, confused) and found to have new subacute infarct in R parietal lobe (R MCA distribution) with petechial hemorrhage and severe expressive and receptive aphasia, failed swallow eval. CTA was unremarkable. The hospital stay was complicated with enterococcus UTI and was treated.  EEG showed intermittent slowing in the L anterior head, a TTE on 11/25/20 showed grade 3 diastolic dysfunction with enlarged bilateral atria but intact septum.  He had a short run of afib/flutter on 11/30 and started on metoprolol.  Warfarin started on day 10 post bleed.  Continued to fail swallowing evaluate so a PEG tube was placed. He also has persistent urinary retention-failed several voiding trials in the hospital and continues to have indwelling elena cath-started on doxazosin. He suffered a traumatic strait cath on 11/29/20.  His extended stay at the hospital was in part caused by the need for a court appointed guardian due to his new persistent severe aphasia, limited family who can help him.      Recent changes:    12/31 - guardian approves treatment in place without follow up with cardiology or Urology unless absolutely needed    1/1/21 - to ED  "for urgent replacement of PEG tube due to traumatic inadvertent removal     1/10/21 - covid + per antigen testing      Today's concern is:     Clinical diagnosis of COVID-19  New onset a-fib (H)  Long term current use of anticoagulant therapy  Right hemiparesis (H)  Urinary retention with incomplete bladder emptying  Flores catheter in place  PEG (percutaneous endoscopic gastrostomy) status (H)    Simone is nonverbal and unable to answer simple questions.  He shakes his head no when asked if in pain.  Nursing reports INR today of 2.9, no other concerns.       Past Medical, Surgical, and Family History reviewed in Epic today.    MEDICATIONS:  Current medication list reviewed    REVIEW OF SYSTEMS:  4 point ROS including Respiratory, CV, GI and , other than that noted in the HPI,  is negative    OBJECTIVE:  BP 95/55   Pulse 94   Temp 98.7  F (37.1  C)   Resp 16   Ht 1.676 m (5' 6\")   Wt 70.6 kg (155 lb 11.2 oz)   SpO2 93%   BMI 25.13 kg/m    Exam:  GENERAL APPEARANCE:  Alert, in no distress   CHEST/RESP:  respiratory effort normal, no respiratory distress, lung sounds CTA    CV:  Rate and rhythm reg, no murmur/rub/gallop, no peripheral edema  M/S:   gait & station normal-ambulating without assistive device, digits and nails within normal limits   PSYCH:  alert, unable to determine orientation due to expressive/receptive aphasia    Labs:   Recent labs in [facility] reviewed by me today.      Date INR New Dose/Orders   1/4/21  2.8 7 mg daily     1/7 2.9 6 mg daily   1/11 1.9 7 mg daily    1/13 2.4 7 mg daily and MISSED DOSE ON 1/13/21 1/14 1.4  10 mg daily    1/18 2.9 8 mg daily    1/21         ASSESSMENT/PLAN:  Clinical diagnosis of COVID-19  Simone was diagnosed with COVID19 on 1/10/21.  Treatment with supportive care.  Current status stable with minimal symptoms     New onset a-fib (H)  Long term current use of anticoagulant therapy  INR today 2.9.  INR therapeutic. no signs of bleeding. Condition and other " health concerns stable-new diagnosis covid  -warfarin and next INR as ordered below     Right hemiparesis (H)  No new symptoms, he is ambulating with no device, no obvious sided weakness    Urinary retention with incomplete bladder emptying  Elena catheter in place  Indwelling elena cath without new symptoms, no obvious pain with cath.     PEG (percutaneous endoscopic gastrostomy) status (H)  Stable, continues with night-time feedings as he is ambulatory during the day. Weight stable, continues to work with speech therapy and diet now mechanical soft with thin liquids.     Orders written by provider at facility    Warfarin 8 mg daily     Recheck INR on 1/21/21     Electronically signed by:  CAROLANN Cha CNP            Sincerely,        CAROLANN Cha CNP

## 2021-01-18 NOTE — PROGRESS NOTES
[place] [facility]  [place] Medical Record Number:  [id number]  Place of Service where encounter took place:  [facility] ([facility]) [[id number]]  Chief Complaint   Patient presents with     Nursing Home Acute       HPI:    Simone Daniels  is a 72 year old (5/26/1948), who is being seen today for an episodic care visit at: [facility] ([facility]) [[id number]].     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  S/he was admitted to the hospital after found to be behaving abnormally (disheveled, mute, confused) and found to have new subacute infarct in R parietal lobe (R MCA distribution) with petechial hemorrhage and severe expressive and receptive aphasia, failed swallow eval. CTA was unremarkable. The hospital stay was complicated with enterococcus UTI and was treated.  EEG showed intermittent slowing in the L anterior head, a TTE on 11/25/20 showed grade 3 diastolic dysfunction with enlarged bilateral atria but intact septum.  He had a short run of afib/flutter on 11/30 and started on metoprolol.  Warfarin started on day 10 post bleed.  Continued to fail swallowing evaluate so a PEG tube was placed. He also has persistent urinary retention-failed several voiding trials in the hospital and continues to have indwelling elena cath-started on doxazosin. He suffered a traumatic strait cath on 11/29/20.  His extended stay at the hospital was in part caused by the need for a court appointed guardian due to his new persistent severe aphasia, limited family who can help him.      Recent changes:    12/31 - guardian approves treatment in place without follow up with cardiology or Urology unless absolutely needed    1/1/21 - to ED for urgent replacement of PEG tube due to traumatic inadvertent removal     1/10/21 - covid + per antigen  "testing      Today's concern is:     Clinical diagnosis of COVID-19  New onset a-fib (H)  Long term current use of anticoagulant therapy  Right hemiparesis (H)  Urinary retention with incomplete bladder emptying  Flores catheter in place  PEG (percutaneous endoscopic gastrostomy) status (H)    Simone is nonverbal and unable to answer simple questions.  He shakes his head no when asked if in pain.  Nursing reports INR today of 2.9, no other concerns.       Past Medical, Surgical, and Family History reviewed in Epic today.    MEDICATIONS:  Current medication list reviewed    REVIEW OF SYSTEMS:  4 point ROS including Respiratory, CV, GI and , other than that noted in the HPI,  is negative    OBJECTIVE:  BP 95/55   Pulse 94   Temp 98.7  F (37.1  C)   Resp 16   Ht 1.676 m (5' 6\")   Wt 70.6 kg (155 lb 11.2 oz)   SpO2 93%   BMI 25.13 kg/m    Exam:  GENERAL APPEARANCE:  Alert, in no distress   CHEST/RESP:  respiratory effort normal, no respiratory distress, lung sounds CTA    CV:  Rate and rhythm reg, no murmur/rub/gallop, no peripheral edema  M/S:   gait & station normal-ambulating without assistive device, digits and nails within normal limits   PSYCH:  alert, unable to determine orientation due to expressive/receptive aphasia    Labs:   Recent labs in [facility] reviewed by me today.      Date INR New Dose/Orders   1/4/21  2.8 7 mg daily     1/7 2.9 6 mg daily   1/11 1.9 7 mg daily    1/13 2.4 7 mg daily and MISSED DOSE ON 1/13/21 1/14 1.4  10 mg daily    1/18 2.9 8 mg daily    1/21         ASSESSMENT/PLAN:  Clinical diagnosis of COVID-19  Simone was diagnosed with COVID19 on 1/10/21.  Treatment with supportive care.  Current status stable with minimal symptoms     New onset a-fib (H)  Long term current use of anticoagulant therapy  INR today 2.9.  INR therapeutic. no signs of bleeding. Condition and other health concerns stable-new diagnosis covid  -warfarin and next INR as ordered below     Right hemiparesis " (H)  No new symptoms, he is ambulating with no device, no obvious sided weakness    Urinary retention with incomplete bladder emptying  Elena catheter in place  Indwelling elnea cath without new symptoms, no obvious pain with cath.     PEG (percutaneous endoscopic gastrostomy) status (H)  Stable, continues with night-time feedings as he is ambulatory during the day. Weight stable, continues to work with speech therapy and diet now mechanical soft with thin liquids.     Orders written by provider at facility    Warfarin 8 mg daily     Recheck INR on 1/21/21     Electronically signed by:  CAROLANN Cha CNP

## 2021-01-19 ENCOUNTER — PRE VISIT (OUTPATIENT)
Dept: UROLOGY | Facility: CLINIC | Age: 76
End: 2021-01-19

## 2021-01-21 ENCOUNTER — NURSING HOME VISIT (OUTPATIENT)
Dept: GERIATRICS | Facility: CLINIC | Age: 76
End: 2021-01-21
Payer: MEDICAID

## 2021-01-21 VITALS
SYSTOLIC BLOOD PRESSURE: 114 MMHG | DIASTOLIC BLOOD PRESSURE: 71 MMHG | RESPIRATION RATE: 17 BRPM | BODY MASS INDEX: 25.05 KG/M2 | TEMPERATURE: 98.9 F | HEART RATE: 88 BPM | WEIGHT: 155.2 LBS | OXYGEN SATURATION: 97 %

## 2021-01-21 DIAGNOSIS — Z79.01 LONG TERM CURRENT USE OF ANTICOAGULANT THERAPY: ICD-10-CM

## 2021-01-21 DIAGNOSIS — I48.91 NEW ONSET A-FIB (H): ICD-10-CM

## 2021-01-21 DIAGNOSIS — U07.1 CLINICAL DIAGNOSIS OF COVID-19: Primary | ICD-10-CM

## 2021-01-21 PROCEDURE — 99309 SBSQ NF CARE MODERATE MDM 30: CPT | Performed by: NURSE PRACTITIONER

## 2021-01-21 NOTE — PROGRESS NOTES
[place] [facility]  [place] Medical Record Number:  [id number]  Place of Service where encounter took place:  [facility] ([facility]) [[id number]]  Chief Complaint   Patient presents with     Nursing Home Acute       HPI:    Simone Daniels  is a 72 year old (5/26/1948), who is being seen today for an episodic care visit at: [facility] ([facility]) [[id number]].     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  S/he was admitted to the hospital after found to be behaving abnormally (disheveled, mute, confused) and found to have new subacute infarct in R parietal lobe (R MCA distribution) with petechial hemorrhage and severe expressive and receptive aphasia, failed swallow eval. CTA was unremarkable. The hospital stay was complicated with enterococcus UTI and was treated.  EEG showed intermittent slowing in the L anterior head, a TTE on 11/25/20 showed grade 3 diastolic dysfunction with enlarged bilateral atria but intact septum.  He had a short run of afib/flutter on 11/30 and started on metoprolol.  Warfarin started on day 10 post bleed.  Continued to fail swallowing evaluate so a PEG tube was placed. He also has persistent urinary retention-failed several voiding trials in the hospital and continues to have indwelling elena cath-started on doxazosin. He suffered a traumatic strait cath on 11/29/20.  His extended stay at the hospital was in part caused by the need for a court appointed guardian due to his new persistent severe aphasia, limited family who can help him.      Recent changes:    12/31 - guardian approves treatment in place without follow up with cardiology or Urology unless absolutely needed    1/1/21 - to ED for urgent replacement of PEG tube due to traumatic inadvertent removal     1/10/21 - covid + per antigen  testing, minimal symptoms     Today's concern is:     Clinical diagnosis of COVID-19  New onset a-fib (H)  Long term current use of anticoagulant therapy    Simone is unable to report concerns, is ambulatory at baseline and nonverbal.  He is smiling at staff, does not appear to have pain.  Nursing reports no new concerns, would like evaluation for off droplet precautions.       Past Medical, Surgical, and Family History reviewed in Epic today.    MEDICATIONS:  Current medication list reviewed    REVIEW OF SYSTEMS:  Unobtainable secondary to aphasia.     OBJECTIVE:  /71   Pulse 88   Temp 98.9  F (37.2  C)   Resp 17   Wt 70.4 kg (155 lb 3.2 oz)   SpO2 97%   BMI 25.05 kg/m    Exam:  GENERAL APPEARANCE:  Alert, in no distress   CHEST/RESP:  respiratory effort normal, no respiratory distress, lung sounds CTA    CV:  Rate and rhythm reg, no murmur/rub/gallop, no peripheral edema  M/S:   gait & station normal-without assistive device, digits and nails within normal limits  PSYCH:   Unable to determine due to aphasia    Labs:   Recent labs in [facility] reviewed by me today.     ASSESSMENT/PLAN:  Clinical diagnosis of COVID-19  Resolving symptoms of COVID, has completed >10 days of quarantine with no symptoms  -ok to remove from droplet precautions  -discontinue prn zofran started as a result of COVID + diagnosis   -discontinue tylenol orders started as a result of COVID + diagnosis  -discontinue prn oxygen started as a result of COVID + diagnosis   -discontinue hold orders on insulin, diuretics, antihypertensives started as a result of COVID + diagnosis   -discontinue nutritional supplements as weight stabilizes and PO intake improves started as a result of COVID + diagnosis     New onset a-fib (H)  Long term current use of anticoagulant therapy  INR today 5.7.  INR supratherapeutic. No signs of bleeding.  Condition and other health concerns completing covid quarantine without new symptoms   -warfarin and next INR  as ordered below       Orders written by provider at facility    No warfarin today    Recheck INR 1/22/21     Electronically signed by:  CAROLANN Cha CNP

## 2021-01-21 NOTE — LETTER
1/21/2021        RE: Simone Daniels  [facility] [employer]  [address]  [place] [address]        [place] [facility]  [place] Medical Record Number:  [id number]  Place of Service where encounter took place:  [facility] ([facility]) [[id number]]  Chief Complaint   Patient presents with     Nursing Home Acute       HPI:    Simone Daniels  is a 72 year old (5/26/1948), who is being seen today for an episodic care visit at: [facility] ([facility]) [[id number]].     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  S/he was admitted to the hospital after found to be behaving abnormally (disheveled, mute, confused) and found to have new subacute infarct in R parietal lobe (R MCA distribution) with petechial hemorrhage and severe expressive and receptive aphasia, failed swallow eval. CTA was unremarkable. The hospital stay was complicated with enterococcus UTI and was treated.  EEG showed intermittent slowing in the L anterior head, a TTE on 11/25/20 showed grade 3 diastolic dysfunction with enlarged bilateral atria but intact septum.  He had a short run of afib/flutter on 11/30 and started on metoprolol.  Warfarin started on day 10 post bleed.  Continued to fail swallowing evaluate so a PEG tube was placed. He also has persistent urinary retention-failed several voiding trials in the hospital and continues to have indwelling elena cath-started on doxazosin. He suffered a traumatic strait cath on 11/29/20.  His extended stay at the hospital was in part caused by the need for a court appointed guardian due to his new persistent severe aphasia, limited family who can help him.      Recent changes:    12/31 - guardian approves treatment in place without follow up with cardiology or Urology unless absolutely needed    1/1/21 - to ED  for urgent replacement of PEG tube due to traumatic inadvertent removal     1/10/21 - covid + per antigen testing, minimal symptoms     Today's concern is:     Clinical diagnosis of COVID-19  New onset a-fib (H)  Long term current use of anticoagulant therapy    Simone is unable to report concerns, is ambulatory at baseline and nonverbal.  He is smiling at staff, does not appear to have pain.  Nursing reports no new concerns, would like evaluation for off droplet precautions.       Past Medical, Surgical, and Family History reviewed in Epic today.    MEDICATIONS:  Current medication list reviewed    REVIEW OF SYSTEMS:  Unobtainable secondary to aphasia.     OBJECTIVE:  /71   Pulse 88   Temp 98.9  F (37.2  C)   Resp 17   Wt 70.4 kg (155 lb 3.2 oz)   SpO2 97%   BMI 25.05 kg/m    Exam:  GENERAL APPEARANCE:  Alert, in no distress   CHEST/RESP:  respiratory effort normal, no respiratory distress, lung sounds CTA    CV:  Rate and rhythm reg, no murmur/rub/gallop, no peripheral edema  M/S:   gait & station normal-without assistive device, digits and nails within normal limits  PSYCH:   Unable to determine due to aphasia    Labs:   [facility] by me today.     ASSESSMENT/PLAN:  Clinical diagnosis of COVID-19  Resolving symptoms of COVID, has completed >10 days of quarantine with no symptoms  -ok to remove from droplet precautions  -discontinue prn zofran started as a result of COVID + diagnosis   -discontinue tylenol orders started as a result of COVID + diagnosis  -discontinue prn oxygen started as a result of COVID + diagnosis   -discontinue hold orders on insulin, diuretics, antihypertensives started as a result of COVID + diagnosis   -discontinue nutritional supplements as weight stabilizes and PO intake improves started as a result of COVID + diagnosis     New onset a-fib (H)  Long term current use of anticoagulant therapy  INR today 5.7.  INR supratherapeutic. No signs of bleeding.   Condition and other health concerns completing covid quarantine without new symptoms   -warfarin and next INR as ordered below       Orders written by provider at facility    No warfarin today    Recheck INR 1/22/21     Electronically signed by:  CAROLANN Cha CNP              Sincerely,        CAROLANN Cha CNP

## 2021-01-22 ENCOUNTER — TELEPHONE (OUTPATIENT)
Dept: GERIATRICS | Facility: CLINIC | Age: 76
End: 2021-01-22

## 2021-01-22 NOTE — TELEPHONE ENCOUNTER
[place] [facility] TELEPHONE ENCOUNTER    Chief Complaint   Patient presents with     INR RESULTS       Simone Daniels is a 72 year old  (5/26/1948),Nurse called today to report: high INR of 6.8.  Patient has no active symptoms of bleeding, no hematuria, no nose bleeds and all vital signs stable, stable health with no new sided weakness, at baseline mentation and ambulation.     INR Flow Sheet  Date INR New Dose/Orders   1/4/21  2.8 7 mg daily     1/7 2.9 6 mg daily   1/11 1.9 7 mg daily    1/13 2.4 7 mg daily and MISSED DOSE ON 1/13/21 1/14 1.4  10 mg daily    1/18 2.9 8 mg daily    1/21  5.7 Hold warfarin   1/22 6.8, then 7.2  Vit K 10 mg today   1/25          ASSESSMENT/PLAN  supratherapeutic INR of 6.8 and 7.2 on recheck, done on INR machine (point of care testing ) at facility.  Recheck as noted.  No changes in condition, health, no bleeding, all vital signs stable.     Vit K 10 mg now stat    Recheck INR on 1/25/21     Electronically signed by:   CAROLANN Cha CNP

## 2021-01-25 ENCOUNTER — TELEPHONE (OUTPATIENT)
Dept: GERIATRICS | Facility: CLINIC | Age: 76
End: 2021-01-25

## 2021-01-26 NOTE — TELEPHONE ENCOUNTER
[place] [facility] TELEPHONE ENCOUNTER    Chief Complaint   Patient presents with     INR RESULTS       Simone Daniels is a 72 year old  (5/26/1948),Nurse called today to report: INR today of 1.6 without changes in health conditions.     INR Flow Sheet  Date INR New Dose/Orders   1/4/21  2.8 7 mg daily     1/7 2.9 6 mg daily   1/11 1.9 7 mg daily    1/13 2.4 7 mg daily and MISSED DOSE ON 1/13/21 1/14 1.4  10 mg daily    1/18 2.9 8 mg daily    1/21  5.7 Hold warfarin   1/22 6.8, then 7.2  Vit K 10 mg today, 0 warfarin until recheck   1/25  1.6  7.5 mg daily           ASSESSMENT/PLAN  Subtherapeutic INR after vit K 10 mg on 1/22/21    Warfarin 7.5 mg daily     Recheck INR on 1/27/21   Electronically signed by:   CAROLANN Cha CNP

## 2021-01-27 ENCOUNTER — TELEPHONE (OUTPATIENT)
Dept: GERIATRICS | Facility: CLINIC | Age: 76
End: 2021-01-27

## 2021-01-27 NOTE — TELEPHONE ENCOUNTER
[facility] TELEPHONE ENCOUNTER    Chief Complaint   Patient presents with     INR RESULTS       Simone Daniels is a 72 year old  (5/26/1948),Nurse called today to report: INR today of 2.1 without condition changes     INR Flow Sheet  Date INR New Dose/Orders   1/4/21  2.8 7 mg daily     1/7 2.9 6 mg daily   1/11 1.9 7 mg daily    1/13 2.4 7 mg daily and MISSED DOSE ON 1/13/21 1/14 1.4  10 mg daily    1/18 2.9 8 mg daily    1/21  5.7 Hold warfarin   1/22 6.8, then 7.2  Vit K 10 mg today, 0 warfarin until recheck   1/25  1.6  7.5 mg daily    1/27 2.1 7.5 mg daily    2/3                ASSESSMENT/PLAN  Therapeutic INR without changes in conditin    warfarin 7.5 mg daily    Recheck INR on 2/3/21  Electronically signed by:   CAROLANN Cha CNP

## 2021-01-30 ENCOUNTER — ANCILLARY PROCEDURE (OUTPATIENT)
Dept: ULTRASOUND IMAGING | Facility: CLINIC | Age: 76
End: 2021-01-30
Attending: EMERGENCY MEDICINE
Payer: MEDICAID

## 2021-01-30 ENCOUNTER — HOSPITAL ENCOUNTER (EMERGENCY)
Facility: CLINIC | Age: 76
Discharge: HOME | End: 2021-01-30
Attending: EMERGENCY MEDICINE | Admitting: EMERGENCY MEDICINE
Payer: MEDICARE

## 2021-01-30 VITALS
OXYGEN SATURATION: 98 % | RESPIRATION RATE: 16 BRPM | DIASTOLIC BLOOD PRESSURE: 82 MMHG | BODY MASS INDEX: 25.18 KG/M2 | SYSTOLIC BLOOD PRESSURE: 123 MMHG | TEMPERATURE: 98.3 F | HEART RATE: 102 BPM | WEIGHT: 156 LBS

## 2021-01-30 DIAGNOSIS — R31.0 GROSS HEMATURIA: ICD-10-CM

## 2021-01-30 DIAGNOSIS — T83.011A MALFUNCTION OF FOLEY CATHETER, INITIAL ENCOUNTER (H): ICD-10-CM

## 2021-01-30 DIAGNOSIS — S37.30XA: ICD-10-CM

## 2021-01-30 LAB — INR PPP: 2.79 (ref 0.86–1.14)

## 2021-01-30 PROCEDURE — 250N000009 HC RX 250: Performed by: EMERGENCY MEDICINE

## 2021-01-30 PROCEDURE — 76775 US EXAM ABDO BACK WALL LIM: CPT | Performed by: EMERGENCY MEDICINE

## 2021-01-30 PROCEDURE — 99284 EMERGENCY DEPT VISIT MOD MDM: CPT | Mod: 25 | Performed by: EMERGENCY MEDICINE

## 2021-01-30 PROCEDURE — 36415 COLL VENOUS BLD VENIPUNCTURE: CPT | Performed by: EMERGENCY MEDICINE

## 2021-01-30 PROCEDURE — 85610 PROTHROMBIN TIME: CPT | Performed by: EMERGENCY MEDICINE

## 2021-01-30 PROCEDURE — 51702 INSERT TEMP BLADDER CATH: CPT | Mod: 59 | Performed by: EMERGENCY MEDICINE

## 2021-01-30 PROCEDURE — 99285 EMERGENCY DEPT VISIT HI MDM: CPT | Mod: 25 | Performed by: EMERGENCY MEDICINE

## 2021-01-30 PROCEDURE — 76775 US EXAM ABDO BACK WALL LIM: CPT | Mod: 26 | Performed by: EMERGENCY MEDICINE

## 2021-01-30 RX ORDER — LIDOCAINE HYDROCHLORIDE 20 MG/ML
JELLY TOPICAL ONCE
Status: COMPLETED | OUTPATIENT
Start: 2021-01-30 | End: 2021-01-30

## 2021-01-30 RX ADMIN — LIDOCAINE HYDROCHLORIDE: 20 JELLY TOPICAL at 09:55

## 2021-01-30 NOTE — ED NOTES
Report called to TAYLOR Siegel at [facility]. Calling for [facility] now.

## 2021-01-30 NOTE — ED PROVIDER NOTES
History     Chief Complaint   Patient presents with     Catheter Problem     HPI  Simone Daniels is a 72 year old male with history of urinary retention status post CVA with chronic indwelling Flores catheter, chronic atrial fibrillation on Coumadin, hypertension, who presents from care facility with malfunctioning Flores catheter.  Patient is nonverbal at baseline.  Per nursing report, Flores catheter was exchanged last evening because of his significant soiling of catheter and tubing.  There was difficulty reinserting the catheter and it was not draining any urine.  No reported fevers, vomiting, diarrhea or abdominal distention.  No reported change in alertness.  No reported distress.      The patient's PMHx, Surgical Hx, Allergies, and Medications were all reviewed with the patient.    Allergies:  No Known Allergies    Problem List:    Patient Active Problem List    Diagnosis Date Noted     2019 novel coronavirus disease (COVID-19) 01/12/2021     Priority: Medium     Flores catheter in place 01/01/2021     Priority: Medium     Long term current use of anticoagulant therapy 01/01/2021     Priority: Medium     PEG (percutaneous endoscopic gastrostomy) status (H) 01/01/2021     Priority: Medium     Late effects of cerebral ischemic stroke 01/01/2021     Priority: Medium     Essential hypertension 01/01/2021     Priority: Medium     Hemorrhagic stroke (H) 11/25/2020     Priority: Medium     Elevated prostate specific antigen (PSA) 04/19/2019     Priority: Medium     Urge incontinence of urine 04/19/2019     Priority: Medium     Chronic atrial fibrillation (H) 04/19/2019     Priority: Medium     Urinary retention with incomplete bladder emptying 12/11/2018     Priority: Medium     Left parietal hemorrhagic infarction (H) 11/27/2018     Priority: Medium     Post herpetic neuralgia 06/19/2017     Priority: Medium     CARDIOVASCULAR SCREENING; LDL GOAL LESS THAN 160 10/31/2010     Priority: Medium     ESOPHAGEAL REFLUX  10/19/2006     Priority: Medium     Left knee osteoarthritis 2006     Priority: Medium     Anxiety state 2005     Priority: Medium     Problem list name updated by automated process. Provider to review          Past Medical History:    Past Medical History:   Diagnosis Date     Cerebral infarction (H)      LOW BACK PAIN        Past Surgical History:    Past Surgical History:   Procedure Laterality Date     IR GASTROSTOMY TUBE PERCUTANEOUS PLCMNT  12/3/2020     SURGICAL HISTORY OF -       Cervical disc (5,6,&7)     SURGICAL HISTORY OF -       (L) Knee fx tibial plateau     SURGICAL HISTORY OF -       (L) Knee surgery - re-fx tibial plateau       Family History:    Family History   Problem Relation Age of Onset     C.A.D. Mother         heart disease in her 60s     C.A.D. Brother         1/2 brother with heart disease     Diabetes No family hx of        Social History:  Marital Status:  Single [1]  Social History     Tobacco Use     Smoking status: Former Smoker     Packs/day: 2.00     Years: 18.00     Pack years: 36.00     Types: Cigarettes     Quit date: 1980     Years since quittin.1     Smokeless tobacco: Never Used   Substance Use Topics     Alcohol use: Yes     Frequency: 2-4 times a month     Drinks per session: 1 or 2     Binge frequency: Never     Comment: occaisonal     Drug use: No        Medications:         alum & mag hydroxide-simethicone (MAALOX  ES) 400-400-40 MG/5ML SUSP suspension       Cranberry 450 MG TABS       doxazosin (CARDURA) 4 MG tablet       folic acid (FOLVITE) 1 MG tablet       melatonin 3 MG tablet       metoprolol tartrate (LOPRESSOR) 25 MG tablet       multivitamin w/minerals (THERA-VIT-M) tablet       Nutritional Supplements (JEVITY 1.5 SANDRA) LIQD       thiamine (B-1) 100 MG tablet       warfarin ANTICOAGULANT (COUMADIN) 7.5 MG tablet       acetaminophen (TYLENOL) 500 MG tablet       acetaminophen (TYLENOL) 650 MG suppository       Warfarin Therapy  Reminder          Review of Systems   Unable to perform ROS: Patient nonverbal       Physical Exam   BP: 127/83  Pulse: 78  Temp: 98.3  F (36.8  C)  Resp: 16  Weight: 70.8 kg (156 lb)  SpO2: 97 %    Physical Exam  GEN: Awake and alert. Does not appear acutely distressed.   HENT: MMM. External ears and nose normal bilaterally.  EYES: EOM intact. Conjunctiva clear. No discharge.   NECK: Symmetric, freely mobile.   CV : Regular rate and rhythm. Extremities warm and well perfused.   PULM: Normal effort. No adventitious sounds in anterior lung fields.   ABD: Soft, non-tender, non-distended. No rebound or guarding.   NEURO: Appears to not yes or no to simple questions. Purposeful movements of upper extremities. .   EXT: No gross deformity. No pretibial edema.   INT: Warm. No diaphoresis. Normal color.        ED Course        Procedures  Results for orders placed during the hospital encounter of 01/30/21   POC US ABDOMEN LIMITED    Impression [facility] Procedure Note      Limited Bedside ED Ultrasound of the Urinary Bladder:    PERFORMED BY: Dr. Frankie Davalos MD  INDICATIONS:  Hematuria  PROBE: Low frequency convex probe  BODY LOCATION:  Abdomen  FINDINGS:  Visualization of the bladder in longitudinal and transverse views demonstrated a distended state.  The bladder dimensions measured: 8.8 cm width X 8.1 height cm X 11.2 cm length.  INTERPRETATION:  Total calculated volume was estimated at 422 cc.  The bladder is abnormally distended.  IMAGE DOCUMENTATION: Images were archived to PACs system.             Critical Care time:  none               Results for orders placed or performed during the hospital encounter of 01/30/21 (from the past 24 hour(s))   POC US ABDOMEN LIMITED    Impression    [facility] Procedure Note      Limited Bedside ED Ultrasound of the Urinary Bladder:    PERFORMED BY: Dr. Frankie Davalos MD  INDICATIONS:  Hematuria  PROBE: Low frequency convex probe  BODY LOCATION:   Abdomen  FINDINGS:  Visualization of the bladder in longitudinal and transverse views demonstrated a distended state.  The bladder dimensions measured: 8.8 cm width X 8.1 height cm X 11.2 cm length.  INTERPRETATION:  Total calculated volume was estimated at 422 cc.  The bladder is abnormally distended.  IMAGE DOCUMENTATION: Images were archived to PACs system.     INR   Result Value Ref Range    INR 2.79 (H) 0.86 - 1.14       Medications   lidocaine (XYLOCAINE) 2 % external gel ( Topical Given 1/30/21 0955)       Assessments & Plan (with Medical Decision Making)   72 year old male with past medical history significant for chronic indwelling Flores catheter due to urinary retention status post CVA, nonverbal, with malfunctioning Flores catheter.  It appears that catheter was inflated within the urethra and not within the bladder and there was gross hematuria and a fair amount of bleeding through the meatus.  Nursing reports that once balloon was deflated, approximately 10 cm of catheter was internal.  Point-of-care ultrasound shows over 400 cc of fluid in the urinary bladder.  Placement of a 16 [place] coudé catheter without incident and decompression of the bladder.  This was confirmed on ultrasound.  Patient is on Coumadin and INR is within a therapeutic range at 2.79.  No further bleeding appreciated and no signs of gross hematuria in the catheter bag.  Urinary catheter is functioning properly he is appropriate to be returned to his care facility.  Recommend follow-up in urology clinic in 1 week.  To return to emergency department sooner if any significant bleeding or malfunctioning of Flores catheter.    I have reviewed the nursing notes.         Discharge Medication List as of 1/30/2021  1:17 PM          Final diagnoses:   Malfunction of Flores catheter, initial encounter (H)   Gross hematuria   Injury of male urethra, initial encounter     Frankie Davalos MD    1/30/2021   [facility] EMERGENCY  DEPT    Disclaimer: This note consists of words and symbols derived from keyboarding and dictation using voice recognition software.  As a result, there may be errors that have gone undetected.  Please consider this when interpreting information found in this note.             Frankie Davalos MD  01/31/21 0663

## 2021-01-30 NOTE — ED NOTES
Catheter inspected, , catheter is not in bladder and balloon is inflated in the urethra. Balloon deflated and catheter is removed. Continuous bleeding. MD at bedside.

## 2021-01-30 NOTE — DISCHARGE INSTRUCTIONS
A new elena catheter was placed. You should follow up with the Urologist this next week. If you have significant bleeding, you should return to the Emergency Department for further evaluation and treatment. No medication changes today.

## 2021-01-30 NOTE — ED TRIAGE NOTES
Pt arrives via EMS with a clogged catheter. Catheter was removed last night and staff was unable to replace it. New catheter placed this morning but no urine output observed. Staff reports a large amount of blood. Small amount of urine present in leg bag. Pt appears uncomfortable. He is non-verbal from a prior CVA, able to answer yes/no.

## 2021-02-03 ENCOUNTER — NURSING HOME VISIT (OUTPATIENT)
Dept: GERIATRICS | Facility: CLINIC | Age: 76
End: 2021-02-03
Payer: MEDICAID

## 2021-02-03 VITALS
DIASTOLIC BLOOD PRESSURE: 70 MMHG | SYSTOLIC BLOOD PRESSURE: 117 MMHG | BODY MASS INDEX: 24.95 KG/M2 | HEART RATE: 97 BPM | RESPIRATION RATE: 18 BRPM | TEMPERATURE: 97.7 F | WEIGHT: 154.6 LBS | OXYGEN SATURATION: 96 %

## 2021-02-03 DIAGNOSIS — Z93.1 PEG (PERCUTANEOUS ENDOSCOPIC GASTROSTOMY) STATUS (H): ICD-10-CM

## 2021-02-03 DIAGNOSIS — I48.91 NEW ONSET A-FIB (H): ICD-10-CM

## 2021-02-03 DIAGNOSIS — Z79.01 LONG TERM CURRENT USE OF ANTICOAGULANT THERAPY: ICD-10-CM

## 2021-02-03 DIAGNOSIS — R33.9 URINARY RETENTION WITH INCOMPLETE BLADDER EMPTYING: ICD-10-CM

## 2021-02-03 DIAGNOSIS — Z97.8 FOLEY CATHETER IN PLACE: ICD-10-CM

## 2021-02-03 DIAGNOSIS — G81.91 RIGHT HEMIPARESIS (H): Primary | ICD-10-CM

## 2021-02-03 DIAGNOSIS — I69.320 APHASIA AS LATE EFFECT OF CEREBROVASCULAR ACCIDENT: ICD-10-CM

## 2021-02-03 PROCEDURE — 99309 SBSQ NF CARE MODERATE MDM 30: CPT | Performed by: NURSE PRACTITIONER

## 2021-02-03 NOTE — PROGRESS NOTES
[facility]  [place] Medical Record Number:  [id number]  Place of Service where encounter took place:  [facility] [[id number]]  Chief Complaint   Patient presents with     Nursing Home Acute       HPI:    Simone Daniels  is a 72 year old (5/26/1948), who is being seen today for an episodic care visit at: [facility] ([facility]) [[id number]].     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  S/he was admitted to the hospital after found to be behaving abnormally (disheveled, mute, confused) and found to have new subacute infarct in R parietal lobe (R MCA distribution) with petechial hemorrhage and severe expressive and receptive aphasia, failed swallow eval. CTA was unremarkable. The hospital stay was complicated with enterococcus UTI and was treated.  EEG showed intermittent slowing in the L anterior head, a TTE on 11/25/20 showed grade 3 diastolic dysfunction with enlarged bilateral atria but intact septum.  He had a short run of afib/flutter on 11/30 and started on metoprolol.  Warfarin started on day 10 post bleed.  Continued to fail swallowing evaluate so a PEG tube was placed. He also has persistent urinary retention-failed several voiding trials in the hospital and continues to have indwelling elena cath-started on doxazosin. He suffered a traumatic strait cath on 11/29/20.  His extended stay at the hospital was in part caused by the need for a court appointed guardian due to his new persistent severe aphasia, limited family who can help him.      Recent changes:    12/31 - guardian approves treatment in place without follow up with cardiology or Urology unless absolutely needed    1/1/21 - to ED for urgent replacement of PEG tube due to traumatic inadvertent removal     1/10/21 - covid + per antigen  testing, minimal symptoms     Today's concern is:     Right hemiparesis (H)  Aphasia as late effect of cerebrovascular accident  Urinary retention with incomplete bladder emptying  Flores catheter in place  PEG (percutaneous endoscopic gastrostomy) status (H)  New onset a-fib (H)  Long term current use of anticoagulant therapy    Simone is smiling and holding hands with this staff, without obvious complaints of pain or other concerns, he is non-verbal.  Nursing reports significantly elevated INR today.       Past Medical, Surgical, and Family History reviewed in Epic today.    MEDICATIONS:  Current medication list reviewed    REVIEW OF SYSTEMS:  Unobtainable secondary to aphasia.     OBJECTIVE:  /70   Pulse 97   Temp 97.7  F (36.5  C)   Resp 18   Wt 70.1 kg (154 lb 9.6 oz)   SpO2 96%   BMI 24.95 kg/m    Exam:  GENERAL APPEARANCE:  Alert, in no distress   CHEST/RESP:  respiratory effort normal, no respiratory distress, lung sounds CTA    CV:  Rate and rhythm reg, no murmur/rub/gallop, no peripheral edema  M/S:   gait & station normal-walks with no device independently, bilateral valgus knees, digits and nails within normal limits   SKIN:  CDI  PSYCH:  at baseline mentation, alert, unable to speak    Labs:   Recent labs in [facility] reviewed by me today.      INR Flow Sheet  Date INR New Dose/Orders   1/4/21  2.8 7 mg daily     1/7 2.9 6 mg daily   1/11 1.9 7 mg daily    1/13 2.4 7 mg daily and MISSED DOSE ON 1/13/21 1/14 1.4  10 mg daily    1/18 2.9 8 mg daily    1/21  5.7 Hold warfarin   1/22 6.8, then 7.2  Vit K 10 mg today, 0 warfarin until recheck   1/25  1.6  7.5 mg daily    1/27 2.1 7.5 mg daily    2/3  6.3               ASSESSMENT/PLAN:  Right hemiparesis (H)  Aphasia as late effect of cerebrovascular accident  Patient with no new symptoms of CVA, no new sided weakness. Walking independently about the unit, no recent falls.      Urinary retention with incomplete bladder emptying  Flores catheter in  place  Patient with indwelling elena, failed voiding trial at [facility] last week due to retention and inability to void, likely will need elena lifelong.      PEG (percutaneous endoscopic gastrostomy) status (H)  Stable, continues with nocturnal feedings to encourage daytime oral intakes, continues to follow up with speech therapy and continues to have difficulty with sequencing of tasks of eating (bite,chew, swallow b4 taking another bite).  He is eating % of meals, weight stable.  He is eating and drinking well overall.  -decrease hours of tube feeding  -decrease flushes as he is drinking well throughout the day    New onset a-fib (H)  Long term current use of anticoagulant therapy  INR variable, unclear etiology for this, without evidence of bleeding.  Nursing reports due to calluses they sometimes have difficulty getting blood on fingerstick, and this could certainly have some impact on accuracy of point-of-care testing.   -orders as below   -check INR via lab draw for comparison      Orders written by provider at facility    Decrease tube feeding to 10 h per day due to good po intake    Decrease tube flushes to 30 ml BID (beginning and end of tube feeding)    INR 6.3 today-NO warfarin 2/3, 2/4    Recheck INR 2/5/21 per lab draw    Electronically signed by:  CAROLANN Cha CNP

## 2021-02-03 NOTE — LETTER
2/3/2021        RE: Simone Daniels  [facility] [place]  [address]  [place] [address]        [facility]  [place] Medical Record Number:  [id number]  [facility] [[id number]]  Chief Complaint   Patient presents with     Nursing Home Acute       HPI:    Simone Daniels  is a 72 year old (5/26/1948), who is being seen today for an episodic care visit at: [facility] ([facility]) [[id number]].     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  S/he was admitted to the hospital after found to be behaving abnormally (disheveled, mute, confused) and found to have new subacute infarct in R parietal lobe (R MCA distribution) with petechial hemorrhage and severe expressive and receptive aphasia, failed swallow eval. CTA was unremarkable. The hospital stay was complicated with enterococcus UTI and was treated.  EEG showed intermittent slowing in the L anterior head, a TTE on 11/25/20 showed grade 3 diastolic dysfunction with enlarged bilateral atria but intact septum.  He had a short run of afib/flutter on 11/30 and started on metoprolol.  Warfarin started on day 10 post bleed.  Continued to fail swallowing evaluate so a PEG tube was placed. He also has persistent urinary retention-failed several voiding trials in the hospital and continues to have indwelling elena cath-started on doxazosin. He suffered a traumatic strait cath on 11/29/20.  His extended stay at the hospital was in part caused by the need for a court appointed guardian due to his new persistent severe aphasia, limited family who can help him.      Recent changes:    12/31 - guardian approves treatment in place without follow up with cardiology or Urology unless absolutely needed    1/1/21 - to ED for  urgent replacement of PEG tube due to traumatic inadvertent removal     1/10/21 - covid + per antigen testing, minimal symptoms     Today's concern is:     Right hemiparesis (H)  Aphasia as late effect of cerebrovascular accident  Urinary retention with incomplete bladder emptying  Flores catheter in place  PEG (percutaneous endoscopic gastrostomy) status (H)  New onset a-fib (H)  Long term current use of anticoagulant therapy    Simone is smiling and holding hands with this staff, without obvious complaints of pain or other concerns, he is non-verbal.  Nursing reports significantly elevated INR today.       Past Medical, Surgical, and Family History reviewed in Epic today.    MEDICATIONS:  Current medication list reviewed    REVIEW OF SYSTEMS:  Unobtainable secondary to aphasia.     OBJECTIVE:  /70   Pulse 97   Temp 97.7  F (36.5  C)   Resp 18   Wt 70.1 kg (154 lb 9.6 oz)   SpO2 96%   BMI 24.95 kg/m    Exam:  GENERAL APPEARANCE:  Alert, in no distress   CHEST/RESP:  respiratory effort normal, no respiratory distress, lung sounds CTA    CV:  Rate and rhythm reg, no murmur/rub/gallop, no peripheral edema  M/S:   gait & station normal-walks with no device independently, bilateral valgus knees, digits and nails within normal limits   SKIN:  CDI  PSYCH:  at baseline mentation, alert, unable to speak    Labs:   Recent labs in [facility] reviewed by me today.      INR Flow Sheet  Date INR New Dose/Orders   1/4/21  2.8 7 mg daily     1/7 2.9 6 mg daily   1/11 1.9 7 mg daily    1/13 2.4 7 mg daily and MISSED DOSE ON 1/13/21 1/14 1.4  10 mg daily    1/18 2.9 8 mg daily    1/21  5.7 Hold warfarin   1/22 6.8, then 7.2  Vit K 10 mg today, 0 warfarin until recheck   1/25  1.6  7.5 mg daily    1/27 2.1 7.5 mg daily    2/3  6.3               ASSESSMENT/PLAN:  Right hemiparesis (H)  Aphasia as late effect of cerebrovascular accident  Patient with no new symptoms of CVA, no new sided weakness. Walking independently about  the unit, no recent falls.      Urinary retention with incomplete bladder emptying  Elena catheter in place  Patient with indwelling elena, failed voiding trial at [facility] nursing [facility] last week due to retention and inability to void, likely will need elena lifelong.      PEG (percutaneous endoscopic gastrostomy) status (H)  Stable, continues with nocturnal feedings to encourage daytime oral intakes, continues to follow up with speech therapy and continues to have difficulty with sequencing of tasks of eating (bite,chew, swallow b4 taking another bite).  He is eating % of meals, weight stable.  He is eating and drinking well overall.  -decrease hours of tube feeding  -decrease flushes as he is drinking well throughout the day    New onset a-fib (H)  Long term current use of anticoagulant therapy  INR variable, unclear etiology for this, without evidence of bleeding.  Nursing reports due to calluses they sometimes have difficulty getting blood on fingerstick, and this could certainly have some impact on accuracy of point-of-care testing.   -orders as below   -check INR via lab draw for comparison      Orders written by provider at facility    Decrease tube feeding to 10 h per day due to good po intake    Decrease tube flushes to 30 ml BID (beginning and end of tube feeding)    INR 6.3 today-NO warfarin 2/3, 2/4    Recheck INR 2/5/21 per lab draw    Electronically signed by:  CAROLANN Cha CNP            Sincerely,        CAROLANN Cha CNP

## 2021-02-05 ENCOUNTER — NURSING HOME VISIT (OUTPATIENT)
Dept: GERIATRICS | Facility: CLINIC | Age: 76
End: 2021-02-05
Payer: MEDICAID

## 2021-02-05 VITALS
WEIGHT: 154.6 LBS | HEART RATE: 82 BPM | OXYGEN SATURATION: 98 % | SYSTOLIC BLOOD PRESSURE: 105 MMHG | DIASTOLIC BLOOD PRESSURE: 65 MMHG | RESPIRATION RATE: 18 BRPM | BODY MASS INDEX: 24.95 KG/M2 | TEMPERATURE: 98.1 F

## 2021-02-05 DIAGNOSIS — I48.91 NEW ONSET A-FIB (H): Primary | ICD-10-CM

## 2021-02-05 DIAGNOSIS — Z51.81 ENCOUNTER FOR THERAPEUTIC DRUG MONITORING: ICD-10-CM

## 2021-02-05 DIAGNOSIS — Z79.01 LONG TERM CURRENT USE OF ANTICOAGULANT THERAPY: ICD-10-CM

## 2021-02-05 PROBLEM — K59.09 OTHER CONSTIPATION: Status: ACTIVE | Noted: 2018-12-11

## 2021-02-05 PROBLEM — B02.29 OTHER POSTHERPETIC NERVOUS SYSTEM INVOLVEMENT: Status: ACTIVE | Noted: 2017-06-19

## 2021-02-05 PROBLEM — R53.1 WEAKNESS: Status: ACTIVE | Noted: 2018-12-11

## 2021-02-05 PROBLEM — I10 ESSENTIAL (PRIMARY) HYPERTENSION: Status: ACTIVE | Noted: 2018-12-11

## 2021-02-05 PROBLEM — M25.562 PAIN IN LEFT KNEE: Status: ACTIVE | Noted: 2018-12-11

## 2021-02-05 PROBLEM — I63.532: Status: ACTIVE | Noted: 2018-12-11

## 2021-02-05 PROBLEM — S05.91XA UNSPECIFIED INJURY OF RIGHT EYE AND ORBIT, INITIAL ENCOUNTER: Status: ACTIVE | Noted: 2018-12-11

## 2021-02-05 PROBLEM — R09.81 NASAL CONGESTION: Status: ACTIVE | Noted: 2018-12-11

## 2021-02-05 PROBLEM — R33.9 RETENTION OF URINE, UNSPECIFIED: Status: ACTIVE | Noted: 2018-12-11

## 2021-02-05 PROBLEM — R26.89 OTHER ABNORMALITIES OF GAIT AND MOBILITY: Status: ACTIVE | Noted: 2018-12-17

## 2021-02-05 PROBLEM — M17.0 BILATERAL PRIMARY OSTEOARTHRITIS OF KNEE: Status: ACTIVE | Noted: 2018-12-11

## 2021-02-05 PROBLEM — R48.8 OTHER SYMBOLIC DYSFUNCTIONS: Status: ACTIVE | Noted: 2018-12-17

## 2021-02-05 PROCEDURE — 99309 SBSQ NF CARE MODERATE MDM 30: CPT | Performed by: NURSE PRACTITIONER

## 2021-02-05 NOTE — PROGRESS NOTES
[place] [facility]  [place] Medical Record Number:  [id number]  Place of Service where encounter took place: [facility] [place] [[id number]]    HPI:    Simone Daniels is a 72 year old  (5/26/1948), who is being seen today for an episodic care visit at the above location.   HPI information obtained from: facility chart records, facility staff, patient report and [facility] chart review. Today's concern is INR/Coumadin management for A. Fib    ROS/Subjective:  Bleeding Signs/Symptoms:  None  Thromboembolic Signs/Symptoms:  None  Medication Changes:  No  Dietary Changes:  No  Activity Changes: No  Bacterial/Viral Infection:  No  Missed Coumadin Doses:  None  On ASA: No  Other Concerns:  No    OBJECTIVE:  /65   Pulse 82   Temp 98.1  F (36.7  C)   Resp 18   Wt 70.1 kg (154 lb 9.6 oz)   SpO2 98%   BMI 24.95 kg/m    Last INR: 6.3 on 2/3/21  INR Today:  3.4  Current Dose:  On hold last 2 days    INR Flow Sheet  Date INR New Dose/Orders   1/21  5.7 Hold warfarin   1/22 6.8, then 7.2  Vit K 10 mg today, 0 warfarin until recheck   1/25  1.6  7.5 mg daily    1/27 2.1 7.5 mg daily    2/3  6.3  hold warfarin    2/5 3.4           ASSESSMENT:  New onset a-fib (H)  Long term current use of anticoagulant therapy  Encounter for therapeutic drug monitoring  Supratherapeutic INR for goal of 2-3    PLAN:   Orders written by provider at facility  New Dose: 5 mg daily     Next INR: 2/8/21     Electronically signed by:  CAROLANN Cha CNP

## 2021-02-05 NOTE — LETTER
2/5/2021        RE: Simone Daniels  [facility]  [address]  [facility] [address]        [place] [facility]  [place] Medical Record Number:  [id number]  Place of Service where encounter took place: [facility] ([facility] [[id number]]    HPI:    Simone Daniels is a 72 year old  (5/26/1948), who is being seen today for an episodic care visit at the above location.   HPI information obtained from: facility chart records, facility staff, patient report and [facility] chart review. Today's concern is INR/Coumadin management for A. Fib    ROS/Subjective:  Bleeding Signs/Symptoms:  None  Thromboembolic Signs/Symptoms:  None  Medication Changes:  No  Dietary Changes:  No  Activity Changes: No  Bacterial/Viral Infection:  No  Missed Coumadin Doses:  None  On ASA: No  Other Concerns:  No    OBJECTIVE:  /65   Pulse 82   Temp 98.1  F (36.7  C)   Resp 18   Wt 70.1 kg (154 lb 9.6 oz)   SpO2 98%   BMI 24.95 kg/m    Last INR: 6.3 on 2/3/21  INR Today:  3.4  Current Dose:  On hold last 2 days    INR Flow Sheet  Date INR New Dose/Orders   1/21  5.7 Hold warfarin   1/22 6.8, then 7.2  Vit K 10 mg today, 0 warfarin until recheck   1/25  1.6  7.5 mg daily    1/27 2.1 7.5 mg daily    2/3  6.3  hold warfarin    2/5 3.4           ASSESSMENT:  New onset a-fib (H)  Long term current use of anticoagulant therapy  Encounter for therapeutic drug monitoring  Supratherapeutic INR for goal of 2-3    PLAN:   Orders written by provider at facility  New Dose: 5 mg daily     Next INR: 2/8/21     Electronically signed by:  CAROLANN Cha CNP             Sincerely,        CAROLANN Cha CNP

## 2021-02-08 ENCOUNTER — NURSING HOME VISIT (OUTPATIENT)
Dept: GERIATRICS | Facility: CLINIC | Age: 76
End: 2021-02-08
Payer: MEDICAID

## 2021-02-08 ENCOUNTER — HOSPITAL LABORATORY (OUTPATIENT)
Facility: OTHER | Age: 76
End: 2021-02-08

## 2021-02-08 VITALS
RESPIRATION RATE: 16 BRPM | SYSTOLIC BLOOD PRESSURE: 111 MMHG | TEMPERATURE: 97.4 F | HEART RATE: 76 BPM | BODY MASS INDEX: 25.37 KG/M2 | DIASTOLIC BLOOD PRESSURE: 70 MMHG | OXYGEN SATURATION: 100 % | WEIGHT: 157.2 LBS

## 2021-02-08 DIAGNOSIS — G81.91 RIGHT HEMIPARESIS (H): ICD-10-CM

## 2021-02-08 DIAGNOSIS — I69.320 APHASIA AS LATE EFFECT OF CEREBROVASCULAR ACCIDENT: ICD-10-CM

## 2021-02-08 DIAGNOSIS — Z79.01 LONG TERM CURRENT USE OF ANTICOAGULANT THERAPY: ICD-10-CM

## 2021-02-08 DIAGNOSIS — I48.91 NEW ONSET A-FIB (H): Primary | ICD-10-CM

## 2021-02-08 LAB — INR PPP: 2.4 (ref 0.86–1.14)

## 2021-02-08 PROCEDURE — 99309 SBSQ NF CARE MODERATE MDM 30: CPT | Performed by: NURSE PRACTITIONER

## 2021-02-08 NOTE — PROGRESS NOTES
[place] [facility]  [place] Medical Record Number:  [id number]  Place of Service where encounter took place:  [facility] ([facility]) [[id number]]  Chief Complaint   Patient presents with     Nursing Home Acute       HPI:    Simone Daniels  is a 72 year old (5/26/1948), who is being seen today for an episodic care visit at: [facility] ([facility]) [[id number]].     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  S/he was admitted to the hospital after found to be behaving abnormally (disheveled, mute, confused) and found to have new subacute infarct in R parietal lobe (R MCA distribution) with petechial hemorrhage and severe expressive and receptive aphasia, failed swallow eval. CTA was unremarkable. The hospital stay was complicated with enterococcus UTI and was treated.  EEG showed intermittent slowing in the L anterior head, a TTE on 11/25/20 showed grade 3 diastolic dysfunction with enlarged bilateral atria but intact septum.  He had a short run of afib/flutter on 11/30 and started on metoprolol.  Warfarin started on day 10 post bleed.  Continued to fail swallowing evaluate so a PEG tube was placed. He also has persistent urinary retention-failed several voiding trials in the hospital and continues to have indwelling elena cath-started on doxazosin. He suffered a traumatic strait cath on 11/29/20.  His extended stay at the hospital was in part caused by the need for a court appointed guardian due to his new persistent severe aphasia, limited family who can help him.      Recent changes:    12/31 - guardian approves treatment in place without follow up with cardiology or Urology unless absolutely needed    1/1/21 - to ED for urgent replacement of PEG tube due to traumatic inadvertent removal     1/10/21 - covid + per antigen  testing, minimal symptoms      Today's concern is:     New onset a-fib (H)  Long term current use of anticoagulant therapy  Right hemiparesis (H)  Aphasia as late effect of cerebrovascular accident    Simone is unable to answer simple questions, smiles and wanders about the unit.  Nursing reports no new concerns , ongoing difficulty with finger stick for INRs.       Past Medical, Surgical, and Family History reviewed in Epic today.    MEDICATIONS:  Current medication list reviewed    REVIEW OF SYSTEMS:  Unobtainable secondary to aphasia.     OBJECTIVE:  /70   Pulse 76   Temp 97.4  F (36.3  C)   Resp 16   Wt 71.3 kg (157 lb 3.2 oz)   SpO2 100%   BMI 25.37 kg/m    Exam:  GENERAL APPEARANCE:  Alert, in no distress   CHEST/RESP:  respiratory effort  normal, no respiratory distress, lung sounds CTA    CV:  Rate and rhythm reg, no murmur/rub/gallop, no peripheral edema  M/S:   gait & station normal-ambulates without assistive device, digits and nails within normal limits   PSYCH:  at baseline mentation, alert, unable to determine orientation due to aphasia    Labs:   Recent labs in [facility] reviewed by me today.     INR Flow Sheet  Date INR New Dose/Orders   1/21  5.7 Hold warfarin   1/22 6.8, then 7.2  Vit K 10 mg today, 0 warfarin until recheck   1/25  1.6  7.5 mg daily    1/27 2.1 7.5 mg daily    2/3  6.3  hold warfarin    2/5 3.4  5 mg daily    2/8          ASSESSMENT/PLAN:  New onset a-fib (H)  Long term current use of anticoagulant therapy  Right hemiparesis (H)  Aphasia as late effect of cerebrovascular accident  Patient with ongoing aphasia after extensive CVA, no new symptoms. History of Afib, previously on apixaban.  He has been on warfarin now but staff having difficulty drawing INRS per fingerstick due to significantly calloused fingers.  Reviewed with geriatric pharmacist, and his kidney function is good.    -discontinue warfarin and INRs  -start apixaban.       Orders written by provider at  facility    Discontinue warfarin and further INR checks    Start apixaban 5 mg BID po for diagnosis afib    Electronically signed by:  CAROLANN Cha CNP

## 2021-02-08 NOTE — LETTER
2/8/2021        RE: Simone Daniels  [facility]  [address]  [facility] [address]        [place] [facility]  [place] Medical Record Number:  [id number]  [facility] encounter took place[facility] [[id number]]  Chief Complaint   Patient presents with     Nursing Home Acute       HPI:    Simone Daniels  is a 72 year old (5/26/1948), who is being seen today for an episodic care visit at: [facility] ([facility]) [[id number]].     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  S/he was admitted to the hospital after found to be behaving abnormally (disheveled, mute, confused) and found to have new subacute infarct in R parietal lobe (R MCA distribution) with petechial hemorrhage and severe expressive and receptive aphasia, failed swallow eval. CTA was unremarkable. The hospital stay was complicated with enterococcus UTI and was treated.  EEG showed intermittent slowing in the L anterior head, a TTE on 11/25/20 showed grade 3 diastolic dysfunction with enlarged bilateral atria but intact septum.  He had a short run of afib/flutter on 11/30 and started on metoprolol.  Warfarin started on day 10 post bleed.  Continued to fail swallowing evaluate so a PEG tube was placed. He also has persistent urinary retention-failed several voiding trials in the hospital and continues to have indwelling elena cath-started on doxazosin. He suffered a traumatic strait cath on 11/29/20.  His extended stay at the hospital was in part caused by the need for a court appointed guardian due to his new persistent severe aphasia, limited family who can help him.      Recent changes:    12/31 - guardian approves treatment in place without follow up with cardiology or Urology unless absolutely needed    1/1/21 - to ED for  urgent replacement of PEG tube due to traumatic inadvertent removal     1/10/21 - covid + per antigen testing, minimal symptoms      Today's concern is:     New onset a-fib (H)  Long term current use of anticoagulant therapy  Right hemiparesis (H)  Aphasia as late effect of cerebrovascular accident    Simone is unable to answer simple questions, smiles and wanders about the unit.  Nursing reports no new concerns , ongoing difficulty with finger stick for INRs.       Past Medical, Surgical, and Family History reviewed in Epic today.    MEDICATIONS:  Current medication list reviewed    REVIEW OF SYSTEMS:  Unobtainable secondary to aphasia.     OBJECTIVE:  /70   Pulse 76   Temp 97.4  F (36.3  C)   Resp 16   Wt 71.3 kg (157 lb 3.2 oz)   SpO2 100%   BMI 25.37 kg/m    Exam:  GENERAL APPEARANCE:  Alert, in no distress   CHEST/RESP:  respiratory effort  normal, no respiratory distress, lung sounds CTA    CV:  Rate and rhythm reg, no murmur/rub/gallop, no peripheral edema  M/S:   gait & station normal-ambulates without assistive device, digits and nails within normal limits   PSYCH:  at baseline mentation, alert, unable to determine orientation due to aphasia    Labs:   Recent labs in [facility] reviewed by me today.     INR Flow Sheet  Date INR New Dose/Orders   1/21  5.7 Hold warfarin   1/22 6.8, then 7.2  Vit K 10 mg today, 0 warfarin until recheck   1/25  1.6  7.5 mg daily    1/27 2.1 7.5 mg daily    2/3  6.3  hold warfarin    2/5 3.4  5 mg daily    2/8          ASSESSMENT/PLAN:  New onset a-fib (H)  Long term current use of anticoagulant therapy  Right hemiparesis (H)  Aphasia as late effect of cerebrovascular accident  Patient with ongoing aphasia after extensive CVA, no new symptoms. History of Afib, previously on apixaban.  He has been on warfarin now but staff having difficulty drawing INRS per fingerstick due to significantly calloused fingers.  Reviewed with geriatric pharmacist, and his kidney function is  good.    -discontinue warfarin and INRs  -start apixaban.       Orders written by provider at facility    Discontinue warfarin and further INR checks    Start apixaban 5 mg BID po for diagnosis afib    Electronically signed by:  CAROLANN Cha CNP              Sincerely,        CAROLANN Cha CNP

## 2021-03-05 NOTE — PROGRESS NOTES
[place] [facility]  Chief Complaint   Patient presents with     skilled nursing Regulatory     [place] Medical Record Number:  [id number]  Place of Service where encounter took place[facility] [[id number]]    HPI:    Simone Daniels  is 72 year old (5/26/1948), who is being seen today for a federally mandated E/M visit.  HPI information obtained from: facility staff, patient report and [facility] chart review.       HISTORY:  * 11/25-12/30/20: pt with PMH notable for left parietal CVA with hemorrhagic conversion, A-fib not on OAC, hospitalized with  Subacute infarction in right parietal lob with petechial hemorrhage- started on coumadin later; and severe expressive/receptive aphasia, and dysphagia (s/p PEG tube placement) .  - short run A-fib/flutter started on metoprolol. TTE revealed GIII diastolic dysfunction with b/l atrial enlargement  - stay c/w enterococcus UTI, and urinary retention s/p Elena's catheter insertion and started on droxacin.   -  Guardian assigned by court      UPDATES SINCE ADMISSION TO THIS FACILITY:  *01/0121: sent to ED for PEG tube break near entry, elena's cath placed in the hardware, with a plan to have IR to exchange the inner tube.     12/31 - guardian approves treatment in place without follow up with cardiology or Urology unless absolutely needed    1/1/21 - to ED for urgent replacement of PEG tube due to traumatic inadvertent removal     1/10/21 - Covid + per antigen testing, minimal symptoms     2/8/21: due to difficulty obtaining blood sample for INR (callus fingers), coumadin discontinued and started on Eliquis.         Today's concerns are:   -  - Resident seen and examined.   - Behaviors: RN reports had some agitation 2/2 roommate, roaming, wander guard placed, and tolerating well.   - CVA:  - PEG tube: denies pain , endorse can eat w/o any problem   - Elena's catheter: no concern reported.   - Reports sleep, appetite and BM are fine.   - RN  "has no concern today.     --------------------------------  - - Past Medical, social, family histories, medications, and allergies reviewed and updated  - Medications reviewed: in the chart and EHR.   - Case Management:   I have reviewed the care plan and MDS and do agree with the plan. Patient's desire to return to the community is not present.  Information reviewed:  Medications, vital signs, orders, and nursing notes.      ==================================================  MEDICATIONS:  Current Outpatient Medications   Medication Sig Dispense Refill     acetaminophen (TYLENOL) 500 MG tablet Take 1,000 mg by mouth every 6 hours as needed for pain       alum & mag hydroxide-simethicone (MAALOX  ES) 400-400-40 MG/5ML SUSP suspension Take 30 mLs by mouth 4 times daily as needed for indigestion        apixaban ANTICOAGULANT (ELIQUIS) 5 MG tablet Take 5 mg by mouth 2 times daily       Cranberry 450 MG TABS Take 1 tablet by mouth daily       doxazosin (CARDURA) 4 MG tablet Take 4 mg by mouth At Bedtime       folic acid (FOLVITE) 1 MG tablet 1 tablet (1 mg) by Oral or Feeding Tube route daily (Patient taking differently: Take 1 mg by mouth daily )       melatonin 3 MG tablet 1 tablet (3 mg) by Oral or Feeding Tube route daily (Patient taking differently: Take 3 mg by mouth daily )       metoprolol tartrate (LOPRESSOR) 25 MG tablet 0.5 tablets (12.5 mg) by Oral or Feeding Tube route 2 times daily (Patient taking differently: Take 12.5 mg by mouth 2 times daily )       multivitamin w/minerals (THERA-VIT-M) tablet Take 1 tablet by mouth daily       Nutritional Supplements (JEVITY 1.5 SANDRA) LIQD 110 ml/hr X 12 hours.  On at 9 pm, off at 9 am       thiamine (B-1) 100 MG tablet 1 tablet (100 mg) by Oral or Feeding Tube route daily (Patient taking differently: Take 100 mg by mouth daily )       ROS: Unobtainable secondary to aphasia.     Vitals:  /68   Pulse 78   Temp 97.3  F (36.3  C)   Resp 20   Ht 1.676 m (5' 6\")  "  Wt 71 kg (156 lb 9.6 oz)   SpO2 100%   BMI 25.28 kg/m    Body mass index is 25.28 kg/m .  Exam:  GENERAL APPEARANCE:  in no distress, cooperative  RESP:  lungs clear to auscultation   CV:  S1S2 audible, regular HR, no murmur appreciated.   ABDOMEN:  soft, NT/ND, BS audible. no mass appreciated on palpation.   M/S:   Limping, ambulates independently.   SKIN:  PEG tube in place. Elena's catheter in place.   NEURO:  Humming and hand movements, no wording. Hand  5/5 bl, dorsiflexion 5/5 bl.   PSYCH: impaired insight, judgement and memory, affect and mood elated    Lab/Diagnostic data: Reviewed in the chart and EHR.          ASSESSMENT/PLAN  ----------------------------  Hx of right parietal infarction with hemorrhagic conversion (2018)  Hx of subacute infarction with petechial hemorrhage  Expressive aphasia as a late effect of CVA  Severe Dysphagia s/p PEG tube (H)  Debility  Aphasia (expressive)  - LDL 69, hence statin was not initiated.   - ambulates independently  - Continues to nursing assistance with ADLs  - Body mass index is 25.28 kg/m .      Chronic A-fib (H)  GIII  Diastolic dysfunction with enlarged bilateral (H)  -  Clinically compensated.   - CVR. On metoprolol tartrated 12.5 mg bid.   - on Eliquis now ( was on coumadin)        Dementia, query early onset Alzheimer disease (H)  - MOCA 12/20 (Nov 2020). Had some behaviors 2/2 roommate presence, better now  - Continue to anticipate needs. Chronic condition, ongoing decline expected.   -  Continue to provide redirection and reassurance as needed. Maintain safe living situation with goals focused on comfort.  - Guardian assigned.       Urinary retention with incomplete bladder emptying 2/2 mechanical obstruction  Failed multiple PVR, now with elena's catheter  - catheter care.       Hx of alcohol abuse: on folic acid and thiamin. No concern.        ORDER: See above, otherwise, continue the rest of the current POC.       Electronically signed by:  Rome  MD Itz

## 2021-03-07 VITALS
SYSTOLIC BLOOD PRESSURE: 101 MMHG | HEIGHT: 66 IN | OXYGEN SATURATION: 100 % | WEIGHT: 156.6 LBS | BODY MASS INDEX: 25.17 KG/M2 | TEMPERATURE: 97.3 F | HEART RATE: 78 BPM | DIASTOLIC BLOOD PRESSURE: 68 MMHG | RESPIRATION RATE: 20 BRPM

## 2021-03-07 ASSESSMENT — MIFFLIN-ST. JEOR: SCORE: 1393.08

## 2021-03-08 ENCOUNTER — NURSING HOME VISIT (OUTPATIENT)
Dept: GERIATRICS | Facility: CLINIC | Age: 76
End: 2021-03-08
Payer: MEDICARE

## 2021-03-08 DIAGNOSIS — F02.818 EARLY ONSET ALZHEIMER'S DISEASE WITH BEHAVIORAL DISTURBANCE (H): ICD-10-CM

## 2021-03-08 DIAGNOSIS — I69.320 APHASIA AS LATE EFFECT OF CEREBROVASCULAR ACCIDENT: ICD-10-CM

## 2021-03-08 DIAGNOSIS — Z93.1 PEG (PERCUTANEOUS ENDOSCOPIC GASTROSTOMY) STATUS (H): ICD-10-CM

## 2021-03-08 DIAGNOSIS — I69.30 LATE EFFECTS OF CEREBRAL ISCHEMIC STROKE: ICD-10-CM

## 2021-03-08 DIAGNOSIS — Z97.8 FOLEY CATHETER IN PLACE: ICD-10-CM

## 2021-03-08 DIAGNOSIS — I48.11 LONGSTANDING PERSISTENT ATRIAL FIBRILLATION (H): ICD-10-CM

## 2021-03-08 DIAGNOSIS — G30.0 EARLY ONSET ALZHEIMER'S DISEASE WITH BEHAVIORAL DISTURBANCE (H): ICD-10-CM

## 2021-03-08 DIAGNOSIS — G81.91 RIGHT HEMIPARESIS (H): Primary | ICD-10-CM

## 2021-03-08 PROCEDURE — 99207 PR CDG-MDM COMPONENT: MEETS MODERATE - DOWN CODED: CPT | Performed by: FAMILY MEDICINE

## 2021-03-08 PROCEDURE — 99309 SBSQ NF CARE MODERATE MDM 30: CPT | Performed by: FAMILY MEDICINE

## 2021-03-08 NOTE — LETTER
3/8/2021        RE: Simone Daniels  [employer]  [address]  [facility] [address]        [place] [facility]  Chief Complaint   Patient presents with     halfway Regulatory     [place] Medical Record Number:  [id number]  Place of Service where encounter took place[facility] [[id number]]    HPI:    Simone Daniels  is 72 year old (5/26/1948), who is being seen today for a federally mandated E/M visit.  HPI information obtained from: facility staff, patient report and [facility] chart review.       HISTORY:  * 11/25-12/30/20: pt with PMH notable for left parietal CVA with hemorrhagic conversion, A-fib not on OAC, hospitalized with  Subacute infarction in right parietal lob with petechial hemorrhage- started on coumadin later; and severe expressive/receptive aphasia, and dysphagia (s/p PEG tube placement) .  - short run A-fib/flutter started on metoprolol. TTE revealed GIII diastolic dysfunction with b/l atrial enlargement  - stay c/w enterococcus UTI, and urinary retention s/p Elena's catheter insertion and started on droxacin.   -  Guardian assigned by court      UPDATES SINCE ADMISSION TO THIS FACILITY:  *01/0121: sent to ED for PEG tube break near entry, elena's cath placed in the hardware, with a plan to have IR to exchange the inner tube.     12/31 - guardian approves treatment in place without follow up with cardiology or Urology unless absolutely needed    1/1/21 - to ED for urgent replacement of PEG tube due to traumatic inadvertent removal     1/10/21 - Covid + per antigen testing, minimal symptoms     2/8/21: due to difficulty obtaining blood sample for INR (callus fingers), coumadin discontinued and started on Eliquis.         Today's concerns are:   -  - Resident seen and examined.   - Behaviors: RN reports had some agitation 2/2 roommate, roaming, wander guard placed, and tolerating well.   - CVA:  - PEG tube: denies pain , endorse can eat  w/o any problem   - Flores's catheter: no concern reported.   - Reports sleep, appetite and BM are fine.   - RN has no concern today.     --------------------------------  - - Past Medical, social, family histories, medications, and allergies reviewed and updated  - Medications reviewed: in the chart and EHR.   - Case Management:   I have reviewed the care plan and MDS and do agree with the plan. Patient's desire to return to the community is not present.  Information reviewed:  Medications, vital signs, orders, and nursing notes.      ==================================================  MEDICATIONS:  Current Outpatient Medications   Medication Sig Dispense Refill     acetaminophen (TYLENOL) 500 MG tablet Take 1,000 mg by mouth every 6 hours as needed for pain       alum & mag hydroxide-simethicone (MAALOX  ES) 400-400-40 MG/5ML SUSP suspension Take 30 mLs by mouth 4 times daily as needed for indigestion        apixaban ANTICOAGULANT (ELIQUIS) 5 MG tablet Take 5 mg by mouth 2 times daily       Cranberry 450 MG TABS Take 1 tablet by mouth daily       doxazosin (CARDURA) 4 MG tablet Take 4 mg by mouth At Bedtime       folic acid (FOLVITE) 1 MG tablet 1 tablet (1 mg) by Oral or Feeding Tube route daily (Patient taking differently: Take 1 mg by mouth daily )       melatonin 3 MG tablet 1 tablet (3 mg) by Oral or Feeding Tube route daily (Patient taking differently: Take 3 mg by mouth daily )       metoprolol tartrate (LOPRESSOR) 25 MG tablet 0.5 tablets (12.5 mg) by Oral or Feeding Tube route 2 times daily (Patient taking differently: Take 12.5 mg by mouth 2 times daily )       multivitamin w/minerals (THERA-VIT-M) tablet Take 1 tablet by mouth daily       Nutritional Supplements (JEVITY 1.5 SANDRA) LIQD 110 ml/hr X 12 hours.  On at 9 pm, off at 9 am       thiamine (B-1) 100 MG tablet 1 tablet (100 mg) by Oral or Feeding Tube route daily (Patient taking differently: Take 100 mg by mouth daily )       ROS: Unobtainable  "secondary to aphasia.     Vitals:  /68   Pulse 78   Temp 97.3  F (36.3  C)   Resp 20   Ht 1.676 m (5' 6\")   Wt 71 kg (156 lb 9.6 oz)   SpO2 100%   BMI 25.28 kg/m    Body mass index is 25.28 kg/m .  Exam:  GENERAL APPEARANCE:  in no distress, cooperative  RESP:  lungs clear to auscultation   CV:  S1S2 audible, regular HR, no murmur appreciated.   ABDOMEN:  soft, NT/ND, BS audible. no mass appreciated on palpation.   M/S:   Limping, ambulates independently.   SKIN:  PEG tube in place. Elena's catheter in place.   NEURO:  Humming and hand movements, no wording. Hand  5/5 bl, dorsiflexion 5/5 bl.   PSYCH: impaired insight, judgement and memory, affect and mood elated    Lab/Diagnostic data: Reviewed in the chart and EHR.          ASSESSMENT/PLAN  ----------------------------  Hx of right parietal infarction with hemorrhagic conversion (2018)  Hx of subacute infarction with petechial hemorrhage  Expressive aphasia as a late effect of CVA  Severe Dysphagia s/p PEG tube (H)  Debility  Aphasia (expressive)  - LDL 69, hence statin was not initiated.   - ambulates independently  - Continues to nursing assistance with ADLs  - Body mass index is 25.28 kg/m .      Chronic A-fib (H)  GIII  Diastolic dysfunction with enlarged bilateral (H)  -  Clinically compensated.   - CVR. On metoprolol tartrated 12.5 mg bid.   - on Eliquis now ( was on coumadin)        Dementia, query early onset Alzheimer disease (H)  - MOCA 12/20 (Nov 2020). Had some behaviors 2/2 roommate presence, better now  - Continue to anticipate needs. Chronic condition, ongoing decline expected.   -  Continue to provide redirection and reassurance as needed. Maintain safe living situation with goals focused on comfort.  - Guardian assigned.       Urinary retention with incomplete bladder emptying 2/2 mechanical obstruction  Failed multiple PVR, now with elena's catheter  - catheter care.       Hx of alcohol abuse: on folic acid and thiamin. No " concern.        ORDER: See above, otherwise, continue the rest of the current POC.       Electronically signed by:  Rome Mobley MD                Sincerely,        Rome Mobley MD

## 2021-03-09 NOTE — PROGRESS NOTES
Will forward to Dr Fowler so he is aware.  Thank you for the update.    Sophia Dacosta FNP-BC     Alar Island Pedicle Flap Text: The defect edges were debeveled with a #15 scalpel blade.  Given the location of the defect, shape of the defect and the proximity to the alar rim an island pedicle advancement flap was deemed most appropriate.  Using a sterile surgical marker, an appropriate advancement flap was drawn incorporating the defect, outlining the appropriate donor tissue and placing the expected incisions within the nasal ala running parallel to the alar rim. The area thus outlined was incised with a #15 scalpel blade.  The skin margins were undermined minimally to an appropriate distance in all directions around the primary defect and laterally outward around the island pedicle utilizing iris scissors.  There was minimal undermining beneath the pedicle flap.

## 2021-03-26 ENCOUNTER — HOSPITAL ENCOUNTER (EMERGENCY)
Facility: CLINIC | Age: 76
Discharge: HOME | End: 2021-03-26
Attending: FAMILY MEDICINE | Admitting: NURSE PRACTITIONER
Payer: MEDICARE

## 2021-03-26 VITALS
HEART RATE: 63 BPM | DIASTOLIC BLOOD PRESSURE: 69 MMHG | TEMPERATURE: 97.1 F | RESPIRATION RATE: 16 BRPM | OXYGEN SATURATION: 99 % | SYSTOLIC BLOOD PRESSURE: 111 MMHG

## 2021-03-26 DIAGNOSIS — Z46.6 ENCOUNTER FOR FOLEY CATHETER REPLACEMENT: ICD-10-CM

## 2021-03-26 PROCEDURE — 99282 EMERGENCY DEPT VISIT SF MDM: CPT | Performed by: NURSE PRACTITIONER

## 2021-03-26 RX ORDER — LIDOCAINE HYDROCHLORIDE 20 MG/ML
JELLY TOPICAL ONCE
Status: DISCONTINUED | OUTPATIENT
Start: 2021-03-26 | End: 2021-03-26 | Stop reason: HOSPADM

## 2021-03-29 RX ORDER — SODIUM CHLORIDE/ALOE VERA
GEL (GRAM) NASAL 2 TIMES DAILY
COMMUNITY

## 2021-03-29 RX ORDER — ACETAMINOPHEN 325 MG/1
650 TABLET ORAL EVERY 4 HOURS PRN
COMMUNITY
End: 2021-05-14

## 2021-03-29 RX ORDER — POLYETHYLENE GLYCOL 3350 17 G/17G
1 POWDER, FOR SOLUTION ORAL DAILY
COMMUNITY

## 2021-03-29 NOTE — PROGRESS NOTES
[place] [facility]  [place] Medical Record Number:  [id number]  Place of Service where encounter took place:  [facility] () [[id number]]  Chief Complaint   Patient presents with     RECHECK       HPI:    Simone Daniels  is a 72 year old (5/26/1948), who is being seen today for an episodic care visit at: [facility] () [[id number]].     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  S/he was admitted to the hospital after found to be behaving abnormally (disheveled, mute, confused) and found to have new subacute infarct in R parietal lobe (R MCA distribution) with petechial hemorrhage and severe expressive and receptive aphasia, failed swallow eval. CTA was unremarkable. The hospital stay was complicated with enterococcus UTI and was treated.  EEG showed intermittent slowing in the L anterior head, a TTE on 11/25/20 showed grade 3 diastolic dysfunction with enlarged bilateral atria but intact septum.  He had a short run of afib/flutter on 11/30 and started on metoprolol.  Warfarin started on day 10 post bleed.  Continued to fail swallowing evaluate so a PEG tube was placed. He also has persistent urinary retention-failed several voiding trials in the hospital and continues to have indwelling elena cath-started on doxazosin. He suffered a traumatic strait cath on 11/29/20.  His extended stay at the hospital was in part caused by the need for a court appointed guardian due to his new persistent severe aphasia, limited family who can help him.      Recent changes:    12/31 - guardian approves treatment in place without follow up with cardiology or Urology unless absolutely needed    1/1/21 - to ED for urgent replacement of PEG tube due to traumatic inadvertent removal     1/10/21 - covid + per antigen testing, minimal symptoms     Today's concern  "is:   Right hemiparesis (H)  Aphasia as late effect of cerebrovascular accident  Longstanding persistent atrial fibrillation (H)  Long term current use of anticoagulant therapy  Urinary retention with incomplete bladder emptying    Simone is unable to articulate words. He is observed smiling and gesticulating about the unit, able to follow simple commands at times.  Nursing reports he required an ED visit to place elena cath as they were unable to successfully replace his cath this week.   Nursing also has several reports of bloody noses, which do stop easily but happen rather frequently, he is on apixaban.    Past Medical, Surgical, and Family History reviewed in Epic today.    MEDICATIONS:  Current medication list reviewed    REVIEW OF SYSTEMS:  Unobtainable secondary to aphasia.     OBJECTIVE:  /67   Pulse 72   Temp 97.3  F (36.3  C)   Resp 16   Ht 1.651 m (5' 5\")   Wt 72.1 kg (158 lb 14.4 oz)   SpO2 94%   BMI 26.44 kg/m    Exam:  GENERAL APPEARANCE:  Alert, in no distress   CHEST/RESP:  respiratory effort normal, no respiratory distress, lung sounds CTA    CV:  Rate and rhythm reg, no murmur/rub/gallop, no peripheral edema  M/S:   gait & station normal-walks without device, digits and nails within normal limits   SKIN:  Gtube site is CDI, some proliferation of skin surrounding tube  PSYCH:  at baseline mentation, alert and oriented to self, unclear level of orientation    Labs:   Recent labs in [facility] reviewed by me today.     ASSESSMENT/PLAN:  Right hemiparesis (H)  Aphasia as late effect of cerebrovascular accident  Patient with no new symptoms of advancing disease, mild R hemiparesis but able to ambulate without device.  He continues to be unable to articulate words, but he appears to be comprehending more simple statements and is pleasant and cooperative with cares most of the time.     Longstanding persistent atrial fibrillation (H)  Long term current use of anticoagulant therapy  On apixaban " "for afib, CVA history.  He is having occasional bloody noses, not difficult to stop.  No obvious lesions in nose  -increase AYR gel to TID for dry nares  -discontinue thiamine-adverse reaction of GI bleeding possible with this  -labs this week for CBC    Urinary retention with incomplete bladder emptying  Patient with ongoing urinary retention and indwelling elena.  Traumatic removal of elena this week, and he required ED visit for replacement.  This has happened only one time before and most cath placements have been without difficulty   -consider urology follow up if this continues    PEG (percutaneous endoscopic gastrostomy) status (H)  Tube feeding is down to 25 ml/hr at night.  Per discussion with dietician,   \"He is currently receiving 375 kcals (22% of needs), 16 g protein (24% of needs), and 250 mL fluid (TF/flushes: 15% of needs) - so very minimal at this point! He is receiving Med Pass TID (720 kcals, 30 g protein) to assist with meeting nutritional needs orally. 100% Med Pass TID consumed (720 kcals, 30 g protein) - % meals consumed (although usually is %).\"  Weight has been stable  -discontinue scheduled tube feeding, continuing maintenance of line with 60 ml water BID and dressing changes to gtube site.  If weight remains stable, consider removing gtube     Orders written by provider at facility    Decrease metoprolol to 6.25 mg BID, Hold for SBP<100 or H<60    Discontinue thiamine    Increase frequency of AYR nasal spray to TID for dry nares    Labs 3/31/21 to include CBC, CMP    Ok to hold tube feeding as oral intake is meeting goal intake needs    Electronically signed by:  CAROLANN Cha CNP    "

## 2021-03-30 ENCOUNTER — NURSING HOME VISIT (OUTPATIENT)
Dept: GERIATRICS | Facility: CLINIC | Age: 76
End: 2021-03-30
Payer: MEDICAID

## 2021-03-30 ENCOUNTER — DOCUMENTATION ONLY (OUTPATIENT)
Dept: OTHER | Facility: CLINIC | Age: 76
End: 2021-03-30

## 2021-03-30 VITALS
HEART RATE: 72 BPM | SYSTOLIC BLOOD PRESSURE: 112 MMHG | OXYGEN SATURATION: 94 % | TEMPERATURE: 97.3 F | RESPIRATION RATE: 16 BRPM | BODY MASS INDEX: 26.48 KG/M2 | WEIGHT: 158.9 LBS | HEIGHT: 65 IN | DIASTOLIC BLOOD PRESSURE: 67 MMHG

## 2021-03-30 DIAGNOSIS — G81.91 RIGHT HEMIPARESIS (H): Primary | ICD-10-CM

## 2021-03-30 DIAGNOSIS — I69.320 APHASIA AS LATE EFFECT OF CEREBROVASCULAR ACCIDENT: ICD-10-CM

## 2021-03-30 DIAGNOSIS — R33.9 URINARY RETENTION WITH INCOMPLETE BLADDER EMPTYING: ICD-10-CM

## 2021-03-30 DIAGNOSIS — Z93.1 PEG (PERCUTANEOUS ENDOSCOPIC GASTROSTOMY) STATUS (H): ICD-10-CM

## 2021-03-30 DIAGNOSIS — I48.11 LONGSTANDING PERSISTENT ATRIAL FIBRILLATION (H): ICD-10-CM

## 2021-03-30 DIAGNOSIS — Z79.01 LONG TERM CURRENT USE OF ANTICOAGULANT THERAPY: ICD-10-CM

## 2021-03-30 PROCEDURE — 99309 SBSQ NF CARE MODERATE MDM 30: CPT | Performed by: NURSE PRACTITIONER

## 2021-03-30 ASSESSMENT — MIFFLIN-ST. JEOR: SCORE: 1387.65

## 2021-03-30 NOTE — LETTER
3/30/2021        RE: Simone Daniels  [employer]  [address]  [facility] [address]        [facility] [facility]  [place] Medical Record Number:  [id number]  Place of Service where encounter took place:  [facility] () [[id number]]  Chief Complaint   Patient presents with     RECHECK       HPI:    Simone Daniels  is a 72 year old (5/26/1948), who is being seen today for an episodic care visit at: [facility] () [[id number]].     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  S/he was admitted to the hospital after found to be behaving abnormally (disheveled, mute, confused) and found to have new subacute infarct in R parietal lobe (R MCA distribution) with petechial hemorrhage and severe expressive and receptive aphasia, failed swallow eval. CTA was unremarkable. The hospital stay was complicated with enterococcus UTI and was treated.  EEG showed intermittent slowing in the L anterior head, a TTE on 11/25/20 showed grade 3 diastolic dysfunction with enlarged bilateral atria but intact septum.  He had a short run of afib/flutter on 11/30 and started on metoprolol.  Warfarin started on day 10 post bleed.  Continued to fail swallowing evaluate so a PEG tube was placed. He also has persistent urinary retention-failed several voiding trials in the hospital and continues to have indwelling elena cath-started on doxazosin. He suffered a traumatic strait cath on 11/29/20.  His extended stay at the hospital was in part caused by the need for a court appointed guardian due to his new persistent severe aphasia, limited family who can help him.      Recent changes:    12/31 - guardian approves treatment in place without follow up with cardiology or Urology unless absolutely needed    1/1/21 - to ED for urgent replacement of PEG tube due to  "traumatic inadvertent removal     1/10/21 - covid + per antigen testing, minimal symptoms     Today's concern is:   Right hemiparesis (H)  Aphasia as late effect of cerebrovascular accident  Longstanding persistent atrial fibrillation (H)  Long term current use of anticoagulant therapy  Urinary retention with incomplete bladder emptying    Simone is unable to articulate words. He is observed smiling and gesticulating about the unit, able to follow simple commands at times.  Nursing reports he required an ED visit to place elena cath as they were unable to successfully replace his cath this week.   Nursing also has several reports of bloody noses, which do stop easily but happen rather frequently, he is on apixaban.    Past Medical, Surgical, and Family History reviewed in Epic today.    MEDICATIONS:  Current medication list reviewed    REVIEW OF SYSTEMS:  Unobtainable secondary to aphasia.     OBJECTIVE:  /67   Pulse 72   Temp 97.3  F (36.3  C)   Resp 16   Ht 1.651 m (5' 5\")   Wt 72.1 kg (158 lb 14.4 oz)   SpO2 94%   BMI 26.44 kg/m    Exam:  GENERAL APPEARANCE:  Alert, in no distress   CHEST/RESP:  respiratory effort normal, no respiratory distress, lung sounds CTA    CV:  Rate and rhythm reg, no murmur/rub/gallop, no peripheral edema  M/S:   gait & station normal-walks without device, digits and nails within normal limits   SKIN:  Gtube site is CDI, some proliferation of skin surrounding tube  PSYCH:  at baseline mentation, alert and oriented to self, unclear level of orientation    Labs:   Recent labs in [facility] reviewed by me today.     ASSESSMENT/PLAN:  Right hemiparesis (H)  Aphasia as late effect of cerebrovascular accident  Patient with no new symptoms of advancing disease, mild R hemiparesis but able to ambulate without device.  He continues to be unable to articulate words, but he appears to be comprehending more simple statements and is pleasant and cooperative with cares most of the time. " "    Longstanding persistent atrial fibrillation (H)  Long term current use of anticoagulant therapy  On apixaban for afib, CVA history.  He is having occasional bloody noses, not difficult to stop.  No obvious lesions in nose  -increase AYR gel to TID for dry nares  -discontinue thiamine-adverse reaction of GI bleeding possible with this  -labs this week for CBC    Urinary retention with incomplete bladder emptying  Patient with ongoing urinary retention and indwelling elena.  Traumatic removal of elena this week, and he required ED visit for replacement.  This has happened only one time before and most cath placements have been without difficulty   -consider urology follow up if this continues    PEG (percutaneous endoscopic gastrostomy) status (H)  Tube feeding is down to 25 ml/hr at night.  Per discussion with dietician,   \"He is currently receiving 375 kcals (22% of needs), 16 g protein (24% of needs), and 250 mL fluid (TF/flushes: 15% of needs) - so very minimal at this point! He is receiving Med Pass TID (720 kcals, 30 g protein) to assist with meeting nutritional needs orally. 100% Med Pass TID consumed (720 kcals, 30 g protein) - % meals consumed (although usually is %).\"  Weight has been stable  -discontinue scheduled tube feeding, continuing maintenance of line with 60 ml water BID and dressing changes to gtube site.  If weight remains stable, consider removing gtube     Orders written by provider at facility    Decrease metoprolol to 6.25 mg BID, Hold for SBP<100 or H<60    Discontinue thiamine    Increase frequency of AYR nasal spray to TID for dry nares    Labs 3/31/21 to include CBC, CMP    Ok to hold tube feeding as oral intake is meeting goal intake needs    Electronically signed by:  CAROLANN Cha CNP          Sincerely,        CAROLANN Cha CNP    "

## 2021-03-31 ENCOUNTER — HOSPITAL LABORATORY (OUTPATIENT)
Facility: OTHER | Age: 76
End: 2021-03-31

## 2021-03-31 LAB
ALBUMIN SERPL-MCNC: 3 G/DL (ref 3.4–5)
ALP SERPL-CCNC: 86 U/L (ref 40–150)
ALT SERPL W P-5'-P-CCNC: 30 U/L (ref 0–70)
ANION GAP SERPL CALCULATED.3IONS-SCNC: 2 MMOL/L (ref 3–14)
AST SERPL W P-5'-P-CCNC: 24 U/L (ref 0–45)
BILIRUB SERPL-MCNC: 0.7 MG/DL (ref 0.2–1.3)
BUN SERPL-MCNC: 22 MG/DL (ref 7–30)
CALCIUM SERPL-MCNC: 8.6 MG/DL (ref 8.5–10.1)
CHLORIDE SERPL-SCNC: 108 MMOL/L (ref 94–109)
CO2 SERPL-SCNC: 30 MMOL/L (ref 20–32)
CREAT SERPL-MCNC: 0.85 MG/DL (ref 0.66–1.25)
ERYTHROCYTE [DISTWIDTH] IN BLOOD BY AUTOMATED COUNT: 15 % (ref 10–15)
GFR SERPL CREATININE-BSD FRML MDRD: 85 ML/MIN/{1.73_M2}
GLUCOSE SERPL-MCNC: 76 MG/DL (ref 70–99)
HCT VFR BLD AUTO: 41.9 % (ref 40–53)
HGB BLD-MCNC: 13.4 G/DL (ref 13.3–17.7)
MCH RBC QN AUTO: 30.5 PG (ref 26.5–33)
MCHC RBC AUTO-ENTMCNC: 32 G/DL (ref 31.5–36.5)
MCV RBC AUTO: 95 FL (ref 78–100)
PLATELET # BLD AUTO: 240 10E9/L (ref 150–450)
POTASSIUM SERPL-SCNC: 3.7 MMOL/L (ref 3.4–5.3)
PROT SERPL-MCNC: 6.6 G/DL (ref 6.8–8.8)
RBC # BLD AUTO: 4.4 10E12/L (ref 4.4–5.9)
SODIUM SERPL-SCNC: 140 MMOL/L (ref 133–144)
WBC # BLD AUTO: 6.2 10E9/L (ref 4–11)

## 2021-04-02 ENCOUNTER — TELEPHONE (OUTPATIENT)
Dept: GERIATRICS | Facility: CLINIC | Age: 76
End: 2021-04-02

## 2021-04-02 ENCOUNTER — DOCUMENTATION ONLY (OUTPATIENT)
Dept: OTHER | Facility: CLINIC | Age: 76
End: 2021-04-02

## 2021-04-02 ENCOUNTER — HOSPITAL LABORATORY (OUTPATIENT)
Facility: OTHER | Age: 76
End: 2021-04-02

## 2021-04-02 NOTE — TELEPHONE ENCOUNTER
[place] [facility] TELEPHONE ENCOUNTER    Chief Complaint   Patient presents with     Cough       Simone Daniels is a 72 year old  (5/26/1948),Nurse called today to report: hoarse voice, chills, runny nose, poor appetite, sleepy but is afebrile, rapid antigen test for COVID negative, did complete COVID vaccines 2/24/21.  Lungs are clear.     ASSESSMENT/PLAN  Unclear, likely viral URI.      PCR for COVID and influenza-out of caution    Tylenol and guaifenesin for comfort    Push fluids  Electronically signed by:   CAROLANN Cha CNP

## 2021-04-03 LAB
SARS-COV-2 RNA RESP QL NAA+PROBE: NOT DETECTED
SPECIMEN SOURCE: NORMAL

## 2021-04-05 ENCOUNTER — NURSING HOME VISIT (OUTPATIENT)
Dept: GERIATRICS | Facility: CLINIC | Age: 76
End: 2021-04-05
Payer: COMMERCIAL

## 2021-04-05 VITALS
WEIGHT: 155.7 LBS | DIASTOLIC BLOOD PRESSURE: 60 MMHG | HEIGHT: 66 IN | RESPIRATION RATE: 14 BRPM | TEMPERATURE: 97.6 F | BODY MASS INDEX: 25.02 KG/M2 | SYSTOLIC BLOOD PRESSURE: 96 MMHG | OXYGEN SATURATION: 96 % | HEART RATE: 61 BPM

## 2021-04-05 DIAGNOSIS — F32.A DEPRESSION, UNSPECIFIED DEPRESSION TYPE: ICD-10-CM

## 2021-04-05 DIAGNOSIS — I69.30 LATE EFFECTS OF CEREBRAL ISCHEMIC STROKE: ICD-10-CM

## 2021-04-05 DIAGNOSIS — B00.1 COLD SORE: ICD-10-CM

## 2021-04-05 DIAGNOSIS — B00.1 HERPES SIMPLEX LABIALIS: Primary | ICD-10-CM

## 2021-04-05 PROCEDURE — 99309 SBSQ NF CARE MODERATE MDM 30: CPT | Performed by: NURSE PRACTITIONER

## 2021-04-05 ASSESSMENT — MIFFLIN-ST. JEOR: SCORE: 1369

## 2021-04-05 NOTE — PROGRESS NOTES
[place] [facility]  [place] Medical Record Number:  [id number]  Place of Service where encounter took place:  [facility] () [[id number]]  Chief Complaint   Patient presents with     RECHECK       HPI:    Simone Daniels  is a 76 year old (5/26/1944), who is being seen today for an episodic care visit at: [facility] () [[id number]].     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  S/he was admitted and found to have new subacute infarct in R parietal lobe (R MCA distribution) with petechial hemorrhage and severe expressive and receptive aphasia, failed swallow eval. CTA was unremarkable. The hospital stay was complicated with enterococcus UTI and was treated.  EEG showed intermittent slowing in the L anterior head, a TTE on 11/25/20 showed grade 3 diastolic dysfunction with enlarged bilateral atria but intact septum.  He had a short run of afib/flutter on 11/30 and started on metoprolol.  Warfarin started on day 10 post bleed.  Continued to fail swallowing evaluate so a PEG tube was placed. He also has persistent urinary retention-failed several voiding trials in the hospital and continues to have indwelling elena cath-started on doxazosin. His extended stay at the hospital was in part caused by the need for a court appointed guardian due to his new persistent severe aphasia, limited family who can help him.      Recent changes:    12/31 - guardian approves treatment in place without follow up with cardiology or Urology unless absolutely needed    1/1/21 - to ED for urgent replacement of PEG tube due to traumatic inadvertent removal     1/10/21 - covid + per antigen testing, minimal symptoms     2/8/21 - warfarin changed to apixaban    3/26 - to ED for replacement of elena    3/30 -holding tube feeding as his po intake is adequate    Today's  "concern is:   Herpes simplex labialis  Cold sore  Late effects of cerebral ischemic stroke  Depression, unspecified depression type    Simone is unable to verbalize but is agitated today.  Nursing reports new onset of small raised blister like areas around his mouth which need evaluattion.  He also is observed to be tearful and crying more, often sad and unable to express feelings due to dysphagia.       Past Medical, Surgical, and Family History reviewed in Epic today.    MEDICATIONS:  Current medication list reviewed    REVIEW OF SYSTEMS:  Unobtainable secondary to aphasia.     OBJECTIVE:  BP 96/60   Pulse 61   Temp 97.6  F (36.4  C)   Resp 14   Ht 1.676 m (5' 6\")   Wt 70.6 kg (155 lb 11.2 oz)   SpO2 96%   BMI 25.13 kg/m    Exam:  GENERAL APPEARANCE:  Alert, in no acute distress   CHEST/RESP:  respiratory effort normal, no respiratory distress, lung sounds -deferred as he would not allow   CV:  Refused auscultation   M/S:   gait & station normal-ambulates without device, digits and nails within normal limits   SKIN:  Small raised reddened and blistered like areas on both upper and lower lips, appears painful and he is holding a sock against it and will not allow me to touch it  PSYCH:  at baseline mentation, alert and appears oriented to self/surroundings as at baseline     Labs:   Recent labs in [facility] reviewed by me today.     ASSESSMENT/PLAN:  Herpes simplex labialis  Cold sore  Patient with small raised blisters on lips, appears herpetic in nature  -valciclovir x2 doses  -monitor closely    Late effects of cerebral ischemic stroke  Depression, unspecified depression type  Patient with recent CVA, tearful and anxious episodes and unable to verbalize his concerns.   -start low dose sertraline    Orders written by provider at facility    Valacyclovir 2000 mg po q12 h X 2 doses for cold sores    Sertraline 25 mg daily po for depression      Electronically signed by:  CAROLANN Cha CNP      "

## 2021-04-05 NOTE — LETTER
4/5/2021        RE: Simone Daniels  [employer]  [address]  [facility] [address]        [place] [facility]  [place] Medical Record Number:  [id number]  Place of Service where encounter took place:  [facility] () [[id number]]  Chief Complaint   Patient presents with     RECHECK       HPI:    Simone Daniels  is a 76 year old (5/26/1944), who is being seen today for an episodic care visit at: [facility] () [[id number]].     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  S/he was admitted and found to have new subacute infarct in R parietal lobe (R MCA distribution) with petechial hemorrhage and severe expressive and receptive aphasia, failed swallow eval. CTA was unremarkable. The hospital stay was complicated with enterococcus UTI and was treated.  EEG showed intermittent slowing in the L anterior head, a TTE on 11/25/20 showed grade 3 diastolic dysfunction with enlarged bilateral atria but intact septum.  He had a short run of afib/flutter on 11/30 and started on metoprolol.  Warfarin started on day 10 post bleed.  Continued to fail swallowing evaluate so a PEG tube was placed. He also has persistent urinary retention-failed several voiding trials in the hospital and continues to have indwelling elena cath-started on doxazosin. His extended stay at the hospital was in part caused by the need for a court appointed guardian due to his new persistent severe aphasia, limited family who can help him.      Recent changes:    12/31 - guardian approves treatment in place without follow up with cardiology or Urology unless absolutely needed    1/1/21 - to ED for urgent replacement of PEG tube due to traumatic inadvertent removal     1/10/21 - covid + per antigen testing, minimal symptoms     2/8/21 - warfarin changed to  "apixaban    3/26 - to ED for replacement of elena    3/30 -holding tube feeding as his po intake is adequate    Today's concern is:   Herpes simplex labialis  Cold sore  Late effects of cerebral ischemic stroke  Depression, unspecified depression type    Simone is unable to verbalize but is agitated today.  Nursing reports new onset of small raised blister like areas around his mouth which need evaluattion.  He also is observed to be tearful and crying more, often sad and unable to express feelings due to dysphagia.       Past Medical, Surgical, and Family History reviewed in Epic today.    MEDICATIONS:  Current medication list reviewed    REVIEW OF SYSTEMS:  Unobtainable secondary to aphasia.     OBJECTIVE:  BP 96/60   Pulse 61   Temp 97.6  F (36.4  C)   Resp 14   Ht 1.676 m (5' 6\")   Wt 70.6 kg (155 lb 11.2 oz)   SpO2 96%   BMI 25.13 kg/m    Exam:  GENERAL APPEARANCE:  Alert, in no acute distress   CHEST/RESP:  respiratory effort normal, no respiratory distress, lung sounds -deferred as he would not allow   CV:  Refused auscultation   M/S:   gait & station normal-ambulates without device, digits and nails within normal limits   SKIN:  Small raised reddened and blistered like areas on both upper and lower lips, appears painful and he is holding a sock against it and will not allow me to touch it  PSYCH:  at baseline mentation, alert and appears oriented to self/surroundings as at baseline     Labs:   Recent labs in [facility] reviewed by me today.     ASSESSMENT/PLAN:  Herpes simplex labialis  Cold sore  Patient with small raised blisters on lips, appears herpetic in nature  -valciclovir x2 doses  -monitor closely    Late effects of cerebral ischemic stroke  Depression, unspecified depression type  Patient with recent CVA, tearful and anxious episodes and unable to verbalize his concerns.   -start low dose sertraline    Orders written by provider at facility    Valacyclovir 2000 mg po q12 h X 2 doses for cold " sores    Sertraline 25 mg daily po for depression      Electronically signed by:  CAROLANN Cha CNP            Sincerely,        CAROLANN Cha CNP

## 2021-04-07 ENCOUNTER — DOCUMENTATION ONLY (OUTPATIENT)
Dept: OTHER | Facility: CLINIC | Age: 76
End: 2021-04-07

## 2021-04-07 RX ORDER — SERTRALINE HYDROCHLORIDE 25 MG/1
100 TABLET, FILM COATED ORAL DAILY
COMMUNITY

## 2021-04-07 RX ORDER — METOPROLOL TARTRATE 25 MG/1
6.25 TABLET, FILM COATED ORAL 2 TIMES DAILY
COMMUNITY

## 2021-04-12 NOTE — PROGRESS NOTES
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  S/he was admitted and found to have new subacute infarct in R parietal lobe (R MCA distribution) with petechial hemorrhage and severe expressive and receptive aphasia, failed swallow eval. CTA was unremarkable. The hospital stay was complicated with enterococcus UTI and was treated.  EEG showed intermittent slowing in the L anterior head, a TTE on 11/25/20 showed grade 3 diastolic dysfunction with enlarged bilateral atria but intact septum.  He had a short run of afib/flutter on 11/30 and started on metoprolol.  Warfarin started on day 10 post bleed.  Continued to fail swallowing evaluate so a PEG tube was placed. He also has persistent urinary retention-failed several voiding trials in the hospital and continues to have indwelling elena cath-started on doxazosin. His extended stay at the hospital was in part caused by the need for a court appointed guardian due to his new persistent severe aphasia, limited family who can help him.      Recent changes:    12/31 - guardian approves treatment in place without follow up with cardiology or Urology unless absolutely needed    1/1/21 - to ED for urgent replacement of PEG tube due to traumatic inadvertent removal     1/10/21 - covid + per antigen testing, minimal sympoms     2/8/21 - warfarin changed to apixaban    3/26 - to ED for replacement of elena    3/30 -holding tube feeding as his po intake is adequate    4/5 - start sertraline for anxiety     4/13 - chart review by Board  "Certified Geriatric Pharmacist, Tess Riggins-appreciate recommendations    HPI:    Simone Daniels  is a 76 year old  (5/26/1944), who is being seen today for an annual comprehensive visit. HPI information obtained from: facility chart records, facility staff, patient report, [facility] chart review.  Today's concerns are:     Right hemiparesis (H)  Late effects of cerebral ischemic stroke  Aphasia as late effect of cerebrovascular accident  Depression, unspecified depression type  Longstanding persistent atrial fibrillation (H)  Long term current use of anticoagulant therapy  Urinary retention with incomplete bladder emptying  Elena catheter in place  PEG (percutaneous endoscopic gastrostomy) status (H)  Essential hypertension  Herpes simplex labialis    Simone appears to understand most spoken comments and follows instructions.  He continues to verbalize in grunts/vocalizations that do not make sense.  Nursing has been working with him, and he can, with help/prompting, say \"Simone\", \"Angry\" and is very pleased with this.  Ongoing communication has been difficult, and nursing has been trying to develop a communication technique that is effective for him.  He is not currently enrolled in formal speech therapy, though this should be considered in the future as his condition warrants.  It is also noted that his TF has been on hold, he is eating adequately and his weight is stable.  Nursing reports he required a trip to the ED for re-placement of elena due to traumatic removal.       ALLERGIES: Patient has no known allergies.  PAST MEDICAL HISTORY:  has a past medical history of Cerebral infarction (H) and LOW BACK PAIN.  PAST SURGICAL HISTORY:  has a past surgical history that includes surgical history of -  (1979); surgical history of - ; surgical history of -  (1981); and IR Gastrostomy Tube Percutaneous Plcmnt (12/3/2020).  IMMUNIZATIONS:  Immunization History   Administered Date(s) Administered     " COVID-19,PF,Moderna 01/28/2021, 02/24/2021     FLU 6-35 months 09/26/2011     Historical DTP/aP 02/06/1997     Influenza (IIV3) PF 09/26/2011     Influenza, Quad, High Dose, Pf, 65yr + 12/23/2020     Mantoux Tuberculin Skin Test 12/12/2018, 12/21/2018     Pneumo Conj 13-V (2010&after) 12/19/2018     Pneumococcal 23 valent 09/26/2011     TD (ADULT, 7+) 09/26/2011     Td (Adult), Adsorbed 02/06/1997, 09/26/2011     Above immunizations pulled from [employer]. [facility] and facility records also reconciled. Outstanding information sent to  to update [employer].  Future immunizations are not needed at this point as all recommended immunizations are up to date.     Current Outpatient Medications   Medication Sig Dispense Refill     acetaminophen (TYLENOL) 325 MG tablet Take 650 mg by mouth every 4 hours as needed       apixaban ANTICOAGULANT (ELIQUIS) 5 MG tablet Take 5 mg by mouth 2 times daily       melatonin 3 MG tablet 1 tablet (3 mg) by Oral or Feeding Tube route daily (Patient taking differently: Take 3 mg by mouth daily )       metoprolol tartrate (LOPRESSOR) 25 MG tablet Take 6.25 mg by mouth 2 times daily       multivitamin w/minerals (THERA-VIT-M) tablet Take 1 tablet by mouth daily       polyethylene glycol (MIRALAX) 17 g packet Take 1 packet by mouth daily       saline nasal (AYR SALINE) GEL topical gel Apply into each nare 2 times daily       sertraline (ZOLOFT) 25 MG tablet Take 25 mg by mouth daily       STATIN NOT PRESCRIBED (INTENTIONAL) Please choose reason not prescribed from choices below.       Nutritional Supplements (JEVITY 1.5 SANDRA PO) HOLD starting 3/30/21 - 25 ml/hr from 9 pm to 7 am       Case Management:  I have reviewed the facility/SNF care plan/MDS, including the falls risk, nutrition and pain screening. I also reviewed the current immunizations, and preventive care. .Future cancer screening is not clinically indicated secondary to age/goals of care Patient's desire to return  "to the community is not assessible due to cognitive impairment. Current Level of Care is appropriate.    Advance Directive Discussion:    I reviewed the current advanced directives as reflected in EPIC, the POLST and the facility chart, and verified the congruency of orders. I contacted the first party and discussed the plan of Care.  I did not due to cognitive impairment review the advance directives with the resident.     Team Discussion:  I communicated with the appropriate disciplines involved with the Plan of Care:   Nursing    Patient's goal is pain control and comfort.  Information reviewed:  Medications, vital signs, orders, and nursing notes.    [facility]:  The health plan new enrollment has happened. I have reviewed the MDS, the preventative needs, and facility care plan. The level of care is appropriate. I have reviewed the code status/advanced directives.    ROS:  Unobtainable secondary to aphasia.     Vitals:  /71   Pulse 77   Temp 97.5  F (36.4  C)   Resp 16   Ht 1.676 m (5' 6\")   Wt 70.2 kg (154 lb 12.8 oz)   SpO2 98%   BMI 24.99 kg/m   Body mass index is 24.99 kg/m .  Exam:  GENERAL APPEARANCE:  Alert, in no distress HEAD:  Normal, normocephalic, atraumatic  EYE EXAM: normal external eye, conjunctiva, lids, CHRISTI  NECK EXAM: supple, no JVD  CHEST/RESP:  respiratory effort normal, lung sounds CTA  , no respiratory distress  CV:  Rate reg, rhythm reg, no murmur, no peripheral edema   GI/ABDOMEN:  normal bowel sounds, soft, nontender, no palpable masses  M/S:   extremities normal, gait normal-without device, noting R valgus knee at baseline , normal muscle tone, and range of motion normal   SKIN EXAM: PEG tube site with hypergranulation tissue surrounding the tube, mild crusted blood on PEG tube  NEUROLOGIC EXAM: Cranial nerves 2-12 are normal tested and grossly at patient's baseline.  No tremor, gross motor movement at baseline.   PSYCH:  Alert and unable to determine orientation " due to aphasia, but is pleasant, cooperative with cares    Lab/Diagnostic data:   Recent labs in EPIC reviewed by me today.      ASSESSMENT/PLAN  Right hemiparesis (H)  Late effects of cerebral ischemic stroke  Aphasia as late effect of cerebrovascular accident  Patient with significant residual effects of CVA, no new symptoms.  He remains aphasic but is starting to be able, with prompting, to verbalize some words.  He appears to understand most simple commands.    Depression, unspecified depression type  Started on low dose sertraline, for presumed anxiety/depression symptoms as he would become easily angered/anxious at times.  -monitor depression symptoms     Longstanding persistent atrial fibrillation (H)  Long term current use of anticoagulant therapy  On apixaban without new symptoms of bleeding, no new infarct symptoms     Urinary retention with incomplete bladder emptying  Elena catheter in place  Patient with chronic indwelling elena, has failed voiding trial many times and will likely require life long catheterization.  Noting pharmacy recommendations that doxazosin could be discontinued if no voiding trial is planned.   -discontinue doxazosin     PEG (percutaneous endoscopic gastrostomy) status (H)  Stable, with noted hypergranulation tissue surrounding PEG.  Currently still holding tube feedings as he is able to eat well.  Noting some dark colored urine, so will push fluids orally prior to increasing tube flushes for extra fluid.    -consider removal of PEG if oral intake remains adequate    Essential hypertension  BP goals are ~130 -165/60 -90 mmHg.This is higher than ACC and AHA recommendations due to risk for hypotension, risk of dizziness and falls, risk of tissue/cerebral hypoperfusion and frailty. Patient is stable with current plan of care and routine assessment.     Herpes simplex labialis  Resolved, no further lesions on skin around mouth     Orders written by provider at facility    When current  supply of apixaban is gone, ok to discontinue.  THEN, begin    rivaroxaban 20 mg po daily for afib    Extra fluids 240 ml po TID for dehydration    discontinue the following medications-no further need:  o Cranberry, folic acid, guaifenesin, mylanta, doxazosin    Electronically signed by:  CAROLANN Cha CNP

## 2021-04-13 ENCOUNTER — NURSING HOME VISIT (OUTPATIENT)
Dept: GERIATRICS | Facility: CLINIC | Age: 76
End: 2021-04-13
Payer: COMMERCIAL

## 2021-04-13 ENCOUNTER — CLINICAL UPDATE (OUTPATIENT)
Dept: PHARMACY | Facility: CLINIC | Age: 76
End: 2021-04-13
Payer: COMMERCIAL

## 2021-04-13 VITALS
HEIGHT: 66 IN | WEIGHT: 154.8 LBS | DIASTOLIC BLOOD PRESSURE: 71 MMHG | RESPIRATION RATE: 16 BRPM | HEART RATE: 77 BPM | TEMPERATURE: 97.5 F | SYSTOLIC BLOOD PRESSURE: 112 MMHG | OXYGEN SATURATION: 98 % | BODY MASS INDEX: 24.88 KG/M2

## 2021-04-13 DIAGNOSIS — Z97.8 FOLEY CATHETER IN PLACE: Primary | ICD-10-CM

## 2021-04-13 DIAGNOSIS — I63.532 CEREBRAL INFARCTION DUE TO UNSPECIFIED OCCLUSION OR STENOSIS OF LEFT POSTERIOR CEREBRAL ARTERY (H): ICD-10-CM

## 2021-04-13 DIAGNOSIS — I69.320 APHASIA AS LATE EFFECT OF CEREBROVASCULAR ACCIDENT: ICD-10-CM

## 2021-04-13 DIAGNOSIS — I48.11 LONGSTANDING PERSISTENT ATRIAL FIBRILLATION (H): ICD-10-CM

## 2021-04-13 DIAGNOSIS — Z97.8 FOLEY CATHETER IN PLACE: ICD-10-CM

## 2021-04-13 DIAGNOSIS — I69.30 LATE EFFECTS OF CEREBRAL ISCHEMIC STROKE: ICD-10-CM

## 2021-04-13 DIAGNOSIS — F41.1 ANXIETY STATE: ICD-10-CM

## 2021-04-13 DIAGNOSIS — I10 ESSENTIAL HYPERTENSION: ICD-10-CM

## 2021-04-13 DIAGNOSIS — G81.91 RIGHT HEMIPARESIS (H): Primary | ICD-10-CM

## 2021-04-13 DIAGNOSIS — I10 ESSENTIAL (PRIMARY) HYPERTENSION: ICD-10-CM

## 2021-04-13 DIAGNOSIS — B00.1 HERPES SIMPLEX LABIALIS: ICD-10-CM

## 2021-04-13 DIAGNOSIS — R33.9 URINARY RETENTION WITH INCOMPLETE BLADDER EMPTYING: ICD-10-CM

## 2021-04-13 DIAGNOSIS — Z79.01 LONG TERM CURRENT USE OF ANTICOAGULANT THERAPY: ICD-10-CM

## 2021-04-13 DIAGNOSIS — Z93.1 PEG (PERCUTANEOUS ENDOSCOPIC GASTROSTOMY) STATUS (H): ICD-10-CM

## 2021-04-13 DIAGNOSIS — F32.A DEPRESSION, UNSPECIFIED DEPRESSION TYPE: ICD-10-CM

## 2021-04-13 DIAGNOSIS — I48.91 ATRIAL FIBRILLATION, UNSPECIFIED TYPE (H): ICD-10-CM

## 2021-04-13 PROCEDURE — 99318 PR ANNUAL NURSING FAC ASSESSMNT, STABLE: CPT | Performed by: NURSE PRACTITIONER

## 2021-04-13 PROCEDURE — 99207 PR NO CHARGE LOS: CPT | Performed by: PHARMACIST

## 2021-04-13 ASSESSMENT — MIFFLIN-ST. JEOR: SCORE: 1364.92

## 2021-04-13 NOTE — PROGRESS NOTES
This patient's medication list and chart were reviewed as part of the service provided by [facility].    Assessment/Recommendations:  1. (Urinary retention):  Pt with elena catheter, and continues on doxazosin.  If continuing with elena, consider d'c doxazosin since no added benefit/elena in place for retention, and doxazosin may be contributing to lower BPs, and may exacerbate HF symptoms, may contribute to orthostasis/falls.  If not going to be continuing with elena and requires med, appears now able to take meds orally, so could consider switch from doxazosin to tamsulosin since tamsulosin has improved/safer side effect profile.      Tess Riggins, Pharm.D.,[facility]  Board Certified Geriatric Pharmacist  Medication Therapy Management Pharmacist  [phone]

## 2021-04-30 ENCOUNTER — TELEPHONE (OUTPATIENT)
Dept: GERIATRICS | Facility: CLINIC | Age: 76
End: 2021-04-30

## 2021-04-30 ENCOUNTER — HOSPITAL ENCOUNTER (EMERGENCY)
Facility: CLINIC | Age: 76
Discharge: HOME | End: 2021-04-30
Attending: EMERGENCY MEDICINE | Admitting: EMERGENCY MEDICINE
Payer: COMMERCIAL

## 2021-04-30 VITALS
SYSTOLIC BLOOD PRESSURE: 135 MMHG | DIASTOLIC BLOOD PRESSURE: 106 MMHG | HEART RATE: 69 BPM | RESPIRATION RATE: 18 BRPM | TEMPERATURE: 97.7 F

## 2021-04-30 DIAGNOSIS — K94.20 PROBLEM WITH GASTROSTOMY TUBE (H): ICD-10-CM

## 2021-04-30 PROCEDURE — 43762 RPLC GTUBE NO REVJ TRC: CPT | Performed by: EMERGENCY MEDICINE

## 2021-04-30 PROCEDURE — 99282 EMERGENCY DEPT VISIT SF MDM: CPT | Mod: 25 | Performed by: EMERGENCY MEDICINE

## 2021-04-30 PROCEDURE — 99283 EMERGENCY DEPT VISIT LOW MDM: CPT | Mod: 25 | Performed by: EMERGENCY MEDICINE

## 2021-04-30 ASSESSMENT — ENCOUNTER SYMPTOMS
ABDOMINAL PAIN: 0
VOMITING: 0
COUGH: 0
FATIGUE: 0
APPETITE CHANGE: 0
FEVER: 0

## 2021-04-30 NOTE — DISCHARGE INSTRUCTIONS
Discussed with your primary care provider whether the Flores should be replaced with another G-tube or whether it can be removed permanently.

## 2021-04-30 NOTE — ED NOTES
Replaced broken G-tube with 18f coude catheter, plug attached & covered with tegaderm to prevent patient from pulling at tube. Leighton VAZQUEZ updated.

## 2021-04-30 NOTE — ED PROVIDER NOTES
History   No chief complaint on file.    HPI  Simone Daniels is a 76 year old male with history of previous strokes in long-term care currently who has a G-tube in place due to previous difficulty with dysphagia who is now tolerating full oral diet presented for evaluation of broken G-tube.  Staff found the G-tube leaking gastric contents over his bed tonight.  Unclear what happened but the end of the gastric tube tore free from the remainder of the catheter leading to leakage.  Patient is not able to verbalize any history due to his previous stroke.  Patient is able to nod and smile and gesture to answer questions.  He denies any pain or discomfort.  No reported vomiting or diarrhea.  Staff contacted at his nursing home states that they have only been flushing the catheter to keep it patent and not using it for drainage or for nutrition.  They are not certain what the long-term plan for the tube is.    Allergies:  No Known Allergies    Problem List:    Patient Active Problem List    Diagnosis Date Noted     2019 novel coronavirus disease (COVID-19) 01/12/2021     Priority: Medium     Flores catheter in place 01/01/2021     Priority: Medium     Long term current use of anticoagulant therapy 01/01/2021     Priority: Medium     PEG (percutaneous endoscopic gastrostomy) status (H) 01/01/2021     Priority: Medium     Late effects of cerebral ischemic stroke 01/01/2021     Priority: Medium     Elevated prostate specific antigen (PSA) 04/19/2019     Priority: Medium     Urge incontinence of urine 04/19/2019     Priority: Medium     Other abnormalities of gait and mobility 12/17/2018     Priority: Medium     Other symbolic dysfunctions 12/17/2018     Priority: Medium     Urinary retention with incomplete bladder emptying 12/11/2018     Priority: Medium     Other constipation 12/11/2018     Priority: Medium     Nasal congestion 12/11/2018     Priority: Medium     Unspecified atrial fibrillation (H) 12/11/2018      Priority: Medium     Cerebral infarction due to unspecified occlusion or stenosis of left posterior cerebral artery (H) 12/11/2018     Priority: Medium     Essential (primary) hypertension 12/11/2018     Priority: Medium     Bilateral primary osteoarthritis of knee 12/11/2018     Priority: Medium     Pain in left knee 12/11/2018     Priority: Medium     Retention of urine, unspecified 12/11/2018     Priority: Medium     Unspecified injury of right eye and orbit, initial encounter 12/11/2018     Priority: Medium     Weakness 12/11/2018     Priority: Medium     Left parietal hemorrhagic infarction (H) 11/27/2018     Priority: Medium     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 Provider to review          Past Medical History:    Past Medical History:   Diagnosis Date     Cerebral infarction (H)      LOW BACK PAIN        Past Surgical History:    Past Surgical History:   Procedure Laterality Date     IR GASTROSTOMY TUBE PERCUTANEOUS PLCMNT  12/3/2020     SURGICAL HISTORY OF -   1979    Cervical disc (5,6,&7)     SURGICAL HISTORY OF -       (L) Knee fx tibial plateau     SURGICAL HISTORY OF -   1981    (L) Knee surgery - re-fx tibial plateau       Family History:    Family History   Problem Relation Age of Onset     C.A.D. Mother         heart disease in her 60s     C.A.D. Brother         1/2 brother with heart disease     Diabetes No family hx of        Social History:  Marital Status:  Single [1]  Social History     Tobacco Use     Smoking status: Former Smoker     Packs/day: 2.00     Years: 18.00     Pack years: 36.00     Types: Cigarettes     Quit  date: 1980     Years since quittin.3     Smokeless tobacco: Never Used   Substance Use Topics     Alcohol use: Yes     Frequency: 2-4 times a month     Drinks per session: 1 or 2     Binge frequency: Never     Comment: occaisonal     Drug use: No        Medications:    rivaroxaban ANTICOAGULANT (XARELTO) 20 MG TABS tablet  acetaminophen (TYLENOL) 325 MG tablet  melatonin 3 MG tablet  metoprolol tartrate (LOPRESSOR) 25 MG tablet  multivitamin w/minerals (THERA-VIT-M) tablet  Nutritional Supplements (JEVITY 1.5 SANDRA PO)  polyethylene glycol (MIRALAX) 17 g packet  saline nasal (AYR SALINE) GEL topical gel  sertraline (ZOLOFT) 25 MG tablet  STATIN NOT PRESCRIBED (INTENTIONAL)        Review of systems limited secondary to patient's baseline aphasia  Review of Systems   Constitutional: Negative for appetite change, fatigue and fever.   HENT: Negative for congestion.    Respiratory: Negative for cough.    Cardiovascular: Negative for chest pain.   Gastrointestinal: Negative for abdominal pain and vomiting.   All other systems reviewed and are negative.      Physical Exam   BP: (!) 135/106  Pulse: 69  Temp: 97.7  F (36.5  C)  Resp: 18      Physical Exam  Vitals signs and nursing note reviewed.   Constitutional:       Appearance: Normal appearance. He is not ill-appearing or diaphoretic.      Comments: Smiling and alert, interactive and in no apparent discomfort or distress   HENT:      Nose: Nose normal.      Mouth/Throat:      Mouth: Mucous membranes are moist.   Eyes:      Conjunctiva/sclera: Conjunctivae normal.   Neck:      Musculoskeletal: Normal range of motion.   Cardiovascular:      Rate and Rhythm: Normal rate.   Abdominal:      General: Abdomen is flat. There is no distension.      Palpations: Abdomen is soft.      Tenderness: There is no abdominal tenderness.      Comments: G-tube in place with the ports torn free of the catheter.  A large alligator clip has been placed on the catheter to stop it from  draining   Musculoskeletal: Normal range of motion.   Skin:     General: Skin is warm.      Capillary Refill: Capillary refill takes less than 2 seconds.   Neurological:      Mental Status: He is alert. Mental status is at baseline.   Psychiatric:         Mood and Affect: Mood normal.             ED Course        Procedures                 No results found for this or any previous visit (from the past 24 hour(s)).    Medications - No data to display     3:52 AM: Damaged catheter removed and replaced with a same size Flores catheter.  Patient tolerated this well with no significant discomfort.  No bleeding.  Tube easily replaced with Flores catheter and balloon inflated.    Assessments & Plan (with Medical Decision Making)  76-year-old male with history of previous stroke with a chronic indwelling G-tube due to previous dysphagia.  Fortunately patient has been progressing in his recovery and is currently reportedly fully tolerant of a diet by mouth with no active use of the G-tube recently.  Unfortunately the tube was disrupted tonight leading to leakage of gastric contents.  I discussed with the nursing staff at his nursing home and there is no clear plan for the G-tube whether it needs to be removed or not.  Given that the entire end of the catheter of his G-tube is disrupted, the gastric balloon is likely no longer inflated and so tube is likely to fall out if not replaced.  G-tube successfully replaced with a Flores catheter and patient discharged back to his nursing home.     I have reviewed the nursing notes.    I have reviewed the findings, diagnosis, plan and need for follow up with the patient.       New Prescriptions    No medications on file       Final diagnoses:   Problem with gastrostomy tube (H) - broken tube       4/30/2021   [facility] EMERGENCY DEPT     Mcgee, Fernando Valencia MD  04/30/21 [id number]

## 2021-04-30 NOTE — TELEPHONE ENCOUNTER
[facility] TELEPHONE ENCOUNTER    Chief Complaint   Patient presents with     g tube problem       Simone Daniels is a 76 year old  (5/26/1944),Nurse called today to report:  The head of the Gtube is leaking. Nurse without a way to close the tube and contents of stomach are leaking out. Ok to send in to the ED for replacement.      Electronically signed by:   CAROLANN Mccarthy CNP

## 2021-04-30 NOTE — PROGRESS NOTES
[facility] GERIATRIC SERVICES  Chief Complaint   Patient presents with     [facility]     [place] Medical Record Number:  [id number]  [facility] [facility] [[id number]]    HPI:    Simone Daniels  is 76 year old (5/26/1944), who is being seen today for a federally mandated E/M visit.     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  On 11/25/20, he was found to have new subacute infarct in R parietal lobe (R MCA distribution) with petechial hemorrhage and severe expressive and receptive aphasia, failed swallow eval. CTA was unremarkable. The hospital stay was complicated with enterococcus UTI and was treated.  EEG showed intermittent slowing in the L anterior head, a TTE on 11/25/20 showed grade 3 diastolic dysfunction with enlarged bilateral atria but intact septum.  He had a short run of afib/flutter on 11/30 and started on metoprolol.  Warfarin started on day 10 post bleed.  Continued to fail swallowing evaluate so a PEG tube was placed. He also has persistent urinary retention-failed several voiding trials in the hospital and continues to have indwelling elena cath-started on doxazosin. His extended stay at the hospital was in part caused by the need for a court appointed guardian due to his new persistent severe aphasia, limited family who can help him.      Recent changes:    12/31 - guardian approves treatment in place without follow up with cardiology or Urology unless absolutely needed    1/1/21 - to ED for urgent replacement of PEG tube due to traumatic inadvertent removal     1/10/21 - covid + per antigen testing, minimal sympoms     2/8/21 - warfarin changed to rivaroxaban    3/26 - to ED for replacement of elena    3/30 -holding tube feeding as his po intake is adequate-consider removing PEG  tube    4/5 - start sertraline for anxiety     4/13 - chart review by Board Certified Geriatric Pharmacist, Tess Riggins-appreciate recommendations    Today's concerns are:   PEG (percutaneous endoscopic gastrostomy) status (H)  Right hemiparesis (H)  Late effects of cerebral ischemic stroke  Aphasia as late effect of cerebrovascular accident  Urinary retention with incomplete bladder emptying  Flores catheter in place    Simone is unable to verbalize words, but appears to understand most simple conversation and does follow instructions. He appears pleasant and smiling much of the time.  He will occasionally appear to have discomfort in knees .  Nursing reports weight has been stable, eating adequately orally, and has not been using PEG tube for any medications or foods.  Dietician has reviewed records, and notes adequate and stable nutritional intake without use of PEG.  Guardian, Vera, reports no new concerns and after discussion agrees with plan to remove PEG tube since it is no longer needed.   Vera also reports that a living will was found, which reflects Simone's desire to not be Full Code .  A new POLST has been signed, indicating DNR/DNI status and will be scanned to Epic.     ALLERGIES:Patient has no known allergies.  PAST MEDICAL HISTORY:   has a past medical history of Cerebral infarction (H) and LOW BACK PAIN.  PAST SURGICAL HISTORY:   has a past surgical history that includes surgical history of -  (1979); surgical history of - ; surgical history of -  (1981); and IR Gastrostomy Tube Percutaneous Plcmnt (12/3/2020).  FAMILY HISTORY: family history includes C.A.D. in his brother and mother.  SOCIAL HISTORY:  reports that he quit smoking about 41 years ago. His smoking use included cigarettes. He has a 36.00 pack-year smoking history. He has never used smokeless tobacco. He reports current alcohol use. He reports that he does not use drugs.    MEDICATIONS:     Review of your medicines          Accurate  "as of May 3, 2021 11:59 PM. If you have any questions, ask your nurse or doctor.            CONTINUE these medicines which have NOT CHANGED      Dose / Directions   acetaminophen 325 MG tablet  Commonly known as: TYLENOL      Dose: 650 mg  Take 650 mg by mouth every 4 hours as needed  Refills: 0     melatonin 3 MG tablet      Dose: 1 mg  Take 1 mg by mouth At Bedtime  Refills: 0     metoprolol tartrate 25 MG tablet  Commonly known as: LOPRESSOR      Dose: 6.25 mg  Take 6.25 mg by mouth 2 times daily  Refills: 0     multivitamin w/minerals tablet      Dose: 1 tablet  Take 1 tablet by mouth daily  Refills: 0     polyethylene glycol 17 g packet  Commonly known as: MIRALAX      Dose: 1 packet  Take 1 packet by mouth daily  Refills: 0     rivaroxaban ANTICOAGULANT 20 MG Tabs tablet  Commonly known as: XARELTO  Indication: Atrial Fibrillation Not Caused By A Heart Valve Problem      Dose: 20 mg  Take 20 mg by mouth daily (with dinner)  Refills: 0     saline nasal Gel topical gel      Apply into each nare 2 times daily  Refills: 0     sertraline 25 MG tablet  Commonly known as: ZOLOFT      Dose: 25 mg  Take 25 mg by mouth daily  Refills: 0     STATIN NOT PRESCRIBED  Commonly known as: INTENTIONAL  Used for: Right hemiparesis (H)      Please choose reason not prescribed from choices below.  Quantity:    Refills: 0            Case Management:  I have reviewed the care plan and MDS and do agree with the plan. Patient's desire to return to the community is not present. Information reviewed:  Medications, vital signs, orders, and nursing notes.    ROS:  Unobtainable secondary to aphasia. Pleasant, smiling, and alert today without evidence of dyspnea, cough, or congestion    Vitals:  /63   Pulse 64   Temp 97  F (36.1  C)   Resp 18   Ht 1.676 m (5' 6\")   Wt 72.8 kg (160 lb 6.4 oz)   SpO2 100%   BMI 25.89 kg/m    Body mass index is 25.89 kg/m .  Exam:  GENERAL APPEARANCE:  Alert, in no distress   HEAD:  Normal, " normocephalic, atraumatic  ENT:  Mouth and posterior oropharynx normal, moist mucous membranes, hearing acuity - within normal limits   EYE EXAM:  EOM, conjunctivae, lids, pupils and irises normal   CHEST/RESP:  respiratory effort normal, no respiratory distress, lung sounds CTA    CV:  Rate and rhythm reg, no murmur/rub/gallop, no peripheral edema  M/S:   extremities normal, gait & station normal-walks without device independently, digits and nails within normal limits   ABD:  18 fr elena present in abdomen, taped to skin.   NEUROLOGIC EXAM: Cranial nerves 2-12 are normal tested and grossly at patient's baseline.  No tremor, gross motor movement at baseline.   PSYCH:  Alert and oriented to self and surroundings, affect pleasant and cooperative     Lab/Diagnostic data:   Recent labs in [facility] reviewed by me today.     ASSESSMENT/PLAN  PEG (percutaneous endoscopic gastrostomy) status (H)  PEG tube has not been used in over 1 month, patient has been eating, drinking adequately by mouth and is able to swallow medications orally as well.  Reviewed options with guardian, who agrees with plan to remove PEG tube as it has not been used.  With Pato's consent (he was very cooperative and smiling throughout the procedure), the current GTube (an 18 fr elena) balloon was deflated and removed intact from the stomach.  A pressure dressing was applied to the gtube site.  Pato expressed no pain and was ambulatory at baseline after this procedure.     Right hemiparesis (H)  Late effects of cerebral ischemic stroke  Aphasia as late effect of cerebrovascular accident  afib   No new symptoms, stable condition.  On rivaroxaban, metoprolol.      Urinary retention with incomplete bladder emptying  Elena catheter in place  Continues with urinary retention and difficulty voiding, elena cath remains indwelling.  Urine is clear and he expresses no pain      Orders written by provider at facility    PEG tube site care daily and PRN til  resolved  o Cleanse  o Apply folded 4x4 and tape    Update NP on 5/10/21 if not closed    Total time spent with patient visit at the skilled nursing facility was 36 min including patient visit, review of past records and phone call to patient contact. Greater than 50% of total time spent with counseling and coordinating care due to removal of PEG tube, discussion re risks/benefits.    Electronically signed by:  CAROLANN Cha CNP

## 2021-04-30 NOTE — ED TRIAGE NOTES
Here from [facility] with broken Gtube. Broke at tubing connection area, staff clamped. Site slightly reddened with small amount of drainage. Hx of recent CVA w/expressive aphasia, unable to obtain any info from patient.

## 2021-05-02 RX ORDER — LANOLIN ALCOHOL/MO/W.PET/CERES
1 CREAM (GRAM) TOPICAL AT BEDTIME
COMMUNITY
End: 2022-01-01

## 2021-05-03 ENCOUNTER — NURSING HOME VISIT (OUTPATIENT)
Dept: GERIATRICS | Facility: CLINIC | Age: 76
End: 2021-05-03
Payer: COMMERCIAL

## 2021-05-03 VITALS
HEIGHT: 66 IN | BODY MASS INDEX: 25.78 KG/M2 | SYSTOLIC BLOOD PRESSURE: 122 MMHG | OXYGEN SATURATION: 100 % | WEIGHT: 160.4 LBS | DIASTOLIC BLOOD PRESSURE: 63 MMHG | TEMPERATURE: 97 F | RESPIRATION RATE: 18 BRPM | HEART RATE: 64 BPM

## 2021-05-03 DIAGNOSIS — I69.320 APHASIA AS LATE EFFECT OF CEREBROVASCULAR ACCIDENT: ICD-10-CM

## 2021-05-03 DIAGNOSIS — I69.30 LATE EFFECTS OF CEREBRAL ISCHEMIC STROKE: ICD-10-CM

## 2021-05-03 DIAGNOSIS — Z97.8 FOLEY CATHETER IN PLACE: ICD-10-CM

## 2021-05-03 DIAGNOSIS — R33.9 URINARY RETENTION WITH INCOMPLETE BLADDER EMPTYING: ICD-10-CM

## 2021-05-03 DIAGNOSIS — G81.91 RIGHT HEMIPARESIS (H): ICD-10-CM

## 2021-05-03 DIAGNOSIS — Z93.1 PEG (PERCUTANEOUS ENDOSCOPIC GASTROSTOMY) STATUS (H): Primary | ICD-10-CM

## 2021-05-03 PROCEDURE — 99310 SBSQ NF CARE HIGH MDM 45: CPT | Performed by: NURSE PRACTITIONER

## 2021-05-03 ASSESSMENT — MIFFLIN-ST. JEOR: SCORE: 1390.32

## 2021-05-03 NOTE — LETTER
5/3/2021        RE: Simone Manzanares  [id number] [address] [facility] [id number]         HEALTH GERIATRIC SERVICES  Chief Complaint   Patient presents with     [facility] Medical Record Number:  [id number]  Place of Service where encounter took place:  [facility] [place] () [[id number]]    HPI:    Simone Daniels  is 76 year old (5/26/1944), who is being seen today for a federally mandated E/M visit.     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  On 11/25/20, he was found to have new subacute infarct in R parietal lobe (R MCA distribution) with petechial hemorrhage and severe expressive and receptive aphasia, failed swallow eval. CTA was unremarkable. The hospital stay was complicated with enterococcus UTI and was treated.  EEG showed intermittent slowing in the L anterior head, a TTE on 11/25/20 showed grade 3 diastolic dysfunction with enlarged bilateral atria but intact septum.  He had a short run of afib/flutter on 11/30 and started on metoprolol.  Warfarin started on day 10 post bleed.  Continued to fail swallowing evaluate so a PEG tube was placed. He also has persistent urinary retention-failed several voiding trials in the hospital and continues to have indwelling elena cath-started on doxazosin. His extended stay at the hospital was in part caused by the need for a court appointed guardian due to his new persistent severe aphasia, limited family who can help him.      Recent changes:    12/31 - guardian approves treatment in place without follow up with cardiology or Urology unless absolutely needed    1/1/21 - to ED for urgent replacement of PEG tube due to traumatic inadvertent removal     1/10/21 - covid + per antigen testing, minimal sympoms     2/8/21 - warfarin changed to rivaroxaban    3/26 - to ED  for replacement of elena    3/30 -holding tube feeding as his po intake is adequate-consider removing PEG tube    4/5 - start sertraline for anxiety     4/13 - chart review by Board Certified Geriatric Pharmacist, Tess Riggins-appreciate recommendations    Today's concerns are:   PEG (percutaneous endoscopic gastrostomy) status (H)  Right hemiparesis (H)  Late effects of cerebral ischemic stroke  Aphasia as late effect of cerebrovascular accident  Urinary retention with incomplete bladder emptying  Elena catheter in place    Simone is unable to verbalize words, but appears to understand most simple conversation and does follow instructions. He appears pleasant and smiling much of the time.  He will occasionally appear to have discomfort in knees .  Nursing reports weight has been stable, eating adequately orally, and has not been using PEG tube for any medications or foods.  Dietician has reviewed records, and notes adequate and stable nutritional intake without use of PEG.  Guardian, Vera, reports no new concerns and after discussion agrees with plan to remove PEG tube since it is no longer needed.   Vera also reports that a living will was found, which reflects Simone's desire to not be Full Code .  A new POLST has been signed, indicating DNR/DNI status and will be scanned to Epic.     ALLERGIES:Patient has no known allergies.  PAST MEDICAL HISTORY:   has a past medical history of Cerebral infarction (H) and LOW BACK PAIN.  PAST SURGICAL HISTORY:   has a past surgical history that includes surgical history of -  (1979); surgical history of - ; surgical history of -  (1981); and IR Gastrostomy Tube Percutaneous Plcmnt (12/3/2020).  FAMILY HISTORY: family history includes C.A.D. in his brother and mother.  SOCIAL HISTORY:  reports that he quit smoking about 41 years ago. His smoking use included cigarettes. He has a 36.00 pack-year smoking history. He has never used smokeless tobacco. He reports current alcohol  "use. He reports that he does not use drugs.    MEDICATIONS:     Review of your medicines          Accurate as of May 3, 2021 11:59 PM. If you have any questions, ask your nurse or doctor.            CONTINUE these medicines which have NOT CHANGED      Dose / Directions   acetaminophen 325 MG tablet  Commonly known as: TYLENOL      Dose: 650 mg  Take 650 mg by mouth every 4 hours as needed  Refills: 0     melatonin 3 MG tablet      Dose: 1 mg  Take 1 mg by mouth At Bedtime  Refills: 0     metoprolol tartrate 25 MG tablet  Commonly known as: LOPRESSOR      Dose: 6.25 mg  Take 6.25 mg by mouth 2 times daily  Refills: 0     multivitamin w/minerals tablet      Dose: 1 tablet  Take 1 tablet by mouth daily  Refills: 0     polyethylene glycol 17 g packet  Commonly known as: MIRALAX      Dose: 1 packet  Take 1 packet by mouth daily  Refills: 0     rivaroxaban ANTICOAGULANT 20 MG Tabs tablet  Commonly known as: XARELTO  Indication: Atrial Fibrillation Not Caused By A Heart Valve Problem      Dose: 20 mg  Take 20 mg by mouth daily (with dinner)  Refills: 0     saline nasal Gel topical gel      Apply into each nare 2 times daily  Refills: 0     sertraline 25 MG tablet  Commonly known as: ZOLOFT      Dose: 25 mg  Take 25 mg by mouth daily  Refills: 0     STATIN NOT PRESCRIBED  Commonly known as: INTENTIONAL  Used for: Right hemiparesis (H)      Please choose reason not prescribed from choices below.  Quantity:    Refills: 0            Case Management:  I have reviewed the care plan and MDS and do agree with the plan. Patient's desire to return to the community is not present. Information reviewed:  Medications, vital signs, orders, and nursing notes.    ROS:  Unobtainable secondary to aphasia. Pleasant, smiling, and alert today without evidence of dyspnea, cough, or congestion    Vitals:  /63   Pulse 64   Temp 97  F (36.1  C)   Resp 18   Ht 1.676 m (5' 6\")   Wt 72.8 kg (160 lb 6.4 oz)   SpO2 100%   BMI 25.89 kg/m  "   Body mass index is 25.89 kg/m .  Exam:  GENERAL APPEARANCE:  Alert, in no distress   HEAD:  Normal, normocephalic, atraumatic  ENT:  Mouth and posterior oropharynx normal, moist mucous membranes, hearing acuity - within normal limits   EYE EXAM:  EOM, conjunctivae, lids, pupils and irises normal   CHEST/RESP:  respiratory effort normal, no respiratory distress, lung sounds CTA    CV:  Rate and rhythm reg, no murmur/rub/gallop, no peripheral edema  M/S:   extremities normal, gait & station normal-walks without device independently, digits and nails within normal limits   ABD:  18 fr elena present in abdomen, taped to skin.   NEUROLOGIC EXAM: Cranial nerves 2-12 are normal tested and grossly at patient's baseline.  No tremor, gross motor movement at baseline.   PSYCH:  Alert and oriented to self and surroundings, affect pleasant and cooperative     Lab/Diagnostic data:   Recent labs in [facility] reviewed by me today.     ASSESSMENT/PLAN  PEG (percutaneous endoscopic gastrostomy) status (H)  PEG tube has not been used in over 1 month, patient has been eating, drinking adequately by mouth and is able to swallow medications orally as well.  Reviewed options with guardian, who agrees with plan to remove PEG tube as it has not been used.  With Pato's consent (he was very cooperative and smiling throughout the procedure), the current GTube (an 18 fr elena) balloon was deflated and removed intact from the stomach.  A pressure dressing was applied to the gtube site.  Pato expressed no pain and was ambulatory at baseline after this procedure.     Right hemiparesis (H)  Late effects of cerebral ischemic stroke  Aphasia as late effect of cerebrovascular accident  afib   No new symptoms, stable condition.  On rivaroxaban, metoprolol.      Urinary retention with incomplete bladder emptying  Elena catheter in place  Continues with urinary retention and difficulty voiding, elena cath remains indwelling.  Urine is clear and he expresses  no pain      Orders written by provider at facility    PEG tube site care daily and PRN til resolved  o Cleanse  o Apply folded 4x4 and tape    Update NP on 5/10/21 if not closed    Total time spent with patient visit at the [facility] nursing [facility] was 36 min including patient visit, review of past records and phone call to patient contact. Greater than 50% of total time spent with counseling and coordinating care due to removal of PEG tube, discussion re risks/benefits.    Electronically signed by:  CAROLANN Cha CNP        Sincerely,        CAROLANN Cha CNP

## 2021-05-11 NOTE — PROGRESS NOTES
[facility] GERIATRIC SERVICE  Episodic/Acute/Follow-Up  [place] MRN: [id number]. Place of Service where encounter took place:  [facility] () [[id number]]   Chief Complaint   Patient presents with     RECHECK   HPI: Simone Daniels  is a 76 year old (5/26/1944), who is being seen today for an episodic care visit.  HPI information obtained from: facility chart records, facility staff, patient report, [facility] chart review and Care Everywhere [facility] chart review. Today's concern is:    Simone seen today on follow-up after his PEG tube was discontinued last week in a planned removal. Nursing is currently providing site care Qshift. Today, Simone is seen resting in his room. He shakes his head no to questions about pain, nausea. He nods that he is eating well and tries to communicate. He is otherwise unable to contribute to HPI/ROS. Chart review shows VS are stable.     Recent changes:    2/8/21 - warfarin changed to rivaroxaban    3/26 - to ED for replacement of elena    3/30 -holding tube feeding as his po intake is adequate-consider removing PEG tube    4/5 - start sertraline for anxiety     4/13 - chart review by Board Certified Geriatric Pharmacist, Tess Riggins-appreciate recommendations    Past Medical and Surgical History reviewed in Epic today.  MEDICATIONS:  Current Outpatient Medications   Medication Sig Dispense Refill     melatonin 3 MG tablet Take 1 mg by mouth At Bedtime       metoprolol tartrate (LOPRESSOR) 25 MG tablet Take 6.25 mg by mouth 2 times daily       multivitamin w/minerals (THERA-VIT-M) tablet Take 1 tablet by mouth daily       polyethylene glycol (MIRALAX) 17 g packet Take 1 packet by mouth daily       rivaroxaban ANTICOAGULANT (XARELTO) 20 MG TABS tablet Take 20 mg by mouth daily (with dinner)       saline nasal (AYR SALINE) GEL topical gel Apply into each nare 2 times daily       sertraline (ZOLOFT) 25 MG tablet Take 25 mg by mouth daily       STATIN NOT PRESCRIBED  "(INTENTIONAL) Please choose reason not prescribed from choices below.       REVIEW OF SYSTEMS: Unobtainable secondary to aphasia.     Objective: /77   Pulse 87   Temp 97.5  F (36.4  C)   Resp 17   Ht 1.676 m (5' 6\")   Wt 70.7 kg (155 lb 12.8 oz)   SpO2 98%   BMI 25.15 kg/m    Exam:  GENERAL APPEARANCE: Alert, in no distress, cooperative.   ENT: Mouth/posterior oropharynx intact w/ moist mucous membranes, hearing acuity unknown.  EYES: EOM, conjunctivae, lids, pupils and irises normal, PERRL2.   RESP: Respiratory effort unlabored, no respiratory distress, Lung sounds clear. On RA.   CV: Auscultation of heart reveals S1, S2, rate-controlled and rhythm irregular, no murmur, no rub or gallop, Edema 0+ BLE. Peripheral pulses are 2+.  ABDOMEN: Normal bowel sounds, soft, non-tender abdomen, and no masses palpated. LUQ scab present from PEG site, PJ/CDI.   SKIN: Inspection/Palpation of skin and subcutaneous tissue baseline w/ fragility. No wounds/rashes noted.   NEURO: CN II-XII at patient's baseline, sensation unknown.  PSYCH: Insight, judgement, and memory are baseline impaired, affect and mood are cooperative/unknown.    Labs: Labs done in facility are in EPIC. Please refer to them using [facility]/Care Everywhere.    ASSESSMENT/PLAN:  PEG (percutaneous endoscopic gastrostomy) status (H)  Aphasia as late effect of cerebrovascular accident  Late effects of cerebral ischemic stroke  Right hemiparesis (H)  Urinary retention with incomplete bladder emptying  Elena catheter in place  Longstanding persistent atrial fibrillation (H)  Long term current use of anticoagulant therapy  Acute-on-chronic. Ongoing.    Simone's chronic conditions (afib, elena,etc) are at baseline and unchanged.    PEG tube site is scabbed over. No need for Qshift manipulation, will discontinue this order and ask for daily monitoring.     Noting unused PRN Tylenol, will discontinue as this is available on SINCERE.     Will otherwise continue current " plan-of-care.  Follow-up routinely or as needed.    Orders written by provider at facility and transcribed by : Jodi Schmitt  1. Discontinue site care for PEG tube site.  2. Monitor PEG site for drainage/infection every day - dx: skin care  3. Discontinue PRN Tylenol (use SINCERE).    Electronically signed by:  Dr. Gabriela Borrero, APRN CNP DNP

## 2021-05-12 ENCOUNTER — NURSING HOME VISIT (OUTPATIENT)
Dept: GERIATRICS | Facility: CLINIC | Age: 76
End: 2021-05-12
Payer: COMMERCIAL

## 2021-05-12 VITALS
DIASTOLIC BLOOD PRESSURE: 77 MMHG | BODY MASS INDEX: 25.04 KG/M2 | HEART RATE: 87 BPM | HEIGHT: 66 IN | RESPIRATION RATE: 17 BRPM | TEMPERATURE: 97.5 F | SYSTOLIC BLOOD PRESSURE: 124 MMHG | OXYGEN SATURATION: 98 % | WEIGHT: 155.8 LBS

## 2021-05-12 DIAGNOSIS — Z79.01 LONG TERM CURRENT USE OF ANTICOAGULANT THERAPY: ICD-10-CM

## 2021-05-12 DIAGNOSIS — I69.320 APHASIA AS LATE EFFECT OF CEREBROVASCULAR ACCIDENT: ICD-10-CM

## 2021-05-12 DIAGNOSIS — Z93.1 PEG (PERCUTANEOUS ENDOSCOPIC GASTROSTOMY) STATUS (H): Primary | ICD-10-CM

## 2021-05-12 DIAGNOSIS — I69.30 LATE EFFECTS OF CEREBRAL ISCHEMIC STROKE: ICD-10-CM

## 2021-05-12 DIAGNOSIS — Z97.8 FOLEY CATHETER IN PLACE: ICD-10-CM

## 2021-05-12 DIAGNOSIS — R33.9 URINARY RETENTION WITH INCOMPLETE BLADDER EMPTYING: ICD-10-CM

## 2021-05-12 DIAGNOSIS — G81.91 RIGHT HEMIPARESIS (H): ICD-10-CM

## 2021-05-12 DIAGNOSIS — I48.11 LONGSTANDING PERSISTENT ATRIAL FIBRILLATION (H): ICD-10-CM

## 2021-05-12 PROCEDURE — 99309 SBSQ NF CARE MODERATE MDM 30: CPT | Performed by: NURSE PRACTITIONER

## 2021-05-12 ASSESSMENT — MIFFLIN-ST. JEOR: SCORE: 1369.45

## 2021-05-12 NOTE — LETTER
5/12/2021        RE: Simone Manzanares  [address] [facility] [address]        [facility] GERIATRIC SERVICE  Episodic/Acute/Follow-Up  [place] MRN: [id number]. Place of Service where encounter took place:  [facility] () [[id number]]   Chief Complaint   Patient presents with     RECHECK   HPI: Simone Daniels  is a 76 year old (5/26/1944), who is being seen today for an episodic care visit.  HPI information obtained from: facility chart records, facility staff, patient report, [facility] chart review and Care Everywhere [facility] chart review. Today's concern is:    Simone seen today on follow-up after his PEG tube was discontinued last week in a planned removal. Nursing is currently providing site care Qshift. Today, Simone is seen resting in his room. He shakes his head no to questions about pain, nausea. He nods that he is eating well and tries to communicate. He is otherwise unable to contribute to HPI/ROS. Chart review shows VS are stable.     Recent changes:    2/8/21 - warfarin changed to rivaroxaban    3/26 - to ED for replacement of elena    3/30 -holding tube feeding as his po intake is adequate-consider removing PEG tube    4/5 - start sertraline for anxiety     4/13 - chart review by Board Certified Geriatric Pharmacist, Tess Riggins-appreciate recommendations    Past Medical and Surgical History reviewed in Epic today.  MEDICATIONS:  Current Outpatient Medications   Medication Sig Dispense Refill     melatonin 3 MG tablet Take 1 mg by mouth At Bedtime       metoprolol tartrate (LOPRESSOR) 25 MG tablet Take 6.25 mg by mouth 2 times daily       multivitamin w/minerals (THERA-VIT-M) tablet Take 1 tablet by mouth daily       polyethylene glycol (MIRALAX) 17 g packet Take 1 packet by mouth daily       rivaroxaban ANTICOAGULANT (XARELTO) 20 MG TABS tablet Take 20 mg by mouth daily (with dinner)       saline nasal (AYR SALINE) GEL topical gel Apply into each nare 2  "times daily       sertraline (ZOLOFT) 25 MG tablet Take 25 mg by mouth daily       STATIN NOT PRESCRIBED (INTENTIONAL) Please choose reason not prescribed from choices below.       REVIEW OF SYSTEMS: Unobtainable secondary to aphasia.     Objective: /77   Pulse 87   Temp 97.5  F (36.4  C)   Resp 17   Ht 1.676 m (5' 6\")   Wt 70.7 kg (155 lb 12.8 oz)   SpO2 98%   BMI 25.15 kg/m    Exam:  GENERAL APPEARANCE: Alert, in no distress, cooperative.   ENT: Mouth/posterior oropharynx intact w/ moist mucous membranes, hearing acuity unknown.  EYES: EOM, conjunctivae, lids, pupils and irises normal, PERRL2.   RESP: Respiratory effort unlabored, no respiratory distress, Lung sounds clear. On RA.   CV: Auscultation of heart reveals S1, S2, rate-controlled and rhythm irregular, no murmur, no rub or gallop, Edema 0+ BLE. Peripheral pulses are 2+.  ABDOMEN: Normal bowel sounds, soft, non-tender abdomen, and no masses palpated. LUQ scab present from PEG site, PJ/CDI.   SKIN: Inspection/Palpation of skin and subcutaneous tissue baseline w/ fragility. No wounds/rashes noted.   NEURO: CN II-XII at patient's baseline, sensation unknown.  PSYCH: Insight, judgement, and memory are baseline impaired, affect and mood are cooperative/unknown.    Labs: Labs done in facility are in EPIC. Please refer to them using [employer]/Care Everywhere.    ASSESSMENT/PLAN:  PEG (percutaneous endoscopic gastrostomy) status (H)  Aphasia as late effect of cerebrovascular accident  Late effects of cerebral ischemic stroke  Right hemiparesis (H)  Urinary retention with incomplete bladder emptying  Elena catheter in place  Longstanding persistent atrial fibrillation (H)  Long term current use of anticoagulant therapy  Acute-on-chronic. Ongoing.    Simone's chronic conditions (afib, elena,etc) are at baseline and unchanged.    PEG tube site is scabbed over. No need for Qshift manipulation, will discontinue this order and ask for daily monitoring. "     Noting unused PRN Tylenol, will discontinue as this is available on SINCERE.     Will otherwise continue current plan-of-care.  Follow-up routinely or as needed.    Orders written by provider at facility and transcribed by : Jodi Schmitt  1. Discontinue site care for PEG tube site.  2. Monitor PEG site for drainage/infection every day - dx: skin care  3. Discontinue PRN Tylenol (use SINCERE).    Electronically signed by:  CAROLANN Pepe CNP DNP        Sincerely,        CAROLANN Sheldon CNP

## 2021-05-13 NOTE — PHARMACY-RX INSURANCE COVERAGE
Discharge Pharmacy Test Claim    Received consult to review xarelto and eliquis coverage. Pt does not have prescription drug insurance. Previous pharmacy chart history shows pt buying sporadic amounts of eliquis and reports cutting the 5mg in half to take once daily. Cash price for 60 tablets eliquis is $604.83 and 30 tabs of xarelto is $603.72. Pt has already used a free trial voucher for eliquis but is eligible for a 30-day free trial voucher for xarelto.        Keesha Henderson  [facility] Pharmacy Liaison  Ph: [phone] Page: [phone]           
Patient

## 2021-06-05 ENCOUNTER — TELEPHONE (OUTPATIENT)
Dept: GERIATRICS | Facility: CLINIC | Age: 76
End: 2021-06-05

## 2021-06-05 NOTE — TELEPHONE ENCOUNTER
Page RE: fall, fever from tooth abscess; fell and hit head, hematoma above R eyebrow, neuro intact, past 48h had fever, using APAP, has poor dentition with tooth back right with pain, limited ability to make needs known, 102.1 now, no swelling mouth/gums/cheeks, no plan to see dental yet.  -amoxicillin 1g stat, 500mg TID x3 days  -family aware and attempting to make appt dental appt for early next week.

## 2021-06-07 ENCOUNTER — NURSING HOME VISIT (OUTPATIENT)
Dept: GERIATRICS | Facility: CLINIC | Age: 76
End: 2021-06-07
Payer: COMMERCIAL

## 2021-06-07 VITALS
HEART RATE: 80 BPM | DIASTOLIC BLOOD PRESSURE: 75 MMHG | OXYGEN SATURATION: 92 % | WEIGHT: 156.4 LBS | HEIGHT: 66 IN | BODY MASS INDEX: 25.13 KG/M2 | SYSTOLIC BLOOD PRESSURE: 109 MMHG | RESPIRATION RATE: 18 BRPM | TEMPERATURE: 98.9 F

## 2021-06-07 DIAGNOSIS — M25.431 WRIST SWELLING, RIGHT: Primary | ICD-10-CM
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
## 2021-06-07 DIAGNOSIS — M79.89 SWELLING OF RIGHT HAND: ICD-10-CM

## 2021-06-07 DIAGNOSIS — I69.320 APHASIA AS LATE EFFECT OF CEREBROVASCULAR ACCIDENT: ICD-10-CM

## 2021-06-07 PROCEDURE — 99309 SBSQ NF CARE MODERATE MDM 30: CPT | Performed by: NURSE PRACTITIONER

## 2021-06-07 ASSESSMENT — MIFFLIN-ST. JEOR: SCORE: 1367.18

## 2021-06-07 NOTE — LETTER
6/7/2021        RE: Simone Daniels  [address]  [facility] [address]        [facility] GERIATRIC SERVICES    Chief Complaint   Patient presents with     RECHECK     HPI:  Simone Daniels is a 77 year old  (5/26/1944), who is being seen today for an episodic care visit at: [facility] () [[id number]].     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  On 11/25/20, he was found to have new subacute infarct in R parietal lobe (R MCA distribution) with petechial hemorrhage and severe expressive and receptive aphasia, failed swallow eval. CTA was unremarkable. The hospital stay was complicated with enterococcus UTI and was treated.  EEG showed intermittent slowing in the L anterior head, a TTE on 11/25/20 showed grade 3 diastolic dysfunction with enlarged bilateral atria but intact septum.  He had a short run of afib/flutter on 11/30 and started on metoprolol.  Warfarin started on day 10 post bleed.  Continued to fail swallowing eval so a PEG tube was placed. He also has persistent urinary retention-failed several voiding trials in the hospital and continues to have indwelling elena cath-started on doxazosin. His extended stay at the hospital was in part caused by the need for a court appointed guardian due to his new persistent severe aphasia, limited family who can help him.      Recent changes:     1/10/21 - covid + per antigen testing, minimal sympoms     2/8/21 - warfarin changed to rivaroxaban    3/26 - to ED for replacement of elena    4/5 - start sertraline for anxiety     4/13 - chart review by Board Certified Geriatric Pharmacist, Tess Riggins-appreciate recommendations    5/3/21 - PEG tube removed without complications, all oral intake and weight stable    Today:  Nursing requests evaluation of R hand and wrist due to swelling.  Simone had an unwitnessed  "fall on 6/5/21, noting R eye bruise, R skin tear and now with R hand and wrist swelling.  Due to aphasia, he is unable to articulate if he is having pain or not. He also has poor dentition, on antibiotics for 3 days while awaiting dental follow up.     Allergies, as well as Past Medical, Surgical, and Family History reviewed in Epic.    Medication list reviewed and reconciled.  Current Outpatient Medications:      melatonin 3 MG tablet, Take 1 mg by mouth At Bedtime, Disp: , Rfl:      metoprolol tartrate (LOPRESSOR) 25 MG tablet, Take 6.25 mg by mouth 2 times daily, Disp: , Rfl:      multivitamin w/minerals (THERA-VIT-M) tablet, Take 1 tablet by mouth daily, Disp: , Rfl:      polyethylene glycol (MIRALAX) 17 g packet, Take 1 packet by mouth daily, Disp: , Rfl:      rivaroxaban ANTICOAGULANT (XARELTO) 20 MG TABS tablet, Take 20 mg by mouth daily (with dinner), Disp: , Rfl:      saline nasal (AYR SALINE) GEL topical gel, Apply into each nare 2 times daily, Disp: , Rfl:      sertraline (ZOLOFT) 25 MG tablet, Take 25 mg by mouth daily, Disp: , Rfl:      STATIN NOT PRESCRIBED (INTENTIONAL), Please choose reason not prescribed from choices below., Disp:  , Rfl:     REVIEW OF SYSTEMS:  Unobtainable secondary to aphasia.     Objective:   /75   Pulse 80   Temp 98.9  F (37.2  C)   Resp 18   Ht 1.676 m (5' 6\")   Wt 70.9 kg (156 lb 6.4 oz)   SpO2 92%   BMI 25.24 kg/m    GENERAL APPEARANCE:  Alert, in no acute distress   CHEST/RESP:  respiratory effort normal, no respiratory distress   CV:  Rate and rhythm eg, no murmur/rub/gallop, no peripheral edema  M/S:   gait & station normal-walks without device independently, digits and nails within normal limits , R wrist and hand are slightly swollen, not warm, slightly reddened, and without pain to palpation or with ROM  SKIN:  R eye - lateral eye socket with small bruise at the browline, R elbow skin tear scabbed  PSYCH:  at baseline mentation, alert and unable to " determine orientation due to aphasia    Assessment/Plan:  Wrist swelling, right  Swelling of right hand  Fall, initial encounter  Aphasia as late effect of cerebrovascular accident  Patient with fall on 6/5, now with R hand/wrist swelling and slightly reddened.  Unclear if it was injured during the fall as his aphasia prevents us from knowing details of unwitnessed fall.     Orders:    R wrist and hand xrays due to fall with pain and swelling, nonverbal patient      Follow up routinely     Electronically signed by: CAROLANN Cha CNP         Sincerely,        CAROLANN Cha CNP

## 2021-06-07 NOTE — PROGRESS NOTES
[facility] GERIATRIC SERVICES    Chief Complaint   Patient presents with     RECHECK     HPI:  Simone Daniels is a 77 year old  (5/26/1944), who is being seen today for an episodic care visit at: [facility] () [[id number]].     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  On 11/25/20, he was found to have new subacute infarct in R parietal lobe (R MCA distribution) with petechial hemorrhage and severe expressive and receptive aphasia, failed swallow eval. CTA was unremarkable. The hospital stay was complicated with enterococcus UTI and was treated.  EEG showed intermittent slowing in the L anterior head, a TTE on 11/25/20 showed grade 3 diastolic dysfunction with enlarged bilateral atria but intact septum.  He had a short run of afib/flutter on 11/30 and started on metoprolol.  Warfarin started on day 10 post bleed.  Continued to fail swallowing eval so a PEG tube was placed. He also has persistent urinary retention-failed several voiding trials in the hospital and continues to have indwelling elena cath-started on doxazosin. His extended stay at the hospital was in part caused by the need for a court appointed guardian due to his new persistent severe aphasia, limited family who can help him.      Recent changes:     1/10/21 - covid + per antigen testing, minimal sympoms     2/8/21 - warfarin changed to rivaroxaban    3/26 - to ED for replacement of elena    4/5 - start sertraline for anxiety     4/13 - chart review by Board Certified Geriatric Pharmacist, Tess Riggins-appreciate recommendations    5/3/21 - PEG tube removed without complications, all oral intake and weight stable    Today:  Nursing requests evaluation of R hand and wrist due to swelling.  Simone had an unwitnessed fall on 6/5/21, noting R eye bruise, R skin tear and now with R hand and  "wrist swelling.  Due to aphasia, he is unable to articulate if he is having pain or not. He also has poor dentition, on antibiotics for 3 days while awaiting dental follow up.     Allergies, as well as Past Medical, Surgical, and Family History reviewed in Epic.    Medication list reviewed and reconciled.  Current Outpatient Medications:      melatonin 3 MG tablet, Take 1 mg by mouth At Bedtime, Disp: , Rfl:      metoprolol tartrate (LOPRESSOR) 25 MG tablet, Take 6.25 mg by mouth 2 times daily, Disp: , Rfl:      multivitamin w/minerals (THERA-VIT-M) tablet, Take 1 tablet by mouth daily, Disp: , Rfl:      polyethylene glycol (MIRALAX) 17 g packet, Take 1 packet by mouth daily, Disp: , Rfl:      rivaroxaban ANTICOAGULANT (XARELTO) 20 MG TABS tablet, Take 20 mg by mouth daily (with dinner), Disp: , Rfl:      saline nasal (AYR SALINE) GEL topical gel, Apply into each nare 2 times daily, Disp: , Rfl:      sertraline (ZOLOFT) 25 MG tablet, Take 25 mg by mouth daily, Disp: , Rfl:      STATIN NOT PRESCRIBED (INTENTIONAL), Please choose reason not prescribed from choices below., Disp:  , Rfl:     REVIEW OF SYSTEMS:  Unobtainable secondary to aphasia.     Objective:   /75   Pulse 80   Temp 98.9  F (37.2  C)   Resp 18   Ht 1.676 m (5' 6\")   Wt 70.9 kg (156 lb 6.4 oz)   SpO2 92%   BMI 25.24 kg/m    GENERAL APPEARANCE:  Alert, in no acute distress   CHEST/RESP:  respiratory effort normal, no respiratory distress   CV:  Rate and rhythm eg, no murmur/rub/gallop, no peripheral edema  M/S:   gait & station normal-walks without device independently, digits and nails within normal limits , R wrist and hand are slightly swollen, not warm, slightly reddened, and without pain to palpation or with ROM  SKIN:  R eye - lateral eye socket with small bruise at the browline, R elbow skin tear scabbed  PSYCH:  at baseline mentation, alert and unable to determine orientation due to aphasia    Assessment/Plan:  Wrist swelling, " right  Swelling of right hand  Fall, initial encounter  Aphasia as late effect of cerebrovascular accident  Patient with fall on 6/5, now with R hand/wrist swelling and slightly reddened.  Unclear if it was injured during the fall as his aphasia prevents us from knowing details of unwitnessed fall.     Orders:    R wrist and hand xrays due to fall with pain and swelling, nonverbal patient      Follow up routinely     Electronically signed by: CAROLANN Cha CNP

## 2021-06-21 ENCOUNTER — NURSING HOME VISIT (OUTPATIENT)
Dept: GERIATRICS | Facility: CLINIC | Age: 76
End: 2021-06-21
Payer: COMMERCIAL

## 2021-06-21 ENCOUNTER — HOSPITAL LABORATORY (OUTPATIENT)
Facility: OTHER | Age: 76
End: 2021-06-21

## 2021-06-21 VITALS
HEIGHT: 66 IN | TEMPERATURE: 97.6 F | BODY MASS INDEX: 24.85 KG/M2 | HEART RATE: 90 BPM | WEIGHT: 154.6 LBS | RESPIRATION RATE: 14 BRPM | SYSTOLIC BLOOD PRESSURE: 123 MMHG | OXYGEN SATURATION: 96 % | DIASTOLIC BLOOD PRESSURE: 74 MMHG

## 2021-06-21 DIAGNOSIS — T83.511A URINARY TRACT INFECTION ASSOCIATED WITH INDWELLING URETHRAL CATHETER, INITIAL ENCOUNTER (H): Primary | ICD-10-CM

## 2021-06-21 DIAGNOSIS — R50.9 FEVER AND CHILLS: ICD-10-CM

## 2021-06-21 DIAGNOSIS — N39.0 URINARY TRACT INFECTION ASSOCIATED WITH INDWELLING URETHRAL CATHETER, INITIAL ENCOUNTER (H): Primary | ICD-10-CM

## 2021-06-21 DIAGNOSIS — F32.A DEPRESSION, UNSPECIFIED DEPRESSION TYPE: ICD-10-CM

## 2021-06-21 PROCEDURE — 99309 SBSQ NF CARE MODERATE MDM 30: CPT | Performed by: NURSE PRACTITIONER

## 2021-06-21 RX ORDER — ACETAMINOPHEN 500 MG
1000 TABLET ORAL 3 TIMES DAILY PRN
COMMUNITY

## 2021-06-21 ASSESSMENT — MIFFLIN-ST. JEOR: SCORE: 1359.01

## 2021-06-21 NOTE — PROGRESS NOTES
[facility] GERIATRIC SERVICES    Chief Complaint   Patient presents with     RECHECK     HPI:  Simone Daniels is a 77 year old  (5/26/1944), who is being seen today for an episodic care visit at: [facility] () [[id number]].     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  On 11/25/20, he was found to have new subacute infarct in R parietal lobe (R MCA distribution) with petechial hemorrhage and severe expressive and receptive aphasia, failed swallow eval. CTA was unremarkable. The hospital stay was complicated with enterococcus UTI and was treated.  EEG showed intermittent slowing in the L anterior head, a TTE on 11/25/20 showed grade 3 diastolic dysfunction with enlarged bilateral atria but intact septum.  He had a short run of afib/flutter on 11/30 and started on metoprolol.  Warfarin started on day 10 post bleed.  Continued to fail swallowing eval so a PEG tube was placed. He also has persistent urinary retention-failed several voiding trials in the hospital and continues to have indwelling elena cath-started on doxazosin. His extended stay at the hospital was in part caused by the need for a court appointed guardian due to his new persistent severe aphasia, limited family who can help him.      Recent changes:     1/10/21 - covid + per antigen testing, minimal sympoms     2/8/21 - warfarin changed to rivaroxaban    3/26 - to ED for replacement of elena    4/5 - start sertraline for anxiety     4/13 - chart review by Board Certified Geriatric Pharmacist, Tess Riggins-appreciate recommendations    5/3/21 - PEG tube removed without complications, all oral intake and weight stable    6/5 - fall without injuries    Chief Concerns Today:  Nursing staff notes thick, mucus-y urine, lethargy, fever, malaise and poor oral intake today, concerning for UTI in patient  "with indwelling elnea cath.  At baseline, Simone is non-verbal due to CVA.  Today, he is lying still in his bed and is lethargic which is not his baseline. Urine observed by me to be dark yellow-brown in color, milky appaearance    Allergies, as well as Past Medical, Surgical, and Family History reviewed in Epic.    Medication list reviewed and reconciled.  Current Outpatient Medications:      melatonin 3 MG tablet, Take 1 mg by mouth At Bedtime, Disp: , Rfl:      metoprolol tartrate (LOPRESSOR) 25 MG tablet, Take 6.25 mg by mouth 2 times daily, Disp: , Rfl:      multivitamin w/minerals (THERA-VIT-M) tablet, Take 1 tablet by mouth daily, Disp: , Rfl:      polyethylene glycol (MIRALAX) 17 g packet, Take 1 packet by mouth daily, Disp: , Rfl:      rivaroxaban ANTICOAGULANT (XARELTO) 20 MG TABS tablet, Take 20 mg by mouth daily (with dinner), Disp: , Rfl:      saline nasal (AYR SALINE) GEL topical gel, Apply into each nare 2 times daily, Disp: , Rfl:      sertraline (ZOLOFT) 25 MG tablet, Take 25 mg by mouth daily, Disp: , Rfl:      STATIN NOT PRESCRIBED (INTENTIONAL), Please choose reason not prescribed from choices below., Disp:  , Rfl:     REVIEW OF SYSTEMS:  Unobtainable secondary to aphasia.     Objective:   /74   Pulse 90   Temp 97.6  F (36.4  C)   Resp 14   Ht 1.676 m (5' 6\")   Wt 70.1 kg (154 lb 9.6 oz)   SpO2 96%   BMI 24.95 kg/m    GENERAL APPEARANCE:  Alert but fatigued, in mod distress   CHEST/RESP:  respiratory effort normal, no respiratory distress, lung sounds CTA    CV:  Rate and rhythm reg, no murmur/rub/gallop, no peripheral edema  ABD: non-tender, no CVA tenderness, no suprapubic tenderness  : elena patent and draining urine as noted above   M/S:   gait & station normal-walks without device at baseline , digits and nails within normal limits   SKIN:  CDI, warm to touch  PSYCH:  at baseline, is alert and oriented to unit and walks, laughs, smiles at staff.  Today is lethargic, lying in " bed and minimally responsive to staff but is alert.       Assessment/Plan:  Urinary tract infection associated with indwelling urethral catheter, initial encounter (H)  Fever and chills  Suspect UTI due to constellation of symptoms, in the setting of indwelling elena cath.   -rocephin 1 gm IM now as patient is obviously ill     Depression, unspecified depression type  Per reports of nursing team prior to today, has had increased withdrawal from surroundings and activities.    -increase sertraline to 75 mg daily       Orders:    Rocephin 1 gm IM now with lidocaine for probable UTI    Increase sertraline to 75 mg daily po for depression     Follow up routinely or prn     Electronically signed by: CAROLANN Cha CNP

## 2021-06-21 NOTE — LETTER
6/21/2021        RE: Simone Daniels  [address]  [place] [place] [address]        [facility] GERIATRIC SERVICES    Chief Complaint   Patient presents with     RECHECK     HPI:  Simone Daniels is a 77 year old  (5/26/1944), who is being seen today for an episodic care visit at: [facility] () [[id number]].     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  On 11/25/20, he was found to have new subacute infarct in R parietal lobe (R MCA distribution) with petechial hemorrhage and severe expressive and receptive aphasia, failed swallow eval. CTA was unremarkable. The hospital stay was complicated with enterococcus UTI and was treated.  EEG showed intermittent slowing in the L anterior head, a TTE on 11/25/20 showed grade 3 diastolic dysfunction with enlarged bilateral atria but intact septum.  He had a short run of afib/flutter on 11/30 and started on metoprolol.  Warfarin started on day 10 post bleed.  Continued to fail swallowing eval so a PEG tube was placed. He also has persistent urinary retention-failed several voiding trials in the hospital and continues to have indwelling elena cath-started on doxazosin. His extended stay at the hospital was in part caused by the need for a court appointed guardian due to his new persistent severe aphasia, limited family who can help him.      Recent changes:     1/10/21 - covid + per antigen testing, minimal sympoms     2/8/21 - warfarin changed to rivaroxaban    3/26 - to ED for replacement of elena    4/5 - start sertraline for anxiety     4/13 - chart review by Board Certified Geriatric Pharmacist, Tess Riggins-appreciate recommendations    5/3/21 - PEG tube removed without complications, all oral intake and weight stable    6/5 - fall without injuries    Chief Concerns Today:  Nursing staff notes thick, mucus-y urine,  "lethargy, fever, malaise and poor oral intake today, concerning for UTI in patient with indwelling elena cath.  At baseline, Simone is non-verbal due to CVA.  Today, he is lying still in his bed and is lethargic which is not his baseline. Urine observed by me to be dark yellow-brown in color, milky appaearance    Allergies, as well as Past Medical, Surgical, and Family History reviewed in Epic.    Medication list reviewed and reconciled.  Current Outpatient Medications:      melatonin 3 MG tablet, Take 1 mg by mouth At Bedtime, Disp: , Rfl:      metoprolol tartrate (LOPRESSOR) 25 MG tablet, Take 6.25 mg by mouth 2 times daily, Disp: , Rfl:      multivitamin w/minerals (THERA-VIT-M) tablet, Take 1 tablet by mouth daily, Disp: , Rfl:      polyethylene glycol (MIRALAX) 17 g packet, Take 1 packet by mouth daily, Disp: , Rfl:      rivaroxaban ANTICOAGULANT (XARELTO) 20 MG TABS tablet, Take 20 mg by mouth daily (with dinner), Disp: , Rfl:      saline nasal (AYR SALINE) GEL topical gel, Apply into each nare 2 times daily, Disp: , Rfl:      sertraline (ZOLOFT) 25 MG tablet, Take 25 mg by mouth daily, Disp: , Rfl:      STATIN NOT PRESCRIBED (INTENTIONAL), Please choose reason not prescribed from choices below., Disp:  , Rfl:     REVIEW OF SYSTEMS:  Unobtainable secondary to aphasia.     Objective:   /74   Pulse 90   Temp 97.6  F (36.4  C)   Resp 14   Ht 1.676 m (5' 6\")   Wt 70.1 kg (154 lb 9.6 oz)   SpO2 96%   BMI 24.95 kg/m    GENERAL APPEARANCE:  Alert but fatigued, in mod distress   CHEST/RESP:  respiratory effort normal, no respiratory distress, lung sounds CTA    CV:  Rate and rhythm reg, no murmur/rub/gallop, no peripheral edema  ABD: non-tender, no CVA tenderness, no suprapubic tenderness  : elena patent and draining urine as noted above   M/S:   gait & station normal-walks without device at baseline , digits and nails within normal limits   SKIN:  CDI, warm to touch  PSYCH:  at baseline, is alert and " oriented to unit and walks, laughs, smiles at staff.  Today is lethargic, lying in bed and minimally responsive to staff but is alert.       Assessment/Plan:  Urinary tract infection associated with indwelling urethral catheter, initial encounter (H)  Fever and chills  Suspect UTI due to constellation of symptoms, in the setting of indwelling elena cath.   -rocephin 1 gm IM now as patient is obviously ill     Depression, unspecified depression type  Per reports of nursing team prior to today, has had increased withdrawal from surroundings and activities.    -increase sertraline to 75 mg daily       Orders:    Rocephin 1 gm IM now with lidocaine for probable UTI    Increase sertraline to 75 mg daily po for depression     Follow up routinely or prn     Electronically signed by: CAROLANN Cha CNP         Sincerely,        CAROLANN Cha CNP

## 2021-07-09 NOTE — PROGRESS NOTES
[facility]  Chief Complaint   Patient presents with     custodial Regulatory     [place] [facility] Record Number:  [id number]  Place of Service where encounter took place:  [facility] () [[id number]]    HPI:    Simone Daniels  is 77 year old (5/26/1944), who is being seen today for a federally mandated E/M visit.  HPI information obtained from: facility chart records, facility staff, patient report and [facility] chart review.     Today's concerns are:   - Resident seen and examined.   - CVA / aphasia; no nursing concern. Answers by nodding.   - PEG tube removed.  Endorses no concern with swallowing. Denies abdominal pain  -  Endorses sleep, appetite and BM are fine.   - Nurse aid reports seems different today, had to elevate to bed to be able to transfer from the bed to .   - Resident endorses pain in his left knee.   -------------------------------  - - Past Medical, social, family histories, medications, and allergies reviewed and updated  - Medications reviewed: in the chart and EHR.   - Case Management:   I have reviewed the care plan and MDS and do agree with the plan. Patient's desire to return to the community is not present.  Information reviewed:  Medications, vital signs, orders, and nursing notes.    MEDICATIONS:  Current Outpatient Medications   Medication Sig Dispense Refill     trolamine salicylate (ASPERCREME) 10 % external cream Apply topically 3 times daily       acetaminophen (TYLENOL) 500 MG tablet Take 1,000 mg by mouth every 6 hours as needed       melatonin 3 MG tablet Take 1 mg by mouth At Bedtime       metoprolol tartrate (LOPRESSOR) 25 MG tablet Take 6.25 mg by mouth 2 times daily       multivitamin w/minerals (THERA-VIT-M) tablet Take 1 tablet by mouth daily       polyethylene glycol (MIRALAX) 17 g packet Take 1 packet by mouth daily       rivaroxaban ANTICOAGULANT (XARELTO) 20 MG TABS tablet Take 20 mg by mouth daily (with dinner)       saline nasal (AYR  "SALINE) GEL topical gel Apply into each nare 2 times daily       sertraline (ZOLOFT) 25 MG tablet Take 75 mg by mouth daily       STATIN NOT PRESCRIBED (INTENTIONAL) Please choose reason not prescribed from choices below.       ROS:  Limited secondary to aphasia impairment but today pt endorses- see HPI    Vitals:  /76   Pulse 62   Temp 98.2  F (36.8  C)   Resp 18   Ht 1.676 m (5' 6\")   Wt 70.1 kg (154 lb 9.6 oz)   SpO2 99%   BMI 24.95 kg/m    Body mass index is 24.95 kg/m .  Exam:  GENERAL APPEARANCE:  in no distress, cooperative  RESP:  lungs clear to auscultation   CV:  S1S2 audible, regular HR, no murmur appreciated.   ABDOMEN:  soft, NT/ND, BS audible. no mass appreciated on palpation.   M/S:   stiff lower extremities. Arthritis deformity in left knee.   SKIN:   Flores's catheter in place.   NEURO:  Humming and hand movements, no wording. Hand  5/5 bl, dorsiflexion 5/5 right  But 4/5 left.   PSYCH: fair insight, judgement and memory, affect and mood elated       Lab/Diagnostic data: Reviewed in the chart and EHR.        ASSESSMENT/PLAN  Hx of right parietal infarction with hemorrhagic conversion (2018)  Hx of subacute infarction with petechial hemorrhage  Expressive aphasia as a late effect of CVA  Debility  - LDL 69, hence statin was not initiated.   - ambulates independently  - Continues to nursing assistance with ADLs  - Significant  Deficits requiring NH placement. Requiring extensive assistance from nursing. Up for meals only o/w spends the day resting in bed          Chronic A-fib (H)  GIII  Diastolic dysfunction with enlarged bilateral atrium  -  Clinically compensated.   - CVR. On metoprolol tartrated 6.25 mg bid  - on  Eliquis         B/L knee OA:stable.      Dementia, query early onset Alzheimer disease (H)  - MOCA 12/20 (Nov 2020).   - Continue to anticipate needs. Chronic condition, ongoing decline expected.   -  Continue to provide redirection and reassurance as needed. Maintain " safe living situation with goals focused on comfort.  - Guardian assigned.         Urinary retention with incomplete bladder emptying 2/2 mechanical obstruction  Failed multiple PVR, now with elena's catheter  - catheter care.     Hx of severe Dysphagia s/p PEG tube: resolved. PEG tube removed. tolerating po intake.       Order: no new order      Electronically signed by:  Rome Mobley MD

## 2021-07-12 NOTE — LETTER
7/12/2021        RE: Simone Daniels  [address]  [facility] [address]        [facility]  Chief Complaint   Patient presents with     prison Regulatory     [place] [facility] Record Number:  [id number]  Place of Service where encounter took place:  [facility] () [[id number]]    HPI:    Simone Daniels  is 77 year old (5/26/1944), who is being seen today for a federally mandated E/M visit.  HPI information obtained from: facility chart records, facility staff, patient report and [place] Epic chart review.     Today's concerns are:   - Resident seen and examined.   - CVA / aphasia; no nursing concern. Answers by nodding.   - PEG tube removed.  Endorses no concern with swallowing. Denies abdominal pain  -  Endorses sleep, appetite and BM are fine.   - Nurse aid reports seems different today, had to elevate to bed to be able to transfer from the bed to .   - Resident endorses pain in his left knee.   -------------------------------  - - Past Medical, social, family histories, medications, and allergies reviewed and updated  - Medications reviewed: in the chart and EHR.   - Case Management:   I have reviewed the care plan and MDS and do agree with the plan. Patient's desire to return to the community is not present.  Information reviewed:  Medications, vital signs, orders, and nursing notes.    MEDICATIONS:  Current Outpatient Medications   Medication Sig Dispense Refill     trolamine salicylate (ASPERCREME) 10 % external cream Apply topically 3 times daily       acetaminophen (TYLENOL) 500 MG tablet Take 1,000 mg by mouth every 6 hours as needed       melatonin 3 MG tablet Take 1 mg by mouth At Bedtime       metoprolol tartrate (LOPRESSOR) 25 MG tablet Take 6.25 mg by mouth 2 times daily       multivitamin w/minerals (THERA-VIT-M) tablet Take 1 tablet by mouth daily       polyethylene glycol (MIRALAX) 17 g packet Take 1 packet by mouth daily       rivaroxaban ANTICOAGULANT (XARELTO) 20 MG  "TABS tablet Take 20 mg by mouth daily (with dinner)       saline nasal (AYR SALINE) GEL topical gel Apply into each nare 2 times daily       sertraline (ZOLOFT) 25 MG tablet Take 75 mg by mouth daily       STATIN NOT PRESCRIBED (INTENTIONAL) Please choose reason not prescribed from choices below.       ROS:  Limited secondary to aphasia impairment but today pt endorses- see HPI    Vitals:  /76   Pulse 62   Temp 98.2  F (36.8  C)   Resp 18   Ht 1.676 m (5' 6\")   Wt 70.1 kg (154 lb 9.6 oz)   SpO2 99%   BMI 24.95 kg/m    Body mass index is 24.95 kg/m .  Exam:  GENERAL APPEARANCE:  in no distress, cooperative  RESP:  lungs clear to auscultation   CV:  S1S2 audible, regular HR, no murmur appreciated.   ABDOMEN:  soft, NT/ND, BS audible. no mass appreciated on palpation.   M/S:   stiff lower extremities. Arthritis deformity in left knee.   SKIN:   Flores's catheter in place.   NEURO:  Humming and hand movements, no wording. Hand  5/5 bl, dorsiflexion 5/5 right  But 4/5 left.   PSYCH: fair insight, judgement and memory, affect and mood elated       Lab/Diagnostic data: Reviewed in the chart and EHR.        ASSESSMENT/PLAN  Hx of right parietal infarction with hemorrhagic conversion (2018)  Hx of subacute infarction with petechial hemorrhage  Expressive aphasia as a late effect of CVA  Debility  - LDL 69, hence statin was not initiated.   - ambulates independently  - Continues to nursing assistance with ADLs  - Significant  Deficits requiring NH placement. Requiring extensive assistance from nursing. Up for meals only o/w spends the day resting in bed          Chronic A-fib (H)  GIII  Diastolic dysfunction with enlarged bilateral atrium  -  Clinically compensated.   - CVR. On metoprolol tartrated 6.25 mg bid  - on  Eliquis         B/L knee OA:stable.      Dementia, query early onset Alzheimer disease (H)  - MOCA 12/20 (Nov 2020).   - Continue to anticipate needs. Chronic condition, ongoing decline expected. "   -  Continue to provide redirection and reassurance as needed. Maintain safe living situation with goals focused on comfort.  - Guardian assigned.         Urinary retention with incomplete bladder emptying 2/2 mechanical obstruction  Failed multiple PVR, now with elena's catheter  - catheter care.     Hx of severe Dysphagia s/p PEG tube: resolved. PEG tube removed. tolerating po intake.       Order: no new order      Electronically signed by:  Rome Mobley MD        Sincerely,        Rome Mobley MD

## 2021-08-03 NOTE — TELEPHONE ENCOUNTER
Called with UA results with UCx in process; Afebrile but dirty looking urine in chronic elena and RN suspects that is why it was ordered. The UA is grossly positive. Aware could be somewhat contaminant but will empirically treat as await culture. Orders given for Keflex 500 mg TID x 7 days and ordered staff to update primary team when UCx results return.      Natalia Maria, DO

## 2021-09-07 NOTE — PROGRESS NOTES
"[facility] Reg[facility]  Chief Complaint   Patient presents with     [facility] MRN: [id number] Place of Service where encounter took place:  [facility] () [[id number]] HPI: Simone Daniels  is 77 year old (5/26/1944), who is being seen today for a federally mandated E/M visit.  HPI information obtained from: facility chart records, facility staff, patient report, [facility] chart review, and Care Everywhere [facility] chart review. Today's concerns are:    Simone seen today per federal mandate. He has no new concerns, but cannot verbalize. He was able to shake his head \"no\" when asked about pain. He is getting ready for lunch. Nursing has no new concerns.    ALLERGIES:Patient has no known allergies.PAST MEDICAL HISTORY:   has a past medical history of Cerebral infarction (H) and LOW BACK PAIN. PAST SURGICAL HISTORY:   has a past surgical history that includes surgical history of -  (1979); surgical history of - ; surgical history of -  (1981); and IR Gastrostomy Tube Percutaneous Plcmnt (12/3/2020).FAMILY HISTORY: family history includes C.A.D. in his brother and mother.SOCIAL HISTORY:  reports that he quit smoking about 41 years ago. His smoking use included cigarettes. He has a 36.00 pack-year smoking history. He has never used smokeless tobacco. He reports current alcohol use. He reports that he does not use drugs.  MEDICATIONS:  Current Outpatient Medications   Medication Sig Dispense Refill     acetaminophen (TYLENOL) 500 MG tablet Take 1,000 mg by mouth every 6 hours as needed       cephALEXin (KEFLEX) 500 MG capsule Take 1 capsule (500 mg) by mouth 3 times daily 21 capsule 0     melatonin 3 MG tablet Take 1 mg by mouth At Bedtime       metoprolol tartrate (LOPRESSOR) 25 MG tablet Take 6.25 mg by mouth 2 times daily       multivitamin w/minerals (THERA-VIT-M) tablet Take 1 tablet by mouth daily       polyethylene glycol (MIRALAX) 17 g packet Take 1 packet by mouth daily       " "rivaroxaban ANTICOAGULANT (XARELTO) 20 MG TABS tablet Take 20 mg by mouth daily (with dinner)       saline nasal (AYR SALINE) GEL topical gel Apply into each nare 2 times daily       sertraline (ZOLOFT) 25 MG tablet Take 75 mg by mouth daily       STATIN NOT PRESCRIBED (INTENTIONAL) Please choose reason not prescribed from choices below.       trolamine salicylate (ASPERCREME) 10 % external cream Apply topically 3 times daily       Case Management:  I have reviewed the care plan and MDS and do agree with the plan. Patient's desire to return to the community is not assessible due to cognitive impairment. Information reviewed:  Medications, vital signs, orders, and nursing notes.    ROS: Unobtainable secondary to cognitive impairment, but see HPI.    Vitals: /78   Pulse 70   Temp (!) 96.6  F (35.9  C)   Resp 16   Ht 1.666 m (5' 5.6\")   Wt 70.7 kg (155 lb 12.8 oz)   SpO2 96%   BMI 25.45 kg/m    Exam:  GENERAL APPEARANCE: Alert, in no distress, cooperative.   ENT: Mouth/posterior oropharynx intact w/ moist mucous membranes, hearing acuity [place].  EYES: EOM, conjunctivae, lids, pupils and irises normal, PERRL2.   RESP: Respiratory effort unlabored, no respiratory distress, On RA.   SKIN: Inspection/Palpation of skin and subcutaneous tissue baseline w/ fragility. No wounds/rashes noted.   NEURO/MSK: CN II-XII at patient's baseline, sensation baseline PPS. Ambulating w/ unilateral favoring secondary to CVA.   PSYCH: Insight, judgement, and memory are baseline impaired, affect and mood are happy/engaged.    Lab/Diagnostic data:   Recent labs in [facility] reviewed by me today.     ASSESSMENT/PLAN  Late effects of cerebral ischemic stroke  Expressive aphasia  Permanent atrial fibrillation (H)  Long term current use of anticoagulant therapy  Flores catheter in place  Chronic. Ongoing.    Will reduce polpyharmacy and decrease PRN Tylenol.    Noting no use of Bisacodyl PRN, which is available on SINCERE should need arise. This " has not been utilized in 30 days.     Simone's needs are not completely known secondary to his CVA and corresponding aphasia, but he presents happy, engaged, and comfortable.     He continues on anticoagulation for afib, and elena for underlying urinary retention.   Will continue plan-of-care and follow-up routinely or as needed.    Orders:  1. Decrease Tylenol to 1000mg PO TID PRN. Dx: pain/fever.  2. Discontinue PRN Bisacodyl.     Electronically signed by:  Dr. Gabriela Borrero, APRN CNP DNP

## 2021-09-08 NOTE — LETTER
"    9/8/2021        RE: Simone Daniels  [facility]  [address]  [facility] [address]        [facility] Reg[facility]  Chief Complaint   Patient presents with     [facility] Regulatory   [place] MRN: [id number] [id number] HPI: Simone Daniels  is 77 year old (5/26/1944), who is being seen today for a federally mandated E/M visit.  HPI information obtained from: facility chart records, facility staff, patient report, [facility] chart review, and Care Everywhere [facility] chart review. Today's concerns are:    Simone seen today per federal mandate. He has no new concerns, but cannot verbalize. He was able to shake his head \"no\" when asked about pain. He is getting ready for lunch. Nursing has no new concerns.    ALLERGIES:Patient has no known allergies.PAST MEDICAL HISTORY:   has a past medical history of Cerebral infarction (H) and LOW BACK PAIN. PAST SURGICAL HISTORY:   has a past surgical history that includes surgical history of -  (1979); surgical history of - ; surgical history of -  (1981); and IR Gastrostomy Tube Percutaneous Plcmnt (12/3/2020).FAMILY HISTORY: family history includes C.A.D. in his brother and mother.SOCIAL HISTORY:  reports that he quit smoking about 41 years ago. His smoking use included cigarettes. He has a 36.00 pack-year smoking history. He has never used smokeless tobacco. He reports current alcohol use. He reports that he does not use drugs.  MEDICATIONS:  Current Outpatient Medications   Medication Sig Dispense Refill     acetaminophen (TYLENOL) 500 MG tablet Take 1,000 mg by mouth every 6 hours as needed       cephALEXin (KEFLEX) 500 MG capsule Take 1 capsule (500 mg) by mouth 3 times daily 21 capsule 0     melatonin 3 MG tablet Take 1 mg by mouth At Bedtime       metoprolol tartrate (LOPRESSOR) 25 MG tablet Take 6.25 mg by mouth 2 times daily       multivitamin w/minerals (THERA-VIT-M) tablet " "Take 1 tablet by mouth daily       polyethylene glycol (MIRALAX) 17 g packet Take 1 packet by mouth daily       rivaroxaban ANTICOAGULANT (XARELTO) 20 MG TABS tablet Take 20 mg by mouth daily (with dinner)       saline nasal (AYR SALINE) GEL topical gel Apply into each nare 2 times daily       sertraline (ZOLOFT) 25 MG tablet Take 75 mg by mouth daily       STATIN NOT PRESCRIBED (INTENTIONAL) Please choose reason not prescribed from choices below.       trolamine salicylate (ASPERCREME) 10 % external cream Apply topically 3 times daily       Case Management:  I have reviewed the care plan and MDS and do agree with the plan. Patient's desire to return to the community is not assessible due to cognitive impairment. Information reviewed:  Medications, vital signs, orders, and nursing notes.    ROS: Unobtainable secondary to cognitive impairment, but see HPI.    Vitals: /78   Pulse 70   Temp (!) 96.6  F (35.9  C)   Resp 16   Ht 1.666 m (5' 5.6\")   Wt 70.7 kg (155 lb 12.8 oz)   SpO2 96%   BMI 25.45 kg/m    Exam:  GENERAL APPEARANCE: Alert, in no distress, cooperative.   ENT: Mouth/posterior oropharynx intact w/ moist mucous membranes, hearing acuity [place].  EYES: EOM, conjunctivae, lids, pupils and irises normal, PERRL2.   RESP: Respiratory effort unlabored, no respiratory distress, On RA.   SKIN: Inspection/Palpation of skin and subcutaneous tissue baseline w/ fragility. No wounds/rashes noted.   NEURO/MSK: CN II-XII at patient's baseline, sensation baseline PPS. Ambulating w/ unilateral favoring secondary to CVA.   PSYCH: Insight, judgement, and memory are baseline impaired, affect and mood are happy/engaged.    Lab/Diagnostic data:   Recent labs in [facility] reviewed by me today.     ASSESSMENT/PLAN  Late effects of cerebral ischemic stroke  Expressive aphasia  Permanent atrial fibrillation (H)  Long term current use of anticoagulant therapy  Flores catheter in place  Chronic. Ongoing.    Will reduce polpyharmacy " and decrease PRN Tylenol.    Noting no use of Bisacodyl PRN, which is available on SINCERE should need arise. This has not been utilized in 30 days.     Simone's needs are not completely known secondary to his CVA and corresponding aphasia, but he presents happy, engaged, and comfortable.     He continues on anticoagulation for afib, and elena for underlying urinary retention.   Will continue plan-of-care and follow-up routinely or as needed.    Orders:  1. Decrease Tylenol to 1000mg PO TID PRN. Dx: pain/fever.  2. Discontinue PRN Bisacodyl.     Electronically signed by:  Dr. Gabriela Borrero, APRN CNP DNP        Sincerely,        CAROLANN Sheldon CNP

## 2021-10-20 NOTE — PROGRESS NOTES
[facility]  Episodic/Acute/Follow-Up  [place] MRN: [id number]. [id number]   Chief Complaint   Patient presents with     RECHECK    HPI: Simone Daniels  is a 76 year old (1945), who is being seen today for an episodic care visit.  HPI information obtained from: facility chart records, facility staff, patient report and [facility] chart review. Today's concern is:    Nursing consulted provider today when they noted odorous urine and more anxiety in [place] than usual. He is afebrile, states he has a little bit of discomfort in his abdomen (though I am asking immediately after lunch). He denies headache, dizziness, fever, SOB, nausea. He nods yes/no to answer questions, and speaks unintelligibly, but is engaged in our conversation.    Past Medical and Surgical History reviewed in Epic today.  MEDICATIONS:  Current Outpatient Medications   Medication Sig Dispense Refill     acetaminophen (TYLENOL) 500 MG tablet Take 1,000 mg by mouth 3 times daily as needed       melatonin 3 MG tablet Take 1 mg by mouth At Bedtime       metoprolol tartrate (LOPRESSOR) 25 MG tablet Take 6.25 mg by mouth 2 times daily       multivitamin w/minerals (THERA-VIT-M) tablet Take 1 tablet by mouth daily       polyethylene glycol (MIRALAX) 17 g packet Take 1 packet by mouth daily       rivaroxaban ANTICOAGULANT (XARELTO) 20 MG TABS tablet Take 20 mg by mouth daily (with dinner)       saline nasal (AYR SALINE) GEL topical gel Apply into each nare 2 times daily       sertraline (ZOLOFT) 25 MG tablet Take 75 mg by mouth daily       STATIN NOT PRESCRIBED (INTENTIONAL) Please choose reason not prescribed from choices below.       trolamine salicylate (ASPERCREME) 10 % external cream Apply topically 3 times daily         REVIEW OF SYSTEMS: Limited secondary to cognitive impairment but today pt reports the above.    Objective: /87   Pulse 83    "Temp 97.4  F (36.3  C)   Resp 16   Ht 1.676 m (5' 6\")   Wt 72.1 kg (159 lb)   SpO2 94%   BMI 25.66 kg/m    Exam:  GENERAL APPEARANCE: Alert, in no distress, cooperative.   ENT: Mouth/posterior oropharynx intact w/ moist mucous membranes, hearing acuity [place].  EYES: EOM, conjunctivae, lids, pupils and irises normal, PERRL2.   RESP: Respiratory effort unlabored, no respiratory distress, On RA.   ABDOMEN: soft, non-tender abdomen, and no masses palpated.  SKIN: Inspection/Palpation of skin and subcutaneous tissue baseline w/ fragility. No wounds/rashes noted.   NEURO: CN II-XII at patient's baseline, sensation baseline PPS.  PSYCH: Insight, judgement, and memory are baseline, affect and mood are happy/engaged.    Labs: Labs done in facility are in EPIC. Please refer to them using EPIC/Care Everywhere.    ASSESSMENT/PLAN:  Dysuria  Abnormal urine odor  Anxiety  Expressive aphasia  Elena catheter in place  Obstructive uropathy  Late effects of cerebral ischemic stroke  Acute on chronic. Ongoing.    Noting chronic elena in place, which is monitored closely by nursing. Recent UTI in August and treatment w/ Keflex.     Will obtain UA/UC x1, and recommending tx w/ Macrobid if sensitive given recent cephalosporin dosing.     IDT noting increased anxiety in the previous months, and also previous reduction in Zoloft. While recent anxiety may be due to UTI, it does not coicide with other reports. Will increase Zoloft back to prior dosing.   Follow up w/in 2 to 4 weeks or as needed.    Orders:  1. Increase Zoloft to 100mg PO QDay. Dx: GURPREET.  2. UA/UC x1 via cath change x1. Dx: AMS.   *Recommending Macrobid if sensitive and positive culture results.*    Electronically signed by:  Dr. Gabriela Borrero, APRN CNP DNP    "

## 2021-10-20 NOTE — LETTER
10/20/2021        RE: Simone Daniels  [employer]  [address]  [facility] [address]        [facility]  Episodic/Acute/Follow-Up  [place] MRN: [id number]. [facility] [[id number]]   Chief Complaint   Patient presents with     RECHECK    HPI: Simone Daniels  is a 76 year old (1945), who is being seen today for an episodic care visit.  HPI information obtained from: facility chart records, facility staff, patient report and [facility] chart review. Today's concern is:    Nursing consulted provider today when they noted odorous urine and more anxiety in [place] than usual. He is afebrile, states he has a little bit of discomfort in his abdomen (though I am asking immediately after lunch). He denies headache, dizziness, fever, SOB, nausea. He nods yes/no to answer questions, and speaks unintelligibly, but is engaged in our conversation.    Past Medical and Surgical History reviewed in Epic today.  MEDICATIONS:  Current Outpatient Medications   Medication Sig Dispense Refill     acetaminophen (TYLENOL) 500 MG tablet Take 1,000 mg by mouth 3 times daily as needed       melatonin 3 MG tablet Take 1 mg by mouth At Bedtime       metoprolol tartrate (LOPRESSOR) 25 MG tablet Take 6.25 mg by mouth 2 times daily       multivitamin w/minerals (THERA-VIT-M) tablet Take 1 tablet by mouth daily       polyethylene glycol (MIRALAX) 17 g packet Take 1 packet by mouth daily       rivaroxaban ANTICOAGULANT (XARELTO) 20 MG TABS tablet Take 20 mg by mouth daily (with dinner)       saline nasal (AYR SALINE) GEL topical gel Apply into each nare 2 times daily       sertraline (ZOLOFT) 25 MG tablet Take 75 mg by mouth daily       STATIN NOT PRESCRIBED (INTENTIONAL) Please choose reason not prescribed from choices below.       trolamine salicylate (ASPERCREME) 10 % external cream Apply topically 3 times daily         REVIEW OF SYSTEMS:  "Limited secondary to cognitive impairment but today pt reports the above.    Objective: /87   Pulse 83   Temp 97.4  F (36.3  C)   Resp 16   Ht 1.676 m (5' 6\")   Wt 72.1 kg (159 lb)   SpO2 94%   BMI 25.66 kg/m    Exam:  GENERAL APPEARANCE: Alert, in no distress, cooperative.   ENT: Mouth/posterior oropharynx intact w/ moist mucous membranes, hearing acuity [place].  EYES: EOM, conjunctivae, lids, pupils and irises normal, PERRL2.   RESP: Respiratory effort unlabored, no respiratory distress, On RA.   ABDOMEN: soft, non-tender abdomen, and no masses palpated.  SKIN: Inspection/Palpation of skin and subcutaneous tissue baseline w/ fragility. No wounds/rashes noted.   NEURO: CN II-XII at patient's baseline, sensation baseline PPS.  PSYCH: Insight, judgement, and memory are baseline, affect and mood are happy/engaged.    Labs: Labs done in facility are in EPIC. Please refer to them using [employer]/Care Everywhere.    ASSESSMENT/PLAN:  Dysuria  Abnormal urine odor  Anxiety  Expressive aphasia  Elena catheter in place  Obstructive uropathy  Late effects of cerebral ischemic stroke  Acute on chronic. Ongoing.    Noting chronic elena in place, which is monitored closely by nursing. Recent UTI in August and treatment w/ Keflex.     Will obtain UA/UC x1, and recommending tx w/ Macrobid if sensitive given recent cephalosporin dosing.     IDT noting increased anxiety in the previous months, and also previous reduction in Zoloft. While recent anxiety may be due to UTI, it does not coicide with other reports. Will increase Zoloft back to prior dosing.   Follow up w/in 2 to 4 weeks or as needed.    Orders:  1. Increase Zoloft to 100mg PO QDay. Dx: GURPREET.  2. UA/UC x1 via cath change x1. Dx: AMS.   *Recommending Macrobid if sensitive and positive culture results.*    Electronically signed by:  Dr. Gabriela Borrero, APRN CNP DNP          Sincerely,        CAROLANN Sheldon CNP    "

## 2021-10-28 NOTE — PROGRESS NOTES
[facility]  Episodic/Acute/Follow-Up  [place] MRN: [id number]. Place of Service where encounter took place:  [facility] () [[id number]]   Chief Complaint   Patient presents with     RECHECK    HPI: Simone Daniels  is a 76 year old (1945), who is being seen today for an episodic care visit.  HPI information obtained from: facility chart records, facility staff, patient report and [facility] chart review. Today's concern is:    Jay seen today on follow-up, after he tested positive for a urinary tract infection secondary to obstructive uropathy and Flores catheter.  He was placed on a course of Bactrim, which he has now completed.  Nursing states that his urine is still a little dark, but that he continues to be asymptomatic otherwise.  Today, Jay denies pain, fever, and otherwise is unable to fully answer questions secondary to expressive aphasia.    Past Medical and Surgical History reviewed in Epic today.  MEDICATIONS:  Current Outpatient Medications   Medication Sig Dispense Refill     acetaminophen (TYLENOL) 500 MG tablet Take 1,000 mg by mouth 3 times daily as needed       melatonin 3 MG tablet Take 1 mg by mouth At Bedtime       metoprolol tartrate (LOPRESSOR) 25 MG tablet Take 6.25 mg by mouth 2 times daily       multivitamin w/minerals (THERA-VIT-M) tablet Take 1 tablet by mouth daily       polyethylene glycol (MIRALAX) 17 g packet Take 1 packet by mouth daily       rivaroxaban ANTICOAGULANT (XARELTO) 20 MG TABS tablet Take 20 mg by mouth daily (with dinner)       saline nasal (AYR SALINE) GEL topical gel Apply into each nare 2 times daily       sertraline (ZOLOFT) 25 MG tablet Take 100 mg by mouth daily       STATIN NOT PRESCRIBED (INTENTIONAL) Please choose reason not prescribed from choices below.       sulfamethoxazole-trimethoprim (BACTRIM DS) 800-160 MG tablet Take 1 tablet by mouth 2 times daily 10 tablet 0     trolamine salicylate (ASPERCREME) 10 % external  "cream Apply topically 3 times daily         REVIEW OF SYSTEMS: Limited secondary to cognitive impairment but today pt reports the above.    Objective: /71   Pulse 73   Temp 97.5  F (36.4  C)   Resp 16   Ht 1.676 m (5' 6\")   Wt 71.5 kg (157 lb 9.6 oz)   SpO2 95%   BMI 25.44 kg/m    Exam:  GENERAL APPEARANCE: Alert, in no distress, cooperative.   ENT: Mouth/posterior oropharynx intact w/ moist mucous membranes, hearing acuity [place].  EYES: EOM, conjunctivae, lids, pupils and irises normal, PERRL2.   RESP: Respiratory effort unlabored, no respiratory distress, On RA.   ABDOMEN: soft, non-tender abdomen, and no masses palpated.  SKIN: Inspection/Palpation of skin and subcutaneous tissue baseline w/ fragility. No wounds/rashes noted.   NEURO: CN II-XII at patient's baseline, sensation baseline PPS.  PSYCH: Insight, judgement, and memory are baseline, affect and mood are happy/engaged.    Labs: Labs done in facility are in EPIC. Please refer to them using [facility]/Care Everywhere.    ASSESSMENT/PLAN:  Acute cystitis w/ hematuria  Expressive aphasia  Flores catheter in place  Obstructive uropathy  Late effects of cerebral ischemic stroke  Acute on chronic. Ongoing.    Antibiotics are now complete, and will continue to monitor urine.    Jay is prone to UTIs, therefore it is essential for catheter cares, hydration, and ongoing monitoring for prevention and early detection.    Difficult to know if Jay is feeling any different, secondary to his expressive aphasia post CVA.  Follow up routinely or as needed.    Orders:  No new orders.    Electronically signed by:  Dr. Gabriela Borrero, APRN CNP DNP  "

## 2021-10-28 NOTE — LETTER
10/28/2021        RE: Simone Daniels  [employer]  [address]  [facility] [address]        [facility]IATRIC SERV[facility]  Episodic/Acute/Follow-Up  [place] MRN: [id number]. Place of Service where encounter took place:  [facility] () [[id number]]   Chief Complaint   Patient presents with     RECHECK    HPI: Simone Daniels  is a 76 year old (1945), who is being seen today for an episodic care visit.  HPI information obtained from: facility chart records, facility staff, patient report and [facility] chart review. Today's concern is:    Jay seen today on follow-up, after he tested positive for a urinary tract infection secondary to obstructive uropathy and Flores catheter.  He was placed on a course of Bactrim, which he has now completed.  Nursing states that his urine is still a little dark, but that he continues to be asymptomatic otherwise.  Today, Jay denies pain, fever, and otherwise is unable to fully answer questions secondary to expressive aphasia.    Past Medical and Surgical History reviewed in Epic today.  MEDICATIONS:  Current Outpatient Medications   Medication Sig Dispense Refill     acetaminophen (TYLENOL) 500 MG tablet Take 1,000 mg by mouth 3 times daily as needed       melatonin 3 MG tablet Take 1 mg by mouth At Bedtime       metoprolol tartrate (LOPRESSOR) 25 MG tablet Take 6.25 mg by mouth 2 times daily       multivitamin w/minerals (THERA-VIT-M) tablet Take 1 tablet by mouth daily       polyethylene glycol (MIRALAX) 17 g packet Take 1 packet by mouth daily       rivaroxaban ANTICOAGULANT (XARELTO) 20 MG TABS tablet Take 20 mg by mouth daily (with dinner)       saline nasal (AYR SALINE) GEL topical gel Apply into each nare 2 times daily       sertraline (ZOLOFT) 25 MG tablet Take 100 mg by mouth daily       STATIN NOT PRESCRIBED (INTENTIONAL) Please choose reason not prescribed from choices below.       sulfamethoxazole-trimethoprim (BACTRIM DS)  "800-160 MG tablet Take 1 tablet by mouth 2 times daily 10 tablet 0     trolamine salicylate (ASPERCREME) 10 % external cream Apply topically 3 times daily         REVIEW OF SYSTEMS: Limited secondary to cognitive impairment but today pt reports the above.    Objective: /71   Pulse 73   Temp 97.5  F (36.4  C)   Resp 16   Ht 1.676 m (5' 6\")   Wt 71.5 kg (157 lb 9.6 oz)   SpO2 95%   BMI 25.44 kg/m    Exam:  GENERAL APPEARANCE: Alert, in no distress, cooperative.   ENT: Mouth/posterior oropharynx intact w/ moist mucous membranes, hearing acuity [place].  EYES: EOM, conjunctivae, lids, pupils and irises normal, PERRL2.   RESP: Respiratory effort unlabored, no respiratory distress, On RA.   ABDOMEN: soft, non-tender abdomen, and no masses palpated.  SKIN: Inspection/Palpation of skin and subcutaneous tissue baseline w/ fragility. No wounds/rashes noted.   NEURO: CN II-XII at patient's baseline, sensation baseline PPS.  PSYCH: Insight, judgement, and memory are baseline, affect and mood are happy/engaged.    Labs: Labs done in facility are in EPIC. Please refer to them using [employer]/Care Everywhere.    ASSESSMENT/PLAN:  Acute cystitis w/ hematuria  Expressive aphasia  Flores catheter in place  Obstructive uropathy  Late effects of cerebral ischemic stroke  Acute on chronic. Ongoing.    Antibiotics are now complete, and will continue to monitor urine.    Jay is prone to UTIs, therefore it is essential for catheter cares, hydration, and ongoing monitoring for prevention and early detection.    Difficult to know if Jay is feeling any different, secondary to his expressive aphasia post CVA.  Follow up routinely or as needed.    Orders:  No new orders.    Electronically signed by:  CAROLANN Pepe CNP DNP        Sincerely,        CAROLANN Sheldon CNP    "

## 2021-11-08 NOTE — Clinical Note
Gabriela,   He is on Xarelto, and seems has some epigastric discomfort. Please consider famotidine or PPI.

## 2021-11-08 NOTE — LETTER
11/8/2021        RE: Simone Daniels  [employer]  [address]  [facility] [address]        [place] G[facility]IATRIC SERVICES  Chief Complaint   Patient presents with     [facility] Regulatory     [place] Medical Record Number:  [id number]  Place of Service where encounter took place:  [facility] () [[id number]]    HPI:    Simone Daniels  is 77 year old (5/26/1944), who is being seen today for a federally mandated E/M visit.  HPI information obtained from: facility chart records, facility staff, patient report and [place] Epic chart review.     Today's concerns are:   - Resident seen and examined. Nurse manager reports recently was treated for UTI, and still has some in the urine.  - CVA / aphasia;  Answers by nodding. Denies Nausea, vomiting. But endorses some discomfort in his upper abdomen.   -  Endorses sleep, appetite and BM are fine.   -------------------------------  - - Past Medical, social, family histories, medications, and allergies reviewed and updated  - Medications reviewed: in the chart and EHR.   - Case Management:   I have reviewed the care plan and MDS and do agree with the plan. Patient's desire to return to the community is not present.  Information reviewed:  Medications, vital signs, orders, and nursing notes.    MEDICATIONS:  Current Outpatient Medications   Medication Sig Dispense Refill     acetaminophen (TYLENOL) 500 MG tablet Take 1,000 mg by mouth 3 times daily as needed       melatonin 3 MG tablet Take 1 mg by mouth At Bedtime       metoprolol tartrate (LOPRESSOR) 25 MG tablet Take 6.25 mg by mouth 2 times daily       multivitamin w/minerals (THERA-VIT-M) tablet Take 1 tablet by mouth daily       polyethylene glycol (MIRALAX) 17 g packet Take 1 packet by mouth daily       rivaroxaban ANTICOAGULANT (XARELTO) 20 MG TABS tablet Take 20 mg by mouth daily (with dinner)       saline nasal (AYR SALINE) GEL topical gel Apply into each nare 2 times daily       sertraline  "(ZOLOFT) 25 MG tablet Take 100 mg by mouth daily       STATIN NOT PRESCRIBED (INTENTIONAL) Please choose reason not prescribed from choices below.       trolamine salicylate (ASPERCREME) 10 % external cream Apply topically 3 times daily       ROS: Limited secondary to aphasia impairment but today pt endorses- see HPI    Vitals:  /75   Pulse 89   Temp 98.3  F (36.8  C)   Resp 20   Ht 1.676 m (5' 6\")   Wt 71.7 kg (158 lb)   SpO2 97%   BMI 25.50 kg/m    Body mass index is 25.5 kg/m .  Exam:  GENERAL APPEARANCE:  in no distress, cooperative  RESP:  lungs clear to auscultation   CV:  S1S2 audible, regular HR, no murmur appreciated.   ABDOMEN:  soft, NT/ND, BS audible. no mass appreciated on palpation.   M/S:   stiff lower extremities. Arthritis deformity in left knee.   SKIN:   Flores's catheter in place  NEURO:  Humming and hand movements, no wording.   PSYCH: fair insight, judgement and memory, affect and mood elated     Lab/Diagnostic data: Reviewed in the chart and EHR.        ASSESSMENT/PLAN  Hx of right parietal infarction with hemorrhagic conversion (2018)  Hx of subacute infarction with petechial hemorrhage  Expressive aphasia as a late effect of CVA  - LDL 69, hence statin was not initiated.   - ambulates independently        Chronic A-fib (H)  GIII  Diastolic dysfunction with enlarged bilateral atrium  -  Clinically compensated.   - CVR. On metoprolol tartrate 6.25 mg bid  - on  Xarelto. Seems have upsetting stomach, consider adding famotidine or PPI.      B/L knee OA:stable.      Dementia, query early onset Alzheimer disease (H)  - MOCA 12/20 (Nov 2020).   - Continue to anticipate needs. Chronic condition, ongoing decline expected.   -  Continue to provide redirection and reassurance as needed. Maintain safe living situation with goals focused on comfort.  - Guardian assigned.         Urinary retention with incomplete bladder emptying 2/2 mechanical obstruction  Failed multiple PVR,  With chronic " elena's catheter  Recent CAUTI  -  Routine catheter care. Follow [facility] minimal criteria for checking UA/UCx    Hx of severe Dysphagia s/p PEG tube: resolved. PEG tube removed. tolerating po intake.       Order: no new order      Electronically signed by:  Rome Mobley MD        Sincerely,        Rome Mobley MD

## 2021-11-08 NOTE — PROGRESS NOTES
[place] GERIATRIC SERVICES  Chief Complaint   Patient presents with     [facility] Regulatory     [place] Medical Record Number:  [id number]  Place of Service where encounter took place:  [facility] () [[id number]]    HPI:    Simone Daniels  is 77 year old (5/26/1944), who is being seen today for a federally mandated E/M visit.  HPI information obtained from: facility chart records, facility staff, patient report and [facility] chart review.     Today's concerns are:   - Resident seen and examined. Nurse manager reports recently was treated for UTI, and still has some in the urine.  - CVA / aphasia;  Answers by nodding. Denies Nausea, vomiting. But endorses some discomfort in his upper abdomen.   -  Endorses sleep, appetite and BM are fine.   -------------------------------  - - Past Medical, social, family histories, medications, and allergies reviewed and updated  - Medications reviewed: in the chart and EHR.   - Case Management:   I have reviewed the care plan and MDS and do agree with the plan. Patient's desire to return to the community is not present.  Information reviewed:  Medications, vital signs, orders, and nursing notes.    MEDICATIONS:  Current Outpatient Medications   Medication Sig Dispense Refill     acetaminophen (TYLENOL) 500 MG tablet Take 1,000 mg by mouth 3 times daily as needed       melatonin 3 MG tablet Take 1 mg by mouth At Bedtime       metoprolol tartrate (LOPRESSOR) 25 MG tablet Take 6.25 mg by mouth 2 times daily       multivitamin w/minerals (THERA-VIT-M) tablet Take 1 tablet by mouth daily       polyethylene glycol (MIRALAX) 17 g packet Take 1 packet by mouth daily       rivaroxaban ANTICOAGULANT (XARELTO) 20 MG TABS tablet Take 20 mg by mouth daily (with dinner)       saline nasal (AYR SALINE) GEL topical gel Apply into each nare 2 times daily       sertraline (ZOLOFT) 25 MG tablet Take 100 mg by mouth daily       STATIN NOT PRESCRIBED (INTENTIONAL) Please choose reason  "not prescribed from choices below.       trolamine salicylate (ASPERCREME) 10 % external cream Apply topically 3 times daily       ROS: Limited secondary to aphasia impairment but today pt endorses- see HPI    Vitals:  /75   Pulse 89   Temp 98.3  F (36.8  C)   Resp 20   Ht 1.676 m (5' 6\")   Wt 71.7 kg (158 lb)   SpO2 97%   BMI 25.50 kg/m    Body mass index is 25.5 kg/m .  Exam:  GENERAL APPEARANCE:  in no distress, cooperative  RESP:  lungs clear to auscultation   CV:  S1S2 audible, regular HR, no murmur appreciated.   ABDOMEN:  soft, NT/ND, BS audible. no mass appreciated on palpation.   M/S:   stiff lower extremities. Arthritis deformity in left knee.   SKIN:   Elena's catheter in place  NEURO:  Humming and hand movements, no wording.   PSYCH: fair insight, judgement and memory, affect and mood elated     Lab/Diagnostic data: Reviewed in the chart and EHR.        ASSESSMENT/PLAN  Hx of right parietal infarction with hemorrhagic conversion (2018)  Hx of subacute infarction with petechial hemorrhage  Expressive aphasia as a late effect of CVA  - LDL 69, hence statin was not initiated.   - ambulates independently        Chronic A-fib (H)  GIII  Diastolic dysfunction with enlarged bilateral atrium  -  Clinically compensated.   - CVR. On metoprolol tartrate 6.25 mg bid  - on  Xarelto. Seems have upsetting stomach, consider adding famotidine or PPI.      B/L knee OA:stable.      Dementia, query early onset Alzheimer disease (H)  - MOCA 12/20 (Nov 2020).   - Continue to anticipate needs. Chronic condition, ongoing decline expected.   -  Continue to provide redirection and reassurance as needed. Maintain safe living situation with goals focused on comfort.  - Guardian assigned.         Urinary retention with incomplete bladder emptying 2/2 mechanical obstruction  Failed multiple PVR,  With chronic elena's catheter  Recent CAUTI  -  Routine catheter care. Follow [facility] minimal criteria for checking UA/UCx    Hx " of severe Dysphagia s/p PEG tube: resolved. PEG tube removed. tolerating po intake.       Order: no new order      Electronically signed by:  Rome Mobley MD

## 2021-12-20 NOTE — TELEPHONE ENCOUNTER
Orders written by provider at facility and transcribed by : Jodi Schmitt    1. Ciprofloxacin 500mg PO BID x 7 days. Dx: Complicated UTI.  2. Push fluids, 8ox Qshift x 3 days. Dx: UTI.

## 2021-12-28 NOTE — PROGRESS NOTES
[facility] [place] G[facility]IATRIC SERVICE  Episodic/Acute/Follow-Up  [place] MRN: [id number]. Place of Service where encounter took place:  [facility] () [[id number]]   Chief Complaint   Patient presents with     RECHECK    HPI: Simone Daniels  is a 73 year old (5/26/1948), who is being seen today for an episodic care visit.  HPI information obtained from: facility chart records, facility staff, patient report and [facility] chart review. Today's concern is:    Pato seen today on follow-up after he was treated with ciprofloxacin for UTI 2 weeks ago.  He completed antibiotics last week and was given a few days to return to baseline.  Today, he denies any pain, denies shortness of breath, denies headaches, denies fever.  He points at his legs, and tries to explain some discomfort he had when his leg bag was strapped in correctly this morning (as described by nursing).    Past Medical and Surgical History reviewed in Epic today.  MEDICATIONS:  Current Outpatient Medications   Medication Sig Dispense Refill     acetaminophen (TYLENOL) 500 MG tablet Take 1,000 mg by mouth 3 times daily as needed       melatonin 3 MG tablet Take 1 mg by mouth At Bedtime       metoprolol tartrate (LOPRESSOR) 25 MG tablet Take 6.25 mg by mouth 2 times daily       multivitamin w/minerals (THERA-VIT-M) tablet Take 1 tablet by mouth daily       polyethylene glycol (MIRALAX) 17 g packet Take 1 packet by mouth daily       rivaroxaban ANTICOAGULANT (XARELTO) 20 MG TABS tablet Take 20 mg by mouth daily (with dinner)       saline nasal (AYR SALINE) GEL topical gel Apply into each nare 2 times daily       sertraline (ZOLOFT) 25 MG tablet Take 100 mg by mouth daily       STATIN NOT PRESCRIBED (INTENTIONAL) Please choose reason not prescribed from choices below.       trolamine salicylate (ASPERCREME) 10 % external cream Apply topically 3 times daily         REVIEW OF SYSTEMS: Limited secondary to cognitive impairment but today pt reports the  "above.    Objective: /67   Pulse 63   Temp 98.4  F (36.9  C)   Resp 16   Ht 1.676 m (5' 6\")   Wt 71.7 kg (158 lb)   SpO2 96%   BMI 25.50 kg/m    Exam:  GENERAL APPEARANCE: Alert, in no distress, cooperative.   ENT: Mouth/posterior oropharynx intact w/ moist mucous membranes, hearing acuity [place].  EYES: EOM, conjunctivae, lids, pupils and irises normal, PERRL2.   RESP: Respiratory effort unlabored, no respiratory distress, On RA.   CV: Edema 0+ BLE. Peripheral pulses are 2+\.  ABDOMEN: Normal bowel sounds, soft, non-tender abdomen, and no masses palpated.  SKIN: Inspection/Palpation of skin and subcutaneous tissue baseline w/ fragility. No wounds/rashes noted except some mild excoriations on BLE.  NEURO: CN II-XII at patient's baseline, sensation baseline.  PSYCH: Insight, judgement, and memory are at baseline, affect and mood are happy/engaged.    Labs: Labs done in facility are in EPIC. Please refer to them using [facility]Care Everywhere.    ASSESSMENT/PLAN:  Late effects of cerebral ischemic stroke  Obstructive uropathy  Urinary retention with incomplete bladder emptying  History of UTI  Elena catheter in place  Acute on chronic. Ongoing.    Given UTI has been cleared, and fill has completely responded well to other care status post CVA, will trial Elena removal.    Given aphasia, will order PVR every shift and straight cathing should retention occur.    Want to ensure appropriate intake and output, and would like filled to have help with toileting and teaching, as ordered below.    If Elena requires replacement, may consider Flomax dosing or other care should this trial void not be successful.  Follow up routinely or as needed.    Orders:  1. On 12/29, remove Elena @ 0600.   2. PVR Qshift x 3 days.   3. If PVR >350mL, may straight cath x2, if 3x, may reinsert elena and notify PCP.   4. QShift I/O x 24 hours.  5. Teaching around voiding, urinal, calling for assistance.   Dx: urinary retention, frequent " UTIs.     Electronically signed by:  Dr. Gabriela Borrero, APRN CNP DNP

## 2021-12-28 NOTE — LETTER
12/28/2021        RE: Simone Daniels  [employer] [place] [employer]  [address]  [facility] [address]        [facility] GERIATRIC SERVICE  Episodic/Acute/Follow-Up  [place] MRN: [id number]. Place of Service where encounter took place:  [facility] () [[id number]]   Chief Complaint   Patient presents with     RECHECK    HPI: Simone Daniels  is a 73 year old (5/26/1948), who is being seen today for an episodic care visit.  HPI information obtained from: facility chart records, facility staff, patient report and [facility] chart review. Today's concern is:    Pato seen today on follow-up after he was treated with ciprofloxacin for UTI 2 weeks ago.  He completed antibiotics last week and was given a few days to return to baseline.  Today, he denies any pain, denies shortness of breath, denies headaches, denies fever.  He points at his legs, and tries to explain some discomfort he had when his leg bag was strapped in correctly this morning (as described by nursing).    Past Medical and Surgical History reviewed in Epic today.  MEDICATIONS:  Current Outpatient Medications   Medication Sig Dispense Refill     acetaminophen (TYLENOL) 500 MG tablet Take 1,000 mg by mouth 3 times daily as needed       melatonin 3 MG tablet Take 1 mg by mouth At Bedtime       metoprolol tartrate (LOPRESSOR) 25 MG tablet Take 6.25 mg by mouth 2 times daily       multivitamin w/minerals (THERA-VIT-M) tablet Take 1 tablet by mouth daily       polyethylene glycol (MIRALAX) 17 g packet Take 1 packet by mouth daily       rivaroxaban ANTICOAGULANT (XARELTO) 20 MG TABS tablet Take 20 mg by mouth daily (with dinner)       saline nasal (AYR SALINE) GEL topical gel Apply into each nare 2 times daily       sertraline (ZOLOFT) 25 MG tablet Take 100 mg by mouth daily       STATIN NOT PRESCRIBED (INTENTIONAL) Please choose reason not prescribed from choices below.       trolamine salicylate (ASPERCREME) 10 % external cream Apply topically  "3 times daily         REVIEW OF SYSTEMS: Limited secondary to cognitive impairment but today pt reports the above.    Objective: /67   Pulse 63   Temp 98.4  F (36.9  C)   Resp 16   Ht 1.676 m (5' 6\")   Wt 71.7 kg (158 lb)   SpO2 96%   BMI 25.50 kg/m    Exam:  GENERAL APPEARANCE: Alert, in no distress, cooperative.   ENT: Mouth/posterior oropharynx intact w/ moist mucous membranes, hearing acuity [place].  EYES: EOM, conjunctivae, lids, pupils and irises normal, PERRL2.   RESP: Respiratory effort unlabored, no respiratory distress, On RA.   CV: Edema 0+ BLE. Peripheral pulses are 2+\.  ABDOMEN: Normal bowel sounds, soft, non-tender abdomen, and no masses palpated.  SKIN: Inspection/Palpation of skin and subcutaneous tissue baseline w/ fragility. No wounds/rashes noted except some mild excoriations on BLE.  NEURO: CN II-XII at patient's baseline, sensation baseline.  PSYCH: Insight, judgement, and memory are at baseline, affect and mood are happy/engaged.    Labs: Labs done in facility are in EPIC. Please refer to them using [employer]Care Everywhere.    ASSESSMENT/PLAN:  Late effects of cerebral ischemic stroke  Obstructive uropathy  Urinary retention with incomplete bladder emptying  History of UTI  Elena catheter in place  Acute on chronic. Ongoing.    Given UTI has been cleared, and fill has completely responded well to other care status post CVA, will trial Elena removal.    Given aphasia, will order PVR every shift and straight cathing should retention occur.    Want to ensure appropriate intake and output, and would like filled to have help with toileting and teaching, as ordered below.    If Elena requires replacement, may consider Flomax dosing or other care should this trial void not be successful.  Follow up routinely or as needed.    Orders:  1. On 12/29, remove Elena @ 0600.   2. PVR Qshift x 3 days.   3. If PVR >350mL, may straight cath x2, if 3x, may reinsert elena and notify PCP.   4. QShift I/O x " 24 hours.  5. Teaching around voiding, urinal, calling for assistance.   Dx: urinary retention, frequent UTIs.     Electronically signed by:  CAROLANN Pepe CNP [facility]          Sincerely,        CAROLANN Sheldon CNP

## 2022-01-01 ENCOUNTER — LAB REQUISITION (OUTPATIENT)
Dept: LAB | Facility: CLINIC | Age: 77
End: 2022-01-01
Payer: MEDICARE

## 2022-01-01 ENCOUNTER — NURSING HOME VISIT (OUTPATIENT)
Dept: GERIATRICS | Facility: CLINIC | Age: 77
End: 2022-01-01
Payer: MEDICARE

## 2022-01-01 ENCOUNTER — CLINICAL UPDATE (OUTPATIENT)
Dept: PHARMACY | Facility: CLINIC | Age: 77
End: 2022-01-01
Payer: MEDICARE

## 2022-01-01 VITALS
DIASTOLIC BLOOD PRESSURE: 71 MMHG | HEIGHT: 65 IN | BODY MASS INDEX: 28.66 KG/M2 | TEMPERATURE: 98.8 F | HEART RATE: 81 BPM | OXYGEN SATURATION: 97 % | WEIGHT: 172 LBS | SYSTOLIC BLOOD PRESSURE: 113 MMHG | RESPIRATION RATE: 16 BRPM

## 2022-01-01 VITALS
BODY MASS INDEX: 26.66 KG/M2 | HEIGHT: 65 IN | WEIGHT: 160 LBS | TEMPERATURE: 98.7 F | SYSTOLIC BLOOD PRESSURE: 124 MMHG | DIASTOLIC BLOOD PRESSURE: 90 MMHG | HEART RATE: 88 BPM | RESPIRATION RATE: 16 BRPM | OXYGEN SATURATION: 97 %

## 2022-01-01 VITALS
RESPIRATION RATE: 18 BRPM | OXYGEN SATURATION: 98 % | DIASTOLIC BLOOD PRESSURE: 75 MMHG | SYSTOLIC BLOOD PRESSURE: 115 MMHG | HEIGHT: 65 IN | TEMPERATURE: 98.7 F | HEART RATE: 81 BPM | WEIGHT: 166 LBS | BODY MASS INDEX: 27.66 KG/M2

## 2022-01-01 VITALS
TEMPERATURE: 97.4 F | OXYGEN SATURATION: 98 % | BODY MASS INDEX: 26.82 KG/M2 | WEIGHT: 161 LBS | HEIGHT: 65 IN | RESPIRATION RATE: 15 BRPM | SYSTOLIC BLOOD PRESSURE: 113 MMHG | DIASTOLIC BLOOD PRESSURE: 78 MMHG | HEART RATE: 87 BPM

## 2022-01-01 DIAGNOSIS — R52 ACUTE PAIN: ICD-10-CM

## 2022-01-01 DIAGNOSIS — F02.818 EARLY ONSET ALZHEIMER'S DISEASE WITH BEHAVIORAL DISTURBANCE (H): ICD-10-CM

## 2022-01-01 DIAGNOSIS — R47.01 EXPRESSIVE APHASIA: ICD-10-CM

## 2022-01-01 DIAGNOSIS — K21.00 GASTROESOPHAGEAL REFLUX DISEASE WITH ESOPHAGITIS WITHOUT HEMORRHAGE: ICD-10-CM

## 2022-01-01 DIAGNOSIS — R53.81 MALAISE: ICD-10-CM

## 2022-01-01 DIAGNOSIS — M62.81 GENERALIZED MUSCLE WEAKNESS: ICD-10-CM

## 2022-01-01 DIAGNOSIS — N13.9 OBSTRUCTIVE UROPATHY: ICD-10-CM

## 2022-01-01 DIAGNOSIS — R31.9 HEMATURIA, UNSPECIFIED: ICD-10-CM

## 2022-01-01 DIAGNOSIS — Z97.8 FOLEY CATHETER IN PLACE: ICD-10-CM

## 2022-01-01 DIAGNOSIS — I48.91 ATRIAL FIBRILLATION, UNSPECIFIED TYPE (H): ICD-10-CM

## 2022-01-01 DIAGNOSIS — I51.89 DIASTOLIC DYSFUNCTION: ICD-10-CM

## 2022-01-01 DIAGNOSIS — Z87.440 HISTORY OF UTI: ICD-10-CM

## 2022-01-01 DIAGNOSIS — I69.30 LATE EFFECTS OF CEREBRAL ISCHEMIC STROKE: ICD-10-CM

## 2022-01-01 DIAGNOSIS — I10 ESSENTIAL (PRIMARY) HYPERTENSION: ICD-10-CM

## 2022-01-01 DIAGNOSIS — G81.91 RIGHT HEMIPARESIS (H): Primary | ICD-10-CM

## 2022-01-01 DIAGNOSIS — F41.1 ANXIETY STATE: ICD-10-CM

## 2022-01-01 DIAGNOSIS — I63.9 CEREBRAL INFARCTION, UNSPECIFIED (H): ICD-10-CM

## 2022-01-01 DIAGNOSIS — I48.21 PERMANENT ATRIAL FIBRILLATION (H): ICD-10-CM

## 2022-01-01 DIAGNOSIS — R33.9 RETENTION OF URINE, UNSPECIFIED: ICD-10-CM

## 2022-01-01 DIAGNOSIS — I63.532 CEREBRAL INFARCTION DUE TO UNSPECIFIED OCCLUSION OR STENOSIS OF LEFT POSTERIOR CEREBRAL ARTERY (H): ICD-10-CM

## 2022-01-01 DIAGNOSIS — G30.0 EARLY ONSET ALZHEIMER'S DISEASE WITH BEHAVIORAL DISTURBANCE (H): ICD-10-CM
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
## 2022-01-01 DIAGNOSIS — N30.01 ACUTE CYSTITIS WITH HEMATURIA: ICD-10-CM

## 2022-01-01 DIAGNOSIS — N31.9 NEUROGENIC BLADDER: ICD-10-CM

## 2022-01-01 DIAGNOSIS — I10 ESSENTIAL HYPERTENSION: ICD-10-CM

## 2022-01-01 DIAGNOSIS — R33.9 URINARY RETENTION WITH INCOMPLETE BLADDER EMPTYING: ICD-10-CM

## 2022-01-01 DIAGNOSIS — R33.9 URINARY RETENTION WITH INCOMPLETE BLADDER EMPTYING: Primary | ICD-10-CM

## 2022-01-01 DIAGNOSIS — G81.91 RIGHT HEMIPARESIS (H): ICD-10-CM

## 2022-01-01 DIAGNOSIS — I69.30 LATE EFFECTS OF CEREBRAL ISCHEMIC STROKE: Primary | ICD-10-CM

## 2022-01-01 DIAGNOSIS — R30.0 DYSURIA: ICD-10-CM

## 2022-01-01 LAB
ALBUMIN UR-MCNC: 100 MG/DL
ANION GAP SERPL CALCULATED.3IONS-SCNC: 1 MMOL/L (ref 3–14)
ANION GAP SERPL CALCULATED.3IONS-SCNC: 5 MMOL/L (ref 3–14)
APPEARANCE UR: ABNORMAL
BACTERIA #/AREA URNS HPF: ABNORMAL /HPF
BACTERIA UR CULT: ABNORMAL
BACTERIA UR CULT: NORMAL
BACTERIA UR CULT: NORMAL
BASOPHILS # BLD AUTO: 0 10E3/UL (ref 0–0.2)
BASOPHILS NFR BLD AUTO: 0 %
BILIRUB UR QL STRIP: NEGATIVE
BUN SERPL-MCNC: 18 MG/DL (ref 7–30)
BUN SERPL-MCNC: 19 MG/DL (ref 7–30)
CALCIUM SERPL-MCNC: 8.6 MG/DL (ref 8.5–10.1)
CALCIUM SERPL-MCNC: 8.7 MG/DL (ref 8.5–10.1)
CAOX CRY #/AREA URNS HPF: ABNORMAL /HPF
CAOX CRY #/AREA URNS HPF: ABNORMAL /HPF
CHLORIDE BLD-SCNC: 105 MMOL/L (ref 94–109)
CHLORIDE BLD-SCNC: 109 MMOL/L (ref 94–109)
CHOLEST SERPL-MCNC: 111 MG/DL
CO2 SERPL-SCNC: 29 MMOL/L (ref 20–32)
CO2 SERPL-SCNC: 31 MMOL/L (ref 20–32)
COLOR UR AUTO: ABNORMAL
CREAT SERPL-MCNC: 0.81 MG/DL (ref 0.66–1.25)
CREAT SERPL-MCNC: 0.9 MG/DL (ref 0.66–1.25)
EOSINOPHIL # BLD AUTO: 0.2 10E3/UL (ref 0–0.7)
EOSINOPHIL NFR BLD AUTO: 2 %
ERYTHROCYTE [DISTWIDTH] IN BLOOD BY AUTOMATED COUNT: 14.6 % (ref 10–15)
ERYTHROCYTE [DISTWIDTH] IN BLOOD BY AUTOMATED COUNT: 15 % (ref 10–15)
FASTING STATUS PATIENT QL REPORTED: NO
GFR SERPL CREATININE-BSD FRML MDRD: 89 ML/MIN/1.73M2
GFR SERPL CREATININE-BSD FRML MDRD: 90 ML/MIN/1.73M2
GLUCOSE BLD-MCNC: 65 MG/DL (ref 70–99)
GLUCOSE BLD-MCNC: 78 MG/DL (ref 70–99)
GLUCOSE UR STRIP-MCNC: NEGATIVE MG/DL
HBA1C MFR BLD: 5.6 % (ref 0–5.6)
HCT VFR BLD AUTO: 38.1 % (ref 40–53)
HCT VFR BLD AUTO: 42.5 % (ref 40–53)
HDLC SERPL-MCNC: 42 MG/DL
HGB BLD-MCNC: 12.3 G/DL (ref 13.3–17.7)
HGB BLD-MCNC: 13.8 G/DL (ref 13.3–17.7)
HGB UR QL STRIP: ABNORMAL
IMM GRANULOCYTES # BLD: 0 10E3/UL
IMM GRANULOCYTES NFR BLD: 0 %
KETONES UR STRIP-MCNC: NEGATIVE MG/DL
LDLC SERPL CALC-MCNC: 60 MG/DL
LEUKOCYTE ESTERASE UR QL STRIP: ABNORMAL
LYMPHOCYTES # BLD AUTO: 0.9 10E3/UL (ref 0.8–5.3)
LYMPHOCYTES NFR BLD AUTO: 10 %
MCH RBC QN AUTO: 30.4 PG (ref 26.5–33)
MCH RBC QN AUTO: 30.9 PG (ref 26.5–33)
MCHC RBC AUTO-ENTMCNC: 32.3 G/DL (ref 31.5–36.5)
MCHC RBC AUTO-ENTMCNC: 32.5 G/DL (ref 31.5–36.5)
MCV RBC AUTO: 94 FL (ref 78–100)
MCV RBC AUTO: 95 FL (ref 78–100)
MONOCYTES # BLD AUTO: 1.1 10E3/UL (ref 0–1.3)
MONOCYTES NFR BLD AUTO: 11 %
MUCOUS THREADS #/AREA URNS LPF: PRESENT /LPF
NEUTROPHILS # BLD AUTO: 7.4 10E3/UL (ref 1.6–8.3)
NEUTROPHILS NFR BLD AUTO: 77 %
NITRATE UR QL: NEGATIVE
NONHDLC SERPL-MCNC: 69 MG/DL
NRBC # BLD AUTO: 0 10E3/UL
NRBC BLD AUTO-RTO: 0 /100
PH UR STRIP: 5 [PH] (ref 5–7)
PH UR STRIP: 6 [PH] (ref 5–7)
PH UR STRIP: 9 [PH] (ref 5–7)
PLATELET # BLD AUTO: 219 10E3/UL (ref 150–450)
PLATELET # BLD AUTO: 220 10E3/UL (ref 150–450)
POTASSIUM BLD-SCNC: 3.7 MMOL/L (ref 3.4–5.3)
POTASSIUM BLD-SCNC: 4.2 MMOL/L (ref 3.4–5.3)
RBC # BLD AUTO: 4.04 10E6/UL (ref 4.4–5.9)
RBC # BLD AUTO: 4.46 10E6/UL (ref 4.4–5.9)
RBC URINE: 33 /HPF
RBC URINE: >182 /HPF
RBC URINE: >182 /HPF
SODIUM SERPL-SCNC: 139 MMOL/L (ref 133–144)
SODIUM SERPL-SCNC: 141 MMOL/L (ref 133–144)
SP GR UR STRIP: 1.01 (ref 1–1.03)
SP GR UR STRIP: 1.02 (ref 1–1.03)
SP GR UR STRIP: 1.02 (ref 1–1.03)
TRIGL SERPL-MCNC: 46 MG/DL
TSH SERPL DL<=0.005 MIU/L-ACNC: 2.39 MU/L (ref 0.4–4)
UROBILINOGEN UR STRIP-MCNC: 2 MG/DL
UROBILINOGEN UR STRIP-MCNC: NORMAL MG/DL
UROBILINOGEN UR STRIP-MCNC: NORMAL MG/DL
WBC # BLD AUTO: 6 10E3/UL (ref 4–11)
WBC # BLD AUTO: 9.6 10E3/UL (ref 4–11)
WBC CLUMPS #/AREA URNS HPF: PRESENT /HPF
WBC CLUMPS #/AREA URNS HPF: PRESENT /HPF
WBC URINE: 167 /HPF
WBC URINE: >182 /HPF
WBC URINE: >182 /HPF
YEAST #/AREA URNS HPF: ABNORMAL /HPF
YEAST #/AREA URNS HPF: ABNORMAL /HPF

## 2022-01-01 PROCEDURE — 85027 COMPLETE CBC AUTOMATED: CPT | Mod: ORL | Performed by: FAMILY MEDICINE

## 2022-01-01 PROCEDURE — 81001 URINALYSIS AUTO W/SCOPE: CPT | Mod: ORL | Performed by: NURSE PRACTITIONER

## 2022-01-01 PROCEDURE — 87088 URINE BACTERIA CULTURE: CPT | Mod: ORL | Performed by: NURSE PRACTITIONER

## 2022-01-01 PROCEDURE — 99207 PR NO CHARGE LOS: CPT | Performed by: PHARMACIST

## 2022-01-01 PROCEDURE — 84443 ASSAY THYROID STIM HORMONE: CPT | Mod: ORL | Performed by: FAMILY MEDICINE

## 2022-01-01 PROCEDURE — 99309 SBSQ NF CARE MODERATE MDM 30: CPT | Performed by: FAMILY MEDICINE

## 2022-01-01 PROCEDURE — 85025 COMPLETE CBC W/AUTO DIFF WBC: CPT | Mod: ORL | Performed by: NURSE PRACTITIONER

## 2022-01-01 PROCEDURE — 80048 BASIC METABOLIC PNL TOTAL CA: CPT | Mod: ORL | Performed by: FAMILY MEDICINE

## 2022-01-01 PROCEDURE — 87086 URINE CULTURE/COLONY COUNT: CPT | Mod: ORL | Performed by: NURSE PRACTITIONER

## 2022-01-01 PROCEDURE — P9603 ONE-WAY ALLOW PRORATED MILES: HCPCS | Mod: ORL | Performed by: FAMILY MEDICINE

## 2022-01-01 PROCEDURE — 99309 SBSQ NF CARE MODERATE MDM 30: CPT | Performed by: NURSE PRACTITIONER

## 2022-01-01 PROCEDURE — 83036 HEMOGLOBIN GLYCOSYLATED A1C: CPT | Mod: ORL | Performed by: FAMILY MEDICINE

## 2022-01-01 PROCEDURE — 80061 LIPID PANEL: CPT | Mod: ORL | Performed by: FAMILY MEDICINE

## 2022-01-01 PROCEDURE — 80048 BASIC METABOLIC PNL TOTAL CA: CPT | Mod: ORL | Performed by: NURSE PRACTITIONER

## 2022-01-01 PROCEDURE — 99318 PR ANNUAL NURSING FAC ASSESSMNT, STABLE: CPT | Performed by: NURSE PRACTITIONER

## 2022-01-01 PROCEDURE — 36415 COLL VENOUS BLD VENIPUNCTURE: CPT | Mod: ORL | Performed by: FAMILY MEDICINE

## 2022-01-01 RX ORDER — MORPHINE SULFATE 10 MG/5ML
SOLUTION ORAL
Refills: 0
Start: 2022-01-01 | End: 2022-01-01

## 2022-01-01 RX ORDER — FAMOTIDINE 20 MG/1
20 TABLET, FILM COATED ORAL 2 TIMES DAILY
Start: 2022-01-01

## 2022-01-01 ASSESSMENT — MIFFLIN-ST. JEOR: SCORE: 1387.64

## 2022-01-11 NOTE — PROGRESS NOTES
[facility] Annual Physical  Chief Complaint   Patient presents with     Annual Comprehensive Nursing Home    [place] MRN: [id number] Place of Service where encounter took place:  [facility] () [[id number]] HPI: Simone Daniels  is a 73 year old  (5/26/1948), who is being seen today for an annual comprehensive visit. HPI information obtained from: facility chart records, facility staff, patient report and [facility] chart review.  Today's concerns are:    Jay seen today for an annual physical exam.  He remains grossly nonverbal, but can shake head yes and no and moderate answers as well.  He denies pain, but rubs his knees at times for which he uses Aspercreme.  He denies shortness of breath, cough, fever.  Previous Flores removals have not been effective, prompting the start of Flomax which he has utilized for 2 weeks.  He is sleeping well, and has a good appetite.    ALLERGIES: Patient has no known allergies.PAST MEDICAL HISTORY:  has a past medical history of Cerebral infarction (H) and LOW BACK PAIN.PAST SURGICAL HISTORY:  has a past surgical history that includes surgical history of -  (1979); surgical history of - ; surgical history of -  (1981); and IR Gastrostomy Tube Percutaneous Plcmnt (12/3/2020).  IMMUNIZATIONS:  Immunization History   Administered Date(s) Administered     COVID-19,PF,Moderna 01/28/2021, 02/24/2021     FLU 6-35 months 09/26/2011     FLUAD(HD)65+ QUAD 10/13/2021     Historical DTP/aP 02/06/1997     Influenza (IIV3) PF 09/26/2011     Influenza Quad, Recombinant, pf(RIV4) (Flublok) 10/13/2021     Influenza, Quad, High Dose, Pf, 65yr+ (Fluzone HD) 12/23/2020     Mantoux Tuberculin Skin Test 12/11/2018, 12/21/2018     Pneumo Conj 13-V (2010&after) 12/19/2018     Pneumococcal 23 valent 09/26/2011     TD (ADULT, 7+) 09/26/2011     Td (Adult), Adsorbed 02/05/1997, 09/25/2011    Above immunizations pulled from [facility]. [facility] and facility records also reconciled. Outstanding  information sent to  to update [facility].  Future immunizations are not needed at this point as all recommended immunizations are up to date, except TDap booster.  Current Outpatient Medications   Medication Sig Dispense Refill     acetaminophen (TYLENOL) 500 MG tablet Take 1,000 mg by mouth 3 times daily as needed       metoprolol tartrate (LOPRESSOR) 25 MG tablet Take 6.25 mg by mouth 2 times daily       multivitamin w/minerals (THERA-VIT-M) tablet Take 1 tablet by mouth daily       polyethylene glycol (MIRALAX) 17 g packet Take 1 packet by mouth daily       rivaroxaban ANTICOAGULANT (XARELTO) 20 MG TABS tablet Take 20 mg by mouth daily (with dinner)       saline nasal (AYR SALINE) GEL topical gel Apply into each nare 2 times daily       sertraline (ZOLOFT) 25 MG tablet Take 100 mg by mouth daily       STATIN NOT PRESCRIBED (INTENTIONAL) Please choose reason not prescribed from choices below.       trolamine salicylate (ASPERCREME) 10 % external cream Apply topically 3 times daily       Case Management: I have reviewed the facility/SNF care plan/MDS, including the falls risk, nutrition and pain screening. I also reviewed the current immunizations, and preventive care. .Future cancer screening is not clinically indicated secondary to age/goals of care Patient's desire to return to the community is not assessible due to cognitive impairment. Current Level of Care is appropriate.    Advance Directive Discussion: I reviewed the current advanced directives as reflected in EPIC, the POLST and the facility chart, and verified the congruency of orders. I contacted the first party [employer] [place] and left a message regarding the plan of Care.  I did not due to cognitive impairment review the advance directives with the resident.     Team Discussion: I communicated with the appropriate disciplines involved with the Plan of Care: Nursing  .Patient's goal is unobtainable secondary to cognitive  "impairment.Information reviewed: Medications, vital signs, orders, and nursing notes.    ROS: Unobtainable secondary to cognitive impairment and unobtainable secondary to aphasia, but sometimes able to answer yes/no questions as noted above.     Vitals: BP (!) 124/90   Pulse 88   Temp 98.7  F (37.1  C)   Resp 16   Ht 1.651 m (5' 5\")   Wt 72.6 kg (160 lb)   SpO2 97%   BMI 26.63 kg/m     BPs:    Exam:  GENERAL APPEARANCE: Alert, in no distress, cooperative.   ENT: Mouth/posterior oropharynx intact w/ moist mucous membranes, hearing acuity [place].  EYES: EOM, conjunctivae, lids, pupils and irises normal, PERRL2.   RESP: Respiratory effort unlabored, no respiratory distress, Lung sounds clear. On RA.   CV: Auscultation of heart reveals S1, S2, rate controlled and rhythm irregular, no murmur, no rub or gallop, Edema 0+ BLE. Peripheral pulses are 2+.  ABDOMEN: Normal bowel sounds, soft, non-tender abdomen, and no masses palpated.  SKIN: Inspection/Palpation of skin and subcutaneous tissue baseline w/ fragility. No wounds/rashes noted.   NEURO: CN II-XII at patient's baseline, sensation baseline PPS.  PSYCH: Insight, judgement, and memory are baseline impaired, affect and mood are happy/engaged.    Lab/Diagnostic data: Recent labs in [facility] reviewed by me today.     ASSESSMENT/PLAN  Right hemiparesis (H)  Late effects of cerebral ischemic stroke  Expressive aphasia  Permanent atrial fibrillation (H)  Obstructive uropathy  Urinary retention with incomplete bladder emptying  Flores catheter in place  History of UTI  Essential hypertension   Chronic. Ongoing.    Will simplify plan of care and discontinue melatonin, as Jay is on a very low dose and he has been sleeping well.    Given 2 weeks of Flomax dosing, will again trial Flores removal, order PVRs and have contingency plan should urinary retention be significant once again.    Will obtain evaluation for thyroid, hyperlipidemia, diabetes type 2, anemia and electrolyte " evaluation, through an annual blood screening as noted below.    Attempt made to reach out to guardian by phone to confirm or discuss cancer screenings, CODE STATUS, and message left with them.  Given we could not coordinate with them, will not delay care and order for colorectal cancer screening through noninvasive means via FIT testing.  We will also perform one-time AAA screening with ultrasound as noted below.    Will have Jay remain a DNR/DNI given his previous POLST on record and will not change unless guardian conveys otherwise.    Tdap booster is due given to last stated booster in 2011.  Will order to maintain vaccination schedule.  Follow up routinely or as needed.    Orders:  1. Discontinue Melatonin.  2. On 1/12 @ 0600, discontinue Elena.  3. Q2h toileting and urinal teaching.  4. PVR Qshift x 48 hours.   5. If PVR >350mL, may straight cath x 2. If 3rd needed, reinsert elena.   6. CBC, BMP, TSH, a1c, lipids x1 on 1/12. Dx: annual physical.  7. gFOBT stool test x1, routine. Dx: CRC screening.   8. Abdominal US x1, routine. Dx: AAA screening.   9. TDap booster, IM x1. Routine. Dx: vaccination.     Electronically signed by:  Dr. Gabriela Borrero, APRN CNP DNP

## 2022-01-11 NOTE — LETTER
1/11/2022        RE: Simone Daniels  [employer]  [address]  [place] [address]        [facility] Annual Physical  Chief Complaint   Patient presents with     Annual Comprehensive Nursing Home    [place] MRN: [id number] Place of Service where encounter took place:  [facility] () [[id number]] HPI: Simone Daniels  is a 73 year old  (5/26/1948), who is being seen today for an annual comprehensive visit. HPI information obtained from: facility chart records, facility staff, patient report and [facility] chart review.  Today's concerns are:    Jay seen today for an annual physical exam.  He remains grossly nonverbal, but can shake head yes and no and moderate answers as well.  He denies pain, but rubs his knees at times for which he uses Aspercreme.  He denies shortness of breath, cough, fever.  Previous Flores removals have not been effective, prompting the start of Flomax which he has utilized for 2 weeks.  He is sleeping well, and has a good appetite.    ALLERGIES: Patient has no known allergies.PAST MEDICAL HISTORY:  has a past medical history of Cerebral infarction (H) and LOW BACK PAIN.PAST SURGICAL HISTORY:  has a past surgical history that includes surgical history of -  (1979); surgical history of - ; surgical history of -  (1981); and IR Gastrostomy Tube Percutaneous Plcmnt (12/3/2020).  IMMUNIZATIONS:  Immunization History   Administered Date(s) Administered     COVID-19,PF,Moderna 01/28/2021, 02/24/2021     FLU 6-35 months 09/26/2011     FLUAD(HD)65+ QUAD 10/13/2021     Historical DTP/aP 02/06/1997     Influenza (IIV3) PF 09/26/2011     Influenza Quad, Recombinant, pf(RIV4) (Flublok) 10/13/2021     Influenza, Quad, High Dose, Pf, 65yr+ (Fluzone HD) 12/23/2020     Mantoux Tuberculin Skin Test 12/11/2018, 12/21/2018     Pneumo Conj 13-V (2010&after) 12/19/2018     Pneumococcal 23 valent 09/26/2011     TD (ADULT, 7+) 09/26/2011     Td (Adult), Adsorbed 02/05/1997,  09/25/2011    Above immunizations pulled from [facility]. MIIC and facility records also reconciled. Outstanding information sent to  to update [facility].  Future immunizations are not needed at this point as all recommended immunizations are up to date, except TDap booster.  Current Outpatient Medications   Medication Sig Dispense Refill     acetaminophen (TYLENOL) 500 MG tablet Take 1,000 mg by mouth 3 times daily as needed       metoprolol tartrate (LOPRESSOR) 25 MG tablet Take 6.25 mg by mouth 2 times daily       multivitamin w/minerals (THERA-VIT-M) tablet Take 1 tablet by mouth daily       polyethylene glycol (MIRALAX) 17 g packet Take 1 packet by mouth daily       rivaroxaban ANTICOAGULANT (XARELTO) 20 MG TABS tablet Take 20 mg by mouth daily (with dinner)       saline nasal (AYR SALINE) GEL topical gel Apply into each nare 2 times daily       sertraline (ZOLOFT) 25 MG tablet Take 100 mg by mouth daily       STATIN NOT PRESCRIBED (INTENTIONAL) Please choose reason not prescribed from choices below.       trolamine salicylate (ASPERCREME) 10 % external cream Apply topically 3 times daily       Case Management: I have reviewed the facility/SNF care plan/MDS, including the falls risk, nutrition and pain screening. I also reviewed the current immunizations, and preventive care. .Future cancer screening is not clinically indicated secondary to age/goals of care Patient's desire to return to the community is not assessible due to cognitive impairment. Current Level of Care is appropriate.    Advance Directive Discussion: I reviewed the current advanced directives as reflected in EPIC, the POLST and the facility chart, and verified the congruency of orders. I contacted the first party [employer] [place] and left a message regarding the plan of Care.  I did not due to cognitive impairment review the advance directives with the resident.     Team Discussion: I communicated with the appropriate  "disciplines involved with the Plan of Care: Nursing  .Patient's goal is unobtainable secondary to cognitive impairment.Information reviewed: Medications, vital signs, orders, and nursing notes.    ROS: Unobtainable secondary to cognitive impairment and unobtainable secondary to aphasia, but sometimes able to answer yes/no questions as noted above.     Vitals: BP (!) 124/90   Pulse 88   Temp 98.7  F (37.1  C)   Resp 16   Ht 1.651 m (5' 5\")   Wt 72.6 kg (160 lb)   SpO2 97%   BMI 26.63 kg/m     BPs:    Exam:  GENERAL APPEARANCE: Alert, in no distress, cooperative.   ENT: Mouth/posterior oropharynx intact w/ moist mucous membranes, hearing acuity [place].  EYES: EOM, conjunctivae, lids, pupils and irises normal, PERRL2.   RESP: Respiratory effort unlabored, no respiratory distress, Lung sounds clear. On RA.   CV: Auscultation of heart reveals S1, S2, rate controlled and rhythm irregular, no murmur, no rub or gallop, Edema 0+ BLE. Peripheral pulses are 2+.  ABDOMEN: Normal bowel sounds, soft, non-tender abdomen, and no masses palpated.  SKIN: Inspection/Palpation of skin and subcutaneous tissue baseline w/ fragility. No wounds/rashes noted.   NEURO: CN II-XII at patient's baseline, sensation baseline PPS.  PSYCH: Insight, judgement, and memory are baseline impaired, affect and mood are happy/engaged.    Lab/Diagnostic data: Recent labs in [facility] reviewed by me today.     ASSESSMENT/PLAN  Right hemiparesis (H)  Late effects of cerebral ischemic stroke  Expressive aphasia  Permanent atrial fibrillation (H)  Obstructive uropathy  Urinary retention with incomplete bladder emptying  Flores catheter in place  History of UTI  Essential hypertension   Chronic. Ongoing.    Will simplify plan of care and discontinue melatonin, as Jay is on a very low dose and he has been sleeping well.    Given 2 weeks of Flomax dosing, will again trial Flores removal, order PVRs and have contingency plan should urinary retention be significant " once again.    Will obtain evaluation for thyroid, hyperlipidemia, diabetes type 2, anemia and electrolyte evaluation, through an annual blood screening as noted below.    Attempt made to reach out to guardian by phone to confirm or discuss cancer screenings, CODE STATUS, and message left with them.  Given we could not coordinate with them, will not delay care and order for colorectal cancer screening through noninvasive means via FIT testing.  We will also perform one-time AAA screening with ultrasound as noted below.    Will have Jay remain a DNR/DNI given his previous POLST on record and will not change unless guardian conveys otherwise.    Tdap booster is due given to last stated booster in 2011.  Will order to maintain vaccination schedule.  Follow up routinely or as needed.    Orders:  1. Discontinue Melatonin.  2. On 1/12 @ 0600, discontinue Elena.  3. Q2h toileting and urinal teaching.  4. PVR Qshift x 48 hours.   5. If PVR >350mL, may straight cath x 2. If 3rd needed, reinsert elena.   6. CBC, BMP, TSH, a1c, lipids x1 on 1/12. Dx: annual physical.  7. gFOBT stool test x1, routine. Dx: CRC screening.   8. Abdominal US x1, routine. Dx: AAA screening.   9. TDap booster, IM x1. Routine. Dx: vaccination.     Electronically signed by:  Dr. Gabriela Borrero, CAROLANN CNP [facility]              Sincerely,        CAROLANN Sheldon CNP

## 2022-01-14 PROBLEM — F02.818 EARLY ONSET ALZHEIMER'S DISEASE WITH BEHAVIORAL DISTURBANCE (H): Status: RESOLVED | Noted: 2022-01-01 | Resolved: 2022-01-01

## 2022-01-14 PROBLEM — G30.0 EARLY ONSET ALZHEIMER'S DISEASE WITH BEHAVIORAL DISTURBANCE (H): Status: RESOLVED | Noted: 2022-01-01 | Resolved: 2022-01-01

## 2022-01-14 PROBLEM — G81.91 RIGHT HEMIPARESIS (H): Status: ACTIVE | Noted: 2022-01-01

## 2022-01-14 PROBLEM — F02.818 EARLY ONSET ALZHEIMER'S DISEASE WITH BEHAVIORAL DISTURBANCE (H): Status: ACTIVE | Noted: 2022-01-01

## 2022-01-14 PROBLEM — Z93.1 PEG (PERCUTANEOUS ENDOSCOPIC GASTROSTOMY) STATUS (H): Status: RESOLVED | Noted: 2021-01-01 | Resolved: 2022-01-01

## 2022-01-14 PROBLEM — G30.0 EARLY ONSET ALZHEIMER'S DISEASE WITH BEHAVIORAL DISTURBANCE (H): Status: ACTIVE | Noted: 2022-01-01

## 2022-03-14 NOTE — LETTER
3/14/2022        RE: Simone Daniels  [employer]  [address]  [facility] [address]        [employer] [facility]  Chief Complaint   Patient presents with     long term Regulatory     [place] Medical Record Number:  [id number]  Place of Service where encounter took place:  [facility] () [[id number]]    HPI:    Simone Daniels  is 77 year old (5/26/1944), who is being seen today for a federally mandated E/M visit.  HPI information obtained from: facility chart records, facility staff, patient report and [facility] chart review.     Today's concerns are:   - Resident seen and examined.endorses pain the epigastrium and behind the sternum Denies Nausea, vomiting.  Endorses sleep, appetite and BM are fine.   - DNP and nursing staff have no concern  -------------------------------  - - Past Medical, social, family histories, medications, and allergies reviewed and updated  - Medications reviewed: in the chart and EHR.   - Case Management:   I have reviewed the care plan and MDS and do agree with the plan. Patient's desire to return to the community is not present.  Information reviewed:  Medications, vital signs, orders, and nursing notes.    MEDICATIONS:  Current Outpatient Medications   Medication Sig Dispense Refill     acetaminophen (TYLENOL) 500 MG tablet Take 1,000 mg by mouth 3 times daily as needed       metoprolol tartrate (LOPRESSOR) 25 MG tablet Take 6.25 mg by mouth 2 times daily       multivitamin w/minerals (THERA-VIT-M) tablet Take 1 tablet by mouth daily       polyethylene glycol (MIRALAX) 17 g packet Take 1 packet by mouth daily       rivaroxaban ANTICOAGULANT (XARELTO) 20 MG TABS tablet Take 20 mg by mouth daily (with dinner)       saline nasal (AYR SALINE) GEL topical gel Apply into each nare 2 times daily       sertraline (ZOLOFT) 25 MG tablet Take 100 mg by mouth daily       STATIN NOT PRESCRIBED (INTENTIONAL) Please choose reason not prescribed from choices  "below.       trolamine salicylate (ASPERCREME) 10 % external cream Apply topically 3 times daily       ROS: Limited secondary to aphasia impairment but today pt endorses- see HPI    Vitals:  /75   Pulse 81   Temp 98.7  F (37.1  C)   Resp 18   Ht 1.651 m (5' 5\")   Wt 75.3 kg (166 lb)   SpO2 98%   BMI 27.62 kg/m    Body mass index is 27.62 kg/m .  Exam:  GENERAL APPEARANCE:  in no distress, cooperative  RESP:  lungs clear to auscultation   CV:  S1S2 audible, regular HR, no murmur appreciated.   ABDOMEN:  soft, NT/ND, BS audible. no mass appreciated on palpation.   M/S:   stiff lower extremities. Arthritis deformity in left knee.   SKIN:   Elena's catheter in place  NEURO:  Humming and hand movements, no wording.   PSYCH: fair insight, judgement and memory, affect and mood elated     Lab/Diagnostic data: Reviewed in the chart and EHR.        ASSESSMENT/PLAN  Hx of right parietal infarction with hemorrhagic conversion (2018)  Hx of subacute infarction with petechial hemorrhage  Expressive aphasia as a late effect of CVA  - LDL 69, hence statin was not initiated.   - ambulates independently        Chronic A-fib (H)  GIII  Diastolic dysfunction with enlarged bilateral atrium  -  Clinically compensated.   - CVR. On metoprolol tartrate 6.25 mg bid  - on  Xarelto.     GERD, query gastritis 2/2 Xarelto: Will start famotidine 20 mg bid.      B/L knee OA:stable.      Dementia, query early onset Alzheimer disease (H)  - MOCA 12/20 (Nov 2020).   - Continue to anticipate needs. Chronic condition, ongoing decline expected.   -  Continue to provide redirection and reassurance as needed. Maintain safe living situation with goals focused on comfort.  - Guardian assigned.         Urinary retention with incomplete bladder emptying 2/2 mechanical obstruction  Failed multiple PVR,  With chronic elena's catheter  Hx of CAUTI  -  Routine catheter care. Follow [facility] minimal criteria for checking UA/UCx      Hx of severe Dysphagia " s/p PEG tube: resolved. PEG tube removed. tolerating po intake.       Order: famotidine 20 mg bid.       Electronically signed by:  Rome Mobley MD        Sincerely,        Rome Mobley MD

## 2022-03-14 NOTE — PROGRESS NOTES
[place] GERIATRIC SERVICES  Chief Complaint   Patient presents with     long-term Regulatory     [place] Medical Record Number:  [id number]  Place of Service where encounter took place:  [facility] () [[id number]]    HPI:    Simone Daniels  is 77 year old (5/26/1944), who is being seen today for a federally mandated E/M visit.  HPI information obtained from: facility chart records, facility staff, patient report and [facility] chart review.     Today's concerns are:   - Resident seen and examined.endorses pain the epigastrium and behind the sternum Denies Nausea, vomiting.  Endorses sleep, appetite and BM are fine.   - DNP and nursing staff have no concern  -------------------------------  - - Past Medical, social, family histories, medications, and allergies reviewed and updated  - Medications reviewed: in the chart and EHR.   - Case Management:   I have reviewed the care plan and MDS and do agree with the plan. Patient's desire to return to the community is not present.  Information reviewed:  Medications, vital signs, orders, and nursing notes.    MEDICATIONS:  Current Outpatient Medications   Medication Sig Dispense Refill     acetaminophen (TYLENOL) 500 MG tablet Take 1,000 mg by mouth 3 times daily as needed       metoprolol tartrate (LOPRESSOR) 25 MG tablet Take 6.25 mg by mouth 2 times daily       multivitamin w/minerals (THERA-VIT-M) tablet Take 1 tablet by mouth daily       polyethylene glycol (MIRALAX) 17 g packet Take 1 packet by mouth daily       rivaroxaban ANTICOAGULANT (XARELTO) 20 MG TABS tablet Take 20 mg by mouth daily (with dinner)       saline nasal (AYR SALINE) GEL topical gel Apply into each nare 2 times daily       sertraline (ZOLOFT) 25 MG tablet Take 100 mg by mouth daily       STATIN NOT PRESCRIBED (INTENTIONAL) Please choose reason not prescribed from choices below.       trolamine salicylate (ASPERCREME) 10 % external cream Apply topically 3 times daily       ROS: Limited  "secondary to aphasia impairment but today pt endorses- see HPI    Vitals:  /75   Pulse 81   Temp 98.7  F (37.1  C)   Resp 18   Ht 1.651 m (5' 5\")   Wt 75.3 kg (166 lb)   SpO2 98%   BMI 27.62 kg/m    Body mass index is 27.62 kg/m .  Exam:  GENERAL APPEARANCE:  in no distress, cooperative  RESP:  lungs clear to auscultation   CV:  S1S2 audible, regular HR, no murmur appreciated.   ABDOMEN:  soft, NT/ND, BS audible. no mass appreciated on palpation.   M/S:   stiff lower extremities. Arthritis deformity in left knee.   SKIN:   Elena's catheter in place  NEURO:  Humming and hand movements, no wording.   PSYCH: fair insight, judgement and memory, affect and mood elated     Lab/Diagnostic data: Reviewed in the chart and EHR.        ASSESSMENT/PLAN  Hx of right parietal infarction with hemorrhagic conversion (2018)  Hx of subacute infarction with petechial hemorrhage  Expressive aphasia as a late effect of CVA  - LDL 69, hence statin was not initiated.   - ambulates independently        Chronic A-fib (H)  GIII  Diastolic dysfunction with enlarged bilateral atrium  -  Clinically compensated.   - CVR. On metoprolol tartrate 6.25 mg bid  - on  Xarelto.     GERD, query gastritis 2/2 Xarelto: Will start famotidine 20 mg bid.      B/L knee OA:stable.      Dementia, query early onset Alzheimer disease (H)  - MOCA 12/20 (Nov 2020).   - Continue to anticipate needs. Chronic condition, ongoing decline expected.   -  Continue to provide redirection and reassurance as needed. Maintain safe living situation with goals focused on comfort.  - Guardian assigned.         Urinary retention with incomplete bladder emptying 2/2 mechanical obstruction  Failed multiple PVR,  With chronic elena's catheter  Hx of CAUTI  -  Routine catheter care. Follow [facility] minimal criteria for checking UA/UCx      Hx of severe Dysphagia s/p PEG tube: resolved. PEG tube removed. tolerating po intake.       Order: famotidine 20 mg bid. "       Electronically signed by:  Rome Mobley MD

## 2022-03-24 NOTE — PROGRESS NOTES
This patient's medication list and chart were reviewed as part of the service provided by [facility].    Assessment/Recommendations:    No recommendations for changes at this time.  If removal of Flores trial has failed, and this is re-initiated, no need for continued tamsulosin.  Noted that patient had been on warfarin in past, and then changed to eliquis, which was then changed to xarelto (switched from eliquis to xarelto possibly due to insurance coverage?)  Xarelto has higher risk of gi bleed associated with it vs eliquis; on [place].  Continue to monitor closely for signs/symptoms of bleeding, CBC.    Tess Riggins, Pharm.D.,[facility]  Board Certified Geriatric Pharmacist  Medication Therapy Management Pharmacist  [phone]

## 2022-05-10 NOTE — LETTER
5/10/2022        RE: Simone Daniels  [employer]  [address]  [facility] [address]        [facility] Regulatory  Chief Complaint   Patient presents with     alf Regulatory   [place] MRN: [id number] Place of Service where encounter took place:  [facility] () [[id number]] HPI: Simone Daniels  is 76 year old (1945), who is being seen today for a federally mandated E/M visit.  HPI information obtained from: facility chart records, facility staff, patient report, [facility] chart review, and Care Everywhere [facility] chart review. Today's concerns are:    Jay seen today per federal mandate.  Nursing reports he fell within the last week, and has not been himself.  He has been more weak than usual, and they have encouraged the use of a walker.  He has been afebrile, with normal vital signs.  His catheter was changed and a UA/UC was ordered by myself.  Today, Jay, though nonverbal was able to point to his knees when asked about pain, which is a common spot for him.  He giggles at other questions asked for HPI/ROS, but is able to nod yes/no to several items.  He is not short of breath, does not have any stomachache/nausea, and has a good appetite.    ALLERGIES:Patient has no known allergies.PAST MEDICAL HISTORY:   has a past medical history of Cerebral infarction (H) and LOW BACK PAIN. PAST SURGICAL HISTORY:   has a past surgical history that includes surgical history of -  (1979); surgical history of - ; surgical history of -  (1981); and IR Gastrostomy Tube Percutaneous Plcmnt (12/3/2020).FAMILY HISTORY: family history includes C.A.D. in his brother and mother.SOCIAL HISTORY:  reports that he quit smoking about 42 years ago. His smoking use included cigarettes. He has a 36.00 pack-year smoking history. He has never used smokeless tobacco. He reports current alcohol use. He reports that he does not use drugs.  MEDICATIONS:  Current Outpatient Medications   Medication Sig  "Dispense Refill     acetaminophen (TYLENOL) 500 MG tablet Take 1,000 mg by mouth 3 times daily as needed       famotidine (PEPCID) 20 MG tablet Take 1 tablet (20 mg) by mouth 2 times daily       metoprolol tartrate (LOPRESSOR) 25 MG tablet Take 6.25 mg by mouth 2 times daily       multivitamin w/minerals (THERA-VIT-M) tablet Take 1 tablet by mouth daily       polyethylene glycol (MIRALAX) 17 g packet Take 1 packet by mouth daily       rivaroxaban ANTICOAGULANT (XARELTO) 20 MG TABS tablet Take 20 mg by mouth daily (with dinner)       saline nasal (AYR SALINE) GEL topical gel Apply into each nare 2 times daily       sertraline (ZOLOFT) 25 MG tablet Take 100 mg by mouth daily       STATIN NOT PRESCRIBED (INTENTIONAL) Please choose reason not prescribed from choices below.       trolamine salicylate (ASPERCREME) 10 % external cream Apply topically 3 times daily       Case Management:  I have reviewed the care plan and MDS and do agree with the plan. Patient's desire to return to the community is not assessible due to cognitive impairment. Information reviewed:  Medications, vital signs, orders, and nursing notes.    ROS: Unobtainable secondary to aphasia but gestures and short discussion included above.    Vitals: /71   Pulse 81   Temp 98.8  F (37.1  C)   Resp 16   Ht 1.651 m (5' 5\")   Wt 78 kg (172 lb)   SpO2 97%   BMI 28.62 kg/m    Exam:  GENERAL APPEARANCE: Alert, in no distress, cooperative.   ENT: Mouth/posterior oropharynx intact w/ moist mucous membranes, hearing acuity [place].  EYES: EOM, conjunctivae, lids, pupils and irises normal, PERRL2.   RESP: Respiratory effort good, no respiratory distress, Lung sounds clear. On RA.   CV: Auscultation of heart reveals S1, S2, rate controlled and rhythm irregular, no murmur, no rub or gallop, Edema 0+ BLE. Peripheral pulses are 2+.  ABDOMEN/: Normal bowel sounds, soft, non-tender abdomen, and no masses palpated. Flores w/ leg bag present, CYU.   SKIN: " Inspection/Palpation of skin and subcutaneous tissue baseline w/ fragility. No wounds/rashes noted.   NEURO: CN II-XII at patient's baseline, sensation baseline PPS.  PSYCH: Insight, judgement, and memory are impaired at baseline, affect and mood are fatigued, but happy.    Lab/Diagnostic data:   Recent labs in [facility] reviewed by me today.       ASSESSMENT/PLAN  Fall, initial encounter  Malaise  Generalized muscle weakness  Expressive aphasia  Acute cystitis with hematuria  Acute on chronic. Ongoing.    Noting new onset fall and malaise secondary to what UA/UC found to be complex UTI with organisms that include MRSA (which is likely colonized).    Nursing and in-house therapy department helping patient to utilize walker for these periods of weakness, and this is helping to maintain safety.    Will prescribe Macrobid as all bacteria are sensitive, catheter was changed, and patient is eating well.  Follow up routinely or as needed.    Orders:  1. Macrobid 100mg PO Q12h x 5 days. Dx: UTI.     Electronically signed by:  CAROLANN Pepe CNP DNP          Sincerely,        CAROLANN Sheldon CNP

## 2022-05-10 NOTE — PROGRESS NOTES
[facility] Regulatory  Chief Complaint   Patient presents with     [facility] Regulatory   [place] MRN: [id number] Place of Service where encounter took place:  [facility] () [[id number]] HPI: Simone Daniels  is 76 year old (1945), who is being seen today for a federally mandated E/M visit.  HPI information obtained from: facility chart records, facility staff, patient report, [facility] chart review, and Care Everywhere [facility] chart review. Today's concerns are:    Jay seen today per federal mandate.  Nursing reports he fell within the last week, and has not been himself.  He has been more weak than usual, and they have encouraged the use of a walker.  He has been afebrile, with normal vital signs.  His catheter was changed and a UA/UC was ordered by myself.  Today, Jay, though nonverbal was able to point to his knees when asked about pain, which is a common spot for him.  He giggles at other questions asked for HPI/ROS, but is able to nod yes/no to several items.  He is not short of breath, does not have any stomachache/nausea, and has a good appetite.    ALLERGIES:Patient has no known allergies.PAST MEDICAL HISTORY:   has a past medical history of Cerebral infarction (H) and LOW BACK PAIN. PAST SURGICAL HISTORY:   has a past surgical history that includes surgical history of -  (1979); surgical history of - ; surgical history of -  (1981); and IR Gastrostomy Tube Percutaneous Plcmnt (12/3/2020).FAMILY HISTORY: family history includes C.A.D. in his brother and mother.SOCIAL HISTORY:  reports that he quit smoking about 42 years ago. His smoking use included cigarettes. He has a 36.00 pack-year smoking history. He has never used smokeless tobacco. He reports current alcohol use. He reports that he does not use drugs.  MEDICATIONS:  Current Outpatient Medications   Medication Sig Dispense Refill     acetaminophen (TYLENOL) 500 MG tablet Take 1,000 mg by mouth 3 times daily as needed        "famotidine (PEPCID) 20 MG tablet Take 1 tablet (20 mg) by mouth 2 times daily       metoprolol tartrate (LOPRESSOR) 25 MG tablet Take 6.25 mg by mouth 2 times daily       multivitamin w/minerals (THERA-VIT-M) tablet Take 1 tablet by mouth daily       polyethylene glycol (MIRALAX) 17 g packet Take 1 packet by mouth daily       rivaroxaban ANTICOAGULANT (XARELTO) 20 MG TABS tablet Take 20 mg by mouth daily (with dinner)       saline nasal (AYR SALINE) GEL topical gel Apply into each nare 2 times daily       sertraline (ZOLOFT) 25 MG tablet Take 100 mg by mouth daily       STATIN NOT PRESCRIBED (INTENTIONAL) Please choose reason not prescribed from choices below.       trolamine salicylate (ASPERCREME) 10 % external cream Apply topically 3 times daily       Case Management:  I have reviewed the care plan and MDS and do agree with the plan. Patient's desire to return to the community is not assessible due to cognitive impairment. Information reviewed:  Medications, vital signs, orders, and nursing notes.    ROS: Unobtainable secondary to aphasia but gestures and short discussion included above.    Vitals: /71   Pulse 81   Temp 98.8  F (37.1  C)   Resp 16   Ht 1.651 m (5' 5\")   Wt 78 kg (172 lb)   SpO2 97%   BMI 28.62 kg/m    Exam:  GENERAL APPEARANCE: Alert, in no distress, cooperative.   ENT: Mouth/posterior oropharynx intact w/ moist mucous membranes, hearing acuity [place].  EYES: EOM, conjunctivae, lids, pupils and irises normal, PERRL2.   RESP: Respiratory effort good, no respiratory distress, Lung sounds clear. On RA.   CV: Auscultation of heart reveals S1, S2, rate controlled and rhythm irregular, no murmur, no rub or gallop, Edema 0+ BLE. Peripheral pulses are 2+.  ABDOMEN/: Normal bowel sounds, soft, non-tender abdomen, and no masses palpated. Flores w/ leg bag present, CYU.   SKIN: Inspection/Palpation of skin and subcutaneous tissue baseline w/ fragility. No wounds/rashes noted.   NEURO: CN II-XII " at patient's baseline, sensation baseline PPS.  PSYCH: Insight, judgement, and memory are impaired at baseline, affect and mood are fatigued, but happy.    Lab/Diagnostic data:   Recent labs in [facility] reviewed by me today.       ASSESSMENT/PLAN  Fall, initial encounter  Malaise  Generalized muscle weakness  Expressive aphasia  Acute cystitis with hematuria  Acute on chronic. Ongoing.    Noting new onset fall and malaise secondary to what UA/UC found to be complex UTI with organisms that include MRSA (which is likely colonized).    Nursing and in-house therapy department helping patient to utilize walker for these periods of weakness, and this is helping to maintain safety.    Will prescribe Macrobid as all bacteria are sensitive, catheter was changed, and patient is eating well.  Follow up routinely or as needed.    Orders:  1. Macrobid 100mg PO Q12h x 5 days. Dx: UTI.     Electronically signed by:  Dr. Gabriela Borrero, CAROLANN CNP DNP

## 2022-06-02 NOTE — LETTER
6/2/2022        RE: Simone Daniels  [employer]  [address]  [facility] [address]        [place] GERIATRIC SERVICES  [place] Medical Record Number:  [id number]  [facility] [[id number]  Chief Complaint   Patient presents with     Nursing Home Acute       HPI:    Simone Daniels  is a 77 year old (1945), who is being seen today for an episodic care visit.  HPI information obtained from: facility chart records, facility staff and [facility] chart review. Today's concern is:    Recurrent falls, nurse manager reports patient is declining, moaning today, less responsive, x-ray negative, in pain.  Baseline non-verbal.  Now in wheelchair.    Past Medical and Surgical History reviewed in Epic today.    MEDICATIONS  Current Outpatient Medications   Medication Sig Dispense Refill     morphine 10 MG/5ML solution Take 2.5 mLs (5 mg) by mouth every 6 hours. May also take 2.5 mLs (5 mg) every 4 hours as needed for pain. Do all this for 3 days.  0     acetaminophen (TYLENOL) 500 MG tablet Take 1,000 mg by mouth 3 times daily as needed       famotidine (PEPCID) 20 MG tablet Take 1 tablet (20 mg) by mouth 2 times daily       metoprolol tartrate (LOPRESSOR) 25 MG tablet Take 6.25 mg by mouth 2 times daily       multivitamin w/minerals (THERA-VIT-M) tablet Take 1 tablet by mouth daily       polyethylene glycol (MIRALAX) 17 g packet Take 1 packet by mouth daily       rivaroxaban ANTICOAGULANT (XARELTO) 20 MG TABS tablet Take 20 mg by mouth daily (with dinner)       saline nasal (AYR SALINE) GEL topical gel Apply into each nare 2 times daily       sertraline (ZOLOFT) 25 MG tablet Take 100 mg by mouth daily       STATIN NOT PRESCRIBED (INTENTIONAL) Please choose reason not prescribed from choices below.       trolamine salicylate (ASPERCREME) 10 % external cream Apply topically 3 times daily       REVIEW OF SYSTEMS: Unobtainable secondary to cognitive  "impairment.  and Unobtainable secondary to aphasia.     Objective:  /78   Pulse 87   Temp 97.4  F (36.3  C)   Resp 15   Ht 1.651 m (5' 5\")   Wt 73 kg (161 lb)   SpO2 98%   BMI 26.79 kg/m    Exam:  General: in the [place] surrounded by the nurses tired looking  Lung: shallow but not rapid.  Psych: confused, non interacting.     Labs: Reviewed in the chart and EHR.        ASSESSMENT/PLAN:  -------------------------------  Fall, initial encounter  Acute pain  Generalized muscle weakness  Expressive aphasia  Permanent atrial fibrillation (H)  Late effects of cerebral ischemic stroke  Diastolic dysfunction  Neurogenic bladder  Essential hypertension  Right hemiparesis (H)  Recent acute cystitis  - recurrent fall, progressive decline. Life expectancy is less than six months if the illness takes its natural trajectory.   - recommend hospice care / comfort care.   - discussed with the nurse manager  - discuss with the Guardian.     Start Morphine 5 mg PO Q6H scheduled and 5 mg PO Q4H PRN      Electronically signed by:  Rome Mobley MD        Sincerely,        Rome Mobley MD      "

## 2022-06-02 NOTE — PROGRESS NOTES
[facility]  [facility] Record Number:  [id number]  Place of Service where encounter took place:  [facility] () [[id number]]  Chief Complaint   Patient presents with     Nursing Home Acute       HPI:    Simone Daniels  is a 77 year old (1945), who is being seen today for an episodic care visit.  HPI information obtained from: facility chart records, facility staff and [facility] chart review. Today's concern is:    Recurrent falls, nurse manager reports patient is declining, moaning today, less responsive, x-ray negative, in pain.  Baseline non-verbal.  Now in wheelchair.    Past Medical and Surgical History reviewed in Epic today.    MEDICATIONS  Current Outpatient Medications   Medication Sig Dispense Refill     morphine 10 MG/5ML solution Take 2.5 mLs (5 mg) by mouth every 6 hours. May also take 2.5 mLs (5 mg) every 4 hours as needed for pain. Do all this for 3 days.  0     acetaminophen (TYLENOL) 500 MG tablet Take 1,000 mg by mouth 3 times daily as needed       famotidine (PEPCID) 20 MG tablet Take 1 tablet (20 mg) by mouth 2 times daily       metoprolol tartrate (LOPRESSOR) 25 MG tablet Take 6.25 mg by mouth 2 times daily       multivitamin w/minerals (THERA-VIT-M) tablet Take 1 tablet by mouth daily       polyethylene glycol (MIRALAX) 17 g packet Take 1 packet by mouth daily       rivaroxaban ANTICOAGULANT (XARELTO) 20 MG TABS tablet Take 20 mg by mouth daily (with dinner)       saline nasal (AYR SALINE) GEL topical gel Apply into each nare 2 times daily       sertraline (ZOLOFT) 25 MG tablet Take 100 mg by mouth daily       STATIN NOT PRESCRIBED (INTENTIONAL) Please choose reason not prescribed from choices below.       trolamine salicylate (ASPERCREME) 10 % external cream Apply topically 3 times daily       REVIEW OF SYSTEMS: Unobtainable secondary to cognitive impairment.  and Unobtainable secondary to aphasia.     Objective:  /78   Pulse 87   Temp 97.4  F  "(36.3  C)   Resp 15   Ht 1.651 m (5' 5\")   Wt 73 kg (161 lb)   SpO2 98%   BMI 26.79 kg/m    Exam:  General: in the [place] surrounded by the nurses tired looking  Lung: shallow but not rapid.  Psych: confused, non interacting.     Labs: Reviewed in the chart and EHR.        ASSESSMENT/PLAN:  -------------------------------  Fall, initial encounter  Acute pain  Generalized muscle weakness  Expressive aphasia  Permanent atrial fibrillation (H)  Late effects of cerebral ischemic stroke  Diastolic dysfunction  Neurogenic bladder  Essential hypertension  Right hemiparesis (H)  Recent acute cystitis  - recurrent fall, progressive decline. Life expectancy is less than six months if the illness takes its natural trajectory.   - recommend hospice care / comfort care.   - discussed with the nurse manager  - discuss with the Guardian.     Start Morphine 5 mg PO Q6H scheduled and 5 mg PO Q4H PRN      Electronically signed by:  Rome Mobley MD  "

## 2023-11-08 NOTE — TELEPHONE ENCOUNTER
MEDICAL RECORDS REQUEST   [facility]  [address] [place] [address]  PHONE: [phone]  Fax: [phone]        FUTURE VISIT INFORMATION                                                   Simone Daniels, : 1948 scheduled for future visit at [facility]    APPOINTMENT INFORMATION:    Date: 2021 8AM    Provider:  HENRIETTA Blackman    Reason for Visit/Diagnosis: TOV Retention     REFERRAL INFORMATION:    Referring provider:  N/a    Specialty: N/A    Referring providers clinic:  [facility]    Clinic contact number:  N/A    RECORDS REQUESTED FOR VISIT                                                     NOTES  STATUS/DETAILS   OFFICE NOTE from referring provider  yes   OFFICE NOTE from other specialist  no   DISCHARGE SUMMARY from hospital  yes   DISCHARGE REPORT from the ER  yes   OPERATIVE REPORT  yes   MEDICATION LIST  yes     PRE-VISIT CHECKLIST      Record collection complete Yes   Appointment appropriately scheduled           (right time/right provider) Yes   MyChart activation If no, please explain: In process   Questionnaire complete If no, please explain: In process      Completed by: Jodi Patricia  
None

## (undated) RX ORDER — LIDOCAINE HYDROCHLORIDE 10 MG/ML
INJECTION, SOLUTION EPIDURAL; INFILTRATION; INTRACAUDAL; PERINEURAL
Status: DISPENSED
Start: 2020-12-03

## (undated) RX ORDER — FENTANYL CITRATE 50 UG/ML
INJECTION, SOLUTION INTRAMUSCULAR; INTRAVENOUS
Status: DISPENSED
Start: 2020-12-03

## (undated) RX ORDER — LIDOCAINE HYDROCHLORIDE 20 MG/ML
JELLY TOPICAL
Status: DISPENSED
Start: 2020-12-03

## (undated) RX ORDER — LIDOCAINE HYDROCHLORIDE 20 MG/ML
JELLY TOPICAL
Status: DISPENSED
Start: 2021-01-01

## (undated) RX ORDER — LIDOCAINE HYDROCHLORIDE 20 MG/ML
SOLUTION OROPHARYNGEAL
Status: DISPENSED
Start: 2020-11-30